# Patient Record
Sex: FEMALE | Race: WHITE | Employment: FULL TIME | URBAN - METROPOLITAN AREA
[De-identification: names, ages, dates, MRNs, and addresses within clinical notes are randomized per-mention and may not be internally consistent; named-entity substitution may affect disease eponyms.]

---

## 2017-01-05 ENCOUNTER — OFFICE VISIT (OUTPATIENT)
Dept: FAMILY MEDICINE CLINIC | Age: 47
End: 2017-01-05

## 2017-01-05 VITALS
DIASTOLIC BLOOD PRESSURE: 76 MMHG | RESPIRATION RATE: 18 BRPM | WEIGHT: 121.6 LBS | SYSTOLIC BLOOD PRESSURE: 112 MMHG | HEART RATE: 94 BPM | HEIGHT: 62 IN | BODY MASS INDEX: 22.38 KG/M2 | TEMPERATURE: 97.8 F

## 2017-01-05 DIAGNOSIS — R06.2 WHEEZING: ICD-10-CM

## 2017-01-05 DIAGNOSIS — R05.9 COUGH: ICD-10-CM

## 2017-01-05 DIAGNOSIS — J32.0 MAXILLARY SINUSITIS, UNSPECIFIED CHRONICITY: Primary | ICD-10-CM

## 2017-01-05 DIAGNOSIS — B37.0 ORAL THRUSH: ICD-10-CM

## 2017-01-05 DIAGNOSIS — R06.02 SOB (SHORTNESS OF BREATH): ICD-10-CM

## 2017-01-05 RX ORDER — BENZONATATE 100 MG/1
100 CAPSULE ORAL
Qty: 20 CAP | Refills: 0 | Status: SHIPPED | OUTPATIENT
Start: 2017-01-05 | End: 2017-01-12

## 2017-01-05 RX ORDER — ALBUTEROL SULFATE 90 UG/1
2 AEROSOL, METERED RESPIRATORY (INHALATION)
Qty: 1 INHALER | Refills: 4 | Status: SHIPPED | OUTPATIENT
Start: 2017-01-05 | End: 2020-09-05

## 2017-01-05 RX ORDER — NYSTATIN 100000 [USP'U]/ML
1 SUSPENSION ORAL 4 TIMES DAILY
Qty: 100 ML | Refills: 0 | Status: SHIPPED | OUTPATIENT
Start: 2017-01-05 | End: 2017-07-04

## 2017-01-05 RX ORDER — ALBUTEROL SULFATE 90 UG/1
AEROSOL, METERED RESPIRATORY (INHALATION)
Refills: 0 | COMMUNITY
Start: 2016-10-17 | End: 2017-01-05

## 2017-01-05 RX ORDER — LEVOFLOXACIN 500 MG/1
500 TABLET, FILM COATED ORAL DAILY
Qty: 7 TAB | Refills: 0 | Status: SHIPPED | OUTPATIENT
Start: 2017-01-05 | End: 2017-01-12

## 2017-01-05 NOTE — MR AVS SNAPSHOT
Visit Information Date & Time Provider Department Dept. Phone Encounter #  
 1/5/2017  9:50 AM Farzaneh Chacon, Carley Perez [place] [phone] [id number] Follow-up Instructions Return if symptoms worsen or fail to improve. Upcoming Health Maintenance Date Due  
 PAP AKA CERVICAL CYTOLOGY 4/8/2015 INFLUENZA AGE 9 TO ADULT 8/1/2016 DTaP/Tdap/Td series (2 - Td) 10/8/2024 Allergies as of 1/5/2017  Review Complete On: 1/5/2017 By: Farzaneh Chacon MD  
  
 Severity Noted Reaction Type Reactions Cephalosporins  04/19/2010    Hives Penicillins  04/19/2010    Hives Tetracyclines  04/19/2010    Hives Current Immunizations  Never Reviewed Name Date Tdap 10/8/2014 Not reviewed this visit You Were Diagnosed With   
  
 Codes Comments Maxillary sinusitis, unspecified chronicity    -  Primary ICD-10-CM: J32.0 ICD-9-CM: 473.0 Wheezing     ICD-10-CM: R06.2 ICD-9-CM: 786.07   
 SOB (shortness of breath)     ICD-10-CM: R06.02 
ICD-9-CM: 786.05 Oral thrush     ICD-10-CM: B37.0 ICD-9-CM: 112.0 Vitals BP Pulse Temp Resp Height(growth percentile) Weight(growth percentile) 112/76 94 97.8 °F (36.6 °C) (Oral) 18 5' 2\" (1.575 m) 121 lb 9.6 oz (55.2 kg) BMI OB Status Smoking Status 22.24 kg/m2 Ablation Former Smoker BMI and BSA Data Body Mass Index Body Surface Area  
 22.24 kg/m 2 1.55 m 2 Preferred Pharmacy Pharmacy Name Phone [facility] [address] AT 1 [facility] [phone] Your Updated Medication List  
  
   
This list is accurate as of: 1/5/17 11:01 AM.  Always use your most recent med list. ADVIL COLD AND SINUS  mg Cap Generic drug:  Pseudoephedrine-Ibuprofen Take  by mouth. albuterol 90 mcg/actuation inhaler Commonly known as:  PROVENTIL HFA, VENTOLIN HFA, PROAIR HFA  
 Take 2 Puffs by inhalation every four (4) hours as needed for Wheezing or Shortness of Breath. AMPHETAMINE SALT COMBO 20 mg tablet Generic drug:  dextroamphetamine-amphetamine Take 20 mg by mouth. ATIVAN 1 mg tablet Generic drug:  LORazepam  
Take 1 mg by mouth every eight (8) hours as needed. May take up to four times a day CENTRUM SILVER WOMEN PO Take 1 Tab by mouth daily. clonazePAM 1 mg tablet Commonly known as:  [place] Boop Take 1 mg by mouth two (2) times a day. levoFLOXacin 500 mg tablet Commonly known as:  Aldean Lefort Take 1 Tab by mouth daily for 7 days. nystatin 100,000 unit/mL suspension Commonly known as:  MYCOSTATIN Take 5 mL by mouth four (4) times daily. swish and swallow  
  
 traZODone 50 mg tablet Commonly known as:  Lemmie Radha Take 100 mg by mouth nightly. May have up to three tablets at bedtime Prescriptions Sent to Pharmacy Refills  
 albuterol (PROVENTIL HFA, VENTOLIN HFA, PROAIR HFA) 90 mcg/actuation inhaler 4 Sig: Take 2 Puffs by inhalation every four (4) hours as needed for Wheezing or Shortness of Breath. Class: Normal  
 Pharmacy: [facility] [address] [place] [address] [address] AT [facility] of [address] ([facility] Ph #: [phone] Route: Inhalation  
 levoFLOXacin (LEVAQUIN) 500 mg tablet 0 Sig: Take 1 Tab by mouth daily for 7 days. Class: Normal  
 Pharmacy: [facility] [place] [address] AT [facility] of [address] ([facility] Ph #: [phone] Route: Oral  
 nystatin (MYCOSTATIN) 100,000 unit/mL suspension 0 Sig: Take 5 mL by mouth four (4) times daily. swish and swallow Class: Normal  
 Pharmacy: [facility] [address] [place] [address] [address] AT [facility] of [address] ([facility] Ph #: [phone] Route: Oral  
  
Follow-up Instructions Return if symptoms worsen or fail to improve. Patient Instructions - Mucinex as needed for expectoration - Sudafed as needed for congestion - Robitussin as needed for cough - Zyrtec or Allegra or Claritin as needed for congestion 
- rest 
- plenty of fluids 
- albuterol inhaler every 4 hours 
- follow up if not feeling better in 3 days or if symptoms get worse Candidiasis: Care Instructions Your Care Instructions Candidiasis (say \"xcd-rks-HD-uh-shy\") is a yeast infection. Yeast normally lives in your body. But it can cause problems if your body's defenses don't work as they should. Some medicines can increase your chance of getting a yeast infection. These include antibiotics, steroids, and cancer drugs. And some diseases like AIDS and diabetes can make you more likely to get yeast infections. There are different types of yeast infections. Miya Swan is a yeast infection in the mouth. It usually occurs in people with weak immune systems. It causes white patches inside the mouth and throat. Yeast infections of the skin usually occur in skin folds where the skin stays moist. They cause red, oozing patches on your skin. Babies can get these infections under the diaper. People who often wear gloves can get them on their hands. Many women get vaginal yeast infections. They are most common when women take antibiotics. These infections can cause the vagina to itch and burn. They also cause white discharge that looks like cottage cheese. In rare cases, yeast infects the blood. This can cause serious disease. This kind of infection is treated with medicine given through a needle into a vein (IV). After you start treatment, a yeast infection usually goes away quickly. But if your immune system is weak, the infection may come back. Tell your doctor if you get yeast infections often. Follow-up care is a key part of your treatment and safety.  Be sure to make and go to all appointments, and call your doctor if you are having problems. It's also a good idea to know your test results and keep a list of the medicines you take. How can you care for yourself at home? · Take your medicines exactly as prescribed. Call your doctor if you think you are having a problem with your medicine. · Use antibiotics only as directed by your doctor. · Eat yogurt with live cultures. It has bacteria called lactobacillus. It may help prevent some types of yeast infections. · Keep your skin clean and dry. Put powder on moist places. · If you are using a cream or suppository to treat a vaginal yeast infection, don't use condoms or a diaphragm. Use a different type of birth control. · Eat a healthy diet and get regular exercise. This will help keep your immune system strong. When should you call for help? Call your doctor now or seek immediate medical care if: 
· You have a fever. · You are pregnant and have signs of a vaginal or urinary tract infection such as: ¨ Severe itching in your vagina. ¨ Pain during sex or when you urinate. ¨ Unusual discharge from your vagina. ¨ A frequent urge to urinate. ¨ Urine that is cloudy or smells bad. Watch closely for changes in your health, and be sure to contact your doctor if: 
· You do not get better as expected. Where can you learn more? Go to http://abdifatah-mariusz.info/. Enter L540 in the search box to learn more about \"Candidiasis: Care Instructions. \" Current as of: February 5, 2016 Content Version: 11.1 © 2869-3928 [employer]. Care instructions adapted under license by [employer] (which disclaims liability or warranty for this information). If you have questions about a medical condition or this instruction, always ask your healthcare professional. [address] any warranty or liability for your use of this information. Introducing \[facility]\"" & HEALTH SERVICES! Dear Rolene Prim: Thank you for requesting a Wamba account. Our records indicate that you already have an active Wamba account. You can access your account anytime at [email or web address] Did you know that you can access your hospital and ER discharge instructions at any time in Wamba? You can also review all of your test results from your hospital stay or ER visit. Additional Information If you have questions, please visit the Frequently Asked Questions section of the Wamba website at [email or web address] Remember, Wamba is NOT to be used for urgent needs. For medical emergencies, dial 911. Now available from your iPhone and Android! Please provide this summary of care documentation to your next provider. Your primary care clinician is listed as Joanie Soto. If you have any questions after today's visit, please call [phone].

## 2017-01-05 NOTE — PROGRESS NOTES
Hamida Vega is a 40 y.o. female with history of Crohn's disease, peripheral neuropathy, depression, Vit B 12 deficiency who presents with cold and congestion. History provided by: patient     HPI    Cold symptoms for 4 months, was seen by employee health and was given zpak in early December. CXR did not show PNA. Symptoms have acutely worsened. Looses her voice and it comes back. Cough with brown, green, yellow sputum, left ear fullness and runny nose  Temperature .3 F. Last night was 100.3F. No bodyaches     Needing proair 2 times a day for last 4 months, prior to that not needing it. Positive SOB and wheezing. No asthma or COPD. Former smoker quit 5 months ago. Painful mouth, felt on fire. White coating over the tongue. Did gargles and mouthwash. scrubbed with her toothbrush. Using netipot, taking advil cold and sinus without relief. Has tried mucinex too which did not work. Patient Active Problem List   Diagnosis Code    [facility] K50.90    Depression F32.9    Vertigo R42    Vitamin B12 deficiency E53.8    Peripheral neuropathy (HCC) G62.9    Nonspecific abnormal electrocardiogram (ECG) (EKG) R94.31          Current Outpatient Prescriptions:     Pseudoephedrine-Ibuprofen (ADVIL COLD AND SINUS)  mg cap, Take  by mouth., Disp: , Rfl:     albuterol (PROVENTIL HFA, VENTOLIN HFA, PROAIR HFA) 90 mcg/actuation inhaler, Take 2 Puffs by inhalation every four (4) hours as needed for Wheezing or Shortness of Breath., Disp: 1 Inhaler, Rfl: 4    levoFLOXacin (LEVAQUIN) 500 mg tablet, Take 1 Tab by mouth daily for 7 days. , Disp: 7 Tab, Rfl: 0    nystatin (MYCOSTATIN) 100,000 unit/mL suspension, Take 5 mL by mouth four (4) times daily. swish and swallow, Disp: 100 mL, Rfl: 0    benzonatate (TESSALON) 100 mg capsule, Take 1 Cap by mouth three (3) times daily as needed for Cough for up to 7 days. , Disp: 20 Cap, Rfl: 0    AMPHETAMINE SALT COMBO 20 mg tablet, Take 20 mg by mouth., Disp: , Rfl: 0    clonazePAM (KLONOPIN) 1 mg tablet, Take 1 mg by mouth two (2) times a day., Disp: , Rfl:     MULTIVITS-MIN/IRON/FA/LUTEIN (CENTRUM SILVER WOMEN PO), Take 1 Tab by mouth daily. , Disp: , Rfl:     trazodone (DESYREL) 50 mg tablet, Take 100 mg by mouth nightly. May have up to three tablets at bedtime, Disp: , Rfl:     lorazepam (ATIVAN) 1 mg tablet, Take 1 mg by mouth every eight (8) hours as needed. May take up to four times a day, Disp: , Rfl:      Allergies   Allergen Reactions    Cephalosporins Hives    Penicillins Hives    Tetracyclines Hives        Past Medical History   Diagnosis Date    [facility]      Crohn's Disease    Crohn disease ([facility] 4/19/2010    Crohn's     Depression 4/19/2010    Vertigo        ROS  As stated in HPI    Physical Exam   Constitutional: She is well-developed, well-nourished, and in no distress. /76  Pulse 94  Temp 97.8 °F (36.6 °C) (Oral)   Resp 18  Ht 5' 2\" (1.575 m)  Wt 121 lb 9.6 oz (55.2 kg)  BMI 22.24 kg/m2    HENT:   Head: Normocephalic and atraumatic. Mouth/Throat: No oropharyngeal exudate. Positive bilateral maxillary sinus tenderness  Nasal congestion  Left ear effusion, no erythema  Tongue is spotted area of white thick discharge   Eyes: Conjunctivae are normal. Pupils are equal, round, and reactive to light. Right eye exhibits no discharge. Left eye exhibits no discharge. No scleral icterus. Neck: Neck supple. Cardiovascular: Normal rate, regular rhythm, normal heart sounds and intact distal pulses. Exam reveals no gallop and no friction rub. No murmur heard. Pulmonary/Chest: Effort normal. She has no wheezes. She has no rales. Coarse breath sounds   Lymphadenopathy:     She has no cervical adenopathy. Vitals reviewed.     Assessment/Plan:   Petra Bruno is a 40 y.o. female with history of Crohn's disease, peripheral neuropathy, depression, Vit B 12 deficiency who presents with cold and congestion. ICD-10-CM ICD-9-CM    1. Maxillary sinusitis, unspecified chronicity J32.0 473.0 levoFLOXacin (LEVAQUIN) 500 mg tablet   2. Wheezing R06.2 786.07 albuterol (PROVENTIL HFA, VENTOLIN HFA, PROAIR HFA) 90 mcg/actuation inhaler   3. SOB (shortness of breath) R06.02 786.05 albuterol (PROVENTIL HFA, VENTOLIN HFA, PROAIR HFA) 90 mcg/actuation inhaler   4. Oral thrush B37.0 112.0 nystatin (MYCOSTATIN) 100,000 unit/mL suspension   5. Cough R05 786.2 benzonatate (TESSALON) 100 mg capsule     1. Maxillary sinusitis, unspecified chronicity  Acute worsening. Symptoms for 4 months. Has had zpak no relief. Allergic to PCN, tetracycline and cephalosporins.  - levoFLOXacin (LEVAQUIN) 500 mg tablet; Take 1 Tab by mouth daily for 7 days. Dispense: 7 Tab; Refill: 0  - Mucinex as needed for expectoration  - Sudafed as needed for congestion  - Robitussin as needed for cough  - Zyrtec or Allegra or Claritin as needed for congestion  - rest  - plenty of fluids  - albuterol inhaler every 4 hours  - follow up if not feeling better in 3 days or if symptoms get worse    2. Wheezing/SOB  Refills  - albuterol (PROVENTIL HFA, VENTOLIN HFA, PROAIR HFA) 90 mcg/actuation inhaler; Take 2 Puffs by inhalation every four (4) hours as needed for Wheezing or Shortness of Breath. Dispense: 1 Inhaler; Refill: 4    3. Oral thrush  - nystatin (MYCOSTATIN) 100,000 unit/mL suspension; Take 5 mL by mouth four (4) times daily. swish and swallow  Dispense: 100 mL; Refill: 0    4. Cough  - benzonatate (TESSALON) 100 mg capsule; Take 1 Cap by mouth three (3) times daily as needed for Cough for up to 7 days. Dispense: 20 Cap; Refill: 0    Follow-up Disposition:  Return if symptoms worsen or fail to improve. I have discussed the diagnosis with the patient and the intended plan as seen in the above orders. The patient has received an after-visit summary and questions were answered concerning future plans.   I have discussed medication side effects and warnings with the patient as well.     Chely Gaviria MD  Family Medicine Resident (PGY-3)  1/5/2017

## 2017-01-05 NOTE — PROGRESS NOTES
Malika Whitmore is a 40 y.o. female  Chief Complaint   Patient presents with    Cold Symptoms     cough, nasal congestion, chest congestion, fevers  x 4 months    Mouth Pain     reports severe pain in mouth x 5 days, states had white coating on tongue but scrubbed it off     1. Have you been to the ER, urgent care clinic since your last visit? Hospitalized since your last visit? No    2. Have you seen or consulted any other health care providers outside of the [address] [place] [address] since your last visit? Include any pap smears or colon screening. [address] for URI Early December 2016.

## 2017-01-05 NOTE — PATIENT INSTRUCTIONS
- Mucinex as needed for expectoration  - Sudafed as needed for congestion  - Robitussin as needed for cough  - Zyrtec or Allegra or Claritin as needed for congestion  - rest  - plenty of fluids  - albuterol inhaler every 4 hours  - follow up if not feeling better in 3 days or if symptoms get worse     Candidiasis: Care Instructions  Your Care Instructions  Candidiasis (say \"fri-mwu-DL-uh-shy\") is a yeast infection. Yeast normally lives in your body. But it can cause problems if your body's defenses don't work as they should. Some medicines can increase your chance of getting a yeast infection. These include antibiotics, steroids, and cancer drugs. And some diseases like AIDS and diabetes can make you more likely to get yeast infections. There are different types of yeast infections. Talon Bushy is a yeast infection in the mouth. It usually occurs in people with weak immune systems. It causes white patches inside the mouth and throat. Yeast infections of the skin usually occur in skin folds where the skin stays moist. They cause red, oozing patches on your skin. Babies can get these infections under the diaper. People who often wear gloves can get them on their hands. Many women get vaginal yeast infections. They are most common when women take antibiotics. These infections can cause the vagina to itch and burn. They also cause white discharge that looks like cottage cheese. In rare cases, yeast infects the blood. This can cause serious disease. This kind of infection is treated with medicine given through a needle into a vein (IV). After you start treatment, a yeast infection usually goes away quickly. But if your immune system is weak, the infection may come back. Tell your doctor if you get yeast infections often. Follow-up care is a key part of your treatment and safety. Be sure to make and go to all appointments, and call your doctor if you are having problems.  It's also a good idea to know your test results and keep a list of the medicines you take. How can you care for yourself at home? · Take your medicines exactly as prescribed. Call your doctor if you think you are having a problem with your medicine. · Use antibiotics only as directed by your doctor. · Eat yogurt with live cultures. It has bacteria called lactobacillus. It may help prevent some types of yeast infections. · Keep your skin clean and dry. Put powder on moist places. · If you are using a cream or suppository to treat a vaginal yeast infection, don't use condoms or a diaphragm. Use a different type of birth control. · Eat a healthy diet and get regular exercise. This will help keep your immune system strong. When should you call for help? Call your doctor now or seek immediate medical care if:  · You have a fever. · You are pregnant and have signs of a vaginal or urinary tract infection such as:  ¨ Severe itching in your vagina. ¨ Pain during sex or when you urinate. ¨ Unusual discharge from your vagina. ¨ A frequent urge to urinate. ¨ Urine that is cloudy or smells bad. Watch closely for changes in your health, and be sure to contact your doctor if:  · You do not get better as expected. Where can you learn more? Go to http://abdifatah-mariusz.info/. Enter C211 in the search box to learn more about \"Candidiasis: Care Instructions. \"  Current as of: February 5, 2016  Content Version: 11.1  © 9001-6825 [employer]. Care instructions adapted under license by [employer] (which disclaims liability or warranty for this information). If you have questions about a medical condition or this instruction, always ask your healthcare professional. [address] any warranty or liability for your use of this information.

## 2017-01-06 NOTE — PROGRESS NOTES
I reviewed the patient's medical history, the resident's findings on physical examination, the patient's diagnoses, and treatment plan as documented in the resident note. I concur with the treatment plan as documented. Additional suggestions noted.

## 2017-03-15 ENCOUNTER — OFFICE VISIT (OUTPATIENT)
Dept: FAMILY MEDICINE CLINIC | Age: 47
End: 2017-03-15

## 2017-03-15 VITALS
BODY MASS INDEX: 23 KG/M2 | WEIGHT: 125 LBS | DIASTOLIC BLOOD PRESSURE: 74 MMHG | RESPIRATION RATE: 18 BRPM | HEIGHT: 62 IN | HEART RATE: 89 BPM | TEMPERATURE: 98.6 F | SYSTOLIC BLOOD PRESSURE: 115 MMHG | OXYGEN SATURATION: 97 %

## 2017-03-15 DIAGNOSIS — M54.50 ACUTE MIDLINE LOW BACK PAIN WITHOUT SCIATICA: Primary | ICD-10-CM

## 2017-03-15 DIAGNOSIS — J32.9 SINUSITIS, UNSPECIFIED CHRONICITY, UNSPECIFIED LOCATION: ICD-10-CM

## 2017-03-15 DIAGNOSIS — E53.8 VITAMIN B12 DEFICIENCY: ICD-10-CM

## 2017-03-15 RX ORDER — CYANOCOBALAMIN 1000 UG/ML
1000 INJECTION, SOLUTION INTRAMUSCULAR; SUBCUTANEOUS ONCE
Qty: 1 ML | Refills: 0
Start: 2017-03-15 | End: 2017-03-15

## 2017-03-15 NOTE — PROGRESS NOTES
Chief Complaint   Patient presents with    Sinus Pain     pt states she feels drunk, and ear popping    Back Pain     1. Have you been to the ER, urgent care clinic since your last visit? Hospitalized since your last visit? No    2. Have you seen or consulted any other health care providers outside of the [address] [place] [address] since your last visit? Include any pap smears or colon screening.  No

## 2017-03-15 NOTE — PATIENT INSTRUCTIONS
Learning About How to Have a Healthy Back  What causes back pain? Back pain is often caused by overuse, strain, or injury. For example, people often hurt their backs playing sports or working in the yard, being jolted in a car accident, or lifting something too heavy. Aging plays a part too. Your bones and muscles tend to lose strength as you age, which makes injury more likely. The spongy discs between the bones of the spine (vertebrae) may suffer from wear and tear and no longer provide enough cushion between the bones. A disc that bulges or breaks open (herniated disc) can press on nerves, causing back pain. In some people, back pain is the result of arthritis, broken vertebrae caused by bone loss (osteoporosis), illness, or a spine problem. Although most people have back pain at one time or another, there are steps you can take to make it less likely. How can you have a healthy back? Reduce stress on your back through good posture  Slumping or slouching alone may not cause low back pain. But after the back has been strained or injured, bad posture can make pain worse. · Sleep in a position that maintains your back's normal curves and on a mattress that feels comfortable. Sleep on your side with a pillow between your knees, or sleep on your back with a pillow under your knees. These positions can reduce strain on your back. · Stand and sit up straight. \"Good posture\" generally means your ears, shoulders, and hips are in a straight line. · If you must stand for a long time, put one foot on a stool, ledge, or box. Switch feet every now and then. · Sit in a chair that is low enough to let you place both feet flat on the floor with both knees nearly level with your hips. If your chair or desk is too high, use a footrest to raise your knees. Place a small pillow, a rolled-up towel, or a lumbar roll in the curve of your back if you need extra support.   · Try a kneeling chair, which helps tilt your hips forward. This takes pressure off your lower back. · Try sitting on an exercise ball. It can rock from side to side, which helps keep your back loose. · When driving, keep your knees nearly level with your hips. Sit straight, and drive with both hands on the steering wheel. Your arms should be in a slightly bent position. Reduce stress on your back through careful lifting  · Squat down, bending at the hips and knees only. If you need to, put one knee to the floor and extend your other knee in front of you, bent at a right angle (half kneeling). · Press your chest straight forward. This helps keep your upper back straight while keeping a slight arch in your low back. · Hold the load as close to your body as possible, at the level of your belly button (navel). · Use your feet to change direction, taking small steps. · Lead with your hips as you change direction. Keep your shoulders in line with your hips as you move. · Set down your load carefully, squatting with your knees and hips only. Exercise and stretch your back  · Do some exercise on most days of the week, if your doctor says it is okay. You can walk, run, swim, or cycle. · Stretch your back muscles. Here are a few exercises to try:  Clevester Sanes on your back, and gently pull one bent knee to your chest. Put that foot back on the floor, and then pull the other knee to your chest.  ¨ Do pelvic tilts. Lie on your back with your knees bent. Tighten your stomach muscles. Pull your belly button (navel) in and up toward your ribs. You should feel like your back is pressing to the floor and your hips and pelvis are slightly lifting off the floor. Hold for 6 seconds while breathing smoothly. ¨ Sit with your back flat against a wall. · Keep your core muscles strong. The muscles of your back, belly (abdomen), and buttocks support your spine. ¨ Pull in your belly and imagine pulling your navel toward your spine. Hold this for 6 seconds, then relax.  Remember to keep breathing normally as you tense your muscles. ¨ Do curl-ups. Always do them with your knees bent. Keep your low back on the floor, and curl your shoulders toward your knees using a smooth, slow motion. Keep your arms folded across your chest. If this bothers your neck, try putting your hands behind your neck (not your head), with your elbows spread apart. ¨ Lie on your back with your knees bent and your feet flat on the floor. Tighten your belly muscles, and then push with your feet and raise your buttocks up a few inches. Hold this position 6 seconds as you continue to breathe normally, then lower yourself slowly to the floor. Repeat 8 to 12 times. ¨ If you like group exercise, try Pilates or yoga. These classes have poses that strengthen the core muscles. Lead a healthy lifestyle  · Stay at a healthy weight to avoid strain on your back. · Do not smoke. Smoking increases the risk of osteoporosis, which weakens the spine. If you need help quitting, talk to your doctor about stop-smoking programs and medicines. These can increase your chances of quitting for good. Where can you learn more? Go to http://abdifatah-mariusz.info/. Enter L315 in the search box to learn more about \"Learning About How to Have a Healthy Back. \"  Current as of: May 23, 2016  Content Version: 11.1  © 1106-3822 [employer]. Care instructions adapted under license by [employer] (which disclaims liability or warranty for this information). If you have questions about a medical condition or this instruction, always ask your healthcare professional. [place] [address] any warranty or liability for your use of this information.

## 2017-03-15 NOTE — MR AVS SNAPSHOT
Visit Information Date & Time Provider Department Dept. Phone Encounter #  
 3/15/2017 10:40 AM Marilyn Brown MD [address] [place] [address] [phone] [id number] Follow-up Instructions Return in about 1 week (around 3/22/2017), or if symptoms worsen or fail to improve. Upcoming Health Maintenance Date Due  
 PAP AKA CERVICAL CYTOLOGY 4/8/2015 INFLUENZA AGE 9 TO ADULT 8/1/2016 DTaP/Tdap/Td series (2 - Td) 10/8/2024 Allergies as of 3/15/2017  Review Complete On: 3/15/2017 By: Barbra Triana LPN Severity Noted Reaction Type Reactions Cephalosporins  04/19/2010    Hives Penicillins  04/19/2010    Hives Tetracyclines  04/19/2010    Hives Current Immunizations  Never Reviewed Name Date Tdap 10/8/2014 Not reviewed this visit You Were Diagnosed With   
  
 Codes Comments Sinusitis, unspecified chronicity, unspecified location    -  Primary ICD-10-CM: J32.9 ICD-9-CM: 473.9 Acute midline low back pain without sciatica     ICD-10-CM: M54.5 ICD-9-CM: 724.2 Vitals BP Pulse Temp Resp Height(growth percentile) Weight(growth percentile) 115/74 (BP 1 Location: Left arm, BP Patient Position: Sitting) 89 98.6 °F (37 °C) (Oral) 18 5' 2\" (1.575 m) 125 lb (56.7 kg) SpO2 BMI OB Status Smoking Status 97% 22.86 kg/m2 Ablation Former Smoker Vitals History BMI and BSA Data Body Mass Index Body Surface Area  
 22.86 kg/m 2 1.57 m 2 Preferred Pharmacy Pharmacy Name Phone [facility] [address]  [facility] [phone] Your Updated Medication List  
  
   
This list is accurate as of: 3/15/17 11:22 AM.  Always use your most recent med list. ADVIL COLD AND SINUS  mg Cap Generic drug:  Pseudoephedrine-Ibuprofen Take  by mouth. albuterol 90 mcg/actuation inhaler Commonly known as:  PROVENTIL HFA, VENTOLIN HFA, PROAIR HFA Take 2 Puffs by inhalation every four (4) hours as needed for Wheezing or Shortness of Breath. AMPHETAMINE SALT COMBO 20 mg tablet Generic drug:  dextroamphetamine-amphetamine Take 20 mg by mouth. ATIVAN 1 mg tablet Generic drug:  LORazepam  
Take 1 mg by mouth every eight (8) hours as needed. May take up to four times a day CENTRUM SILVER WOMEN PO Take 1 Tab by mouth daily. clonazePAM 1 mg tablet Commonly known as:  Viola Bourdon Take 1 mg by mouth two (2) times a day. nystatin 100,000 unit/mL suspension Commonly known as:  MYCOSTATIN Take 5 mL by mouth four (4) times daily. swish and swallow  
  
 traZODone 50 mg tablet Commonly known as:  Madolyn Saas Take 100 mg by mouth nightly. May have up to three tablets at bedtime Follow-up Instructions Return in about 1 week (around 3/22/2017), or if symptoms worsen or fail to improve. Patient Instructions Learning About How to Have a Healthy Back What causes back pain? Back pain is often caused by overuse, strain, or injury. For example, people often hurt their backs playing sports or working in the yard, being jolted in a car accident, or lifting something too heavy. Aging plays a part too. Your bones and muscles tend to lose strength as you age, which makes injury more likely. The spongy discs between the bones of the spine (vertebrae) may suffer from wear and tear and no longer provide enough cushion between the bones. A disc that bulges or breaks open (herniated disc) can press on nerves, causing back pain. In some people, back pain is the result of arthritis, broken vertebrae caused by bone loss (osteoporosis), illness, or a spine problem. Although most people have back pain at one time or another, there are steps you can take to make it less likely. How can you have a healthy back? Reduce stress on your back through good posture Slumping or slouching alone may not cause low back pain. But after the back has been strained or injured, bad posture can make pain worse. · Sleep in a position that maintains your back's normal curves and on a mattress that feels comfortable. Sleep on your side with a pillow between your knees, or sleep on your back with a pillow under your knees. These positions can reduce strain on your back. · Stand and sit up straight. \"Good posture\" generally means your ears, shoulders, and hips are in a straight line. · If you must stand for a long time, put one foot on a stool, ledge, or box. Switch feet every now and then. · Sit in a chair that is low enough to let you place both feet flat on the floor with both knees nearly level with your hips. If your chair or desk is too high, use a footrest to raise your knees. Place a small pillow, a rolled-up towel, or a lumbar roll in the curve of your back if you need extra support. · Try a kneeling chair, which helps tilt your hips forward. This takes pressure off your lower back. · Try sitting on an exercise ball. It can rock from side to side, which helps keep your back loose. · When driving, keep your knees nearly level with your hips. Sit straight, and drive with both hands on the steering wheel. Your arms should be in a slightly bent position. Reduce stress on your back through careful lifting · Squat down, bending at the hips and knees only. If you need to, put one knee to the floor and extend your other knee in front of you, bent at a right angle (half kneeling). · Press your chest straight forward. This helps keep your upper back straight while keeping a slight arch in your low back. · Hold the load as close to your body as possible, at the level of your belly button (navel). · Use your feet to change direction, taking small steps. · Lead with your hips as you change direction.  Keep your shoulders in line with your hips as you move. · Set down your load carefully, squatting with your knees and hips only. Exercise and stretch your back · Do some exercise on most days of the week, if your doctor says it is okay. You can walk, run, swim, or cycle. · Stretch your back muscles. Here are a few exercises to try: ¨ Lie on your back, and gently pull one bent knee to your chest. Put that foot back on the floor, and then pull the other knee to your chest. 
¨ Do pelvic tilts. Lie on your back with your knees bent. Tighten your stomach muscles. Pull your belly button (navel) in and up toward your ribs. You should feel like your back is pressing to the floor and your hips and pelvis are slightly lifting off the floor. Hold for 6 seconds while breathing smoothly. ¨ Sit with your back flat against a wall. · Keep your core muscles strong. The muscles of your back, belly (abdomen), and buttocks support your spine. ¨ Pull in your belly and imagine pulling your navel toward your spine. Hold this for 6 seconds, then relax. Remember to keep breathing normally as you tense your muscles. ¨ Do curl-ups. Always do them with your knees bent. Keep your low back on the floor, and curl your shoulders toward your knees using a smooth, slow motion. Keep your arms folded across your chest. If this bothers your neck, try putting your hands behind your neck (not your head), with your elbows spread apart. ¨ Lie on your back with your knees bent and your feet flat on the floor. Tighten your belly muscles, and then push with your feet and raise your buttocks up a few inches. Hold this position 6 seconds as you continue to breathe normally, then lower yourself slowly to the floor. Repeat 8 to 12 times. ¨ If you like group exercise, try Pilates or yoga. These classes have poses that strengthen the core muscles. Lead a healthy lifestyle · Stay at a healthy weight to avoid strain on your back. · Do not smoke. Smoking increases the risk of osteoporosis, which weakens the spine. If you need help quitting, talk to your doctor about stop-smoking programs and medicines. These can increase your chances of quitting for good. Where can you learn more? Go to http://abdifatah-mariusz.info/. Enter L315 in the search box to learn more about \"Learning About How to Have a Healthy Back. \" Current as of: May 23, 2016 Content Version: 11.1 © 5977-3181 [employer]. Care instructions adapted under license by [employer] (which disclaims liability or warranty for this information). If you have questions about a medical condition or this instruction, always ask your healthcare professional. [address] any warranty or liability for your use of this information. Introducing [facility]! Dear Dedrick Rosales: Thank you for requesting a [employer] account. Our records indicate that you already have an active [employer] account. You can access your account anytime at [email or web address] Did you know that you can access your hospital and ER discharge instructions at any time in [employer]? You can also review all of your test results from your hospital stay or ER visit. Additional Information If you have questions, please visit the Frequently Asked Questions section of the [employer] website at [email or web address] Remember, [employer] is NOT to be used for urgent needs. For medical emergencies, dial 911. Now available from your iPhone and Android! Please provide this summary of care documentation to your next provider. Your primary care clinician is listed as Maico Richards. If you have any questions after today's visit, please call [phone].

## 2017-07-04 ENCOUNTER — ANESTHESIA EVENT (OUTPATIENT)
Dept: SURGERY | Age: 47
DRG: 853 | End: 2017-07-04
Payer: COMMERCIAL

## 2017-07-04 ENCOUNTER — APPOINTMENT (OUTPATIENT)
Dept: CT IMAGING | Age: 47
DRG: 853 | End: 2017-07-04
Attending: EMERGENCY MEDICINE
Payer: COMMERCIAL

## 2017-07-04 ENCOUNTER — APPOINTMENT (OUTPATIENT)
Dept: GENERAL RADIOLOGY | Age: 47
DRG: 853 | End: 2017-07-04
Attending: EMERGENCY MEDICINE
Payer: COMMERCIAL

## 2017-07-04 ENCOUNTER — HOSPITAL ENCOUNTER (INPATIENT)
Age: 47
LOS: 17 days | Discharge: HOME HEALTH | DRG: 853 | End: 2017-07-21
Attending: EMERGENCY MEDICINE | Admitting: FAMILY MEDICINE
Payer: COMMERCIAL

## 2017-07-04 ENCOUNTER — ANESTHESIA (OUTPATIENT)
Dept: SURGERY | Age: 47
DRG: 853 | End: 2017-07-04
Payer: COMMERCIAL

## 2017-07-04 ENCOUNTER — APPOINTMENT (OUTPATIENT)
Dept: GENERAL RADIOLOGY | Age: 47
DRG: 853 | End: 2017-07-04
Attending: ANESTHESIOLOGY
Payer: COMMERCIAL

## 2017-07-04 DIAGNOSIS — D72.9 NEUTROPHILIA: ICD-10-CM

## 2017-07-04 DIAGNOSIS — L97.529 SKIN ULCERS OF FOOT, BILATERAL (HCC): ICD-10-CM

## 2017-07-04 DIAGNOSIS — D75.839 THROMBOCYTOSIS: ICD-10-CM

## 2017-07-04 DIAGNOSIS — K31.89 PERFORATED STOMACH, ACUTE: ICD-10-CM

## 2017-07-04 DIAGNOSIS — D72.829 LEUKOCYTOSIS, UNSPECIFIED TYPE: ICD-10-CM

## 2017-07-04 DIAGNOSIS — J18.9 PNEUMONIA OF LEFT LOWER LOBE DUE TO INFECTIOUS ORGANISM: Primary | ICD-10-CM

## 2017-07-04 DIAGNOSIS — D64.9 ANEMIA, UNSPECIFIED TYPE: ICD-10-CM

## 2017-07-04 DIAGNOSIS — L97.519 SKIN ULCERS OF FOOT, BILATERAL (HCC): ICD-10-CM

## 2017-07-04 PROBLEM — K63.1 PERFORATION BOWEL (HCC): Status: ACTIVE | Noted: 2017-07-04

## 2017-07-04 PROBLEM — S81.809A WOUNDS, MULTIPLE OPEN, LOWER EXTREMITY: Status: ACTIVE | Noted: 2017-07-04

## 2017-07-04 LAB
ALBUMIN SERPL BCP-MCNC: 2 G/DL (ref 3.5–5)
ALBUMIN/GLOB SERPL: 0.6 {RATIO} (ref 1.1–2.2)
ALP SERPL-CCNC: 72 U/L (ref 45–117)
ALT SERPL-CCNC: 23 U/L (ref 12–78)
ANION GAP BLD CALC-SCNC: 7 MMOL/L (ref 5–15)
APPEARANCE UR: ABNORMAL
APTT PPP: 32.1 SEC (ref 22.1–32.5)
AST SERPL W P-5'-P-CCNC: 15 U/L (ref 15–37)
BACTERIA URNS QL MICRO: NEGATIVE /HPF
BASOPHILS # BLD AUTO: 0 K/UL (ref 0–0.1)
BASOPHILS # BLD: 0 % (ref 0–1)
BILIRUB SERPL-MCNC: 0.2 MG/DL (ref 0.2–1)
BILIRUB UR QL: NEGATIVE
BUN SERPL-MCNC: 12 MG/DL (ref 6–20)
BUN/CREAT SERPL: 12 (ref 12–20)
CALCIUM SERPL-MCNC: 7.8 MG/DL (ref 8.5–10.1)
CHLORIDE SERPL-SCNC: 105 MMOL/L (ref 97–108)
CO2 SERPL-SCNC: 26 MMOL/L (ref 21–32)
COLOR UR: ABNORMAL
CREAT SERPL-MCNC: 1.03 MG/DL (ref 0.55–1.02)
DIFFERENTIAL METHOD BLD: ABNORMAL
EOSINOPHIL # BLD: 0.2 K/UL (ref 0–0.4)
EOSINOPHIL NFR BLD: 1 % (ref 0–7)
EPITH CASTS URNS QL MICRO: ABNORMAL /LPF
ERYTHROCYTE [DISTWIDTH] IN BLOOD BY AUTOMATED COUNT: 15 % (ref 11.5–14.5)
GLOBULIN SER CALC-MCNC: 3.2 G/DL (ref 2–4)
GLUCOSE SERPL-MCNC: 71 MG/DL (ref 65–100)
GLUCOSE UR STRIP.AUTO-MCNC: NEGATIVE MG/DL
HCT VFR BLD AUTO: 20.9 % (ref 35–47)
HEMOCCULT STL QL: NEGATIVE
HGB BLD-MCNC: 6.6 G/DL (ref 11.5–16)
HGB UR QL STRIP: NEGATIVE
HYALINE CASTS URNS QL MICRO: ABNORMAL /LPF (ref 0–5)
INR PPP: 1.2 (ref 0.9–1.1)
KETONES UR QL STRIP.AUTO: NEGATIVE MG/DL
LACTATE SERPL-SCNC: 1.3 MMOL/L (ref 0.4–2)
LDH SERPL L TO P-CCNC: 294 U/L (ref 81–246)
LEUKOCYTE ESTERASE UR QL STRIP.AUTO: ABNORMAL
LYMPHOCYTES # BLD AUTO: 10 % (ref 12–49)
LYMPHOCYTES # BLD: 1.8 K/UL (ref 0.8–3.5)
MCH RBC QN AUTO: 25.5 PG (ref 26–34)
MCHC RBC AUTO-ENTMCNC: 31.6 G/DL (ref 30–36.5)
MCV RBC AUTO: 80.7 FL (ref 80–99)
MONOCYTES # BLD: 0.9 K/UL (ref 0–1)
MONOCYTES NFR BLD AUTO: 5 % (ref 5–13)
NEUTS SEG # BLD: 15.2 K/UL (ref 1.8–8)
NEUTS SEG NFR BLD AUTO: 84 % (ref 32–75)
NITRITE UR QL STRIP.AUTO: NEGATIVE
PH UR STRIP: 6 [PH] (ref 5–8)
PLATELET # BLD AUTO: 547 K/UL (ref 150–400)
POTASSIUM SERPL-SCNC: 3.7 MMOL/L (ref 3.5–5.1)
PROT SERPL-MCNC: 5.2 G/DL (ref 6.4–8.2)
PROT UR STRIP-MCNC: NEGATIVE MG/DL
PROTHROMBIN TIME: 12.3 SEC (ref 9–11.1)
RBC # BLD AUTO: 2.59 M/UL (ref 3.8–5.2)
RBC #/AREA URNS HPF: ABNORMAL /HPF (ref 0–5)
RBC MORPH BLD: ABNORMAL
RETICS/RBC NFR AUTO: 2.3 % (ref 0.7–2.1)
SODIUM SERPL-SCNC: 138 MMOL/L (ref 136–145)
SP GR UR REFRACTOMETRY: 1.02 (ref 1–1.03)
THERAPEUTIC RANGE,PTTT: NORMAL SECS (ref 58–77)
UA: UC IF INDICATED,UAUC: ABNORMAL
UROBILINOGEN UR QL STRIP.AUTO: 0.2 EU/DL (ref 0.2–1)
WBC # BLD AUTO: 18.1 K/UL (ref 3.6–11)
WBC URNS QL MICRO: ABNORMAL /HPF (ref 0–4)

## 2017-07-04 PROCEDURE — 74011000250 HC RX REV CODE- 250

## 2017-07-04 PROCEDURE — 82728 ASSAY OF FERRITIN: CPT

## 2017-07-04 PROCEDURE — 74011250636 HC RX REV CODE- 250/636: Performed by: SURGERY

## 2017-07-04 PROCEDURE — 76210000016 HC OR PH I REC 1 TO 1.5 HR: Performed by: SURGERY

## 2017-07-04 PROCEDURE — 71020 XR CHEST PA LAT: CPT

## 2017-07-04 PROCEDURE — 74177 CT ABD & PELVIS W/CONTRAST: CPT

## 2017-07-04 PROCEDURE — 77030012407 HC DRN WND BARD -B: Performed by: SURGERY

## 2017-07-04 PROCEDURE — 77030008684 HC TU ET CUF COVD -B: Performed by: NURSE ANESTHETIST, CERTIFIED REGISTERED

## 2017-07-04 PROCEDURE — 36415 COLL VENOUS BLD VENIPUNCTURE: CPT | Performed by: EMERGENCY MEDICINE

## 2017-07-04 PROCEDURE — 77030032490 HC SLV COMPR SCD KNE COVD -B: Performed by: SURGERY

## 2017-07-04 PROCEDURE — 77030026438 HC STYL ET INTUB CARD -A: Performed by: NURSE ANESTHETIST, CERTIFIED REGISTERED

## 2017-07-04 PROCEDURE — 85045 AUTOMATED RETICULOCYTE COUNT: CPT | Performed by: EMERGENCY MEDICINE

## 2017-07-04 PROCEDURE — 0DQ60ZZ REPAIR STOMACH, OPEN APPROACH: ICD-10-PCS | Performed by: SURGERY

## 2017-07-04 PROCEDURE — 86900 BLOOD TYPING SEROLOGIC ABO: CPT

## 2017-07-04 PROCEDURE — 0DU607Z SUPPLEMENT STOMACH WITH AUTOLOGOUS TISSUE SUBSTITUTE, OPEN APPROACH: ICD-10-PCS | Performed by: SURGERY

## 2017-07-04 PROCEDURE — 87205 SMEAR GRAM STAIN: CPT | Performed by: FAMILY MEDICINE

## 2017-07-04 PROCEDURE — 77030011278 HC ELECTRD LIG IMPT COVD -F: Performed by: SURGERY

## 2017-07-04 PROCEDURE — 77030019908 HC STETH ESOPH SIMS -A: Performed by: NURSE ANESTHETIST, CERTIFIED REGISTERED

## 2017-07-04 PROCEDURE — 77030002916 HC SUT ETHLN J&J -A: Performed by: SURGERY

## 2017-07-04 PROCEDURE — 86923 COMPATIBILITY TEST ELECTRIC: CPT

## 2017-07-04 PROCEDURE — 77030002966 HC SUT PDS J&J -A: Performed by: SURGERY

## 2017-07-04 PROCEDURE — 76060000038 HC ANESTHESIA 3.5 TO 4 HR: Performed by: SURGERY

## 2017-07-04 PROCEDURE — 87086 URINE CULTURE/COLONY COUNT: CPT | Performed by: EMERGENCY MEDICINE

## 2017-07-04 PROCEDURE — 88307 TISSUE EXAM BY PATHOLOGIST: CPT | Performed by: SURGERY

## 2017-07-04 PROCEDURE — 74011250636 HC RX REV CODE- 250/636

## 2017-07-04 PROCEDURE — 74011250636 HC RX REV CODE- 250/636: Performed by: FAMILY MEDICINE

## 2017-07-04 PROCEDURE — 76010000134 HC OR TIME 3.5 TO 4 HR: Performed by: SURGERY

## 2017-07-04 PROCEDURE — 77030013567 HC DRN WND RESERV BARD -A: Performed by: SURGERY

## 2017-07-04 PROCEDURE — 87040 BLOOD CULTURE FOR BACTERIA: CPT | Performed by: EMERGENCY MEDICINE

## 2017-07-04 PROCEDURE — 77030018836 HC SOL IRR NACL ICUM -A: Performed by: SURGERY

## 2017-07-04 PROCEDURE — 77030018673: Performed by: SURGERY

## 2017-07-04 PROCEDURE — 82607 VITAMIN B-12: CPT

## 2017-07-04 PROCEDURE — 3E0M05Z INTRODUCTION OF ADHESION BARRIER INTO PERITONEAL CAVITY, OPEN APPROACH: ICD-10-PCS | Performed by: SURGERY

## 2017-07-04 PROCEDURE — 74011250636 HC RX REV CODE- 250/636: Performed by: EMERGENCY MEDICINE

## 2017-07-04 PROCEDURE — 85730 THROMBOPLASTIN TIME PARTIAL: CPT | Performed by: EMERGENCY MEDICINE

## 2017-07-04 PROCEDURE — 93005 ELECTROCARDIOGRAM TRACING: CPT

## 2017-07-04 PROCEDURE — 80053 COMPREHEN METABOLIC PANEL: CPT | Performed by: EMERGENCY MEDICINE

## 2017-07-04 PROCEDURE — 77030002933 HC SUT MCRYL J&J -A: Performed by: SURGERY

## 2017-07-04 PROCEDURE — 80053 COMPREHEN METABOLIC PANEL: CPT | Performed by: FAMILY MEDICINE

## 2017-07-04 PROCEDURE — 74011000250 HC RX REV CODE- 250: Performed by: EMERGENCY MEDICINE

## 2017-07-04 PROCEDURE — 88342 IMHCHEM/IMCYTCHM 1ST ANTB: CPT | Performed by: SURGERY

## 2017-07-04 PROCEDURE — 65610000006 HC RM INTENSIVE CARE

## 2017-07-04 PROCEDURE — 71010 XR CHEST PORT: CPT

## 2017-07-04 PROCEDURE — 77030011640 HC PAD GRND REM COVD -A: Performed by: SURGERY

## 2017-07-04 PROCEDURE — 74011636320 HC RX REV CODE- 636/320: Performed by: EMERGENCY MEDICINE

## 2017-07-04 PROCEDURE — 96361 HYDRATE IV INFUSION ADD-ON: CPT

## 2017-07-04 PROCEDURE — 0DBS0ZZ EXCISION OF GREATER OMENTUM, OPEN APPROACH: ICD-10-PCS | Performed by: SURGERY

## 2017-07-04 PROCEDURE — C9290 INJ, BUPIVACAINE LIPOSOME: HCPCS | Performed by: SURGERY

## 2017-07-04 PROCEDURE — 74011000258 HC RX REV CODE- 258: Performed by: SURGERY

## 2017-07-04 PROCEDURE — P9016 RBC LEUKOCYTES REDUCED: HCPCS

## 2017-07-04 PROCEDURE — 77030031139 HC SUT VCRL2 J&J -A: Performed by: SURGERY

## 2017-07-04 PROCEDURE — 77030002996 HC SUT SLK J&J -A: Performed by: SURGERY

## 2017-07-04 PROCEDURE — 83540 ASSAY OF IRON: CPT

## 2017-07-04 PROCEDURE — 83735 ASSAY OF MAGNESIUM: CPT | Performed by: FAMILY MEDICINE

## 2017-07-04 PROCEDURE — 83615 LACTATE (LD) (LDH) ENZYME: CPT | Performed by: EMERGENCY MEDICINE

## 2017-07-04 PROCEDURE — 82272 OCCULT BLD FECES 1-3 TESTS: CPT | Performed by: EMERGENCY MEDICINE

## 2017-07-04 PROCEDURE — 77030013079 HC BLNKT BAIR HGGR 3M -A: Performed by: NURSE ANESTHETIST, CERTIFIED REGISTERED

## 2017-07-04 PROCEDURE — 87075 CULTR BACTERIA EXCEPT BLOOD: CPT | Performed by: FAMILY MEDICINE

## 2017-07-04 PROCEDURE — 85610 PROTHROMBIN TIME: CPT | Performed by: EMERGENCY MEDICINE

## 2017-07-04 PROCEDURE — 81001 URINALYSIS AUTO W/SCOPE: CPT | Performed by: EMERGENCY MEDICINE

## 2017-07-04 PROCEDURE — 77030020061 HC IV BLD WRMR ADMIN SET 3M -B: Performed by: NURSE ANESTHETIST, CERTIFIED REGISTERED

## 2017-07-04 PROCEDURE — 77030008771 HC TU NG SALEM SUMP -A: Performed by: ANESTHESIOLOGY

## 2017-07-04 PROCEDURE — C1765 ADHESION BARRIER: HCPCS | Performed by: SURGERY

## 2017-07-04 PROCEDURE — 99285 EMERGENCY DEPT VISIT HI MDM: CPT

## 2017-07-04 PROCEDURE — 96374 THER/PROPH/DIAG INJ IV PUSH: CPT

## 2017-07-04 PROCEDURE — 83605 ASSAY OF LACTIC ACID: CPT | Performed by: EMERGENCY MEDICINE

## 2017-07-04 PROCEDURE — 30233N0 TRANSFUSION OF AUTOLOGOUS RED BLOOD CELLS INTO PERIPHERAL VEIN, PERCUTANEOUS APPROACH: ICD-10-PCS | Performed by: FAMILY MEDICINE

## 2017-07-04 PROCEDURE — 83010 ASSAY OF HAPTOGLOBIN QUANT: CPT

## 2017-07-04 PROCEDURE — 82746 ASSAY OF FOLIC ACID SERUM: CPT

## 2017-07-04 PROCEDURE — 85025 COMPLETE CBC W/AUTO DIFF WBC: CPT | Performed by: EMERGENCY MEDICINE

## 2017-07-04 RX ORDER — ROCURONIUM BROMIDE 10 MG/ML
INJECTION, SOLUTION INTRAVENOUS AS NEEDED
Status: DISCONTINUED | OUTPATIENT
Start: 2017-07-04 | End: 2017-07-04 | Stop reason: HOSPADM

## 2017-07-04 RX ORDER — DEXTROAMPHETAMINE SACCHARATE, AMPHETAMINE ASPARTATE, DEXTROAMPHETAMINE SULFATE AND AMPHETAMINE SULFATE 2.5; 2.5; 2.5; 2.5 MG/1; MG/1; MG/1; MG/1
10 TABLET ORAL
COMMUNITY
End: 2017-07-04

## 2017-07-04 RX ORDER — LORAZEPAM 1 MG/1
1 TABLET ORAL 4 TIMES DAILY
Status: ON HOLD | COMMUNITY
End: 2017-07-21

## 2017-07-04 RX ORDER — HYDROMORPHONE HCL IN 0.9% NACL 15 MG/30ML
PATIENT CONTROLLED ANALGESIA VIAL INTRAVENOUS
Status: DISCONTINUED | OUTPATIENT
Start: 2017-07-04 | End: 2017-07-13

## 2017-07-04 RX ORDER — SODIUM CHLORIDE 0.9 % (FLUSH) 0.9 %
5-10 SYRINGE (ML) INJECTION AS NEEDED
Status: DISCONTINUED | OUTPATIENT
Start: 2017-07-04 | End: 2017-07-04 | Stop reason: HOSPADM

## 2017-07-04 RX ORDER — FENTANYL CITRATE 50 UG/ML
INJECTION, SOLUTION INTRAMUSCULAR; INTRAVENOUS AS NEEDED
Status: DISCONTINUED | OUTPATIENT
Start: 2017-07-04 | End: 2017-07-04 | Stop reason: HOSPADM

## 2017-07-04 RX ORDER — ONDANSETRON 2 MG/ML
INJECTION INTRAMUSCULAR; INTRAVENOUS AS NEEDED
Status: DISCONTINUED | OUTPATIENT
Start: 2017-07-04 | End: 2017-07-04 | Stop reason: HOSPADM

## 2017-07-04 RX ORDER — SODIUM CHLORIDE, SODIUM LACTATE, POTASSIUM CHLORIDE, CALCIUM CHLORIDE 600; 310; 30; 20 MG/100ML; MG/100ML; MG/100ML; MG/100ML
1000 INJECTION, SOLUTION INTRAVENOUS CONTINUOUS
Status: DISCONTINUED | OUTPATIENT
Start: 2017-07-04 | End: 2017-07-04 | Stop reason: HOSPADM

## 2017-07-04 RX ORDER — LEVOFLOXACIN 5 MG/ML
750 INJECTION, SOLUTION INTRAVENOUS
Status: COMPLETED | OUTPATIENT
Start: 2017-07-04 | End: 2017-07-05

## 2017-07-04 RX ORDER — ESCITALOPRAM OXALATE 20 MG/1
20 TABLET ORAL DAILY
COMMUNITY
End: 2020-06-22

## 2017-07-04 RX ORDER — SODIUM CHLORIDE 0.9 % (FLUSH) 0.9 %
5-10 SYRINGE (ML) INJECTION AS NEEDED
Status: DISCONTINUED | OUTPATIENT
Start: 2017-07-04 | End: 2017-07-21 | Stop reason: HOSPADM

## 2017-07-04 RX ORDER — METRONIDAZOLE 500 MG/100ML
500 INJECTION, SOLUTION INTRAVENOUS EVERY 6 HOURS
Status: DISCONTINUED | OUTPATIENT
Start: 2017-07-05 | End: 2017-07-13

## 2017-07-04 RX ORDER — SODIUM CHLORIDE, SODIUM LACTATE, POTASSIUM CHLORIDE, CALCIUM CHLORIDE 600; 310; 30; 20 MG/100ML; MG/100ML; MG/100ML; MG/100ML
125 INJECTION, SOLUTION INTRAVENOUS CONTINUOUS
Status: DISCONTINUED | OUTPATIENT
Start: 2017-07-04 | End: 2017-07-05

## 2017-07-04 RX ORDER — SODIUM CHLORIDE 0.9 % (FLUSH) 0.9 %
5-10 SYRINGE (ML) INJECTION EVERY 8 HOURS
Status: DISCONTINUED | OUTPATIENT
Start: 2017-07-04 | End: 2017-07-04 | Stop reason: HOSPADM

## 2017-07-04 RX ORDER — SODIUM CHLORIDE 0.9 % (FLUSH) 0.9 %
5-10 SYRINGE (ML) INJECTION EVERY 8 HOURS
Status: DISCONTINUED | OUTPATIENT
Start: 2017-07-04 | End: 2017-07-21 | Stop reason: HOSPADM

## 2017-07-04 RX ORDER — GLYCOPYRROLATE 0.2 MG/ML
INJECTION INTRAMUSCULAR; INTRAVENOUS AS NEEDED
Status: DISCONTINUED | OUTPATIENT
Start: 2017-07-04 | End: 2017-07-04 | Stop reason: HOSPADM

## 2017-07-04 RX ORDER — FENTANYL CITRATE 50 UG/ML
25 INJECTION, SOLUTION INTRAMUSCULAR; INTRAVENOUS
Status: COMPLETED | OUTPATIENT
Start: 2017-07-04 | End: 2017-07-04

## 2017-07-04 RX ORDER — DEXTROAMPHETAMINE SACCHARATE, AMPHETAMINE ASPARTATE, DEXTROAMPHETAMINE SULFATE AND AMPHETAMINE SULFATE 5; 5; 5; 5 MG/1; MG/1; MG/1; MG/1
20 TABLET ORAL 3 TIMES DAILY
COMMUNITY
End: 2017-07-21

## 2017-07-04 RX ORDER — FLUCONAZOLE 2 MG/ML
200 INJECTION, SOLUTION INTRAVENOUS
Status: DISCONTINUED | OUTPATIENT
Start: 2017-07-04 | End: 2017-07-04

## 2017-07-04 RX ORDER — FLUCONAZOLE 2 MG/ML
400 INJECTION, SOLUTION INTRAVENOUS ONCE
Status: DISCONTINUED | OUTPATIENT
Start: 2017-07-04 | End: 2017-07-04

## 2017-07-04 RX ORDER — MINERAL OIL
180 ENEMA (ML) RECTAL DAILY
COMMUNITY
End: 2017-08-30 | Stop reason: ALTCHOICE

## 2017-07-04 RX ORDER — NEOSTIGMINE METHYLSULFATE 1 MG/ML
INJECTION INTRAVENOUS AS NEEDED
Status: DISCONTINUED | OUTPATIENT
Start: 2017-07-04 | End: 2017-07-04 | Stop reason: HOSPADM

## 2017-07-04 RX ORDER — SODIUM CHLORIDE 0.9 % (FLUSH) 0.9 %
5-10 SYRINGE (ML) INJECTION EVERY 8 HOURS
Status: DISCONTINUED | OUTPATIENT
Start: 2017-07-05 | End: 2017-07-04 | Stop reason: HOSPADM

## 2017-07-04 RX ORDER — MIDAZOLAM HYDROCHLORIDE 1 MG/ML
INJECTION, SOLUTION INTRAMUSCULAR; INTRAVENOUS AS NEEDED
Status: DISCONTINUED | OUTPATIENT
Start: 2017-07-04 | End: 2017-07-04 | Stop reason: HOSPADM

## 2017-07-04 RX ORDER — FLUCONAZOLE 2 MG/ML
400 INJECTION, SOLUTION INTRAVENOUS EVERY 24 HOURS
Status: DISCONTINUED | OUTPATIENT
Start: 2017-07-05 | End: 2017-07-04

## 2017-07-04 RX ORDER — LIDOCAINE HYDROCHLORIDE 20 MG/ML
INJECTION, SOLUTION EPIDURAL; INFILTRATION; INTRACAUDAL; PERINEURAL AS NEEDED
Status: DISCONTINUED | OUTPATIENT
Start: 2017-07-04 | End: 2017-07-04 | Stop reason: HOSPADM

## 2017-07-04 RX ORDER — PROPOFOL 10 MG/ML
INJECTION, EMULSION INTRAVENOUS AS NEEDED
Status: DISCONTINUED | OUTPATIENT
Start: 2017-07-04 | End: 2017-07-04 | Stop reason: HOSPADM

## 2017-07-04 RX ORDER — ALBUTEROL SULFATE 0.83 MG/ML
2.5 SOLUTION RESPIRATORY (INHALATION)
Status: DISCONTINUED | OUTPATIENT
Start: 2017-07-04 | End: 2017-07-21 | Stop reason: HOSPADM

## 2017-07-04 RX ORDER — HYDROMORPHONE HYDROCHLORIDE 1 MG/ML
.25-1 INJECTION, SOLUTION INTRAMUSCULAR; INTRAVENOUS; SUBCUTANEOUS
Status: DISCONTINUED | OUTPATIENT
Start: 2017-07-04 | End: 2017-07-04 | Stop reason: HOSPADM

## 2017-07-04 RX ORDER — NALOXONE HYDROCHLORIDE 0.4 MG/ML
0.4 INJECTION, SOLUTION INTRAMUSCULAR; INTRAVENOUS; SUBCUTANEOUS AS NEEDED
Status: DISCONTINUED | OUTPATIENT
Start: 2017-07-04 | End: 2017-07-21 | Stop reason: HOSPADM

## 2017-07-04 RX ORDER — LEVOFLOXACIN 5 MG/ML
750 INJECTION, SOLUTION INTRAVENOUS EVERY 24 HOURS
Status: DISCONTINUED | OUTPATIENT
Start: 2017-07-05 | End: 2017-07-04

## 2017-07-04 RX ORDER — SODIUM CHLORIDE, SODIUM LACTATE, POTASSIUM CHLORIDE, CALCIUM CHLORIDE 600; 310; 30; 20 MG/100ML; MG/100ML; MG/100ML; MG/100ML
INJECTION, SOLUTION INTRAVENOUS
Status: DISCONTINUED | OUTPATIENT
Start: 2017-07-04 | End: 2017-07-04 | Stop reason: HOSPADM

## 2017-07-04 RX ORDER — LEVOFLOXACIN 5 MG/ML
750 INJECTION, SOLUTION INTRAVENOUS EVERY 24 HOURS
Status: DISCONTINUED | OUTPATIENT
Start: 2017-07-05 | End: 2017-07-13

## 2017-07-04 RX ORDER — SODIUM CHLORIDE, SODIUM LACTATE, POTASSIUM CHLORIDE, CALCIUM CHLORIDE 600; 310; 30; 20 MG/100ML; MG/100ML; MG/100ML; MG/100ML
25 INJECTION, SOLUTION INTRAVENOUS CONTINUOUS
Status: DISCONTINUED | OUTPATIENT
Start: 2017-07-04 | End: 2017-07-04 | Stop reason: HOSPADM

## 2017-07-04 RX ORDER — HYDROMORPHONE HYDROCHLORIDE 2 MG/ML
INJECTION, SOLUTION INTRAMUSCULAR; INTRAVENOUS; SUBCUTANEOUS AS NEEDED
Status: DISCONTINUED | OUTPATIENT
Start: 2017-07-04 | End: 2017-07-04 | Stop reason: HOSPADM

## 2017-07-04 RX ORDER — METRONIDAZOLE 500 MG/100ML
500 INJECTION, SOLUTION INTRAVENOUS
Status: COMPLETED | OUTPATIENT
Start: 2017-07-04 | End: 2017-07-05

## 2017-07-04 RX ORDER — LIDOCAINE HYDROCHLORIDE 10 MG/ML
0.1 INJECTION, SOLUTION EPIDURAL; INFILTRATION; INTRACAUDAL; PERINEURAL AS NEEDED
Status: DISCONTINUED | OUTPATIENT
Start: 2017-07-04 | End: 2017-07-04 | Stop reason: HOSPADM

## 2017-07-04 RX ORDER — SUCCINYLCHOLINE CHLORIDE 20 MG/ML
INJECTION INTRAMUSCULAR; INTRAVENOUS AS NEEDED
Status: DISCONTINUED | OUTPATIENT
Start: 2017-07-04 | End: 2017-07-04 | Stop reason: HOSPADM

## 2017-07-04 RX ORDER — SODIUM CHLORIDE 9 MG/ML
250 INJECTION, SOLUTION INTRAVENOUS AS NEEDED
Status: DISCONTINUED | OUTPATIENT
Start: 2017-07-04 | End: 2017-07-21 | Stop reason: HOSPADM

## 2017-07-04 RX ORDER — TRAZODONE HYDROCHLORIDE 100 MG/1
50 TABLET ORAL
COMMUNITY
End: 2021-09-09

## 2017-07-04 RX ORDER — SODIUM CHLORIDE 9 MG/ML
100 INJECTION, SOLUTION INTRAVENOUS CONTINUOUS
Status: DISCONTINUED | OUTPATIENT
Start: 2017-07-04 | End: 2017-07-04

## 2017-07-04 RX ORDER — SODIUM CHLORIDE 9 MG/ML
INJECTION, SOLUTION INTRAVENOUS
Status: DISCONTINUED | OUTPATIENT
Start: 2017-07-04 | End: 2017-07-04 | Stop reason: HOSPADM

## 2017-07-04 RX ADMIN — SODIUM CHLORIDE 1000 ML: 900 INJECTION, SOLUTION INTRAVENOUS at 16:58

## 2017-07-04 RX ADMIN — NEOSTIGMINE METHYLSULFATE 3 MG: 1 INJECTION INTRAVENOUS at 22:21

## 2017-07-04 RX ADMIN — SODIUM CHLORIDE 200 MG: 900 INJECTION, SOLUTION INTRAVENOUS at 19:57

## 2017-07-04 RX ADMIN — FENTANYL CITRATE 100 MCG: 50 INJECTION, SOLUTION INTRAMUSCULAR; INTRAVENOUS at 19:37

## 2017-07-04 RX ADMIN — VANCOMYCIN HYDROCHLORIDE 1250 MG: 10 INJECTION, POWDER, LYOPHILIZED, FOR SOLUTION INTRAVENOUS at 20:21

## 2017-07-04 RX ADMIN — ONDANSETRON 4 MG: 2 INJECTION INTRAMUSCULAR; INTRAVENOUS at 22:10

## 2017-07-04 RX ADMIN — FENTANYL CITRATE 25 MCG: 50 INJECTION, SOLUTION INTRAMUSCULAR; INTRAVENOUS at 17:58

## 2017-07-04 RX ADMIN — SODIUM CHLORIDE, SODIUM LACTATE, POTASSIUM CHLORIDE, CALCIUM CHLORIDE: 600; 310; 30; 20 INJECTION, SOLUTION INTRAVENOUS at 20:04

## 2017-07-04 RX ADMIN — FENTANYL CITRATE 25 MCG: 50 INJECTION, SOLUTION INTRAMUSCULAR; INTRAVENOUS at 22:26

## 2017-07-04 RX ADMIN — ROCURONIUM BROMIDE 20 MG: 10 INJECTION, SOLUTION INTRAVENOUS at 19:35

## 2017-07-04 RX ADMIN — HYDROMORPHONE HYDROCHLORIDE 1 MG: 2 INJECTION, SOLUTION INTRAMUSCULAR; INTRAVENOUS; SUBCUTANEOUS at 22:43

## 2017-07-04 RX ADMIN — FENTANYL CITRATE 25 MCG: 50 INJECTION, SOLUTION INTRAMUSCULAR; INTRAVENOUS at 22:28

## 2017-07-04 RX ADMIN — IOPAMIDOL 100 ML: 755 INJECTION, SOLUTION INTRAVENOUS at 15:37

## 2017-07-04 RX ADMIN — FENTANYL CITRATE 100 MCG: 50 INJECTION, SOLUTION INTRAMUSCULAR; INTRAVENOUS at 21:03

## 2017-07-04 RX ADMIN — SODIUM CHLORIDE, SODIUM LACTATE, POTASSIUM CHLORIDE, CALCIUM CHLORIDE: 600; 310; 30; 20 INJECTION, SOLUTION INTRAVENOUS at 20:56

## 2017-07-04 RX ADMIN — ROCURONIUM BROMIDE 40 MG: 10 INJECTION, SOLUTION INTRAVENOUS at 19:08

## 2017-07-04 RX ADMIN — SODIUM CHLORIDE: 9 INJECTION, SOLUTION INTRAVENOUS at 21:00

## 2017-07-04 RX ADMIN — SODIUM CHLORIDE, SODIUM LACTATE, POTASSIUM CHLORIDE, CALCIUM CHLORIDE: 600; 310; 30; 20 INJECTION, SOLUTION INTRAVENOUS at 19:00

## 2017-07-04 RX ADMIN — LEVOFLOXACIN 750 MG: 5 INJECTION, SOLUTION INTRAVENOUS at 16:58

## 2017-07-04 RX ADMIN — SODIUM CHLORIDE 1000 ML: 900 INJECTION, SOLUTION INTRAVENOUS at 14:48

## 2017-07-04 RX ADMIN — FENTANYL CITRATE 100 MCG: 50 INJECTION, SOLUTION INTRAMUSCULAR; INTRAVENOUS at 19:01

## 2017-07-04 RX ADMIN — LIDOCAINE HYDROCHLORIDE 100 MG: 20 INJECTION, SOLUTION EPIDURAL; INFILTRATION; INTRACAUDAL; PERINEURAL at 19:01

## 2017-07-04 RX ADMIN — FENTANYL CITRATE 50 MCG: 50 INJECTION, SOLUTION INTRAMUSCULAR; INTRAVENOUS at 22:00

## 2017-07-04 RX ADMIN — HYDROMORPHONE HYDROCHLORIDE 1 MG: 2 INJECTION, SOLUTION INTRAMUSCULAR; INTRAVENOUS; SUBCUTANEOUS at 22:30

## 2017-07-04 RX ADMIN — ROCURONIUM BROMIDE 30 MG: 10 INJECTION, SOLUTION INTRAVENOUS at 20:08

## 2017-07-04 RX ADMIN — SODIUM CHLORIDE: 9 INJECTION, SOLUTION INTRAVENOUS at 18:50

## 2017-07-04 RX ADMIN — PROPOFOL 150 MG: 10 INJECTION, EMULSION INTRAVENOUS at 19:01

## 2017-07-04 RX ADMIN — FENTANYL CITRATE 25 MCG: 50 INJECTION, SOLUTION INTRAMUSCULAR; INTRAVENOUS at 15:45

## 2017-07-04 RX ADMIN — FENTANYL CITRATE 100 MCG: 50 INJECTION, SOLUTION INTRAMUSCULAR; INTRAVENOUS at 20:02

## 2017-07-04 RX ADMIN — NEOSTIGMINE METHYLSULFATE 1 MG: 1 INJECTION INTRAVENOUS at 22:30

## 2017-07-04 RX ADMIN — MIDAZOLAM HYDROCHLORIDE 2 MG: 1 INJECTION, SOLUTION INTRAMUSCULAR; INTRAVENOUS at 18:54

## 2017-07-04 RX ADMIN — SUCCINYLCHOLINE CHLORIDE 100 MG: 20 INJECTION INTRAMUSCULAR; INTRAVENOUS at 19:01

## 2017-07-04 RX ADMIN — ROCURONIUM BROMIDE 10 MG: 10 INJECTION, SOLUTION INTRAVENOUS at 19:01

## 2017-07-04 RX ADMIN — Medication: at 23:04

## 2017-07-04 RX ADMIN — METRONIDAZOLE 500 MG: 500 INJECTION, SOLUTION INTRAVENOUS at 20:12

## 2017-07-04 RX ADMIN — GLYCOPYRROLATE 0.5 MG: 0.2 INJECTION INTRAMUSCULAR; INTRAVENOUS at 22:21

## 2017-07-04 NOTE — PROGRESS NOTES
[facility] Dosing Services: 7/4/17    Consult for Vancomycin by Dr. Morena Vicente  Pharmacist reviewed antibiotic appropriateness for  56y.o. year old ,female for indication of perforated gastric ulcer sepsis. Trough goal: 15-20mcg/mL    Day of Therapy 1    Current Antimicrobial Therapy:  1250mg IVPB x1 then 1gm ivpb q12h    Previous Dosing Regimen none   Other Current Antibiotics Levaquin 750mg ivpb q24h, metronidazole 500mg ivpb q6h, antifungal    Serum Creatinine/ BUN Lab Results   Component Value Date/Time    Creatinine 1.03 07/04/2017 02:54 PM    Creatinine (POC) 1.0 05/06/2014 07:55 AM       Creatinine Clearance Estimated Creatinine Clearance: 54 mL/min (based on Cr of 1.03). Temp 98.1 °F (36.7 °C)    WBC Lab Results   Component Value Date/Time    WBC 18.1 07/04/2017 02:54 PM      H/H Lab Results   Component Value Date/Time    HGB 6.6 07/04/2017 02:54 PM      Platelets Lab Results   Component Value Date/Time    PLATELET 343 92/12/5510 02:54 PM        Significant Cultures: blood cx's pending  Recommendations regarding antibiotic therapy: recommending Eraxis in place of Fluconazole d/t QT prolongation and infusion duration.     Pharmacist HINA GoffD  Contact information: [phone]

## 2017-07-04 NOTE — ANESTHESIA PREPROCEDURE EVALUATION
Anesthetic History   No history of anesthetic complications            Review of Systems / Medical History  Patient summary reviewed and pertinent labs reviewed    Pulmonary          Smoker         Neuro/Psych              Cardiovascular  Within defined limits                Exercise tolerance: >4 METS     GI/Hepatic/Renal               Comments: Crohn's Disease Endo/Other        Anemia     Other Findings              Physical Exam    Airway  Mallampati: II  TM Distance: 4 - 6 cm  Neck ROM: normal range of motion   Mouth opening: Normal     Cardiovascular    Rhythm: regular  Rate: normal         Dental    Dentition: Poor dentition  Comments: Multiple missing and broken teeth   Pulmonary  Breath sounds clear to auscultation               Abdominal         Other Findings            Anesthetic Plan    ASA: 3, emergent  Anesthesia type: general            Anesthetic plan and risks discussed with: Father

## 2017-07-04 NOTE — ED NOTES
TRANSFER - OUT REPORT:    Verbal report given to Robert Starkey Rn(name) on Raphael Neumann  being transferred to OR(unit) for ordered procedure       Report consisted of patients Situation, Background, Assessment and   Recommendations(SBAR). Information from the following report(s) SBAR, ED Summary, [place] and Recent Results was reviewed with the receiving nurse. Lines:   Peripheral IV 07/04/17 Left Wrist (Active)   Site Assessment Clean, dry, & intact 7/4/2017  2:44 PM   Phlebitis Assessment 0 7/4/2017  2:44 PM   Infiltration Assessment 0 7/4/2017  2:44 PM   Dressing Status Clean, dry, & intact 7/4/2017  2:44 PM   Dressing Type Transparent 7/4/2017  2:44 PM   Hub Color/Line Status Pink;Capped;Flushed;Patent 7/4/2017  2:44 PM   Action Taken Blood drawn 7/4/2017  2:44 PM   Alcohol Cap Used Yes 7/4/2017  2:44 PM       Peripheral IV 07/04/17 Right Hand (Active)   Site Assessment Clean, dry, & intact 7/4/2017  2:53 PM   Phlebitis Assessment 0 7/4/2017  2:53 PM   Infiltration Assessment 0 7/4/2017  2:53 PM   Dressing Status Clean, dry, & intact 7/4/2017  2:53 PM   Dressing Type Transparent 7/4/2017  2:53 PM   Hub Color/Line Status Pink;Capped;Flushed;Patent 7/4/2017  2:53 PM   Action Taken Blood drawn 7/4/2017  2:53 PM   Alcohol Cap Used Yes 7/4/2017  2:53 PM        Opportunity for questions and clarification was provided.       Patient transported with:   OR Tech

## 2017-07-04 NOTE — PROGRESS NOTES
[facility]: MED RECONCILIATION    Comments: Talked to patient. - Patient states that she is on both Clonazepam and Alprazolam scheduled. RX Query does match that. - Prednisone : Recently finished (this month) Prednisone 10 mg pack (#48) + another 2 days of Prednisone 10 mg pack which was stopped yesterday by her physician due to no improvement. - Finished a week ago 3 weeks course of Doxycycline   - Took 150 mg Fluconazole one time on 7/2/17      Allergies: Cephalosporins; Penicillins; and Tetracyclines    Prior to Admission Medications:     Medication Documentation Review Audit       Reviewed by Kristin Vaughn, PHARMD (Pharmacist) on 07/04/17 at 1623         Medication Sig Documenting Provider Last Dose Status Taking? albuterol (PROVENTIL HFA, VENTOLIN HFA, PROAIR HFA) 90 mcg/actuation inhaler Take 2 Puffs by inhalation every four (4) hours as needed for Wheezing or Shortness of Breath. Marco Antonio Jalloh MD  Active Yes    clonazePAM (KLONOPIN) 1 mg tablet Take 1 mg by mouth two (2) times a day. Historical Provider 7/4/2017 AM Active Yes    dextroamphetamine-amphetamine (ADDERALL) 20 mg tablet Take 20 mg by mouth three (3) times daily. @@ 0800, 1200, 1400 Historical Provider 7/4/2017 Active Yes    escitalopram oxalate (LEXAPRO) 20 mg tablet Take 20 mg by mouth daily. Historical Provider 7/4/2017 Active Yes    fexofenadine (ALLEGRA) 180 mg tablet Take 180 mg by mouth daily. Historical Provider 7/4/2017 Active Yes    LORazepam (ATIVAN) 1 mg tablet Take 1 mg by mouth four (4) times daily. Historical Provider 7/4/2017 Active Yes    traZODone (DESYREL) 100 mg tablet Take 200 mg by mouth nightly.  Historical Provider 7/3/2017 Active Yes                  Kristin Vaughn, PHARMD   Contact: [phone]

## 2017-07-04 NOTE — ED PROVIDER NOTES
HPI Comments: 56 y.o. female with past medical history significant for Crohn's disease, depression, and vertigo who presents with chief complaint of wound. Patient was dx with Lyme disease ~1 month ago and placed on 21-day course of Doxycycline.  ~3 days after starting ABX, patient began developing bilateral feet swelling, along with rash and blistering to the feet/toes -- \"feels like my feet have been on fire. \"  Patient has also noticed \"random bruises\" on her legs. In addition to ABX, patient reports having been on \"2 different prednisone packs\" for these symptoms. Patient states that her blisters have \"popped,\" noting malodorous \"greenish clear\" drainage. Patient additionally complains of recent cough productive of yellow sputum, fever (102.2), and left-sided abdominal pain -- \"feels really hard, like I have a baby in there. \"  She was seen at [facility] earlier today, where her labs showed WBC 20 and HCT 24.8. Patient was referred to the ED due to concerns regarding anemia. Patient is not currently on any immunosuppressants for her Crohn's. Patient has h/o uterine ablation, LMP was \"years ago. \"  Patient reports not being able to pass her bowels regularly. Patient denies any hematochezia or melena. There are no other acute medical concerns at this time. Social hx: +tobacco smoker; denies EtOH; denies IV drug use  PCP: Tyler Davey MD  GI: Jamison Hollins. Hossein Yap MD    Note written by Bj Quinteros, as dictated by Dorene Bardales MD 2:35 PM    The history is provided by the patient.         Past Medical History:   Diagnosis Date    Autoimmune disease ([facility]     Crohn's Disease    Crohn disease ([facility] [address] [facility] 2010    Crohn's     Depression 2010    Vertigo        Past Surgical History:   Procedure Laterality Date    ABDOMEN SURGERY PROC UNLISTED      due to Crohn's-bowel resection x2    HX  SECTION      HX HEENT      HX TONSIL AND ADENOIDECTOMY      HX TUBAL LIGATION Family History:   Problem Relation Age of Onset    Heart Disease Father     Cancer Mother        Social History     Social History    Marital status:      Spouse name: N/A    Number of children: N/A    Years of education: N/A     Occupational History    Not on file. Social History Main Topics    Smoking status: Former Smoker     Packs/day: 0.50     Years: 8.00     Types: Cigarettes    Smokeless tobacco: Never Used    Alcohol use No    Drug use: No      Comment: 1 pack a day    Sexual activity: Yes     Partners: Male     Birth control/ protection: Pill     Other Topics Concern    Not on file     Social History Narrative         ALLERGIES: Cephalosporins; Penicillins; and Tetracyclines    Review of Systems   Constitutional: Positive for fever. Respiratory: Positive for cough. Cardiovascular: Positive for leg swelling. Gastrointestinal: Positive for abdominal pain and constipation. Negative for blood in stool. Skin: Positive for color change and wound. Hematological: Bruises/bleeds easily. All other systems reviewed and are negative. Vitals:    07/04/17 1420 07/04/17 1436   BP: 101/55    Pulse: (!) 101    Resp: 18    Temp: 98.1 °F (36.7 °C)    SpO2: 100% 100%   Weight: 54.9 kg (121 lb)    Height: 5' 2\" (1.575 m)             Physical Exam   Constitutional: She is oriented to person, place, and time. She appears well-developed and well-nourished. No distress. HENT:   Head: Normocephalic and atraumatic. Eyes: Conjunctivae and EOM are normal. Pupils are equal, round, and reactive to light. Neck: Normal range of motion. Neck supple. Cardiovascular: Normal rate, regular rhythm, normal heart sounds and intact distal pulses. Exam reveals no friction rub. No murmur heard. Pulmonary/Chest: Effort normal and breath sounds normal. No respiratory distress. She has no wheezes. She has no rales. She exhibits no tenderness. Abdominal: Soft.  Bowel sounds are normal. She exhibits no distension. There is tenderness. There is no rebound and no guarding. llq   Musculoskeletal: Normal range of motion. She exhibits edema. She exhibits no tenderness. 2+ pedal edema b/l;    Neurological: She is alert and oriented to person, place, and time. She exhibits normal muscle tone. Coordination normal.   Skin: Skin is warm and dry. She is not diaphoretic. No pallor. Ulcers to b/l feet    Scattered bruising   Psychiatric: She has a normal mood and affect. Her behavior is normal.   Nursing note and vitals reviewed. MDM  Number of Diagnoses or Management Options  Anemia, unspecified type:   Leukocytosis, unspecified type:   Pneumonia of left lower lobe due to infectious organism:   Skin ulcers of foot, bilateral ([facility] [address]): Thrombocytosis [facility]):   Diagnosis management comments: Repeat cbc, hb, plts. (hb 12 in June prior to abx) ? Aplastic anemia from doxycycline vs infectious process. eval for PNA, colitis/diverticulitis. Leg swelling creatinine nl at [facility] eval for low protein       Amount and/or Complexity of Data Reviewed  Clinical lab tests: ordered and reviewed  Tests in the radiology section of CPT®: ordered and reviewed  Obtain history from someone other than the patient: yes (parents)  Discuss the patient with other providers: yes (Hematology, family practice, surgery)  Independent visualization of images, tracings, or specimens: yes (ekg)    Critical Care  Total time providing critical care: 30-74 minutes (Total critical care time spend exclusive of procedures:  35)    Patient Progress  Patient progress: stable    ED Course       Procedures    EKG interpretation: (Preliminary)  Rhythm: normal sinus rhythm; and regular . Rate (approx.): 90; Axis: normal; P wave: normal; QRS interval: normal ; ST/T wave: non-specific changes;     CONSULT NOTE:  3:45 PM Loc Jhaveri MD spoke with Dr. Aileen Olmstead for hematology. Discussed available diagnostic tests and clinical findings. He is in agreement with care plans as outlined. Dr. Roberta Ag recommends sending LD, haptoglobin, peripheral smeal and retic count to check for aplastic anemia. He advises that if the pt does have aplastic anemia that we need to be careful of the type of blood the pt receives. 3:51 PM  Patient is being admitted to the hospital.  The results of their tests and reasons for their admission have been discussed with them and/or available family. They convey agreement and understanding for the need to be admitted and for their admission diagnosis. Consultation will be made now with the inpatient physician for hospitalization. CONSULT NOTE:  3:51 PM Phyllis Shields MD spoke with Dr. Alex Dobbins for [facility]. Discussed available diagnostic tests and clinical findings. They will admit. PROGRESS NOTE:  4:15 PM  Went in the room and the pt was eating although the pt was instructed not to eat or drink at the beginning of her time in the ED as she arrived with a sweet tea until we figured out what was going on. CONSULT NOTE:  4:21 PM Phyllis Shields MD spoke with Dr. Josiane Crowell MD, Consult for Surgery. Discussed available diagnostic tests and clinical findings. He is in agreement with care plans as outlined. Dr. Josiane Crowell MD is not on call for [facility], most likely Dr. José Miguel Lee. CONSULT NOTE:  4:34 PM Phyllis Shields MD spoke with Dr. Erendira Hammonds M.D., Consult for Surgery. Discussed available diagnostic tests and clinical findings. He is in agreement with care plans as outlined. Dr. Erendira Hammonds M.D. Will see the pt. Pt going to OR. [facility] ordered blood. Spoke with radiologist dr horn and fluid looks like simple fluid in abdomen not blood. Pt reports bp is always in low 100's.  Clinically she is well appearing and is not tachycardic or in distress

## 2017-07-04 NOTE — ED TRIAGE NOTES
Pt was Dx with lymes dieseae one month ago. Pres[facility] to [facility] today with blistering to toes and hands and edema to the bilateral lower legs and feet. Pt reports that they feel like they are on fire. WBC at [facility] 20.07 HCT 24.8  Has intermittent fevers, chills, general malaise.

## 2017-07-04 NOTE — H&P
[address] [facility] [address] [place] [address]   Office [phone]  Fax [phone]       Admission H&P     Name: Demetrius Jose MRN: [id number]  Sex: Female   YOB: 1970  Age: 56 y.o. PCP: Jarred Ta MD     Source of Information: patient, medical records    Chief complaint: foot wounds, abdominal pain. History of Present Illness  April RENATO Bella is a 56 y.o. female with known Chron's disease (s/p bowel resection, not currently on immunotherapy) vertigo and depression who presents to the ER complaining of foot wounds and abdominal pain. The patient very somnolent and cannot provide much history--history is mainly provided by stepmother and father in the room. She was diagnosed with lyme disease approximately 1 month which was treated with Doxycycline. Father says after the initiation of treatment pt had leg swelling along with rash and blistering of the feet and that she was told it was a reaction of the medication. As per father, pt fell on Friday night and as a result pt has a left upper back laceration of about 3 cm and after the fall her abdominal pain started. Pt says is 10/10 in intensity, localized to the left, no radiation and is better while she's holding her breath. Pt also complains about chills and a headache of 10/10 in intensity. Pt also says that her feet are swollen and sore. Father doesn't know what other symptoms pt is having at the moment. Past Medical History:   Diagnosis Date    [facility] disease ([facility]     Crohn's Disease    Crohn disease ([facility] 4/19/2010    Crohn's     Depression 4/19/2010    Vertigo       Patient Vitals for the past 12 hrs:   Temp Pulse Resp BP SpO2   07/04/17 1436 - - - - 100 %   07/04/17 1420 98.1 °F (36.7 °C) (!) 101 18 101/55 100 %       In the ER, vital signs were remarkable for Pulse of 101. Labs were remarkable for WBC 18.1, HGB 6.6, HCT 20.9, Platelets 736, Cr 1.76, Albumin 2.0.  Pt was treated with Levaquin 750 mg, Fentanyl injection 25 mcg and IV fluids. Home Medications   Prior to Admission medications    Medication Sig Start Date End Date Taking? Authorizing Provider   dextroamphetamine-amphetamine (ADDERALL) 20 mg tablet Take 20 mg by mouth three (3) times daily. @@ 0800, 1200, 1400   Yes Historical Provider   LORazepam (ATIVAN) 1 mg tablet Take 1 mg by mouth four (4) times daily. Yes Historical Provider   traZODone (DESYREL) 100 mg tablet Take 200 mg by mouth nightly. Yes Historical Provider   escitalopram oxalate (LEXAPRO) 20 mg tablet Take 20 mg by mouth daily. Yes Historical Provider   fexofenadine (ALLEGRA) 180 mg tablet Take 180 mg by mouth daily. Yes Historical Provider   albuterol (PROVENTIL HFA, VENTOLIN HFA, PROAIR HFA) 90 mcg/actuation inhaler Take 2 Puffs by inhalation every four (4) hours as needed for Wheezing or Shortness of Breath. 17  Yes Kassandra Bullock MD   clonazePAM (KLONOPIN) 1 mg tablet Take 1 mg by mouth two (2) times a day.    Yes Historical Provider       Allergies  Allergies   Allergen Reactions    Cephalosporins Hives    Penicillins Hives    Tetracyclines Hives       Past Medical History:   Diagnosis Date    Autoimmune disease ([facility] [address]     Crohn's Disease    Crohn disease ([facility] [address] 2010    Crohn's     Depression 2010    Vertigo        Previous Hospitalization(s)  -Vertigo in   -Acute renal failure, Abdominal pain and hematochezia, Anemia of GI loss, Chron disease in     Past Surgical History:   Procedure Laterality Date    ABDOMEN SURGERY PROC UNLISTED      due to Crohn's-bowel resection x2    HX  SECTION      HX HEENT      HX TONSIL AND ADENOIDECTOMY      HX TUBAL LIGATION         Family History   Problem Relation Age of Onset    Heart Disease Father     Cancer Mother        Social History  Social History     Social History    Marital status:      Spouse name: N/A    Number of children: N/A    Years of education: N/A     Occupational History    Not on file. Social History Main Topics    Smoking status: Former Smoker     Packs/day: 0.50     Years: 8.00     Types: Cigarettes    Smokeless tobacco: Never Used    Alcohol use No    Drug use: No      Comment: 1 pack a day    Sexual activity: Yes     Partners: Male     Birth control/ protection: Pill     Other Topics Concern    Not on file     Social History Narrative       Alcohol history: Not at all  Smoking history: Smokes 1 1/2 ppd x 32 years  Illicit drug history: Not at all    Living arrangement: patient lives with their son. Ambulates: Independently     Review of Systems  Review of Systems   Constitutional: Positive for chills and fever. HENT: Negative. Eyes: Negative. Respiratory: Positive for shortness of breath. Cardiovascular: Negative. Gastrointestinal: Positive for abdominal pain. Endocrine: Negative. Genitourinary: Negative. Neurological: Negative. Physical Exam  Objective:  General Appearance: In pain, uncomfortable and in distress. Vital signs: (most recent): Blood pressure 101/55, pulse (!) 101, temperature 98.1 °F (36.7 °C), resp. rate 18, height 5' 2\" (1.575 m), weight 121 lb (54.9 kg), SpO2 100 %. Fever. HEENT: Normal HEENT exam.    Lungs:  (Crackles were heard in right lower lobe)  Heart: Normal rate. Regular rhythm. Extremities: There is local extremity swelling. (Erythematous open wounds bilateral LE)  Neurological: Patient is alert and oriented to person, place and time. Skin:  (Color change and wounds in LE)  Abdomen: There is generalized tenderness. There is left lower quadrant tenderness. There is rebound tenderness. There is guarding. Pupils:  Pupils are equal, round, and reactive to light.     Back: Left laceration of about 3 cm    Foot wounds:               Left back laceration:          O2 Device: Room air         Laboratory Data  Recent Results (from the past 8 hour(s))   EKG, 12 LEAD, INITIAL    Collection Time: 07/04/17  2:45 PM   Result Value Ref Range    Ventricular Rate 91 BPM    Atrial Rate 91 BPM    P-R Interval 108 ms    QRS Duration 88 ms    Q-T Interval 352 ms    QTC Calculation (Bezet) 432 ms    Calculated P Axis 40 degrees    Calculated R Axis 59 degrees    Calculated T Axis 43 degrees    Diagnosis       Sinus rhythm with short NM  Otherwise normal ECG  When compared with ECG of 06-SEP-2015 13:25,  No significant change was found     LACTIC ACID    Collection Time: 07/04/17  2:54 PM   Result Value Ref Range    Lactic acid 1.3 0.4 - 2.0 MMOL/L   METABOLIC PANEL, COMPREHENSIVE    Collection Time: 07/04/17  2:54 PM   Result Value Ref Range    Sodium 138 136 - 145 mmol/L    Potassium 3.7 3.5 - 5.1 mmol/L    Chloride 105 97 - 108 mmol/L    CO2 26 21 - 32 mmol/L    Anion gap 7 5 - 15 mmol/L    Glucose 71 65 - 100 mg/dL    BUN 12 6 - 20 MG/DL    Creatinine 1.03 (H) 0.55 - 1.02 MG/DL    BUN/Creatinine ratio 12 12 - 20      GFR est AA >60 >60 ml/min/1.73m2    GFR est non-AA 58 (L) >60 ml/min/1.73m2    Calcium 7.8 (L) 8.5 - 10.1 MG/DL    Bilirubin, total 0.2 0.2 - 1.0 MG/DL    ALT (SGPT) 23 12 - 78 U/L    AST (SGOT) 15 15 - 37 U/L    Alk. phosphatase 72 45 - 117 U/L    Protein, total 5.2 (L) 6.4 - 8.2 g/dL    Albumin 2.0 (L) 3.5 - 5.0 g/dL    Globulin 3.2 2.0 - 4.0 g/dL    A-G Ratio 0.6 (L) 1.1 - 2.2     CBC WITH AUTOMATED DIFF    Collection Time: 07/04/17  2:54 PM   Result Value Ref Range    WBC 18.1 (H) 3.6 - 11.0 K/uL    RBC 2.59 (L) 3.80 - 5.20 M/uL    HGB 6.6 (L) 11.5 - 16.0 g/dL    HCT 20.9 (L) 35.0 - 47.0 %    MCV 80.7 80.0 - 99.0 FL    MCH 25.5 (L) 26.0 - 34.0 PG    MCHC 31.6 30.0 - 36.5 g/dL    RDW 15.0 (H) 11.5 - 14.5 %    PLATELET 011 (H) 236 - 400 K/uL    NEUTROPHILS 84 (H) 32 - 75 %    LYMPHOCYTES 10 (L) 12 - 49 %    MONOCYTES 5 5 - 13 %    EOSINOPHILS 1 0 - 7 %    BASOPHILS 0 0 - 1 %    ABS. NEUTROPHILS 15.2 (H) 1.8 - 8.0 K/UL    ABS. LYMPHOCYTES 1.8 0.8 - 3.5 K/UL    ABS.  MONOCYTES 0.9 0.0 - 1.0 K/UL ABS. EOSINOPHILS 0.2 0.0 - 0.4 K/UL    ABS. BASOPHILS 0.0 0.0 - 0.1 K/UL    DF SMEAR SCANNED      RBC COMMENTS FARIDEH CELLS  PRESENT        RBC COMMENTS HYPOCHROMIA  PRESENT        RBC COMMENTS ANISOCYTOSIS  1+       PROTHROMBIN TIME + INR    Collection Time: 07/04/17  2:54 PM   Result Value Ref Range    INR 1.2 (H) 0.9 - 1.1      Prothrombin time 12.3 (H) 9.0 - 11.1 sec   PTT    Collection Time: 07/04/17  2:54 PM   Result Value Ref Range    aPTT 32.1 22.1 - 32.5 sec    aPTT, therapeutic range     58.0 - 77.0 SECS   RETICULOCYTE COUNT    Collection Time: 07/04/17  2:56 PM   Result Value Ref Range    Reticulocyte count 2.3 (H) 0.7 - 2.1 %   LD    Collection Time: 07/04/17  2:56 PM   Result Value Ref Range     (H) 81 - 246 U/L   URINALYSIS W/ REFLEX CULTURE    Collection Time: 07/04/17  3:31 PM   Result Value Ref Range    Color YELLOW/STRAW      Appearance CLOUDY (A) CLEAR      Specific gravity 1.017 1.003 - 1.030      pH (UA) 6.0 5.0 - 8.0      Protein NEGATIVE  NEG mg/dL    Glucose NEGATIVE  NEG mg/dL    Ketone NEGATIVE  NEG mg/dL    Bilirubin NEGATIVE  NEG      Blood NEGATIVE  NEG      Urobilinogen 0.2 0.2 - 1.0 EU/dL    Nitrites NEGATIVE  NEG      Leukocyte Esterase TRACE (A) NEG      WBC 10-20 0 - 4 /hpf    RBC 0-5 0 - 5 /hpf    Epithelial cells FEW FEW /lpf    Bacteria NEGATIVE  NEG /hpf    UA:UC IF INDICATED URINE CULTURE ORDERED (A) CNI      Hyaline cast 0-2 0 - 5 /lpf   OCCULT BLOOD, STOOL    Collection Time: 07/04/17  4:42 PM   Result Value Ref Range    Occult blood, stool NEGATIVE  NEG     TYPE & CROSSMATCH    Collection Time: 07/04/17  4:42 PM   Result Value Ref Range    Crossmatch Expiration 07/07/2017     ABO/Rh(D) A NEGATIVE     Antibody screen NEG        Imaging  CXR Results  (Last 48 hours)               07/04/17 1524  XR CHEST PA LAT Final result    Impression:  IMPRESSION:     Small patch of left basilar airspace disease. Narrative:  INDICATION:  Cough and fever.        COMPARISON: 9/6/2015. FINDINGS:  PA and lateral views were obtained of the chest.     The heart is normal in size. A small patch of left basilar airspace disease is seen. No pulmonary edema or pleural effusion is evident. The regional osseous structures are unremarkable. CT Results  (Last 48 hours)               07/04/17 1545  CT ABD PELV W CONT Final result    Impression:  IMPRESSION:       1. Thickening of the gastric wall in the body and antrum with a perforation in   the posterior wall of the body. Gastric contents are seen extending down through   the defect. A moderate amount of fluid extends in the left upper quadrant from   adjacent to the adrenal gland along the greater curvature of the stomach to the   mid abdomen. No significant free air. A small amount of free fluid is also seen   in the right lower quadrant and in the deep pelvis. 2. Prior ileocolic anastomosis. 3. Areas of consolidation in the lingular tip and medial right middle lobe which   could related to atelectasis or airspace disease or possibly scarring. This is   new. 4. Bilateral nonobstructive nephrolithiasis. The findings were called to Dr. Victorino Javier on 7/4/2017 at 4:10 PM by myself. [id number]                   Narrative:  EXAM:  CT ABD PELV W CONT       INDICATION: llq pain. History of Crohn's disease and prior abdominal surgery. COMPARISON: 9/6/2015       CONTRAST:  100 mL of Isovue-370. TECHNIQUE:    Following the uneventful intravenous administration of 100 cc Isovue-370, thin   axial images were obtained through the abdomen and pelvis. Coronal and sagittal   reconstructions were generated. Oral contrast was not administered. CT dose   reduction was achieved through use of a standardized protocol tailored for this   examination and automatic exposure control for dose modulation. FINDINGS:    LUNG BASES: Areas of scarring are seen in the lingula and medial right middle   lobe.    INCIDENTALLY IMAGED HEART AND MEDIASTINUM: Unremarkable. LIVER: No mass or biliary dilatation. GALLBLADDER: Unremarkable. SPLEEN: No mass. PANCREAS: No mass or ductal dilatation. ADRENALS: Unremarkable. KIDNEYS: Small nonobstructive stones are seen in both kidneys. There is no   hydronephrosis. The kidneys are otherwise unremarkable. STOMACH: There is diffuse bowel wall thickening of the stomach. There is a gap   in the posterior stomach wall on series 2 image 27 measuring 3.1 cm in size. Stomach contents extend posteriorly in the gap. This is also well seen on   sagittal image 94 and coronal image 43. SMALL BOWEL: No dilatation or wall thickening. COLON: A surgical anastomosis is seen in the right colon. APPENDIX: Surgically absent. PERITONEUM: Free fluid is seen extending from adjacent to the left adrenal gland   down along the greater curvature of the stomach. This measures 2.4 x 2.8 cm on   series 2 image 17. This measures 4.5 x 2.7 cm on series 2 image 23. This   measures 2.5 x 4.9 cm on image 45. This measures 1.2 x 2.4 cm on image 54. A   small amount of free fluid is seen in the quadrant and in the deep pelvis. No   evidence of free air. RETROPERITONEUM: No lymphadenopathy or aortic aneurysm. REPRODUCTIVE ORGANS: The uterus appears small in size. URINARY BLADDER: No mass or calculus. BONES: No destructive bone lesion. ADDITIONAL COMMENTS: N/A                   EKG: normal EKG, normal sinus rhythm with short MN, unchanged from previous tracings. Assessment and Plan     Tiffany Petty is a 56 y.o. female with history of Chron disease and depression, who came to the ER for abdominal pain and foot wounds and was admitted for bowel perforation. Active Problems:    Perforation bowel ([address] (7/4/2017)      Pneumonia (7/4/2017)      Anemia (7/4/2017)      Wounds, multiple open, lower extremity (7/4/2017)    Bowel perforation: it was evidenced by Abd CT.  Pt will go into surgery today.  -NPO  -Flagyl 500mg, Levaquin 750mg, Anidulafungin 200 mg, Vancomycin 1000mg  -IV fluids  -Strict IN/Out    Pneumonia: Pt had some fever, SOB. WBC was elevated. CXR showed small patch of left basilar airspace disease.  -Levaquin 750 mg  -Albuterol nebulizer solution prn  -F/u VS  -F/u WBC    Anemia: HGB of 6.6, HCT of 20.9.  -Transfuse Packed RBC's  -CBC  -H&H    Lower extremity wounds: open erythematous wounds in both feets/  -IP consult for Wound care     Hypoalbuminemia: contributing to her leg swelling    Chron Disease: s/p bowel resection. Not currently on immunosuppressants.  -Consider GI consult later on     Lyme disease: as per pt she completed her Doxycycline treatment. Depression/Panic attacks: Adderall, Lorazepam, Trazodone, Escitalopram, Fefofenadine and Clonazepam were hold due to NPO. FEN/GI - NPO. NS at 100 mL/hr. Activity - Ambulate with assistance  DVT prophylaxis - SCDs  GI prophylaxis - Not indicated at this time  Fall prophylaxis - Not indicated at this time. Disposition - Admit to Stepdown. With low threshold of admission to ICU Plan to d/c to Home. Code Status - Full, discussed with patient / caregivers.       Patient Theresa Duke will be discussed Dr. Nabeel Sloan.    4:49 PM, 07/04/17  Violeta Blount MD  Family Medicine Resident       For Billing    Chief Complaint   Patient presents with    Rash    Fever    Sore Throat    Nasal Congestion       Hospital Problems  Date Reviewed: 1/5/2017          Codes Class Noted POA    Perforation bowel ([facility] ICD-10-CM: K63.1  ICD-9-CM: 569.83  7/4/2017 Unknown        Pneumonia ICD-10-CM: J18.9  ICD-9-CM: 486  7/4/2017 Unknown        Anemia ICD-10-CM: D64.9  ICD-9-CM: 285.9  7/4/2017 Unknown        Wounds, multiple open, lower extremity ICD-10-CM: F61.612I  ICD-9-CM: 894.0  7/4/2017 Unknown

## 2017-07-04 NOTE — IP AVS SNAPSHOT
Vviek Slade 
 
 
 [address] [place] [address] 
[phone] Patient: James Garrett MRN: [id number] :1970 Current Discharge Medication List  
  
START taking these medications Dose & Instructions Dispensing Information Comments Morning Noon Evening Bedtime  
 anidulafungin 100 mg Your last dose was: Your next dose is:    
   
   
 Dose:  100 mg  
100 mg by IntraVENous route every twenty-four (24) hours. Quantity:  1 Dose Refills:  0  
     
   
   
   
  
 meropenem 500 mg, ADDaptor 1 Device IVPB Your last dose was: Your next dose is:    
   
   
 Dose:  500 mg  
500 mg by IntraVENous route every six (6) hours. Quantity:  1 Dose Refills:  0  
     
   
   
   
  
 naloxone 2 mg/actuation Spry Your last dose was: Your next dose is:    
   
   
 Use 1 spray intranasally into 1 nostril. Use a new Narcan nasal spray for subsequent doses and administer into alternating nostrils. May repeat every 2 to 3 minutes as needed. Quantity:  1 Blister Refills:  0  
     
   
   
   
  
 oxyCODONE IR 10 mg Tab immediate release tablet Commonly known as:  Wyvobernabe Aguirre Your last dose was: Your next dose is:    
   
   
 Dose:  10 mg Take 1 Tab by mouth every eight (8) hours as needed. Max Daily Amount: 30 mg.  
 Quantity:  12 Tab Refills:  0  
     
   
   
   
  
 pantoprazole 40 mg tablet Commonly known as:  PROTONIX Your last dose was: Your next dose is:    
   
   
 Dose:  40 mg Take 1 Tab by mouth Before breakfast and dinner. Quantity:  30 Tab Refills:  2 CONTINUE these medications which have CHANGED Dose & Instructions Dispensing Information Comments Morning Noon Evening Bedtime LORazepam 1 mg tablet Commonly known as:  ATIVAN What changed:   
- when to take this 
- reasons to take this Your last dose was: Your next dose is:    
   
   
 Dose:  1 mg Take 1 Tab by mouth every eight (8) hours as needed for Anxiety. Max Daily Amount: 3 mg. Quantity:  12 Tab Refills:  0 CONTINUE these medications which have NOT CHANGED Dose & Instructions Dispensing Information Comments Morning Noon Evening Bedtime  
 albuterol 90 mcg/actuation inhaler Commonly known as:  PROVENTIL HFA, VENTOLIN HFA, PROAIR HFA Your last dose was: Your next dose is:    
   
   
 Dose:  2 Puff Take 2 Puffs by inhalation every four (4) hours as needed for Wheezing or Shortness of Breath. Quantity:  1 Inhaler Refills:  4  
     
   
   
   
  
 fexofenadine 180 mg tablet Commonly known as:  Linda Lapidus Your last dose was: Your next dose is:    
   
   
 Dose:  180 mg Take 180 mg by mouth daily. Refills:  0 LEXAPRO 20 mg tablet Generic drug:  escitalopram oxalate Your last dose was: Your next dose is:    
   
   
 Dose:  20 mg Take 20 mg by mouth daily. Refills:  0  
     
   
   
   
  
 traZODone 100 mg tablet Commonly known as:  Barbie Mtzman Your last dose was: Your next dose is:    
   
   
 Dose:  200 mg Take 200 mg by mouth nightly. Refills:  0 STOP taking these medications ADDERALL 20 mg tablet Generic drug:  dextroamphetamine-amphetamine  
   
  
 clonazePAM 1 mg tablet Commonly known as:  Dom Flores Where to Get Your Medications Information on where to get these meds will be given to you by the nurse or doctor. ! Ask your nurse or doctor about these medications  
  anidulafungin 100 mg LORazepam 1 mg tablet  
 meropenem 500 mg, ADDaptor 1 Device IVPB  
 naloxone 2 mg/actuation Spry  
 oxyCODONE IR 10 mg Tab immediate release tablet  
 pantoprazole 40 mg tablet

## 2017-07-04 NOTE — CONSULTS
Assessment:     Perforated hollow viscus, posterior wall of the stomach. Possibly secondary to steroid use. Plan:     Consent for  Emergent exploratory laparotomy, repair of gastric perforation and possible EGD  NPO  Levaquin, Flagyl, Vancomycin and Fluconazole  Cross for 4 units PRBC transfuse 2 now    Signed By: Beverly Neumann MD  [facility]  Office:  [phone]    July 4, 2017          General Surgery Consult    Subjective:      I was asked to see Nai Daly by Angus Dawn DO for management of a gastric perforation. The patient is a 56 y.o. female with prior history of Chron's disease s/p remote open ileocecectomy and small bowel resection in 2001, currently not on any medications for her Crohn's disease. She presents with a 4 day history of progressive, severe, sharp left upper and lower abdominal pain associated with abdominal distention, obstipation, SOB, and bilateral lower extremity edema. She was recently diagnosed with Lyme's disease and had a course of doxycycline prior to developing these symptoms. She completed a 1 month course of prednisone for her lower extremity edema. She denies nausea, emesis, fevers, chills, recent unintentional weight loss. She had a recent fall while cleaning a fan and landed on her left shoulder, she denies loss of consciousness or penetrating trauma. Work-up in the ER showed leukocytosis of 18.1, anemia with hemoglobin 6.6. CT abdomen/pelvis showed diffuse wall thickening of the stomach. There was a gap in the posterior stomach wall measuring 3.1 cm with extrusion of gastric contents and free fluid was seen extending from adjacent to the left adrenal gland down along the greater curvature of the stomach. She received 2L crystalloid and Levaquin. Vancomycin Flagyl, Fluconazole and 2 units of PRBC was ordered transfused but she has not received those therapies yet.     Past Medical History:   Diagnosis Date    Autoimmune disease ([facility] [address] Crohn's Disease    Crohn disease ([facility] [address] 2010    Crohn's     Depression 2010    Vertigo          Past Surgical History:   Procedure Laterality Date    ABDOMEN SURGERY PROC UNLISTED      due to Crohn's-bowel resection x2    HX  SECTION      HX HEENT      HX TONSIL AND ADENOIDECTOMY      HX TUBAL LIGATION        Family History   Problem Relation Age of Onset    Heart Disease Father     Cancer Mother      Social History     Social History    Marital status:      Spouse name: N/A    Number of children: N/A    Years of education: N/A     Social History Main Topics    Smoking status: Former Smoker     Packs/day: 0.50     Years: 8.00     Types: Cigarettes    Smokeless tobacco: Never Used    Alcohol use No    Drug use: No      Comment: 1 pack a day    Sexual activity: Yes     Partners: Male     Birth control/ protection: Pill     Other Topics Concern    None     Social History Narrative      Current Facility-Administered Medications   Medication Dose Route Frequency    sodium chloride (NS) flush 5-10 mL  5-10 mL IntraVENous PRN    sodium chloride 0.9 % bolus infusion 1,000 mL  1,000 mL IntraVENous NOW    levoFLOXacin (LEVAQUIN) 750 mg in D5W IVPB  750 mg IntraVENous NOW    metroNIDAZOLE (FLAGYL) IVPB premix 500 mg  500 mg IntraVENous NOW    0.9% sodium chloride infusion 250 mL  250 mL IntraVENous PRN    fentaNYL citrate (PF) injection 25 mcg  25 mcg IntraVENous NOW    sodium chloride (NS) flush 5-10 mL  5-10 mL IntraVENous Q8H    sodium chloride (NS) flush 5-10 mL  5-10 mL IntraVENous PRN    naloxone (NARCAN) injection 0.4 mg  0.4 mg IntraVENous PRN    metroNIDAZOLE (FLAGYL) IVPB premix 500 mg  500 mg IntraVENous Q6H    [START ON 2017] levoFLOXacin (LEVAQUIN) 750 mg in D5W IVPB  750 mg IntraVENous Q24H    0.9% sodium chloride infusion  100 mL/hr IntraVENous CONTINUOUS    albuterol (ACCUNEB) nebulizer solution 2.5 mg  2.5 mg Nebulization Q4H PRN    fluconazole in 0.9% NaCl (DIFLUCAN) 800 mg in 400 mL IV syringe  800 mg IntraVENous ONCE    [START ON 7/5/2017] fluconazole in 0.9% NaCl (DIFLUCAN) 400 mg in 200 mL IV syringe  400 mg IntraVENous Q24H    vancomycin (VANCOCIN) 1,250 mg in 0.9% sodium chloride 250 mL IVPB  1,250 mg IntraVENous ONCE    [START ON 7/5/2017] vancomycin (VANCOCIN) 1,000 mg in 0.9% sodium chloride (MBP/ADV) 250 mL  1 g IntraVENous Q12H     Current Outpatient Prescriptions   Medication Sig    dextroamphetamine-amphetamine (ADDERALL) 20 mg tablet Take 20 mg by mouth three (3) times daily. @@ 0800, 1200, 1400    LORazepam (ATIVAN) 1 mg tablet Take 1 mg by mouth four (4) times daily.  traZODone (DESYREL) 100 mg tablet Take 200 mg by mouth nightly.  escitalopram oxalate (LEXAPRO) 20 mg tablet Take 20 mg by mouth daily.  fexofenadine (ALLEGRA) 180 mg tablet Take 180 mg by mouth daily.  albuterol (PROVENTIL HFA, VENTOLIN HFA, PROAIR HFA) 90 mcg/actuation inhaler Take 2 Puffs by inhalation every four (4) hours as needed for Wheezing or Shortness of Breath.  clonazePAM (KLONOPIN) 1 mg tablet Take 1 mg by mouth two (2) times a day.       Allergies   Allergen Reactions    Cephalosporins Hives    Penicillins Hives    Tetracyclines Hives       Review of Systems:     []     Unable to obtain  ROS due to  []    mental status change  []    sedated   []    intubated   [x]    Total of 12 system negative, unless specified below or in HPI:  Constitutional: negative fever, negative chills, negative weight loss  Eyes:   negative visual changes  ENT:   negative sore throat, tongue or lip swelling  Respiratory:  negative cough, negative dyspnea  Cards:  negative for chest pain, palpitations, lower extremity edema  GI:   negative for nausea, vomiting, diarrhea, and abdominal pain  :  negative for frequency, dysuria  Integument:  negative for rash and pruritus  Heme:  negative for easy bruising and gum/nose bleeding  Musculoskel: negative for myalgias,  back pain and muscle weakness  Neuro:  negative for headaches, dizziness, vertigo  Psych:  negative for feelings of anxiety, depression     Objective:      Patient Vitals for the past 8 hrs:   BP Temp Pulse Resp SpO2 Height Weight   17 1436 - - - - 100 % - -   17 1420 101/55 98.1 °F (36.7 °C) (!) 101 18 100 % 5' 2\" (1.575 m) 54.9 kg (121 lb)       Temp (24hrs), Av.1 °F (36.7 °C), Min:98.1 °F (36.7 °C), Max:98.1 °F (36.7 °C)      Physical Exam:  General:  Alert, cooperative, uncomfortable, appears stated age. Eyes:  Conjunctivae/corneas clear. PERRL, EOMs intact. Nose: Nares normal. Septum midline. Mucosa normal. No drainage or sinus tenderness. Mouth/Throat: Lips, mucosa, and tongue normal. Teeth and gums normal.   Neck: Supple, symmetrical, trachea midline, no adenopathy, thyroid: no enlargment/tenderness/nodules, no carotid bruit and no JVD. Back:   Symmetric, no curvature. ROM normal. No CVA tenderness. Lungs:   Clear to auscultation bilaterally. Heart:  Regular rate and rhythm, S1, S2 normal, no murmur, click, rub or gallop. Abdomen:   Well healed RLQ incision without hernia, soft, moderately distended, Left sided tenderness with guarding. Extremities: 2+ bilateral lower extremity edema. Multiple healing partial thickness ulcers on the toes   Pulses: 2+ and symmetric all extremities.    Skin: Skin color, texture, turgor normal.    Lymph nodes: Cervical, supraclavicular, and axillary nodes normal.     Labs: Recent Labs      17   1454   WBC  18.1*   HGB  6.6*   HCT  20.9*   PLT  547*     Recent Labs      17   1454   NA  138   K  3.7   CL  105   CO2  26   GLU  71   BUN  12   CREA  1.03*   CA  7.8*   ALB  2.0*   TBILI  0.2   SGOT  15   ALT  23     Recent Labs      17   1454   INR  1.2*

## 2017-07-04 NOTE — IP AVS SNAPSHOT
[address] 
[phone] Patient: Levern Dakin MRN: [id number] :1970 You are allergic to the following Allergen Reactions Cephalosporins Hives Penicillins Hives Tetracyclines Nausea and Vomiting Recent Documentation Height Weight BMI OB Status Smoking Status 1.575 m 50.1 kg 20.21 kg/m2 Ablation Former Smoker Unresulted Labs Order Current Status CULTURE, BLOOD Preliminary result CULTURE, BLOOD Preliminary result CULTURE, BLOOD Preliminary result CULTURE, BLOOD, LINE Preliminary result Emergency Contacts Name Discharge Info Relation Home Work Mobile Valeria Alexandra  Other Relative [6] [phone] Anastasia Perez  Child [2] [phone] About your hospitalization You were admitted on:  2017 You last received care in the:  [facility] 4M POST SURG ORT 2 You were discharged on:  2017 Unit phone number:  [phone] Why you were hospitalized Your primary diagnosis was:  Perforation Bowel (Hcc) Your diagnoses also included:  Pneumonia, Anemia, Wounds, Multiple Open, Lower Extremity, Thrombocytosis (Hcc), Neutrophilia, Crohn Disease (Hcc) Providers Seen During Your Hospitalizations Provider Role Specialty Primary office phone Julien Wilson MD Attending Provider Emergency Medicine [phone] Gemma Gonzales DO Attending Provider [facility] [phone] Kellen Crockett MD Attending Provider [facility] [phone] Silvia Dennis MD Attending Provider [facility] [phone] Graham Mukherjee MD Attending Provider [facility] [phone] Your Primary Care Physician (PCP) Primary Care Physician Office Phone Office Fax [facility] [phone] Follow-up Information Follow up With Details Comments Contact Info [address] [place] [address]   [address] 
1st Floor [place] [address] 
[phone] [address] On 7/24/2017 9:55AM on Monday 7/24/17 with Dr. Codey Yan [address] [place] [address],[place] [address], [place] [address] 
[phone] Anum Sanchez MD Schedule an appointment as soon as possible for a visit in 2 weeks For General Surgery follow up and wound re-check [address] [place] [address] 
[phone] Ap Lopez MD   [address] [facility],[place] [address] [address] [address] [facility] 
[phone] [address] Frankey Dame 
[phone] Your Appointments Monday July 24, 2017  9:55 AM EDT TRANSITIONAL CARE MANAGEMENT with Codey Yan MD  
[address] [place] [address],[place] [address] [facility]  
[phone] Current Discharge Medication List  
  
START taking these medications Dose & Instructions Dispensing Information Comments Morning Noon Evening Bedtime  
 anidulafungin 100 mg Your last dose was: Your next dose is:    
   
   
 Dose:  100 mg  
100 mg by IntraVENous route every twenty-four (24) hours. Quantity:  1 Dose Refills:  0  
     
   
   
   
  
 meropenem 500 mg, ADDaptor 1 Device IVPB Your last dose was: Your next dose is:    
   
   
 Dose:  500 mg  
500 mg by IntraVENous route every six (6) hours. Quantity:  1 Dose Refills:  0  
     
   
   
   
  
 naloxone 2 mg/actuation Spry Your last dose was: Your next dose is:    
   
   
 Use 1 spray intranasally into 1 nostril. Use a new Narcan nasal spray for subsequent doses and administer into alternating nostrils. May repeat every 2 to 3 minutes as needed. Quantity:  1 Blister Refills:  0 oxyCODONE IR 10 mg Tab immediate release tablet Commonly known as:  Isatu Teran Your last dose was: Your next dose is:    
   
   
 Dose:  10 mg Take 1 Tab by mouth every eight (8) hours as needed. Max Daily Amount: 30 mg.  
 Quantity:  12 Tab Refills:  0  
     
   
   
   
  
 pantoprazole 40 mg tablet Commonly known as:  PROTONIX Your last dose was: Your next dose is:    
   
   
 Dose:  40 mg Take 1 Tab by mouth Before breakfast and dinner. Quantity:  30 Tab Refills:  2 CONTINUE these medications which have CHANGED Dose & Instructions Dispensing Information Comments Morning Noon Evening Bedtime LORazepam 1 mg tablet Commonly known as:  ATIVAN What changed:   
- when to take this 
- reasons to take this Your last dose was: Your next dose is:    
   
   
 Dose:  1 mg Take 1 Tab by mouth every eight (8) hours as needed for Anxiety. Max Daily Amount: 3 mg. Quantity:  12 Tab Refills:  0 CONTINUE these medications which have NOT CHANGED Dose & Instructions Dispensing Information Comments Morning Noon Evening Bedtime  
 albuterol 90 mcg/actuation inhaler Commonly known as:  PROVENTIL HFA, VENTOLIN HFA, PROAIR HFA Your last dose was: Your next dose is:    
   
   
 Dose:  2 Puff Take 2 Puffs by inhalation every four (4) hours as needed for Wheezing or Shortness of Breath. Quantity:  1 Inhaler Refills:  4  
     
   
   
   
  
 fexofenadine 180 mg tablet Commonly known as:  Dom Vesta Your last dose was: Your next dose is:    
   
   
 Dose:  180 mg Take 180 mg by mouth daily. Refills:  0 LEXAPRO 20 mg tablet Generic drug:  escitalopram oxalate Your last dose was: Your next dose is:    
   
   
 Dose:  20 mg Take 20 mg by mouth daily. Refills:  0  
     
   
   
   
  
 traZODone 100 mg tablet Commonly known as:  Marshall Ja Your last dose was: Your next dose is:    
   
   
 Dose:  200 mg Take 200 mg by mouth nightly. Refills:  0 STOP taking these medications ADDERALL 20 mg tablet Generic drug:  dextroamphetamine-amphetamine  
   
  
 clonazePAM 1 mg tablet Commonly known as:  Kvng Holm Where to Get Your Medications Information on where to get these meds will be given to you by the nurse or doctor. ! Ask your nurse or doctor about these medications  
  anidulafungin 100 mg LORazepam 1 mg tablet  
 meropenem 500 mg, ADDaptor 1 Device IVPB  
 naloxone 2 mg/actuation Spry  
 oxyCODONE IR 10 mg Tab immediate release tablet  
 pantoprazole 40 mg tablet Discharge Instructions HOME DISCHARGE INSTRUCTIONS Mara Segura / [id number] : 1970 Admission date: 2017 Discharge date: 2017 Please bring this form with you to show your care provider at your follow-up appointment. Primary care provider:  Teagan Molina MD 
 
Discharging provider: Melissa Cordero MD  - Family Medicine Resident Arianne Boyle MD - Attending, Family Medicine You have been admitted to the hospital with the following diagnoses: 
 
ACUTE DIAGNOSES: 
Perforation bowel Pneumonia Wounds, multiple open, lower extremity Anemia Gibran Bain . . . . . . . . . . . . . . . . . . . . . . . . . . . . . . . . . . . . . . . . . . . . . . . . . . . . . . . . . . . . . . . . . . . . . . Gibran Bain FOLLOW-UP CARE RECOMMENDATIONS: 
You will be going home with a six week course of Eraxis and Merrem for a total of six weeks. A home health nurse will be coming by daily to help with administration of your antibiotic and to check your PICC site for signs of infection. Keep PICC site clean and dry.  You will also have weekly labs to assess how your body is handling these long-term antibiotics. Never miss a dose of your antibiotic as this can cause bacteria to become resistant to the antibiotics--making the antibiotic ineffective in treatment. It is very important that you AVOID NSAIDs (aspirin, ibuprofen, naprosyn, BC or goody powders) as they can cause gastric ulcers which can lead to gastric perforations. Also, you will START taking Protonix 40mg daily. This will help protect the lining of your stomach and hopefully prevent ulcers from stomach acid. What to eat/Diet: Full liquid diet until 7/27/17 and then advance your diet to soft solid diet (mashed potatoes etc). Eat slowly, chew food extensively, and stop eating as soon as you reach satiety. Avoid taking in food and beverages at the same time. For pain control you are being given 10mg of Oxycodone. This can be used as needed up to three times a day. This prescription should last you until your appointment with your primary care provider on 8/24/17. You will also be given a prescription for Naloxone. This is given to all patients being sent home on narcotics as a reversal agent in case of an accidental overdose. Signs of opioid overdose include decreased breathing or respiratory rate, decreased bowel function (constipation), shallow breathing or pin point pupils. If you have decreased breaths or changes in mental status use the Naloxone as indicated. It can be physically and mentally exhausting when a person has a prolonged hospital stay such as you have. The body can become deconditioned. Due to this you will be receiving visits from physical therapy and occupational therapy. They are a part of your wellness team! They will work with you to improve your strength and functionality to do every day tasks that you were once able to do. You have gone more than 2 weeks without a cigarette! That is really awesome!  Talk with your primary care physician about ways to continue to be smoke-free. Not only is being smoke free good for you lungs, it is also good for your gut. People who smoke have an increased chance of forming a stomach ulcer. You were on the nicotine patch while in the hospital. If you like this method or want to explore other options (gum, lozenges) ask your primary care physician for all your options! Follow-up with your primary care physician about the listed medicines below: Adderall - you were not given this while inpatient. Have you doctor reassess whether or not you need to continue its use. Klonopin - you did not require this medication while inpatient to help with your anxiety. Discuss with your doctor whether or not you need to continue its use. Ativan- You were taking this medication four times daily but only required it three times daily while inpatient. Discuss with your doctor the frequency you need to take this medication. It was a pleasure taking care of you! Appointments Follow-up Information Follow up With Details Comments Contact [place] [address] 
1st Floor [place] [address] 
[phone] [address] On 7/24/2017 9:55AM on Monday 7/24/17 with Dr. Tiffany Florian [address] [facility], [facility] 
[phone] Erendira Hammonds MD Schedule an appointment as soon as possible for a visit in 2 weeks For General Surgery follow up and wound re-check [address] 
[phone] Sierra Ventura MD   [address] [facility] [address] [address] [address] 
[phone] Eliseo Jose 
[phone] FOLLOW-UP TESTS RECOMMENDED:  
· Weekly CBC and CMP while taking IV antibiotics. ONGOING TREATMENT PLAN: As per notes above PENDING TEST RESULTS: 
At the time of discharge the following test results are still pending: None.  
Please review these results as they become available. Specific symptoms to watch for: chest pain, shortness of breath, fever, chills, nausea, vomiting, diarrhea, change in mentation, falling, weakness, bleeding. DIET:  Full liquid diet until 7/27/17 and then advance your diet to soft solid diet (mashed potatoes etc). Eat slowly, chew food extensively, and stop eating as soon as you reach satiety. Avoid taking in food and beverages at the same time. ACTIVITY:  Activity as tolerated; home PT/OT 
 
WOUND CARE: Per home health wound care nurse EQUIPMENT needed:  Shower chair per OT 
 
INCIDENTAL FINDINGS:  None What to do if new or unexpected symptoms occur? If you experience any of the above symptoms (or should other concerns or questions arise after discharge) please call your primary care physician. Return to the emergency room if you cannot get hold of your doctor. · It is very important that you keep your follow-up appointment(s). · Please bring discharge papers, medication list (and/or medication bottles) to your follow-up appointments for review by your outpatient provider(s). · Please check the list of medications and be sure it includes every medication (even non-prescription medications) that your provider wants you to take. · It is important that you take the medication exactly as they are prescribed. · Keep your medication in the bottles provided by the pharmacist and keep a list of the medication names, dosages, and times to be taken in your wallet. · Do not take other medications without consulting your doctor. · If you have any questions about your medications or other instructions, please talk to your nurse or care provider before you leave the hospital.  
 
Information obtained by:  
 
I understand that if any problems occur once I am at home I am to contact my physician. These instructions were explained to me and I had the opportunity to ask questions. I understand and acknowledge receipt of the instructions indicated above. Physician's or R.N.'s Signature                                                                  Date/Time Patient or Representative Signature                                                          Date/Time 
 
 
 
Nutrition Recommendations for Discharge: 
 
Continue Oral Nutrition Supplements at discharge: Ensure Enlive or Ensure Plus twice daily between meals  
for 30 days unless otherwise directed by your Primary Care Physician. This product can be purchased at your local grocery store or drug store and online. Selma Encinas MD, RD 
 _ General Surgery Instructions Recommended diet: Full liquids. May start soft diet on 7/25 Recommended activity: No heavy lifting or strenuous activity for 6 weeks. You may shower. You may drive once pain has improved and you no longer require pain medication. DO NOT take any NSAID medications (ibuprofen, Motrin, Advil, Naproxen, Aleve, Mobic, Aspirin, etc) Follow up with Dr. Heidi De La Garza in 2 weeks. Call [facility] to make an appointment. Discharge Orders None [facility] Announcement We are excited to announce that we are making your provider's discharge notes available to you in Ziipa. You will see these notes when they are completed and signed by the physician that discharged you from your recent hospital stay. If you have any questions or concerns about any information you see in [employer], please call the Health Information Department where you were seen or reach out to your Primary Care Provider for more information about your plan of care. Introducing [facility] HE[facility]! Dear Miguel Angel Amado: Thank you for requesting a Branch[employer] account. Our records indicate that you already have an active Branch[employer] account. You can access your account anytime at [email or web address] Did you know that you can access your hospital and ER discharge instructions at any time in Branchly? You can also review all of your test results from your hospital stay or ER visit. Additional Information If you have questions, please visit the Frequently Asked Questions section of the Branch[employer] website at [email or web address] Remember, Branchly is NOT to be used for urgent needs. For medical emergencies, dial 911. Now available from your iPhone and Android! General Information Please provide this summary of care documentation to your next provider. Patient Signature:  ____________________________________________________________ Date:  ____________________________________________________________  
  
Eugenio Valadez Provider Signature:  ____________________________________________________________ Date:  ____________________________________________________________

## 2017-07-05 PROBLEM — D75.839 THROMBOCYTOSIS: Status: ACTIVE | Noted: 2017-07-05

## 2017-07-05 PROBLEM — D72.9 NEUTROPHILIA: Status: ACTIVE | Noted: 2017-07-05

## 2017-07-05 LAB
ALBUMIN SERPL BCP-MCNC: 1.3 G/DL (ref 3.5–5)
ALBUMIN SERPL BCP-MCNC: 1.3 G/DL (ref 3.5–5)
ALBUMIN/GLOB SERPL: 0.5 {RATIO} (ref 1.1–2.2)
ALBUMIN/GLOB SERPL: 0.5 {RATIO} (ref 1.1–2.2)
ALP SERPL-CCNC: 63 U/L (ref 45–117)
ALP SERPL-CCNC: 64 U/L (ref 45–117)
ALT SERPL-CCNC: 15 U/L (ref 12–78)
ALT SERPL-CCNC: 17 U/L (ref 12–78)
ANION GAP BLD CALC-SCNC: 6 MMOL/L (ref 5–15)
ANION GAP BLD CALC-SCNC: 6 MMOL/L (ref 5–15)
AST SERPL W P-5'-P-CCNC: 15 U/L (ref 15–37)
AST SERPL W P-5'-P-CCNC: 18 U/L (ref 15–37)
ATRIAL RATE: 91 BPM
BASOPHILS # BLD AUTO: 0 K/UL (ref 0–0.1)
BASOPHILS # BLD AUTO: 0 K/UL (ref 0–0.1)
BASOPHILS # BLD: 0 % (ref 0–1)
BASOPHILS # BLD: 0 % (ref 0–1)
BILIRUB SERPL-MCNC: 0.5 MG/DL (ref 0.2–1)
BILIRUB SERPL-MCNC: 0.7 MG/DL (ref 0.2–1)
BUN SERPL-MCNC: 7 MG/DL (ref 6–20)
BUN SERPL-MCNC: 8 MG/DL (ref 6–20)
BUN/CREAT SERPL: 10 (ref 12–20)
BUN/CREAT SERPL: 11 (ref 12–20)
CA-I BLD-SCNC: 1.07 MMOL/L (ref 1.13–1.32)
CALCIUM SERPL-MCNC: 6.8 MG/DL (ref 8.5–10.1)
CALCIUM SERPL-MCNC: 6.9 MG/DL (ref 8.5–10.1)
CALCULATED P AXIS, ECG09: 40 DEGREES
CALCULATED R AXIS, ECG10: 59 DEGREES
CALCULATED T AXIS, ECG11: 43 DEGREES
CHLORIDE SERPL-SCNC: 108 MMOL/L (ref 97–108)
CHLORIDE SERPL-SCNC: 108 MMOL/L (ref 97–108)
CO2 SERPL-SCNC: 25 MMOL/L (ref 21–32)
CO2 SERPL-SCNC: 25 MMOL/L (ref 21–32)
CREAT SERPL-MCNC: 0.7 MG/DL (ref 0.55–1.02)
CREAT SERPL-MCNC: 0.74 MG/DL (ref 0.55–1.02)
DIAGNOSIS, 93000: NORMAL
DIFFERENTIAL METHOD BLD: ABNORMAL
EOSINOPHIL # BLD: 0.2 K/UL (ref 0–0.4)
EOSINOPHIL # BLD: 0.2 K/UL (ref 0–0.4)
EOSINOPHIL NFR BLD: 1 % (ref 0–7)
EOSINOPHIL NFR BLD: 1 % (ref 0–7)
ERYTHROCYTE [DISTWIDTH] IN BLOOD BY AUTOMATED COUNT: 14.7 % (ref 11.5–14.5)
ERYTHROCYTE [DISTWIDTH] IN BLOOD BY AUTOMATED COUNT: 14.7 % (ref 11.5–14.5)
FERRITIN SERPL-MCNC: 46 NG/ML (ref 8–252)
FOLATE SERPL-MCNC: 7.5 NG/ML (ref 5–21)
GLOBULIN SER CALC-MCNC: 2.5 G/DL (ref 2–4)
GLOBULIN SER CALC-MCNC: 2.5 G/DL (ref 2–4)
GLUCOSE BLD STRIP.AUTO-MCNC: 111 MG/DL (ref 65–100)
GLUCOSE BLD STRIP.AUTO-MCNC: 166 MG/DL (ref 65–100)
GLUCOSE BLD STRIP.AUTO-MCNC: 53 MG/DL (ref 65–100)
GLUCOSE BLD STRIP.AUTO-MCNC: 61 MG/DL (ref 65–100)
GLUCOSE BLD STRIP.AUTO-MCNC: 83 MG/DL (ref 65–100)
GLUCOSE BLD STRIP.AUTO-MCNC: 89 MG/DL (ref 65–100)
GLUCOSE SERPL-MCNC: 66 MG/DL (ref 65–100)
GLUCOSE SERPL-MCNC: 74 MG/DL (ref 65–100)
HAPTOGLOB SERPL-MCNC: 250 MG/DL (ref 30–200)
HCT VFR BLD AUTO: 28.8 % (ref 35–47)
HCT VFR BLD AUTO: 30.2 % (ref 35–47)
HCT VFR BLD AUTO: 31.7 % (ref 35–47)
HGB BLD-MCNC: 10.1 G/DL (ref 11.5–16)
HGB BLD-MCNC: 10.9 G/DL (ref 11.5–16)
HGB BLD-MCNC: 9.4 G/DL (ref 11.5–16)
IRON SATN MFR SERPL: 3 % (ref 20–50)
IRON SERPL-MCNC: 6 UG/DL (ref 35–150)
LYMPHOCYTES # BLD AUTO: 4 % (ref 12–49)
LYMPHOCYTES # BLD AUTO: 7 % (ref 12–49)
LYMPHOCYTES # BLD: 1 K/UL (ref 0.8–3.5)
LYMPHOCYTES # BLD: 1.3 K/UL (ref 0.8–3.5)
MAGNESIUM SERPL-MCNC: 1 MG/DL (ref 1.6–2.4)
MAGNESIUM SERPL-MCNC: 1 MG/DL (ref 1.6–2.4)
MAGNESIUM SERPL-MCNC: 2.5 MG/DL (ref 1.6–2.4)
MCH RBC QN AUTO: 27 PG (ref 26–34)
MCH RBC QN AUTO: 27 PG (ref 26–34)
MCHC RBC AUTO-ENTMCNC: 32.6 G/DL (ref 30–36.5)
MCHC RBC AUTO-ENTMCNC: 33.4 G/DL (ref 30–36.5)
MCV RBC AUTO: 80.7 FL (ref 80–99)
MCV RBC AUTO: 82.8 FL (ref 80–99)
MONOCYTES # BLD: 0.5 K/UL (ref 0–1)
MONOCYTES # BLD: 0.7 K/UL (ref 0–1)
MONOCYTES NFR BLD AUTO: 3 % (ref 5–13)
MONOCYTES NFR BLD AUTO: 3 % (ref 5–13)
NEUTS SEG # BLD: 16.3 K/UL (ref 1.8–8)
NEUTS SEG # BLD: 22.1 K/UL (ref 1.8–8)
NEUTS SEG NFR BLD AUTO: 89 % (ref 32–75)
NEUTS SEG NFR BLD AUTO: 92 % (ref 32–75)
P-R INTERVAL, ECG05: 108 MS
PERIPHERAL SMEAR,PSM: NORMAL
PHOSPHATE SERPL-MCNC: 3.6 MG/DL (ref 2.6–4.7)
PLATELET # BLD AUTO: 432 K/UL (ref 150–400)
PLATELET # BLD AUTO: 505 K/UL (ref 150–400)
POTASSIUM SERPL-SCNC: 3.7 MMOL/L (ref 3.5–5.1)
POTASSIUM SERPL-SCNC: 3.9 MMOL/L (ref 3.5–5.1)
PROT SERPL-MCNC: 3.8 G/DL (ref 6.4–8.2)
PROT SERPL-MCNC: 3.8 G/DL (ref 6.4–8.2)
Q-T INTERVAL, ECG07: 352 MS
QRS DURATION, ECG06: 88 MS
QTC CALCULATION (BEZET), ECG08: 432 MS
RBC # BLD AUTO: 3.48 M/UL (ref 3.8–5.2)
RBC # BLD AUTO: 3.74 M/UL (ref 3.8–5.2)
RBC MORPH BLD: ABNORMAL
SERVICE CMNT-IMP: ABNORMAL
SERVICE CMNT-IMP: NORMAL
SERVICE CMNT-IMP: NORMAL
SODIUM SERPL-SCNC: 139 MMOL/L (ref 136–145)
SODIUM SERPL-SCNC: 139 MMOL/L (ref 136–145)
TIBC SERPL-MCNC: 192 UG/DL (ref 250–450)
VENTRICULAR RATE, ECG03: 91 BPM
VIT B12 SERPL-MCNC: 298 PG/ML (ref 211–911)
WBC # BLD AUTO: 18.4 K/UL (ref 3.6–11)
WBC # BLD AUTO: 24 K/UL (ref 3.6–11)

## 2017-07-05 PROCEDURE — C9113 INJ PANTOPRAZOLE SODIUM, VIA: HCPCS | Performed by: STUDENT IN AN ORGANIZED HEALTH CARE EDUCATION/TRAINING PROGRAM

## 2017-07-05 PROCEDURE — 74011250636 HC RX REV CODE- 250/636: Performed by: FAMILY MEDICINE

## 2017-07-05 PROCEDURE — 77030029211 HC GEL MEDIH TU INLC -B

## 2017-07-05 PROCEDURE — 36415 COLL VENOUS BLD VENIPUNCTURE: CPT

## 2017-07-05 PROCEDURE — 84100 ASSAY OF PHOSPHORUS: CPT

## 2017-07-05 PROCEDURE — 83735 ASSAY OF MAGNESIUM: CPT | Performed by: SURGERY

## 2017-07-05 PROCEDURE — 74011000250 HC RX REV CODE- 250: Performed by: PHYSICIAN ASSISTANT

## 2017-07-05 PROCEDURE — 74011000258 HC RX REV CODE- 258: Performed by: FAMILY MEDICINE

## 2017-07-05 PROCEDURE — 77030013797 HC KT TRNSDUC PRSSR EDWD -A

## 2017-07-05 PROCEDURE — 83735 ASSAY OF MAGNESIUM: CPT

## 2017-07-05 PROCEDURE — 74011250637 HC RX REV CODE- 250/637: Performed by: FAMILY MEDICINE

## 2017-07-05 PROCEDURE — 80053 COMPREHEN METABOLIC PANEL: CPT | Performed by: SURGERY

## 2017-07-05 PROCEDURE — 77030011256 HC DRSG MEPILEX <16IN NO BORD MOLN -A

## 2017-07-05 PROCEDURE — 85018 HEMOGLOBIN: CPT | Performed by: FAMILY MEDICINE

## 2017-07-05 PROCEDURE — 93970 EXTREMITY STUDY: CPT

## 2017-07-05 PROCEDURE — 74011000258 HC RX REV CODE- 258: Performed by: INTERNAL MEDICINE

## 2017-07-05 PROCEDURE — 74011000250 HC RX REV CODE- 250: Performed by: FAMILY MEDICINE

## 2017-07-05 PROCEDURE — 74011000250 HC RX REV CODE- 250: Performed by: STUDENT IN AN ORGANIZED HEALTH CARE EDUCATION/TRAINING PROGRAM

## 2017-07-05 PROCEDURE — 74011250636 HC RX REV CODE- 250/636: Performed by: STUDENT IN AN ORGANIZED HEALTH CARE EDUCATION/TRAINING PROGRAM

## 2017-07-05 PROCEDURE — 85025 COMPLETE CBC W/AUTO DIFF WBC: CPT | Performed by: SURGERY

## 2017-07-05 PROCEDURE — 74011250636 HC RX REV CODE- 250/636: Performed by: SURGERY

## 2017-07-05 PROCEDURE — 77030019563 HC DEV ATTCH FEED HOLL -A

## 2017-07-05 PROCEDURE — 74011250636 HC RX REV CODE- 250/636: Performed by: INTERNAL MEDICINE

## 2017-07-05 PROCEDURE — 74011000250 HC RX REV CODE- 250

## 2017-07-05 PROCEDURE — 77030012856

## 2017-07-05 PROCEDURE — 3E0336Z INTRODUCTION OF NUTRITIONAL SUBSTANCE INTO PERIPHERAL VEIN, PERCUTANEOUS APPROACH: ICD-10-PCS | Performed by: FAMILY MEDICINE

## 2017-07-05 PROCEDURE — 82962 GLUCOSE BLOOD TEST: CPT

## 2017-07-05 PROCEDURE — 74011000250 HC RX REV CODE- 250: Performed by: INTERNAL MEDICINE

## 2017-07-05 PROCEDURE — 65610000006 HC RM INTENSIVE CARE

## 2017-07-05 PROCEDURE — 74011000258 HC RX REV CODE- 258: Performed by: PHYSICIAN ASSISTANT

## 2017-07-05 PROCEDURE — 74011250636 HC RX REV CODE- 250/636: Performed by: PHYSICIAN ASSISTANT

## 2017-07-05 PROCEDURE — 82330 ASSAY OF CALCIUM: CPT | Performed by: FAMILY MEDICINE

## 2017-07-05 RX ORDER — DEXTROSE 50 % IN WATER (D50W) INTRAVENOUS SYRINGE
12.5-25 AS NEEDED
Status: DISCONTINUED | OUTPATIENT
Start: 2017-07-05 | End: 2017-07-07 | Stop reason: SDUPTHER

## 2017-07-05 RX ORDER — MAGNESIUM SULFATE 100 %
4 CRYSTALS MISCELLANEOUS AS NEEDED
Status: DISCONTINUED | OUTPATIENT
Start: 2017-07-05 | End: 2017-07-21 | Stop reason: HOSPADM

## 2017-07-05 RX ORDER — MAGNESIUM SULFATE HEPTAHYDRATE 40 MG/ML
2 INJECTION, SOLUTION INTRAVENOUS
Status: COMPLETED | OUTPATIENT
Start: 2017-07-05 | End: 2017-07-08

## 2017-07-05 RX ORDER — PROCHLORPERAZINE EDISYLATE 5 MG/ML
10 INJECTION INTRAMUSCULAR; INTRAVENOUS
Status: DISCONTINUED | OUTPATIENT
Start: 2017-07-05 | End: 2017-07-21 | Stop reason: HOSPADM

## 2017-07-05 RX ORDER — INSULIN LISPRO 100 [IU]/ML
INJECTION, SOLUTION INTRAVENOUS; SUBCUTANEOUS EVERY 6 HOURS
Status: DISCONTINUED | OUTPATIENT
Start: 2017-07-05 | End: 2017-07-13

## 2017-07-05 RX ORDER — DEXTROSE 50 % IN WATER (D50W) INTRAVENOUS SYRINGE
Status: COMPLETED
Start: 2017-07-05 | End: 2017-07-05

## 2017-07-05 RX ORDER — DEXTROSE MONOHYDRATE AND SODIUM CHLORIDE 5; .9 G/100ML; G/100ML
125 INJECTION, SOLUTION INTRAVENOUS CONTINUOUS
Status: DISPENSED | OUTPATIENT
Start: 2017-07-05 | End: 2017-07-05

## 2017-07-05 RX ORDER — DIAZEPAM 10 MG/2ML
2.5 INJECTION INTRAMUSCULAR
Status: DISCONTINUED | OUTPATIENT
Start: 2017-07-05 | End: 2017-07-06

## 2017-07-05 RX ORDER — DEXTROSE 50 % IN WATER (D50W) INTRAVENOUS SYRINGE
12.5-25 AS NEEDED
Status: DISCONTINUED | OUTPATIENT
Start: 2017-07-05 | End: 2017-07-21 | Stop reason: HOSPADM

## 2017-07-05 RX ORDER — IBUPROFEN 200 MG
1 TABLET ORAL DAILY
Status: DISCONTINUED | OUTPATIENT
Start: 2017-07-05 | End: 2017-07-21 | Stop reason: HOSPADM

## 2017-07-05 RX ORDER — SODIUM CHLORIDE, SODIUM LACTATE, POTASSIUM CHLORIDE, CALCIUM CHLORIDE 600; 310; 30; 20 MG/100ML; MG/100ML; MG/100ML; MG/100ML
83 INJECTION, SOLUTION INTRAVENOUS CONTINUOUS
Status: DISCONTINUED | OUTPATIENT
Start: 2017-07-05 | End: 2017-07-06

## 2017-07-05 RX ADMIN — DEXTROSE MONOHYDRATE 12.5 G: 25 INJECTION, SOLUTION INTRAVENOUS at 08:14

## 2017-07-05 RX ADMIN — DEXTROSE MONOHYDRATE 25 G: 25 INJECTION, SOLUTION INTRAVENOUS at 11:23

## 2017-07-05 RX ADMIN — SODIUM CHLORIDE 500 ML: 900 INJECTION, SOLUTION INTRAVENOUS at 05:59

## 2017-07-05 RX ADMIN — METRONIDAZOLE 500 MG: 500 INJECTION, SOLUTION INTRAVENOUS at 20:12

## 2017-07-05 RX ADMIN — DIAZEPAM 2.5 MG: 5 INJECTION, SOLUTION INTRAMUSCULAR; INTRAVENOUS at 23:43

## 2017-07-05 RX ADMIN — Medication 10 ML: at 13:46

## 2017-07-05 RX ADMIN — VANCOMYCIN HYDROCHLORIDE 1250 MG: 10 INJECTION, POWDER, LYOPHILIZED, FOR SOLUTION INTRAVENOUS at 00:57

## 2017-07-05 RX ADMIN — VANCOMYCIN HYDROCHLORIDE 1000 MG: 1 INJECTION, POWDER, LYOPHILIZED, FOR SOLUTION INTRAVENOUS at 11:28

## 2017-07-05 RX ADMIN — DEXTROSE MONOHYDRATE AND SODIUM CHLORIDE 125 ML/HR: 5; .9 INJECTION, SOLUTION INTRAVENOUS at 11:34

## 2017-07-05 RX ADMIN — CALCIUM GLUCONATE 1 G: 94 INJECTION, SOLUTION INTRAVENOUS at 17:51

## 2017-07-05 RX ADMIN — SODIUM CHLORIDE 40 MG: 9 INJECTION, SOLUTION INTRAMUSCULAR; INTRAVENOUS; SUBCUTANEOUS at 08:43

## 2017-07-05 RX ADMIN — METRONIDAZOLE 500 MG: 500 INJECTION, SOLUTION INTRAVENOUS at 08:43

## 2017-07-05 RX ADMIN — PROCHLORPERAZINE EDISYLATE 10 MG: 5 INJECTION INTRAMUSCULAR; INTRAVENOUS at 20:12

## 2017-07-05 RX ADMIN — SODIUM CHLORIDE 100 MG: 900 INJECTION, SOLUTION INTRAVENOUS at 20:32

## 2017-07-05 RX ADMIN — VANCOMYCIN HYDROCHLORIDE 1000 MG: 1 INJECTION, POWDER, LYOPHILIZED, FOR SOLUTION INTRAVENOUS at 23:43

## 2017-07-05 RX ADMIN — CALCIUM GLUCONATE 1 G: 94 INJECTION, SOLUTION INTRAVENOUS at 12:38

## 2017-07-05 RX ADMIN — NOREPINEPHRINE BITARTRATE 2 MCG/MIN: 1 INJECTION INTRAVENOUS at 06:20

## 2017-07-05 RX ADMIN — MAGNESIUM SULFATE HEPTAHYDRATE 2 G: 40 INJECTION, SOLUTION INTRAVENOUS at 08:41

## 2017-07-05 RX ADMIN — SODIUM CHLORIDE, SODIUM LACTATE, POTASSIUM CHLORIDE, AND CALCIUM CHLORIDE 125 ML/HR: 600; 310; 30; 20 INJECTION, SOLUTION INTRAVENOUS at 00:03

## 2017-07-05 RX ADMIN — SODIUM CHLORIDE, SODIUM LACTATE, POTASSIUM CHLORIDE, AND CALCIUM CHLORIDE 125 ML/HR: 600; 310; 30; 20 INJECTION, SOLUTION INTRAVENOUS at 07:56

## 2017-07-05 RX ADMIN — METRONIDAZOLE 500 MG: 500 INJECTION, SOLUTION INTRAVENOUS at 13:45

## 2017-07-05 RX ADMIN — RETINOL, ERGOCALCIFEROL, .ALPHA.-TOCOPHEROL ACETATE, DL-, PHYTONADIONE, ASCORBIC ACID, NIACINAMIDE, RIBOFLAVIN 5-PHOSPHATE SODIUM, THIAMINE HYDROCHLORIDE, PYRIDOXINE HYDROCHLORIDE, DEXPANTHENOL, BIOTIN, FOLIC ACID, AND CYANOCOBALAMIN: KIT at 17:52

## 2017-07-05 RX ADMIN — MAGNESIUM SULFATE HEPTAHYDRATE 2 G: 40 INJECTION, SOLUTION INTRAVENOUS at 06:42

## 2017-07-05 RX ADMIN — LEVOFLOXACIN 750 MG: 5 INJECTION, SOLUTION INTRAVENOUS at 17:52

## 2017-07-05 RX ADMIN — Medication 10 ML: at 21:21

## 2017-07-05 RX ADMIN — METRONIDAZOLE 500 MG: 500 INJECTION, SOLUTION INTRAVENOUS at 02:29

## 2017-07-05 RX ADMIN — SODIUM CHLORIDE 40 MG: 9 INJECTION, SOLUTION INTRAMUSCULAR; INTRAVENOUS; SUBCUTANEOUS at 20:33

## 2017-07-05 RX ADMIN — DIAZEPAM 2.5 MG: 5 INJECTION, SOLUTION INTRAMUSCULAR; INTRAVENOUS at 11:37

## 2017-07-05 RX ADMIN — Medication 10 ML: at 06:39

## 2017-07-05 RX ADMIN — SODIUM CHLORIDE, SODIUM LACTATE, POTASSIUM CHLORIDE, AND CALCIUM CHLORIDE 83 ML/HR: 600; 310; 30; 20 INJECTION, SOLUTION INTRAVENOUS at 18:02

## 2017-07-05 RX ADMIN — SODIUM CHLORIDE 40 MG: 9 INJECTION, SOLUTION INTRAMUSCULAR; INTRAVENOUS; SUBCUTANEOUS at 00:34

## 2017-07-05 RX ADMIN — DIAZEPAM 2.5 MG: 5 INJECTION, SOLUTION INTRAMUSCULAR; INTRAVENOUS at 17:51

## 2017-07-05 NOTE — WOUND CARE
Wound care consult:  Initial visit for skin and wound assessment, alert, no distress. In the ICU, staff at bedside. S/P abdominal surgery 7-4. Assessment  All skin folds and bony prominences assessed, turned with staff assistance. Left upper lateral back wound present on admission- full thickness 2.5x1.0.5 mixed wound bed, yellow and pink tissue, julia wound is white and moist, julia wound intact with bruising. Family states she fell off a stool into the cabinet in the kitchen last week. Mid abdominal wounds and ANDRE drains intact, not disturbed. Heels are pink, tender and blanching. Pain in the lower legs prior to admission, history of scattered blistering noted on the dorsal toes, now dry and resolving. Dry pink abraded area on the left medial upper arm present on admission. 2 dry scabs noted on the right buttock, resolving, unknown etiology. Treatment  Medihoney, foam to left upper back wound. Repositioned in bed   Heels floated    Recommendations/Plan  Wound care orders per family practice. Leave resolving feet blisters open to air. Turn, reposition every 2 hours as tolerated, float heels, place in prevalon boots if needed. Incontinent care with comfort shields. Apply moisture barrier as needed. Will follow, reconsult as needed.   Keerthi Aleman

## 2017-07-05 NOTE — PROGRESS NOTES
General Surgery Daily Progress Note    Patient: Gualberto Montaño MRN: [id number]  SSN: [id number]    YOB: 1970  Age: 56 y.o. Sex: female      Admit Date: 7/4/2017    Subjective:   Pain fairly well controlled with Dilaudid PCA. C/o left foot pain.  On Levophed 8mcg/min    Current Facility-Administered Medications   Medication Dose Route Frequency    NOREPINephrine (LEVOPHED) 8,000 mcg in dextrose 5% 250 mL infusion  2-16 mcg/min IntraVENous TITRATE    nicotine (NICODERM CQ) 21 mg/24 hr patch 1 Patch  1 Patch TransDERmal DAILY    dextrose (D50W) injection syrg 12.5-25 g  12.5-25 g IntraVENous PRN    diazePAM (VALIUM) injection 2.5 mg  2.5 mg IntraVENous Q6H PRN    TPN ADULT - CENTRAL AA 5% D15% W/ ELECTROLYTES AND CA   IntraVENous CONTINUOUS    glucose chewable tablet 16 g  4 Tab Oral PRN    dextrose (D50W) injection syrg 12.5-25 g  12.5-25 g IntraVENous PRN    glucagon (GLUCAGEN) injection 1 mg  1 mg IntraMUSCular PRN    insulin lispro (HUMALOG) injection   SubCUTAneous Q6H    lactated Ringers infusion  83 mL/hr IntraVENous CONTINUOUS    sodium chloride (NS) flush 5-10 mL  5-10 mL IntraVENous PRN    0.9% sodium chloride infusion 250 mL  250 mL IntraVENous PRN    sodium chloride (NS) flush 5-10 mL  5-10 mL IntraVENous Q8H    sodium chloride (NS) flush 5-10 mL  5-10 mL IntraVENous PRN    naloxone (NARCAN) injection 0.4 mg  0.4 mg IntraVENous PRN    metroNIDAZOLE (FLAGYL) IVPB premix 500 mg  500 mg IntraVENous Q6H    albuterol (PROVENTIL VENTOLIN) nebulizer solution 2.5 mg  2.5 mg Nebulization Q4H PRN    vancomycin (VANCOCIN) 1,000 mg in 0.9% sodium chloride (MBP/ADV) 250 mL  1 g IntraVENous Q12H    sodium chloride (NS) flush 5-10 mL  5-10 mL IntraVENous PRN    anidulafungin (ERAXIS) 100 mg in 0.9% sodium chloride 130 mL IVPB  100 mg IntraVENous Q24H    levoFLOXacin (LEVAQUIN) 750 mg in D5W IVPB  750 mg IntraVENous Q24H    HYDROmorphone (PF) 15 mg/30 ml (DILAUDID) PCA   IntraVENous TITRATE    lactated Ringers infusion  125 mL/hr IntraVENous CONTINUOUS    pantoprazole (PROTONIX) 40 mg in sodium chloride 0.9 % 10 mL injection  40 mg IntraVENous Q12H        Objective:   07/05 0701 - 07/05 1900  In: 872.1 [I.V.:862.1]  Out: 440 [Urine:440]  07/03 1901 - 07/05 0700  In: 5313.8 [I.V.:4693.8]  Out: 1930 [HJUSR:4330; Drains:460]  Patient Vitals for the past 8 hrs:   BP Temp Pulse Resp SpO2   07/05/17 1000 104/55 - 98 (!) 7 94 %   07/05/17 0945 111/65 - 90 9 95 %   07/05/17 0930 - - 99 15 95 %   07/05/17 0830 101/50 - 90 28 94 %   07/05/17 0815 122/41 - 82 19 96 %   07/05/17 0800 103/68 98.2 °F (36.8 °C) (!) 101 22 94 %   07/05/17 0745 - - (!) 102 13 94 %   07/05/17 0730 106/52 - 83 12 95 %   07/05/17 0715 (!) 86/33 - 100 17 91 %   07/05/17 0700 107/61 - 81 9 94 %   07/05/17 0646 105/57 - 73 16 95 %   07/05/17 0637 (!) 83/45 - 82 11 94 %   07/05/17 0630 (!) 88/49 - 82 12 93 %   07/05/17 0615 (!) 89/55 98 °F (36.7 °C) 91 11 95 %   07/05/17 0609 (!) 89/51 - (!) 101 17 94 %   07/05/17 0605 101/47 - 87 9 95 %   07/05/17 0600 (!) 89/51 - 84 (!) 5 93 %   07/05/17 0545 (!) 85/38 - 86 (!) 5 93 %   07/05/17 0537 (!) 83/49 - 87 10 94 %   07/05/17 0530 (!) 75/37 - 88 (!) 7 94 %   07/05/17 0518 (!) 80/58 - 94 12 94 %   07/05/17 0500 (!) 88/53 - 91 12 94 %   07/05/17 0445 90/59 - 97 16 92 %   07/05/17 0431 94/58 - 90 11 95 %   07/05/17 0415 94/60 - 79 13 96 %   07/05/17 0400 99/59 98.1 °F (36.7 °C) 82 10 94 %   07/05/17 0345 90/58 - 79 10 96 %   07/05/17 0330 93/59 - 81 12 96 %   07/05/17 0315 98/52 - 78 12 96 %       Physical Exam:  General: Alert, cooperative, NAD  Lungs: Clear to auscultation bilaterally. Heart:  Regular rate and rhythm  Abdomen: Soft, ATTP, distended, hypoactive bowel sounds. Incisions c/d/i.  Drains x 2 SS  Extremities: Warm, moves all, no edema  Skin:  Warm and dry, no rash    Labs: Recent Labs      07/05/17 0425   WBC  24.0*   HGB  10.1*   HCT  30.2*   PLT  505*     Recent Labs 07/05/17   0425   NA  139   K  3.7   CL  108   CO2  25   GLU  66   BUN  7   CREA  0.70   CA  6.9*   MG  1.0*   PHOS  3.6   ALB  1.3*   TBILI  0.5   SGOT  15   ALT  15       Assessment / Plan:   · POD#1 ex lap, primary repair perforated gastric ulcer x 2, omental intra-abdominal flap  · Will keep NG to POD#7 and will need an upper GI prior to initiating PO  · Start TPN today  · Continue PCA, add PRN Valium for abdominal muscle spasms.    · Continue broad-spectrum ABX and antifungal   · Maintain wilder until off pressors  · Start Lovenox for DVT prophylaxis  · Replete Mg  · H pylori serologies   · PT/OT consults, encourage IS

## 2017-07-05 NOTE — OP NOTES
Date: 07/04/17    Pre-operative Diagnosis: Acute gastric ulcer with perforation    Post-operative Diagnosis: Acute gastric ulcer with perforation    Procedure:   1.) Exploratory laparotomy  2.) Primary repair of perforated gastric ulcer x 2  3.) Creation of omental intra-abdominal flap  4.) Bilateral transversus abdominus plane block    Surgeon: Kaylin Bishop MD    Assistant:  Clara Mae    Anesthesia: General    Estimated Blood Loss: 100 cc    Fluids: 1600 cc NS; 2500 cc LR; 2 units PRBC    Urine: 550 cc    Findings: 2 cm round full thickness anterior gastric ulcer at the greater curvature. 6 cm x 4 cm full thickness posterior gastric ulcer at the greater curvature. There was gross contamination of the peritoneal cavity at the site of both of the ulcers with undigested food. Wound Class: 4     Specimen: Edges of the anterior gastric ulcer. Edges of the Posterior gastric ulcer. Peritoneal fluid for culture.      Indication: 56year-old female with Crohn's disease, recent diagnosis of Lyme disease s/p doxycycline therapy and recent 1 month use of Prednisone presented with findings of a perforated gastric ulcer and peritonitis.       Procedure: After written informed consent was obtained she was brought to the operating room, placed supine on the table and general endotracheal anesthesia was induced without complication. The left arm was tucked. All pressure points were padded. A wilder catheter was placed under sterile conditions. The anesthesia team placed a right internal jugular central line. The abdomen was prepped and draped in the usual sterile fashion with an [place]ob[place] drape. A time-out was performed verifying the correct patient, procedure, allergies, laterality and administration of antibiotics. She received Levaquin and Vancomycin pre-incision.      A midline incision was made from the xyphoid to the umbilicus. The skin and subcutaneous tissue was divided with electrocautery.  The fascia was incised in the midline and the peritoneum grasped and divided with scissors. There were no intra-abdominal adhesions. Preperitoneal fat was divided with the LigaSure device. A large wound protector was placed. The Bookwalter retractor was set up and the abdominal wall and left lobe of the liver were retracted. The anterior stomach was inspected and there was a 2 cm round full thickness ulcer at the greater curvature with surrounding gross contamination of undigested food. This was suctioned. A [place] clamp was used to temporarily close the defect and prevent additional spillage. The transverse colon was  from the stomach by  the gastrocolic ligament with electrocautery. There was a lot of inflammation and edema in this area. The lesser sac was entered and the posterior stomach was inspected. There was a 6 cm x 4 cm full thickness posterior gastric ulcer at the greater curvature with surrounding gross contamination of undigested food. This was suctioned. The colon and small bowel was inspected and no other injury was noted. There was no gross evidence of cancer or metastatic disease in the abdomen. The edges of the ulcers were necrotic. The ulcer edges were debrided sharply with scissor back to healthy bleeding tissue. A 48 Fr bougie esophageal dilator was placed by anesthesia and guided into the duodenum. The anterior ulcer was repaired in 2 layers over the bougie, the first with a running locking 2-0 Vicryl suture, a second layer of interrupted 3-0 silk sutures placed in a lembert fashion completed the repair. Attention was then turned to the posterior ulcer. The posterior ulcer was repaired in 2 layers over the bougie, the first with a running locking 2-0 Vicryl suture, a second layer of interrupted 3-0 silk sutures placed in a lembert fashion completed the repair. A omental flap was created with the LigaSure device and secured to the posterior gastric repair with interrupted 3-0 silk sutures. The abdomen was irrigated in all 4 quadrants with 5 liters sterile saline. Two 24 Mauritian channel drains were placed via a left and right stab incision. The left 24 Fr channel drain was positioned adjacent to posterior repair and secured to the skin with a 2-0 prolene. The right 24 Fr channel drain was positioned adjacent to anterior repair and secured to the skin with a 2-0 prolene. An NG tube was placed and its location was confirmed by the surgeon as being in the body of the stomach. It was secured at 63 cm at the nares by anesthesia. Exparel expanded with saline was injected as a transversus abdominus plane block bilaterally. The surgical team re-scrubbed and changed their outer garments and gloves. A new set of instruments was used to close the fascia. Sepra film was placed. The fascia was closed with a running 0 looped PDS. The subcutaneous fat was irrigated and the skin closed with staples. The drains were placed to bulb suction. All sponge and instrument counts were correct x 2. The patient tolerated the procedure well. She was extubated and transferred to the PACU for recovery. I went an discussed the findings with the patient's family in the waiting area.      Isamar Cruz MD

## 2017-07-05 NOTE — PROGRESS NOTES
[address] [place] [address]   Office [phone]  Fax [phone]          Assessment and Heracliovictor manuel Augustine is a 56 y.o. female with known Chron's Disease (s/p bowel resection, not currenlty on immunotherapy and depression admitted for sepsis 2/2 perforated gastric ulcers/peritonitis. She has spent 1 night(s) in the hospital.    24 Hour Events: Underwent surgical repair of perforated gastric ulcers last night. Continuing on abx following per general surgery. Infectious Disease    Septic Shock 2/2 Peritonitis - MAP noted to be <65 this morning. S/p exploratory laparotomy and operative repair of 2 perforated gastric ulcers. Resultant peritonitis. UOP:  550cc since admission. All cultures currently in process. -Consult placed to intensivist, appreciate their support.  -Starting levophed this morning. Instructing nursing to titrate to keep MAP > 65. -Abx:  Eraxis, levaquin, vancomycin, flagyl. -F/u blood, urine, body fluid (surgical) cultures.  -500cc NS bolus to be given now. -LR running at 125cc/hr. -Strict I&O. -Daily labs. Community Acquired Pneumonia - left basilar airspace disease noted on initial CXR. Could also be playing a role in SIRS/sepsis picture.  -Has appropriate coverage with levaquin.  -Albuterol nebulizer available. Patient has inhaler at home, but no documented history of obstructive lung disease. Lyme Disease - diagnosed ~1 month ago at [facility]. Was treated with a 21 day course of doxycycline. Completed this course fully, but developed some lower extremity skin breakdown following treatment. Tetracyclines are on patient's allergy list.  -Not repeating tic studies now. Gastrointestinal    Perforated Gastric Ulcers s/p Operative Repair on 7/4/17 - surgery following. Patient is NPO for now. Gastric ulcers thought to be related to chronic steroid use and Chron's disease. Drain OP 100cc.   -Appreciate surgery recommendations & support. -NPO x7 days. Starting TPN today--awaiting nutritional recs. -IV protonix. -Dilaudid PCA for pain control per general surgery.  -Encouraging incentive spirometry. Chron's Disease - no pharmacotherapy PTA. Poor follow up history with GI. Likely playing a role in patient's current clinical course.  -Will consider GI consult later in course when patient becomes more stable. Needs better outpatient follow up. Severe Protein Calorie Malnutrition - as evidenced by lower extremity swelling and albumin of 1.3. This is complicated by need for 7 days of NPO.  -Starting TPN today--awaiting nutrition recs. -Daily CMP. -Checking a Phos now (add on), checking daily as well. Hematology    Anemia - lab work up done on admission. Notable for reticulocyte count of 2.3, LD of 294. Not sure of the etiology of this process. Could be related to acute blood loss anemia 2/2 gastric perforation. S/p 2 units pRBC on admission--Hgb responded appropriately.  -Hematology consulted, appreciate their recs. -Have 2 units of pRBCs on hold. -Trending H&H Q12H (next draw today ~1600). Pulmonary    Tobacco Abuse - reports to smoke 1.5 PPD. -Encouraging cessation.  -Prescribed daily nicotine patch while patient admitted. Integumentary    Healing Wounds on Bilateral Feet - most likely acute reaction to doxycycline treatment that patient underwent prior to admission. Per patient, these wounds are healing. Remain painful.  -Wound care consulted, appreciate support.  -Tetracyclines on allergy list.    3cm Laceration of Left Upper Back - healing. Not amenable to sutures at this time.  -Wound care consulted, appreciate support. Electrolytes    Hypomagnesemia - Mag noted to be 1.0 this morning.  -Repleting with 2 runs of 2g IV magnesium now.  -Will check Mg level 2 hours after both infusions are complete. May need further infusion.  -Daily Mg level. Psych    Depression/Panic Attacks - currently stable.   Takes klonopin, adderall, Lexapro, ativan, and trazodone at home.  -Currently holding these medications as patient is NPO. Would avoid use of IV benzodiazepines in this patient as currently on dilaudid PCA. MSK    History of Falls - patient and family gave detailed history of multiple falls over the last few months/weeks. -PT/OT consulted. Their evaluation may be deferred until patient clinically improves. FEN/GI - NPO. Lactated Ringer's at 125 mL/hr. Activity - Bedrest.  DVT prophylaxis - SCDs  GI prophylaxis - Protonix  Fall prophylaxis - Fall precautions ordered. Unit - ICU  Admission Status - Admitted  IV Access - Right IJ CVC, 2 PIVs.  Disposition - Plan to d/c to D. Code Status - Full     Elsa Howard MD  Family Medicine Resident         Subjective / Objective     Subjective:  Symptoms:  Stable. She reports weakness and anxiety. No chest pain or chest pressure. Diet:  NPO. No nausea or vomiting. Activity level: Impaired due to pain. Pain:  She complains of pain that is severe. She reports pain is unchanged. Pain is requiring pain medication and partially controlled. Patient is complaining of pain this morning post operatively. Reports that PCA is helping. Is also complaining of some pruritis. Temp (24hrs), Av.9 °F (36.6 °C), Min:97.5 °F (36.4 °C), Max:98.1 °F (36.7 °C)     Objective:  General Appearance:  Ill-appearing, in pain and uncomfortable. Vital signs: (most recent): Blood pressure 107/61, pulse 81, temperature 98 °F (36.7 °C), resp. rate 9, height 5' 2\" (1.575 m), weight 121 lb (54.9 kg), SpO2 94 %. HEENT: Normal HEENT exam.  (NG tube in left nare. Brown/bloody output.)    Lungs:  Normal respiratory rate. She is not in respiratory distress. Breath sounds clear to auscultation. No stridor. No wheezes, rales, rhonchi or decreased breath sounds. (Taking shallow breaths. Unable to ausculate posteriorly 2/2 pain.)  Heart: Normal rate. Regular rhythm.   S1 normal and S2 normal.  No murmur, gallop or friction rub. Extremities: There is dependent edema. (Tenderness to palpation of bilateral lower extremities. SCD in place only on LLE.)  Neurological: Patient is alert and oriented to person, place and time. Skin:  Warm and dry. (3cm laceration on upper left back, unchanged from admission. Some skin breakdown noted/poorly healing wounds on bilateral feet.)  Abdomen: (Surgical bandages in place. ANDRE drain in place. Bandages appear clean and dry. )Bowel sounds are normal.   There is generalized tenderness. Pupils:  Pupils are equal, round, and reactive to light. Respiratory:  O2 Device: Room air     I/O:    Date 07/04/17 0700 - 07/05/17 0659 07/05/17 0700 - 07/06/17 0659   Shift 1290-5362 5850-5806 24 Hour Total 5869-6169 4694-9483 24 Hour Total   I  N  T  A  K  E   I.V.  (mL/kg/hr)  4693.8  (7.1) 4693.8  (3.6)         Volume (0.9% sodium chloride infusion)  1600 1600         Volume (lactated Ringers infusion)  2500 2500         Volume (lactated Ringers infusion)  493.8 493.8         Volume (metroNIDAZOLE (FLAGYL) IVPB premix 500 mg)  100 100       Blood  620 620         Volume (TRANSFUSE PACKED RBC'S)  310 310         Volume (TRANSFUSE PACKED RBC'S)  310 310       Shift Total  (mL/kg)  5313.8  (96.8) 5313.8  (96.8)      O  U  T  P  U  T   Urine  (mL/kg/hr)  1370  (2.1) 1370  (1)         Urine Output  550 550         Urine Output (mL) (Urinary Catheter 07/04/17 2- way; [place])  820 820       Drains  330 330         Output (ml) (Gurney Lasso #2 07/04/17 Anterior;Right Abdomen)  200 200         Output (ml) Ellis Lizz Drain #1 07/04/17 Left;Posterior Abdomen)  130 130       Blood  100 100         Estimated Blood Loss  100 100       Shift Total  (mL/kg)  1800  (32.8) 1800  (32.8)      NET  3513.8 3513.8      Weight (kg) 54.9 54.9 54.9 54.9 54.9 54.9       CBC:  Recent Labs      07/05/17   0425  07/04/17   2334  07/04/17   1454   WBC  24.0*  18.4*  18.1*   HGB 10.1*  9.4*  6.6*   HCT  30.2*  28.8*  20.9*   PLT  505*  432*  078*       Metabolic Panel:  Recent Labs      07/05/17   0425  07/04/17   2334  07/04/17   1454   NA  139  139  138   K  3.7  3.9  3.7   CL  108  108  105   CO2  25  25  26   BUN  7  8  12   CREA  0.70  0.74  1.03*   GLU  66  74  71   CA  6.9*  6.8*  7.8*   MG  1.0*  1.0*   --    ALB  1.3*  1.3*  2.0*   SGOT  15  18  15   ALT  15  17  23   INR   --    --   1.2*          For Billing    Chief Complaint   Patient presents with    Rash    Fever    Sore Throat    Nasal Congestion       Hospital Problems  Date Reviewed: 1/5/2017          Codes Class Noted POA    Perforation bowel ([facility] [address] ICD-10-CM: K63.1  ICD-9-CM: 569.83  7/4/2017 Unknown        Pneumonia ICD-10-CM: J18.9  ICD-9-CM: 580  7/4/2017 Unknown        Anemia ICD-10-CM: D64.9  ICD-9-CM: 285.9  7/4/2017 Unknown        Wounds, multiple open, lower extremity ICD-10-CM: D28.782S  ICD-9-CM: 894.0  7/4/2017 Unknown

## 2017-07-05 NOTE — PROGRESS NOTES
2330 Telephone report on this patient. Will go to bed 13 upon arrival.    2355 Pt arrive to unit. TRANSFER - IN REPORT:    Verbal report received from Kristal Ortiz (name) on [address]  being received from PACU(unit) for routine post - op      Report consisted of patients Situation, Background, Assessment and   Recommendations(SBAR). Information from the following report(s) SBAR, Kardex, OR Summary, Procedure Summary, Intake/Output, MAR, Recent Results and Cardiac Rhythm sinus rhythm was reviewed with the receiving nurse. Opportunity for questions and clarification was provided. Assessment completed upon patients arrival to unit and care assumed. 8527 Primary Nurse Daniela Gr and SVITLANA Denis performed a dual skin assessment on this patient Impairment noted- see wound doc flow sheet  Kehinde score is 17  Blisters to all toes both feet; blisters to left hand first and second digits; blisters to right hand first, second, and fourth digits; surgical site to abdomen. 3060 Kyle Urena on unit. Pharm tech given bag of vancocin 1250mg as the [facility] reflected this med had been given. Per Pharmacist, med to be given now, following doses will be adjusted accordingly. 0430 In pt room, pt requesting to be readjusted wanting to lay high on her right side. Pillows used to support patient in position of comfort. Pt had a subsequent drop in BP/ MAP.   0515 Dr Lisa Hanson called to check on pt. Will continue to monitor BP/ MAP. Dr Lisa Hanson states he will be by about 0600.  0530 Dr María Mullins arrive to check on pt. BP/ MAP remains low. 500mL NS bolus ordered. 4508 Dr Stephani Portillo arrive to check on pt. Dr Lisa Hanson also arrive. Will start pt on Levophed and continue to monitor. 0700 Bedside and Verbal shift change report given to Charmayne Angst (oncoming nurse) by Bakari Vincent (offgoing nurse).  Report included the following information SBAR, Kardex, OR Summary, Procedure Summary, Intake/Output, MAR, Recent Results and Cardiac Rhythm sinus rhythm.

## 2017-07-05 NOTE — BRIEF OP NOTE
BRIEF OPERATIVE NOTE    Date of Procedure: 7/4/2017   Preoperative Diagnosis: Acute gastric ulcer with perforation  Postoperative Diagnosis: Acute gastric ulcer with perforation    Procedure(s):  LAPAROTOMY EXPLORATORY, PRIMARY REPAIR OF GASTRIC PERFORATION AND CREATION OF OMENTAL FLAP INTRA-ABDOMINAL/ KODAK PATCH  Surgeon(s) and Role:     * Daya Lynn MD - Primary         Assistant Staff:       Surgical Staff:  Circ-1: Mitul Thompson RN  Scrub Tech-1: [place]  Surg Asst-1: Deborah Blas  Event Time In   Incision Start 1938   Incision Close 2229     Anesthesia: General   Estimated Blood Loss: 100  IV Fluid: 1600 cc NS; 2500 cc LR  Blood: 2 units PRBC  Urine: 550 cc  Specimens:   ID Type Source Tests Collected by Time Destination   1 : ANTERIOR GASTRIC ULCER Preservative Stomach  Daya Lnyn MD 7/4/2017 2043 Pathology   2 : POSTERIOR GASTRIC WALL Preservative Stomach  Daya Lynn MD 7/4/2017 2058 Pathology   1 : PERITONEAL FLUID Body Fluid Peritoneal Fluid CULTURE, BODY FLUID, CULTURE, ANAEROBIC Daya Lynn MD 7/4/2017 1952 Microbiology      Findings: 2 cm round full thickness anterior gastric ulcer at the greater curvature. 6 cm x 4 cm full thickness posterior gastric ulcer at the greater curvature. Left 24 Fr channel drain adjacent to posterior repair.  Right 24 Fr channel drain adjacent to anterior repair  Complications: None  Implants: * No implants in log *

## 2017-07-05 NOTE — PROGRESS NOTES
0200-Pt yelling in room. Upon Entrance Pt started cussing and yelling at staff. Attempts made to redirect pt unsuccessful. Instructed on Use of PCA and pt started yelling again about \"have you seen my hands? What am I supposed to do about this F#$%&*! Button? \" Explained again how to use PCA and provided oral care to pt. Explained the importance of PCA Utilization to stay on top of pain and that verbal abuse towards staff was unacceptable. Pt more calm now and pain seems to have decreased. Mary Anne Mullen RN

## 2017-07-05 NOTE — CONSULTS
IP CONSULT TO INTENSIVIST  Consult performed by: Apoorva Burnett ordered by: Ludmila Ness      See separate consult note dated 7/5 by Dr. Haile for details.

## 2017-07-05 NOTE — ROUTINE PROCESS
TRANSFER - OUT REPORT:    Verbal report given to [address] on [employer]  being transferred to ICU 13 for routine post - op       Report consisted of patients Situation, Background, Assessment and   Recommendations(SBAR). Information from the following report(s) SBAR and OR Summary was reviewed with the receiving nurse. Lines:   Triple Lumen Right IJ Central Line Right Internal jugular (Active)   Central Line Being Utilized No 7/4/2017 10:45 PM   Criteria for Appropriate Use Limited/no vessel suitable for conventional peripheral access 7/4/2017 10:45 PM   Site Assessment Clean, dry, & intact 7/4/2017 10:45 PM   Infiltration Assessment 0 7/4/2017 10:45 PM   Affected Extremity/Extremities Color distal to insertion site pink (or appropriate for race) 7/4/2017 10:45 PM   Date of Last Dressing Change 07/04/17 7/4/2017 10:45 PM   Dressing Status Clean, dry, & intact 7/4/2017 10:45 PM   Dressing Type Disk with Chlorhexadine gluconate (CHG); Transparent 7/4/2017 10:45 PM   Proximal Hub Color/Line Status White 7/4/2017 10:45 PM   Medial Hub Color/Line Status Blue 7/4/2017 10:45 PM   Distal Hub Color/Line Status Brown 7/4/2017 10:45 PM       Peripheral IV 07/04/17 Left Wrist (Active)   Site Assessment Clean, dry, & intact 7/4/2017 10:45 PM   Phlebitis Assessment 0 7/4/2017 10:45 PM   Infiltration Assessment 0 7/4/2017 10:45 PM   Dressing Status Clean, dry, & intact 7/4/2017 10:45 PM   Dressing Type Tape;Transparent 7/4/2017 10:45 PM   Hub Color/Line Status Blue 7/4/2017 10:45 PM   Action Taken Blood drawn 7/4/2017  2:44 PM   Alcohol Cap Used Yes 7/4/2017  2:44 PM       Peripheral IV 07/04/17 Right Hand (Active)   Site Assessment Clean, dry, & intact 7/4/2017 10:45 PM   Phlebitis Assessment 0 7/4/2017 10:45 PM   Infiltration Assessment 0 7/4/2017 10:45 PM   Dressing Status Clean, dry, & intact 7/4/2017 10:45 PM   Dressing Type Tape;Transparent 7/4/2017 10:45 PM   Hub Color/Line Status Pink 7/4/2017 10:45 PM Action Taken Blood drawn 7/4/2017  2:53 PM   Alcohol Cap Used Yes 7/4/2017  2:53 PM        Opportunity for questions and clarification was provided.       Patient transported with:   Monitor  O2 @ 3 liters  Registered Nurse

## 2017-07-05 NOTE — PROGRESS NOTES
Physical Therapy Note:    Orders acknowledged, chart reviewed, and spoke with OT following interdisciplinary rounds. Patient now POD 1 s/p gastric perforation repair. She is requiring pressor support at this time and not appropriate for PT evaluation. Will hold OT/PT evaluations until stable and appropriate.

## 2017-07-05 NOTE — PROGRESS NOTES
Pt is POD ex lap primary repair gastric ulcers x 2. .Parents visited earlier but left for lunch. As pt is currently sleeping and has been volatile ,cursing earlier @ staff I will assess pt when awake or talk with parents when they return.   Hima Pickard

## 2017-07-05 NOTE — PROCEDURES
[address]  *** FINAL REPORT ***    Name: Ruthy Ruiz  MRN: [id number]    Inpatient  : 1970  HIS Order #: [id number]  [id number] [facility] Visit #: [id number]  Date: 2017    TYPE OF TEST: Peripheral Venous Testing    REASON FOR TEST  Edema    Right Leg:-  Deep venous thrombosis:           No  Superficial venous thrombosis:    No  Deep venous insufficiency:        No  Superficial venous insufficiency: No    Left Leg:-  Deep venous thrombosis:           No  Superficial venous thrombosis:    No  Deep venous insufficiency:        No  Superficial venous insufficiency: No      INTERPRETATION/FINDINGS  PROCEDURE:  BILATERAL LE VENOUS DUPLEX. Evaluation of lower extremity veins with ultrasound (B-mode imaging,  pulsed Doppler, color Doppler). Includes the common femoral, deep  femoral, femoral, popliteal, posterior tibial, peroneal, and great  saphenous veins. FINDINGS:  Ardeth Елена scale and color flow duplex images of the veins in  both lower extremities demonstrate normal compressibility, spontaneous   and augmented flow profiles, and absence of filling defects  throughout the deep and superficial veins in both lower extremities. CONCLUSION:  Bilateral lower extremity venous duplex negative for deep   venous thrombosis or thrombophlebitis. ADDITIONAL COMMENTS    I have personally reviewed the data relevant to the interpretation of  this  study. TECHNOLOGIST: Isadora Aguilar  Signed: 2017 3:46:05 PM    PHYSICIAN: Felicita Waggoner MD  Signed: 2017 7:50:16 AM

## 2017-07-05 NOTE — PROGRESS NOTES
Nutrition Assessment:    RECOMMENDATIONS/INTERVENTION(S):   TPN recommendation:    Day 1:  D15/5%AA @ 42 mls/hr  Day 2:  D15/5%AA @ goal rate of 63 mls/hr+ add 20% lipids (500 mls) @ 42 mls/hr x 12 hours 3x per week  (TPN @ goal + lipids will provide pt with 1505 Kcals/d, 76 g/d protein, and 1512 mls/d fluid meeting 100% daily energy needs)    Adjust IVF accordingly (currently receiving LR @ 125 mls/hr)    Check TG weekly while receiving TPN/Lipids    Monitor electrolytes, BG closely    ASSESSMENT:   Noted consult for TPN recommendations. Chart reviewed. 55 yo female admitted with anemia, perforated gastric ulcer, s/p exp lap with repair. Hx Crohn's disease. Visited pt this morning. Lethargic but answered questions appropriately. Pt reports poor appetite/limited oral intake x 2 weeks along with a 6# weight loss which she attributes to new medication she was taking for Lyme Disease. IVF infusing. On Levo. -61. No recent TG level. Mg repleted. Phos and K+ WDL. NGT in place. Skin--abd incision, blisters/sores noted to bilateral feet, 3+ edema BLE.  NPO, TPN to begin today. Pt with a 4.7% weight decrease x 2 weeks, amount considered significant for timeframe. See above for TPN recommendations. Meets Criteria for Severe Acute Malnutrition as evidenced by:   [] Moderate muscle wasting, loss of subcutaneous fat   [x] Nutritional intake of <50% of recommended intake for >5 days   [x] Weight loss of >1-2% in 1 week, >5% in 1 month, >7.5% in 3 months, or >10% in 6 months   [x] Moderate-severe edema           SUBJECTIVE/OBJECTIVE:     Diet Order: NPO  % Eaten:  No data found.     Pertinent Medications: [x] Reviewed    Chemistries:  Lab Results   Component Value Date/Time    Sodium 139 07/05/2017 04:25 AM    Potassium 3.7 07/05/2017 04:25 AM    Chloride 108 07/05/2017 04:25 AM    CO2 25 07/05/2017 04:25 AM    Anion gap 6 07/05/2017 04:25 AM    Glucose 66 07/05/2017 04:25 AM    BUN 7 07/05/2017 04:25 AM Creatinine 0.70 07/05/2017 04:25 AM    BUN/Creatinine ratio 10 07/05/2017 04:25 AM    GFR est AA >60 07/05/2017 04:25 AM    GFR est non-AA >60 07/05/2017 04:25 AM    Calcium 6.9 07/05/2017 04:25 AM    AST (SGOT) 15 07/05/2017 04:25 AM    Alk. phosphatase 63 07/05/2017 04:25 AM    Protein, total 3.8 07/05/2017 04:25 AM    Albumin 1.3 07/05/2017 04:25 AM    Globulin 2.5 07/05/2017 04:25 AM    A-G Ratio 0.5 07/05/2017 04:25 AM    ALT (SGPT) 15 07/05/2017 04:25 AM      Anthropometrics: Height: 5' 2\" (157.5 cm) Weight: 54.9 kg (121 lb)    IBW (%IBW): 50 kg (110 lb 3.7 oz) ( ) UBW (%UBW):   (  %)    BMI: Body mass index is 22.13 kg/(m^2). This BMI is indicative of:   [] Underweight    [x] Normal    [] Overweight    []  Obesity    []  Extreme Obesity (BMI>40)  Estimated Nutrition Needs (Based on): 1485 Kcals/day (BMR (1142) x 1.3 AF ) , 72 g (1.3 g/Kg actual body wt) Protein  Carbohydrate:  At Least 130 g/day  Fluids: 1500 mL/day    Last BM: ?   []Active     []Hyperactive  [x]Hypoactive       [] Absent   BS  Skin:    [] Intact   [x] Incision  [] Breakdown   [] DTI   [] Tears/Excoriation/Abrasion  []Edema [x] Other: blisters noted to bilateral feet, abrasion to back   Wt Readings from Last 30 Encounters:   07/04/17 54.9 kg (121 lb)   03/15/17 56.7 kg (125 lb)   01/05/17 55.2 kg (121 lb 9.6 oz)   03/23/16 59 kg (130 lb)   09/06/15 59 kg (130 lb)   03/27/15 59 kg (130 lb)   03/19/15 57.2 kg (126 lb)   03/15/15 56.2 kg (124 lb)   03/06/15 57.6 kg (127 lb)   10/08/14 59 kg (130 lb)   05/06/14 57.6 kg (127 lb)   12/27/13 61.7 kg (136 lb)   05/22/13 57.2 kg (126 lb)   05/08/13 58.5 kg (129 lb)   08/31/12 47.6 kg (105 lb)   06/07/12 49.9 kg (110 lb)   05/31/12 49.6 kg (109 lb 6.4 oz)   02/16/12 48.4 kg (106 lb 9.6 oz)   02/09/12 48.8 kg (107 lb 9.6 oz)   02/02/12 48.5 kg (107 lb)   01/30/12 45.8 kg (101 lb)   01/21/12 46.7 kg (103 lb)   01/17/12 45.9 kg (101 lb 3.2 oz)   10/27/11 50.3 kg (111 lb)   08/16/11 46.7 kg (103 lb) 01/18/11 50.2 kg (110 lb 9.6 oz)   07/13/10 52.8 kg (116 lb 6.4 oz)   04/16/10 53.5 kg (118 lb)      NUTRITION DIAGNOSES:   Problem:  Altered GI function Unintended weight loss   Etiology: related to perforated gastric ulcer medication induced nausea, decreased appetite   Signs/Symptoms: as evidenced by s/p exp lap with repair, NPO, need for TPN 4.7% weight decrease x 2 weeks    NUTRITION INTERVENTIONS:    Enteral/Parenteral Nutrition: Initiate parenteral nutrition                GOAL:   Pt will tolerate TPN @ goal rate with stable electrolytes and BG in next 1-3 days    Cultural, Quaker, or Ethnic Dietary Needs: None     LEARNING NEEDS (Diet, Food/Nutrient-Drug Interaction):    [x] None Identified   [] Identified and Education Provided/Documented   [] Identified and Pt declined/was not appropriate      [x] Interdisciplinary Care Plan Reviewed/Documented    [x] Discharge Needs:  tbd   [] No Nutrition Related Discharge Needs    NUTRITION RISK:   Pt Is At Nutrition Risk  [x]     No Nutrition Risk Identified  []       PT SEEN FOR:    [x]  MD Consult: []Calorie Count      []Diabetic Diet Education        []Diet Education     []Electrolyte Management     []General Nutrition Management and Supplements     []Management of Tube Feeding     [x]TPN Recommendations    []  RN Referral:  []MST score >=2     []Enteral/Parenteral Nutrition PTA     []Pregnant: Gestational DM or Multigestation                 [] Pressure Ulcer      []  Low BMI      []  Length of Stay       [] Dysphagia Diet         [] Ventilator      []  Follow-Up      Previous Recommendations:   [] Implemented          [] Not Implemented          [x] Not Applicable    Previous Goal:   [] Met              [] Progressing Towards Goal              [] Not Progressing Towards Goal   [x] Not Applicable              Franky Mendez, [address] [place] [address]   Pager [phone]

## 2017-07-05 NOTE — DIABETES MGMT
DTC Progress Note    Recommendations/ Comments:  Chart reviewed on Onel Dutta for hypoglycemia (glucose less than 80 mg/dL x 1 in the past 24 hours). Juanjose Cerda is a 56 y.o. female with no past medical history significant for DM per Dr. Talbot Litten, MD's H&P dated 7/4/2017. If appropriate, please consider   1. Glucose POCT checks Q6 hours to assess for hypoglycemia   2. Adding dextrose to [facility] (inpatient) medications:  -none for DM    Prior to admission medications: per past medical records  1. None for DM       Lab Results   Component Value Date/Time    Hemoglobin A1c 4.9 01/21/2012 02:10 PM       Reference range*:  Increased risk for diabetes: 5.7 - 6.4%  Diabetes: >6.4%  Glycemic control for adults with diabetes: <7.0 %    *RADHA BLANK (2014). Diagnosis and classification of diabetes mellitus. Diabetes care, 37, S81. Recent Glucose Results:   Lab Results   Component Value Date/Time    GLU 66 07/05/2017 04:25 AM    GLU 74 07/04/2017 11:34 PM    GLU 71 07/04/2017 02:54 PM    GLUCPOC 111 (H) 07/05/2017 08:29 AM    GLUCPOC 61 (L) 07/05/2017 07:59 AM      Estimated Creatinine Clearance: 79.4 mL/min (based on Cr of 0.7). Active Orders   Diet    DIET NPO      PO intake: No data found. Thank you. Anna Conteh.  Yan Cruz RN, BSN, MPH  Diabetes [address] [place] [address]  [phone]

## 2017-07-05 NOTE — PROGRESS NOTES
0710: Bedside shift change report given to JIMMY Khalil RN (oncoming nurse) by David Pavon RN (offgoing nurse). Report included the following information SBAR, Kardex, Intake/Output, MAR, Accordion, Recent Results and Cardiac Rhythm NSR. Chart, orders, labs reviewed. ITRACE complete. 0800: FP rounding on patient. 0930: FP rounding on patient. 1011: PA Gen. Surgery rounding on patient. 1200: Wound care rn rounding on patient. 1412: Patient appears to be sleeping in bed, resting comfortably, call bell within reach. 1510: Vascular here to do venous duplex at bedside. 1724: Notified Dr. Maya Camilo of ionized calcium result. Orders received. 1910: Bedside shift change report given to MARILEE Maki RN (oncoming nurse) by Yolanda Giles RN/ Klaus Jenkins RN (offgoing nurse). Report included the following information SBAR, Kardex, Intake/Output, MAR, Accordion, Recent Results and Cardiac Rhythm NSR.

## 2017-07-05 NOTE — PROGRESS NOTES
Occupational Therapy Note:  Orders acknowledged, chart reviewed, and spoke with team at rounds. Patient now POD 1 s/p gastric perforation repair. She is requiring pressor support at this time. Will hold OT/PT evaluations until stable and weaned off pressors. Thank you.   Fito Miles, OTR/L

## 2017-07-05 NOTE — ANESTHESIA PROCEDURE NOTES
Central Line Placement    Start time: 7/4/2017 7:05 PM  End time: 7/4/2017 7:15 PM  Performed by: Sari Price  Authorized by: Sari Price     Indications: vascular access  Preanesthetic Checklist: patient identified, risks and benefits discussed, anesthesia consent, site marked, patient being monitored and timeout performed      Pre-procedure: All elements of maximal sterile barrier technique followed? Yes    Maximal barrier precautions followed, 2% Chlorhexidine for cutaneous antisepsis, Hand hygiene performed prior to catheter insertion and Ultrasound guidance    Sterile Ultrasound Technique followed?: Yes          Procedure:   Prep:  Chlorhexidine  Location:  Internal jugular  Orientation:  Right  Patient position:  Trendelenburg  Catheter type:  Triple lumen    Catheter length:  16 cm  Number of attempts:  1  Successful placement: Yes      Assessment:   Post-procedure:  Catheter secured, sterile dressing applied and sterile dressing with CHG applied  Assessment:  Blood return through all ports, free fluid flow and guidewire removal verified  Insertion:  Uncomplicated  Patient tolerance:  Patient tolerated the procedure well with no immediate complications  Procedure performed after induction of anesthesia. Seldinger technique, venous access verified by gravity.

## 2017-07-05 NOTE — PROGRESS NOTES
Subjective   Patient POD0 s/p exploratory laparotomy and primary repair of gastric perforation. Patient tolerated procedure well. No significant complaints. Wilder in, drain in, NG tube in. Visit Vitals    /64    Pulse 90    Temp 97.5 °F (36.4 °C)    Resp 16    Ht 5' 2\" (1.575 m)    Wt 54.9 kg (121 lb)    SpO2 95%    BMI 22.13 kg/m2         Date 07/04/17 0700 - 07/05/17 0659 07/05/17 0700 - 07/06/17 0659   Shift 5056-2159 8999-9115 24 Hour Total 7916-9591 2354-8099 24 Hour Total   I  N  T  A  K  E   I.V.  (mL/kg/hr)  4100 4100         Volume (0.9% sodium chloride infusion)  1600 1600         Volume (lactated Ringers infusion)  2500 2500       Blood  620 620         Volume (TRANSFUSE PACKED RBC'S)  310 310         Volume (TRANSFUSE PACKED RBC'S)  310 310       Shift Total  (mL/kg)  4720  (86) 4720  (86)      O  U  T  P  U  T   Urine  (mL/kg/hr)  550 550         Urine Output  550 550       Drains  100 100         Output (ml) (Osmar Drain #2 07/04/17 Anterior;Right Abdomen)  50 50         Output (ml) (Osmar Drain #1 07/04/17 Left;Posterior Abdomen)  50 50       Blood  100 100         Estimated Blood Loss  100 100       Shift Total  (mL/kg)  750  (13.7) 750  (13.7)      NET  3970 3970      Weight (kg) 54.9 54.9 54.9 54.9 54.9 54.9         Objective   General  no distress, resting comfortably with NG tube, and wilder in place. Respiratory  CTAB, no wheezing. Cardiovascular  - regular rate, normal rhythm, no murmur   Abdomen: s/p laparotomy. Incision c/d/i covered with clean dressing. ATTP,  Liberian channel drain in place with serosanguinous output      Assessment & Plan     S/p Laparotomy and gastric perforation repair, POD0: 2 perforated gastric ulcers repaired. - Continue  ml/hour  - Strict NPO for 7 days  - Continue broad spectrum antibiotics. On Levaquin, Flagyl, Vanc. Continue Eraxis   - plan for TPN. Consult placed to nutrition consult for TPN management.    - PCA for pain control, narcan prn   - PPI: Protonix 40 bid iv  - incentive spirometry  - monitor I/os, VS  - appreciate surgeon recs. Sepsis - 2/4 SIRS (HR, WBC). Pt with cellulitis. However, 2/4 SIRS possibly 2/2 stress from gastric perforation. - continue IVF at 125 cc/hr  - continue broad spectrum antibiotics. On Levaquin, Flagyl, Vanc. Continue Eraxis     Anemia: likely 2/2 to bleeding from Crohn colitis/ gastric ulcers. s/p 2 units of PBRCs  - HH 2 hours post transfusion. Dale Dangelo MD   12:49 AM

## 2017-07-05 NOTE — PROGRESS NOTES
[facility]     Resident ICU Progress Note      Name: Mara Segura   : 1970   MRN: [id number]   Date: 2017 10:27 AM     I have reviewed the flowsheet and previous days notes. IMPRESSION:   · Septic Shock 2/2 Peritonitis: POD#1 s/p Gastric Perforation Repair. Improving and           tolerating well the procedure. Important amount of gastric content in the peritoneal       cavity. Patient on Pressors overnight. Surgery following. · PNA: Stable  · Hypomagnesemia: IV replaced  · Hypocalcemia: Might be related to nutritional state and protein levels  · Anemia: Stable s/p 2 units of PRBCs on admission  · Skin Lesions: Suspected due to Doxycycline SE (Risk for EM, SJS, TEN) or Crohn's          skin manifestations (erythema nodosum and pyoderma gangrenosum), possible                  multifactorial.   · Hypoglycemia: Probably due to NPO and malnourished state as per protein and                  albumin levels. Required D50W administration. · Bilateral Leg Pain: As reported by patient there is a new onset of pain and tenderness. PLAN:   · Continue NPO and NGT in place until POD#7 as per surgery. Surgery suggests starting TPN today. Continue PCA and start Valium for muscle spasms. Wean off pressors as tolerated, keeping [place] at least until off pressors. Continue current broad spectrum ABX (Flagyl, Levaquin, Vancomycin, and Eraxis). · Continue Levaquin to cover PNA   · F/U Mg levels post replacement and daily  · Will replace IV, F/U ionized Ca 2 hours after replacement   · F/U Hgb levels daily  · Continue Wound care   · Continue holding psych meds until PO  · POC Glucose checks every 6 hours, D50W PRN, plans for TPN  · F/U Duplex Scan of Bilateral LE     Overnight events reviewed: Patient received surgical repair of gastric perforation. Required pressors overnight to maintain adequate BP. Patient complained of pain on skin lesions. Vital Signs:    Visit Vitals    /55    Pulse 98    Temp 98.2 °F (36.8 °C)    Resp (!) 7    Ht 5' 2\" (1.575 m)    Wt 121 lb (54.9 kg)    SpO2 94%    BMI 22.13 kg/m2       O2 Device: Room air   O2 Flow Rate (L/min): 3 l/min   Temp (24hrs), Av °F (36.7 °C), Min:97.5 °F (36.4 °C), Max:98.2 °F (36.8 °C)       Intake/Output:   Last shift:      701 - 1900  In: 872.1 [I.V.:862.1]  Out: 440 [Urine:440]  Last 3 shifts: 1901 - 700  In: 5313.8 [I.V.:4693.8]  Out: 1930 [NXZLE:1860; Drains:460]    Intake/Output Summary (Last 24 hours) at 17 1027  Last data filed at 17 1022   Gross per 24 hour   Intake          6185.82 ml   Output             2370 ml   Net          3815.82 ml     Hemodynamics:   PAP:     Wedge:     CVP:  CVP (mmHg): 12 mmHg (17 0400)   CO:     CI:     SVR:     PVR:        Physical Exam:  General:  Alert, cooperative, no distress, appears stated age. Head:  Normocephalic, without obvious abnormality, atraumatic. Eyes:  Conjunctivae/corneas clear. PERRL, EOMs intact. Nose: Nares normal. Septum midline. Mucosa normal. No drainage or sinus tenderness. Throat: Lips, mucosa, and tongue normal. Several teeth missing or chipped. No ulcerations. Lungs:   Clear to auscultation bilaterally. Chest wall:  No tenderness or deformity. Heart:  Regular rate and rhythm, S1, S2 normal, no murmur, click, rub or gallop. Abdomen:   Soft, non-tender. Bowel sounds normal. No masses,  No organomegaly. ANDRE drains in place of bilateral flanks   Extremities: Extremities normal, atraumatic, no cyanosis or edema. Pulses: 2+ and symmetric all extremities. Skin: Skin color, texture, turgor normal. Ulcerative lesions on Lt posterior neck, Lt medial arm, Rt lower back, Bilateral extensor surface to fingers and toes. Incision of about 2 inches on the Lt posterior axilla, Drain on bilateral flanks and 3 surgical wounds covers by dry, clean dresses.     Lymph nodes: Cervical, supraclavicular, and axillary nodes normal.   Neurologic: Grossly nonfocal       DATA:   Current Facility-Administered Medications   Medication Dose Route Frequency    NOREPINephrine (LEVOPHED) 8,000 mcg in dextrose 5% 250 mL infusion  2-16 mcg/min IntraVENous TITRATE    nicotine (NICODERM CQ) 21 mg/24 hr patch 1 Patch  1 Patch TransDERmal DAILY    dextrose (D50W) injection syrg 12.5-25 g  12.5-25 g IntraVENous PRN    sodium chloride (NS) flush 5-10 mL  5-10 mL IntraVENous PRN    0.9% sodium chloride infusion 250 mL  250 mL IntraVENous PRN    sodium chloride (NS) flush 5-10 mL  5-10 mL IntraVENous Q8H    sodium chloride (NS) flush 5-10 mL  5-10 mL IntraVENous PRN    naloxone (NARCAN) injection 0.4 mg  0.4 mg IntraVENous PRN    metroNIDAZOLE (FLAGYL) IVPB premix 500 mg  500 mg IntraVENous Q6H    albuterol (PROVENTIL VENTOLIN) nebulizer solution 2.5 mg  2.5 mg Nebulization Q4H PRN    vancomycin (VANCOCIN) 1,000 mg in 0.9% sodium chloride (MBP/ADV) 250 mL  1 g IntraVENous Q12H    sodium chloride (NS) flush 5-10 mL  5-10 mL IntraVENous PRN    anidulafungin (ERAXIS) 100 mg in 0.9% sodium chloride 130 mL IVPB  100 mg IntraVENous Q24H    levoFLOXacin (LEVAQUIN) 750 mg in D5W IVPB  750 mg IntraVENous Q24H    HYDROmorphone (PF) 15 mg/30 ml (DILAUDID) PCA   IntraVENous TITRATE    lactated Ringers infusion  125 mL/hr IntraVENous CONTINUOUS    pantoprazole (PROTONIX) 40 mg in sodium chloride 0.9 % 10 mL injection  40 mg IntraVENous Q12H       Telemetry: NSR          Labs:  Recent Labs      07/05/17 0425 07/04/17   2334  07/04/17   1454   WBC  24.0*  18.4*  18.1*   HGB  10.1*  9.4*  6.6*   HCT  30.2*  28.8*  20.9*   PLT  505*  432*  547*     Recent Labs      07/05/17 0425 07/04/17   2334  07/04/17   1454   NA  139  139  138   K  3.7  3.9  3.7   CL  108  108  105   CO2  25  25  26   GLU  66  74  71   BUN  7  8  12   CREA  0.70  0.74  1.03*   CA  6.9*  6.8*  7.8*   MG  1.0*  1.0*   -- PHOS  3.6   --    --    ALB  1.3*  1.3*  2.0*   SGOT  15  18  15   ALT  15  17  23   INR   --    --   1.2*     No results for input(s): PH, PCO2, PO2, HCO3, FIO2 in the last 72 hours. Imaging:  I have personally reviewed the patients radiographs and reports. The pt is critically ill.     My assessment/plan to be discussed with: Dr. Juma Ann MD  PGY-2 Family Medicine Resident

## 2017-07-05 NOTE — ANESTHESIA POSTPROCEDURE EVALUATION
Post-Anesthesia Evaluation and Assessment    Patient: Levern Dakin MRN: [id number]  SSN: [id number]    YOB: 1970  Age: 56 y.o. Sex: female       Cardiovascular Function/Vital Signs  Visit Vitals    /81    Pulse (!) 106    Temp 36.6 °C (97.9 °F)    Resp 18    Ht 5' 2\" (1.575 m)    Wt 54.9 kg (121 lb)    SpO2 95%    BMI 22.13 kg/m2       Patient is status post general anesthesia for Procedure(s):  LAPAROTOMY EXPLORATORY, REPAIR OF GASTRIC PERFORATION. Nausea/Vomiting: None    Postoperative hydration reviewed and adequate. Pain:  Pain Scale 1: Numeric (0 - 10) (07/04/17 1434)  Pain Intensity 1: 10 (07/04/17 1434)   Managed    Neurological Status: At baseline    Mental Status and Level of Consciousness: Arousable    Pulmonary Status:   O2 Device: Nasal cannula (07/04/17 9295)   Adequate oxygenation and airway patent    Complications related to anesthesia: None    Post-anesthesia assessment completed.  No concerns    Signed By: Peter Tineo MD     July 4, 2017

## 2017-07-05 NOTE — CONSULTS
[id number] [facility] at [facility]  [phone]    Hematology / Oncology Consult    Reason for Visit:   Remigio Blair is a 56 y.o. female who is seen in consultation at the request of Dr. Lisa Garcia for evaluation of anemia. History of Present Illness:   Remigio Blair is a pleasant 56 y.o. female who was admitted for a perforated gastric ulcer. She presented to the ED yesterday complaining of fevers, rash, blisters, and abdominal pain. She had been started on doxycline about a month prior for Lyme Disease. In the ED she had labwork that demonstrated significant anemia, apparently new from a month prior. Given the timing, the ED physician was concerned about the possibility of a drug reaction leading to her anemia (ie aplastic anemia) and called me for guidance. I advised some additional labs, including a reticulocyte count. Shortly after that conversation the patient had a CT scan of her abdomen which revealed a perforated gastric ulcer, the more likely cause of her anemia. She was taken urgently to the operating room for repair. Currently, she is in the ICU, on a PCA for pain control. No apparent bleeding at this time. She reports considerable pain in her abdomen, only partially relieved by PCA. Complains of feeling very warm.       Past Medical History:   Diagnosis Date    Autoimmune disease ([address]     Crohn's Disease    Crohn disease [address] [address] 2010    Crohn's     Depression 2010    Vertigo       Past Surgical History:   Procedure Laterality Date    ABDOMEN SURGERY PROC UNLISTED      due to Crohn's-bowel resection x2    HX  SECTION      HX HEENT      HX TONSIL AND ADENOIDECTOMY      HX TUBAL LIGATION        Social History   Substance Use Topics    Smoking status: Former Smoker     Packs/day: 0.50     Years: 8.00     Types: Cigarettes    Smokeless tobacco: Never Used    Alcohol use No      Family History   Problem Relation Age of Onset  Heart Disease Father     Cancer Mother      Current Facility-Administered Medications   Medication Dose Route Frequency    NOREPINephrine (LEVOPHED) 8,000 mcg in dextrose 5% 250 mL infusion  2-16 mcg/min IntraVENous TITRATE    nicotine (NICODERM CQ) 21 mg/24 hr patch 1 Patch  1 Patch TransDERmal DAILY    sodium chloride (NS) flush 5-10 mL  5-10 mL IntraVENous PRN    0.9% sodium chloride infusion 250 mL  250 mL IntraVENous PRN    sodium chloride (NS) flush 5-10 mL  5-10 mL IntraVENous Q8H    sodium chloride (NS) flush 5-10 mL  5-10 mL IntraVENous PRN    naloxone (NARCAN) injection 0.4 mg  0.4 mg IntraVENous PRN    metroNIDAZOLE (FLAGYL) IVPB premix 500 mg  500 mg IntraVENous Q6H    albuterol (PROVENTIL VENTOLIN) nebulizer solution 2.5 mg  2.5 mg Nebulization Q4H PRN    vancomycin (VANCOCIN) 1,000 mg in 0.9% sodium chloride (MBP/ADV) 250 mL  1 g IntraVENous Q12H    sodium chloride (NS) flush 5-10 mL  5-10 mL IntraVENous PRN    anidulafungin (ERAXIS) 100 mg in 0.9% sodium chloride 130 mL IVPB  100 mg IntraVENous Q24H    levoFLOXacin (LEVAQUIN) 750 mg in D5W IVPB  750 mg IntraVENous Q24H    HYDROmorphone (PF) 15 mg/30 ml (DILAUDID) PCA   IntraVENous TITRATE    lactated Ringers infusion  125 mL/hr IntraVENous CONTINUOUS    pantoprazole (PROTONIX) 40 mg in sodium chloride 0.9 % 10 mL injection  40 mg IntraVENous Q12H      Allergies   Allergen Reactions    Cephalosporins Hives    Penicillins Hives    Tetracyclines Nausea and Vomiting        Review of Systems: A complete review of systems was obtained, negative except as described above.     Physical Exam:     Visit Vitals    BP (!) 89/51    Pulse 84    Temp 98.1 °F (36.7 °C)    Resp (!) 5    Ht 5' 2\" (1.575 m)    Wt 121 lb (54.9 kg)    SpO2 93%    BMI 22.13 kg/m2     General: No distress, ill appearing  Eyes: PERRLA, anicteric sclerae  HENT: Atraumatic, OP clear  Neck: Supple  Lymphatic: No cervical, supraclavicular, or inguinal adenopathy  Respiratory: CTAB, normal respiratory effort  CV: Normal rate, regular rhythm, no murmurs, no peripheral edema  GI: Tender, bandages in place. MS: Normal gait and station. Digits without clubbing or cyanosis. Skin: Blisters on feet. No ecchymoses or petechiae. Normal temperature, turgor, and texture. Psych: Alert, oriented, appropriate affect, normal judgment/insight    Results:     Lab Results   Component Value Date/Time    WBC 24.0 07/05/2017 04:25 AM    HGB 10.1 07/05/2017 04:25 AM    HCT 30.2 07/05/2017 04:25 AM    PLATELET 103 66/87/9293 04:25 AM    MCV 80.7 07/05/2017 04:25 AM    ABS. NEUTROPHILS 22.1 07/05/2017 04:25 AM    Hemoglobin (POC) 13.3 05/06/2014 07:55 AM    Hematocrit (POC) 39 05/06/2014 07:55 AM     Lab Results   Component Value Date/Time    Sodium 139 07/05/2017 04:25 AM    Potassium 3.7 07/05/2017 04:25 AM    Chloride 108 07/05/2017 04:25 AM    CO2 25 07/05/2017 04:25 AM    Glucose 66 07/05/2017 04:25 AM    BUN 7 07/05/2017 04:25 AM    Creatinine 0.70 07/05/2017 04:25 AM    GFR est AA >60 07/05/2017 04:25 AM    GFR est non-AA >60 07/05/2017 04:25 AM    Calcium 6.9 07/05/2017 04:25 AM    Sodium (POC) 141 05/06/2014 07:55 AM    Potassium (POC) 3.8 05/06/2014 07:55 AM    Chloride (POC) 110 05/06/2014 07:55 AM    Glucose (POC) 83 09/06/2015 09:34 AM    BUN (POC) 15 05/06/2014 07:55 AM    Creatinine (POC) 1.0 05/06/2014 07:55 AM    Calcium, ionized (POC) 1.23 05/06/2014 07:55 AM     Lab Results   Component Value Date/Time    Bilirubin, total 0.5 07/05/2017 04:25 AM    ALT (SGPT) 15 07/05/2017 04:25 AM    AST (SGOT) 15 07/05/2017 04:25 AM    Alk.  phosphatase 63 07/05/2017 04:25 AM    Protein, total 3.8 07/05/2017 04:25 AM    Albumin 1.3 07/05/2017 04:25 AM    Globulin 2.5 07/05/2017 04:25 AM     Lab Results   Component Value Date/Time    Reticulocyte count 2.3 07/04/2017 02:56 PM    Iron % saturation 3 07/04/2017 04:45 PM    TIBC 192 07/04/2017 04:45 PM    Ferritin 46 07/04/2017 04:45 PM    Vitamin B12 298 07/04/2017 04:45 PM    Folate 7.5 07/04/2017 04:45 PM    Homocysteine, plasma 6.4 01/22/2012 04:42 AM    Methylmalonic acid 0.45 01/22/2012 02:30 AM    Haptoglobin 250 07/04/2017 04:45 PM     07/04/2017 02:56 PM    Sed rate (ESR) 3 01/22/2012 02:30 AM    TSH 2.140 08/16/2011 11:37 AM    CORI, Direct None Detected 01/21/2012 02:10 PM    Lipase 150 09/06/2015 08:00 AM    HEP C VIRUS AB <0.1 03/19/2015 02:41 PM    HIV 1/O/2 Abs <1.00 03/19/2015 02:41 PM     Lab Results   Component Value Date/Time    INR 1.2 07/04/2017 02:54 PM    aPTT 32.1 07/04/2017 02:54 PM       Records reviewed and summarized above. Assessment/Recommendations:   1) Anemia, normocytic  Likely secondary to bleeding related to her gastric ulcer and subsequent perforation. Labwork indicates iron deficiency as well. HGB responded appropriately to transfusion. My recommendation is to start her on oral iron supplementation when she is able to take PO. Monitor CBC and transfuse if HGB <7.    2) Neutrophilia  Secondary to perforated gastric ulcer. Monitor. 3) Thrombocytosis  Likely reactive to her perforated gastric ulcer, and perhaps to iron deficiency. Monitor. 4) Perforated gastric ulcer  S/p repair by general surgery. Presumably not malignant, but pathology is pending. Continue supportive care. I will sign off. Please do not hesitate to call with any questions.     Signed By: Alexa Randolph MD     July 5, 2017

## 2017-07-06 LAB
ALBUMIN SERPL BCP-MCNC: 1.1 G/DL (ref 3.5–5)
ALBUMIN/GLOB SERPL: 0.4 {RATIO} (ref 1.1–2.2)
ALP SERPL-CCNC: 70 U/L (ref 45–117)
ALT SERPL-CCNC: 16 U/L (ref 12–78)
ANION GAP BLD CALC-SCNC: 5 MMOL/L (ref 5–15)
AST SERPL W P-5'-P-CCNC: 16 U/L (ref 15–37)
BACTERIA SPEC CULT: NORMAL
BASOPHILS # BLD AUTO: 0 K/UL (ref 0–0.1)
BASOPHILS # BLD: 0 % (ref 0–1)
BILIRUB SERPL-MCNC: 0.2 MG/DL (ref 0.2–1)
BUN SERPL-MCNC: 6 MG/DL (ref 6–20)
BUN/CREAT SERPL: 8 (ref 12–20)
CALCIUM SERPL-MCNC: 7.1 MG/DL (ref 8.5–10.1)
CC UR VC: NORMAL
CHLORIDE SERPL-SCNC: 109 MMOL/L (ref 97–108)
CO2 SERPL-SCNC: 24 MMOL/L (ref 21–32)
CREAT SERPL-MCNC: 0.71 MG/DL (ref 0.55–1.02)
DATE LAST DOSE: ABNORMAL
EOSINOPHIL # BLD: 0.3 K/UL (ref 0–0.4)
EOSINOPHIL NFR BLD: 1 % (ref 0–7)
ERYTHROCYTE [DISTWIDTH] IN BLOOD BY AUTOMATED COUNT: 15.5 % (ref 11.5–14.5)
GLOBULIN SER CALC-MCNC: 2.7 G/DL (ref 2–4)
GLUCOSE BLD STRIP.AUTO-MCNC: 102 MG/DL (ref 65–100)
GLUCOSE BLD STRIP.AUTO-MCNC: 103 MG/DL (ref 65–100)
GLUCOSE BLD STRIP.AUTO-MCNC: 106 MG/DL (ref 65–100)
GLUCOSE SERPL-MCNC: 110 MG/DL (ref 65–100)
HCT VFR BLD AUTO: 31.6 % (ref 35–47)
HGB BLD-MCNC: 10.4 G/DL (ref 11.5–16)
LYMPHOCYTES # BLD AUTO: 4 % (ref 12–49)
LYMPHOCYTES # BLD: 1.1 K/UL (ref 0.8–3.5)
MAGNESIUM SERPL-MCNC: 1.8 MG/DL (ref 1.6–2.4)
MCH RBC QN AUTO: 26.8 PG (ref 26–34)
MCHC RBC AUTO-ENTMCNC: 32.9 G/DL (ref 30–36.5)
MCV RBC AUTO: 81.4 FL (ref 80–99)
MONOCYTES # BLD: 1.1 K/UL (ref 0–1)
MONOCYTES NFR BLD AUTO: 4 % (ref 5–13)
NEUTS SEG # BLD: 25 K/UL (ref 1.8–8)
NEUTS SEG NFR BLD AUTO: 91 % (ref 32–75)
PHOSPHATE SERPL-MCNC: 2.7 MG/DL (ref 2.6–4.7)
PLATELET # BLD AUTO: 537 K/UL (ref 150–400)
POTASSIUM SERPL-SCNC: 3.6 MMOL/L (ref 3.5–5.1)
PROT SERPL-MCNC: 3.8 G/DL (ref 6.4–8.2)
RBC # BLD AUTO: 3.88 M/UL (ref 3.8–5.2)
RBC MORPH BLD: ABNORMAL
RBC MORPH BLD: ABNORMAL
REPORTED DOSE,DOSE: ABNORMAL UNITS
REPORTED DOSE/TIME,TMG: ABNORMAL
SERVICE CMNT-IMP: ABNORMAL
SERVICE CMNT-IMP: NORMAL
SERVICE CMNT-IMP: NORMAL
SODIUM SERPL-SCNC: 138 MMOL/L (ref 136–145)
TRIGL SERPL-MCNC: 41 MG/DL (ref ?–150)
VANCOMYCIN TROUGH SERPL-MCNC: 12.1 UG/ML (ref 5–10)
WBC # BLD AUTO: 27.5 K/UL (ref 3.6–11)

## 2017-07-06 PROCEDURE — 97165 OT EVAL LOW COMPLEX 30 MIN: CPT

## 2017-07-06 PROCEDURE — 65610000006 HC RM INTENSIVE CARE

## 2017-07-06 PROCEDURE — 80053 COMPREHEN METABOLIC PANEL: CPT | Performed by: SURGERY

## 2017-07-06 PROCEDURE — 74011250637 HC RX REV CODE- 250/637: Performed by: FAMILY MEDICINE

## 2017-07-06 PROCEDURE — 74011250636 HC RX REV CODE- 250/636: Performed by: FAMILY MEDICINE

## 2017-07-06 PROCEDURE — 74011000250 HC RX REV CODE- 250: Performed by: FAMILY MEDICINE

## 2017-07-06 PROCEDURE — 82962 GLUCOSE BLOOD TEST: CPT

## 2017-07-06 PROCEDURE — 74011000258 HC RX REV CODE- 258: Performed by: FAMILY MEDICINE

## 2017-07-06 PROCEDURE — 97530 THERAPEUTIC ACTIVITIES: CPT

## 2017-07-06 PROCEDURE — 74011000250 HC RX REV CODE- 250: Performed by: STUDENT IN AN ORGANIZED HEALTH CARE EDUCATION/TRAINING PROGRAM

## 2017-07-06 PROCEDURE — 77030027138 HC INCENT SPIROMETER -A

## 2017-07-06 PROCEDURE — 84100 ASSAY OF PHOSPHORUS: CPT | Performed by: SURGERY

## 2017-07-06 PROCEDURE — 74011250636 HC RX REV CODE- 250/636: Performed by: PHYSICIAN ASSISTANT

## 2017-07-06 PROCEDURE — 84478 ASSAY OF TRIGLYCERIDES: CPT | Performed by: SURGERY

## 2017-07-06 PROCEDURE — 83735 ASSAY OF MAGNESIUM: CPT | Performed by: SURGERY

## 2017-07-06 PROCEDURE — 74011250636 HC RX REV CODE- 250/636: Performed by: SURGERY

## 2017-07-06 PROCEDURE — 85025 COMPLETE CBC W/AUTO DIFF WBC: CPT | Performed by: SURGERY

## 2017-07-06 PROCEDURE — 74011000258 HC RX REV CODE- 258: Performed by: PHYSICIAN ASSISTANT

## 2017-07-06 PROCEDURE — 74011250636 HC RX REV CODE- 250/636: Performed by: STUDENT IN AN ORGANIZED HEALTH CARE EDUCATION/TRAINING PROGRAM

## 2017-07-06 PROCEDURE — 97162 PT EVAL MOD COMPLEX 30 MIN: CPT

## 2017-07-06 PROCEDURE — 74011000250 HC RX REV CODE- 250: Performed by: PHYSICIAN ASSISTANT

## 2017-07-06 PROCEDURE — 80202 ASSAY OF VANCOMYCIN: CPT | Performed by: FAMILY MEDICINE

## 2017-07-06 PROCEDURE — 36415 COLL VENOUS BLD VENIPUNCTURE: CPT | Performed by: SURGERY

## 2017-07-06 PROCEDURE — 97535 SELF CARE MNGMENT TRAINING: CPT

## 2017-07-06 PROCEDURE — C9113 INJ PANTOPRAZOLE SODIUM, VIA: HCPCS | Performed by: STUDENT IN AN ORGANIZED HEALTH CARE EDUCATION/TRAINING PROGRAM

## 2017-07-06 PROCEDURE — 86677 HELICOBACTER PYLORI ANTIBODY: CPT | Performed by: PHYSICIAN ASSISTANT

## 2017-07-06 RX ORDER — DIAZEPAM 10 MG/2ML
2.5 INJECTION INTRAMUSCULAR
Status: DISCONTINUED | OUTPATIENT
Start: 2017-07-06 | End: 2017-07-09

## 2017-07-06 RX ORDER — SODIUM CHLORIDE, SODIUM LACTATE, POTASSIUM CHLORIDE, CALCIUM CHLORIDE 600; 310; 30; 20 MG/100ML; MG/100ML; MG/100ML; MG/100ML
62 INJECTION, SOLUTION INTRAVENOUS CONTINUOUS
Status: DISCONTINUED | OUTPATIENT
Start: 2017-07-06 | End: 2017-07-08

## 2017-07-06 RX ORDER — HEPARIN SODIUM 5000 [USP'U]/ML
5000 INJECTION, SOLUTION INTRAVENOUS; SUBCUTANEOUS EVERY 8 HOURS
Status: DISCONTINUED | OUTPATIENT
Start: 2017-07-06 | End: 2017-07-21 | Stop reason: HOSPADM

## 2017-07-06 RX ORDER — VANCOMYCIN/0.9 % SOD CHLORIDE 1.5G/250ML
1500 PLASTIC BAG, INJECTION (ML) INTRAVENOUS EVERY 12 HOURS
Status: DISCONTINUED | OUTPATIENT
Start: 2017-07-06 | End: 2017-07-08 | Stop reason: DRUGHIGH

## 2017-07-06 RX ADMIN — SODIUM CHLORIDE 40 MG: 9 INJECTION, SOLUTION INTRAMUSCULAR; INTRAVENOUS; SUBCUTANEOUS at 09:20

## 2017-07-06 RX ADMIN — Medication 10 ML: at 05:05

## 2017-07-06 RX ADMIN — HEPARIN SODIUM 5000 UNITS: 5000 INJECTION, SOLUTION INTRAVENOUS; SUBCUTANEOUS at 21:40

## 2017-07-06 RX ADMIN — DIAZEPAM 2.5 MG: 5 INJECTION, SOLUTION INTRAMUSCULAR; INTRAVENOUS at 22:36

## 2017-07-06 RX ADMIN — HEPARIN SODIUM 5000 UNITS: 5000 INJECTION, SOLUTION INTRAVENOUS; SUBCUTANEOUS at 14:42

## 2017-07-06 RX ADMIN — SODIUM CHLORIDE 40 MG: 9 INJECTION, SOLUTION INTRAMUSCULAR; INTRAVENOUS; SUBCUTANEOUS at 20:52

## 2017-07-06 RX ADMIN — DIAZEPAM 2.5 MG: 5 INJECTION, SOLUTION INTRAMUSCULAR; INTRAVENOUS at 05:06

## 2017-07-06 RX ADMIN — METRONIDAZOLE 500 MG: 500 INJECTION, SOLUTION INTRAVENOUS at 14:42

## 2017-07-06 RX ADMIN — Medication: at 02:18

## 2017-07-06 RX ADMIN — LEVOFLOXACIN 750 MG: 5 INJECTION, SOLUTION INTRAVENOUS at 16:54

## 2017-07-06 RX ADMIN — SODIUM CHLORIDE, SODIUM LACTATE, POTASSIUM CHLORIDE, AND CALCIUM CHLORIDE 62 ML/HR: 600; 310; 30; 20 INJECTION, SOLUTION INTRAVENOUS at 20:52

## 2017-07-06 RX ADMIN — PROCHLORPERAZINE EDISYLATE 10 MG: 5 INJECTION INTRAMUSCULAR; INTRAVENOUS at 21:40

## 2017-07-06 RX ADMIN — VANCOMYCIN HYDROCHLORIDE 1500 MG: 10 INJECTION, POWDER, LYOPHILIZED, FOR SOLUTION INTRAVENOUS at 22:40

## 2017-07-06 RX ADMIN — METRONIDAZOLE 500 MG: 500 INJECTION, SOLUTION INTRAVENOUS at 19:52

## 2017-07-06 RX ADMIN — HEPARIN SODIUM 5000 UNITS: 5000 INJECTION, SOLUTION INTRAVENOUS; SUBCUTANEOUS at 07:47

## 2017-07-06 RX ADMIN — METRONIDAZOLE 500 MG: 500 INJECTION, SOLUTION INTRAVENOUS at 07:41

## 2017-07-06 RX ADMIN — Medication 10 ML: at 22:00

## 2017-07-06 RX ADMIN — Medication 10 ML: at 13:23

## 2017-07-06 RX ADMIN — VANCOMYCIN HYDROCHLORIDE 1000 MG: 1 INJECTION, POWDER, LYOPHILIZED, FOR SOLUTION INTRAVENOUS at 12:37

## 2017-07-06 RX ADMIN — DIAZEPAM 2.5 MG: 5 INJECTION, SOLUTION INTRAMUSCULAR; INTRAVENOUS at 11:42

## 2017-07-06 RX ADMIN — DIAZEPAM 2.5 MG: 5 INJECTION, SOLUTION INTRAMUSCULAR; INTRAVENOUS at 18:31

## 2017-07-06 RX ADMIN — METRONIDAZOLE 500 MG: 500 INJECTION, SOLUTION INTRAVENOUS at 01:13

## 2017-07-06 RX ADMIN — RETINOL, ERGOCALCIFEROL, .ALPHA.-TOCOPHEROL ACETATE, DL-, PHYTONADIONE, ASCORBIC ACID, NIACINAMIDE, RIBOFLAVIN 5-PHOSPHATE SODIUM, THIAMINE HYDROCHLORIDE, PYRIDOXINE HYDROCHLORIDE, DEXPANTHENOL, BIOTIN, FOLIC ACID, AND CYANOCOBALAMIN: KIT at 18:17

## 2017-07-06 RX ADMIN — SODIUM CHLORIDE 100 MG: 900 INJECTION, SOLUTION INTRAVENOUS at 19:51

## 2017-07-06 RX ADMIN — SODIUM CHLORIDE, SODIUM LACTATE, POTASSIUM CHLORIDE, AND CALCIUM CHLORIDE 83 ML/HR: 600; 310; 30; 20 INJECTION, SOLUTION INTRAVENOUS at 07:41

## 2017-07-06 NOTE — PROGRESS NOTES
Problem: Mobility Impaired (Adult and Pediatric)  Goal: *Acute Goals and Plan of Care (Insert Text)  Physical Therapy Goals  Initiated 7/6/2017  1. Patient will move from supine to sit and sit to supine in bed with minimal assistance/contact guard assist within 7 day(s). 2. Patient will transfer from bed to chair and chair to bed with minimal assistance/contact guard assist using the least restrictive device within 7 day(s). 3. Patient will perform sit to stand with moderate assistance within 7 day(s). 4. Patient will ambulate with moderate assistance for 15 feet with the least restrictive device within 7 day(s). 5. Patient will demonstrate independence with home exercise program for LE strengthening within 7 days. PHYSICAL THERAPY EVALUATION  Patient: Jeffery Albarado (89 y.o. female)  Date: 7/6/2017  Primary Diagnosis: Perforation bowel (HCC)  Pneumonia  Wounds, multiple open, lower extremity  Anemia  Perforated Bowel  Procedure(s) (LRB):  LAPAROTOMY EXPLORATORY, REPAIR OF GASTRIC PERFORATION (N/A) 2 Days Post-Op   Precautions: Falls         ASSESSMENT :  Based on the objective data described below, the patient presents with decreased strength, impaired balance, decreased endurance, and severe abdominal pain on POD 2 s/p repair of gastric perforation. Pt states independent baseline mobility with several medical issues in recent few months as documented elsewhere in medical record. Despite reported severe pain pt willing to attempt passive repositioning with bed in chair position up to 65 degrees, then sitting edge of bed briefly with moderate assistance. VSS in all positions. Patient will benefit from skilled intervention to address the above impairments.   Patients rehabilitation potential is considered to be Good  Factors which may influence rehabilitation potential include:   [X]         None noted  [ ]         Mental ability/status  [ ]         Medical condition  [ ]         Home/family situation and support systems  [ ]         Safety awareness  [ ]         Pain tolerance/management  [ ]         Other:        PLAN :  Recommendations and Planned Interventions:  [X]           Bed Mobility Training             [ ]    Neuromuscular Re-Education  [X]           Transfer Training                   [ ]    Orthotic/Prosthetic Training  [X]           Gait Training                         [ ]    Modalities  [X]           Therapeutic Exercises           [ ]    Edema Management/Control  [X]           Therapeutic Activities            [X]    Patient and Family Training/Education  [ ]           Other (comment):     Frequency/Duration: Patient will be followed by physical therapy  5 times a week to address goals. Discharge Recommendations: Rehab  Further Equipment Recommendations for Discharge: defer to rehab       SUBJECTIVE:   Patient stated Ysabel Mai can try it, re: sitting edge of bed after bed in chair position. OBJECTIVE DATA SUMMARY:   HISTORY:    Past Medical History:   Diagnosis Date    Auto[facility] disease ([facility] [address]       Crohn's Disease    Crohn disease ([facility] [address] 2010    Crohn's      Depression 2010    Vertigo       Past Surgical History:   Procedure Laterality Date    ABDOMEN SURGERY PROC UNLISTED        due to Crohn's-bowel resection x2    HX  SECTION       HX HEENT        HX TONSIL AND ADENOIDECTOMY        HX TUBAL LIGATION         Prior Level of Function/Home Situation: independent baseline mobility, drives a school bus. Father reports pt mowed her lawn for 2 hours immediately prior to admission. Pt stating lower extremity swelling for 3 weeks prior to admission limiting ability to ambulate. Personal factors and/or comorbidities impacting plan of care: Depression, requiring pressor support at time of evaluation     Home Situation  Home Environment: Private residence  5 steps with bilateral rails to enter at front. She state 35 stairs to enter at deck.   One/Two Story Residence: One story  Living Alone: No  Support Systems: Family member(s)  Patient Expects to be Discharged to[de-identified] Private residence  Current DME Used/Available at Home: None     EXAMINATION/PRESENTATION/DECISION MAKING:   Critical Behavior:  Neurologic State: Drowsy  Orientation Level: Oriented X4  Cognition: Poor safety awareness, Decreased attention/concentration, Follows commands     Hearing: Auditory  Auditory Impairment: None  Skin:  Healing wounds to dorsal aspect of toes bilaterally, ecchymosis to L medial ankle, dressing to L shoulder wound. Pt with 2 ANDRE drains, L drain with red-yellow drainaige, R drain with clear yellow drainage, wilder catheter, R neck central line, NG tube to wall suction 57 at nares before and after encounter. Edema: swelling to bilateral feet. Range Of Motion:   AROM grossly decreased, non functional in lower extremities. PROM generally decreased, functional, limited by pain in lower extremities                       Strength:        grossly decreased in bilateral lower extremities. Tone & Sensation:          normal lower extremity tone. Functional Mobility:  Bed Mobility:  Rolling: Additional time; Moderate assistance; to R  Supine to Sit: Additional time; Moderate assistance (x2; +rails and elevated head of bed)  Sit to Supine: Additional time;Maximum assistance         Pt sits with bed in chair position x 12 mins with gradual elevation as high as 65 degrees before performing supine to sit at edge of bed  Transfers:      not tested                       Balance:   Sitting: Impaired  Sitting - Static: Fair (occasional)  Sitting - Dynamic: Poor (constant support)  Ambulation/Gait Training:      not tested                                                                 Therapeutic Exercises:    Ankle pumps x 10     Functional Measure:  Barthel Index:      Bathin  Bladder: 0  Bowels: 0  Groomin  Dressin  Feedin  Mobility: 0  Stairs: 0  Toilet Use: 0  Transfer (Bed to Chair and Back): 5  Total: 5         Barthel and G-code impairment scale:  Percentage of impairment CH  0% CI  1-19% CJ  20-39% CK  40-59% CL  60-79% CM  80-99% CN  100%   Barthel Score 0-100 100 99-80 79-60 59-40 20-39 1-19    0   Barthel Score 0-20 20 17-19 13-16 9-12 5-8 1-4 0      The Barthel ADL Index: Guidelines  1. The index should be used as a record of what a patient does, not as a record of what a patient could do. 2. The main aim is to establish degree of independence from any help, physical or verbal, however minor and for whatever reason. 3. The need for supervision renders the patient not independent. 4. A patient's performance should be established using the best available evidence. Asking the patient, friends/relatives and nurses are the usual sources, but direct observation and common sense are also important. However direct testing is not needed. 5. Usually the patient's performance over the preceding 24-48 hours is important, but occasionally longer periods will be relevant. 6. Middle categories imply that the patient supplies over 50 per cent of the effort. 7. Use of aids to be independent is allowed. Florecita Fowler., Barthel, D.W. (8692). Functional evaluation: the Barthel Index. [address] [facility] (14)2. LISA Lemos, Roney Mackay., Chadwick Edgar., [place], [address] [place] [address] (1999). Measuring the change indisability after inpatient rehabilitation; comparison of the responsiveness of the Barthel Index and Functional [place] Measure. Journal of Neurology, Neurosurgery, and Psychiatry, 66(4), 313-465. Germain Luna, N.ADELA.A, Farnaz Cheema,  W.J.M, & Vipin Pritchett MSWETHA. (2004.) Assessment of post-stroke quality of life in cost-effectiveness studies: The usefulness of the Barthel Index and the EuroQoL-5D. Quality of Life Research, 13, 573-55            G codes:   In compliance with CMSs Claims Based Outcome Reporting, the following G-code set was chosen for this patient based on their primary functional limitation being treated: The outcome measure chosen to determine the severity of the functional limitation was the Barthel with a score of 5/100 which was correlated with the impairment scale. · Mobility - Walking and Moving Around:               - CURRENT STATUS:    CM - 80%-99% impaired, limited or restricted               - GOAL STATUS:           CK - 40%-59% impaired, limited or restricted               - D/C STATUS:                       ---------------To be determined---------------      Physical Therapy Evaluation Charge Determination   History Examination Presentation Decision-Making   MEDIUM  Complexity : 1-2 comorbidities / personal factors will impact the outcome/ POC  HIGH Complexity : 4+ Standardized tests and measures addressing body structure, function, activity limitation and / or participation in recreation  MEDIUM Complexity : Evolving with changing characteristics  Other outcome measures Barthel  HIGH       Based on the above components, the patient evaluation is determined to be of the following complexity level: MEDIUM     Pain:  Pain Scale 1: Numeric (0 - 10)  Pain Intensity 1: 10  Pain Location 1: Abdomen  Pain Orientation 1: Right  Pain Description 1: Aching  Pain Intervention(s) 1: Encouraged PCA; repositioned, rest  Activity Tolerance:   Limited by pain, fatigue. Please refer to the flowsheet for vital signs taken during this treatment.   After treatment:   [ ]         Patient left in no apparent distress sitting up in chair  [X]         Patient left in no apparent distress in bed  [X]         Call bell left within reach  [X]         Nursing notified  [X]         Caregiver present (RN)  [ ]         Bed alarm activated      COMMUNICATION/EDUCATION:   The patients plan of care was discussed with: Occupational Therapist and Registered Nurse.  [X]         Fall prevention education was provided and the patient/caregiver indicated understanding. [X]         Patient/family have participated as able in goal setting and plan of care. [X]         Patient/family agree to work toward stated goals and plan of care. [ ]         Patient understands intent and goals of therapy, but is neutral about his/her participation. [ ]         Patient is unable to participate in goal setting and plan of care.      Thank you for this referral.  Scott Corcoran, PT, DPT   Time Calculation: 37 mins

## 2017-07-06 NOTE — PROGRESS NOTES
0710: Bedside shift change report given to JIMMY Lane RN/MARILEE Pearl, RN (oncoming nurse) by Gita Echevarria. Isabella Calero RN (offgoing nurse). Report included the following information SBAR, Kardex, Intake/Output, MAR, Accordion, Recent Results and Cardiac Rhythm NSR. Orders, labs, chart reviewed. ITRACE complete. 6910: [facility] surgery rounding on patient. 0845: Dr. Kodak Roland rounding on patient with PA    1008: Dr. Torri Pacheco rounding on patient. 1038: [facility]. 1209: PT/OT working with patient    [id number]: Patient resting quietly in bed, no acute distress noted. Patient c/o pain with coughing. Encouraged to splint incision and press pca button as needed. VSS. Call bell left within reach. Will continue to monitor. 1622: Patient resting quietly in bed. VSS. Call bell left within reach. 1820: Patient requesting valium. Given see e-mar. Family at bedside. VSS. Call bell left within reach. 1915: Bedside shift change report given to ANNITA Calero RN (oncoming nurse) by Fransisca Bill RN/ MARILEE Pearl RN (offgoing nurse). Report included the following information SBAR, Kardex, MAR, Accordion, Recent Results and Cardiac Rhythm NSR.

## 2017-07-06 NOTE — PROGRESS NOTES
[facility] Dosing Services: Antimicrobial Stewardship Daily Doc     Consult for antibiotic dosing of vancomycin by Dr. Regla Fung  Indication: perf'd gastric ulcer, sepsis   Day of Therapy: 2 (first dose not hung until 7/5 early AM)     Vancomycin therapy:  1250 mg IV x 1 then 1000 mg IV q 12 hours   Dose calculated to approximate a therapeutic trough of 15-20mcg/mL. Plan: patient's vancomycin level was 12.1, prior to the 4th dose, scr 0.71. Will increase vancomycin to 1500 mg IV q 12 hours      (Dose administration notes: 1250 mg load hung at 0100 7/5)     Other Antimicrobial   (not dosed by pharmacist) Flagyl, levaquin, eraxis   Cultures 7/4 2/2 bcx: NGTD (pending)  7/4 ucx: NGTD (pending)  7/4 body fluid: NGTD (pending)   Serum Creatinine       Lab Results   Component Value Date/Time     Creatinine 0.71 07/06/2017 03:47 AM     Creatinine (POC) 1.0 05/06/2014 07:55 AM       Creatinine Clearance Estimated Creatinine Clearance: 86.7 mL/min (based on Cr of 0.71).    Temp Temp: 98.2 °F (36.8 °C)     WBC        Lab Results    Component Value Date/Time     WBC 27.5 07/06/2017 03:47 AM       H/H        Lab Results    Component Value Date/Time     HGB 10.4 07/06/2017 03:47 AM       Platelets           Lab Results   Component Value Date/Time     PLATELET 682 56/60/3802 03:47 AM             Pharmacist HINA SarkarD                      Contact information: [phone]

## 2017-07-06 NOTE — PROGRESS NOTES
General Surgery Daily Progress Note    Patient: Theresa Hernandez MRN: [id number]  SSN: [id number]    YOB: 1970  Age: 56 y.o. Sex: female      Admit Date: 7/4/2017    Subjective:   C/o abdominal pain. No nausea. On Levophed 2mcg/min. UOP good. Per RN patient has been somewhat sedated and speech slurred.      Current Facility-Administered Medications   Medication Dose Route Frequency    heparin (porcine) injection 5,000 Units  5,000 Units SubCUTAneous Q8H    NOREPINephrine (LEVOPHED) 8,000 mcg in dextrose 5% 250 mL infusion  2-16 mcg/min IntraVENous TITRATE    nicotine (NICODERM CQ) 21 mg/24 hr patch 1 Patch  1 Patch TransDERmal DAILY    dextrose (D50W) injection syrg 12.5-25 g  12.5-25 g IntraVENous PRN    diazePAM (VALIUM) injection 2.5 mg  2.5 mg IntraVENous Q6H PRN    TPN ADULT - CENTRAL AA 5% D15% W/ ELECTROLYTES AND CA   IntraVENous CONTINUOUS    glucose chewable tablet 16 g  4 Tab Oral PRN    dextrose (D50W) injection syrg 12.5-25 g  12.5-25 g IntraVENous PRN    glucagon (GLUCAGEN) injection 1 mg  1 mg IntraMUSCular PRN    insulin lispro (HUMALOG) injection   SubCUTAneous Q6H    lactated Ringers infusion  83 mL/hr IntraVENous CONTINUOUS    vancomycin: trough 7/6 1130   Other ONCE    prochlorperazine (COMPAZINE) injection 10 mg  10 mg IntraVENous Q6H PRN    sodium chloride (NS) flush 5-10 mL  5-10 mL IntraVENous PRN    0.9% sodium chloride infusion 250 mL  250 mL IntraVENous PRN    sodium chloride (NS) flush 5-10 mL  5-10 mL IntraVENous Q8H    sodium chloride (NS) flush 5-10 mL  5-10 mL IntraVENous PRN    naloxone (NARCAN) injection 0.4 mg  0.4 mg IntraVENous PRN    metroNIDAZOLE (FLAGYL) IVPB premix 500 mg  500 mg IntraVENous Q6H    albuterol (PROVENTIL VENTOLIN) nebulizer solution 2.5 mg  2.5 mg Nebulization Q4H PRN    vancomycin (VANCOCIN) 1,000 mg in 0.9% sodium chloride (MBP/ADV) 250 mL  1 g IntraVENous Q12H    sodium chloride (NS) flush 5-10 mL  5-10 mL IntraVENous PRN  anidulafungin (ERAXIS) 100 mg in 0.9% sodium chloride 130 mL IVPB  100 mg IntraVENous Q24H    levoFLOXacin (LEVAQUIN) 750 mg in D5W IVPB  750 mg IntraVENous Q24H    HYDROmorphone (PF) 15 mg/30 ml (DILAUDID) PCA   IntraVENous TITRATE    pantoprazole (PROTONIX) 40 mg in sodium chloride 0.9 % 10 mL injection  40 mg IntraVENous Q12H        Objective:   07/06 0701 - 07/06 1900  In: -   Out: 205 [Urine:135; Drains:70]  07/04 1901 - 07/06 0700  In: 38050.5 [I.V.:9660.5]  Out: 5605 [Urine:4080; Drains:1325]  Patient Vitals for the past 8 hrs:   BP Temp Pulse Resp SpO2   07/06/17 0700 102/65 - 88 14 93 %   07/06/17 0645 110/68 - 86 14 95 %   07/06/17 0630 103/68 - 83 14 95 %   07/06/17 0615 98/67 - 86 15 93 %   07/06/17 0600 103/87 - 92 17 94 %   07/06/17 0545 108/66 - 82 13 95 %   07/06/17 0530 107/74 - 90 19 94 %   07/06/17 0515 102/64 - 88 16 94 %   07/06/17 0500 106/59 - 94 17 95 %   07/06/17 0445 103/55 - 81 14 96 %   07/06/17 0430 98/58 - - - -   07/06/17 0415 101/61 - 87 14 95 %   07/06/17 0400 102/70 98.2 °F (36.8 °C) 94 18 93 %   07/06/17 0345 102/59 - 90 15 94 %   07/06/17 0330 100/57 - 84 12 96 %   07/06/17 0315 99/60 - 85 14 95 %   07/06/17 0300 92/60 - 83 10 96 %   07/06/17 0245 96/60 - 86 14 95 %   07/06/17 0230 96/57 - 87 14 95 %   07/06/17 0215 109/55 - 97 29 93 %   07/06/17 0200 101/59 - 88 16 94 %   07/06/17 0145 91/56 - 86 15 95 %   07/06/17 0130 96/56 - 81 15 96 %   07/06/17 0115 93/60 - 81 11 96 %   07/06/17 0100 (!) 85/54 - 81 10 95 %   07/06/17 0030 92/52 - 87 13 96 %       Physical Exam:  General: Awake, cooperative, speech slurred  Lungs: Clear to auscultation bilaterally. Heart:  Regular rate and rhythm  Abdomen: Soft, ATTP, distended, hypoactive bowel sounds. Incisions c/d/i.  Left anterior drain SS, right posterior drain serous output   Extremities: Warm, moves all, no edema  Skin:  Warm and dry, no rash    Labs:   Recent Labs      07/06/17   0347   WBC  27.5*   HGB  10.4*   HCT  31.6* PLT  537*     Recent Labs      07/06/17   0347   NA  138   K  3.6   CL  109*   CO2  24   GLU  110*   BUN  6   CREA  0.71   CA  7.1*   MG  1.8   PHOS  2.7   ALB  1.1*   TBILI  0.2   SGOT  16   ALT  16       Assessment / Plan:   · POD#2 ex lap, primary repair perforated gastric ulcer x 2, omental intra-abdominal flap  · NG decompression to POD#7 and will need an upper GI prior to initiating PO  · Increase TPN rate, add lipids. · Continue PCA, add PRN Valium for abdominal muscle spasms. Avoid increase in sedating medications. · WBC increased, no fever. Cultures pending.  Monitor and continue broad-spectrum ABX and antifungal  · D/c wilder once off pressors  · Heparin for DVT prophylaxis  · H pylori serologies pending, continue BID PPI  · PT/OT consults, encourage IS

## 2017-07-06 NOTE — PROGRESS NOTES
1900 - Bedside shift change report given to KAILYN, [address], RN (oncoming nurse) by Andreina Marino RN  offgoing nurse). Report included the following information SBAR, OR Summary, Procedure Summary, Intake/Output, Recent Results, Cardiac Rhythm SR and Alarm Parameters . 2000 - Assessment done. See flow sheet. Pt awake in bed, family at bedside. Dilaudid PCA running. Enc pt to push button when in pain. Verbalized understanding  0000 - Reassessment done. See flowsheet. Pt resting comfortably. Using PCA as needed for pain management. Pt turned repositioned. 0400 - Reassessment done, labs drawn. No changes in condition. Pt resting comfortably. Pt turned/repositioned  0500 - Pt awoke, crying out, very anxious. Valium 2.5 mg given IV  0530 - Pt resting comfortably. Resp even, unlabored  0605 - Dr Chavo Escobar in to see pt. Updated on overnight events. 0700 - \"Bedside\"} shift change report given to Andreina Marino RN (oncoming nurse) by SVITLANA AVINA (offgoing nurse). Report included the following information SBAR, Intake/Output, Recent Results, Cardiac Rhythm NSR and Alarm Parameters .

## 2017-07-06 NOTE — PROGRESS NOTES
[address] [facility] [place] [address]   Office [phone]  Fax [phone]          Assessment and Wendy Lloyd is a 56 y.o. female with known Chron's Disease (s/p bowel resection, not currenlty on immunotherapy and depression admitted for sepsis 2/2 perforated gastric ulcers/peritonitis. She has spent 2 night(s) in the hospital.    24 Hour Events: No acute events. Started on levophed yesterday. Started on TPN yesterday as well. Infectious Disease    Septic Shock 2/2 Peritonitis - Remains on levophed this morning. Blood cultures with no growth x17 hours. Body fluid culture with no growth x10 hours. Urine culture in process.  -Intensivist following, appreciate their support.  -Continue levophed. Instructing nursing to titrate to keep MAP > 65. -Abx:  Eraxis, levaquin, vancomycin, flagyl. -F/u blood, urine, body fluid (surgical) cultures. -LR running at 125cc/hr. -Strict I&O. -Daily labs. Community Acquired Pneumonia - left basilar airspace disease noted on initial CXR. Could also be playing a role in SIRS/sepsis picture.  -Has appropriate coverage with levaquin.  -Albuterol nebulizer available. Patient has inhaler at home, but no documented history of obstructive lung disease. Lyme Disease - diagnosed ~1 month ago at [facility]. Was treated with a 21 day course of doxycycline. Completed this course fully, but developed some lower extremity skin breakdown following treatment. Tetracyclines are on patient's allergy list.  -Not repeating tic studies. Gastrointestinal    Perforated Gastric Ulcers s/p Operative Repair on 7/4/17 - surgery following. Patient is NPO until POD7. Gastric ulcers thought to be related to chronic steroid use and Chron's disease. Drain OP 865cc over the last 24 hours. -Appreciate surgery recommendations & support. -NPO x7 days. Continue TPN. Added lipids 3x/week. Checking TG weekly. -IV protonix.   -Dilaudid PCA for pain control per general surgery.  -Encouraging incentive spirometry. Chron's Disease - no pharmacotherapy PTA. Poor follow up history with GI. Likely playing a role in patient's current clinical course.  -Will consider GI consult later in course when patient becomes more stable. Needs better outpatient follow up. Severe Protein Calorie Malnutrition - as evidenced by lower extremity swelling and sever hypoalbuminemia  This is complicated by need for 7 days of NPO. -Continuing TPN. -Daily CMP. -Daily Phos. Hematology    Anemia - lab work up done on admission. Notable for reticulocyte count of 2.3, LD of 294. Per hematology, most likely 2/2 acute blood loss from gastric ulcer. Also a component of iron deficiency. S/p 2 units pRBC on admission--Hgb responded appropriately. Has been stable since.  -Hematology signed off.  -Have 2 units of pRBCs on hold. Pulmonary    Tobacco Abuse - reports to smoke 1.5 PPD. -Encouraging cessation.  -Prescribed daily nicotine patch while patient admitted. Integumentary    Healing Wounds on Bilateral Feet - most likely acute reaction to doxycycline treatment that patient underwent prior to admission. Per patient, these wounds are healing. Remain painful.  -Wound care consulted, appreciate support.  -Tetracyclines on allergy list.    3cm Laceration of Left Upper Back - healing. Not amenable to sutures at this time.  -Wound care consulted, appreciate support. Bilateral Lower Extremity Swelling/Pain - noted on admisison. Thought to be 2/2 hypoalbuminemia. Intensivist ordered duplex venous studies of lower extremities to evaluate for DVT. -Follow up on result of duplex. Electrolytes    Hypomagnesemia - Resolved s/p repletion.  -Daily Mg level. Psych    Depression/Panic Attacks - currently stable.   Takes klonopin, adderall, Lexapro, ativan, and trazodone at home.  -Currently holding these medications as patient is NPO.  -OK with IV vallium prn.    MSK    History of Falls - patient and family gave detailed history of multiple falls over the last few months/weeks. -PT/OT consulted. Their evaluation may be deferred until patient clinically improves. FEN/GI - NPO. Lactated Ringer's at 125 mL/hr. Activity - Bedrest.  DVT prophylaxis - SCDs  GI prophylaxis - Protonix  Fall prophylaxis - Fall precautions ordered. Unit - ICU  Admission Status - Admitted  IV Access - Right IJ CVC, 2 PIVs.  Disposition - Plan to d/c to TBD. Code Status - Full     Blaze Lawrence MD  Family Medicine Resident         Subjective / Objective     Subjective:  Symptoms:  Stable. She reports weakness and anxiety. No chest pain or chest pressure. Diet:  NPO. No nausea or vomiting. Activity level: Impaired due to pain. Pain:  She complains of pain that is severe. She reports pain is unchanged. Pain is requiring pain medication and partially controlled. Did have some increased anxiety overnight--treated with vallium which did help. Patient reports feeling the same this morning. Temp (24hrs), Av.1 °F (36.7 °C), Min:97.4 °F (36.3 °C), Max:98.7 °F (37.1 °C)     Objective:  General Appearance:  Ill-appearing, in pain and uncomfortable. Vital signs: (most recent): Blood pressure 103/87, pulse 92, temperature 98.2 °F (36.8 °C), resp. rate 17, height 5' 2\" (1.575 m), weight 139 lb 15.9 oz (63.5 kg), SpO2 94 %. HEENT: Normal HEENT exam.  (NG tube in left nare. Brown/bloody output.)    Lungs:  Normal respiratory rate. She is not in respiratory distress. Breath sounds clear to auscultation. No stridor. No wheezes, rales, rhonchi or decreased breath sounds. (Taking shallow breaths. Unable to ausculate posteriorly 2/2 pain.)  Heart: Normal rate. Regular rhythm. S1 normal and S2 normal.  No murmur, gallop or friction rub. Extremities: There is dependent edema. (Tenderness to palpation of bilateral lower extremities.   No SCDs this morning.)  Neurological: Patient is alert and oriented to person, place and time. Skin:  Warm and dry. (3cm laceration on upper left back, unchanged from admission. Some skin breakdown noted/poorly healing wounds on bilateral feet.)  Abdomen: (Surgical bandages in place. ANDRE drain in place. Bandages appear clean and dry. )Bowel sounds are normal.   There is generalized tenderness. Pupils:  Pupils are equal, round, and reactive to light. Respiratory:  O2 Device: Room air     I/O:    Date 07/05/17 0700 - 07/06/17 0659 07/06/17 0700 - 07/07/17 0659   Shift 0700-1859 1900-0659 24 Hour Total 0700-1859 1900-0659 24 Hour Total   I  N  T  A  K  E   I.V.  (mL/kg/hr) 2691.3  (4.1) 2150.5 4841.7         Levophed Volume 133.3 67.6 201         Volume (lactated Ringers infusion) 941.7  941. 7         Volume (lactated Ringers infusion)  993.2 993.2         Volume (dextrose 5% and 0.9% NaCl infusion) 806.3  806.3         Volume (metroNIDAZOLE (FLAGYL) IVPB premix 500 mg) 200 200 400         Volume (vancomycin (VANCOCIN) 1,000 mg in 0.9% sodium chloride (MBP/ADV) 250 mL) 250 250 500         Volume (anidulafungin (ERAXIS) 100 mg in 0.9% sodium chloride 130 mL IVPB)  130 130         Volume (levoFLOXacin (LEVAQUIN) 750 mg in D5W IVPB) 150  150         Volume (magnesium sulfate 2 g/50 ml IVPB (premix or compounded)) 100  100         Volume (calcium gluconate 1 g in 0.9% sodium chloride 100 mL IVPB) 110  110         Volume (TPN ADULT - CENTRAL AA 5% D15% W/ ELECTROLYTES AND CA)  509.6 509.6       NG/GT 30 30 60         Intake (ml) (Nasogastric Tube) 30 30 60       Shift Total  (mL/kg) 2721.3  (49.6) 2180.5  (34.3) 4901.7  (77.2)      O  U  T  P  U  T   Urine  (mL/kg/hr) 1240  (1.9) 1470 2710         Urine Output (mL) (Urinary Catheter 07/04/17 2- way; [place]) 1240 1470 2710       Emesis/NG output 70 30 100         Output (ml) (Nasogastric Tube) 70 30 100       Drains 675 320 995         Output (ml) ([place] Fan #2 07/04/17 Anterior;Right Abdomen) 560 290 850         Output (ml) ([place] Drain #1 07/04/17 Left;Posterior Abdomen) 115 30 145       Shift Total  (mL/kg) 1985  (36.2) 1820  (28.7) 3805  (59.9)      .3 360.5 1096.7      Weight (kg) 54.9 63.5 63.5 63.5 63.5 63.5       CBC:  Recent Labs      07/06/17 0347 07/05/17   1551  07/05/17   0425  07/04/17   2334   WBC  27.5*   --   24.0*  18.4*   HGB  10.4*  10.9*  10.1*  9.4*   HCT  31.6*  31.7*  30.2*  28.8*   PLT  537*   --   505*  240*       Metabolic Panel:  Recent Labs      07/06/17 0347 07/05/17   1119  07/05/17   0425  07/04/17   2334   07/04/17   1454   NA  138   --   139  139   --   138   K  3.6   --   3.7  3.9   --   3.7   CL  109*   --   108  108   --   105   CO2  24   --   25  25   --   26   BUN  6   --   7  8   --   12   CREA  0.71   --   0.70  0.74   --   1.03*   GLU  110*   --   66  74   --   71   CA  7.1*   --   6.9*  6.8*   --   7.8*   MG  1.8  2.5*  1.0*  1.0*   < >   --    PHOS  2.7   --   3.6   --    --    --    ALB  1.1*   --   1.3*  1.3*   --   2.0*   SGOT  16   --   15  18   --   15   ALT  16   --   15  17   --   23   INR   --    --    --    --    --   1.2*    < > = values in this interval not displayed.           For Billing    Chief Complaint   Patient presents with    Rash    Fever    Sore Throat    Nasal Congestion       Hospital Problems  Date Reviewed: 1/5/2017          Codes Class Noted POA    Thrombocytosis ([facility] [address] ICD-10-CM: D47.3  ICD-9-CM: 238.71  7/5/2017 Yes        Neutrophilia ICD-10-CM: D72.9  ICD-9-CM: 288.8  7/5/2017 Yes        * (Principal)Perforation bowel ([facility] ICD-10-CM: K63.1  ICD-9-CM: 569.83  7/4/2017 Yes        Pneumonia ICD-10-CM: J18.9  ICD-9-CM: 102  7/4/2017 Yes        Anemia ICD-10-CM: D64.9  ICD-9-CM: 285.9  7/4/2017 Yes        Wounds, multiple open, lower extremity ICD-10-CM: M35.881I  ICD-9-CM: 894.0  7/4/2017 Yes        Crohn disease ([facility] ICD-10-CM: K50.90  ICD-9-CM: 555.9  4/19/2010 Yes    Overview Addendum 2/2/2012  4:33 PM by Zara Nugent MD     She had 4 colon resections in the past.  Dr. Libby Pena, Dr. Orozco Given abd/pevis 2009: Thickened segment of neoileum proximal and adjacent to   anastomotic chain sutures and likely representing inflammatory bowel disease   recurrence. Partial small bowel obstruction at this level. No evidence of   perforation. No evidence of fistula or intra-abdominal abscess.

## 2017-07-06 NOTE — DIABETES MGMT
DTC Progress Note    Recommendations/ Comments:  Chart reviewed on Aprill L Dutta for hypoglycemia (glucose less than 80 mg/dL x 1 in the past 24 hours). Christal Roberson is a 56 y.o. female with no past medical history significant for DM per Dr. Chantale Chin MD's H&P dated 7/4/2017. Hypoglycemia treated x 2 - with 12.5 g and then with 25 g D50W. Receiving TPN. Will continue to follow. Hospital (inpatient) medications:  -Humalog Normal Sensitivity scale to cover for glucose > 139 mg/dL Every 6 hours       Prior to admission medications: per past medical records  1. None for DM       Lab Results   Component Value Date/Time    Hemoglobin A1c 4.9 01/21/2012 02:10 PM       Reference range*:  Increased risk for diabetes: 5.7 - 6.4%  Diabetes: >6.4%  Glycemic control for adults with diabetes: <7.0 %    *ARDHA BLANK (2014). Diagnosis and classification of diabetes mellitus. Diabetes care, 37, S81. Recent Glucose Results:   Lab Results   Component Value Date/Time     (H) 07/06/2017 03:47 AM    GLUCPOC 89 07/05/2017 11:17 PM    GLUCPOC 83 07/05/2017 04:56 PM    GLUCPOC 166 (H) 07/05/2017 11:37 AM      Estimated Creatinine Clearance: 86.7 mL/min (based on Cr of 0.71). Active Orders   Diet    DIET NPO      PO intake: No data found. Thank you. Cristal Haque.  Ike Montejo RN, BSN, MPH  Diabetes [address] [place] [address]  [phone]

## 2017-07-06 NOTE — PROGRESS NOTES
Problem: Pain  Goal: *KNOWLEDGE OF MEDICATION MANAGEMENT  Outcome: Progressing Towards Goal  Pt using PCA without encouragement.  Resting comfortably when hourly rounding is done

## 2017-07-06 NOTE — PROGRESS NOTES
Occupational Therapy EVALUATION    Occupational Therapy Goals  Initiated 7/6/2017  1. Patient will perform light grooming in unsupported sitting x5 mins with minimal assistance within 7 day(s). 2. Patient will perform upper body dressing in unsupported sitting with min A within 7 day(s). 3. Patient will perform toilet transfers with minimal assistance with appropriate AD within 7 day(s). 4. Patient will participate in upper extremity therapeutic exercise/activities through comfortable ROM in supported sitting to prepare for ADL tasks with supervision/set-up for 6 minutes within 7 day(s). 5. Patient will utilize energy conservation techniques during functional activities with verbal cues within 7 day(s). Patient: Jose Garcia (81 y.o. female)  Date: 7/6/2017  Primary Diagnosis: Perforation bowel (HCC)  Pneumonia  Wounds, multiple open, lower extremity  Anemia  Perforated Bowel  Procedure(s) (LRB):  LAPAROTOMY EXPLORATORY, REPAIR OF GASTRIC PERFORATION (N/A) 2 Days Post-Op   Precautions: Fall       ASSESSMENT :  Based on the objective data described below, the patient presents with impaired activity tolerance, sitting balance, functional mobility and abdominal pain s/p surgical repair of gastric perforation POD 2 with recent dx of Lyme's disease and blistering on distal extremities. Nursing cleared for therapy and MD encouraging mobility. Prior to admission she reports independence with all ADL tasks and works as a . Fall at home on 6/30 which she reports increased pain in L shoulder with AROM. Reports of severe pain, but agreeable to continue. Patient with use of PCA button at beginning of session. Tolerated bed in chair position with head up to 65 degrees for over 10 mins(between 45-65 degrees, progressing to erect posture) and completed washing her face and putting on lip moisturizer with RUE.   She was willing to attempt sitting EOB and completed unsupported sitting briefly with mod A.     BP, HR and SpO2 on RA stable supine, bed in chair position and after sitting EOB. Recommend next session for additional assessment of L shoulder ROM and continued sitting EOB as tolerated. Patient will benefit from skilled intervention to address the above impairments. Patients rehabilitation potential is considered to be Good  Factors which may influence rehabilitation potential include:   []             None noted  []             Mental ability/status  [x]             Medical condition  []             Home/family situation and support systems  []             Safety awareness  []             Pain tolerance/management  []             Other:      PLAN :  Recommendations and Planned Interventions:  [x]               Self Care Training                  [x]        Therapeutic Activities  [x]               Functional Mobility Training    []        Cognitive Retraining  [x]               Therapeutic Exercises           [x]        Endurance Activities  [x]               Balance Training                   []        Neuromuscular Re-Education  []               Visual/Perceptual Training     [x]   Home Safety Training  [x]               Patient Education                 [x]        Family Training/Education  []               Other (comment):    Frequency/Duration: Patient will be followed by occupational therapy 5 times a week to address goals. Discharge Recommendations: Rehab vs TBD base on progress with therapy in hospital  Further Equipment Recommendations for Discharge: TBD with progression     SUBJECTIVE:   Patient stated I will try.   In regards to attempting to sit EOB, patient's choice    OBJECTIVE DATA SUMMARY:   HISTORY:   Past Medical History:   Diagnosis Date    Autoimmune disease ([facility] [address]     Crohn's Disease    Crohn disease ([facility] [address] 4/19/2010    Crohn's     Depression 4/19/2010    Vertigo      Past Surgical History:   Procedure Laterality Date    ABDOMEN SURGERY PROC UNLISTED  2009    due to Crohn's-bowel resection x2    HX  SECTION      HX HEENT      HX TONSIL AND ADENOIDECTOMY      HX TUBAL LIGATION         Prior Level of Function/Home Situation: Home with family. 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 Patient's father reporting she was mowing the lawn two hours prior to admission to hospital.  Expanded or extensive additional review of patient history: extensive medical hx: Lyme's disease, Chron disease, blistering on hands/feet    Home Situation  Home Environment: Private residence  One/Two Story Residence: One story  Living Alone: No  Support Systems: Family member(s)  Patient Expects to be Discharged to[de-identified] Private residence  Current DME Used/Available at Home: None  [x]  Right hand dominant   []  Left hand dominant    EXAMINATION OF PERFORMANCE DEFICITS:  Cognitive/Behavioral Status:  Neurologic State: Drowsy  Orientation Level: Oriented X4  Cognition: Poor safety awareness;Decreased attention/concentration; Follows commands       Skin: impaired, see nursing notes for details    Edema: uppers none    Hearing: Auditory  Auditory Impairment: None    Vision/Perceptual:    Acuity: Within Defined Limits         Range of Motion:  RUE: WDL  LUE: mild impairment, functional        Strength:  RUE: mild impairment, functional- overall  LUE: moderate impairment at shoulder for overhead extension, reporting soreness following fall at home ; elbow, wrist, fingers WDL       Coordination:     Fine Motor Skills-Upper: Left Intact; Right Intact (weak  strength)    Gross Motor Skills-Upper: Left Impaired;Right Intact (R shoulder ROM decrease s/p fall at home)    Tone & Sensation:   BUE Tone normal  Sensation: hypersensitivity with feet secondary to blisters- preferring not to put on socks on this day    Balance:  Sitting: Impaired  Sitting - Static: Fair (occasional)  Sitting - Dynamic: Poor (constant support)    Functional Mobility and Transfers for ADLs:  Bed Mobility:  Rolling: Additional time; Moderate assistance  Supine to Sit: Additional time; Moderate assistance (x2; +rails and elevated head of bed)  Sit to Supine: Additional time;Maximum assistance    Transfers:       ADL Assessment:  Feeding:  (NPO)    Oral Facial Hygiene/Grooming: Minimum assistance (in supported sitting)    Bathing: Maximum assistance    Upper Body Dressing: Moderate assistance    Lower Body Dressing: Total assistance    Toileting: Total assistance      ADL Intervention and task modifications:    Grooming- while bed in chair position  Washing Face: Supervision/set-up (supported sitting)  Applying Makeup: Supervision/set-up (lip moistureizer )    Provided education on importance of therapy and time in sitting/OOB. Patient with reports of severe pain, 10/10. Use of PCA prior to putting bed in chair position. Asked patient through out session if she wanted to end session and patient willing to continue. Provided verbal cues for bracing abdomin while coughing or repositioning. Tolerated bed in chair position up to 65 degrees to complete ADL tasks. Education on body mechanics and sequencing for supine > sit, completed with mod A x 2 and sitting briefly with mod A. Functional Measure:  Barthel Index:    Bathin  Bladder: 0  Bowels: 0  Groomin  Dressin  Feedin  Mobility: 0  Stairs: 0  Toilet Use: 0  Transfer (Bed to Chair and Back): 5  Total: 5       Barthel and G-code impairment scale:  Percentage of impairment CH  0% CI  1-19% CJ  20-39% CK  40-59% CL  60-79% CM  80-99% CN  100%   Barthel Score 0-100 100 99-80 79-60 59-40 20-39 1-19   0   Barthel Score 0-20 20 17-19 13-16 9-12 5-8 1-4 0      The Barthel ADL Index: Guidelines  1. The index should be used as a record of what a patient does, not as a record of what a patient could do. 2. The main aim is to establish degree of independence from any help, physical or verbal, however minor and for whatever reason.   3. The need for supervision renders the patient not independent. 4. A patient's performance should be established using the best available evidence. Asking the patient, friends/relatives and nurses are the usual sources, but direct observation and common sense are also important. However direct testing is not needed. 5. Usually the patient's performance over the preceding 24-48 hours is important, but occasionally longer periods will be relevant. 6. Middle categories imply that the patient supplies over 50 per cent of the effort. 7. Use of aids to be independent is allowed. Jany Yeager., Barthel, D.W. (8404). Functional evaluation: the Barthel Index. [address] [facility] (14)2. Raza Dang gentry LISA Acosta, Socorro Gómez., Peg Flores., Mika, [address] [place] [address] Ave (1999). Measuring the change indisability after inpatient rehabilitation; comparison of the responsiveness of the Barthel Index and Functional Lea Measure. Journal of Neurology, Neurosurgery, and Psychiatry, 66(4), 633-059. Kwadwo De La Cruz, N.J.A, SUZY Weeks, & Adam Elder MKirstinA. (2004.) Assessment of post-stroke quality of life in cost-effectiveness studies: The usefulness of the Barthel Index and the EuroQoL-5D. Quality of Life Research, 13, 277-11       G codes: In compliance with CMSs Claims Based Outcome Reporting, the following G-code set was chosen for this patient based on their primary functional limitation being treated: The outcome measure chosen to determine the severity of the functional limitation was the barthel index with a score of 5/100 which was correlated with the impairment scale. ?  Self Care:     - CURRENT STATUS: CM - 80%-99% impaired, limited or restricted    - GOAL STATUS: CL - 60%-79% impaired, limited or restricted    - D/C STATUS:  ---------------To be determined---------------       Occupational Therapy Evaluation Charge Determination   History Examination Decision-Making   LOW Complexity : Brief history review  MEDIUM Complexity : 3-5 performance deficits relating to physical, cognitive , or psychosocial skils that result in activity limitations and / or participation restrictions MEDIUM Complexity : Patient may present with comorbidities that affect occupational performnce. Miniml to moderate modification of tasks or assistance (eg, physical or verbal ) with assesment(s) is necessary to enable patient to complete evaluation       Based on the above components, the patient evaluation is determined to be of the following complexity level: LOW   Pain:  Pain Scale 1: Numeric (0 - 10)  Pain Intensity 1: 0  Pain Location 1: Abdomen  Pain Orientation 1: Right  Pain Description 1: Aching  Pain Intervention(s) 1: Encouraged PCA    Activity Tolerance:   VSS with supine, bed in chair position and following sitting EOB  Supine/HOB mild elevation: 100/66, HR 95  Bed in chair position: 100/67, HR 91  After session: 115/81    After treatment:   [] Patient left in no apparent distress sitting up in chair  [x] Patient left in no apparent distress in bed  [x] Call bell left within reach  [x] Nursing notified  [] Caregiver present  [] Bed alarm activated    COMMUNICATION/EDUCATION:   The patients plan of care was discussed with: Physical Therapist, Registered Nurse and Patient and two family members. [x] Home safety education was provided and the patient/caregiver indicated understanding. [x] Patient/family have participated as able in goal setting and plan of care. [x] Patient/family agree to work toward stated goals and plan of care. [] Patient understands intent and goals of therapy, but is neutral about his/her participation. [] Patient is unable to participate in goal setting and plan of care. This patients plan of care is appropriate for delegation to [facility].     Thank you for this referral.  Morgan Dixon, OTR/L  Time Calculation: 36 mins

## 2017-07-06 NOTE — PROGRESS NOTES
[facility]     Resident ICU Progress Note      Name: George Mojica   : 1970   MRN: [id number]   Date: 2017 10:27 AM     I have reviewed the flowsheet and previous days notes. IMPRESSION:   · Septic Shock 2/2 Peritonitis: POD#2 s/p Gastric Perforation Repair. Improving and           tolerating well the procedure. Weaning down Pressors, planning on wean off Today. TPN started Yesterday, will continue as per Surgery. · Leukocytosis: WBC count increasing even with broad spectrum Abx and antifungi treatment. Will continue watching. · PNA: Stable  · Hypomagnesemia: Stable  · Hypocalcemia: Improved when adjusted by albumin levels, ionized marginally low after        replacement. Will continue watching with TPN. · Anemia: Stable, s/p 2 units of PRBCs on admission  · Skin Lesions: Improving. Suspected due to Doxycycline SE (Risk for EM, SJS, TEN) or Crohn's skin manifestations (erythema nodosum and pyoderma gangrenosum), possible multifactorial.   · Hypoglycemia: Stable. Patient started on TPN Yesterday. · Bilateral Leg Pain: Bilateral Duplex scan was negative for DVT and Thrombophlebitis. PLAN:   · Continue NPO, TPN and keep NGT in place until POD#7 as per surgery. Continue Dilaudid PCA for pain control and Valium for muscle spasms. Wean off pressors as tolerated, keeping [place] at least until off pressors. Continue current broad spectrum ABX (Flagyl, Levaquin, and Eraxis). · Continue Levaquin to cover PNA   · F/U Mg and Ca levels daily  · F/U Hgb levels daily  · Continue Wound care   · Continue holding psych meds until PO  · POC Glucose checks every 6 hours, D50W PRN, and TPN as per Surgery     Overnight events reviewed: Patient received surgical repair of gastric perforation. Required pressors overnight to maintain adequate BP. Patient complained of pain on skin lesions.          Vital Signs:    Visit Vitals    /61  Pulse 92    Temp 98.2 °F (36.8 °C)    Resp 17    Ht 5' 2\" (1.575 m)    Wt 139 lb 15.9 oz (63.5 kg)    SpO2 93%    BMI 25.6 kg/m2       O2 Device: Room air   O2 Flow Rate (L/min): 3 l/min   Temp (24hrs), Av.1 °F (36.7 °C), Min:97.4 °F (36.3 °C), Max:98.7 °F (37.1 °C)       Intake/Output:   Last shift:      701 - 1900  In: 426 [I.V.:426]  Out: 440 [Urine:370; Drains:70]  Last 3 shifts: 1901 - 700  In: 11390.5 [I.V.:9660.5]  Out: 5605 [Urine:4080; Drains:1325]    Intake/Output Summary (Last 24 hours) at 17 1026  Last data filed at 17 1018   Gross per 24 hour   Intake          4580.67 ml   Output             3675 ml   Net           905.67 ml     Hemodynamics:   PAP:     Wedge:     CVP:  CVP (mmHg): 7 mmHg (17 0800)   CO:     CI:     SVR:     PVR:        Physical Exam:  General:  Alert, cooperative, no distress, appears stated age. Head:  Normocephalic, without obvious abnormality, atraumatic. Eyes:  Conjunctivae/corneas clear. PERRL, EOMs intact. Nose: Nares normal. Septum midline. Mucosa normal. No drainage or sinus tenderness. Throat: Lips, mucosa, and tongue normal. Several teeth missing or chipped. No ulcerations. Lungs:   Clear to auscultation bilaterally. Chest wall:  No tenderness or deformity. Heart:  Regular rate and rhythm, S1, S2 normal, no murmur, click, rub or gallop. Abdomen:   Soft, non-tender. Bowel sounds normal. No masses,  No organomegaly. ANDRE drains in place of bilateral flanks   Extremities: Extremities normal, atraumatic, no cyanosis or edema. Pulses: 2+ and symmetric all extremities. Skin: Skin color, texture, turgor normal. Improving ulcerative lesions on Lt posterior neck, Lt medial arm, Rt lower back, Bilateral extensor surface to fingers and toes. Incision of about 2 inches on the Lt posterior axilla, Drain on bilateral flanks and 3 surgical wounds covered by dry, clean dresses.     Lymph nodes: Cervical, supraclavicular, and axillary nodes normal.   Neurologic: Grossly nonfocal       DATA:   Current Facility-Administered Medications   Medication Dose Route Frequency    heparin (porcine) injection 5,000 Units  5,000 Units SubCUTAneous Q8H    TPN ADULT - CENTRAL AA 5% D15% W/ ELECTROLYTES AND CA   IntraVENous CONTINUOUS    [START ON 7/7/2017] fat emulsion 20% (LIPOSYN, INTRALIPID) infusion 500 mL  500 mL IntraVENous Q MON, WED & FRI    NOREPINephrine (LEVOPHED) 8,000 mcg in dextrose 5% 250 mL infusion  2-16 mcg/min IntraVENous TITRATE    nicotine (NICODERM CQ) 21 mg/24 hr patch 1 Patch  1 Patch TransDERmal DAILY    dextrose (D50W) injection syrg 12.5-25 g  12.5-25 g IntraVENous PRN    diazePAM (VALIUM) injection 2.5 mg  2.5 mg IntraVENous Q6H PRN    TPN ADULT - CENTRAL AA 5% D15% W/ ELECTROLYTES AND CA   IntraVENous CONTINUOUS    glucose chewable tablet 16 g  4 Tab Oral PRN    dextrose (D50W) injection syrg 12.5-25 g  12.5-25 g IntraVENous PRN    glucagon (GLUCAGEN) injection 1 mg  1 mg IntraMUSCular PRN    insulin lispro (HUMALOG) injection   SubCUTAneous Q6H    lactated Ringers infusion  83 mL/hr IntraVENous CONTINUOUS    vancomycin: trough 7/6 1130   Other ONCE    prochlorperazine (COMPAZINE) injection 10 mg  10 mg IntraVENous Q6H PRN    sodium chloride (NS) flush 5-10 mL  5-10 mL IntraVENous PRN    0.9% sodium chloride infusion 250 mL  250 mL IntraVENous PRN    sodium chloride (NS) flush 5-10 mL  5-10 mL IntraVENous Q8H    sodium chloride (NS) flush 5-10 mL  5-10 mL IntraVENous PRN    naloxone (NARCAN) injection 0.4 mg  0.4 mg IntraVENous PRN    metroNIDAZOLE (FLAGYL) IVPB premix 500 mg  500 mg IntraVENous Q6H    albuterol (PROVENTIL VENTOLIN) nebulizer solution 2.5 mg  2.5 mg Nebulization Q4H PRN    vancomycin (VANCOCIN) 1,000 mg in 0.9% sodium chloride (MBP/ADV) 250 mL  1 g IntraVENous Q12H    sodium chloride (NS) flush 5-10 mL  5-10 mL IntraVENous PRN    anidulafungin (ERAXIS) 100 mg in 0.9% sodium chloride 130 mL IVPB  100 mg IntraVENous Q24H    levoFLOXacin (LEVAQUIN) 750 mg in D5W IVPB  750 mg IntraVENous Q24H    HYDROmorphone (PF) 15 mg/30 ml (DILAUDID) PCA   IntraVENous TITRATE    pantoprazole (PROTONIX) 40 mg in sodium chloride 0.9 % 10 mL injection  40 mg IntraVENous Q12H       Telemetry: NSR          Labs:  Recent Labs      07/06/17 0347 07/05/17   1551  07/05/17   0425  07/04/17   2334   WBC  27.5*   --   24.0*  18.4*   HGB  10.4*  10.9*  10.1*  9.4*   HCT  31.6*  31.7*  30.2*  28.8*   PLT  537*   --   505*  432*     Recent Labs      07/06/17 0347 07/05/17   1119  07/05/17   0425  07/04/17   2334   07/04/17   1454   NA  138   --   139  139   --   138   K  3.6   --   3.7  3.9   --   3.7   CL  109*   --   108  108   --   105   CO2  24   --   25  25   --   26   GLU  110*   --   66  74   --   71   BUN  6   --   7  8   --   12   CREA  0.71   --   0.70  0.74   --   1.03*   CA  7.1*   --   6.9*  6.8*   --   7.8*   MG  1.8  2.5*  1.0*  1.0*   < >   --    PHOS  2.7   --   3.6   --    --    --    ALB  1.1*   --   1.3*  1.3*   --   2.0*   SGOT  16   --   15  18   --   15   ALT  16   --   15  17   --   23   INR   --    --    --    --    --   1.2*    < > = values in this interval not displayed. No results for input(s): PH, PCO2, PO2, HCO3, FIO2 in the last 72 hours. Imaging:  I have personally reviewed the patients radiographs and reports. The pt is critically ill.     My assessment/plan to be discussed with: Dr. Macey Ha MD  PGY-2 Family Medicine Resident

## 2017-07-07 LAB
ABO + RH BLD: NORMAL
ALBUMIN SERPL BCP-MCNC: 1 G/DL (ref 3.5–5)
ALBUMIN/GLOB SERPL: 0.5 {RATIO} (ref 1.1–2.2)
ALP SERPL-CCNC: 74 U/L (ref 45–117)
ALT SERPL-CCNC: 16 U/L (ref 12–78)
ANION GAP BLD CALC-SCNC: 4 MMOL/L (ref 5–15)
AST SERPL W P-5'-P-CCNC: 14 U/L (ref 15–37)
BASOPHILS # BLD AUTO: 0 K/UL (ref 0–0.1)
BASOPHILS # BLD: 0 % (ref 0–1)
BILIRUB DIRECT SERPL-MCNC: 0.1 MG/DL (ref 0–0.2)
BILIRUB SERPL-MCNC: 0.2 MG/DL (ref 0.2–1)
BLD PROD TYP BPU: NORMAL
BLOOD GROUP ANTIBODIES SERPL: NORMAL
BPU ID: NORMAL
BUN SERPL-MCNC: 4 MG/DL (ref 6–20)
BUN/CREAT SERPL: 6 (ref 12–20)
CALCIUM SERPL-MCNC: 7.3 MG/DL (ref 8.5–10.1)
CHLORIDE SERPL-SCNC: 108 MMOL/L (ref 97–108)
CO2 SERPL-SCNC: 27 MMOL/L (ref 21–32)
CREAT SERPL-MCNC: 0.63 MG/DL (ref 0.55–1.02)
CROSSMATCH RESULT,%XM: NORMAL
EOSINOPHIL # BLD: 0.1 K/UL (ref 0–0.4)
EOSINOPHIL NFR BLD: 1 % (ref 0–7)
ERYTHROCYTE [DISTWIDTH] IN BLOOD BY AUTOMATED COUNT: 15.8 % (ref 11.5–14.5)
GLOBULIN SER CALC-MCNC: 2 G/DL (ref 2–4)
GLUCOSE BLD STRIP.AUTO-MCNC: 105 MG/DL (ref 65–100)
GLUCOSE BLD STRIP.AUTO-MCNC: 107 MG/DL (ref 65–100)
GLUCOSE SERPL-MCNC: 114 MG/DL (ref 65–100)
H PYLORI IGA SER-ACNC: <9 UNITS (ref 0–8.9)
H PYLORI IGG SER IA-ACNC: <0.9 U/ML (ref 0–0.8)
HCT VFR BLD AUTO: 28.7 % (ref 35–47)
HGB BLD-MCNC: 9.4 G/DL (ref 11.5–16)
LYMPHOCYTES # BLD AUTO: 7 % (ref 12–49)
LYMPHOCYTES # BLD: 1.1 K/UL (ref 0.8–3.5)
MAGNESIUM SERPL-MCNC: 1.6 MG/DL (ref 1.6–2.4)
MCH RBC QN AUTO: 26.6 PG (ref 26–34)
MCHC RBC AUTO-ENTMCNC: 32.8 G/DL (ref 30–36.5)
MCV RBC AUTO: 81.3 FL (ref 80–99)
MONOCYTES # BLD: 1.1 K/UL (ref 0–1)
MONOCYTES NFR BLD AUTO: 7 % (ref 5–13)
NEUTS SEG # BLD: 13.4 K/UL (ref 1.8–8)
NEUTS SEG NFR BLD AUTO: 85 % (ref 32–75)
PHOSPHATE SERPL-MCNC: 2.3 MG/DL (ref 2.6–4.7)
PLATELET # BLD AUTO: 475 K/UL (ref 150–400)
POTASSIUM SERPL-SCNC: 3.4 MMOL/L (ref 3.5–5.1)
PROT SERPL-MCNC: 3 G/DL (ref 6.4–8.2)
RBC # BLD AUTO: 3.53 M/UL (ref 3.8–5.2)
SERVICE CMNT-IMP: ABNORMAL
SERVICE CMNT-IMP: ABNORMAL
SODIUM SERPL-SCNC: 139 MMOL/L (ref 136–145)
SPECIMEN EXP DATE BLD: NORMAL
STATUS OF UNIT,%ST: NORMAL
UNIT DIVISION, %UDIV: 0
WBC # BLD AUTO: 15.8 K/UL (ref 3.6–11)

## 2017-07-07 PROCEDURE — 94640 AIRWAY INHALATION TREATMENT: CPT

## 2017-07-07 PROCEDURE — 84100 ASSAY OF PHOSPHORUS: CPT | Performed by: INTERNAL MEDICINE

## 2017-07-07 PROCEDURE — 74011000250 HC RX REV CODE- 250: Performed by: FAMILY MEDICINE

## 2017-07-07 PROCEDURE — 74011250636 HC RX REV CODE- 250/636: Performed by: STUDENT IN AN ORGANIZED HEALTH CARE EDUCATION/TRAINING PROGRAM

## 2017-07-07 PROCEDURE — 74011250636 HC RX REV CODE- 250/636: Performed by: FAMILY MEDICINE

## 2017-07-07 PROCEDURE — 65660000000 HC RM CCU STEPDOWN

## 2017-07-07 PROCEDURE — C9113 INJ PANTOPRAZOLE SODIUM, VIA: HCPCS | Performed by: STUDENT IN AN ORGANIZED HEALTH CARE EDUCATION/TRAINING PROGRAM

## 2017-07-07 PROCEDURE — 74011000250 HC RX REV CODE- 250: Performed by: INTERNAL MEDICINE

## 2017-07-07 PROCEDURE — 77030013140 HC MSK NEB VYRM -A

## 2017-07-07 PROCEDURE — 80048 BASIC METABOLIC PNL TOTAL CA: CPT | Performed by: INTERNAL MEDICINE

## 2017-07-07 PROCEDURE — 74011000250 HC RX REV CODE- 250: Performed by: STUDENT IN AN ORGANIZED HEALTH CARE EDUCATION/TRAINING PROGRAM

## 2017-07-07 PROCEDURE — 83735 ASSAY OF MAGNESIUM: CPT | Performed by: INTERNAL MEDICINE

## 2017-07-07 PROCEDURE — 74011000250 HC RX REV CODE- 250: Performed by: PHYSICIAN ASSISTANT

## 2017-07-07 PROCEDURE — 74011250637 HC RX REV CODE- 250/637: Performed by: PHYSICIAN ASSISTANT

## 2017-07-07 PROCEDURE — 80076 HEPATIC FUNCTION PANEL: CPT | Performed by: INTERNAL MEDICINE

## 2017-07-07 PROCEDURE — 97535 SELF CARE MNGMENT TRAINING: CPT

## 2017-07-07 PROCEDURE — 97530 THERAPEUTIC ACTIVITIES: CPT

## 2017-07-07 PROCEDURE — 74011250637 HC RX REV CODE- 250/637: Performed by: FAMILY MEDICINE

## 2017-07-07 PROCEDURE — 82962 GLUCOSE BLOOD TEST: CPT

## 2017-07-07 PROCEDURE — 74011000258 HC RX REV CODE- 258: Performed by: PHYSICIAN ASSISTANT

## 2017-07-07 PROCEDURE — 77030011256 HC DRSG MEPILEX <16IN NO BORD MOLN -A

## 2017-07-07 PROCEDURE — 74011250636 HC RX REV CODE- 250/636: Performed by: PHYSICIAN ASSISTANT

## 2017-07-07 PROCEDURE — 85025 COMPLETE CBC W/AUTO DIFF WBC: CPT | Performed by: INTERNAL MEDICINE

## 2017-07-07 PROCEDURE — 36415 COLL VENOUS BLD VENIPUNCTURE: CPT | Performed by: INTERNAL MEDICINE

## 2017-07-07 RX ORDER — IPRATROPIUM BROMIDE AND ALBUTEROL SULFATE 2.5; .5 MG/3ML; MG/3ML
3 SOLUTION RESPIRATORY (INHALATION)
Status: DISCONTINUED | OUTPATIENT
Start: 2017-07-07 | End: 2017-07-08

## 2017-07-07 RX ORDER — LIDOCAINE 50 MG/G
2 PATCH TOPICAL EVERY 24 HOURS
Status: DISCONTINUED | OUTPATIENT
Start: 2017-07-07 | End: 2017-07-21 | Stop reason: HOSPADM

## 2017-07-07 RX ORDER — POTASSIUM CHLORIDE 7.45 MG/ML
10 INJECTION INTRAVENOUS
Status: COMPLETED | OUTPATIENT
Start: 2017-07-07 | End: 2017-07-08

## 2017-07-07 RX ORDER — MAGNESIUM SULFATE HEPTAHYDRATE 40 MG/ML
2 INJECTION, SOLUTION INTRAVENOUS ONCE
Status: COMPLETED | OUTPATIENT
Start: 2017-07-07 | End: 2017-07-08

## 2017-07-07 RX ADMIN — Medication 10 ML: at 21:26

## 2017-07-07 RX ADMIN — METRONIDAZOLE 500 MG: 500 INJECTION, SOLUTION INTRAVENOUS at 21:19

## 2017-07-07 RX ADMIN — DIAZEPAM 2.5 MG: 5 INJECTION, SOLUTION INTRAMUSCULAR; INTRAVENOUS at 21:19

## 2017-07-07 RX ADMIN — VANCOMYCIN HYDROCHLORIDE 1500 MG: 10 INJECTION, POWDER, LYOPHILIZED, FOR SOLUTION INTRAVENOUS at 09:51

## 2017-07-07 RX ADMIN — LEVOFLOXACIN 750 MG: 5 INJECTION, SOLUTION INTRAVENOUS at 17:13

## 2017-07-07 RX ADMIN — METRONIDAZOLE 500 MG: 500 INJECTION, SOLUTION INTRAVENOUS at 14:20

## 2017-07-07 RX ADMIN — HEPARIN SODIUM 5000 UNITS: 5000 INJECTION, SOLUTION INTRAVENOUS; SUBCUTANEOUS at 14:21

## 2017-07-07 RX ADMIN — IPRATROPIUM BROMIDE AND ALBUTEROL SULFATE 3 ML: .5; 3 SOLUTION RESPIRATORY (INHALATION) at 19:58

## 2017-07-07 RX ADMIN — POTASSIUM CHLORIDE 10 MEQ: 10 INJECTION, SOLUTION INTRAVENOUS at 05:42

## 2017-07-07 RX ADMIN — METRONIDAZOLE 500 MG: 500 INJECTION, SOLUTION INTRAVENOUS at 01:27

## 2017-07-07 RX ADMIN — DIAZEPAM 2.5 MG: 5 INJECTION, SOLUTION INTRAMUSCULAR; INTRAVENOUS at 15:43

## 2017-07-07 RX ADMIN — DIAZEPAM 2.5 MG: 5 INJECTION, SOLUTION INTRAMUSCULAR; INTRAVENOUS at 05:41

## 2017-07-07 RX ADMIN — SODIUM CHLORIDE 40 MG: 9 INJECTION, SOLUTION INTRAMUSCULAR; INTRAVENOUS; SUBCUTANEOUS at 21:19

## 2017-07-07 RX ADMIN — I.V. FAT EMULSION 500 ML: 20 EMULSION INTRAVENOUS at 19:19

## 2017-07-07 RX ADMIN — Medication 10 ML: at 05:43

## 2017-07-07 RX ADMIN — Medication 10 ML: at 14:23

## 2017-07-07 RX ADMIN — IPRATROPIUM BROMIDE AND ALBUTEROL SULFATE 3 ML: .5; 3 SOLUTION RESPIRATORY (INHALATION) at 09:07

## 2017-07-07 RX ADMIN — METRONIDAZOLE 500 MG: 500 INJECTION, SOLUTION INTRAVENOUS at 07:26

## 2017-07-07 RX ADMIN — MAGNESIUM SULFATE IN WATER 2 G: 40 INJECTION, SOLUTION INTRAVENOUS at 05:42

## 2017-07-07 RX ADMIN — RETINOL, ERGOCALCIFEROL, .ALPHA.-TOCOPHEROL ACETATE, DL-, PHYTONADIONE, ASCORBIC ACID, NIACINAMIDE, RIBOFLAVIN 5-PHOSPHATE SODIUM, THIAMINE HYDROCHLORIDE, PYRIDOXINE HYDROCHLORIDE, DEXPANTHENOL, BIOTIN, FOLIC ACID, AND CYANOCOBALAMIN: KIT at 18:47

## 2017-07-07 RX ADMIN — HEPARIN SODIUM 5000 UNITS: 5000 INJECTION, SOLUTION INTRAVENOUS; SUBCUTANEOUS at 05:41

## 2017-07-07 RX ADMIN — SODIUM CHLORIDE, SODIUM LACTATE, POTASSIUM CHLORIDE, AND CALCIUM CHLORIDE 62 ML/HR: 600; 310; 30; 20 INJECTION, SOLUTION INTRAVENOUS at 21:19

## 2017-07-07 RX ADMIN — POTASSIUM CHLORIDE 10 MEQ: 10 INJECTION, SOLUTION INTRAVENOUS at 07:25

## 2017-07-07 RX ADMIN — IPRATROPIUM BROMIDE AND ALBUTEROL SULFATE 3 ML: .5; 3 SOLUTION RESPIRATORY (INHALATION) at 14:17

## 2017-07-07 RX ADMIN — POTASSIUM CHLORIDE 10 MEQ: 10 INJECTION, SOLUTION INTRAVENOUS at 08:40

## 2017-07-07 RX ADMIN — SODIUM CHLORIDE: 900 INJECTION, SOLUTION INTRAVENOUS at 05:42

## 2017-07-07 RX ADMIN — POTASSIUM CHLORIDE 10 MEQ: 10 INJECTION, SOLUTION INTRAVENOUS at 09:48

## 2017-07-07 RX ADMIN — SODIUM CHLORIDE 40 MG: 9 INJECTION, SOLUTION INTRAMUSCULAR; INTRAVENOUS; SUBCUTANEOUS at 08:40

## 2017-07-07 NOTE — PROGRESS NOTES
[address] [place] [address] [facility] [place]   Office [phone]  Fax [phone]          Vinita Frost is a 56 y.o. female with known Chron's Disease (s/p bowel resection, not currenlty on immunotherapy and depression admitted for sepsis 2/2 perforated gastric ulcers/peritonitis. She has spent 3 night(s) in the hospital.    24 Hour Events: No acute events. Was taken off of levophed yesterday. Did have some increased anxiety overnight, treated with IV vallium. Infectious Disease    Septic Shock 2/2 Peritonitis - off pressors now. Blood cultures with no growth x3 days. Body fluid culture with no growth x1 day. Urine culture with no significant growth. -Intensivist following, appreciate their support. -Maintaining MAP now. Will keep levophed available. Need to keep MAP >65. -Abx:  Eraxis, levaquin, vancomycin, flagyl. Can consider weaning abx today: possibly off vanco, eraxis. -F/u blood, urine, body fluid (surgical) cultures. -LR running at 125cc/hr. -Strict I&O. -Daily labs. -OK for patient to be transferred out of ICU today per primary team.  Will defer to intensivist for final decision on this. Community Acquired Pneumonia - left basilar airspace disease noted on initial CXR. Could also be playing a role in SIRS/sepsis picture.  -Has appropriate coverage with levaquin.  -Albuterol nebulizer available. Aggressive pulmonary toilet per pulm. -Checking a CXR to f/u PNA resolution. Lyme Disease - diagnosed ~1 month ago at [facility]. Was treated with a 21 day course of doxycycline. Completed this course fully, but developed some lower extremity skin breakdown following treatment. Tetracyclines are on patient's allergy list.  -Not repeating tic studies. Gastrointestinal    Perforated Gastric Ulcers s/p Operative Repair on 7/4/17 - surgery following. Patient is NPO until POD7.   Gastric ulcers thought to be related to chronic steroid use and Chron's disease. Drain OP 865cc over the last 24 hours. -Appreciate surgery recommendations & support. -NPO x7 days. Continue TPN. Added lipids 3x/week. Checking TG weekly. -IV protonix. -Dilaudid PCA for pain control per general surgery.  -Encouraging incentive spirometry. Chron's Disease - no pharmacotherapy PTA. Poor follow up history with GI. Likely playing a role in patient's current clinical course.  -Will consider GI consult later in course when patient becomes more stable. Needs better outpatient follow up. Severe Protein Calorie Malnutrition - as evidenced by lower extremity swelling and severe hypoalbuminemia  This is complicated by need for 7 days of NPO. -Continuing TPN. -Daily CMP. -Daily Phos. Hematology    Anemia - lab work up done on admission. Notable for reticulocyte count of 2.3, LD of 294. Per hematology, most likely 2/2 acute blood loss from gastric ulcer. Also a component of iron deficiency. S/p 2 units pRBC on admission--Hgb responded appropriately. Has been stable since.  -Hematology signed off.  -Have 2 units of pRBCs on hold. Pulmonary    Tobacco Abuse - reports to smoke 1.5 PPD. -Encouraging cessation.  -Prescribed daily nicotine patch while patient admitted. Integumentary    Healing Wounds on Bilateral Feet - most likely acute reaction to doxycycline treatment that patient underwent prior to admission. Per patient, these wounds are healing. Remain painful.  -Wound care consulted, appreciate support.  -Tetracyclines on allergy list.    3cm Laceration of Left Upper Back - healing. Not amenable to sutures at this time.  -Wound care consulted, appreciate support. Bilateral Lower Extremity Swelling/Pain - noted on admisison. Thought to be 2/2 hypoalbuminemia. Duplex studies of lower extremities negative for DVT. Electrolytes - multiple abnormalities, likely 2/2 nutritional management with TPN.     Hypomagnesemia - low this morning.  -Repleting with IV mag.  -Daily Mg level. Hypophosphatemia - low this morning.  -Repleting with IV phos.  -Daily P. Hypokalemia - low this morning.  -Repleting with IV potassium. 4 runs of 10meq.  -Daily BMP. Psych    Depression/Panic Attacks - patient is showing some symptoms of this--mostly at night. Takes klonopin, adderall, Lexapro, ativan, and trazodone at home.  -Currently holding these medications as patient is NPO.  -OK with IV vallium prn. Will need to carefully monitor respirations. MSK    History of Falls - patient and family gave detailed history of multiple falls over the last few months/weeks. -PT/OT consulted. Their evaluation may be deferred until patient clinically improves. FEN/GI - NPO. Lactated Ringer's at 62 mL/hr, TPN running at 63mL/hr. Activity - Bedrest.  DVT prophylaxis - SCDs  GI prophylaxis - Protonix  Fall prophylaxis - Fall precautions ordered. Unit - ICU  Admission Status - Admitted  IV Access - Right IJ CVC, 2 PIVs.  Disposition - Plan to d/c to TBD. Code Status - Full     Blaze Lawrence MD  Family Medicine Resident         Subjective / Objective     Subjective:  Symptoms:  Stable. She reports weakness and anxiety. No chest pain or chest pressure. Diet:  NPO. No nausea or vomiting. Activity level: Impaired due to pain. Pain:  She complains of pain that is severe. She reports pain is unchanged. Pain is requiring pain medication and partially controlled. Did have some increased anxiety overnight--frequency of vallium increased. Not feeling much improvement this morning. Still complaining of abdominal soreness. Temp (24hrs), Av.2 °F (36.8 °C), Min:97.2 °F (36.2 °C), Max:98.5 °F (36.9 °C)     Objective:  General Appearance:  Ill-appearing, in pain and uncomfortable. Vital signs: (most recent): Blood pressure 100/64, pulse 100, temperature 98.5 °F (36.9 °C), resp. rate 24, height 5' 2\" (1.575 m), weight 141 lb 1.5 oz (64 kg), SpO2 99 %.     HEENT: Normal HEENT exam.  (NG tube in left nare. Output is no longer brown/bloody.)    Lungs:  Normal respiratory rate. She is not in respiratory distress. Breath sounds clear to auscultation. No stridor. No wheezes, rales, rhonchi or decreased breath sounds. (Taking shallow breaths. Unable to ausculate posteriorly 2/2 pain.)  Heart: Normal rate. Regular rhythm. S1 normal and S2 normal.  No murmur, gallop or friction rub. Extremities: There is dependent edema. (Tenderness to palpation of bilateral lower extremities. No SCDs this morning.)  Neurological: Patient is alert and oriented to person, place and time. Skin:  Warm and dry. (3cm laceration on upper left back, unchanged from admission. Some skin breakdown noted/poorly healing wounds on bilateral feet.)  Abdomen: (Surgical bandages in place. ANDRE drain in place. Bandages appear clean and dry. )Bowel sounds are normal.   There is generalized tenderness. Pupils:  Pupils are equal, round, and reactive to light. Respiratory:  O2 Device: Room air     I/O:    Date 07/06/17 0700 - 07/07/17 0659 07/07/17 0700 - 07/08/17 0659   Shift 5068-5970 5634-6426 24 Hour Total 6172-8802 1261-4439 24 Hour Total   I  N  T  A  K  E   I.V.  (mL/kg/hr) 2072.3  (2.7) 2260.5  (2.9) 4332.8  (2.8)         Levophed Volume 31.9  31.9         Volume (lactated Ringers infusion) 943.4  943. 4         Volume (lactated Ringers infusion)  692.3 692.3         Volume (metroNIDAZOLE (FLAGYL) IVPB premix 500 mg) 200 200 400         Volume (vancomycin (VANCOCIN) 1,000 mg in 0.9% sodium chloride (MBP/ADV) 250 mL) 250  250         Volume (anidulafungin (ERAXIS) 100 mg in 0.9% sodium chloride 130 mL IVPB)  130 130         Volume (levoFLOXacin (LEVAQUIN) 750 mg in D5W IVPB) 150  150         Volume (vancomycin (VANCOCIN) 1500 mg in  ml infusion)  500 500         Volume (TPN ADULT - CENTRAL AA 5% D15% W/ ELECTROLYTES AND CA) 497  119         Volume (TPN ADULT - CENTRAL AA 5% D15% W/ ELECTROLYTES AND CA)  738.2 738.2       NG/GT 25 30 55         Water Flush Volume (mL) (Nasogastric Tube) 25 30 55       Shift Total  (mL/kg) 2097.3  (33) 2290.5  (35.8) 4387.8  (68.6)      O  U  T  P  U  T   Urine  (mL/kg/hr) 910  (1.2) 1730  (2.3) 2640  (1.7) 350  350      Urine Output (mL) (Urinary Catheter 07/04/17 2- way; [place]) 910 1730 2640 350  350    Emesis/NG output 25 150 175 0  0      Output (ml) (Nasogastric Tube) 25 150 175 0  0    Drains          Output (ml) (Osmar Drain #2 07/04/17 Anterior;Right Abdomen)          Output (ml) (Osmar Drain #1 07/04/17 Left;Posterior Abdomen) 30 0 30       Shift Total  (mL/kg) 1610  (25.4) 2310  (36.1) 3920  (61.3) 350  (5.5)  350  (5.5)   .3 -19.5 467.8 -350  -350   Weight (kg) 63.5 64 64 64 64 64       CBC:  Recent Labs      07/07/17   0332  07/06/17   0347  07/05/17   1551  07/05/17   0425   WBC  15.8*  27.5*   --   24.0*   HGB  9.4*  10.4*  10.9*  10.1*   HCT  28.7*  31.6*  31.7*  30.2*   PLT  475*  537*   --   397*       Metabolic Panel:  Recent Labs      07/07/17   0332  07/06/17   0347  07/05/17   1119  07/05/17   0425   07/04/17   1454   NA  139  138   --   139   < >  138   K  3.4*  3.6   --   3.7   < >  3.7   CL  108  109*   --   108   < >  105   CO2  27  24   --   25   < >  26   BUN  4*  6   --   7   < >  12   CREA  0.63  0.71   --   0.70   < >  1.03*   GLU  114*  110*   --   66   < >  71   CA  7.3*  7.1*   --   6.9*   < >  7.8*   MG  1.6  1.8  2.5*  1.0*   < >   --    PHOS  2.3*  2.7   --   3.6   --    --    ALB  1.0*  1.1*   --   1.3*   < >  2.0*   SGOT  14*  16   --   15   < >  15   ALT  16  16   --   15   < >  23   INR   --    --    --    --    --   1.2*    < > = values in this interval not displayed.           For Billing    Chief Complaint   Patient presents with    Rash    Fever    Sore Throat    Nasal Congestion       Hospital Problems  Date Reviewed: 1/5/2017          Codes Class Noted POA    Thrombocytosis ([address] ICD-10-CM: D47.3  ICD-9-CM: 238.71  7/5/2017 Yes        Neutrophilia ICD-10-CM: D72.9  ICD-9-CM: 288.8  7/5/2017 Yes        * (Principal)Perforation bowel ([facility] ICD-10-CM: K63.1  ICD-9-CM: 569.83  7/4/2017 Yes        Pneumonia ICD-10-CM: J18.9  ICD-9-CM: 758  7/4/2017 Yes        Anemia ICD-10-CM: D64.9  ICD-9-CM: 285.9  7/4/2017 Yes        Wounds, multiple open, lower extremity ICD-10-CM: S81.809A  ICD-9-CM: 894.0  7/4/2017 Yes        Crohn disease ([facility] ICD-10-CM: K50.90  ICD-9-CM: 555.9  4/19/2010 Yes    Overview Addendum 2/2/2012  4:33 PM by Yoshi Unger MD     She had 4 colon resections in the past.  Dr. Rena Lazo, Dr. Yahaira Varma abd/pevis 2009: Thickened segment of neoileum proximal and adjacent to   anastomotic chain sutures and likely representing inflammatory bowel disease   recurrence. Partial small bowel obstruction at this level. No evidence of   perforation. No evidence of fistula or intra-abdominal abscess.

## 2017-07-07 NOTE — PROGRESS NOTES
Problem: Pain  Goal: *Pain is controlled to three or less  Outcome: Progressing Towards Goal  Pt using PCA.

## 2017-07-07 NOTE — PROGRESS NOTES
1910 - Bedside shift change report given to Marcel Stacy RN (oncoming nurse) by Ubaldo Schirmer, RN (offgoing nurse). Report included the following information SBAR, Intake/Output, Recent Results, Cardiac Rhythm SR, ST and Alarm Parameters . 2000 - Assessment done. See flow sheet. Pt awake, alert, coughing and deep breathing. IS provided by RN, instruction given. Verbalized understanding. Enc to use PCA when needed. States she has been. Dr Haile in to see pt. Informed pt has been anxious, restless today and need to have valium increased. States to call family practice if it is indeed needed. 2050 - Dr Chuck Zeng informed of pt's increased anxiety, restlessness. Orders rec'd to increase valium to q 4 hrs PRN  0000 - Reassessment done. No changes. Pt resting comfortably in bed w/ eyes closed. VSS  0400 - Reassessment done. No changes. Pt resting comfortably in bed. PCA in use.   0700 - \"Bedside\" shift change report given to Leidy Elmore RN (oncoming nurse) by [address] [place], RN (offgoing nurse). Report included the following information SBAR, Procedure Summary, Recent Results, Med Rec Status and Cardiac Rhythm NSR.

## 2017-07-07 NOTE — PROGRESS NOTES
Problem: Self Care Deficits Care Plan (Adult)  Goal: *Acute Goals and Plan of Care (Insert Text)  Occupational Therapy Goals  Initiated 7/6/2017  1. Patient will perform light grooming in unsupported sitting x5 mins with minimal assistance within 7 day(s). 2. Patient will perform upper body dressing in unsupported sitting with min A within 7 day(s). 3. Patient will perform toilet transfers with minimal assistance with appropriate AD within 7 day(s). 4. Patient will participate in upper extremity therapeutic exercise/activities through comfortable ROM in supported sitting to prepare for ADL tasks with supervision/set-up for 6 minutes within 7 day(s). 5. Patient will utilize energy conservation techniques during functional activities with verbal cues within 7 day(s). OCCUPATIONAL THERAPY TREATMENT  Patient: Talia Dhaliwal (77 y.o. female)  Date: 7/7/2017  Diagnosis: Perforation bowel ([address]  Pneumonia  Wounds, multiple open, lower extremity  Anemia  Perforated Bowel Perforation bowel ([address]  Procedure(s) (LRB):  LAPAROTOMY EXPLORATORY, REPAIR OF GASTRIC PERFORATION (N/A) 3 Days Post-Op  Precautions:  fall      ASSESSMENT:  Ms. Santhosh Hammonds was received in bed agreeable to activity. She continues to be primarily limited by abdominal pain; encouraged PCA and education provided for bracing techniques + cues for carryover when coughing or during movement. She is motivated however does require constant encouragement and cuing to remain engaged in activity. Education provided for log roll technique and she required mod A overall to come to sitting EOB. Education provided for adaptive LB dressing technique; Due to increased pain/hypersensativity to B feet/toes (blisters) and impaired balance with dynamic sitting tasks, she continues to need total A for don socks however overall efforts/contribution to task significantly improved.   Mod A needed for stand-pivot transfer to chair + max verbal and tactile cuing for upright posture during transfers (head and trunk initially flexed). Transfer completed to wheelchair for future transfer to 4th floor. Encouraged patient to be OOB as often as tolerated and to request staff assist her with OOB to chair at least 3x/day over the weekend for continued progression towards independence; She was agreeable. Patient is making good progress and would benefit from continued skilled OT. Progression toward goals:  [X]       Improving appropriately and progressing toward goals  [ ]       Improving slowly and progressing toward goals  [ ]       Not making progress toward goals and plan of care will be adjusted       PLAN:  Patient continues to benefit from skilled intervention to address the above impairments. Continue treatment per established plan of care. Discharge Recommendations:  Rehab  Further Equipment Recommendations for Discharge:  None at this time       SUBJECTIVE:   Patient stated Do I have to stay in the chair?   Patient educated on the benefits of being OOB frequently and encouraged to remain up for 1 hour as a goal multiple times a day (at least 3x). OBJECTIVE DATA SUMMARY:   Cognitive/Behavioral Status:  Neurologic State: Alert  Orientation Level: Oriented X4  Cognition: Follows commands  Perception: Appears intact  Perseveration: No perseveration noted  Safety/Judgement: Awareness of environment     Functional Mobility and Transfers for ADLs:  Bed Mobility:  Rolling: Moderate assistance; Additional time  Supine to Sit: Moderate assistance; Additional time  Scooting: Moderate assistance; Additional time     Transfers:  Sit to Stand: Moderate assistance; Additional time     Balance:  Sitting: Impaired  Sitting - Static: Good (unsupported)  Sitting - Dynamic: Fair (occasional)  Standing: Impaired  Standing - Static: Poor  Standing - Dynamic : Poor     ADL Intervention:  Lower Body Dressing Assistance  Dressing Assistance: Total assistance(dependent)  Socks:  Total assistance (dependent)  Leg Crossed Method Used: Yes  Position Performed: Seated in chair  Cues: Verbal cues provided;Don;Visual cues provided     Cognitive Retraining  Safety/Judgement: Awareness of environment     Pain:  Pain Scale 1: Numeric (0 - 10)  Pain Intensity 1: 7  Pain Location 1: Abdomen  Pain Orientation 1: Anterior  Pain Description 1: Aching  Pain Intervention(s) 1: Encouraged PCA; educated on bracing with pillow; educated and assisted with positioning/repositioning     Activity Tolerance:   Patient tolerated tx well. Please refer to the flowsheet for vital signs taken during this treatment. After treatment:   [X] Patient left in no apparent distress sitting up in wheelchair preparing for transfer to 4th floor  [ ] Patient left in no apparent distress in bed  [X] Call bell left within reach  [X] Nursing notified  [ ] Caregiver present  [ ] Bed alarm activated      COMMUNICATION/COLLABORATION:   The patients plan of care was discussed with: Physical Therapist, Registered Nurse and patient.      Corinne Burris OTR/L  Time Calculation: 24 mins

## 2017-07-07 NOTE — PROGRESS NOTES
General Surgery Daily Progress Note    Patient: Negar Luu MRN: [id number]  SSN: [id number]    YOB: 1970  Age: 56 y.o. Sex: female      Admit Date: 7/4/2017    Subjective:   Pain controlled, no nausea, no BM or flatus. Off pressor support.      Current Facility-Administered Medications   Medication Dose Route Frequency    albuterol-ipratropium (DUO-NEB) 2.5 MG-0.5 MG/3 ML  3 mL Nebulization Q6H RT    lidocaine (LIDODERM) 5 % patch 2 Patch  2 Patch TransDERmal Q24H    heparin (porcine) injection 5,000 Units  5,000 Units SubCUTAneous Q8H    TPN ADULT - CENTRAL AA 5% D15% W/ ELECTROLYTES AND CA   IntraVENous CONTINUOUS    fat emulsion 20% (LIPOSYN, INTRALIPID) infusion 500 mL  500 mL IntraVENous Q MON, WED & FRI    vancomycin (VANCOCIN) 1500 mg in  ml infusion  1,500 mg IntraVENous Q12H    lactated Ringers infusion  62 mL/hr IntraVENous CONTINUOUS    diazePAM (VALIUM) injection 2.5 mg  2.5 mg IntraVENous Q4H PRN    nicotine (NICODERM CQ) 21 mg/24 hr patch 1 Patch  1 Patch TransDERmal DAILY    glucose chewable tablet 16 g  4 Tab Oral PRN    dextrose (D50W) injection syrg 12.5-25 g  12.5-25 g IntraVENous PRN    glucagon (GLUCAGEN) injection 1 mg  1 mg IntraMUSCular PRN    insulin lispro (HUMALOG) injection   SubCUTAneous Q6H    prochlorperazine (COMPAZINE) injection 10 mg  10 mg IntraVENous Q6H PRN    sodium chloride (NS) flush 5-10 mL  5-10 mL IntraVENous PRN    0.9% sodium chloride infusion 250 mL  250 mL IntraVENous PRN    sodium chloride (NS) flush 5-10 mL  5-10 mL IntraVENous Q8H    sodium chloride (NS) flush 5-10 mL  5-10 mL IntraVENous PRN    naloxone (NARCAN) injection 0.4 mg  0.4 mg IntraVENous PRN    metroNIDAZOLE (FLAGYL) IVPB premix 500 mg  500 mg IntraVENous Q6H    albuterol (PROVENTIL VENTOLIN) nebulizer solution 2.5 mg  2.5 mg Nebulization Q4H PRN    sodium chloride (NS) flush 5-10 mL  5-10 mL IntraVENous PRN    anidulafungin (ERAXIS) 100 mg in 0.9% sodium chloride 130 mL IVPB  100 mg IntraVENous Q24H    levoFLOXacin (LEVAQUIN) 750 mg in D5W IVPB  750 mg IntraVENous Q24H    HYDROmorphone (PF) 15 mg/30 ml (DILAUDID) PCA   IntraVENous TITRATE    pantoprazole (PROTONIX) 40 mg in sodium chloride 0.9 % 10 mL injection  40 mg IntraVENous Q12H        Objective:   07/07 0701 - 07/07 1900  In: 6948 [I.V.:1750]  Out: 1200 [Urine:1130; Drains:70]  07/05 1901 - 07/07 0700  In: 6427.3 [I.V.:6342.3]  Out: 5299 [Urine:4110; Drains:1425]  Patient Vitals for the past 8 hrs:   BP Temp Pulse Resp SpO2   07/07/17 1000 99/68 - 94 26 98 %   07/07/17 0900 100/70 - 96 25 96 %   07/07/17 0718 - 98.5 °F (36.9 °C) - - -   07/07/17 0600 100/64 - 100 24 99 %   07/07/17 0500 99/60 - 98 22 95 %   07/07/17 0400 107/65 97.2 °F (36.2 °C) 98 24 97 %       Physical Exam:  General: Awake, cooperative, speech slurred  Lungs: Clear to auscultation bilaterally. Heart:  Regular rate and rhythm  Abdomen: Soft, ATTP, mildly distended, incisions c/d/i.  Left anterior drain SS, right posterior drain serous output   Extremities: Warm, moves all, no edema  Skin:  Warm and dry, no rash    Labs:   Recent Labs      07/07/17   0332   WBC  15.8*   HGB  9.4*   HCT  28.7*   PLT  475*     Recent Labs      07/07/17   0332   NA  139   K  3.4*   CL  108   CO2  27   GLU  114*   BUN  4*   CREA  0.63   CA  7.3*   MG  1.6   PHOS  2.3*   ALB  1.0*   TBILI  0.2   SGOT  14*   ALT  16       Assessment / Plan:   · POD#3 ex lap, primary repair perforated gastric ulcer x 2, omental intra-abdominal flap  · NG decompression to POD#7 and will need an upper GI prior to initiating PO  · Continue TPN  · Continue PCA, PRN Valium for abdominal muscle spasms, add Lidoderm patches   · Leukocytosis improved, continue broad-spectrum ABX and antifungal  · D/c wilder   · Heparin for DVT prophylaxis  · H pylori serologies pending, continue BID PPI  · Pathology benign  · PT/OT consults, encourage IS  · OK to come out of ICU today from our perspective

## 2017-07-07 NOTE — PROGRESS NOTES
Nutrition Assessment:    RECOMMENDATIONS/INTERVENTION(S):   Continue TPN D15/5%AA @ goal rate of 63 mls/hr+ add 20% lipids (500 mls) @ 42 mls/hr x 12 hours 3x per week  (TPN @ goal + lipids will provide pt with 1505 Kcals/d, 76 g/d protein, and 1512 mls/d fluid meeting 100% daily energy needs)    Check TG weekly while receiving TPN/Lipids    Monitor electrolytes, BG closely    Diet advancement per Surgery    ASSESSMENT:   7/7:  Remains NPO. Will need UGI prior to initiated PO per surgery. Lytes requiring repletion. BG stable 102-103-106. TG 41. Off pressors. Current TPN and Lipids meeting daily energy needs at this time. 7/5:  Noted consult for TPN recommendations. Chart reviewed. 57 yo female admitted with anemia, perforated gastric ulcer, s/p exp lap with repair. Hx Crohn's disease. Visited pt this morning. Lethargic but answered questions appropriately. Pt reports poor appetite/limited oral intake x 2 weeks along with a 6# weight loss which she attributes to new medication she was taking for Lyme Disease. IVF infusing. On Levo. -61. No recent TG level. Mg repleted. Phos and K+ WDL. NGT in place. Skin--abd incision, blisters/sores noted to bilateral feet, 3+ edema BLE.  NPO, TPN to begin today. Pt with a 4.7% weight decrease x 2 weeks, amount considered significant for timeframe. See above for TPN recommendations. Meets Criteria for Severe Acute Malnutrition as evidenced by:   [] Moderate muscle wasting, loss of subcutaneous fat   [x] Nutritional intake of <50% of recommended intake for >5 days   [x] Weight loss of >1-2% in 1 week, >5% in 1 month, >7.5% in 3 months, or >10% in 6 months   [x] Moderate-severe edema           SUBJECTIVE/OBJECTIVE:     Diet Order: NPO;  TPN (D20/5%AA @ 63 mls/hr + 20% lipids (500 mls) @ 42 mls/hr x 12 hours 3x weekly  % Eaten:  No data found.     Pertinent Medications: [x] Reviewed    Chemistries:  Lab Results   Component Value Date/Time    Sodium 139 07/07/2017 03:32 AM    Potassium 3.4 07/07/2017 03:32 AM    Chloride 108 07/07/2017 03:32 AM    CO2 27 07/07/2017 03:32 AM    Anion gap 4 07/07/2017 03:32 AM    Glucose 114 07/07/2017 03:32 AM    BUN 4 07/07/2017 03:32 AM    Creatinine 0.63 07/07/2017 03:32 AM    BUN/Creatinine ratio 6 07/07/2017 03:32 AM    GFR est AA >60 07/07/2017 03:32 AM    GFR est non-AA >60 07/07/2017 03:32 AM    Calcium 7.3 07/07/2017 03:32 AM    AST (SGOT) 14 07/07/2017 03:32 AM    Alk. phosphatase 74 07/07/2017 03:32 AM    Protein, total 3.0 07/07/2017 03:32 AM    Albumin 1.0 07/07/2017 03:32 AM    Globulin 2.0 07/07/2017 03:32 AM    A-G Ratio 0.5 07/07/2017 03:32 AM    ALT (SGPT) 16 07/07/2017 03:32 AM      Anthropometrics: Height: 5' 2\" (157.5 cm) Weight: 64 kg (141 lb 1.5 oz)    IBW (%IBW): 50 kg (110 lb 3.7 oz) ( ) UBW (%UBW):   (  %)    BMI: Body mass index is 25.81 kg/(m^2). This BMI is indicative of:   [] Underweight    [x] Normal    [] Overweight    []  Obesity    []  Extreme Obesity (BMI>40)  Estimated Nutrition Needs (Based on): 1485 Kcals/day (BMR (1142) x 1.3 AF ) , 72 g (1.3 g/Kg actual body wt) Protein  Carbohydrate:  At Least 130 g/day  Fluids: 1500 mL/day    Last BM: ?   []Active     []Hyperactive  [x]Hypoactive       [] Absent   BS  Skin:    [] Intact   [x] Incision  [] Breakdown   [] DTI   [] Tears/Excoriation/Abrasion  []Edema [x] Other: blisters noted to bilateral feet, abrasion to back   Wt Readings from Last 30 Encounters:   07/06/17 64 kg (141 lb 1.5 oz)   03/15/17 56.7 kg (125 lb)   01/05/17 55.2 kg (121 lb 9.6 oz)   03/23/16 59 kg (130 lb)   09/06/15 59 kg (130 lb)   03/27/15 59 kg (130 lb)   03/19/15 57.2 kg (126 lb)   03/15/15 56.2 kg (124 lb)   03/06/15 57.6 kg (127 lb)   10/08/14 59 kg (130 lb)   05/06/14 57.6 kg (127 lb)   12/27/13 61.7 kg (136 lb)   05/22/13 57.2 kg (126 lb)   05/08/13 58.5 kg (129 lb)   08/31/12 47.6 kg (105 lb)   06/07/12 49.9 kg (110 lb)   05/31/12 49.6 kg (109 lb 6.4 oz)   02/16/12 48.4 kg (106 lb 9.6 oz)   02/09/12 48.8 kg (107 lb 9.6 oz)   02/02/12 48.5 kg (107 lb)   01/30/12 45.8 kg (101 lb)   01/21/12 46.7 kg (103 lb)   01/17/12 45.9 kg (101 lb 3.2 oz)   10/27/11 50.3 kg (111 lb)   08/16/11 46.7 kg (103 lb)   01/18/11 50.2 kg (110 lb 9.6 oz)   07/13/10 52.8 kg (116 lb 6.4 oz)   04/16/10 53.5 kg (118 lb)      NUTRITION DIAGNOSES:   Problem:  Altered GI function Unintended weight loss   Etiology: related to perforated gastric ulcer medication induced nausea, decreased appetite   Signs/Symptoms: as evidenced by s/p exp lap with repair, NPO, need for TPN 4.7% weight decrease x 2 weeks    NUTRITION INTERVENTIONS:    Enteral/Parenteral Nutrition: Initiate parenteral nutrition                GOAL:   Pt will tolerate TPN @ goal rate with stable electrolytes and BG in next 1-3 days    Cultural, Restorationist, or Ethnic Dietary Needs: None     LEARNING NEEDS (Diet, Food/Nutrient-Drug Interaction):    [x] None Identified   [] Identified and Education Provided/Documented   [] Identified and Pt declined/was not appropriate      [x] Interdisciplinary Care Plan Reviewed/Documented    [x] Discharge Needs:  tbd   [] No Nutrition Related Discharge Needs    NUTRITION RISK:   Pt Is At Nutrition Risk  [x]     No Nutrition Risk Identified  []       PT SEEN FOR:    []  MD Consult: []Calorie Count      []Diabetic Diet Education        []Diet Education     []Electrolyte Management     []General Nutrition Management and Supplements     []Management of Tube Feeding     []TPN Recommendations    []  RN Referral:  []MST score >=2     []Enteral/Parenteral Nutrition PTA     []Pregnant: Gestational DM or Multigestation                 [] Pressure Ulcer      []  Low BMI      []  Length of Stay       [] Dysphagia Diet         [] Ventilator      [x]  Follow-Up-TPN     Previous Recommendations:   [x] Implemented          [] Not Implemented          [] Not Applicable    Previous Goal:   [] Met              [x] Progressing Towards Goal              [] Not Progressing Towards Goal   [] Not Applicable              Pallavi Lau, Neva N [address] [place] [address]   Pager [phone]

## 2017-07-07 NOTE — PROGRESS NOTES
0710- Bedside shift change report given to [facility] (oncoming nurse) by Jennifer Hernandez RN (offgoing nurse). Report included the following information SBAR, Kardex, Intake/Output, MAR and Recent Results. Delaney.Kaplan- Dr. Tyrese Portillo, family practice resident, in to see patient. No new orders at this time. 9213- Dr. Edilson Thibodeaux in to see patient. Ok for transfer out of ICU. Will confirm with surgery they are ok with transfer out. [facility],  Surgery PA, in to see patient. Ok for transfer out of ICU today. 1100- [place] catheter removed. Pt tolerated well. 1200- Pt able to void post catheter removal.    1225- TRANSFER - OUT REPORT:    Verbal report given to April RN(name) on Trena Rodriguez  being transferred to 4th floor(unit) for routine progression of care       Report consisted of patients Situation, Background, Assessment and   Recommendations(SBAR). Information from the following report(s) SBAR, Kardex, OR Summary, Intake/Output, MAR and Recent Results was reviewed with the receiving nurse. Lines:   Triple Lumen Right IJ Central Line Right Internal jugular (Active)   Central Line Being Utilized Yes 7/7/2017  7:30 AM   Criteria for Appropriate Use Total parenteral nutrition 7/7/2017  7:30 AM   Site Assessment Clean, dry, & intact 7/7/2017  7:30 AM   Infiltration Assessment 0 7/7/2017  7:30 AM   Affected Extremity/Extremities Color distal to insertion site pink (or appropriate for race) 7/7/2017  7:30 AM   Date of Last Dressing Change 07/04/17 7/7/2017  7:30 AM   Dressing Status Clean, dry, & intact 7/7/2017  7:30 AM   Dressing Type Disk with Chlorhexadine gluconate (CHG); Transparent 7/7/2017  7:30 AM   Action Taken Open ports on tubing capped 7/7/2017  4:00 AM   Proximal Hub Color/Line Status White 7/7/2017  7:30 AM   Positive Blood Return (Medial Site) Yes 7/7/2017  7:30 AM   Medial Hub Color/Line Status Blue 7/7/2017  7:30 AM   Positive Blood Return (Lateral Site) Yes 7/7/2017  7:30 AM Distal Hub Color/Line Status Brown 7/7/2017  7:30 AM   Positive Blood Return (Site #3) Yes 7/7/2017  7:30 AM   Alcohol Cap Used Yes 7/7/2017  7:30 AM       Peripheral IV 07/04/17 Left Wrist (Active)   Site Assessment Clean, dry, & intact 7/7/2017  7:30 AM   Phlebitis Assessment 0 7/7/2017  7:30 AM   Infiltration Assessment 0 7/7/2017  7:30 AM   Dressing Status Clean, dry, & intact 7/7/2017  7:30 AM   Dressing Type Transparent 7/7/2017  7:30 AM   Hub Color/Line Status Blue 7/7/2017  7:30 AM   Action Taken Open ports on tubing capped 7/7/2017 12:00 AM   Alcohol Cap Used Yes 7/7/2017  7:30 AM       Peripheral IV 07/04/17 Right Hand (Active)   Site Assessment Clean, dry, & intact 7/7/2017  7:30 AM   Phlebitis Assessment 0 7/7/2017  7:30 AM   Infiltration Assessment 0 7/7/2017  7:30 AM   Dressing Status Clean, dry, & intact 7/7/2017  7:30 AM   Dressing Type Transparent 7/7/2017  7:30 AM   Hub Color/Line Status Pink 7/7/2017  7:30 AM   Action Taken Open ports on tubing capped 7/7/2017 12:00 AM   Alcohol Cap Used Yes 7/7/2017  7:30 AM        Opportunity for questions and clarification was provided. Patient transported with:   Monitor  Tech      9031- Pt's father, Barbra Baptist, notified of transfer to 4th floor.

## 2017-07-07 NOTE — PROGRESS NOTES
I met with pt as a follow-up visit to discuss future discharge planning. Pt states her two adult sons live with her. They are ages 21 and 22. I discussed the recommendations with pt (rehab versus other ) Pt states she prefers going home with home health if possible when stable for discharge. Pt is planning to stay with her father when discharged. Case Management will continue to follow pt throughout hospitalization for discharge disposition needs.     Serjio Kim

## 2017-07-07 NOTE — PROGRESS NOTES
[facility]  Pulmonary, Critical Care, and Sleep Medicine  Name: Tiffany Petty MRN: [id number]   : 1970 [facility]   Date: 2017          IMPRESSION:   1. POD # 3 lap perfed gastric ulcers  2. Prob CAP with ? COPD  3. Decreased nutrition-->for prolonged bowel rest-->LOLA  4. Crohn's disease  5. Resolved shock/SIRS--> off LV since mid-day   6. H/o smoking  7. Anemia      RECOMMENDATIONS:   · ICU transfer if sgy concurs  · Continue anti-infectives- f/u final cx data-  · Pulm toilet-> add nebs/flutter valve  · LOLA support- >correct lytes  · DVT prophylaxis- on sub q heparin  · Will need f/u CXR for ? pna resolution       Radiology  ( personally reviewed) CXR  reviewed   ABG No results for input(s): PHI, PO2I, PCO2I in the last 72 hours. Subjective/Interval History:   I have reviewed the flowsheet and previous days notes. D/w ICU nursing- no new issues. Rx reviewed with pharmacy  ROS- cough- no SOB  Not hungry, no flatus  Pain controlled on PCA      Objective:     Mode Rate Tidal Volume Pressure FiO2 PEEP                    Vital Signs:     TMAX(24)      Intake/Output:   Last shift:         Last 3 shifts:  0701 -  1900  In: -   Out: 350 [Urine:350][id number]  Intake/Output Summary (Last 24 hours) at 17 0816  Last data filed at 17 0730   Gross per 24 hour   Intake           4049.8 ml   Output             4065 ml   Net            -15.2 ml     EXAM:   Patient awake alert- off pressors  NG  Mouth dry  Heart regular RRR S1 S2  Lungs clear- decreased bases, loose rhonchi with cough  Abdomen soft mildly tender, no BS. Large dressings intact. Drain with serosanguinous output  Multiple areas of ulceration over feet and hands. Extremities warm trace pedal edema  Neuro intact           Data    I have personally reviewed data, flowsheets for the last 24 hours.         Labs:  Recent Labs      17   0332  17   7222 07/05/17   1551  07/05/17   0425   WBC  15.8*  27.5*   --   24.0*   HGB  9.4*  10.4*  10.9*  10.1*   HCT  28.7*  31.6*  31.7*  30.2*   PLT  475*  537*   --   505*     Recent Labs      07/07/17   0332  07/06/17   0347  07/05/17   1119  07/05/17   0425   07/04/17   1454   NA  139  138   --   139   < >  138   K  3.4*  3.6   --   3.7   < >  3.7   CL  108  109*   --   108   < >  105   CO2  27  24   --   25   < >  26   GLU  114*  110*   --   66   < >  71   BUN  4*  6   --   7   < >  12   CREA  0.63  0.71   --   0.70   < >  1.03*   CA  7.3*  7.1*   --   6.9*   < >  7.8*   MG  1.6  1.8  2.5*  1.0*   < >   --    PHOS  2.3*  2.7   --   3.6   --    --    ALB  1.0*  1.1*   --   1.3*   < >  2.0*   SGOT  14*  16   --   15   < >  15   ALT  16  16   --   15   < >  23   INR   --    --    --    --    --   1.2*    < > = values in this interval not displayed.        Jenni Antonio MD  [facility]

## 2017-07-07 NOTE — PROGRESS NOTES
Problem: Mobility Impaired (Adult and Pediatric)  Goal: *Acute Goals and Plan of Care (Insert Text)  Physical Therapy Goals  Initiated 7/6/2017  1. Patient will move from supine to sit and sit to supine in bed with minimal assistance/contact guard assist within 7 day(s). 2. Patient will transfer from bed to chair and chair to bed with minimal assistance/contact guard assist using the least restrictive device within 7 day(s). 3. Patient will perform sit to stand with moderate assistance within 7 day(s). 4. Patient will ambulate with moderate assistance for 15 feet with the least restrictive device within 7 day(s). 5. Patient will demonstrate independence with home exercise program for LE strengthening within 7 days. PHYSICAL THERAPY TREATMENT  Patient: Trena Rdoriguez (56 y.o. female)  Date: 7/7/2017  Diagnosis: Perforation bowel ([address]  Pneumonia  Wounds, multiple open, lower extremity  Anemia  Perforated Bowel Perforation bowel ([address]  Procedure(s) (LRB):  LAPAROTOMY EXPLORATORY, REPAIR OF GASTRIC PERFORATION (N/A) 3 Days Post-Op  Precautions:        ASSESSMENT:  Patient with continued abdominal pain with transfers, but able to participate. She requires increased cuing for log roll technique for supine-sit transfers. Overall Mod A for performance. Instructed and education on bracing technique for abdominal region with pillow for pain relief with coughing. She requires MIN A verbal cuing for carry over. She was able to tolerate sitting at edge of bed for 5 min duration. Patient with increased blisters on dorsum of foot, especially toes. She  Is hypersensitive. She required MOD A for stand pivot transfers to sit up in wheelchair. She has multiple lines still present including: +2 ANDRE drains (right and left abdominal region), NG tube, central line, PCA pump. Patient continues to benefit from skilled PT due to low activity tolerance and overall functional mobility.    Progression toward goals:  [ ] Improving appropriately and progressing toward goals  [X]    Improving slowly and progressing toward goals  [ ]    Not making progress toward goals and plan of care will be adjusted       PLAN:  Patient continues to benefit from skilled intervention to address the above impairments. Continue treatment per established plan of care. Discharge Recommendations:  Rehab  Further Equipment Recommendations for Discharge:  TBD at rehab       SUBJECTIVE:   Patient stated .      OBJECTIVE DATA SUMMARY:   Critical Behavior:  Neurologic State: Alert, Drowsy  Orientation Level: Oriented X4  Cognition: Appropriate safety awareness, Follows commands, Decreased attention/concentration     Functional Mobility Training:  Bed Mobility:  Rolling: Moderate assistance  Supine to Sit: Moderate assistance              Transfers:  Sit to Stand: Moderate assistance  Stand to Sit: Minimum assistance        Bed to Chair: Moderate assistance                    Balance:     Ambulation/Gait Training:   only able to perform stand pivot to chair              Stairs:                       Neuro Re-Education:     Therapeutic Exercises:      Pain:  Pain Scale 1: Numeric (0 - 10)  Pain Intensity 1: 0              Activity Tolerance:   Fair- increased pain  Please refer to the flowsheet for vital signs taken during this treatment.   After treatment:   [X]    Patient left in no apparent distress sitting up in chair  [ ]    Patient left in no apparent distress in bed  [X]    Call bell left within reach  [X]    Nursing notified  [X]    Caregiver present  [ ]    Bed alarm activated      COMMUNICATION/COLLABORATION:   The patients plan of care was discussed with: Registered Nurse     Felipe Clemens PT, DPT   Time Calculation: 20 mins

## 2017-07-08 LAB
ALBUMIN SERPL BCP-MCNC: 1.1 G/DL (ref 3.5–5)
ALBUMIN/GLOB SERPL: 0.4 {RATIO} (ref 1.1–2.2)
ALP SERPL-CCNC: 86 U/L (ref 45–117)
ALT SERPL-CCNC: 14 U/L (ref 12–78)
ANION GAP BLD CALC-SCNC: 7 MMOL/L (ref 5–15)
AST SERPL W P-5'-P-CCNC: 15 U/L (ref 15–37)
BASOPHILS # BLD AUTO: 0 K/UL (ref 0–0.1)
BASOPHILS # BLD: 0 % (ref 0–1)
BILIRUB SERPL-MCNC: 0.2 MG/DL (ref 0.2–1)
BUN SERPL-MCNC: 4 MG/DL (ref 6–20)
BUN/CREAT SERPL: 6 (ref 12–20)
CALCIUM SERPL-MCNC: 6.9 MG/DL (ref 8.5–10.1)
CHLORIDE SERPL-SCNC: 105 MMOL/L (ref 97–108)
CO2 SERPL-SCNC: 25 MMOL/L (ref 21–32)
CREAT SERPL-MCNC: 0.63 MG/DL (ref 0.55–1.02)
DATE LAST DOSE: ABNORMAL
EOSINOPHIL # BLD: 0 K/UL (ref 0–0.4)
EOSINOPHIL NFR BLD: 0 % (ref 0–7)
ERYTHROCYTE [DISTWIDTH] IN BLOOD BY AUTOMATED COUNT: 16.3 % (ref 11.5–14.5)
GLOBULIN SER CALC-MCNC: 3 G/DL (ref 2–4)
GLUCOSE BLD STRIP.AUTO-MCNC: 107 MG/DL (ref 65–100)
GLUCOSE BLD STRIP.AUTO-MCNC: 114 MG/DL (ref 65–100)
GLUCOSE BLD STRIP.AUTO-MCNC: 129 MG/DL (ref 65–100)
GLUCOSE BLD STRIP.AUTO-MCNC: 134 MG/DL (ref 65–100)
GLUCOSE BLD STRIP.AUTO-MCNC: 138 MG/DL (ref 65–100)
GLUCOSE SERPL-MCNC: 104 MG/DL (ref 65–100)
HCT VFR BLD AUTO: 27.1 % (ref 35–47)
HGB BLD-MCNC: 9.2 G/DL (ref 11.5–16)
LYMPHOCYTES # BLD AUTO: 6 % (ref 12–49)
LYMPHOCYTES # BLD: 1.4 K/UL (ref 0.8–3.5)
MAGNESIUM SERPL-MCNC: 1.6 MG/DL (ref 1.6–2.4)
MCH RBC QN AUTO: 26.7 PG (ref 26–34)
MCHC RBC AUTO-ENTMCNC: 33.9 G/DL (ref 30–36.5)
MCV RBC AUTO: 78.6 FL (ref 80–99)
MONOCYTES # BLD: 0.7 K/UL (ref 0–1)
MONOCYTES NFR BLD AUTO: 3 % (ref 5–13)
NEUTS SEG # BLD: 21.3 K/UL (ref 1.8–8)
NEUTS SEG NFR BLD AUTO: 91 % (ref 32–75)
PHOSPHATE SERPL-MCNC: 2.3 MG/DL (ref 2.6–4.7)
PLATELET # BLD AUTO: 436 K/UL (ref 150–400)
POTASSIUM SERPL-SCNC: 3.2 MMOL/L (ref 3.5–5.1)
PROT SERPL-MCNC: 4.1 G/DL (ref 6.4–8.2)
RBC # BLD AUTO: 3.45 M/UL (ref 3.8–5.2)
RBC MORPH BLD: ABNORMAL
REPORTED DOSE,DOSE: ABNORMAL UNITS
REPORTED DOSE/TIME,TMG: 2200
SERVICE CMNT-IMP: ABNORMAL
SODIUM SERPL-SCNC: 137 MMOL/L (ref 136–145)
VANCOMYCIN TROUGH SERPL-MCNC: 17.5 UG/ML (ref 5–10)
WBC # BLD AUTO: 23.4 K/UL (ref 3.6–11)

## 2017-07-08 PROCEDURE — 80053 COMPREHEN METABOLIC PANEL: CPT | Performed by: SURGERY

## 2017-07-08 PROCEDURE — 74011250636 HC RX REV CODE- 250/636: Performed by: FAMILY MEDICINE

## 2017-07-08 PROCEDURE — 74011250637 HC RX REV CODE- 250/637: Performed by: PHYSICIAN ASSISTANT

## 2017-07-08 PROCEDURE — 74011000258 HC RX REV CODE- 258: Performed by: FAMILY MEDICINE

## 2017-07-08 PROCEDURE — C9113 INJ PANTOPRAZOLE SODIUM, VIA: HCPCS | Performed by: STUDENT IN AN ORGANIZED HEALTH CARE EDUCATION/TRAINING PROGRAM

## 2017-07-08 PROCEDURE — 74011000250 HC RX REV CODE- 250: Performed by: FAMILY MEDICINE

## 2017-07-08 PROCEDURE — 82962 GLUCOSE BLOOD TEST: CPT

## 2017-07-08 PROCEDURE — 65660000000 HC RM CCU STEPDOWN

## 2017-07-08 PROCEDURE — 85025 COMPLETE CBC W/AUTO DIFF WBC: CPT | Performed by: SURGERY

## 2017-07-08 PROCEDURE — 74011000250 HC RX REV CODE- 250: Performed by: STUDENT IN AN ORGANIZED HEALTH CARE EDUCATION/TRAINING PROGRAM

## 2017-07-08 PROCEDURE — 94640 AIRWAY INHALATION TREATMENT: CPT

## 2017-07-08 PROCEDURE — 74011250636 HC RX REV CODE- 250/636: Performed by: PHYSICIAN ASSISTANT

## 2017-07-08 PROCEDURE — 84100 ASSAY OF PHOSPHORUS: CPT | Performed by: SURGERY

## 2017-07-08 PROCEDURE — 36415 COLL VENOUS BLD VENIPUNCTURE: CPT | Performed by: SURGERY

## 2017-07-08 PROCEDURE — 80202 ASSAY OF VANCOMYCIN: CPT | Performed by: FAMILY MEDICINE

## 2017-07-08 PROCEDURE — 74011250636 HC RX REV CODE- 250/636: Performed by: SURGERY

## 2017-07-08 PROCEDURE — 83735 ASSAY OF MAGNESIUM: CPT | Performed by: SURGERY

## 2017-07-08 PROCEDURE — 74011250636 HC RX REV CODE- 250/636: Performed by: STUDENT IN AN ORGANIZED HEALTH CARE EDUCATION/TRAINING PROGRAM

## 2017-07-08 PROCEDURE — 74011250637 HC RX REV CODE- 250/637: Performed by: FAMILY MEDICINE

## 2017-07-08 RX ORDER — LEVALBUTEROL INHALATION SOLUTION 1.25 MG/3ML
1.25 SOLUTION RESPIRATORY (INHALATION)
Status: DISCONTINUED | OUTPATIENT
Start: 2017-07-08 | End: 2017-07-11

## 2017-07-08 RX ADMIN — METRONIDAZOLE 500 MG: 500 INJECTION, SOLUTION INTRAVENOUS at 02:14

## 2017-07-08 RX ADMIN — LEVALBUTEROL INHALATION 1.25MG/3ML 1.25 MG: 1.25 SOLUTION RESPIRATORY (INHALATION) at 13:45

## 2017-07-08 RX ADMIN — VANCOMYCIN HYDROCHLORIDE 1000 MG: 1 INJECTION, POWDER, LYOPHILIZED, FOR SOLUTION INTRAVENOUS at 23:12

## 2017-07-08 RX ADMIN — SODIUM CHLORIDE 100 MG: 900 INJECTION, SOLUTION INTRAVENOUS at 00:08

## 2017-07-08 RX ADMIN — DIAZEPAM 2.5 MG: 5 INJECTION, SOLUTION INTRAMUSCULAR; INTRAVENOUS at 23:27

## 2017-07-08 RX ADMIN — VANCOMYCIN HYDROCHLORIDE 1500 MG: 10 INJECTION, POWDER, LYOPHILIZED, FOR SOLUTION INTRAVENOUS at 00:26

## 2017-07-08 RX ADMIN — SODIUM CHLORIDE 40 MG: 9 INJECTION, SOLUTION INTRAMUSCULAR; INTRAVENOUS; SUBCUTANEOUS at 08:21

## 2017-07-08 RX ADMIN — LEVALBUTEROL INHALATION 1.25MG/3ML 1.25 MG: 1.25 SOLUTION RESPIRATORY (INHALATION) at 08:00

## 2017-07-08 RX ADMIN — METRONIDAZOLE 500 MG: 500 INJECTION, SOLUTION INTRAVENOUS at 20:45

## 2017-07-08 RX ADMIN — VANCOMYCIN HYDROCHLORIDE 1500 MG: 10 INJECTION, POWDER, LYOPHILIZED, FOR SOLUTION INTRAVENOUS at 10:31

## 2017-07-08 RX ADMIN — HEPARIN SODIUM 5000 UNITS: 5000 INJECTION, SOLUTION INTRAVENOUS; SUBCUTANEOUS at 00:12

## 2017-07-08 RX ADMIN — DIAZEPAM 2.5 MG: 5 INJECTION, SOLUTION INTRAMUSCULAR; INTRAVENOUS at 04:46

## 2017-07-08 RX ADMIN — HEPARIN SODIUM 5000 UNITS: 5000 INJECTION, SOLUTION INTRAVENOUS; SUBCUTANEOUS at 08:21

## 2017-07-08 RX ADMIN — Medication: at 14:11

## 2017-07-08 RX ADMIN — HEPARIN SODIUM 5000 UNITS: 5000 INJECTION, SOLUTION INTRAVENOUS; SUBCUTANEOUS at 21:02

## 2017-07-08 RX ADMIN — LEVALBUTEROL INHALATION 1.25MG/3ML 1.25 MG: 1.25 SOLUTION RESPIRATORY (INHALATION) at 19:50

## 2017-07-08 RX ADMIN — SODIUM CHLORIDE: 900 INJECTION, SOLUTION INTRAVENOUS at 09:42

## 2017-07-08 RX ADMIN — LEVOFLOXACIN 750 MG: 5 INJECTION, SOLUTION INTRAVENOUS at 17:48

## 2017-07-08 RX ADMIN — DIAZEPAM 2.5 MG: 5 INJECTION, SOLUTION INTRAMUSCULAR; INTRAVENOUS at 19:07

## 2017-07-08 RX ADMIN — Medication 10 ML: at 21:01

## 2017-07-08 RX ADMIN — HEPARIN SODIUM 5000 UNITS: 5000 INJECTION, SOLUTION INTRAVENOUS; SUBCUTANEOUS at 13:54

## 2017-07-08 RX ADMIN — METRONIDAZOLE 500 MG: 500 INJECTION, SOLUTION INTRAVENOUS at 13:53

## 2017-07-08 RX ADMIN — SODIUM CHLORIDE 40 MG: 9 INJECTION, SOLUTION INTRAMUSCULAR; INTRAVENOUS; SUBCUTANEOUS at 20:49

## 2017-07-08 RX ADMIN — RETINOL, ERGOCALCIFEROL, .ALPHA.-TOCOPHEROL ACETATE, DL-, PHYTONADIONE, ASCORBIC ACID, NIACINAMIDE, RIBOFLAVIN 5-PHOSPHATE SODIUM, THIAMINE HYDROCHLORIDE, PYRIDOXINE HYDROCHLORIDE, DEXPANTHENOL, BIOTIN, FOLIC ACID, AND CYANOCOBALAMIN: KIT at 18:38

## 2017-07-08 RX ADMIN — DIAZEPAM 0.25 MG: 5 INJECTION, SOLUTION INTRAMUSCULAR; INTRAVENOUS at 14:05

## 2017-07-08 RX ADMIN — METRONIDAZOLE 500 MG: 500 INJECTION, SOLUTION INTRAVENOUS at 08:26

## 2017-07-08 RX ADMIN — SODIUM CHLORIDE 100 MG: 900 INJECTION, SOLUTION INTRAVENOUS at 20:33

## 2017-07-08 NOTE — PROGRESS NOTES
Bedside shift change report given to Huseyin Casey RN (oncoming nurse) by Haroldo Matthews RN (offgoing nurse). Report included the following information SBAR, Kardex, Procedure Summary, Intake/Output, MAR and Accordion.

## 2017-07-08 NOTE — PROGRESS NOTES
Doing well and stable  Had a good BM  Afebrile and vitals stable  abd is soft and non distended - minimal drainage from right side ANDRE and none from left  White count is still high  Maintain NPO and continue ABX

## 2017-07-08 NOTE — PROGRESS NOTES
Physical Therapy    915 Chart reviewed, Spoke with RN . Pt received in bed, tearful, reporting increased pain. Reports she transferred to [facility] with nursing, her feet did not touch floor increasing her pain. Pt declining OOB activity. Brought step stool to room to allow for more stable sitting balance with C transfers. Shirley Ordonez

## 2017-07-08 NOTE — PROGRESS NOTES
Bedside and Verbal shift change report given to [address] [place] [address] (oncoming nurse) by [facility] (offgoing nurse). Report included the following information SBAR, Kardex, Procedure Summary, Intake/Output, MAR and Recent Results.

## 2017-07-08 NOTE — PROGRESS NOTES
[facility] Dosing Services: Antimicrobial Stewardship Daily Doc  Consult for antibiotic dosing of Vancomycin by Dr. Jaden Carter  Indication: Intra-abdominal infection/Perforated gastric ulcerx2/Peritonitis/Sepsis   Day of Therapy: 4    Ht Readings from Last 1 Encounters:   07/04/17 157.5 cm (62\")        Wt Readings from Last 1 Encounters:   07/06/17 64 kg (141 lb 1.5 oz)      Vancomycin therapy:  Current maintenance dose: 1500(mg) every 12 hours (frequency). Dose calculated to approximate a therapeutic trough of 15-20 mcg/mL. Last trough level: 17.5 mcg/ml drawn 9.43 hrs post dose, extrapolates to a trough of ~12.91 mcg/ml  Plan for level / Adjustment in Therapy: Will change Vancomycin to 1000 mg IV q8hr and recheck level  Dose administration notes:   Doses given appropriately as scheduled    Other Antimicrobial   (not dosed by pharmacist) Levaquin 750 mg IV daily  Flagyl 500 mg IV q6hr  Eraxis 100 mg IV daily   Cultures 7/5/2017: Nares: neg final  7/4/2017: Peritoneal fluid: NG x3 days pending  Anaerobes: NG x3 days pending  7/4/2017: Urine: NG final  7/4/2017: Blood: NG x4 days pending  7/4/2017: Blood: NG x4 days pending   Serum Creatinine Lab Results   Component Value Date/Time    Creatinine 0.63 07/08/2017 05:40 AM    Creatinine (POC) 1.0 05/06/2014 07:55 AM         Creatinine Clearance Estimated Creatinine Clearance: 98.1 mL/min (based on Cr of 0.63).      Temp Temp: 98.8 °F (37.1 °C)       WBC Lab Results   Component Value Date/Time    WBC 23.4 07/08/2017 05:40 AM        H/H Lab Results   Component Value Date/Time    HGB 9.2 07/08/2017 05:40 AM        Platelets    Lab Results   Component Value Date/Time    PLATELET 965 84/28/6175 05:40 AM          Pharmacist Santiago Singh Contact information: [phone]

## 2017-07-09 LAB
ALBUMIN SERPL BCP-MCNC: 1.2 G/DL (ref 3.5–5)
ALBUMIN/GLOB SERPL: 0.5 {RATIO} (ref 1.1–2.2)
ALP SERPL-CCNC: 88 U/L (ref 45–117)
ALT SERPL-CCNC: 13 U/L (ref 12–78)
ANION GAP BLD CALC-SCNC: 7 MMOL/L (ref 5–15)
AST SERPL W P-5'-P-CCNC: 17 U/L (ref 15–37)
BACTERIA SPEC CULT: NORMAL
BACTERIA SPEC CULT: NORMAL
BASOPHILS # BLD AUTO: 0 K/UL (ref 0–0.1)
BASOPHILS # BLD: 0 % (ref 0–1)
BILIRUB SERPL-MCNC: 0.1 MG/DL (ref 0.2–1)
BUN SERPL-MCNC: 6 MG/DL (ref 6–20)
BUN/CREAT SERPL: 10 (ref 12–20)
CALCIUM SERPL-MCNC: 6.7 MG/DL (ref 8.5–10.1)
CHLORIDE SERPL-SCNC: 107 MMOL/L (ref 97–108)
CO2 SERPL-SCNC: 26 MMOL/L (ref 21–32)
CREAT SERPL-MCNC: 0.62 MG/DL (ref 0.55–1.02)
EOSINOPHIL # BLD: 0.7 K/UL (ref 0–0.4)
EOSINOPHIL NFR BLD: 3 % (ref 0–7)
ERYTHROCYTE [DISTWIDTH] IN BLOOD BY AUTOMATED COUNT: 16.6 % (ref 11.5–14.5)
GLOBULIN SER CALC-MCNC: 2.4 G/DL (ref 2–4)
GLUCOSE BLD STRIP.AUTO-MCNC: 105 MG/DL (ref 65–100)
GLUCOSE BLD STRIP.AUTO-MCNC: 124 MG/DL (ref 65–100)
GLUCOSE BLD STRIP.AUTO-MCNC: 128 MG/DL (ref 65–100)
GLUCOSE BLD STRIP.AUTO-MCNC: 137 MG/DL (ref 65–100)
GLUCOSE SERPL-MCNC: 99 MG/DL (ref 65–100)
GRAM STN SPEC: NORMAL
GRAM STN SPEC: NORMAL
HCT VFR BLD AUTO: 26.2 % (ref 35–47)
HGB BLD-MCNC: 8.8 G/DL (ref 11.5–16)
LYMPHOCYTES # BLD AUTO: 11 % (ref 12–49)
LYMPHOCYTES # BLD: 2.6 K/UL (ref 0.8–3.5)
MAGNESIUM SERPL-MCNC: 1.4 MG/DL (ref 1.6–2.4)
MCH RBC QN AUTO: 26.9 PG (ref 26–34)
MCHC RBC AUTO-ENTMCNC: 33.6 G/DL (ref 30–36.5)
MCV RBC AUTO: 80.1 FL (ref 80–99)
MONOCYTES # BLD: 1.2 K/UL (ref 0–1)
MONOCYTES NFR BLD AUTO: 5 % (ref 5–13)
MYELOCYTES NFR BLD MANUAL: 4 %
NEUTS BAND NFR BLD MANUAL: 1 % (ref 0–6)
NEUTS SEG # BLD: 18.4 K/UL (ref 1.8–8)
NEUTS SEG NFR BLD AUTO: 76 % (ref 32–75)
PHOSPHATE SERPL-MCNC: 3.2 MG/DL (ref 2.6–4.7)
PLATELET # BLD AUTO: 406 K/UL (ref 150–400)
POTASSIUM SERPL-SCNC: 3.2 MMOL/L (ref 3.5–5.1)
PROT SERPL-MCNC: 3.6 G/DL (ref 6.4–8.2)
RBC # BLD AUTO: 3.27 M/UL (ref 3.8–5.2)
RBC MORPH BLD: ABNORMAL
SERVICE CMNT-IMP: ABNORMAL
SERVICE CMNT-IMP: NORMAL
SERVICE CMNT-IMP: NORMAL
SODIUM SERPL-SCNC: 140 MMOL/L (ref 136–145)
WBC # BLD AUTO: 23.9 K/UL (ref 3.6–11)

## 2017-07-09 PROCEDURE — 74011000250 HC RX REV CODE- 250: Performed by: FAMILY MEDICINE

## 2017-07-09 PROCEDURE — 94640 AIRWAY INHALATION TREATMENT: CPT

## 2017-07-09 PROCEDURE — 74011250637 HC RX REV CODE- 250/637: Performed by: FAMILY MEDICINE

## 2017-07-09 PROCEDURE — 74011000258 HC RX REV CODE- 258: Performed by: FAMILY MEDICINE

## 2017-07-09 PROCEDURE — 36415 COLL VENOUS BLD VENIPUNCTURE: CPT | Performed by: SURGERY

## 2017-07-09 PROCEDURE — 65660000000 HC RM CCU STEPDOWN

## 2017-07-09 PROCEDURE — 74011250636 HC RX REV CODE- 250/636: Performed by: FAMILY MEDICINE

## 2017-07-09 PROCEDURE — 80053 COMPREHEN METABOLIC PANEL: CPT | Performed by: SURGERY

## 2017-07-09 PROCEDURE — 80202 ASSAY OF VANCOMYCIN: CPT | Performed by: FAMILY MEDICINE

## 2017-07-09 PROCEDURE — C9113 INJ PANTOPRAZOLE SODIUM, VIA: HCPCS | Performed by: STUDENT IN AN ORGANIZED HEALTH CARE EDUCATION/TRAINING PROGRAM

## 2017-07-09 PROCEDURE — 84100 ASSAY OF PHOSPHORUS: CPT | Performed by: SURGERY

## 2017-07-09 PROCEDURE — 83735 ASSAY OF MAGNESIUM: CPT | Performed by: SURGERY

## 2017-07-09 PROCEDURE — 74011250636 HC RX REV CODE- 250/636: Performed by: STUDENT IN AN ORGANIZED HEALTH CARE EDUCATION/TRAINING PROGRAM

## 2017-07-09 PROCEDURE — 74011250636 HC RX REV CODE- 250/636: Performed by: PHYSICIAN ASSISTANT

## 2017-07-09 PROCEDURE — 74011000250 HC RX REV CODE- 250: Performed by: STUDENT IN AN ORGANIZED HEALTH CARE EDUCATION/TRAINING PROGRAM

## 2017-07-09 PROCEDURE — 82962 GLUCOSE BLOOD TEST: CPT

## 2017-07-09 PROCEDURE — 85025 COMPLETE CBC W/AUTO DIFF WBC: CPT | Performed by: SURGERY

## 2017-07-09 PROCEDURE — 74011250637 HC RX REV CODE- 250/637: Performed by: PHYSICIAN ASSISTANT

## 2017-07-09 RX ORDER — POTASSIUM CHLORIDE 7.45 MG/ML
10 INJECTION INTRAVENOUS
Status: COMPLETED | OUTPATIENT
Start: 2017-07-09 | End: 2017-07-11

## 2017-07-09 RX ORDER — DIAZEPAM 10 MG/2ML
1 INJECTION INTRAMUSCULAR
Status: DISCONTINUED | OUTPATIENT
Start: 2017-07-09 | End: 2017-07-13

## 2017-07-09 RX ORDER — MAGNESIUM SULFATE HEPTAHYDRATE 40 MG/ML
2 INJECTION, SOLUTION INTRAVENOUS
Status: COMPLETED | OUTPATIENT
Start: 2017-07-09 | End: 2017-07-11

## 2017-07-09 RX ADMIN — MAGNESIUM SULFATE IN WATER 2 G: 40 INJECTION, SOLUTION INTRAVENOUS at 09:46

## 2017-07-09 RX ADMIN — MAGNESIUM SULFATE IN WATER 2 G: 40 INJECTION, SOLUTION INTRAVENOUS at 07:32

## 2017-07-09 RX ADMIN — LEVALBUTEROL INHALATION 1.25MG/3ML 1.25 MG: 1.25 SOLUTION RESPIRATORY (INHALATION) at 08:00

## 2017-07-09 RX ADMIN — HEPARIN SODIUM 5000 UNITS: 5000 INJECTION, SOLUTION INTRAVENOUS; SUBCUTANEOUS at 23:01

## 2017-07-09 RX ADMIN — Medication 10 ML: at 23:05

## 2017-07-09 RX ADMIN — VANCOMYCIN HYDROCHLORIDE 1000 MG: 1 INJECTION, POWDER, LYOPHILIZED, FOR SOLUTION INTRAVENOUS at 14:20

## 2017-07-09 RX ADMIN — HEPARIN SODIUM 5000 UNITS: 5000 INJECTION, SOLUTION INTRAVENOUS; SUBCUTANEOUS at 13:16

## 2017-07-09 RX ADMIN — POTASSIUM CHLORIDE 10 MEQ: 10 INJECTION, SOLUTION INTRAVENOUS at 13:17

## 2017-07-09 RX ADMIN — HEPARIN SODIUM 5000 UNITS: 5000 INJECTION, SOLUTION INTRAVENOUS; SUBCUTANEOUS at 07:10

## 2017-07-09 RX ADMIN — POTASSIUM CHLORIDE 10 MEQ: 10 INJECTION, SOLUTION INTRAVENOUS at 12:09

## 2017-07-09 RX ADMIN — LEVALBUTEROL INHALATION 1.25MG/3ML 1.25 MG: 1.25 SOLUTION RESPIRATORY (INHALATION) at 01:22

## 2017-07-09 RX ADMIN — DIAZEPAM 1 MG: 5 INJECTION, SOLUTION INTRAMUSCULAR; INTRAVENOUS at 14:26

## 2017-07-09 RX ADMIN — POTASSIUM CHLORIDE 10 MEQ: 10 INJECTION, SOLUTION INTRAVENOUS at 11:00

## 2017-07-09 RX ADMIN — SODIUM CHLORIDE 40 MG: 9 INJECTION, SOLUTION INTRAMUSCULAR; INTRAVENOUS; SUBCUTANEOUS at 21:01

## 2017-07-09 RX ADMIN — METRONIDAZOLE 500 MG: 500 INJECTION, SOLUTION INTRAVENOUS at 13:15

## 2017-07-09 RX ADMIN — VANCOMYCIN HYDROCHLORIDE 1000 MG: 1 INJECTION, POWDER, LYOPHILIZED, FOR SOLUTION INTRAVENOUS at 07:04

## 2017-07-09 RX ADMIN — SODIUM CHLORIDE 100 MG: 900 INJECTION, SOLUTION INTRAVENOUS at 21:01

## 2017-07-09 RX ADMIN — RETINOL, ERGOCALCIFEROL, .ALPHA.-TOCOPHEROL ACETATE, DL-, PHYTONADIONE, ASCORBIC ACID, NIACINAMIDE, RIBOFLAVIN 5-PHOSPHATE SODIUM, THIAMINE HYDROCHLORIDE, PYRIDOXINE HYDROCHLORIDE, DEXPANTHENOL, BIOTIN, FOLIC ACID, AND CYANOCOBALAMIN: KIT at 18:25

## 2017-07-09 RX ADMIN — METRONIDAZOLE 500 MG: 500 INJECTION, SOLUTION INTRAVENOUS at 19:47

## 2017-07-09 RX ADMIN — VANCOMYCIN HYDROCHLORIDE 1000 MG: 1 INJECTION, POWDER, LYOPHILIZED, FOR SOLUTION INTRAVENOUS at 23:01

## 2017-07-09 RX ADMIN — SODIUM CHLORIDE 40 MG: 9 INJECTION, SOLUTION INTRAMUSCULAR; INTRAVENOUS; SUBCUTANEOUS at 08:46

## 2017-07-09 RX ADMIN — Medication 10 ML: at 13:16

## 2017-07-09 RX ADMIN — METRONIDAZOLE 500 MG: 500 INJECTION, SOLUTION INTRAVENOUS at 08:41

## 2017-07-09 RX ADMIN — DIAZEPAM 2.5 MG: 5 INJECTION, SOLUTION INTRAMUSCULAR; INTRAVENOUS at 09:41

## 2017-07-09 RX ADMIN — Medication 10 ML: at 07:34

## 2017-07-09 RX ADMIN — POTASSIUM CHLORIDE 10 MEQ: 10 INJECTION, SOLUTION INTRAVENOUS at 08:45

## 2017-07-09 RX ADMIN — DIAZEPAM 1 MG: 5 INJECTION, SOLUTION INTRAMUSCULAR; INTRAVENOUS at 19:44

## 2017-07-09 RX ADMIN — LEVALBUTEROL INHALATION 1.25MG/3ML 1.25 MG: 1.25 SOLUTION RESPIRATORY (INHALATION) at 23:35

## 2017-07-09 RX ADMIN — LEVALBUTEROL INHALATION 1.25MG/3ML 1.25 MG: 1.25 SOLUTION RESPIRATORY (INHALATION) at 19:49

## 2017-07-09 RX ADMIN — LEVOFLOXACIN 750 MG: 5 INJECTION, SOLUTION INTRAVENOUS at 16:40

## 2017-07-09 RX ADMIN — METRONIDAZOLE 500 MG: 500 INJECTION, SOLUTION INTRAVENOUS at 03:29

## 2017-07-09 RX ADMIN — LEVALBUTEROL INHALATION 1.25MG/3ML 1.25 MG: 1.25 SOLUTION RESPIRATORY (INHALATION) at 14:12

## 2017-07-09 NOTE — PROGRESS NOTES
Physical Exam   Skin:              Nevaeh Bridges RN and primary nurse Jc Lemon RN performed a dual skin assessment on this patient Impairment noted- see wound doc flow sheet.

## 2017-07-09 NOTE — PROGRESS NOTES
[address] [facility] [address] [place] [address] [facility]    Office [phone]  Fax [phone]          Assessment and Bekah Rodrigo is a 56 y.o. female with known Chron's Disease (s/p bowel resection, not currenlty on immunotherapy and depression admitted for sepsis 2/2 perforated gastric ulcers/peritonitis. She has spent 5 night(s) in the hospital.    24 Hour Events: No acute events. Infectious Disease    Septic Shock 2/2 Peritonitis - off pressors. Blood cultures with no growth x5 days. Body fluid culture with no growth x4 day. Urine culture with no significant growth. Leukocytosis stable. -Maintaining MAP on her own now--pressors not required. Need to keep MAP >65. -Abx:  Eraxis, levaquin, vancomycin, flagyl. Will keep broad spectrum abx per general surgery. -F/u blood, body fluid (surgical) cultures to completion.  -Stopping IVF--TPN only as patient is complaining of some increasing LE edema.  -Strict I&O. -Daily labs. Community Acquired Pneumonia - left basilar airspace disease noted on initial CXR. Could also be playing a role in SIRS/sepsis picture.  -Has appropriate coverage with levaquin.  -Albuterol nebulizer available. Aggressive pulmonary toilet per pulm. Lyme Disease - diagnosed ~1 month ago at [facility]. Was treated with a 21 day course of doxycycline. Completed this course fully, but developed some lower extremity skin breakdown following treatment. Tetracyclines are on patient's allergy list.  -Not repeating tic studies. Gastrointestinal    Perforated Gastric Ulcers s/p Operative Repair on 7/4/17 - surgery following. Patient is NPO until POD7. Gastric ulcers thought to be related to chronic steroid use and Chron's disease. Drain OP 175cc over the last 24 hours. -Appreciate surgery recommendations & support. -NPO x7 days. Continue TPN. Added lipids 3x/week. Checking TG weekly. -IV protonix.   -Dilaudid PCA for pain control per general surgery.  -Encouraging incentive spirometry. Chron's Disease - no pharmacotherapy PTA. Poor follow up history with GI. Likely playing a role in patient's current clinical course.  -Will consider GI consult later in course when patient becomes more stable. Needs better outpatient follow up. Severe Protein Calorie Malnutrition - as evidenced by lower extremity swelling and severe hypoalbuminemia  This is complicated by need for 7 days of NPO. -Continuing TPN. -Daily CMP. -Daily Phos. Hematology    Anemia - lab work up done on admission. Notable for reticulocyte count of 2.3, LD of 294. Per hematology, most likely 2/2 acute blood loss from gastric ulcer. Also a component of iron deficiency. S/p 2 units pRBC on admission--Hgb responded appropriately. Has been stable since. Hgb down to 8.8 this morning.  -Hematology signed off.  -Have 2 units of pRBCs on hold. -Daily CBC. Pulmonary    Tobacco Abuse - reports to smoke 1.5 PPD. -Encouraging cessation.  -Prescribed daily nicotine patch while patient admitted. Integumentary    Healing Wounds on Bilateral Feet - most likely acute reaction to doxycycline treatment that patient underwent prior to admission. Per patient, these wounds are healing. Remain painful.  -Wound care consulted, appreciate support.  -Tetracyclines on allergy list.    3cm Laceration of Left Upper Back - healing. Patient notes that this was from a fall. Not amenable to sutures at this time.  -Wound care consulted, appreciate support. Bilateral Lower Extremity Swelling/Pain - noted on admisison. Thought to be 2/2 hypoalbuminemia. Duplex studies of lower extremities negative for DVT. Electrolytes - multiple abnormalities throughout course, likely 2/2 nutritional management with TPN. Hypomagnesemia - low this morning.  -Repleting IV.  -Daily Mg level. Hypophosphatemia - normal this morning.  -Daily P.     Hypokalemia - low this morning.  -Repleting with IV potassium (4 runs of 10meq). -Daily BMP. Psych    Depression/Panic Attacks - patient is showing some symptoms of this--mostly at night. Takes klonopin, adderall, Lexapro, ativan, and trazodone at home. Concern for withdrawal from SSRI. -Currently holding these medications as patient is NPO.  -OK with IV vallium prn--will reduce dose today to 1mg as patient concerned that 2.5mg is too sedating. Will need to carefully monitor respirations with this in addition to dilaudid. MSK    History of Falls - patient and family gave detailed history of multiple falls over the last few months/weeks. -PT/OT consulted. Their evaluation may be deferred until patient clinically improves. FEN/GI - NPO. TPN running at 63mL/hr. Activity - Out of bed with assistance  DVT prophylaxis - SCDs  GI prophylaxis - Protonix  Fall prophylaxis - Fall precautions ordered. Unit - Remote Telemetry. Admission Status - Admitted  IV Access - Right IJ CVC, 2 PIVs.  Disposition - Plan to d/c to [facility]. Code Status - Full     Zahraa Dunlap MD  Family Medicine Resident         Subjective / Objective     Subjective:  Symptoms:  Stable. She reports weakness and anxiety. No chest pain or chest pressure. Diet:  NPO. No nausea or vomiting. Activity level: Impaired due to pain. Pain:  She complains of pain that is severe. She reports pain is unchanged. Pain is requiring pain medication and partially controlled. Continues to improve. Asking for her IV vallium dose to be decreased as she finds it too sedating. Temp (24hrs), Av °F (36.7 °C), Min:95.1 °F (35.1 °C), Max:98.8 °F (37.1 °C)     Objective:  General Appearance:  Ill-appearing, in pain and uncomfortable. Vital signs: (most recent): Blood pressure 108/66, pulse 96, temperature 95.1 °F (35.1 °C), resp. rate 18, height 5' 2\" (1.575 m), weight 141 lb 1.5 oz (64 kg), SpO2 95 %. HEENT: Normal HEENT exam.  (NG tube in left nare.   Output clear.)    Lungs:  Normal respiratory rate and normal effort. She is not in respiratory distress. Breath sounds clear to auscultation. No stridor. No wheezes, rales, rhonchi or decreased breath sounds. Heart: Normal rate. Regular rhythm. S1 normal and S2 normal.  No murmur, gallop or friction rub. Extremities: There is dependent edema. (Tenderness to palpation of bilateral lower extremities. No SCDs this morning.)  Neurological: Patient is alert and oriented to person, place and time. Skin:  Warm and dry. (3cm laceration on upper left back, unchanged from admission. Some skin breakdown noted/poorly healing wounds on bilateral feet.)  Abdomen: (Surgical bandages in place. ANDRE drain in place. Bandages appear clean and dry. )Bowel sounds are normal.   There is generalized tenderness. Pupils:  Pupils are equal, round, and reactive to light.       Respiratory:  O2 Device: Room air     I/O:    Date 07/08/17 0700 - 07/09/17 0659 07/09/17 0700 - 07/10/17 0659   Shift [id number] 1900-0659 24 Hour Total 0700-1859 1900-0659 24 Hour Total   I  N  T  A  K  E   NG/GT  0 0         Intake (ml) (Nasogastric Tube)  0 0       Shift Total  (mL/kg)  0  (0) 0  (0)      O  U  T  P  U  T   Urine  (mL/kg/hr)  1250  (1.6) 1250  (0.8)         Urine Voided  1250 1250         Urine Occurrence(s)  4 x 4 x 2 x  2 x    Emesis/NG output 125 100 225         Output (ml) (Nasogastric Tube) 125 100 225       Drains 95 80 175 45  45      Output (ml) (Osmar Drain #2 07/04/17 Anterior;Right Abdomen) 90 80 170 30  30      Output (ml) (Osmar Drain #1 07/04/17 Left;Posterior Abdomen) 5  5 15  15    Shift Total  (mL/kg) 220  (3.4) 1430  (22.3) 1650  (25.8) 45  (0.7)  45  (0.7)   NET -220 -1430 -1650 -45  -45   Weight (kg) 64 64 64 64 64 64       CBC:  Recent Labs      07/09/17   0341  07/08/17   0540  07/07/17   0332   WBC  23.9*  23.4*  15.8*   HGB  8.8*  9.2*  9.4*   HCT  26.2*  27.1*  28.7*   PLT  406*  436*  625*       Metabolic Panel:  Recent Labs 07/09/17   0341  07/08/17   0540  07/07/17   0332   NA  140  137  139   K  3.2*  3.2*  3.4*   CL  107  105  108   CO2  26  25  27   BUN  6  4*  4*   CREA  0.62  0.63  0.63   GLU  99  104*  114*   CA  6.7*  6.9*  7.3*   MG  1.4*  1.6  1.6   PHOS  3.2  2.3*  2.3*   ALB  1.2*  1.1*  1.0*   SGOT  17  15  14*   ALT  13  14  16          For Billing    Chief Complaint   Patient presents with    Rash    Fever    Sore Throat    Nasal Congestion       Hospital Problems  Date Reviewed: 1/5/2017          Codes Class Noted POA    Thrombocytosis ([facility] ICD-10-CM: D47.3  ICD-9-CM: 238.71  7/5/2017 Yes        Neutrophilia ICD-10-CM: D72.9  ICD-9-CM: 288.8  7/5/2017 Yes        * (Principal)Perforation bowel ([facility] ICD-10-CM: K63.1  ICD-9-CM: 569.83  7/4/2017 Yes        Pneumonia ICD-10-CM: J18.9  ICD-9-CM: 349  7/4/2017 Yes        Anemia ICD-10-CM: D64.9  ICD-9-CM: 285.9  7/4/2017 Yes        Wounds, multiple open, lower extremity ICD-10-CM: S81.809A  ICD-9-CM: 894.0  7/4/2017 Yes        Crohn disease ([facility] ICD-10-CM: K50.90  ICD-9-CM: 555.9  4/19/2010 Yes    Overview Addendum 2/2/2012  4:33 PM by Daniel Ozuna MD     She had 4 colon resections in the past.  Dr. Ze Ragland, Dr. Jaycee Martinez abd/kaye 2009: Thickened segment of neoileum proximal and adjacent to   anastomotic chain sutures and likely representing inflammatory bowel disease   recurrence. Partial small bowel obstruction at this level. No evidence of   perforation. No evidence of fistula or intra-abdominal abscess.

## 2017-07-09 NOTE — ROUTINE PROCESS
Bedside and Verbal shift change report given to Lynn Krause RN (oncoming nurse) by SVITLANA Murray (offgoing nurse). Report included the following information SBAR, Kardex, OR Summary, Intake/Output, MAR and Cardiac Rhythm NSR.

## 2017-07-09 NOTE — PROGRESS NOTES
Bedside and Verbal shift change report given to [facility] (oncoming nurse) by [facility] (offgoing nurse). Report included the following information SBAR, Kardex, Procedure Summary, Intake/Output, MAR and Recent Results.

## 2017-07-09 NOTE — PROGRESS NOTES
Pain controlled   Passing flatus and feels hungry  Vitals stable  Afebrile   abd is soft and non tender  Awaiting contrast study prior to beginning PO  White count remains high and Hb trending down- will check that daily

## 2017-07-10 ENCOUNTER — APPOINTMENT (OUTPATIENT)
Dept: GENERAL RADIOLOGY | Age: 47
DRG: 853 | End: 2017-07-10
Attending: FAMILY MEDICINE
Payer: COMMERCIAL

## 2017-07-10 ENCOUNTER — APPOINTMENT (OUTPATIENT)
Dept: GENERAL RADIOLOGY | Age: 47
DRG: 853 | End: 2017-07-10
Attending: RADIOLOGY
Payer: COMMERCIAL

## 2017-07-10 ENCOUNTER — APPOINTMENT (OUTPATIENT)
Dept: GENERAL RADIOLOGY | Age: 47
DRG: 853 | End: 2017-07-10
Attending: PHYSICIAN ASSISTANT
Payer: COMMERCIAL

## 2017-07-10 ENCOUNTER — APPOINTMENT (OUTPATIENT)
Dept: CT IMAGING | Age: 47
DRG: 853 | End: 2017-07-10
Attending: PHYSICIAN ASSISTANT
Payer: COMMERCIAL

## 2017-07-10 ENCOUNTER — APPOINTMENT (OUTPATIENT)
Dept: ULTRASOUND IMAGING | Age: 47
DRG: 853 | End: 2017-07-10
Attending: PHYSICIAN ASSISTANT
Payer: COMMERCIAL

## 2017-07-10 LAB
ALBUMIN SERPL BCP-MCNC: 1.1 G/DL (ref 3.5–5)
ALBUMIN/GLOB SERPL: 0.3 {RATIO} (ref 1.1–2.2)
ALP SERPL-CCNC: 83 U/L (ref 45–117)
ALT SERPL-CCNC: 10 U/L (ref 12–78)
ANION GAP BLD CALC-SCNC: 5 MMOL/L (ref 5–15)
APPEARANCE FLD: ABNORMAL
AST SERPL W P-5'-P-CCNC: 18 U/L (ref 15–37)
BACTERIA SPEC CULT: NORMAL
BACTERIA SPEC CULT: NORMAL
BASOPHILS # BLD AUTO: 0 K/UL (ref 0–0.1)
BASOPHILS # BLD: 0 % (ref 0–1)
BILIRUB SERPL-MCNC: 0.2 MG/DL (ref 0.2–1)
BODY FLD TYPE: NORMAL
BUN SERPL-MCNC: 6 MG/DL (ref 6–20)
BUN/CREAT SERPL: 10 (ref 12–20)
CALCIUM SERPL-MCNC: 7.2 MG/DL (ref 8.5–10.1)
CHLORIDE SERPL-SCNC: 104 MMOL/L (ref 97–108)
CO2 SERPL-SCNC: 27 MMOL/L (ref 21–32)
COLOR FLD: ABNORMAL
CREAT SERPL-MCNC: 0.61 MG/DL (ref 0.55–1.02)
DATE LAST DOSE: ABNORMAL
DIFFERENTIAL METHOD BLD: ABNORMAL
EOSINOPHIL # BLD: 0.2 K/UL (ref 0–0.4)
EOSINOPHIL NFR BLD: 1 % (ref 0–7)
ERYTHROCYTE [DISTWIDTH] IN BLOOD BY AUTOMATED COUNT: 16.6 % (ref 11.5–14.5)
GLOBULIN SER CALC-MCNC: 3.4 G/DL (ref 2–4)
GLUCOSE BLD STRIP.AUTO-MCNC: 117 MG/DL (ref 65–100)
GLUCOSE BLD STRIP.AUTO-MCNC: 118 MG/DL (ref 65–100)
GLUCOSE BLD STRIP.AUTO-MCNC: 118 MG/DL (ref 65–100)
GLUCOSE SERPL-MCNC: 125 MG/DL (ref 65–100)
HCT VFR BLD AUTO: 25.2 % (ref 35–47)
HGB BLD-MCNC: 8.6 G/DL (ref 11.5–16)
LDH FLD L TO P-CCNC: 129 U/L
LYMPHOCYTES # BLD AUTO: 10 % (ref 12–49)
LYMPHOCYTES # BLD: 2.3 K/UL (ref 0.8–3.5)
LYMPHOCYTES NFR FLD: 23 %
MAGNESIUM SERPL-MCNC: 1.8 MG/DL (ref 1.6–2.4)
MCH RBC QN AUTO: 26.9 PG (ref 26–34)
MCHC RBC AUTO-ENTMCNC: 34.1 G/DL (ref 30–36.5)
MCV RBC AUTO: 78.8 FL (ref 80–99)
MESOTHL CELL NFR FLD: 7 %
METAMYELOCYTES NFR BLD MANUAL: 3 %
MONOCYTES # BLD: 1.6 K/UL (ref 0–1)
MONOCYTES NFR BLD AUTO: 7 % (ref 5–13)
MONOS+MACROS NFR FLD: 9 %
NEUTS BAND NFR BLD MANUAL: 1 % (ref 0–6)
NEUTS SEG # BLD: 18.4 K/UL (ref 1.8–8)
NEUTS SEG NFR BLD AUTO: 78 % (ref 32–75)
NEUTS SEG NFR FLD: 61 %
NUC CELL # FLD: 919 /CU MM (ref 0–5)
PH FLD: 8 [PH]
PHOSPHATE SERPL-MCNC: 2.9 MG/DL (ref 2.6–4.7)
PLATELET # BLD AUTO: 380 K/UL (ref 150–400)
POTASSIUM SERPL-SCNC: 3.5 MMOL/L (ref 3.5–5.1)
PROT SERPL-MCNC: 4.5 G/DL (ref 6.4–8.2)
RBC # BLD AUTO: 3.2 M/UL (ref 3.8–5.2)
RBC # FLD: >100 /CU MM
RBC MORPH BLD: ABNORMAL
RBC MORPH BLD: ABNORMAL
REPORTED DOSE,DOSE: ABNORMAL UNITS
REPORTED DOSE/TIME,TMG: 1420
SERVICE CMNT-IMP: ABNORMAL
SERVICE CMNT-IMP: NORMAL
SERVICE CMNT-IMP: NORMAL
SODIUM SERPL-SCNC: 136 MMOL/L (ref 136–145)
SPECIMEN SOURCE FLD: ABNORMAL
SPECIMEN SOURCE FLD: NORMAL
VANCOMYCIN TROUGH SERPL-MCNC: 16.4 UG/ML (ref 5–10)
WBC # BLD AUTO: 23.3 K/UL (ref 3.6–11)

## 2017-07-10 PROCEDURE — 74011636320 HC RX REV CODE- 636/320: Performed by: RADIOLOGY

## 2017-07-10 PROCEDURE — 74011000250 HC RX REV CODE- 250: Performed by: FAMILY MEDICINE

## 2017-07-10 PROCEDURE — 74011250636 HC RX REV CODE- 250/636: Performed by: FAMILY MEDICINE

## 2017-07-10 PROCEDURE — 74177 CT ABD & PELVIS W/CONTRAST: CPT

## 2017-07-10 PROCEDURE — 65660000000 HC RM CCU STEPDOWN

## 2017-07-10 PROCEDURE — 83986 ASSAY PH BODY FLUID NOS: CPT | Performed by: FAMILY MEDICINE

## 2017-07-10 PROCEDURE — 71010 XR CHEST SNGL V: CPT

## 2017-07-10 PROCEDURE — 74011250637 HC RX REV CODE- 250/637: Performed by: PHYSICIAN ASSISTANT

## 2017-07-10 PROCEDURE — 71010 XR CHEST PORT: CPT

## 2017-07-10 PROCEDURE — 87205 SMEAR GRAM STAIN: CPT | Performed by: FAMILY MEDICINE

## 2017-07-10 PROCEDURE — 97535 SELF CARE MNGMENT TRAINING: CPT

## 2017-07-10 PROCEDURE — 74011000258 HC RX REV CODE- 258: Performed by: FAMILY MEDICINE

## 2017-07-10 PROCEDURE — 80053 COMPREHEN METABOLIC PANEL: CPT | Performed by: SURGERY

## 2017-07-10 PROCEDURE — 77030029211 HC GEL MEDIH TU INLC -B

## 2017-07-10 PROCEDURE — C9113 INJ PANTOPRAZOLE SODIUM, VIA: HCPCS | Performed by: STUDENT IN AN ORGANIZED HEALTH CARE EDUCATION/TRAINING PROGRAM

## 2017-07-10 PROCEDURE — 74011250636 HC RX REV CODE- 250/636: Performed by: STUDENT IN AN ORGANIZED HEALTH CARE EDUCATION/TRAINING PROGRAM

## 2017-07-10 PROCEDURE — 84157 ASSAY OF PROTEIN OTHER: CPT | Performed by: DENTIST

## 2017-07-10 PROCEDURE — 77030032490 HC SLV COMPR SCD KNE COVD -B

## 2017-07-10 PROCEDURE — 74011250636 HC RX REV CODE- 250/636: Performed by: SURGERY

## 2017-07-10 PROCEDURE — 36415 COLL VENOUS BLD VENIPUNCTURE: CPT | Performed by: SURGERY

## 2017-07-10 PROCEDURE — 85025 COMPLETE CBC W/AUTO DIFF WBC: CPT | Performed by: SURGERY

## 2017-07-10 PROCEDURE — 0W9B3ZZ DRAINAGE OF LEFT PLEURAL CAVITY, PERCUTANEOUS APPROACH: ICD-10-PCS | Performed by: RADIOLOGY

## 2017-07-10 PROCEDURE — 83735 ASSAY OF MAGNESIUM: CPT | Performed by: SURGERY

## 2017-07-10 PROCEDURE — 77030019563 HC DEV ATTCH FEED HOLL -A

## 2017-07-10 PROCEDURE — 74011250636 HC RX REV CODE- 250/636: Performed by: RADIOLOGY

## 2017-07-10 PROCEDURE — 77030011256 HC DRSG MEPILEX <16IN NO BORD MOLN -A

## 2017-07-10 PROCEDURE — 74011000250 HC RX REV CODE- 250: Performed by: PHYSICIAN ASSISTANT

## 2017-07-10 PROCEDURE — 74011250636 HC RX REV CODE- 250/636: Performed by: PHYSICIAN ASSISTANT

## 2017-07-10 PROCEDURE — 89050 BODY FLUID CELL COUNT: CPT | Performed by: FAMILY MEDICINE

## 2017-07-10 PROCEDURE — 83615 LACTATE (LD) (LDH) ENZYME: CPT | Performed by: FAMILY MEDICINE

## 2017-07-10 PROCEDURE — 97530 THERAPEUTIC ACTIVITIES: CPT

## 2017-07-10 PROCEDURE — 74011000250 HC RX REV CODE- 250: Performed by: STUDENT IN AN ORGANIZED HEALTH CARE EDUCATION/TRAINING PROGRAM

## 2017-07-10 PROCEDURE — 71275 CT ANGIOGRAPHY CHEST: CPT

## 2017-07-10 PROCEDURE — 97116 GAIT TRAINING THERAPY: CPT

## 2017-07-10 PROCEDURE — 82962 GLUCOSE BLOOD TEST: CPT

## 2017-07-10 PROCEDURE — 32555 ASPIRATE PLEURA W/ IMAGING: CPT

## 2017-07-10 PROCEDURE — 74011250637 HC RX REV CODE- 250/637: Performed by: FAMILY MEDICINE

## 2017-07-10 PROCEDURE — 94640 AIRWAY INHALATION TREATMENT: CPT

## 2017-07-10 PROCEDURE — 77030012856

## 2017-07-10 PROCEDURE — 84100 ASSAY OF PHOSPHORUS: CPT | Performed by: SURGERY

## 2017-07-10 RX ORDER — FENTANYL CITRATE 50 UG/ML
100 INJECTION, SOLUTION INTRAMUSCULAR; INTRAVENOUS
Status: DISCONTINUED | OUTPATIENT
Start: 2017-07-10 | End: 2017-07-21 | Stop reason: HOSPADM

## 2017-07-10 RX ORDER — LORAZEPAM 2 MG/ML
1 INJECTION INTRAMUSCULAR ONCE
Status: COMPLETED | OUTPATIENT
Start: 2017-07-10 | End: 2017-07-10

## 2017-07-10 RX ADMIN — FENTANYL CITRATE 25 MCG: 50 INJECTION, SOLUTION INTRAMUSCULAR; INTRAVENOUS at 15:32

## 2017-07-10 RX ADMIN — VANCOMYCIN HYDROCHLORIDE 1000 MG: 1 INJECTION, POWDER, LYOPHILIZED, FOR SOLUTION INTRAVENOUS at 16:47

## 2017-07-10 RX ADMIN — HEPARIN SODIUM 5000 UNITS: 5000 INJECTION, SOLUTION INTRAVENOUS; SUBCUTANEOUS at 22:35

## 2017-07-10 RX ADMIN — DIAZEPAM 1 MG: 5 INJECTION, SOLUTION INTRAMUSCULAR; INTRAVENOUS at 02:44

## 2017-07-10 RX ADMIN — METRONIDAZOLE 500 MG: 500 INJECTION, SOLUTION INTRAVENOUS at 19:55

## 2017-07-10 RX ADMIN — VANCOMYCIN HYDROCHLORIDE 1000 MG: 1 INJECTION, POWDER, LYOPHILIZED, FOR SOLUTION INTRAVENOUS at 05:27

## 2017-07-10 RX ADMIN — Medication 10 ML: at 05:27

## 2017-07-10 RX ADMIN — SODIUM CHLORIDE 40 MG: 9 INJECTION, SOLUTION INTRAMUSCULAR; INTRAVENOUS; SUBCUTANEOUS at 20:31

## 2017-07-10 RX ADMIN — LEVOFLOXACIN 750 MG: 5 INJECTION, SOLUTION INTRAVENOUS at 17:58

## 2017-07-10 RX ADMIN — LEVALBUTEROL INHALATION 1.25MG/3ML 1.25 MG: 1.25 SOLUTION RESPIRATORY (INHALATION) at 20:28

## 2017-07-10 RX ADMIN — Medication: at 06:27

## 2017-07-10 RX ADMIN — I.V. FAT EMULSION 500 ML: 20 EMULSION INTRAVENOUS at 17:46

## 2017-07-10 RX ADMIN — LORAZEPAM 1 MG: 2 INJECTION INTRAMUSCULAR; INTRAVENOUS at 20:32

## 2017-07-10 RX ADMIN — SODIUM CHLORIDE 40 MG: 9 INJECTION, SOLUTION INTRAMUSCULAR; INTRAVENOUS; SUBCUTANEOUS at 08:18

## 2017-07-10 RX ADMIN — HEPARIN SODIUM 5000 UNITS: 5000 INJECTION, SOLUTION INTRAVENOUS; SUBCUTANEOUS at 05:27

## 2017-07-10 RX ADMIN — METRONIDAZOLE 500 MG: 500 INJECTION, SOLUTION INTRAVENOUS at 16:51

## 2017-07-10 RX ADMIN — FENTANYL CITRATE 25 MCG: 50 INJECTION, SOLUTION INTRAMUSCULAR; INTRAVENOUS at 15:40

## 2017-07-10 RX ADMIN — DIAZEPAM 1 MG: 5 INJECTION, SOLUTION INTRAMUSCULAR; INTRAVENOUS at 08:06

## 2017-07-10 RX ADMIN — SODIUM CHLORIDE 100 MG: 900 INJECTION, SOLUTION INTRAVENOUS at 20:31

## 2017-07-10 RX ADMIN — METRONIDAZOLE 500 MG: 500 INJECTION, SOLUTION INTRAVENOUS at 02:26

## 2017-07-10 RX ADMIN — LEVALBUTEROL INHALATION 1.25MG/3ML 1.25 MG: 1.25 SOLUTION RESPIRATORY (INHALATION) at 07:33

## 2017-07-10 RX ADMIN — RETINOL, ERGOCALCIFEROL, .ALPHA.-TOCOPHEROL ACETATE, DL-, PHYTONADIONE, ASCORBIC ACID, NIACINAMIDE, RIBOFLAVIN 5-PHOSPHATE SODIUM, THIAMINE HYDROCHLORIDE, PYRIDOXINE HYDROCHLORIDE, DEXPANTHENOL, BIOTIN, FOLIC ACID, AND CYANOCOBALAMIN: KIT at 17:49

## 2017-07-10 RX ADMIN — VANCOMYCIN HYDROCHLORIDE 1000 MG: 1 INJECTION, POWDER, LYOPHILIZED, FOR SOLUTION INTRAVENOUS at 22:34

## 2017-07-10 RX ADMIN — FENTANYL CITRATE 25 MCG: 50 INJECTION, SOLUTION INTRAMUSCULAR; INTRAVENOUS at 15:25

## 2017-07-10 RX ADMIN — IOPAMIDOL 95 ML: 755 INJECTION, SOLUTION INTRAVENOUS at 10:00

## 2017-07-10 RX ADMIN — DIAZEPAM 1 MG: 5 INJECTION, SOLUTION INTRAMUSCULAR; INTRAVENOUS at 17:41

## 2017-07-10 RX ADMIN — LEVALBUTEROL INHALATION 1.25MG/3ML 1.25 MG: 1.25 SOLUTION RESPIRATORY (INHALATION) at 13:55

## 2017-07-10 RX ADMIN — METRONIDAZOLE 500 MG: 500 INJECTION, SOLUTION INTRAVENOUS at 08:03

## 2017-07-10 NOTE — PROGRESS NOTES
General Surgery Daily Progress Note    Patient: Rogers Coley MRN: [id number]  SSN: [id number]    YOB: 1970  Age: 56 y.o. Sex: female      Admit Date: 7/4/2017    Subjective:   Abdominal pain controlled, + BM/flatus. States she is still having foot pain.      Current Facility-Administered Medications   Medication Dose Route Frequency    TPN ADULT - CENTRAL AA 5% D15% W/ ELECTROLYTES AND CA   IntraVENous CONTINUOUS    diazePAM (VALIUM) injection 1 mg  1 mg IntraVENous Q4H PRN    levalbuterol (XOPENEX) nebulizer soln 1.25 mg/3 mL  1.25 mg Nebulization Q6H RT    vancomycin (VANCOCIN) 1,000 mg in 0.9% sodium chloride (MBP/ADV) 250 mL  1,000 mg IntraVENous Q8H    lidocaine (LIDODERM) 5 % patch 2 Patch  2 Patch TransDERmal Q24H    heparin (porcine) injection 5,000 Units  5,000 Units SubCUTAneous Q8H    fat emulsion 20% (LIPOSYN, INTRALIPID) infusion 500 mL  500 mL IntraVENous Q MON, WED & FRI    nicotine (NICODERM CQ) 21 mg/24 hr patch 1 Patch  1 Patch TransDERmal DAILY    glucose chewable tablet 16 g  4 Tab Oral PRN    dextrose (D50W) injection syrg 12.5-25 g  12.5-25 g IntraVENous PRN    glucagon (GLUCAGEN) injection 1 mg  1 mg IntraMUSCular PRN    insulin lispro (HUMALOG) injection   SubCUTAneous Q6H    prochlorperazine (COMPAZINE) injection 10 mg  10 mg IntraVENous Q6H PRN    sodium chloride (NS) flush 5-10 mL  5-10 mL IntraVENous PRN    0.9% sodium chloride infusion 250 mL  250 mL IntraVENous PRN    sodium chloride (NS) flush 5-10 mL  5-10 mL IntraVENous Q8H    sodium chloride (NS) flush 5-10 mL  5-10 mL IntraVENous PRN    naloxone (NARCAN) injection 0.4 mg  0.4 mg IntraVENous PRN    metroNIDAZOLE (FLAGYL) IVPB premix 500 mg  500 mg IntraVENous Q6H    albuterol (PROVENTIL VENTOLIN) nebulizer solution 2.5 mg  2.5 mg Nebulization Q4H PRN    sodium chloride (NS) flush 5-10 mL  5-10 mL IntraVENous PRN    anidulafungin (ERAXIS) 100 mg in 0.9% sodium chloride 130 mL IVPB  100 mg IntraVENous Q24H    levoFLOXacin (LEVAQUIN) 750 mg in D5W IVPB  750 mg IntraVENous Q24H    HYDROmorphone (PF) 15 mg/30 ml (DILAUDID) PCA   IntraVENous TITRATE    pantoprazole (PROTONIX) 40 mg in sodium chloride 0.9 % 10 mL injection  40 mg IntraVENous Q12H        Objective:   07/10 0701 - 07/10 1900  In: -   Out: 135 [Drains:35]  07/08 1901 - 07/10 0700  In: 1948.4 [I.V.:1948.4]  Out: 8099 [Urine:1250; Drains:175]  Patient Vitals for the past 8 hrs:   BP Temp Pulse Resp SpO2   07/10/17 0759 111/69 98.2 °F (36.8 °C) (!) 113 18 95 %   07/10/17 0223 113/67 97.9 °F (36.6 °C) (!) 103 17 97 %       Physical Exam:  General: Awake, cooperative, speech slurred  Lungs: Clear to auscultation bilaterally. Heart:  Tachycardic  Abdomen: Soft, ATTP, mildly distended, incisions c/d/i. Osmar drains x 2 serous. Extremities: Warm, moves all, no edema, Ulcers on toes   Skin:  Warm and dry, no rash    Labs:   Recent Labs      07/10/17   0234   WBC  23.3*   HGB  8.6*   HCT  25.2*   PLT  380     Recent Labs      07/10/17   0234   NA  136   K  3.5   CL  104   CO2  27   GLU  125*   BUN  6   CREA  0.61   CA  7.2*   MG  1.8   PHOS  2.9   ALB  1.1*   TBILI  0.2   SGOT  18   ALT  10*       Assessment / Plan:   · POD#6 ex lap, primary repair perforated gastric ulcer x 2, omental intra-abdominal flap  · Persistent leukocytosis, tachycardic over last 24 hours. Abdominal exam appropriate. Will get CTA chest and CT abd/pelvis today to eval for leak, abscess, PE.   · Continue TPN  · Continue PCA, PRN Valium for abdominal muscle spasms, Lidoderm patches   · Continue current ABX broad-spectrum ABX and antifungal  · Heparin for DVT prophylaxis, holding today until CT results reviewed  · H pylori serologies negative. Continue BID PPI  · Pathology benign  · PT/OT consults, encourage IS.  It is important she participate with PT and get OOB to chair

## 2017-07-10 NOTE — PROGRESS NOTES
Chart reviewed for evaluation prior to US guided Thoracentesis. Areas reviewed include, but are not limited to, patient's current and past H&P, Lab and Procedure Results, all notes, media records and medications.

## 2017-07-10 NOTE — PROGRESS NOTES
Received call from Dr. Vonn Osgood regarding patient's post-thoracentesis CXR. There is a small left apical pneumothorax. Will check CXR in 2 hours to evaluation for increased size. Maintain patient on supplemental oxygen via NC. If no enlargement of pneumothorax we will repeat CXR in am. If it enlarges, she will need a chest tube. Discussed plan with patient's nurse. Dr. Arthur Love aware.

## 2017-07-10 NOTE — PROGRESS NOTES
Pt rec'd in [address] [place] [address] holding 1445 via stretcher. Pt is anxious and tearful. PCA pump dilaudid has been taken off for radiology procedure. Called and requested RN to bring her PCA pump to Radiology. Instructed family to return to pt room until procedure complete. Pt taken to ultrasound and Dr Duncan Knock in to discuss procedure, risks and benefits and consent obtained. Pain pump is now reconnected at 1510. Pt alert, oriented x 3, describes abdominal pain 7/10.    1515 Procedure begins    1600 US guided thoracentesis completed. Start time [id number], stop time 1555. Fentanyl 75 mcg IV used for procedure. Pt tolerated procedure well. Total of 700 ml serosanguinous fluid removed L pleural space. Sterile gauze dressing with tegaderm over to Left upper back site. CXR obtained. Pt transported to room via stretcher. Assisted to void  per bedpan  Approximately 200 ml urine. 20 GA PIV removed R forearm as pt has TLC at present.

## 2017-07-10 NOTE — PROGRESS NOTES
[facility]   Daily Progress Note    Date: 7/10/2017    Assessment/Plan:   Lorena Bay is a 56 y.o. female who is hospitalized for sepsis 2/2 perforated gastric ulcers/peritonitis. 24 Hour Events: No events overnight    Sepsis 2/2 Peritonitis - off pressors. Blood cultures with no growth x5 days. Body fluid culture with no growth x4 day. Urine culture with no significant growth. Leukocytosis stable. -Maintaining MAP on her own now--pressors not required. Need to keep MAP >65. -Abx:  Eraxis, levaquin, vancomycin, flagyl. Will keep broad spectrum abx per general surgery. -F/u blood, body fluid (surgical) cultures to completion.  -Stopping IVF--TPN only as patient is complaining of some increasing LE edema.  -Strict I&O. -Daily labs. Perforated Gastric Ulcers s/p Operative Repair on 7/4/17 - surgery following. Patient is NPO until POD7. Gastric ulcers thought to be related to chronic steroid use and Chron's disease. Drain OP 175cc over the last 24 hours. -Appreciate surgery recommendations & support. -NPO x7 days. Continue TPN. Added lipids 3x/week. Checking TG weekly. -IV protonix. -Dilaudid PCA for pain control per general surgery.  -Encouraging incentive spirometry.  -Encouraging movement and participation in PT  -Per surgery SBF for tomorrow    Community Acquired Pneumonia - left basilar airspace disease noted on initial CXR. Could also be playing a role in SIRS/sepsis picture.  -Has appropriate coverage with levaquin.  -Albuterol nebulizer available. Aggressive pulmonary toilet per pulm. Anemia - lab work up done on admission. Notable for reticulocyte count of 2.3, LD of 294. Per hematology, most likely 2/2 acute blood loss from gastric ulcer. Also a component of iron deficiency. S/p 2 units pRBC on admission--Hgb responded appropriately. Has been stable since.   Hgb down to 8.8 this morning.  -Hematology signed off.  -Have 2 units of pRBCs on hold. -Daily CBC. Chron's Disease - no pharmacotherapy PTA. Poor follow up history with GI. Likely playing a role in patient's current clinical course.  -Will consider GI consult later in course when patient becomes more stable. Needs better outpatient follow up. Severe Protein Calorie Malnutrition - as evidenced by lower extremity swelling and severe hypoalbuminemia  This is complicated by need for 7 days of NPO. -Continuing TPN. -Daily CMP. -Daily Phos. Lyme Disease - diagnosed ~1 month ago at [facility]. Was treated with a 21 day course of doxycycline. Completed this course fully, but developed some lower extremity skin breakdown following treatment. Tetracyclines are on patient's allergy list.  -Not repeating tic studies    Tobacco Abuse - reports to smoke 1.5 PPD. -Encouraging cessation.  -Prescribed daily nicotine patch while patient admitted. Healing Wounds on Bilateral Feet - most likely acute reaction to doxycycline treatment that patient underwent prior to admission. Per patient, these wounds are healing. Remain painful.  -Wound care consulted, appreciate support.  -Tetracyclines on allergy list.     3cm Laceration of Left Upper Back - healing. Patient notes that this was from a fall. Not amenable to sutures at this time.  -Wound care consulted, appreciate support.     Bilateral Lower Extremity Swelling/Pain - noted on admisison. Thought to be 2/2 hypoalbuminemia. Duplex studies of lower extremities negative for DVT. Improved.     Hypomagnesemia -  likely 2/2 nutritional management with TPN. Resolved with repletion     Hypophosphatemia -  likely 2/2 nutritional management with TPN. Resolved  -Daily Phosphorous     Hypokalemia -  likely 2/2 nutritional management with TPN. Resolved with repletionlow this morning.  -Daily CMP     Depression/Panic Attacks - patient is showing some symptoms of this--mostly at night.   Takes klonopin, adderall, Lexapro, ativan, and trazodone at home. Concern for withdrawal from SSRI. -Currently holding these medications as patient is NPO.  -OK with IV vallium prn--will reduce dose today to 1mg as patient concerned that 2.5mg is too sedating. Will need to carefully monitor respirations with this in addition to dilaudid.     History of Falls - patient and family gave detailed history of multiple falls over the last few months/weeks. -PT/OT consulted.     FEN/GI - NPO. TPN running at 63mL/hr. Activity - Out of bed with assistance  DVT prophylaxis - Heparin  GI prophylaxis - Protonix  Fall prophylaxis - Fall precautions ordered. Disposition- D/c placement Rehab  Code Status - Full     Patient seen and discussed with Dr. Howie Chávez (Attending physician)      Veronique Najera MD  Family Medicine Resident  7/10/2017 9:02 AM         CC: \"This tick bite is bothering me\"    Subjective  No acute events overnight. Patient has not had a BM but has been able to pass flatus. Denies chills, headaches, chest pain, shortness of breath, palpitations, abdominal pain, nausea and vomiting. Currently NPO. Has not been very active. Reports the site of a previous tick bite is very itchy. Also notes that pain at incision site is more today.       Inpatient Medications  Current Facility-Administered Medications   Medication Dose Route Frequency    TPN ADULT - CENTRAL AA 5% D15% W/ ELECTROLYTES AND CA   IntraVENous CONTINUOUS    diazePAM (VALIUM) injection 1 mg  1 mg IntraVENous Q4H PRN    levalbuterol (XOPENEX) nebulizer soln 1.25 mg/3 mL  1.25 mg Nebulization Q6H RT    vancomycin (VANCOCIN) 1,000 mg in 0.9% sodium chloride (MBP/ADV) 250 mL  1,000 mg IntraVENous Q8H    lidocaine (LIDODERM) 5 % patch 2 Patch  2 Patch TransDERmal Q24H    heparin (porcine) injection 5,000 Units  5,000 Units SubCUTAneous Q8H    fat emulsion 20% (LIPOSYN, INTRALIPID) infusion 500 mL  500 mL IntraVENous Q MON, WED & FRI    nicotine (NICODERM CQ) 21 mg/24 hr patch 1 Patch  1 Patch TransDERmal DAILY    glucose chewable tablet 16 g  4 Tab Oral PRN    dextrose (D50W) injection syrg 12.5-25 g  12.5-25 g IntraVENous PRN    glucagon (GLUCAGEN) injection 1 mg  1 mg IntraMUSCular PRN    insulin lispro (HUMALOG) injection   SubCUTAneous Q6H    prochlorperazine (COMPAZINE) injection 10 mg  10 mg IntraVENous Q6H PRN    sodium chloride (NS) flush 5-10 mL  5-10 mL IntraVENous PRN    0.9% sodium chloride infusion 250 mL  250 mL IntraVENous PRN    sodium chloride (NS) flush 5-10 mL  5-10 mL IntraVENous Q8H    sodium chloride (NS) flush 5-10 mL  5-10 mL IntraVENous PRN    naloxone (NARCAN) injection 0.4 mg  0.4 mg IntraVENous PRN    metroNIDAZOLE (FLAGYL) IVPB premix 500 mg  500 mg IntraVENous Q6H    albuterol (PROVENTIL VENTOLIN) nebulizer solution 2.5 mg  2.5 mg Nebulization Q4H PRN    sodium chloride (NS) flush 5-10 mL  5-10 mL IntraVENous PRN    anidulafungin (ERAXIS) 100 mg in 0.9% sodium chloride 130 mL IVPB  100 mg IntraVENous Q24H    levoFLOXacin (LEVAQUIN) 750 mg in D5W IVPB  750 mg IntraVENous Q24H    HYDROmorphone (PF) 15 mg/30 ml (DILAUDID) PCA   IntraVENous TITRATE    pantoprazole (PROTONIX) 40 mg in sodium chloride 0.9 % 10 mL injection  40 mg IntraVENous Q12H         Allergies  Allergies   Allergen Reactions    Cephalosporins Hives    Penicillins Hives    Tetracyclines Nausea and Vomiting         Objective  Vitals:  Patient Vitals for the past 8 hrs:   Temp Pulse Resp BP SpO2   07/10/17 0759 98.2 °F (36.8 °C) (!) 113 18 111/69 95 %   07/10/17 0223 97.9 °F (36.6 °C) (!) 103 17 113/67 97 %         I/O:    Intake/Output Summary (Last 24 hours) at 07/10/17 0902  Last data filed at 07/10/17 0820   Gross per 24 hour   Intake          1948.35 ml   Output              280 ml   Net          1668.35 ml     Last shift:    07/10 0701 - 07/10 1900  In: -   Out: 135 [Drains:35]  Last 3 shifts:    07/08 1901 - 07/10 0700  In: 1948.4 [I.V.:1948.4]  Out: 3640 [Urine:1250; Drains:175]    Physical Exam:  General: No acute distress. Alert. Cooperative. HEENT: Normocephalic. Atraumatic. No tonsillar exudates or erythema. Conjunctiva pink. Sclera white. PERRL. MMM. Flat eschar with surrounding erythema on left posterior neck. Non fluctuant without discharge. Respiratory: CTAB. No w/r/r/c.   Cardiovascular: RRR. Normal S1,S2. No m/r/g. 2+ pulses in DP bilaterally   GI: + bowel sounds. Nontender. No rebound tenderness or guarding. Nondistended   Extremities: Pedal edema bilaterally. No tenderness. Laboratory Data  Recent Results (from the past 12 hour(s))   VANCOMYCIN, TROUGH    Collection Time: 07/09/17 11:04 PM   Result Value Ref Range    Vancomycin,trough 16.4 (H) 5.0 - 10.0 ug/mL    Reported dose date: 20170709      Reported dose time: 1420      Reported dose: 1000 MG UNITS   GLUCOSE, POC    Collection Time: 07/09/17 11:36 PM   Result Value Ref Range    Glucose (POC) 105 (H) 65 - 100 mg/dL    Performed by Soren Coleman    METABOLIC PANEL, COMPREHENSIVE    Collection Time: 07/10/17  2:34 AM   Result Value Ref Range    Sodium 136 136 - 145 mmol/L    Potassium 3.5 3.5 - 5.1 mmol/L    Chloride 104 97 - 108 mmol/L    CO2 27 21 - 32 mmol/L    Anion gap 5 5 - 15 mmol/L    Glucose 125 (H) 65 - 100 mg/dL    BUN 6 6 - 20 MG/DL    Creatinine 0.61 0.55 - 1.02 MG/DL    BUN/Creatinine ratio 10 (L) 12 - 20      GFR est AA >60 >60 ml/min/1.73m2    GFR est non-AA >60 >60 ml/min/1.73m2    Calcium 7.2 (L) 8.5 - 10.1 MG/DL    Bilirubin, total 0.2 0.2 - 1.0 MG/DL    ALT (SGPT) 10 (L) 12 - 78 U/L    AST (SGOT) 18 15 - 37 U/L    Alk.  phosphatase 83 45 - 117 U/L    Protein, total 4.5 (L) 6.4 - 8.2 g/dL    Albumin 1.1 (L) 3.5 - 5.0 g/dL    Globulin 3.4 2.0 - 4.0 g/dL    A-G Ratio 0.3 (L) 1.1 - 2.2     CBC WITH AUTOMATED DIFF    Collection Time: 07/10/17  2:34 AM   Result Value Ref Range    WBC 23.3 (H) 3.6 - 11.0 K/uL    RBC 3.20 (L) 3.80 - 5.20 M/uL    HGB 8.6 (L) 11.5 - 16.0 g/dL    HCT 25.2 (L) 35.0 - 47.0 %    MCV 78.8 (L) 80.0 - 99.0 FL    MCH 26.9 26.0 - 34.0 PG    MCHC 34.1 30.0 - 36.5 g/dL    RDW 16.6 (H) 11.5 - 14.5 %    PLATELET 540 321 - 503 K/uL    NEUTROPHILS 78 (H) 32 - 75 %    BAND NEUTROPHILS 1 0 - 6 %    LYMPHOCYTES 10 (L) 12 - 49 %    MONOCYTES 7 5 - 13 %    EOSINOPHILS 1 0 - 7 %    BASOPHILS 0 0 - 1 %    METAMYELOCYTES 3 (H) 0 %    ABS. NEUTROPHILS 18.4 (H) 1.8 - 8.0 K/UL    ABS. LYMPHOCYTES 2.3 0.8 - 3.5 K/UL    ABS. MONOCYTES 1.6 (H) 0.0 - 1.0 K/UL    ABS. EOSINOPHILS 0.2 0.0 - 0.4 K/UL    ABS. BASOPHILS 0.0 0.0 - 0.1 K/UL    DF MANUAL      RBC COMMENTS ANISOCYTOSIS  1+        RBC COMMENTS MICROCYTOSIS  1+       PHOSPHORUS    Collection Time: 07/10/17  2:34 AM   Result Value Ref Range    Phosphorus 2.9 2.6 - 4.7 MG/DL   MAGNESIUM    Collection Time: 07/10/17  2:34 AM   Result Value Ref Range    Magnesium 1.8 1.6 - 2.4 mg/dL   GLUCOSE, POC    Collection Time: 07/10/17  6:12 AM   Result Value Ref Range    Glucose (POC) 118 (H) 65 - 100 mg/dL    Performed by Tanner Moulton (PCT)      Labs pertinent for Van trough of 16.4      Imaging  No new imaging      Hospital Problems:  Hospital Problems  Date Reviewed: 1/5/2017          Codes Class Noted POA    Thrombocytosis ([facility] ICD-10-CM: D47.3  ICD-9-CM: 238.71  7/5/2017 Yes        Neutrophilia ICD-10-CM: D72.9  ICD-9-CM: 288.8  7/5/2017 Yes        * (Principal)Perforation bowel ([facility] ICD-10-CM: K63.1  ICD-9-CM: 569.83  7/4/2017 Yes        Pneumonia ICD-10-CM: J18.9  ICD-9-CM: 454  7/4/2017 Yes        Anemia ICD-10-CM: D64.9  ICD-9-CM: 285.9  7/4/2017 Yes        Wounds, multiple open, lower extremity ICD-10-CM: N19.725R  ICD-9-CM: 894.0  7/4/2017 Yes        Crohn disease ([facility] ICD-10-CM: K50.90  ICD-9-CM: 555.9  4/19/2010 Yes    Overview Addendum 2/2/2012  4:33 PM by Roe Alejo MD     She had 4 colon resections in the past.  Dr. Guerline Harman, Dr. Janis Mayorga abd/pevis 2009:   Thickened segment of neoileum proximal and adjacent to anastomotic chain sutures and likely representing inflammatory bowel disease   recurrence. Partial small bowel obstruction at this level. No evidence of   perforation. No evidence of fistula or intra-abdominal abscess.

## 2017-07-10 NOTE — PROGRESS NOTES
Vancomycin trough resulted at  16.4 mcg/ml. This extrapolates to 17.8 mcg/ml and an AUC:MEGAN of 565. This is a therapeutic result, so will continue same regimen.

## 2017-07-10 NOTE — WOUND CARE
Wound care reconsult per nursing for left shoulder wound present on admission. Patient assessed on admission by [facility], dressing orders in place. Staff nurse advised, patient off the floor for tests, orders followed by nursing. Will follow.   Rony Lopez

## 2017-07-10 NOTE — PROGRESS NOTES
Problem: Mobility Impaired (Adult and Pediatric)  Goal: *Acute Goals and Plan of Care (Insert Text)  Physical Therapy Goals  Initiated 7/6/2017  1. Patient will move from supine to sit and sit to supine in bed with minimal assistance/contact guard assist within 7 day(s). 2. Patient will transfer from bed to chair and chair to bed with minimal assistance/contact guard assist using the least restrictive device within 7 day(s). 3. Patient will perform sit to stand with moderate assistance within 7 day(s). 4. Patient will ambulate with moderate assistance for 15 feet with the least restrictive device within 7 day(s). 5. Patient will demonstrate independence with home exercise program for LE strengthening within 7 days. PHYSICAL THERAPY TREATMENT  Patient: Kirill Yousif (7 y.o. female)  Date: 7/10/2017  Diagnosis: Perforation bowel ([address]  Pneumonia  Wounds, multiple open, lower extremity  Anemia  Perforated Bowel Perforation bowel ([address]  Procedure(s) (LRB):  LAPAROTOMY EXPLORATORY, REPAIR OF GASTRIC PERFORATION (N/A) 6 Days Post-Op  Precautions:        ASSESSMENT:  Pt received in bed, agreeable to participate with PT. Pt describes \"pins and needles\" in BLE, blisters dried dorsum of BLE. Total A to don socks. Min A for supine > sitting EOB with increased time to complete task. Sat EOB x 5min to brush teeth. Sit<>stand using RW with min A x2. Gait training using RW x 6' with min A x2 demonstrated fwd flexed posture, with decreased step clearance and verbal cues for sequencing. [facility] commode brought to patient, +void. Pt returned to supine with min Ax2. Bed placed in chair position, pt became anxious, unsure how she would \"get out of this\" Pt shown how to control and adjust bed, pt returned bed to semi Baarn position. Heels floated and encouraged patient to perform supine thera ex of BLE and attempt HOB in more upright position throughout the day. Pt agreed she \"would try\".  Pt required encouragement to use PCA for pain control during session. Progression toward goals:  [ ]    Improving appropriately and progressing toward goals  [X]    Improving slowly and progressing toward goals  [ ]    Not making progress toward goals and plan of care will be adjusted       PLAN:  Patient continues to benefit from skilled intervention to address the above impairments. Continue treatment per established plan of care. Discharge Recommendations:  Rehab  Further Equipment Recommendations for Discharge:  none       SUBJECTIVE:   Patient stated I will try.       OBJECTIVE DATA SUMMARY:   Critical Behavior:  Neurologic State: Alert  Orientation Level: Oriented X4  Cognition: Follows commands, Appropriate safety awareness, Appropriate for age attention/concentration, Appropriate decision making  Safety/Judgement: Awareness of environment  Functional Mobility Training:  Bed Mobility:        Sit to Supine: Minimum assistance;Assist x2  Scooting: Contact guard assistance;Assist x2        Transfers:  Sit to Stand: Minimum assistance;Assist x2  Stand to Sit: Minimum assistance;Assist x2                             Balance:  Sitting - Static: Fair (occasional)  Sitting - Dynamic: Fair (occasional)  Standing - Static: Fair;Constant support  Standing - Dynamic : Fair  Ambulation/Gait Training:  Distance (ft): 8 Feet (ft)  Assistive Device: Walker, rolling  Ambulation - Level of Assistance: Minimal assistance;Assist x2        Gait Abnormalities: Decreased step clearance; Step to gait        Base of Support: Narrowed                                        Stairs:                       Neuro Re-Education:     Therapeutic Exercises:      Pain:  Pain Scale 1: Numeric (0 - 10)  Pain Intensity 1: 7  Pain Location 1: Abdomen  Pain Orientation 1: Anterior  Pain Description 1: Aching  Pain Intervention(s) 1: Encouraged PCA; Medication (see MAR)  Activity Tolerance:   Fair  Please refer to the flowsheet for vital signs taken during this treatment.   After treatment:   [ ]    Patient left in no apparent distress sitting up in chair  [X]    Patient left in no apparent distress in bed  [X]    Call bell left within reach  [X]    Nursing notified  [ ]    Caregiver present  [ ]    Bed alarm activated      COMMUNICATION/COLLABORATION:   The patients plan of care was discussed with: Certified Occupational Therapy Assistant and Registered Nurse     Bindu Dukes   Time Calculation: 42 mins

## 2017-07-10 NOTE — PROGRESS NOTES
Problem: Self Care Deficits Care Plan (Adult)  Goal: *Acute Goals and Plan of Care (Insert Text)  Occupational Therapy Goals  Initiated 7/6/2017  1. Patient will perform light grooming in unsupported sitting x5 mins with minimal assistance within 7 day(s). 2. Patient will perform upper body dressing in unsupported sitting with min A within 7 day(s). 3. Patient will perform toilet transfers with minimal assistance with appropriate AD within 7 day(s). 4. Patient will participate in upper extremity therapeutic exercise/activities through comfortable ROM in supported sitting to prepare for ADL tasks with supervision/set-up for 6 minutes within 7 day(s). 5. Patient will utilize energy conservation techniques during functional activities with verbal cues within 7 day(s). OCCUPATIONAL THERAPY TREATMENT  Patient: Christal Roberson (49 y.o. female)  Date: 7/10/2017  Diagnosis: Perforation bowel ([address]  Pneumonia  Wounds, multiple open, lower extremity  Anemia  Perforated Bowel Perforation bowel ([address]  Procedure(s) (LRB):  LAPAROTOMY EXPLORATORY, REPAIR OF GASTRIC PERFORATION (N/A) 6 Days Post-Op  Precautions:    Chart, occupational therapy assessment, plan of care, and goals were reviewed. ASSESSMENT:  Pt sat edge of bed with min assist to brush her teeth with contact guard verbalizing 6/10 abdominal pain. After standing pt needed bedside commode. She transferred with min assist x 2 and dep for bladder hygiene. Attempted to place bed in chair position but pt uncomfortable and wanted head of bed down more. Encouraged pt to sit up when she has visitors etc.  Recommend rehab. Progression toward goals:  [ ]          Improving appropriately and progressing toward goals  [X]          Improving slowly and progressing toward goals  [ ]          Not making progress toward goals and plan of care will be adjusted       PLAN:  Patient continues to benefit from skilled intervention to address the above impairments.   Continue treatment per established plan of care. Discharge Recommendations: Rehab  Further Equipment Recommendations for Discharge:  None       SUBJECTIVE:   Patient stated Can I have the head of the bed down some? Moses Suggs      OBJECTIVE DATA SUMMARY:   Cognitive/Behavioral Status:  Neurologic State: Alert  Orientation Level: Oriented X4  Cognition: Follows commands           Functional Mobility and Transfers for ADLs:              Bed Mobility:        Sit to Supine: Minimum assistance;Assist x2  Scooting: Contact guard assistance;Assist x2                Transfers:  Sit to Stand: Minimum assistance;Assist x2  Functional Transfers  Toilet Transfer : Minimum assistance;Assist x2  Adaptive Equipment: Bedside commode;Walker (comment)     Balance:  Sitting - Static: Fair (occasional)  Sitting - Dynamic: Fair (occasional)  Standing - Static: Fair;Constant support  Standing - Dynamic : Fair  ADL Intervention:        Grooming  Grooming Assistance: Supervision/set up  Brushing Teeth: Supervision/set-up seated at edge of bed. Lower Body Dressing Assistance  Dressing Assistance: Total assistance(dependent)  Socks: Total assistance (dependent)     Toileting  Bladder Hygiene: Total assistance (dependent)  Clothing Management: Total assistance (dependent)      Min assist x 2 to transfer to bedside commode. Pain:  Pain Scale 1: Numeric (0 - 10)  Pain Intensity 1: 7  Pain Location 1: Abdomen  Pain Orientation 1: Anterior  Pain Description 1: Aching  Pain Intervention(s) 1: Encouraged PCA; Medication (see MAR)     Activity Tolerance:    Fair  Please refer to the flowsheet for vital signs taken during this treatment.   After treatment:   [ ]  Patient left in no apparent distress sitting up in chair  [X]  Patient left in no apparent distress in bed  [X]  Call bell left within reach  [X]  Nursing notified  [ ]  Caregiver present  [ ]  Bed alarm activated      COMMUNICATION/COLLABORATION:   The patients plan of care was discussed with: Physical Therapy Assistant, Occupational Therapist and Registered Nurse     GRICEL Ulloa  Time Calculation: 42 mins

## 2017-07-10 NOTE — PROGRESS NOTES
PHYSICAL THERAPY    1040 Chart reviewed, pt currently off floor for CT. PT will attempt later this afternoon. Daniela Lyons

## 2017-07-10 NOTE — PROGRESS NOTES
[facility]. [facility] Res[facility] Program       Resident Post op Note     S: Patient seen and examined at bedside after thoracocentesis. Patient still c/o mild SOB. No CP. Pain is minimal      O:  Visit Vitals    /77 (BP 1 Location: Left arm, BP Patient Position: Sitting)    Pulse 99    Temp 98.3 °F (36.8 °C)    Resp 20    Ht 5' 2\" (1.575 m)    Wt 64 kg (141 lb 1.5 oz)    SpO2 97%    BMI 25.81 kg/m2     Physical Examination:   General appearance - alert, well appearing, and in mild pain. Chest - NC in place, but on RA and normal O2 sats. clear to auscultation, no wheezes, rales or rhonchi, Dressing over the area of the thoracocentesis. Symmetric air entry  Heart - normal rate, regular rhythm, normal S1, S2, no murmurs, rubs, clicks or gallops,   Abdomen - soft, nontender, nondistended, no masses or organomegaly, drain in place  Neurological - alert, oriented, normal speech, no focal findings  Extremities - peripheral pulses normal, no pedal edema, no clubbing or cyanosis    A/P: s/p thoracocentesis complicated with small pneumothorax. Repeat CXR:stable  Left apical pneumothorax to minimally smaller. - continue monitoring respiratory status      Dale Dangelo MD  Family Medicine Resident  10:01 PM

## 2017-07-10 NOTE — PROGRESS NOTES
Bedside and Verbal shift change report given to [address] [place] [address] (oncoming nurse) by Jens Bonner RN (offgoing nurse). Report included the following information SBAR, Kardex, Procedure Summary, Intake/Output, MAR, Accordion and Recent Results.

## 2017-07-10 NOTE — PROGRESS NOTES
Nutrition Assessment:    RECOMMENDATIONS/INTERVENTION(S):   Continue TPN D15/5%AA @ goal rate of 63 mls/hr+ add 20% lipids (500 mls) @ 42 mls/hr x 12 hours 3x per week  (TPN @ goal + lipids will provide pt with 1505 Kcals/d, 76 g/d protein, and 1512 mls/d fluid meeting 100% daily energy needs)    Check TG weekly while receiving TPN/Lipids    Monitor electrolytes, BG closely    Diet advancement per Surgery    ASSESSMENT:   7/10:  NPO, NGT in place. Noted WBC remain elevated, for CT abd/pelvis, chest today. Labs/meds reviewed. BG stable. K+ and Mg low end of normal--repleted. BM recorded 7/10. Skin--abd incisions, 1+ pitting edema BLE. Current TPN and lipids meeting daily energy needs at this time. 7/7:  Remains NPO. Will need UGI prior to initiated PO per surgery. Lytes requiring repletion. BG stable 102-103-106. TG 41. Off pressors. Current TPN and Lipids meeting daily energy needs at this time. 7/5:  Noted consult for TPN recommendations. Chart reviewed. 57 yo female admitted with anemia, perforated gastric ulcer, s/p exp lap with repair. Hx Crohn's disease. Visited pt this morning. Lethargic but answered questions appropriately. Pt reports poor appetite/limited oral intake x 2 weeks along with a 6# weight loss which she attributes to new medication she was taking for Lyme Disease. IVF infusing. On Levo. -61. No recent TG level. Mg repleted. Phos and K+ WDL. NGT in place. Skin--abd incision, blisters/sores noted to bilateral feet, 3+ edema BLE.  NPO, TPN to begin today. Pt with a 4.7% weight decrease x 2 weeks, amount considered significant for timeframe. See above for TPN recommendations.       Meets Criteria for Severe Acute Malnutrition as evidenced by:   [] Moderate muscle wasting, loss of subcutaneous fat   [x] Nutritional intake of <50% of recommended intake for >5 days   [x] Weight loss of >1-2% in 1 week, >5% in 1 month, >7.5% in 3 months, or >10% in 6 months   [x] Moderate-severe edema           SUBJECTIVE/OBJECTIVE:     Diet Order: NPO;  TPN (D20/5%AA @ 63 mls/hr + 20% lipids (500 mls) @ 42 mls/hr x 12 hours 3x weekly  % Eaten:  No data found. Pertinent Medications: [x] Reviewed    Chemistries:  Lab Results   Component Value Date/Time    Sodium 136 07/10/2017 02:34 AM    Potassium 3.5 07/10/2017 02:34 AM    Chloride 104 07/10/2017 02:34 AM    CO2 27 07/10/2017 02:34 AM    Anion gap 5 07/10/2017 02:34 AM    Glucose 125 07/10/2017 02:34 AM    BUN 6 07/10/2017 02:34 AM    Creatinine 0.61 07/10/2017 02:34 AM    BUN/Creatinine ratio 10 07/10/2017 02:34 AM    GFR est AA >60 07/10/2017 02:34 AM    GFR est non-AA >60 07/10/2017 02:34 AM    Calcium 7.2 07/10/2017 02:34 AM    AST (SGOT) 18 07/10/2017 02:34 AM    Alk. phosphatase 83 07/10/2017 02:34 AM    Protein, total 4.5 07/10/2017 02:34 AM    Albumin 1.1 07/10/2017 02:34 AM    Globulin 3.4 07/10/2017 02:34 AM    A-G Ratio 0.3 07/10/2017 02:34 AM    ALT (SGPT) 10 07/10/2017 02:34 AM      Anthropometrics: Height: 5' 2\" (157.5 cm) Weight: 64 kg (141 lb 1.5 oz)    IBW (%IBW): 50 kg (110 lb 3.7 oz) ( ) UBW (%UBW):   (  %)    BMI: Body mass index is 25.81 kg/(m^2). This BMI is indicative of:   [] Underweight    [x] Normal    [] Overweight    []  Obesity    []  Extreme Obesity (BMI>40)  Estimated Nutrition Needs (Based on): 1485 Kcals/day (BMR (1142) x 1.3 AF ) , 72 g (1.3 g/Kg actual body wt) Protein  Carbohydrate:  At Least 130 g/day  Fluids: 1500 mL/day    Last BM: 7/10   [x]Active     []Hyperactive  [x]Hypoactive       [] Absent   BS  Skin:    [] Intact   [x] Incision  [] Breakdown   [] DTI   [] Tears/Excoriation/Abrasion  [x]Edema--1+ pitting BLE [x] Other: blisters noted to bilateral feet, abrasion to back   Wt Readings from Last 30 Encounters:   07/06/17 64 kg (141 lb 1.5 oz)   03/15/17 56.7 kg (125 lb)   01/05/17 55.2 kg (121 lb 9.6 oz)   03/23/16 59 kg (130 lb)   09/06/15 59 kg (130 lb)   03/27/15 59 kg (130 lb)   03/19/15 57.2 kg (126 lb)   03/15/15 56.2 kg (124 lb)   03/06/15 57.6 kg (127 lb)   10/08/14 59 kg (130 lb)   05/06/14 57.6 kg (127 lb)   12/27/13 61.7 kg (136 lb)   05/22/13 57.2 kg (126 lb)   05/08/13 58.5 kg (129 lb)   08/31/12 47.6 kg (105 lb)   06/07/12 49.9 kg (110 lb)   05/31/12 49.6 kg (109 lb 6.4 oz)   02/16/12 48.4 kg (106 lb 9.6 oz)   02/09/12 48.8 kg (107 lb 9.6 oz)   02/02/12 48.5 kg (107 lb)   01/30/12 45.8 kg (101 lb)   01/21/12 46.7 kg (103 lb)   01/17/12 45.9 kg (101 lb 3.2 oz)   10/27/11 50.3 kg (111 lb)   08/16/11 46.7 kg (103 lb)   01/18/11 50.2 kg (110 lb 9.6 oz)   07/13/10 52.8 kg (116 lb 6.4 oz)   04/16/10 53.5 kg (118 lb)      NUTRITION DIAGNOSES:   Problem:  Altered GI function Unintended weight loss   Etiology: related to perforated gastric ulcer medication induced nausea, decreased appetite   Signs/Symptoms: as evidenced by s/p exp lap with repair, NPO, need for TPN 4.7% weight decrease x 2 weeks    NUTRITION INTERVENTIONS:    Enteral/Parenteral Nutrition: Initiate parenteral nutrition                GOAL:   Pt will tolerate TPN @ goal rate with stable electrolytes and BG in next 1-3 days    Cultural, Religion, or Ethnic Dietary Needs: None     LEARNING NEEDS (Diet, Food/Nutrient-Drug Interaction):    [x] None Identified   [] Identified and Education Provided/Documented   [] Identified and Pt declined/was not appropriate      [x] Interdisciplinary Care Plan Reviewed/Documented    [x] Discharge Needs:  tbd   [] No Nutrition Related Discharge Needs    NUTRITION RISK:   Pt Is At Nutrition Risk  [x]     No Nutrition Risk Identified  []       PT SEEN FOR:    []  MD Consult: []Calorie Count      []Diabetic Diet Education        []Diet Education     []Electrolyte Management     []General Nutrition Management and Supplements     []Management of Tube Feeding     []TPN Recommendations    []  RN Referral:  []MST score >=2     []Enteral/Parenteral Nutrition PTA     []Pregnant: Gestational DM or Multigestation [] Pressure Ulcer      []  Low BMI      []  Length of Stay       [] Dysphagia Diet         [] Ventilator      [x]  Follow-Up-TPN     Previous Recommendations:   [x] Implemented          [] Not Implemented          [] Not Applicable    Previous Goal:   [] Met              [x] Progressing Towards Goal              [] Not Progressing Towards Goal   [] Not Applicable              Neva Lunsford N [address] [place] [address]   Pager [phone]

## 2017-07-10 NOTE — PROGRESS NOTES
Met with pt, her son, and parents. Discussed dc options and amenable to starting process of rehab and interested in [facility]. Referral sent to [facility]. CM to follow. Thanks.   Sharon Castillo LCSW

## 2017-07-11 ENCOUNTER — APPOINTMENT (OUTPATIENT)
Dept: GENERAL RADIOLOGY | Age: 47
DRG: 853 | End: 2017-07-11
Attending: PHYSICIAN ASSISTANT
Payer: COMMERCIAL

## 2017-07-11 LAB
ALBUMIN SERPL BCP-MCNC: 1.3 G/DL (ref 3.5–5)
ALBUMIN/GLOB SERPL: 0.3 {RATIO} (ref 1.1–2.2)
ALP SERPL-CCNC: 83 U/L (ref 45–117)
ALT SERPL-CCNC: 11 U/L (ref 12–78)
ANION GAP BLD CALC-SCNC: 5 MMOL/L (ref 5–15)
AST SERPL W P-5'-P-CCNC: 19 U/L (ref 15–37)
BASOPHILS # BLD AUTO: 0 K/UL (ref 0–0.1)
BASOPHILS # BLD: 0 % (ref 0–1)
BILIRUB SERPL-MCNC: 0.1 MG/DL (ref 0.2–1)
BUN SERPL-MCNC: 9 MG/DL (ref 6–20)
BUN/CREAT SERPL: 15 (ref 12–20)
CALCIUM SERPL-MCNC: 7.4 MG/DL (ref 8.5–10.1)
CHLORIDE SERPL-SCNC: 105 MMOL/L (ref 97–108)
CO2 SERPL-SCNC: 26 MMOL/L (ref 21–32)
CREAT SERPL-MCNC: 0.6 MG/DL (ref 0.55–1.02)
EOSINOPHIL # BLD: 0.2 K/UL (ref 0–0.4)
EOSINOPHIL NFR BLD: 1 % (ref 0–7)
ERYTHROCYTE [DISTWIDTH] IN BLOOD BY AUTOMATED COUNT: 17 % (ref 11.5–14.5)
GLOBULIN SER CALC-MCNC: 3.8 G/DL (ref 2–4)
GLUCOSE BLD STRIP.AUTO-MCNC: 134 MG/DL (ref 65–100)
GLUCOSE BLD STRIP.AUTO-MCNC: 139 MG/DL (ref 65–100)
GLUCOSE BLD STRIP.AUTO-MCNC: 143 MG/DL (ref 65–100)
GLUCOSE BLD STRIP.AUTO-MCNC: 145 MG/DL (ref 65–100)
GLUCOSE SERPL-MCNC: 154 MG/DL (ref 65–100)
HCT VFR BLD AUTO: 25.8 % (ref 35–47)
HGB BLD-MCNC: 8.6 G/DL (ref 11.5–16)
LDH SERPL L TO P-CCNC: 326 U/L (ref 81–246)
LYMPHOCYTES # BLD AUTO: 6 % (ref 12–49)
LYMPHOCYTES # BLD: 1.4 K/UL (ref 0.8–3.5)
MAGNESIUM SERPL-MCNC: 1.5 MG/DL (ref 1.6–2.4)
MCH RBC QN AUTO: 26.5 PG (ref 26–34)
MCHC RBC AUTO-ENTMCNC: 33.3 G/DL (ref 30–36.5)
MCV RBC AUTO: 79.6 FL (ref 80–99)
METAMYELOCYTES NFR BLD MANUAL: 1 %
MONOCYTES # BLD: 1.2 K/UL (ref 0–1)
MONOCYTES NFR BLD AUTO: 5 % (ref 5–13)
MYELOCYTES NFR BLD MANUAL: 2 %
NEUTS BAND NFR BLD MANUAL: 1 % (ref 0–6)
NEUTS SEG # BLD: 20.5 K/UL (ref 1.8–8)
NEUTS SEG NFR BLD AUTO: 84 % (ref 32–75)
PHOSPHATE SERPL-MCNC: 3 MG/DL (ref 2.6–4.7)
PLATELET # BLD AUTO: 422 K/UL (ref 150–400)
POTASSIUM SERPL-SCNC: 3.3 MMOL/L (ref 3.5–5.1)
PROT FLD-MCNC: 1.4 G/DL
PROT SERPL-MCNC: 5.1 G/DL (ref 6.4–8.2)
RBC # BLD AUTO: 3.24 M/UL (ref 3.8–5.2)
RBC MORPH BLD: ABNORMAL
SERVICE CMNT-IMP: ABNORMAL
SODIUM SERPL-SCNC: 136 MMOL/L (ref 136–145)
SPECIMEN SOURCE FLD: NORMAL
WBC # BLD AUTO: 24.1 K/UL (ref 3.6–11)

## 2017-07-11 PROCEDURE — C9113 INJ PANTOPRAZOLE SODIUM, VIA: HCPCS | Performed by: STUDENT IN AN ORGANIZED HEALTH CARE EDUCATION/TRAINING PROGRAM

## 2017-07-11 PROCEDURE — 74011250636 HC RX REV CODE- 250/636: Performed by: PHYSICIAN ASSISTANT

## 2017-07-11 PROCEDURE — 74011636637 HC RX REV CODE- 636/637: Performed by: PHYSICIAN ASSISTANT

## 2017-07-11 PROCEDURE — 74011000250 HC RX REV CODE- 250: Performed by: FAMILY MEDICINE

## 2017-07-11 PROCEDURE — 65660000000 HC RM CCU STEPDOWN

## 2017-07-11 PROCEDURE — 74011250636 HC RX REV CODE- 250/636: Performed by: STUDENT IN AN ORGANIZED HEALTH CARE EDUCATION/TRAINING PROGRAM

## 2017-07-11 PROCEDURE — 84100 ASSAY OF PHOSPHORUS: CPT | Performed by: SURGERY

## 2017-07-11 PROCEDURE — 88305 TISSUE EXAM BY PATHOLOGIST: CPT | Performed by: FAMILY MEDICINE

## 2017-07-11 PROCEDURE — 74011000258 HC RX REV CODE- 258: Performed by: STUDENT IN AN ORGANIZED HEALTH CARE EDUCATION/TRAINING PROGRAM

## 2017-07-11 PROCEDURE — 83615 LACTATE (LD) (LDH) ENZYME: CPT

## 2017-07-11 PROCEDURE — 74011250637 HC RX REV CODE- 250/637: Performed by: PHYSICIAN ASSISTANT

## 2017-07-11 PROCEDURE — 74011250637 HC RX REV CODE- 250/637: Performed by: FAMILY MEDICINE

## 2017-07-11 PROCEDURE — 36415 COLL VENOUS BLD VENIPUNCTURE: CPT | Performed by: SURGERY

## 2017-07-11 PROCEDURE — 74011000250 HC RX REV CODE- 250: Performed by: STUDENT IN AN ORGANIZED HEALTH CARE EDUCATION/TRAINING PROGRAM

## 2017-07-11 PROCEDURE — 74011250636 HC RX REV CODE- 250/636: Performed by: FAMILY MEDICINE

## 2017-07-11 PROCEDURE — 80053 COMPREHEN METABOLIC PANEL: CPT | Performed by: SURGERY

## 2017-07-11 PROCEDURE — 85025 COMPLETE CBC W/AUTO DIFF WBC: CPT | Performed by: SURGERY

## 2017-07-11 PROCEDURE — 71010 XR CHEST SNGL V: CPT

## 2017-07-11 PROCEDURE — 88112 CYTOPATH CELL ENHANCE TECH: CPT | Performed by: FAMILY MEDICINE

## 2017-07-11 PROCEDURE — 83735 ASSAY OF MAGNESIUM: CPT | Performed by: SURGERY

## 2017-07-11 PROCEDURE — 74011000258 HC RX REV CODE- 258: Performed by: FAMILY MEDICINE

## 2017-07-11 PROCEDURE — 77030032490 HC SLV COMPR SCD KNE COVD -B

## 2017-07-11 PROCEDURE — 94640 AIRWAY INHALATION TREATMENT: CPT

## 2017-07-11 RX ORDER — POTASSIUM CHLORIDE 7.45 MG/ML
10 INJECTION INTRAVENOUS
Status: COMPLETED | OUTPATIENT
Start: 2017-07-11 | End: 2017-07-11

## 2017-07-11 RX ORDER — LEVALBUTEROL INHALATION SOLUTION 1.25 MG/3ML
1.25 SOLUTION RESPIRATORY (INHALATION)
Status: DISCONTINUED | OUTPATIENT
Start: 2017-07-11 | End: 2017-07-21 | Stop reason: HOSPADM

## 2017-07-11 RX ORDER — LEVALBUTEROL INHALATION SOLUTION 1.25 MG/3ML
SOLUTION RESPIRATORY (INHALATION)
Status: DISPENSED
Start: 2017-07-11 | End: 2017-07-12

## 2017-07-11 RX ORDER — MAGNESIUM SULFATE HEPTAHYDRATE 40 MG/ML
2 INJECTION, SOLUTION INTRAVENOUS
Status: COMPLETED | OUTPATIENT
Start: 2017-07-11 | End: 2017-07-11

## 2017-07-11 RX ADMIN — SODIUM CHLORIDE 100 MG: 900 INJECTION, SOLUTION INTRAVENOUS at 21:01

## 2017-07-11 RX ADMIN — VANCOMYCIN HYDROCHLORIDE 1000 MG: 1 INJECTION, POWDER, LYOPHILIZED, FOR SOLUTION INTRAVENOUS at 14:46

## 2017-07-11 RX ADMIN — LEVOFLOXACIN 750 MG: 5 INJECTION, SOLUTION INTRAVENOUS at 16:51

## 2017-07-11 RX ADMIN — DIAZEPAM 1 MG: 5 INJECTION, SOLUTION INTRAMUSCULAR; INTRAVENOUS at 08:13

## 2017-07-11 RX ADMIN — HEPARIN SODIUM 5000 UNITS: 5000 INJECTION, SOLUTION INTRAVENOUS; SUBCUTANEOUS at 14:47

## 2017-07-11 RX ADMIN — POTASSIUM CHLORIDE 10 MEQ: 10 INJECTION, SOLUTION INTRAVENOUS at 10:33

## 2017-07-11 RX ADMIN — INSULIN LISPRO 2 UNITS: 100 INJECTION, SOLUTION INTRAVENOUS; SUBCUTANEOUS at 12:03

## 2017-07-11 RX ADMIN — DIAZEPAM 1 MG: 5 INJECTION, SOLUTION INTRAMUSCULAR; INTRAVENOUS at 17:46

## 2017-07-11 RX ADMIN — Medication 10 ML: at 06:00

## 2017-07-11 RX ADMIN — LEVALBUTEROL 1.25 MG: 1.25 SOLUTION RESPIRATORY (INHALATION) at 19:53

## 2017-07-11 RX ADMIN — VANCOMYCIN HYDROCHLORIDE 1000 MG: 1 INJECTION, POWDER, LYOPHILIZED, FOR SOLUTION INTRAVENOUS at 06:37

## 2017-07-11 RX ADMIN — VANCOMYCIN HYDROCHLORIDE 1000 MG: 1 INJECTION, POWDER, LYOPHILIZED, FOR SOLUTION INTRAVENOUS at 22:10

## 2017-07-11 RX ADMIN — METRONIDAZOLE 500 MG: 500 INJECTION, SOLUTION INTRAVENOUS at 13:44

## 2017-07-11 RX ADMIN — METRONIDAZOLE 500 MG: 500 INJECTION, SOLUTION INTRAVENOUS at 08:21

## 2017-07-11 RX ADMIN — POTASSIUM CHLORIDE 10 MEQ: 10 INJECTION, SOLUTION INTRAVENOUS at 08:18

## 2017-07-11 RX ADMIN — LEVALBUTEROL INHALATION 1.25MG/3ML 1.25 MG: 1.25 SOLUTION RESPIRATORY (INHALATION) at 01:21

## 2017-07-11 RX ADMIN — HEPARIN SODIUM 5000 UNITS: 5000 INJECTION, SOLUTION INTRAVENOUS; SUBCUTANEOUS at 06:38

## 2017-07-11 RX ADMIN — DIAZEPAM 1 MG: 5 INJECTION, SOLUTION INTRAMUSCULAR; INTRAVENOUS at 22:11

## 2017-07-11 RX ADMIN — POTASSIUM CHLORIDE 10 MEQ: 10 INJECTION, SOLUTION INTRAVENOUS at 11:23

## 2017-07-11 RX ADMIN — SODIUM CHLORIDE 40 MG: 9 INJECTION, SOLUTION INTRAMUSCULAR; INTRAVENOUS; SUBCUTANEOUS at 08:26

## 2017-07-11 RX ADMIN — DIAZEPAM 1 MG: 5 INJECTION, SOLUTION INTRAMUSCULAR; INTRAVENOUS at 13:44

## 2017-07-11 RX ADMIN — POTASSIUM CHLORIDE 10 MEQ: 10 INJECTION, SOLUTION INTRAVENOUS at 13:22

## 2017-07-11 RX ADMIN — MAGNESIUM SULFATE HEPTAHYDRATE 2 G: 40 INJECTION, SOLUTION INTRAVENOUS at 11:24

## 2017-07-11 RX ADMIN — DIAZEPAM 1 MG: 5 INJECTION, SOLUTION INTRAMUSCULAR; INTRAVENOUS at 04:03

## 2017-07-11 RX ADMIN — ALBUTEROL SULFATE 2.5 MG: 2.5 SOLUTION RESPIRATORY (INHALATION) at 13:26

## 2017-07-11 RX ADMIN — LEVALBUTEROL INHALATION 1.25MG/3ML 1.25 MG: 1.25 SOLUTION RESPIRATORY (INHALATION) at 07:35

## 2017-07-11 RX ADMIN — INSULIN LISPRO 2 UNITS: 100 INJECTION, SOLUTION INTRAVENOUS; SUBCUTANEOUS at 06:38

## 2017-07-11 RX ADMIN — HEPARIN SODIUM 5000 UNITS: 5000 INJECTION, SOLUTION INTRAVENOUS; SUBCUTANEOUS at 22:11

## 2017-07-11 RX ADMIN — METRONIDAZOLE 500 MG: 500 INJECTION, SOLUTION INTRAVENOUS at 21:02

## 2017-07-11 RX ADMIN — MAGNESIUM SULFATE HEPTAHYDRATE 2 G: 40 INJECTION, SOLUTION INTRAVENOUS at 09:47

## 2017-07-11 RX ADMIN — METRONIDAZOLE 500 MG: 500 INJECTION, SOLUTION INTRAVENOUS at 02:17

## 2017-07-11 RX ADMIN — SODIUM CHLORIDE 40 MG: 9 INJECTION, SOLUTION INTRAMUSCULAR; INTRAVENOUS; SUBCUTANEOUS at 21:00

## 2017-07-11 RX ADMIN — DIAZEPAM 1 MG: 5 INJECTION, SOLUTION INTRAMUSCULAR; INTRAVENOUS at 00:04

## 2017-07-11 RX ADMIN — RETINOL, ERGOCALCIFEROL, .ALPHA.-TOCOPHEROL ACETATE, DL-, PHYTONADIONE, ASCORBIC ACID, NIACINAMIDE, RIBOFLAVIN 5-PHOSPHATE SODIUM, THIAMINE HYDROCHLORIDE, PYRIDOXINE HYDROCHLORIDE, DEXPANTHENOL, BIOTIN, FOLIC ACID, AND CYANOCOBALAMIN: KIT at 18:23

## 2017-07-11 RX ADMIN — Medication 10 ML: at 21:02

## 2017-07-11 NOTE — PROGRESS NOTES
Occupational Therapy Note: Despite much encouragement pt declined OT due to increased pain from yesterday's thoracentesis. RN aware. Will attempt OT tomorrow.

## 2017-07-11 NOTE — PROGRESS NOTES
Spiritual Care Assessment/Progress Notes    Mara Segura [id number]  [id number]    1970  56 y.o.  female    Patient Telephone Number: [phone] (home)   Cheondoism Affiliation: No Restorationist   Language: English   Extended Emergency Contact Information  Primary Emergency Contact: Essie Lott Phone: [phone]  Relation: Other Relative  Secondary Emergency Contact: [address] Phone: [phone]  Relation: Child   Patient Active Problem List    Diagnosis Date Noted    Thrombocytosis ([facility] 07/05/2017    Neutrophilia 07/05/2017    Perforation bowel ([facility] 07/04/2017    Pneumonia 07/04/2017    Anemia 07/04/2017    Wounds, multiple open, lower extremity 07/04/2017    Peripheral neuropathy ([facility] 02/02/2012    Nonspecific abnormal electrocardiogram (ECG) (EKG) 02/02/2012    Vitamin B12 deficiency 01/21/2012    Vertigo 01/20/2012    Crohn disease ([facility] 04/19/2010    Depression 04/19/2010        Date: 7/11/2017       Level of Cheondoism/Spiritual Activity:  []         Involved in colt tradition/spiritual practice    []         Not involved in colt tradition/spiritual practice  [x]         Spiritually oriented    []         Claims no spiritual orientation    []         seeking spiritual identity  []         Feels alienated from Church practice/tradition  []         Feels angry about Church practice/tradition  []         Spirituality/Church tradition is a resource for coping at this time.   []         Not able to assess due to medical condition    Services Provided Today:  []         crisis intervention    []         reading Scriptures  []         spiritual assessment    []         prayer  [x]         empathic listening/emotional support  []         rites and rituals (cite in comments)  []         life review     []         Church support  []         theological development   []         advocacy  []         ethical dialog     []         blessing  []         bereavement support    []         support to family  []         anticipatory grief support   []         help with AMD  []         spiritual guidance    []         meditation      Spiritual Care Needs  []         Emotional Support  []         Spiritual/Advent Care  []         Loss/Adjustment  []         Advocacy/Referral                /Ethics  [x]         No needs expressed at               this time  []         Other: (note in               comments)  [address]  []         Follow up visits with               pt/family  []         Provide materials  []         Schedule sacraments  []         Contact Community               Clergy  [x]         Follow up as needed  []         Other: (note in               comments)     Comments:  has attempted visit 2x today. Pt has been busy and was sleeping during second visit. Pt's family were by bedside and updated  about pt's status. Spiritual care will continue to follow with pt and family.      Naif Rodriguez M.Div, M.S, [place] [address] available at [phone]

## 2017-07-11 NOTE — PROGRESS NOTES
Bedside and Verbal shift change report given to EVERT Sanders (oncoming nurse) by Mohesn Mcnamara RN (offgoing nurse). Report included the following information SBAR, Kardex, Procedure Summary, Intake/Output, MAR, Accordion and Recent Results.

## 2017-07-11 NOTE — PROGRESS NOTES
General Surgery Daily Progress Note    Patient: Starr Beck MRN: [id number]  SSN: [id number]    YOB: 1970  Age: 56 y.o. Sex: female      Admit Date: 7/4/2017    Subjective:   Abdominal pain controlled, + BM/flatus. C/o left pleuritic chest pain.     Current Facility-Administered Medications   Medication Dose Route Frequency    TPN ADULT - CENTRAL AA 5% D15% W/ ELECTROLYTES AND CA   IntraVENous CONTINUOUS    magnesium sulfate 2 g/50 ml IVPB (premix or compounded)  2 g IntraVENous Q1H    potassium chloride 10 mEq in 100 ml IVPB  10 mEq IntraVENous Q1H    TPN ADULT - CENTRAL AA 5% D15% W/ ELECTROLYTES AND CA   IntraVENous CONTINUOUS    fentaNYL citrate (PF) injection 100 mcg  100 mcg IntraVENous RAD PRN    diazePAM (VALIUM) injection 1 mg  1 mg IntraVENous Q4H PRN    levalbuterol (XOPENEX) nebulizer soln 1.25 mg/3 mL  1.25 mg Nebulization Q6H RT    vancomycin (VANCOCIN) 1,000 mg in 0.9% sodium chloride (MBP/ADV) 250 mL  1,000 mg IntraVENous Q8H    lidocaine (LIDODERM) 5 % patch 2 Patch  2 Patch TransDERmal Q24H    heparin (porcine) injection 5,000 Units  5,000 Units SubCUTAneous Q8H    fat emulsion 20% (LIPOSYN, INTRALIPID) infusion 500 mL  500 mL IntraVENous Q MON, WED & FRI    nicotine (NICODERM CQ) 21 mg/24 hr patch 1 Patch  1 Patch TransDERmal DAILY    glucose chewable tablet 16 g  4 Tab Oral PRN    dextrose (D50W) injection syrg 12.5-25 g  12.5-25 g IntraVENous PRN    glucagon (GLUCAGEN) injection 1 mg  1 mg IntraMUSCular PRN    insulin lispro (HUMALOG) injection   SubCUTAneous Q6H    prochlorperazine (COMPAZINE) injection 10 mg  10 mg IntraVENous Q6H PRN    sodium chloride (NS) flush 5-10 mL  5-10 mL IntraVENous PRN    0.9% sodium chloride infusion 250 mL  250 mL IntraVENous PRN    sodium chloride (NS) flush 5-10 mL  5-10 mL IntraVENous Q8H    sodium chloride (NS) flush 5-10 mL  5-10 mL IntraVENous PRN    naloxone (NARCAN) injection 0.4 mg  0.4 mg IntraVENous PRN    metroNIDAZOLE (FLAGYL) IVPB premix 500 mg  500 mg IntraVENous Q6H    albuterol (PROVENTIL VENTOLIN) nebulizer solution 2.5 mg  2.5 mg Nebulization Q4H PRN    sodium chloride (NS) flush 5-10 mL  5-10 mL IntraVENous PRN    anidulafungin (ERAXIS) 100 mg in 0.9% sodium chloride 130 mL IVPB  100 mg IntraVENous Q24H    levoFLOXacin (LEVAQUIN) 750 mg in D5W IVPB  750 mg IntraVENous Q24H    HYDROmorphone (PF) 15 mg/30 ml (DILAUDID) PCA   IntraVENous TITRATE    pantoprazole (PROTONIX) 40 mg in sodium chloride 0.9 % 10 mL injection  40 mg IntraVENous Q12H        Objective:   07/11 0701 - 07/11 1900  In: 0   Out: 45 [Drains:45]  07/09 1901 - 07/11 0700  In: 0   Out: 516 [Urine:200; Drains:115]  Patient Vitals for the past 8 hrs:   BP Temp Pulse Resp SpO2   07/11/17 0810 109/70 98.5 °F (36.9 °C) 97 18 99 %   07/11/17 0346 106/65 98 °F (36.7 °C) (!) 106 17 98 %       Physical Exam:  General: Awake, cooperative, speech slurred  Lungs: Clear to auscultation bilaterally, unlabored  Heart:  RRR  Abdomen: Soft, ATTP, non-distended, incisions c/d/i. Osmar drains x 2 serous. Extremities: Warm, moves all, no edema, Ulcers on toes   Skin:  Warm and dry, no rash    Labs:   Recent Labs      07/11/17   0356   WBC  24.1*   HGB  8.6*   HCT  25.8*   PLT  422*     Recent Labs      07/11/17   0356   NA  136   K  3.3*   CL  105   CO2  26   GLU  154*   BUN  9   CREA  0.60   CA  7.4*   MG  1.5*   PHOS  3.0   ALB  1.3*   TBILI  0.1*   SGOT  19   ALT  11*       Assessment / Plan:   · POD#7 ex lap, primary repair perforated gastric ulcer x 2, omental intra-abdominal flap  · No leak demonstrated on CT. Multiple small intra-abdominal collections will be treated with ABX  · S/p left thoracentesis yesterday. Small left apical pneumothorax is improving. Continue supplemental oxygen. Repeat CXR in am  · NG removed, NPO w/ sips today. Continue TPN at full rate. · Continue PCA, PRN Valium for abdominal muscle spasms, Lidoderm patches.    · Continue current ABX broad-spectrum ABX and antifungal  · Heparin for DVT prophylaxis  · H pylori serologies negative. Continue BID PPI  · Pathology benign  · PT/OT consults, encourage IS.  It is important she participate with PT and get OOB to chair

## 2017-07-11 NOTE — PROGRESS NOTES
[facility] [place] RESIDENCY PROGRAM   Daily Progress Note    Date: 7/11/2017    Assessment/Plan:   Paul Niño is a 56 y.o. female who is hospitalized for sepsis 2/2 perforated gastric ulcers/peritonitis    24 Hour Events: Thorocentesis overnight for left pleural effusion leaving small apical pntx which showed minimal improvement on repeat CXR but has remained stable. Sepsis 2/2 Peritonitis - Blood cultures with no growth x5 days.  Body fluid culture with no growth x4 day.  Urine culture with no significant growth. Leukocytosis stable. Per surgery, drain showed low serous output with WBCs remained elevated at 23. Chest CT was obtained and found pleural effusions bilaterally. U/S guided thoracentesis was preformed and  700ml of serosanguinous fluid was removed from left pleural space. Repeat serial CXRs showed improving small apical pneumothorax. Pain in upper left lateral chest likely referred pleuritic pain. -Abx:  Eraxis, levaquin, vancomycin, flagyl.  Will keep broad spectrum abx per general surgery. -F/u blood, body fluid (surgical) cultures to completion.  -Stopping IVF--TPN only as patient is complaining of some increasing LE edema.  -Strict I&O. -Daily labs. -Continue to monitor respiratory status  -Repeat CXR this AM     Perforated Gastric Ulcers s/p Operative Repair on 7/4/17 - surgery following.  Patient is NPO until POD7.  Gastric ulcers thought to be related to chronic steroid use and Chron's disease.  Drain OP 175cc over the last 24 hours. -Appreciate surgery recommendations & support. -NPO x7 days.  Continue TPN.  Added lipids 3x/week. Checking TG weekly. -IV protonix.   -Dilaudid PCA for pain control per general surgery.  -Encouraging incentive spirometry.  -Encouraging movement and participation in PT  -Per surgery SBF for tomorrow     Community Acquired Pneumonia - left basilar airspace disease noted on initial CXR.  Could also be playing a role in SIRS/sepsis picture.  -Has appropriate coverage with levaquin.  -Albuterol nebulizer available.  Aggressive pulmonary toilet per pulm. Severe Acute Malnutrition - criteria evidenced by nutritional intake <50% of recommended intake for >5days, weight loss of 4.7% in 2 weeks, and moderate-severe edema. - Per nutrition, continue TPN with 20% lipids while NPO     Anemia - lab work up done on admission.  Notable for reticulocyte count of 2.3, LD of 294.  Per hematology, most likely 2/2 acute blood loss from gastric ulcer.  Also a component of iron deficiency.  S/p 2 units pRBC on admission--Hgb responded appropriately.  Has been stable since.  Hgb down to 8.8 this morning.  -Hematology signed off.  -Have 2 units of pRBCs on hold. -Daily CBC.     Chron's Disease - no pharmacotherapy PTA.  Poor follow up history with GI.  Likely playing a role in patient's current clinical course.  -Will consider GI consult later in course when patient becomes more stable.  Needs better outpatient follow up.     Severe Protein Calorie Malnutrition - as evidenced by lower extremity swelling and severe hypoalbuminemia  This is complicated by need for 7 days of NPO. -Continuing TPN. -Daily CMP. -Daily Phos.     Lyme Disease - diagnosed ~1 month ago at [facility].  Was treated with a 21 day course of doxycycline.  Completed this course fully, but developed some lower extremity skin breakdown following treatment.  Tetracyclines are on patient's allergy list.  -Not repeating tic studies     Tobacco Abuse - reports to smoke 1.5 PPD.   -Encouraging cessation.  -Prescribed daily nicotine patch while patient admitted.     Healing Wounds on Bilateral Feet - most likely acute reaction to doxycycline treatment that patient underwent prior to admission.  Per patient, these wounds are healing.  Remain painful.  -Wound care consulted, appreciate support.  -Tetracyclines on allergy list.      3cm Laceration of Left Upper Back - healing.  Patient notes that this was from a fall.  Not amenable to sutures at this time.  -Wound care consulted, appreciate support.      Bilateral Lower Extremity Swelling/Pain - noted on admisison.  Thought to be 2/2 hypoalbuminemia.  Duplex studies of lower extremities negative for DVT. Improved.      Hypomagnesemia -  likely 2/2 nutritional management with TPN. Resolved with repletion      Hypophosphatemia -  likely 2/2 nutritional management with TPN. Resolved  -Daily Phosphorous      Hypokalemia -  likely 2/2 nutritional management with TPN. Resolved with repletionlow this morning.  -Daily CMP      Depression/Panic Attacks - patient is showing some symptoms of this--mostly at night.  Takes klonopin, adderall, Lexapro, ativan, and trazodone at home. [facility] for withdrawal from SSRI. -Currently holding these medications as patient is NPO.  -OK with IV vallium prn--will reduce dose today to 1mg as patient concerned that 2.5mg is too sedating.  Will need to carefully monitor respirations with this in addition to dilaudid.      History of Falls - patient and family gave detailed history of multiple falls over the last few months/weeks. -PT/OT consulted.      FEN/GI - NPO.  TPN running at 63mL/hr. Activity - Out of bed with assistance  DVT prophylaxis - Heparin  GI prophylaxis - Protonix  Fall prophylaxis - Fall precautions ordered. Disposition- D/c placement Rehab  Code Status [facility]     Patient seen and discussed with Dr. To Hartley (Attending physician)      Rafaela Mclean MD  Family Medicine Resident  7/11/2017 5:51 AM         CC: \"I have pain under my left breast\"    Subjective  Poor drainage from surgical drain. Thorocentesis to remove left-sided effusion. Patient denies chills, headaches, chest pain, shortness of breath, palpitations, nausea and vomiting, and LE edema. She endorses sharp intermittent pain in her left lateral chest that is self-limiting. Currently on TPN.     Inpatient Medications  Current Facility-Administered Medications   Medication Dose Route Frequency    TPN ADULT - CENTRAL AA 5% D15% W/ ELECTROLYTES AND CA   IntraVENous CONTINUOUS    fentaNYL citrate (PF) injection 100 mcg  100 mcg IntraVENous RAD PRN    diazePAM (VALIUM) injection 1 mg  1 mg IntraVENous Q4H PRN    levalbuterol (XOPENEX) nebulizer soln 1.25 mg/3 mL  1.25 mg Nebulization Q6H RT    vancomycin (VANCOCIN) 1,000 mg in 0.9% sodium chloride (MBP/ADV) 250 mL  1,000 mg IntraVENous Q8H    lidocaine (LIDODERM) 5 % patch 2 Patch  2 Patch TransDERmal Q24H    heparin (porcine) injection 5,000 Units  5,000 Units SubCUTAneous Q8H    fat emulsion 20% (LIPOSYN, INTRALIPID) infusion 500 mL  500 mL IntraVENous Q MON, WED & FRI    nicotine (NICODERM CQ) 21 mg/24 hr patch 1 Patch  1 Patch TransDERmal DAILY    glucose chewable tablet 16 g  4 Tab Oral PRN    dextrose (D50W) injection syrg 12.5-25 g  12.5-25 g IntraVENous PRN    glucagon (GLUCAGEN) injection 1 mg  1 mg IntraMUSCular PRN    insulin lispro (HUMALOG) injection   SubCUTAneous Q6H    prochlorperazine (COMPAZINE) injection 10 mg  10 mg IntraVENous Q6H PRN    sodium chloride (NS) flush 5-10 mL  5-10 mL IntraVENous PRN    0.9% sodium chloride infusion 250 mL  250 mL IntraVENous PRN    sodium chloride (NS) flush 5-10 mL  5-10 mL IntraVENous Q8H    sodium chloride (NS) flush 5-10 mL  5-10 mL IntraVENous PRN    naloxone (NARCAN) injection 0.4 mg  0.4 mg IntraVENous PRN    metroNIDAZOLE (FLAGYL) IVPB premix 500 mg  500 mg IntraVENous Q6H    albuterol (PROVENTIL VENTOLIN) nebulizer solution 2.5 mg  2.5 mg Nebulization Q4H PRN    sodium chloride (NS) flush 5-10 mL  5-10 mL IntraVENous PRN    anidulafungin (ERAXIS) 100 mg in 0.9% sodium chloride 130 mL IVPB  100 mg IntraVENous Q24H    levoFLOXacin (LEVAQUIN) 750 mg in D5W IVPB  750 mg IntraVENous Q24H    HYDROmorphone (PF) 15 mg/30 ml (DILAUDID) PCA   IntraVENous TITRATE    pantoprazole (PROTONIX) 40 mg in sodium chloride 0.9 % 10 mL injection  40 mg IntraVENous Q12H         Allergies  Allergies   Allergen Reactions    Cephalosporins Hives    Penicillins Hives    Tetracyclines Nausea and Vomiting         Objective  Vitals:  Patient Vitals for the past 8 hrs:   Temp Pulse Resp BP SpO2   07/11/17 0346 98 °F (36.7 °C) (!) 106 17 106/65 98 %   07/10/17 2344 98.3 °F (36.8 °C) (!) 104 18 114/77 100 %   07/10/17 2245 - (!) 104 - - -         I/O:    Intake/Output Summary (Last 24 hours) at 07/11/17 0551  Last data filed at 07/10/17 2234   Gross per 24 hour   Intake                0 ml   Output              436 ml   Net             -436 ml     Last shift:    07/10 1901 - 07/11 0700  In: -   Out: 100 [Drains:50]  Last 3 shifts:    07/09 0701 - 07/10 1900  In: 1948.4 [I.V.:1948.4]  Out: 481 [Urine:200; Drains:130]    Physical Exam:  General: No acute distress. Alert. Cooperative. HEENT: Normocephalic. Atraumatic. NG tube in place. Conjunctiva pink. Sclera white. PERRL. MMM   Respiratory: CTAB. No w/r/r/c. No TTP of left lateral chest wall. Cardiovascular: RRR. Normal S1,S2. No m/r/g. 2+ pulses in DP bilaterally   GI: + bowel sounds. Nondistended   Extremities: Improved pedal edema. No tenderness.      Laboratory Data  Recent Results (from the past 12 hour(s))   GLUCOSE, POC    Collection Time: 07/10/17  6:02 PM   Result Value Ref Range    Glucose (POC) 117 (H) 65 - 100 mg/dL    Performed by Park Dang (PCT)    GLUCOSE, POC    Collection Time: 07/10/17 11:52 PM   Result Value Ref Range    Glucose (POC) 134 (H) 65 - 100 mg/dL    Performed by Alberto Moulton (PCT)    METABOLIC PANEL, COMPREHENSIVE    Collection Time: 07/11/17  3:56 AM   Result Value Ref Range    Sodium 136 136 - 145 mmol/L    Potassium 3.3 (L) 3.5 - 5.1 mmol/L    Chloride 105 97 - 108 mmol/L    CO2 26 21 - 32 mmol/L    Anion gap 5 5 - 15 mmol/L    Glucose 154 (H) 65 - 100 mg/dL    BUN 9 6 - 20 MG/DL    Creatinine 0.60 0.55 - 1.02 MG/DL    BUN/Creatinine ratio 15 12 - 20 GFR est AA >60 >60 ml/min/1.73m2    GFR est non-AA >60 >60 ml/min/1.73m2    Calcium 7.4 (L) 8.5 - 10.1 MG/DL    Bilirubin, total 0.1 (L) 0.2 - 1.0 MG/DL    ALT (SGPT) 11 (L) 12 - 78 U/L    AST (SGOT) 19 15 - 37 U/L    Alk. phosphatase 83 45 - 117 U/L    Protein, total 5.1 (L) 6.4 - 8.2 g/dL    Albumin 1.3 (L) 3.5 - 5.0 g/dL    Globulin 3.8 2.0 - 4.0 g/dL    A-G Ratio 0.3 (L) 1.1 - 2.2     CBC WITH AUTOMATED DIFF    Collection Time: 07/11/17  3:56 AM   Result Value Ref Range    WBC 24.1 (H) 3.6 - 11.0 K/uL    RBC 3.24 (L) 3.80 - 5.20 M/uL    HGB 8.6 (L) 11.5 - 16.0 g/dL    HCT 25.8 (L) 35.0 - 47.0 %    MCV 79.6 (L) 80.0 - 99.0 FL    MCH 26.5 26.0 - 34.0 PG    MCHC 33.3 30.0 - 36.5 g/dL    RDW 17.0 (H) 11.5 - 14.5 %    PLATELET 277 (H) 743 - 400 K/uL    NEUTROPHILS PENDING %    LYMPHOCYTES PENDING %    MONOCYTES PENDING %    EOSINOPHILS PENDING %    BASOPHILS PENDING %    ABS. NEUTROPHILS PENDING K/UL    ABS. LYMPHOCYTES PENDING K/UL    ABS. MONOCYTES PENDING K/UL    ABS. EOSINOPHILS PENDING K/UL    ABS. BASOPHILS PENDING K/UL    DF PENDING    PHOSPHORUS    Collection Time: 07/11/17  3:56 AM   Result Value Ref Range    Phosphorus 3.0 2.6 - 4.7 MG/DL   MAGNESIUM    Collection Time: 07/11/17  3:56 AM   Result Value Ref Range    Magnesium 1.5 (L) 1.6 - 2.4 mg/dL     Pertinent labs Mg 1.5, K 3.3, WBC 29.1. Imaging  CT abdomen- Moderate to large bilateral pleural effusions. Scattered groundglass opacities in the lungs consistent with pulmonary edema. Numerous loculated collections in the peritoneum the largest along the posterior margin of the spleen and 48 x 33 mm in size. Too small for drainage.  Hydropic gallbladder with nonobstructive right renal calculus      Hospital Problems:  Hospital Problems  Date Reviewed: 1/5/2017          Codes Class Noted POA    Thrombocytosis ([facility] ICD-10-CM: D47.3  ICD-9-CM: 238.71  7/5/2017 Yes        Neutrophilia ICD-10-CM: D72.9  ICD-9-CM: 288.8  7/5/2017 Yes        * (Principal)Perforation bowel ([facility] ICD-10-CM: K63.1  ICD-9-CM: 569.83  7/4/2017 Yes        Pneumonia ICD-10-CM: J18.9  ICD-9-CM: 437  7/4/2017 Yes        Anemia ICD-10-CM: D64.9  ICD-9-CM: 285.9  7/4/2017 Yes        Wounds, multiple open, lower extremity ICD-10-CM: S81.809A  ICD-9-CM: 894.0  7/4/2017 Yes        Crohn disease ([facility] ICD-10-CM: K50.90  ICD-9-CM: 555.9  4/19/2010 Yes    Overview Addendum 2/2/2012  4:33 PM by Ap Lopez MD     She had 4 colon resections in the past.  Dr. Davis Smith, Dr. Sophia Wharton abd/pevis 2009: Thickened segment of neoileum proximal and adjacent to   anastomotic chain sutures and likely representing inflammatory bowel disease   recurrence. Partial small bowel obstruction at this level. No evidence of   perforation. No evidence of fistula or intra-abdominal abscess.

## 2017-07-11 NOTE — PROGRESS NOTES
Bedside and Verbal shift change report given to Safia Le RN (oncoming nurse) by Marylene Mathieu, RN (offgoing nurse). Report included the following information SBAR, Kardex, Procedure Summary, Intake/Output, MAR, Recent Results and Cardiac Rhythm NSR.

## 2017-07-11 NOTE — PROGRESS NOTES
PHYSICAL THERAPY    1430 Chart reviewed, spoke with RN. Pt received in bed, tearful, declining to participate with physical therapy. Explained benefits of physical therapy, pt stated being up to [facility] a couple times today and not feeling up to additional activity. PT will continue to follow    Maria Luz Sarmiento

## 2017-07-11 NOTE — ROUTINE PROCESS
Bedside and Verbal shift change report given to Tania prescott RN (oncoming nurse) by Brandon Hoff RN (offgoing nurse). Report included the following information SBAR, Kardex, Procedure Summary, Intake/Output, MAR and Cardiac Rhythm NSR.

## 2017-07-12 ENCOUNTER — APPOINTMENT (OUTPATIENT)
Dept: GENERAL RADIOLOGY | Age: 47
DRG: 853 | End: 2017-07-12
Attending: PHYSICIAN ASSISTANT
Payer: COMMERCIAL

## 2017-07-12 LAB
ALBUMIN SERPL BCP-MCNC: 1.3 G/DL (ref 3.5–5)
ALBUMIN/GLOB SERPL: 0.3 {RATIO} (ref 1.1–2.2)
ALP SERPL-CCNC: 85 U/L (ref 45–117)
ALT SERPL-CCNC: 16 U/L (ref 12–78)
ANION GAP BLD CALC-SCNC: 4 MMOL/L (ref 5–15)
AST SERPL W P-5'-P-CCNC: 13 U/L (ref 15–37)
BASOPHILS # BLD AUTO: 0 K/UL (ref 0–0.1)
BASOPHILS # BLD: 0 % (ref 0–1)
BILIRUB SERPL-MCNC: 0.2 MG/DL (ref 0.2–1)
BUN SERPL-MCNC: 9 MG/DL (ref 6–20)
BUN/CREAT SERPL: 15 (ref 12–20)
CALCIUM SERPL-MCNC: 7.4 MG/DL (ref 8.5–10.1)
CHLORIDE SERPL-SCNC: 107 MMOL/L (ref 97–108)
CO2 SERPL-SCNC: 28 MMOL/L (ref 21–32)
CREAT SERPL-MCNC: 0.6 MG/DL (ref 0.55–1.02)
DIFFERENTIAL METHOD BLD: ABNORMAL
EOSINOPHIL # BLD: 0 K/UL (ref 0–0.4)
EOSINOPHIL NFR BLD: 0 % (ref 0–7)
ERYTHROCYTE [DISTWIDTH] IN BLOOD BY AUTOMATED COUNT: 17 % (ref 11.5–14.5)
GLOBULIN SER CALC-MCNC: 3.9 G/DL (ref 2–4)
GLUCOSE BLD STRIP.AUTO-MCNC: 109 MG/DL (ref 65–100)
GLUCOSE BLD STRIP.AUTO-MCNC: 130 MG/DL (ref 65–100)
GLUCOSE BLD STRIP.AUTO-MCNC: 139 MG/DL (ref 65–100)
GLUCOSE BLD STRIP.AUTO-MCNC: 93 MG/DL (ref 65–100)
GLUCOSE SERPL-MCNC: 113 MG/DL (ref 65–100)
HCT VFR BLD AUTO: 25.6 % (ref 35–47)
HGB BLD-MCNC: 8.4 G/DL (ref 11.5–16)
LYMPHOCYTES # BLD AUTO: 15 % (ref 12–49)
LYMPHOCYTES # BLD: 3.3 K/UL (ref 0.8–3.5)
MAGNESIUM SERPL-MCNC: 1.8 MG/DL (ref 1.6–2.4)
MCH RBC QN AUTO: 26.6 PG (ref 26–34)
MCHC RBC AUTO-ENTMCNC: 32.8 G/DL (ref 30–36.5)
MCV RBC AUTO: 81 FL (ref 80–99)
MONOCYTES # BLD: 2.2 K/UL (ref 0–1)
MONOCYTES NFR BLD AUTO: 10 % (ref 5–13)
MYELOCYTES NFR BLD MANUAL: 1 %
NEUTS BAND NFR BLD MANUAL: 6 % (ref 0–6)
NEUTS SEG # BLD: 16.2 K/UL (ref 1.8–8)
NEUTS SEG NFR BLD AUTO: 68 % (ref 32–75)
PHOSPHATE SERPL-MCNC: 3.1 MG/DL (ref 2.6–4.7)
PLATELET # BLD AUTO: 466 K/UL (ref 150–400)
POTASSIUM SERPL-SCNC: 3.5 MMOL/L (ref 3.5–5.1)
PROT SERPL-MCNC: 5.2 G/DL (ref 6.4–8.2)
RBC # BLD AUTO: 3.16 M/UL (ref 3.8–5.2)
RBC MORPH BLD: ABNORMAL
RBC MORPH BLD: ABNORMAL
SERVICE CMNT-IMP: ABNORMAL
SERVICE CMNT-IMP: NORMAL
SODIUM SERPL-SCNC: 139 MMOL/L (ref 136–145)
WBC # BLD AUTO: 21.9 K/UL (ref 3.6–11)

## 2017-07-12 PROCEDURE — 97535 SELF CARE MNGMENT TRAINING: CPT

## 2017-07-12 PROCEDURE — 82962 GLUCOSE BLOOD TEST: CPT

## 2017-07-12 PROCEDURE — 74011250637 HC RX REV CODE- 250/637: Performed by: PHYSICIAN ASSISTANT

## 2017-07-12 PROCEDURE — 97530 THERAPEUTIC ACTIVITIES: CPT

## 2017-07-12 PROCEDURE — 74011000250 HC RX REV CODE- 250: Performed by: FAMILY MEDICINE

## 2017-07-12 PROCEDURE — 65660000000 HC RM CCU STEPDOWN

## 2017-07-12 PROCEDURE — 97116 GAIT TRAINING THERAPY: CPT

## 2017-07-12 PROCEDURE — 83735 ASSAY OF MAGNESIUM: CPT | Performed by: SURGERY

## 2017-07-12 PROCEDURE — 77030011256 HC DRSG MEPILEX <16IN NO BORD MOLN -A

## 2017-07-12 PROCEDURE — 74011000250 HC RX REV CODE- 250: Performed by: PHYSICIAN ASSISTANT

## 2017-07-12 PROCEDURE — 71010 XR CHEST PORT: CPT

## 2017-07-12 PROCEDURE — 84100 ASSAY OF PHOSPHORUS: CPT | Performed by: SURGERY

## 2017-07-12 PROCEDURE — C9113 INJ PANTOPRAZOLE SODIUM, VIA: HCPCS | Performed by: STUDENT IN AN ORGANIZED HEALTH CARE EDUCATION/TRAINING PROGRAM

## 2017-07-12 PROCEDURE — 74011250636 HC RX REV CODE- 250/636: Performed by: PHYSICIAN ASSISTANT

## 2017-07-12 PROCEDURE — 94640 AIRWAY INHALATION TREATMENT: CPT

## 2017-07-12 PROCEDURE — 74011250636 HC RX REV CODE- 250/636: Performed by: FAMILY MEDICINE

## 2017-07-12 PROCEDURE — 74011000250 HC RX REV CODE- 250: Performed by: STUDENT IN AN ORGANIZED HEALTH CARE EDUCATION/TRAINING PROGRAM

## 2017-07-12 PROCEDURE — 80053 COMPREHEN METABOLIC PANEL: CPT | Performed by: SURGERY

## 2017-07-12 PROCEDURE — 36415 COLL VENOUS BLD VENIPUNCTURE: CPT | Performed by: SURGERY

## 2017-07-12 PROCEDURE — 74011250636 HC RX REV CODE- 250/636: Performed by: STUDENT IN AN ORGANIZED HEALTH CARE EDUCATION/TRAINING PROGRAM

## 2017-07-12 PROCEDURE — 74011000258 HC RX REV CODE- 258: Performed by: FAMILY MEDICINE

## 2017-07-12 PROCEDURE — 74011250637 HC RX REV CODE- 250/637: Performed by: FAMILY MEDICINE

## 2017-07-12 PROCEDURE — 85025 COMPLETE CBC W/AUTO DIFF WBC: CPT | Performed by: SURGERY

## 2017-07-12 PROCEDURE — 74011250636 HC RX REV CODE- 250/636: Performed by: SURGERY

## 2017-07-12 PROCEDURE — 74011000258 HC RX REV CODE- 258: Performed by: STUDENT IN AN ORGANIZED HEALTH CARE EDUCATION/TRAINING PROGRAM

## 2017-07-12 RX ADMIN — LEVOFLOXACIN 750 MG: 5 INJECTION, SOLUTION INTRAVENOUS at 17:47

## 2017-07-12 RX ADMIN — HEPARIN SODIUM 5000 UNITS: 5000 INJECTION, SOLUTION INTRAVENOUS; SUBCUTANEOUS at 22:13

## 2017-07-12 RX ADMIN — Medication 20 ML: at 14:25

## 2017-07-12 RX ADMIN — Medication: at 07:45

## 2017-07-12 RX ADMIN — DIAZEPAM 1 MG: 5 INJECTION, SOLUTION INTRAMUSCULAR; INTRAVENOUS at 06:28

## 2017-07-12 RX ADMIN — DIAZEPAM 1 MG: 5 INJECTION, SOLUTION INTRAMUSCULAR; INTRAVENOUS at 01:38

## 2017-07-12 RX ADMIN — LEVALBUTEROL 1.25 MG: 1.25 SOLUTION RESPIRATORY (INHALATION) at 08:04

## 2017-07-12 RX ADMIN — METRONIDAZOLE 500 MG: 500 INJECTION, SOLUTION INTRAVENOUS at 20:21

## 2017-07-12 RX ADMIN — Medication 10 ML: at 20:21

## 2017-07-12 RX ADMIN — VANCOMYCIN HYDROCHLORIDE 1000 MG: 1 INJECTION, POWDER, LYOPHILIZED, FOR SOLUTION INTRAVENOUS at 06:21

## 2017-07-12 RX ADMIN — DIAZEPAM 1 MG: 5 INJECTION, SOLUTION INTRAMUSCULAR; INTRAVENOUS at 11:46

## 2017-07-12 RX ADMIN — METRONIDAZOLE 500 MG: 500 INJECTION, SOLUTION INTRAVENOUS at 09:00

## 2017-07-12 RX ADMIN — DIAZEPAM 1 MG: 5 INJECTION, SOLUTION INTRAMUSCULAR; INTRAVENOUS at 22:13

## 2017-07-12 RX ADMIN — SODIUM CHLORIDE 40 MG: 9 INJECTION, SOLUTION INTRAMUSCULAR; INTRAVENOUS; SUBCUTANEOUS at 08:54

## 2017-07-12 RX ADMIN — SODIUM CHLORIDE 100 MG: 900 INJECTION, SOLUTION INTRAVENOUS at 22:12

## 2017-07-12 RX ADMIN — HEPARIN SODIUM 5000 UNITS: 5000 INJECTION, SOLUTION INTRAVENOUS; SUBCUTANEOUS at 06:21

## 2017-07-12 RX ADMIN — METRONIDAZOLE 500 MG: 500 INJECTION, SOLUTION INTRAVENOUS at 14:30

## 2017-07-12 RX ADMIN — METRONIDAZOLE 500 MG: 500 INJECTION, SOLUTION INTRAVENOUS at 01:38

## 2017-07-12 RX ADMIN — VANCOMYCIN HYDROCHLORIDE 1000 MG: 1 INJECTION, POWDER, LYOPHILIZED, FOR SOLUTION INTRAVENOUS at 22:13

## 2017-07-12 RX ADMIN — I.V. FAT EMULSION 500 ML: 20 EMULSION INTRAVENOUS at 17:54

## 2017-07-12 RX ADMIN — VANCOMYCIN HYDROCHLORIDE 1000 MG: 1 INJECTION, POWDER, LYOPHILIZED, FOR SOLUTION INTRAVENOUS at 14:31

## 2017-07-12 RX ADMIN — SODIUM CHLORIDE 40 MG: 9 INJECTION, SOLUTION INTRAMUSCULAR; INTRAVENOUS; SUBCUTANEOUS at 20:21

## 2017-07-12 RX ADMIN — HEPARIN SODIUM 5000 UNITS: 5000 INJECTION, SOLUTION INTRAVENOUS; SUBCUTANEOUS at 14:32

## 2017-07-12 RX ADMIN — LEVALBUTEROL 1.25 MG: 1.25 SOLUTION RESPIRATORY (INHALATION) at 19:20

## 2017-07-12 RX ADMIN — Medication 10 ML: at 17:53

## 2017-07-12 RX ADMIN — DIAZEPAM 1 MG: 5 INJECTION, SOLUTION INTRAMUSCULAR; INTRAVENOUS at 17:47

## 2017-07-12 RX ADMIN — RETINOL, ERGOCALCIFEROL, .ALPHA.-TOCOPHEROL ACETATE, DL-, PHYTONADIONE, ASCORBIC ACID, NIACINAMIDE, RIBOFLAVIN 5-PHOSPHATE SODIUM, THIAMINE HYDROCHLORIDE, PYRIDOXINE HYDROCHLORIDE, DEXPANTHENOL, BIOTIN, FOLIC ACID, AND CYANOCOBALAMIN: KIT at 17:55

## 2017-07-12 NOTE — PROGRESS NOTES
Problem: Self Care Deficits Care Plan (Adult)  Goal: *Acute Goals and Plan of Care (Insert Text)  Occupational Therapy Goals  Initiated 7/6/2017  1. Patient will perform light grooming in unsupported sitting x5 mins with minimal assistance within 7 day(s). 2. Patient will perform upper body dressing in unsupported sitting with min A within 7 day(s). 3. Patient will perform toilet transfers with minimal assistance with appropriate AD within 7 day(s). 4. Patient will participate in upper extremity therapeutic exercise/activities through comfortable ROM in supported sitting to prepare for ADL tasks with supervision/set-up for 6 minutes within 7 day(s). 5. Patient will utilize energy conservation techniques during functional activities with verbal cues within 7 day(s). OCCUPATIONAL THERAPY TREATMENT  Patient: Jeffery Albarado (52 y.o. female)  Date: 7/12/2017  Diagnosis: Perforation bowel ([address]  Pneumonia  Wounds, multiple open, lower extremity  Anemia  Perforated Bowel Perforation bowel ([address]  Procedure(s) (LRB):  LAPAROTOMY EXPLORATORY, REPAIR OF GASTRIC PERFORATION (N/A) 8 Days Post-Op  Precautions:    Chart, occupational therapy assessment, plan of care, and goals were reviewed. ASSESSMENT:  Pt seated in chair willing to work on grooming. She washed her face and upper body needing assist for her back. Pt cued to take deep breaths during task, heart rate approx 118 beats per minute during activity. She also brushed her teeth with set-up. Min assist to doff/don hospital gown. Pt returned to bed following treatment with contact guard for transfer and assist of another for lines/leads. Pt tolerated sitting in chair for approx 20 minutes. Progress slow, limited by abdominal pain, decreased activity tolerance. Recommend rehab.   Progression toward goals:  [ ]          Improving appropriately and progressing toward goals  [X]          Improving slowly and progressing toward goals  [ ]          Not making progress toward goals and plan of care will be adjusted       PLAN:  Patient continues to benefit from skilled intervention to address the above impairments. Continue treatment per established plan of care. Discharge Recommendations:  Rehab  Further Equipment Recommendations for Discharge:  None       SUBJECTIVE:   Patient stated I would like to wash up.       OBJECTIVE DATA SUMMARY:   Cognitive/Behavioral Status:  Neurologic State: Alert  Orientation Level: Oriented X4  Cognition: Follows commands           Functional Mobility and Transfers for ADLs:              Bed Mobility:      Min assist x 2 for sit to supine. Transfers:      Contact guard plus assist of another for lines/leads. Balance: Impaired standing balance. ADL Intervention:        Grooming  Grooming Assistance: Supervision/set up (seated in chair)  Washing Face: Supervision/set-up  Brushing Teeth: Supervision/set-up     Upper Body Bathing  Bathing Assistance: Minimum assistance  Position Performed: Seated in chair  Cues: Physical assistance seated in chair. Upper Body Dressing Assistance  Dressing Assistance: Minimum assistance  Hospital Gown: Minimum  assistance  Cues: Verbal cues provided seated in chair. Pain:  Pain Scale 1: Numeric (0 - 10)  Pain Intensity 1: 7                 Activity Tolerance:    Fair  Please refer to the flowsheet for vital signs taken during this treatment.   After treatment:   [ ]  Patient left in no apparent distress sitting up in chair  [X]  Patient left in no apparent distress in bed  [X]  Call bell left within reach  [ ]  Nursing notified  [ ]  Caregiver present  [ ]  Bed alarm activated      COMMUNICATION/COLLABORATION:   The patients plan of care was discussed with: Physical Therapy Assistant and Occupational Therapist     GRICEL Marcum  Time Calculation: 36 mins

## 2017-07-12 NOTE — PROGRESS NOTES
Problem: Mobility Impaired (Adult and Pediatric)  Goal: *Acute Goals and Plan of Care (Insert Text)  Physical Therapy Goals  Initiated 7/6/2017  1. Patient will move from supine to sit and sit to supine in bed with minimal assistance/contact guard assist within 7 day(s). 2. Patient will transfer from bed to chair and chair to bed with minimal assistance/contact guard assist using the least restrictive device within 7 day(s). 3. Patient will perform sit to stand with moderate assistance within 7 day(s). 4. Patient will ambulate with moderate assistance for 15 feet with the least restrictive device within 7 day(s). 5. Patient will demonstrate independence with home exercise program for LE strengthening within 7 days. PHYSICAL THERAPY TREATMENT  Patient: Criss Yi (88 y.o. female)  Date: 7/12/2017  Diagnosis: Perforation bowel ([address]  Pneumonia  Wounds, multiple open, lower extremity  Anemia  Perforated Bowel Perforation bowel ([address]  Procedure(s) (LRB):  LAPAROTOMY EXPLORATORY, REPAIR OF GASTRIC PERFORATION (N/A) 8 Days Post-Op  Precautions:        ASSESSMENT:  Pt received in bed, agreeable to participate with physical therapy. Encouraged use of PCA prior to session. Pt prefer to perform mobility tasks independently. Supine to sitting EOB with CGA with increased time to complete task. Stand pivot transfer using RW to [facility]. +void. Gait training using RW x 10' with CGAx2 with second person to manage lines/leads. Pt demonstrates fwd flexed posture secondary to abdominal incisional pain. Pt returned to chair and agreeable to sit 30min. Pt anxious regarding sitting in chair, legs shaking. Pt left in room with Occupational Therapy. Will benefit from Rehab to improved activity tolerance.   Progression toward goals:  [ ]    Improving appropriately and progressing toward goals  [X]    Improving slowly and progressing toward goals  [ ]    Not making progress toward goals and plan of care will be adjusted PLAN:  Patient continues to benefit from skilled intervention to address the above impairments. Continue treatment per established plan of care. Discharge Recommendations:  Rehab  Further Equipment Recommendations for Discharge:  TBD       SUBJECTIVE:   Patient stated I didn't want to wait for the afternoon.       OBJECTIVE DATA SUMMARY:   Critical Behavior:  Neurologic State: Alert  Orientation Level: Oriented X4  Cognition: Follows commands  Safety/Judgement: Awareness of environment  Functional Mobility Training:  Bed Mobility:     Supine to Sit: Contact guard assistance; Additional time     Scooting: Contact guard assistance; Additional time        Transfers:  Sit to Stand: Contact guard assistance; Additional time  Stand to Sit: Contact guard assistance                             Balance:  Sitting - Static: Good (unsupported)  Sitting - Dynamic: Fair (occasional)  Standing - Static: Fair  Standing - Dynamic : Fair  Ambulation/Gait Training:  Distance (ft): 10 Feet (ft)  Assistive Device: Walker, rolling  Ambulation - Level of Assistance: Contact guard assistance;Assist x2 (second person to manage IV)        Gait Abnormalities: Decreased step clearance        Base of Support: Narrowed     Speed/Marjorie: Slow                                  Stairs:                       Neuro Re-Education:     Therapeutic Exercises:      Pain:  Pain Scale 1: Numeric (0 - 10)  Pain Intensity 1: 7              Activity Tolerance:   Fair  Please refer to the flowsheet for vital signs taken during this treatment.   After treatment:   [X]    Patient left in no apparent distress sitting up in chair  [ ]    Patient left in no apparent distress in bed  [X]    Call bell left within reach  [X]    Nursing notified  [ ]    Caregiver present  [X]    Chair alarm activated      COMMUNICATION/COLLABORATION:   The patients plan of care was discussed with: Certified Occupational Therapy Assistant and Registered Nurse     Gloria Still SONA Wolf   Time Calculation: 32 mins

## 2017-07-12 NOTE — PROGRESS NOTES
Bedside and Verbal shift change report given to Marie Branch (oncoming nurse) by Bonnie Chang (offgoing nurse). Report included the following information SBAR, MAR and Recent Results.

## 2017-07-12 NOTE — PROGRESS NOTES
[facility] FAMILY MEDICINE RESIDENCY PROGRAM   Daily Progress Note    Date: 7/12/2017    Assessment/Plan:   Theresa Duke is a 56 y.o. female who is hospitalized for sepsis 2/2 perforated gastric ulcers/peritonitis    24 Hour Events: No events overnight. Sepsis 2/2 Peritonitis - Blood cultures with no growth x5 days.  Body fluid culture with no growth x4 day.  Urine culture with no significant growth. Leukocytosis stable. Per surgery, drain showed low serous output with WBCs remained elevated at 23. Chest CT was obtained and found pleural effusions bilaterally. U/S guided thoracentesis was preformed and 700ml of serosanguinous fluid was removed from left pleural space. Repeat serial CXRs showed improving small apical pneumothorax. Pain in upper left lateral chest likely referred pleuritic pain. Surgery advanced diet to clear liquids. -Abx:  Eraxis, levaquin, vancomycin, flagyl.  Will keep broad spectrum abx per general surgery. -F/u blood, body fluid (surgical) cultures to completion.  -Stopping IVF--TPN only as patient is complaining of some increasing LE edema.  -Strict I&O. -Daily labs. -Repeat CXR this AM     Perforated Gastric Ulcers s/p Operative Repair on 7/4/17 - surgery following.  Patient is NPO until POD7.  Gastric ulcers thought to be related to chronic steroid use and Chron's disease.  Drain OP 175cc over the last 24 hours. -Appreciate surgery recommendations & support.  -Continue TPN.  Added lipids 3x/week. Checking TG weekly. Advance diet per Gen Surg  -IV protonix. -Dilaudid PCA for pain control per general surgery.  -Encouraging incentive spirometry.  -Encouraging movement and participation in PT     Community Acquired Pneumonia - Improvement of left basilar airspace disease noted on initial CXR.  Could have been playing a role in SIRS/sepsis picture.  -Has appropriate coverage with levaquin.  -Albuterol nebulizer available.  Aggressive pulmonary toilet per pulm.     Severe Acute Malnutrition - criteria evidenced by nutritional intake <50% of recommended intake for >5days, weight loss of 4.7% in 2 weeks, and moderate-severe edema. - Per nutrition, continue TPN with 20% lipids  - Now on clear liquid diet.     Anemia - lab work up done on admission.  Notable for reticulocyte count of 2.3, LD of 294.  Per hematology, most likely 2/2 acute blood loss from gastric ulcer.  Also a component of iron deficiency.  S/p 2 units pRBC on admission--Hgb responded appropriately.  Has been stable since.  Hgb down to 8.8 this morning.  -Hematology signed off.  -Have 2 units of pRBCs on hold. -Daily CBC.     Chron's Disease - no pharmacotherapy PTA.  Poor follow up history with GI.  Likely playing a role in patient's current clinical course.  -Will consider GI consult later in course when patient becomes more stable.  Needs better outpatient follow up.     Severe Protein Calorie Malnutrition - as evidenced by lower extremity swelling and severe hypoalbuminemia  This is complicated by need for 7 days of NPO. -Continuing TPN. -Daily CMP. -Daily Phos.     Lyme Disease - diagnosed ~1 month ago at [facility].  Was treated with a 21 day course of doxycycline.  Completed this course fully, but developed some lower extremity skin breakdown following treatment.  Tetracyclines are on patient's allergy list.  -Not repeating tic studies     Tobacco Abuse - reports to smoke 1.5 PPD.   -Encouraging cessation.  -Prescribed daily nicotine patch while patient admitted.     Healing Wounds on Bilateral Feet - most likely acute reaction to doxycycline treatment that patient underwent prior to admission.  Per patient, these wounds are healing.  Remain painful.  -Wound care consulted, appreciate support.  -Tetracyclines on allergy list.      3cm Laceration of Left Upper Back - healing.  Patient notes that this was from a fall.  Not amenable to sutures at this time.  -Wound care consulted, appreciate support.      Bilateral Lower Extremity Swelling/Pain - noted on admisison.  Thought to be 2/2 hypoalbuminemia.  Duplex studies of lower extremities negative for DVT. Improved.      Hypomagnesemia -  likely 2/2 nutritional management with TPN. Resolved with repletion      Hypophosphatemia -  likely 2/2 nutritional management with TPN. Resolved  -Daily Phosphorous      Hypokalemia -  likely 2/2 nutritional management with TPN. Resolved with repletionlow this morning.  -Daily CMP      Depression/Panic Attacks - patient is showing some symptoms of this--mostly at night.  Takes klonopin, adderall, Lexapro, ativan, and trazodone at home. [facility] for withdrawal from SSRI. -Currently holding these medications as patient is NPO.  -OK with IV vallium prn--will reduce dose today to 1mg as patient concerned that 2.5mg is too sedating.  Will need to carefully monitor respirations with this in addition to dilaudid.      History of Falls - patient and family gave detailed history of multiple falls over the last few months/weeks. -PT/OT consulted.      FEN/GI - Clear liquids. TPN running at 63mL/hr. Activity - Out of bed with assistance  DVT prophylaxis - Heparin  GI prophylaxis - Protonix  Fall prophylaxis - Fall precautions ordered. Disposition- D/c placement Rehab  Code Status [facility]     Patient seen and discussed with Dr. Lesa Landa (Attending physician)      Annmarie Felton MD  Family Medicine Resident  7/12/2017 5:51 AM         CC: \"I was too tired to participate in PT\"    Subjective  No events overnight. Per OT/PT note she did not participate in their exercises. When asked about this her response is that she is so wiped out at the end of the day when they come. She says she would participate if they would come earlier. She has been tolerating ice chips well. Patient denies chills, headaches, chest pain, shortness of breath, palpitations, nausea and vomiting.  She notes improvement in the feel and look of her pedal edema.     Inpatient Medications  Current Facility-Administered Medications   Medication Dose Route Frequency    TPN ADULT - CENTRAL AA 5% D15% W/ ELECTROLYTES AND CA   IntraVENous CONTINUOUS    TPN ADULT - CENTRAL AA 5% D15% W/ ELECTROLYTES AND CA   IntraVENous CONTINUOUS    levalbuterol (XOPENEX) nebulizer soln 1.25 mg/3 mL  1.25 mg Nebulization BID RT    fentaNYL citrate (PF) injection 100 mcg  100 mcg IntraVENous RAD PRN    diazePAM (VALIUM) injection 1 mg  1 mg IntraVENous Q4H PRN    vancomycin (VANCOCIN) 1,000 mg in 0.9% sodium chloride (MBP/ADV) 250 mL  1,000 mg IntraVENous Q8H    lidocaine (LIDODERM) 5 % patch 2 Patch  2 Patch TransDERmal Q24H    heparin (porcine) injection 5,000 Units  5,000 Units SubCUTAneous Q8H    fat emulsion 20% (LIPOSYN, INTRALIPID) infusion 500 mL  500 mL IntraVENous Q MON, WED & FRI    nicotine (NICODERM CQ) 21 mg/24 hr patch 1 Patch  1 Patch TransDERmal DAILY    glucose chewable tablet 16 g  4 Tab Oral PRN    dextrose (D50W) injection syrg 12.5-25 g  12.5-25 g IntraVENous PRN    glucagon (GLUCAGEN) injection 1 mg  1 mg IntraMUSCular PRN    insulin lispro (HUMALOG) injection   SubCUTAneous Q6H    prochlorperazine (COMPAZINE) injection 10 mg  10 mg IntraVENous Q6H PRN    sodium chloride (NS) flush 5-10 mL  5-10 mL IntraVENous PRN    0.9% sodium chloride infusion 250 mL  250 mL IntraVENous PRN    sodium chloride (NS) flush 5-10 mL  5-10 mL IntraVENous Q8H    sodium chloride (NS) flush 5-10 mL  5-10 mL IntraVENous PRN    naloxone (NARCAN) injection 0.4 mg  0.4 mg IntraVENous PRN    metroNIDAZOLE (FLAGYL) IVPB premix 500 mg  500 mg IntraVENous Q6H    albuterol (PROVENTIL VENTOLIN) nebulizer solution 2.5 mg  2.5 mg Nebulization Q4H PRN    sodium chloride (NS) flush 5-10 mL  5-10 mL IntraVENous PRN    anidulafungin (ERAXIS) 100 mg in 0.9% sodium chloride 130 mL IVPB  100 mg IntraVENous Q24H    levoFLOXacin (LEVAQUIN) 750 mg in D5W IVPB  750 mg IntraVENous Q24H    HYDROmorphone (PF) 15 mg/30 ml (DILAUDID) PCA   IntraVENous TITRATE    pantoprazole (PROTONIX) 40 mg in sodium chloride 0.9 % 10 mL injection  40 mg IntraVENous Q12H         Allergies  Allergies   Allergen Reactions    Cephalosporins Hives    Penicillins Hives    Tetracyclines Nausea and Vomiting         Objective  Vitals:  Patient Vitals for the past 8 hrs:   Temp Pulse Resp BP SpO2   07/12/17 1535 98.5 °F (36.9 °C) 94 18 117/73 99 %   07/12/17 1500 - 90 - - -   07/12/17 1119 98.3 °F (36.8 °C) 97 18 113/72 100 %         I/O:    Intake/Output Summary (Last 24 hours) at 07/12/17 1756  Last data filed at 07/12/17 0849   Gross per 24 hour   Intake                0 ml   Output              160 ml   Net             -160 ml     Last shift:    07/12 0701 - 07/12 1900  In: -   Out: 40 [Drains:40]  Last 3 shifts:    07/10 1901 - 07/12 0700  In: 2796.7 [I.V.:2796.7]  Out: 305 [Drains:255]    Physical Exam:  General: No acute distress. Alert. Cooperative. HEENT: Normocephalic. Atraumatic. NG tube in place. Conjunctiva pink. Sclera white. PERRL. MMM   Respiratory: CTAB. No w/r/r/c. No TTP of left lateral chest wall. Cardiovascular: RRR. Normal S1,S2. No m/r/g. 2+ pulses in DP bilaterally   GI: + bowel sounds. Non-distended. Soft abdomen. Incision c/d/i. Extremities: Improved pedal edema. No tenderness.      Laboratory Data  Recent Results (from the past 24 hour(s))   GLUCOSE, POC    Collection Time: 07/12/17 12:25 AM   Result Value Ref Range    Glucose (POC) 109 (H) 65 - 100 mg/dL    Performed by ARIEL VIVAR    METABOLIC PANEL, COMPREHENSIVE    Collection Time: 07/12/17 12:30 AM   Result Value Ref Range    Sodium 139 136 - 145 mmol/L    Potassium 3.5 3.5 - 5.1 mmol/L    Chloride 107 97 - 108 mmol/L    CO2 28 21 - 32 mmol/L    Anion gap 4 (L) 5 - 15 mmol/L    Glucose 113 (H) 65 - 100 mg/dL    BUN 9 6 - 20 MG/DL    Creatinine 0.60 0.55 - 1.02 MG/DL    BUN/Creatinine ratio 15 12 - 20      GFR est AA >60 >60 ml/min/1.73m2    GFR est non-AA >60 >60 ml/min/1.73m2    Calcium 7.4 (L) 8.5 - 10.1 MG/DL    Bilirubin, total 0.2 0.2 - 1.0 MG/DL    ALT (SGPT) 16 12 - 78 U/L    AST (SGOT) 13 (L) 15 - 37 U/L    Alk. phosphatase 85 45 - 117 U/L    Protein, total 5.2 (L) 6.4 - 8.2 g/dL    Albumin 1.3 (L) 3.5 - 5.0 g/dL    Globulin 3.9 2.0 - 4.0 g/dL    A-G Ratio 0.3 (L) 1.1 - 2.2     CBC WITH AUTOMATED DIFF    Collection Time: 07/12/17 12:30 AM   Result Value Ref Range    WBC 21.9 (H) 3.6 - 11.0 K/uL    RBC 3.16 (L) 3.80 - 5.20 M/uL    HGB 8.4 (L) 11.5 - 16.0 g/dL    HCT 25.6 (L) 35.0 - 47.0 %    MCV 81.0 80.0 - 99.0 FL    MCH 26.6 26.0 - 34.0 PG    MCHC 32.8 30.0 - 36.5 g/dL    RDW 17.0 (H) 11.5 - 14.5 %    PLATELET 823 (H) 024 - 400 K/uL    NEUTROPHILS 68 32 - 75 %    BAND NEUTROPHILS 6 0 - 6 %    LYMPHOCYTES 15 12 - 49 %    MONOCYTES 10 5 - 13 %    EOSINOPHILS 0 0 - 7 %    BASOPHILS 0 0 - 1 %    MYELOCYTES 1 (H) 0 %    ABS. NEUTROPHILS 16.2 (H) 1.8 - 8.0 K/UL    ABS. LYMPHOCYTES 3.3 0.8 - 3.5 K/UL    ABS. MONOCYTES 2.2 (H) 0.0 - 1.0 K/UL    ABS. EOSINOPHILS 0.0 0.0 - 0.4 K/UL    ABS.  BASOPHILS 0.0 0.0 - 0.1 K/UL    DF MANUAL      RBC COMMENTS ANISOCYTOSIS  1+        RBC COMMENTS HYPOCHROMIA  1+       PHOSPHORUS    Collection Time: 07/12/17 12:30 AM   Result Value Ref Range    Phosphorus 3.1 2.6 - 4.7 MG/DL   MAGNESIUM    Collection Time: 07/12/17 12:30 AM   Result Value Ref Range    Magnesium 1.8 1.6 - 2.4 mg/dL   GLUCOSE, POC    Collection Time: 07/12/17  6:03 AM   Result Value Ref Range    Glucose (POC) 139 (H) 65 - 100 mg/dL    Performed by Hyacinth Moulton ([facility])    GLUCOSE, POC    Collection Time: 07/12/17 11:10 AM   Result Value Ref Range    Glucose (POC) 130 (H) 65 - 100 mg/dL    Performed by Kristina Buchanan ([facility])    GLUCOSE, POC    Collection Time: 07/12/17  5:50 PM   Result Value Ref Range    Glucose (POC) 93 65 - 100 mg/dL    Performed by Kristina Buchanan ([facility])      Pertinent labs Hbg 8.4, WBC 21.9, K 3.5, TP 5.2, Mg 1.8      Imaging  CXR-No change in the left lung base. Interval removal of NG tube. Hospital Problems:  Hospital Problems  Date Reviewed: 1/5/2017          Codes Class Noted POA    Thrombocytosis ([facility] ICD-10-CM: D47.3  ICD-9-CM: 238.71  7/5/2017 Yes        Neutrophilia ICD-10-CM: D72.9  ICD-9-CM: 288.8  7/5/2017 Yes        * (Principal)Perforation bowel ([facility] ICD-10-CM: K63.1  ICD-9-CM: 569.83  7/4/2017 Yes        Pneumonia ICD-10-CM: J18.9  ICD-9-CM: 379  7/4/2017 Yes        Anemia ICD-10-CM: D64.9  ICD-9-CM: 285.9  7/4/2017 Yes        Wounds, multiple open, lower extremity ICD-10-CM: S81.809A  ICD-9-CM: 894.0  7/4/2017 Yes        Crohn disease ([facility] ICD-10-CM: K50.90  ICD-9-CM: 555.9  4/19/2010 Yes    Overview Addendum 2/2/2012  4:33 PM by Sean Knight MD     She had 4 colon resections in the past.  Dr. Shanna Contreras, Dr. Radha Humphrey abd/kaye 2009: Thickened segment of neoileum proximal and adjacent to   anastomotic chain sutures and likely representing inflammatory bowel disease   recurrence. Partial small bowel obstruction at this level. No evidence of   perforation. No evidence of fistula or intra-abdominal abscess.

## 2017-07-12 NOTE — PROGRESS NOTES
General Surgery Daily Progress Note    Patient: Starr Beck MRN: [id number]  SSN: [id number]    YOB: 1970  Age: 56 y.o. Sex: female      Admit Date: 7/4/2017    Subjective:   Abdominal pain controlled, + BM/flatus. Tolerating sips. Bowel function continues.      Current Facility-Administered Medications   Medication Dose Route Frequency    TPN ADULT - CENTRAL AA 5% D15% W/ ELECTROLYTES AND CA   IntraVENous CONTINUOUS    TPN ADULT - CENTRAL AA 5% D15% W/ ELECTROLYTES AND CA   IntraVENous CONTINUOUS    levalbuterol (XOPENEX) nebulizer soln 1.25 mg/3 mL  1.25 mg Nebulization BID RT    fentaNYL citrate (PF) injection 100 mcg  100 mcg IntraVENous RAD PRN    diazePAM (VALIUM) injection 1 mg  1 mg IntraVENous Q4H PRN    vancomycin (VANCOCIN) 1,000 mg in 0.9% sodium chloride (MBP/ADV) 250 mL  1,000 mg IntraVENous Q8H    lidocaine (LIDODERM) 5 % patch 2 Patch  2 Patch TransDERmal Q24H    heparin (porcine) injection 5,000 Units  5,000 Units SubCUTAneous Q8H    fat emulsion 20% (LIPOSYN, INTRALIPID) infusion 500 mL  500 mL IntraVENous Q MON, WED & FRI    nicotine (NICODERM CQ) 21 mg/24 hr patch 1 Patch  1 Patch TransDERmal DAILY    glucose chewable tablet 16 g  4 Tab Oral PRN    dextrose (D50W) injection syrg 12.5-25 g  12.5-25 g IntraVENous PRN    glucagon (GLUCAGEN) injection 1 mg  1 mg IntraMUSCular PRN    insulin lispro (HUMALOG) injection   SubCUTAneous Q6H    prochlorperazine (COMPAZINE) injection 10 mg  10 mg IntraVENous Q6H PRN    sodium chloride (NS) flush 5-10 mL  5-10 mL IntraVENous PRN    0.9% sodium chloride infusion 250 mL  250 mL IntraVENous PRN    sodium chloride (NS) flush 5-10 mL  5-10 mL IntraVENous Q8H    sodium chloride (NS) flush 5-10 mL  5-10 mL IntraVENous PRN    naloxone (NARCAN) injection 0.4 mg  0.4 mg IntraVENous PRN    metroNIDAZOLE (FLAGYL) IVPB premix 500 mg  500 mg IntraVENous Q6H    albuterol (PROVENTIL VENTOLIN) nebulizer solution 2.5 mg  2.5 mg Nebulization Q4H PRN    sodium chloride (NS) flush 5-10 mL  5-10 mL IntraVENous PRN    anidulafungin (ERAXIS) 100 mg in 0.9% sodium chloride 130 mL IVPB  100 mg IntraVENous Q24H    levoFLOXacin (LEVAQUIN) 750 mg in D5W IVPB  750 mg IntraVENous Q24H    HYDROmorphone (PF) 15 mg/30 ml (DILAUDID) PCA   IntraVENous TITRATE    pantoprazole (PROTONIX) 40 mg in sodium chloride 0.9 % 10 mL injection  40 mg IntraVENous Q12H        Objective:   07/12 0701 - 07/12 1900  In: -   Out: 40 [Drains:40]  07/10 1901 - 07/12 0700  In: 2796.7 [I.V.:2796.7]  Out: 305 [Drains:255]  Patient Vitals for the past 8 hrs:   BP Temp Pulse Resp SpO2   07/12/17 1119 113/72 98.3 °F (36.8 °C) 97 18 100 %   07/12/17 0804 - - - - 98 %   07/12/17 0748 110/72 98.6 °F (37 °C) 93 18 98 %   07/12/17 0420 127/84 98 °F (36.7 °C) 95 17 98 %       Physical Exam:  General: Awake, cooperative, speech slurred  Lungs: Clear to auscultation bilaterally, unlabored  Heart:  RRR  Abdomen: Soft, ATTP, non-distended, incisions c/d/i. Osmar drains x 2 serous. Extremities: Warm, moves all, no edema, Ulcers on toes   Skin:  Warm and dry, no rash. Left posterior shoulder wound from injury prior to admission is healing well and shows no sign of infection. Labs:   Recent Labs      07/12/17   0030   WBC  21.9*   HGB  8.4*   HCT  25.6*   PLT  466*     Recent Labs      07/12/17   0030   NA  139   K  3.5   CL  107   CO2  28   GLU  113*   BUN  9   CREA  0.60   CA  7.4*   MG  1.8   PHOS  3.1   ALB  1.3*   TBILI  0.2   SGOT  13*   ALT  16       Assessment / Plan:   · POD#8 ex lap, primary repair perforated gastric ulcer x 2, omental intra-abdominal flap  · No leak demonstrated on CT. Multiple small intra-abdominal collections will be treated with ABX  · S/p left thoracentesis. Small left apical pneumothorax is stable. Continue supplemental oxygen. Repeat CXR in am  · CLD, would not wean TPN at this point   · PCA, PRN Valium for abdominal muscle spasms, Lidoderm patches. · Continue current ABX broad-spectrum ABX and antifungal  · Heparin for DVT prophylaxis  · H pylori serologies negative.  Continue BID PPI  · Pathology benign  · PT/OT consults, encourage IS

## 2017-07-12 NOTE — PROGRESS NOTES
Bedside and Verbal shift change report given to Ashlee Carpio RN (oncoming nurse) by Stevan Martinez RN (offgoing nurse). Report included the following information SBAR, Kardex, Procedure Summary, Intake/Output, MAR, Accordion and Recent Results.

## 2017-07-12 NOTE — PROGRESS NOTES
is providing follow up support with pt in room 428. Pt shared about her grief related to losing her mother. She shared about some of her complicated medical and family concerns.  provided a listening presence and prayer with pt by bedside. Spiritual care will continue to follow.     Reji Milner M.Div, M.S, [place] [address] available at [address] [place] [phone])

## 2017-07-12 NOTE — PROGRESS NOTES
[facility] FAMILY MEDICINE RESIDENCY PROGRAM   Daily Progress Note    Date: 7/13/2017    Assessment/Plan:   Rogers Coley is a 56 y.o. female who is hospitalized for sepsis 2/2 perforated gastric ulcers/peritonitis    24 Hour Events: No events overnight. Sepsis 2/2 Peritonitis - Blood cultures with no growth x5 days.  Body fluid culture with no growth x4 day.  Urine culture with no significant growth. Leukocytosis stable. Per surgery, drain showed low serous output with WBCs remained elevated at 23. Chest CT was obtained and found pleural effusions bilaterally. U/S guided thoracentesis was preformed and 700ml of serosanguinous fluid was removed from left pleural space. Repeat serial CXRs showed stable improvement of small apical pneumothorax. Pain in upper left lateral chest likely referred pleuritic pain. Surgery advanced diet to clear liquids.   -Per surgery, Abx-Eraxis, vancomycin, meropenum. -F/u blood, body fluid (surgical) cultures to completion.  -Wean TPN, advance diet to full liquids  -Strict I&O. -Daily labs.     Perforated Gastric Ulcers s/p Operative Repair on 7/4/17 - surgery following.  Patient is NPO until POD7.  Gastric ulcers thought to be related to chronic steroid use and Chron's disease.  Drain OP 175cc over the last 24 hours. -Appreciate surgery recommendations & support.  -Per surgery, wean TPN and start full liquid diet. -IV protonix. -Dilaudid PO for pain control per general surgery.  -Encouraging incentive spirometry.  -Encouraging movement and participation in PT    Elevated Leukocytosis - despite being on broad spectrum antibiotics, continues to have leukocytosis. Peripheral smear shows metamyelocytes of 1 and metamyelocytes of 1.  - Heme/onc made aware and will assess     Community Acquired Pneumonia - Improvement of left basilar airspace disease noted on initial CXR. Could have been playing a role in SIRS/sepsis picture. Improved on serial CXR.     Severe Acute Malnutrition - criteria evidenced by nutritional intake <50% of recommended intake for >5days, weight loss of 4.7% in 2 weeks, and moderate-severe edema. Improvement during hospitalization. Weight today 136lb (up from from 121 on 7/4). - Encourage full liquid diet     Anemia - lab work up done on admission.  Notable for reticulocyte count of 2.3, LD of 294.  Per hematology, most likely 2/2 acute blood loss from gastric ulcer.  Also a component of iron deficiency.  S/p 2 units pRBC on admission--Hgb responded appropriately.  Has been stable since.  Hgb down to 8.8 this morning.  -Hematology signed off.  -Have 2 units of pRBCs on hold. -Daily CBC.     Chron's Disease - no pharmacotherapy PTA.  Poor follow up history with GI.  Likely playing a role in patient's current clinical course.  -Will consider GI consult later in course when patient becomes more stable.  Needs better outpatient follow up.     Severe Protein Calorie Malnutrition - as evidenced by lower extremity swelling and severe hypoalbuminemia  This is complicated by need for 7 days of NPO. -Continuing TPN. -Daily CMP. -Daily Phos.     Lyme Disease - diagnosed ~1 month ago at [facility].  Was treated with a 21 day course of doxycycline.  Completed this course fully, but developed some lower extremity skin breakdown following treatment.  Tetracyclines are on patient's allergy list.  -Not repeating tic studies     Tobacco Abuse - reports to smoke 1.5 PPD.   -Encouraging cessation.  -Prescribed daily nicotine patch while patient admitted.     Healing Wounds on Bilateral Feet - most likely acute reaction to doxycycline treatment that patient underwent prior to admission.  Per patient, these wounds are healing.  Remain painful.  -Wound care consulted, appreciate support.  -Tetracyclines on allergy list.      3cm Laceration of Left Upper Back - healing.  Patient notes that this was from a fall.  Not amenable to sutures at this time.  -Wound care consulted, appreciate support.      Bilateral Lower Extremity Swelling/Pain - noted on admisison.  Thought to be 2/2 hypoalbuminemia.  Duplex studies of lower extremities negative for DVT. Improved.      Hypomagnesemia -  likely 2/2 nutritional management with TPN. Resolved with repletion      Hypophosphatemia -  likely 2/2 nutritional management with TPN. Resolved  -Daily Phosphorous      Hypokalemia -  likely 2/2 nutritional management with TPN. Resolved with repletionlow this morning.  -Daily CMP      Depression/Panic Attacks - patient is showing some symptoms of this--mostly at night.  Takes klonopin, adderall, Lexapro, ativan, and trazodone at home. [facility] for withdrawal from SSRI. -Currently holding these medications as patient is NPO.  -OK with IV vallium prn--will reduce dose today to 1mg as patient concerned that 2.5mg is too sedating.  Will need to carefully monitor respirations with this in addition to dilaudid.      History of Falls - patient and family gave detailed history of multiple falls over the last few months/weeks. -PT/OT consulted.      FEN/GI - Clear liquids. TPN running at 63mL/hr. Activity - Out of bed with assistance  DVT prophylaxis - Heparin  GI prophylaxis - Protonix  Fall prophylaxis - Fall precautions ordered. Disposition- D/c placement Rehab  Code Status [facility]     Patient seen and discussed with Dr. Jackeline Jang (Attending physician)      Gildardo Sanchez MD  Family Medicine Resident  7/13/2017 5:51 AM         CC: \"I'm doing pretty good\"    Subjective  No events overnight. Participated in PT yesterday. She has been tolerating clear fluids well. Passing stool and flatus. Patient denies chills, headaches, chest pain, shortness of breath, palpitations, nausea and vomiting.      Inpatient Medications  Current Facility-Administered Medications   Medication Dose Route Frequency    TPN ADULT - CENTRAL AA 5% D15% W/ ELECTROLYTES AND CA   IntraVENous CONTINUOUS    levalbuterol (XOPENEX) nebulizer soln 1.25 mg/3 mL  1.25 mg Nebulization BID RT    fentaNYL citrate (PF) injection 100 mcg  100 mcg IntraVENous RAD PRN    diazePAM (VALIUM) injection 1 mg  1 mg IntraVENous Q4H PRN    vancomycin (VANCOCIN) 1,000 mg in 0.9% sodium chloride (MBP/ADV) 250 mL  1,000 mg IntraVENous Q8H    lidocaine (LIDODERM) 5 % patch 2 Patch  2 Patch TransDERmal Q24H    heparin (porcine) injection 5,000 Units  5,000 Units SubCUTAneous Q8H    fat emulsion 20% (LIPOSYN, INTRALIPID) infusion 500 mL  500 mL IntraVENous Q MON, WED & FRI    nicotine (NICODERM CQ) 21 mg/24 hr patch 1 Patch  1 Patch TransDERmal DAILY    glucose chewable tablet 16 g  4 Tab Oral PRN    dextrose (D50W) injection syrg 12.5-25 g  12.5-25 g IntraVENous PRN    glucagon (GLUCAGEN) injection 1 mg  1 mg IntraMUSCular PRN    insulin lispro (HUMALOG) injection   SubCUTAneous Q6H    prochlorperazine (COMPAZINE) injection 10 mg  10 mg IntraVENous Q6H PRN    sodium chloride (NS) flush 5-10 mL  5-10 mL IntraVENous PRN    0.9% sodium chloride infusion 250 mL  250 mL IntraVENous PRN    sodium chloride (NS) flush 5-10 mL  5-10 mL IntraVENous Q8H    sodium chloride (NS) flush 5-10 mL  5-10 mL IntraVENous PRN    naloxone (NARCAN) injection 0.4 mg  0.4 mg IntraVENous PRN    metroNIDAZOLE (FLAGYL) IVPB premix 500 mg  500 mg IntraVENous Q6H    albuterol (PROVENTIL VENTOLIN) nebulizer solution 2.5 mg  2.5 mg Nebulization Q4H PRN    sodium chloride (NS) flush 5-10 mL  5-10 mL IntraVENous PRN    anidulafungin (ERAXIS) 100 mg in 0.9% sodium chloride 130 mL IVPB  100 mg IntraVENous Q24H    levoFLOXacin (LEVAQUIN) 750 mg in D5W IVPB  750 mg IntraVENous Q24H    HYDROmorphone (PF) 15 mg/30 ml (DILAUDID) PCA   IntraVENous TITRATE    pantoprazole (PROTONIX) 40 mg in sodium chloride 0.9 % 10 mL injection  40 mg IntraVENous Q12H         Allergies  Allergies   Allergen Reactions    Cephalosporins Hives    Penicillins Hives    Tetracyclines Nausea and Vomiting         Objective  Vitals:  Patient Vitals for the past 8 hrs:   Temp Pulse Resp BP SpO2   07/13/17 0343 98 °F (36.7 °C) (!) 109 19 117/73 97 %   07/12/17 2313 98.4 °F (36.9 °C) 84 16 120/73 97 %         I/O:    Intake/Output Summary (Last 24 hours) at 07/13/17 0607  Last data filed at 07/12/17 2021   Gross per 24 hour   Intake             2984 ml   Output               65 ml   Net             2919 ml     Last shift:    07/12 1901 - 07/13 0700  In: -   Out: 25 [Drains:25]  Last 3 shifts:    07/11 0701 - 07/12 1900  In: 5780.7 [I.V.:5780.7]  Out: 245 [Drains:245]    Physical Exam:  General: No acute distress. Alert. Cooperative. HEENT: Normocephalic. Atraumatic. NG tube in place. Conjunctiva pink. Sclera white. PERRL. MMM   Respiratory: CTAB. No w/r/r/c.   Cardiovascular: RRR. Normal S1,S2. No m/r/g. 2+ pulses in DP bilaterally   GI: + bowel sounds. Non-distended. Soft abdomen. Incision c/d/i. Extremities:  Skin: Improved pedal edema. No tenderness.   Healing tick bite on left neck, improving surrounding erythema     Laboratory Data  Recent Results (from the past 24 hour(s))   GLUCOSE, POC    Collection Time: 07/12/17 11:10 AM   Result Value Ref Range    Glucose (POC) 130 (H) 65 - 100 mg/dL    Performed by Silver Ninfa (PCT)    GLUCOSE, POC    Collection Time: 07/12/17  5:50 PM   Result Value Ref Range    Glucose (POC) 93 65 - 100 mg/dL    Performed by Silver Ninfa (PCT)    GLUCOSE, POC    Collection Time: 07/13/17 12:19 AM   Result Value Ref Range    Glucose (POC) 136 (H) 65 - 100 mg/dL    Performed by Joseph Moulton (PCT)    METABOLIC PANEL, COMPREHENSIVE    Collection Time: 07/13/17 12:41 AM   Result Value Ref Range    Sodium 135 (L) 136 - 145 mmol/L    Potassium 3.7 3.5 - 5.1 mmol/L    Chloride 104 97 - 108 mmol/L    CO2 24 21 - 32 mmol/L    Anion gap 7 5 - 15 mmol/L    Glucose 120 (H) 65 - 100 mg/dL    BUN 12 6 - 20 MG/DL    Creatinine 0.66 0.55 - 1.02 MG/DL    BUN/Creatinine ratio 18 12 - 20      GFR est AA >60 >60 ml/min/1.73m2    GFR est non-AA >60 >60 ml/min/1.73m2    Calcium 7.7 (L) 8.5 - 10.1 MG/DL    Bilirubin, total 0.2 0.2 - 1.0 MG/DL    ALT (SGPT) 12 12 - 78 U/L    AST (SGOT) 32 15 - 37 U/L    Alk. phosphatase 88 45 - 117 U/L    Protein, total 5.5 (L) 6.4 - 8.2 g/dL    Albumin 1.4 (L) 3.5 - 5.0 g/dL    Globulin 4.1 (H) 2.0 - 4.0 g/dL    A-G Ratio 0.3 (L) 1.1 - 2.2     CBC WITH AUTOMATED DIFF    Collection Time: 07/13/17 12:41 AM   Result Value Ref Range    WBC 23.4 (H) 3.6 - 11.0 K/uL    RBC 3.33 (L) 3.80 - 5.20 M/uL    HGB 8.9 (L) 11.5 - 16.0 g/dL    HCT 26.6 (L) 35.0 - 47.0 %    MCV 79.9 (L) 80.0 - 99.0 FL    MCH 26.7 26.0 - 34.0 PG    MCHC 33.5 30.0 - 36.5 g/dL    RDW 17.3 (H) 11.5 - 14.5 %    PLATELET 452 (H) 704 - 400 K/uL    NEUTROPHILS 64 32 - 75 %    BAND NEUTROPHILS 1 0 - 6 %    LYMPHOCYTES 20 12 - 49 %    MONOCYTES 9 5 - 13 %    EOSINOPHILS 2 0 - 7 %    BASOPHILS 0 0 - 1 %    METAMYELOCYTES 1 (H) 0 %    MYELOCYTES 3 (H) 0 %    ABS. NEUTROPHILS 15.2 (H) 1.8 - 8.0 K/UL    ABS. LYMPHOCYTES 4.7 (H) 0.8 - 3.5 K/UL    ABS. MONOCYTES 2.1 (H) 0.0 - 1.0 K/UL    ABS. EOSINOPHILS 0.5 (H) 0.0 - 0.4 K/UL    ABS. BASOPHILS 0.0 0.0 - 0.1 K/UL    DF MANUAL      RBC COMMENTS ANISOCYTOSIS  1+        WBC COMMENTS REACTIVE LYMPHS     PHOSPHORUS    Collection Time: 07/13/17 12:41 AM   Result Value Ref Range    Phosphorus 3.6 2.6 - 4.7 MG/DL   MAGNESIUM    Collection Time: 07/13/17 12:41 AM   Result Value Ref Range    Magnesium 1.4 (L) 1.6 - 2.4 mg/dL   TRIGLYCERIDE    Collection Time: 07/13/17 12:41 AM   Result Value Ref Range    Triglyceride 222 (H) <150 MG/DL     Pertinent labs WBC 23.4, K 3.7, glu , peripheral smear- Metamyelocytes 1, myelocytes 3. Imaging  CXR-Left-sided hydropneumothorax, similar to comparison.        Hospital Problems:  Hospital Problems  Date Reviewed: 1/5/2017          Codes Class Noted POA    Thromb[facility] ([facility] ICD-10-CM: D47.3  ICD-9-CM: 238.71  7/5/2017 Yes Neutrophilia ICD-10-CM: D72.9  ICD-9-CM: 288.8  7/5/2017 Yes        * (Principal)Perforation bowel ([facility] ICD-10-CM: K63.1  ICD-9-CM: 569.83  7/4/2017 Yes        Pneumonia ICD-10-CM: J18.9  ICD-9-CM: 180  7/4/2017 Yes        Anemia ICD-10-CM: D64.9  ICD-9-CM: 285.9  7/4/2017 Yes        Wounds, multiple open, lower extremity ICD-10-CM: S81.809A  ICD-9-CM: 894.0  7/4/2017 Yes        Crohn disease ([facility] ICD-10-CM: K50.90  ICD-9-CM: 555.9  4/19/2010 Yes    Overview Addendum 2/2/2012  4:33 PM by Zara Nugent MD     She had 4 colon resections in the past.  Dr. Libby Pena, Dr. Orozco Given abd/pevis 2009: Thickened segment of neoileum proximal and adjacent to   anastomotic chain sutures and likely representing inflammatory bowel disease   recurrence. Partial small bowel obstruction at this level. No evidence of   perforation. No evidence of fistula or intra-abdominal abscess.

## 2017-07-12 NOTE — PROGRESS NOTES
is attempting follow up with pt for spiritual support. Pt is resting well and the time and not waking to  calling her name.      [id number] Aravind Dubon M.Div, M.S, [place] [address] available at [address] [place] [phone]

## 2017-07-13 ENCOUNTER — APPOINTMENT (OUTPATIENT)
Dept: GENERAL RADIOLOGY | Age: 47
DRG: 853 | End: 2017-07-13
Attending: PHYSICIAN ASSISTANT
Payer: COMMERCIAL

## 2017-07-13 LAB
ALBUMIN SERPL BCP-MCNC: 1.4 G/DL (ref 3.5–5)
ALBUMIN/GLOB SERPL: 0.3 {RATIO} (ref 1.1–2.2)
ALP SERPL-CCNC: 88 U/L (ref 45–117)
ALT SERPL-CCNC: 12 U/L (ref 12–78)
ANION GAP BLD CALC-SCNC: 7 MMOL/L (ref 5–15)
AST SERPL W P-5'-P-CCNC: 32 U/L (ref 15–37)
BASOPHILS # BLD AUTO: 0 K/UL (ref 0–0.1)
BASOPHILS # BLD: 0 % (ref 0–1)
BILIRUB SERPL-MCNC: 0.2 MG/DL (ref 0.2–1)
BUN SERPL-MCNC: 12 MG/DL (ref 6–20)
BUN/CREAT SERPL: 18 (ref 12–20)
CALCIUM SERPL-MCNC: 7.7 MG/DL (ref 8.5–10.1)
CHLORIDE SERPL-SCNC: 104 MMOL/L (ref 97–108)
CO2 SERPL-SCNC: 24 MMOL/L (ref 21–32)
CREAT SERPL-MCNC: 0.66 MG/DL (ref 0.55–1.02)
DIFFERENTIAL METHOD BLD: ABNORMAL
EOSINOPHIL # BLD: 0.5 K/UL (ref 0–0.4)
EOSINOPHIL NFR BLD: 2 % (ref 0–7)
ERYTHROCYTE [DISTWIDTH] IN BLOOD BY AUTOMATED COUNT: 17.3 % (ref 11.5–14.5)
ERYTHROCYTE [SEDIMENTATION RATE] IN BLOOD: 124 MM/HR (ref 0–20)
GLOBULIN SER CALC-MCNC: 4.1 G/DL (ref 2–4)
GLUCOSE BLD STRIP.AUTO-MCNC: 125 MG/DL (ref 65–100)
GLUCOSE BLD STRIP.AUTO-MCNC: 136 MG/DL (ref 65–100)
GLUCOSE BLD STRIP.AUTO-MCNC: 147 MG/DL (ref 65–100)
GLUCOSE BLD STRIP.AUTO-MCNC: 82 MG/DL (ref 65–100)
GLUCOSE BLD STRIP.AUTO-MCNC: 83 MG/DL (ref 65–100)
GLUCOSE SERPL-MCNC: 120 MG/DL (ref 65–100)
HCT VFR BLD AUTO: 26.6 % (ref 35–47)
HGB BLD-MCNC: 8.9 G/DL (ref 11.5–16)
LDH SERPL L TO P-CCNC: 214 U/L (ref 81–246)
LYMPHOCYTES # BLD AUTO: 20 % (ref 12–49)
LYMPHOCYTES # BLD: 4.7 K/UL (ref 0.8–3.5)
MAGNESIUM SERPL-MCNC: 1.4 MG/DL (ref 1.6–2.4)
MCH RBC QN AUTO: 26.7 PG (ref 26–34)
MCHC RBC AUTO-ENTMCNC: 33.5 G/DL (ref 30–36.5)
MCV RBC AUTO: 79.9 FL (ref 80–99)
METAMYELOCYTES NFR BLD MANUAL: 1 %
MONOCYTES # BLD: 2.1 K/UL (ref 0–1)
MONOCYTES NFR BLD AUTO: 9 % (ref 5–13)
MYELOCYTES NFR BLD MANUAL: 3 %
NEUTS BAND NFR BLD MANUAL: 1 % (ref 0–6)
NEUTS SEG # BLD: 15.2 K/UL (ref 1.8–8)
NEUTS SEG NFR BLD AUTO: 64 % (ref 32–75)
PHOSPHATE SERPL-MCNC: 3.6 MG/DL (ref 2.6–4.7)
PLATELET # BLD AUTO: 631 K/UL (ref 150–400)
POTASSIUM SERPL-SCNC: 3.7 MMOL/L (ref 3.5–5.1)
PROT SERPL-MCNC: 5.5 G/DL (ref 6.4–8.2)
RBC # BLD AUTO: 3.33 M/UL (ref 3.8–5.2)
RBC MORPH BLD: ABNORMAL
SERVICE CMNT-IMP: ABNORMAL
SERVICE CMNT-IMP: NORMAL
SERVICE CMNT-IMP: NORMAL
SODIUM SERPL-SCNC: 135 MMOL/L (ref 136–145)
TRIGL SERPL-MCNC: 222 MG/DL (ref ?–150)
WBC # BLD AUTO: 23.4 K/UL (ref 3.6–11)
WBC MORPH BLD: ABNORMAL

## 2017-07-13 PROCEDURE — 74011636637 HC RX REV CODE- 636/637: Performed by: FAMILY MEDICINE

## 2017-07-13 PROCEDURE — 74011250636 HC RX REV CODE- 250/636: Performed by: FAMILY MEDICINE

## 2017-07-13 PROCEDURE — 80053 COMPREHEN METABOLIC PANEL: CPT | Performed by: SURGERY

## 2017-07-13 PROCEDURE — 74011000250 HC RX REV CODE- 250: Performed by: FAMILY MEDICINE

## 2017-07-13 PROCEDURE — 74011000250 HC RX REV CODE- 250: Performed by: STUDENT IN AN ORGANIZED HEALTH CARE EDUCATION/TRAINING PROGRAM

## 2017-07-13 PROCEDURE — 74011250637 HC RX REV CODE- 250/637: Performed by: FAMILY MEDICINE

## 2017-07-13 PROCEDURE — 74011250636 HC RX REV CODE- 250/636: Performed by: STUDENT IN AN ORGANIZED HEALTH CARE EDUCATION/TRAINING PROGRAM

## 2017-07-13 PROCEDURE — 65660000000 HC RM CCU STEPDOWN

## 2017-07-13 PROCEDURE — 74011000258 HC RX REV CODE- 258: Performed by: FAMILY MEDICINE

## 2017-07-13 PROCEDURE — 97530 THERAPEUTIC ACTIVITIES: CPT

## 2017-07-13 PROCEDURE — 83615 LACTATE (LD) (LDH) ENZYME: CPT | Performed by: INTERNAL MEDICINE

## 2017-07-13 PROCEDURE — 97535 SELF CARE MNGMENT TRAINING: CPT

## 2017-07-13 PROCEDURE — 82962 GLUCOSE BLOOD TEST: CPT

## 2017-07-13 PROCEDURE — 97116 GAIT TRAINING THERAPY: CPT

## 2017-07-13 PROCEDURE — 84478 ASSAY OF TRIGLYCERIDES: CPT | Performed by: SURGERY

## 2017-07-13 PROCEDURE — 85652 RBC SED RATE AUTOMATED: CPT | Performed by: INTERNAL MEDICINE

## 2017-07-13 PROCEDURE — 83735 ASSAY OF MAGNESIUM: CPT | Performed by: SURGERY

## 2017-07-13 PROCEDURE — 74011000258 HC RX REV CODE- 258: Performed by: SURGERY

## 2017-07-13 PROCEDURE — 36415 COLL VENOUS BLD VENIPUNCTURE: CPT | Performed by: SURGERY

## 2017-07-13 PROCEDURE — 74011250637 HC RX REV CODE- 250/637: Performed by: PHYSICIAN ASSISTANT

## 2017-07-13 PROCEDURE — C9113 INJ PANTOPRAZOLE SODIUM, VIA: HCPCS | Performed by: STUDENT IN AN ORGANIZED HEALTH CARE EDUCATION/TRAINING PROGRAM

## 2017-07-13 PROCEDURE — 85025 COMPLETE CBC W/AUTO DIFF WBC: CPT | Performed by: SURGERY

## 2017-07-13 PROCEDURE — 74011250636 HC RX REV CODE- 250/636: Performed by: PHYSICIAN ASSISTANT

## 2017-07-13 PROCEDURE — 71010 XR CHEST PORT: CPT

## 2017-07-13 PROCEDURE — 74011250636 HC RX REV CODE- 250/636: Performed by: SURGERY

## 2017-07-13 PROCEDURE — 94640 AIRWAY INHALATION TREATMENT: CPT

## 2017-07-13 PROCEDURE — 84100 ASSAY OF PHOSPHORUS: CPT | Performed by: SURGERY

## 2017-07-13 RX ORDER — LORAZEPAM 1 MG/1
1 TABLET ORAL
Status: DISCONTINUED | OUTPATIENT
Start: 2017-07-13 | End: 2017-07-14

## 2017-07-13 RX ORDER — OXYCODONE HYDROCHLORIDE 5 MG/1
10 TABLET ORAL
Status: DISCONTINUED | OUTPATIENT
Start: 2017-07-13 | End: 2017-07-17

## 2017-07-13 RX ORDER — ESCITALOPRAM OXALATE 10 MG/1
20 TABLET ORAL DAILY
Status: DISCONTINUED | OUTPATIENT
Start: 2017-07-14 | End: 2017-07-21 | Stop reason: HOSPADM

## 2017-07-13 RX ORDER — HYDROMORPHONE HYDROCHLORIDE 1 MG/ML
0.5 INJECTION, SOLUTION INTRAMUSCULAR; INTRAVENOUS; SUBCUTANEOUS
Status: DISCONTINUED | OUTPATIENT
Start: 2017-07-13 | End: 2017-07-17

## 2017-07-13 RX ORDER — INSULIN LISPRO 100 [IU]/ML
INJECTION, SOLUTION INTRAVENOUS; SUBCUTANEOUS
Status: DISCONTINUED | OUTPATIENT
Start: 2017-07-13 | End: 2017-07-15

## 2017-07-13 RX ORDER — OXYCODONE HYDROCHLORIDE 5 MG/1
5 TABLET ORAL
Status: DISCONTINUED | OUTPATIENT
Start: 2017-07-13 | End: 2017-07-17

## 2017-07-13 RX ADMIN — SODIUM CHLORIDE 40 MG: 9 INJECTION, SOLUTION INTRAMUSCULAR; INTRAVENOUS; SUBCUTANEOUS at 21:12

## 2017-07-13 RX ADMIN — LORAZEPAM 1 MG: 1 TABLET ORAL at 23:20

## 2017-07-13 RX ADMIN — VANCOMYCIN HYDROCHLORIDE 1000 MG: 1 INJECTION, POWDER, LYOPHILIZED, FOR SOLUTION INTRAVENOUS at 23:21

## 2017-07-13 RX ADMIN — OXYCODONE HYDROCHLORIDE 10 MG: 5 TABLET ORAL at 23:20

## 2017-07-13 RX ADMIN — VANCOMYCIN HYDROCHLORIDE 1000 MG: 1 INJECTION, POWDER, LYOPHILIZED, FOR SOLUTION INTRAVENOUS at 14:26

## 2017-07-13 RX ADMIN — DIAZEPAM 1 MG: 5 INJECTION, SOLUTION INTRAMUSCULAR; INTRAVENOUS at 06:11

## 2017-07-13 RX ADMIN — SODIUM CHLORIDE 40 MG: 9 INJECTION, SOLUTION INTRAMUSCULAR; INTRAVENOUS; SUBCUTANEOUS at 08:50

## 2017-07-13 RX ADMIN — DIAZEPAM 1 MG: 5 INJECTION, SOLUTION INTRAMUSCULAR; INTRAVENOUS at 02:13

## 2017-07-13 RX ADMIN — VANCOMYCIN HYDROCHLORIDE 1000 MG: 1 INJECTION, POWDER, LYOPHILIZED, FOR SOLUTION INTRAVENOUS at 06:10

## 2017-07-13 RX ADMIN — OXYCODONE HYDROCHLORIDE 5 MG: 5 TABLET ORAL at 11:57

## 2017-07-13 RX ADMIN — Medication 10 ML: at 23:35

## 2017-07-13 RX ADMIN — Medication 10 ML: at 18:45

## 2017-07-13 RX ADMIN — INSULIN LISPRO 2 UNITS: 100 INJECTION, SOLUTION INTRAVENOUS; SUBCUTANEOUS at 11:52

## 2017-07-13 RX ADMIN — HYDROMORPHONE HYDROCHLORIDE 0.5 MG: 1 INJECTION, SOLUTION INTRAMUSCULAR; INTRAVENOUS; SUBCUTANEOUS at 14:40

## 2017-07-13 RX ADMIN — HEPARIN SODIUM 5000 UNITS: 5000 INJECTION, SOLUTION INTRAVENOUS; SUBCUTANEOUS at 06:11

## 2017-07-13 RX ADMIN — MEROPENEM 500 MG: 500 INJECTION, POWDER, FOR SOLUTION INTRAVENOUS at 18:10

## 2017-07-13 RX ADMIN — Medication 10 ML: at 06:11

## 2017-07-13 RX ADMIN — METRONIDAZOLE 500 MG: 500 INJECTION, SOLUTION INTRAVENOUS at 02:13

## 2017-07-13 RX ADMIN — LEVALBUTEROL 1.25 MG: 1.25 SOLUTION RESPIRATORY (INHALATION) at 07:20

## 2017-07-13 RX ADMIN — HEPARIN SODIUM 5000 UNITS: 5000 INJECTION, SOLUTION INTRAVENOUS; SUBCUTANEOUS at 14:26

## 2017-07-13 RX ADMIN — SODIUM CHLORIDE 100 MG: 900 INJECTION, SOLUTION INTRAVENOUS at 21:12

## 2017-07-13 RX ADMIN — DIAZEPAM 1 MG: 5 INJECTION, SOLUTION INTRAMUSCULAR; INTRAVENOUS at 10:32

## 2017-07-13 RX ADMIN — HYDROMORPHONE HYDROCHLORIDE 0.5 MG: 1 INJECTION, SOLUTION INTRAMUSCULAR; INTRAVENOUS; SUBCUTANEOUS at 18:05

## 2017-07-13 RX ADMIN — MEROPENEM 500 MG: 500 INJECTION, POWDER, FOR SOLUTION INTRAVENOUS at 11:53

## 2017-07-13 RX ADMIN — Medication 10 ML: at 14:27

## 2017-07-13 RX ADMIN — HYDROMORPHONE HYDROCHLORIDE 0.5 MG: 1 INJECTION, SOLUTION INTRAMUSCULAR; INTRAVENOUS; SUBCUTANEOUS at 23:42

## 2017-07-13 RX ADMIN — LEVALBUTEROL 1.25 MG: 1.25 SOLUTION RESPIRATORY (INHALATION) at 20:57

## 2017-07-13 RX ADMIN — HEPARIN SODIUM 5000 UNITS: 5000 INJECTION, SOLUTION INTRAVENOUS; SUBCUTANEOUS at 23:20

## 2017-07-13 NOTE — PROGRESS NOTES
[id number] [facility] at [facility]  [phone]    Hematology / Oncology Follow up    Reason for Visit:   Tiffany Petty is a 56 y.o. female who is seen in consultation at the request of Dr. Brandy Loja for evaluation of anemia. History of Present Illness:   Tiffany Petty is a pleasant 56 y.o. female who was admitted for a perforated gastric ulcer. She presented to the ED yesterday complaining of fevers, rash, blisters, and abdominal pain. She had been started on doxycline about a month prior for Lyme Disease. In the ED she had labwork that demonstrated significant anemia, apparently new from a month prior. Given the timing, the ED physician was concerned about the possibility of a drug reaction leading to her anemia (ie aplastic anemia) and called me for guidance. I advised some additional labs, including a reticulocyte count. Shortly after that conversation the patient had a CT scan of her abdomen which revealed a perforated gastric ulcer, the more likely cause of her anemia. She was taken urgently to the operating room for repair. Currently, she is in the ICU, on a PCA for pain control. No apparent bleeding at this time. She reports considerable pain in her abdomen, only partially relieved by PCA. Complains of feeling very warm. Interval History:   7/13/2017 We have been re-consulted for continuation of elevated WBC. Since original consultation, Ms Gracy Frey developed sepsis secondary to peritonitis; remains on abx . Developed bilateral pleural effusions; (7/10) thoracentesis performed and 700 ml removed from Left pleural effusion. Developed PNA  Ms Gracy Frey is currently on TPN due to malnutrition    Teary during conversation stating she gets down when she is told that she needs more time in the hospital and possibly rehab. She is feeling better, has been getting up to the chair and up to the bedside commode. Tolerated diet for today. Denies N/V.  Does have abd pain; currently controlled with current medication. Denies SOB. Blisters on toes are improving. No further complaints at present. No family at bedside.        Past Medical History:   Diagnosis Date    Autoimmune disease ([facility]     Crohn's Disease    [facility] 2010    Crohn's     Depression 2010    Vertigo       Past Surgical History:   Procedure Laterality Date    ABDOMEN SURGERY PROC UNLISTED      due to Crohn's-bowel resection x2    HX  SECTION      HX HEENT      HX TONSIL AND ADENOIDECTOMY      HX TUBAL LIGATION        Social History   Substance Use Topics    Smoking status: Former Smoker     Packs/day: 0.50     Years: 8.00     Types: Cigarettes    Smokeless tobacco: Never Used    Alcohol use No      Family History   Problem Relation Age of Onset    Heart Disease Father     Cancer Mother      Current Facility-Administered Medications   Medication Dose Route Frequency    meropenem (MERREM) 500 mg in 0.9% sodium chloride (MBP/ADV) 50 mL  500 mg IntraVENous Q6H    oxyCODONE IR (ROXICODONE) tablet 5 mg  5 mg Oral Q4H PRN    HYDROmorphone (PF) (DILAUDID) injection 0.5 mg  0.5 mg IntraVENous Q3H PRN    oxyCODONE IR (ROXICODONE) tablet 10 mg  10 mg Oral Q4H PRN    insulin lispro (HUMALOG) injection   SubCUTAneous AC&HS    TPN ADULT - CENTRAL AA 5% D15% W/ ELECTROLYTES AND CA   IntraVENous CONTINUOUS    levalbuterol (XOPENEX) nebulizer soln 1.25 mg/3 mL  1.25 mg Nebulization BID RT    fentaNYL citrate (PF) injection 100 mcg  100 mcg IntraVENous RAD PRN    diazePAM (VALIUM) injection 1 mg  1 mg IntraVENous Q4H PRN    vancomycin (VANCOCIN) 1,000 mg in 0.9% sodium chloride (MBP/ADV) 250 mL  1,000 mg IntraVENous Q8H    lidocaine (LIDODERM) 5 % patch 2 Patch  2 Patch TransDERmal Q24H    heparin (porcine) injection 5,000 Units  5,000 Units SubCUTAneous Q8H    fat emulsion 20% (LIPOSYN, INTRALIPID) infusion 500 mL  500 mL IntraVENous Q MON, WED & FRI    nicotine (NICODERM CQ) 21 mg/24 hr patch 1 Patch  1 Patch TransDERmal DAILY    glucose chewable tablet 16 g  4 Tab Oral PRN    dextrose (D50W) injection syrg 12.5-25 g  12.5-25 g IntraVENous PRN    glucagon (GLUCAGEN) injection 1 mg  1 mg IntraMUSCular PRN    prochlorperazine (COMPAZINE) injection 10 mg  10 mg IntraVENous Q6H PRN    sodium chloride (NS) flush 5-10 mL  5-10 mL IntraVENous PRN    0.9% sodium chloride infusion 250 mL  250 mL IntraVENous PRN    sodium chloride (NS) flush 5-10 mL  5-10 mL IntraVENous Q8H    sodium chloride (NS) flush 5-10 mL  5-10 mL IntraVENous PRN    naloxone (NARCAN) injection 0.4 mg  0.4 mg IntraVENous PRN    albuterol (PROVENTIL VENTOLIN) nebulizer solution 2.5 mg  2.5 mg Nebulization Q4H PRN    sodium chloride (NS) flush 5-10 mL  5-10 mL IntraVENous PRN    anidulafungin (ERAXIS) 100 mg in 0.9% sodium chloride 130 mL IVPB  100 mg IntraVENous Q24H    pantoprazole (PROTONIX) 40 mg in sodium chloride 0.9 % 10 mL injection  40 mg IntraVENous Q12H      Allergies   Allergen Reactions    Cephalosporins Hives    Penicillins Hives    Tetracyclines Nausea and Vomiting        Review of Systems: A complete review of systems was obtained, negative except as described above. Physical Exam:     Visit Vitals    /71 (BP 1 Location: Right arm, BP Patient Position: At rest)    Pulse 90    Temp 98.8 °F (37.1 °C)    Resp 18    Ht 5' 2\" (1.575 m)    Wt 61.8 kg (136 lb 3.2 oz)    SpO2 93%    BMI 24.91 kg/m2     General: No distress  Eyes:anicteric sclerae  HENT: Atraumatic, OP clear  Neck: Supple  Respiratory: normal respiratory effort  CV: Normal rate, regular rhythm, no murmurs, no peripheral edema; rt IJ noted  Skin: Blisters on feet. No ecchymoses or petechiae. Normal temperature, turgor, and texture.   Psych: Alert, oriented, appropriate affect, normal judgment/insight    Results:     Lab Results   Component Value Date/Time    WBC 23.4 07/13/2017 12:41 AM    HGB 8.9 07/13/2017 12:41 AM    HCT 26.6 07/13/2017 12:41 AM    PLATELET 161 62/51/1956 12:41 AM    MCV 79.9 07/13/2017 12:41 AM    ABS. NEUTROPHILS 15.2 07/13/2017 12:41 AM    Hemoglobin (POC) 13.3 05/06/2014 07:55 AM    Hematocrit (POC) 39 05/06/2014 07:55 AM     Lab Results   Component Value Date/Time    Sodium 135 07/13/2017 12:41 AM    Potassium 3.7 07/13/2017 12:41 AM    Chloride 104 07/13/2017 12:41 AM    CO2 24 07/13/2017 12:41 AM    Glucose 120 07/13/2017 12:41 AM    BUN 12 07/13/2017 12:41 AM    Creatinine 0.66 07/13/2017 12:41 AM    GFR est AA >60 07/13/2017 12:41 AM    GFR est non-AA >60 07/13/2017 12:41 AM    Calcium 7.7 07/13/2017 12:41 AM    Sodium (POC) 141 05/06/2014 07:55 AM    Potassium (POC) 3.8 05/06/2014 07:55 AM    Chloride (POC) 110 05/06/2014 07:55 AM    Glucose (POC) 147 07/13/2017 11:09 AM    BUN (POC) 15 05/06/2014 07:55 AM    Creatinine (POC) 1.0 05/06/2014 07:55 AM    Calcium, ionized (POC) 1.23 05/06/2014 07:55 AM     Lab Results   Component Value Date/Time    Bilirubin, total 0.2 07/13/2017 12:41 AM    ALT (SGPT) 12 07/13/2017 12:41 AM    AST (SGOT) 32 07/13/2017 12:41 AM    Alk.  phosphatase 88 07/13/2017 12:41 AM    Protein, total 5.5 07/13/2017 12:41 AM    Albumin 1.4 07/13/2017 12:41 AM    Globulin 4.1 07/13/2017 12:41 AM     Lab Results   Component Value Date/Time    Reticulocyte count 2.3 07/04/2017 02:56 PM    Iron % saturation 3 07/04/2017 04:45 PM    TIBC 192 07/04/2017 04:45 PM    Ferritin 46 07/04/2017 04:45 PM    Vitamin B12 298 07/04/2017 04:45 PM    Folate 7.5 07/04/2017 04:45 PM    Homocysteine, plasma 6.4 01/22/2012 04:42 AM    Methylmalonic acid 0.45 01/22/2012 02:30 AM    Haptoglobin 250 07/04/2017 04:45 PM     07/11/2017 03:56 AM    Sed rate (ESR) 3 01/22/2012 02:30 AM    TSH 2.140 08/16/2011 11:37 AM    CORI, Direct None Detected 01/21/2012 02:10 PM    Lipase 150 09/06/2015 08:00 AM    HEP C VIRUS AB <0.1 03/19/2015 02:41 PM    HIV 1/O/2 Abs <1.00 03/19/2015 02:41 PM     Lab Results   Component Value Date/Time    INR 1.2 07/04/2017 02:54 PM    aPTT 32.1 07/04/2017 02:54 PM     7/6/2017 Path report    FINAL PATHOLOGIC DIAGNOSIS   1. Stomach, anterior partial resection:   Ulcer with no evidence of malignancy   Acute serositis   Changes compatible with perforation   2. Stomach, posterior, partial resection:   Ulcer with inflammatory sinus tract   Acute serositis   Immunohistochemistry for H. pylori will be reported in an addendum   No evidence of malignancy     7/11/2017 Pleural Fluid  CYTOLOGIC INTERPRETATION:   Reactive mesothelial cells, acute and chronic inflammation and fibrin   General Categorization   No cells diagnostic for malignancy       Assessment/Recommendations:   1) Neutrophilia  Appears reactive likely secondary to infection,  surgery and hx of Crohn's disease  Additional labs added and peripheral smear to further eval    2) Anemia, normocytic (s/p 2 units PRBCs 7/4/2017)  Likely secondary to bleeding related to her gastric ulcer and subsequent perforation and  iron deficiency as well. Recommend starting her on oral iron supplementation when she is able to take PO. Monitor CBC and transfuse if HGB <7.    3) Thrombocytosis  Likely reactive to her perforated gastric ulcer, and perhaps to iron deficiency. Monitor. 4) Perforated gastric ulcer  S/p repair by general surgery;not malignant     Plan reviewed with Dr Carl Stover  .     Signed By: Alphonsus Prader, NP     July 13, 2017

## 2017-07-13 NOTE — PROGRESS NOTES
Pt continues to be amenable to [facility]. CM continues to follow. Thanks.   Beto Torres LCSW

## 2017-07-13 NOTE — PROGRESS NOTES
Bedside and Verbal shift change report given to Jada Menchaca RN (oncoming nurse) by Modesto Ramírez RN (offgoing nurse). Report included the following information SBAR, Kardex, Procedure Summary, Intake/Output, MAR, Recent Results and Cardiac Rhythm NSR.

## 2017-07-13 NOTE — PROGRESS NOTES
Problem: Self Care Deficits Care Plan (Adult)  Goal: *Acute Goals and Plan of Care (Insert Text)  Occupational Therapy Goals  Initiated 7/6/2017 reviewed and upgraded 7-13  1. Patient will perform light grooming in unsupported sitting x5 mins with minimal assistance within 7 day(s). Met 7-13; upgrade to grooming in standing with S in 7 days  2. Patient will perform upper body dressing in unsupported sitting with min A within 7 day(s). Met; upgrade to mod I in 7 days  3. Patient will perform toilet transfers with minimal assistance with appropriate AD within 7 day(s). Met; upgrade to S in 7 days  4. Patient will participate in upper extremity therapeutic exercise/activities through comfortable ROM in supported sitting to prepare for ADL tasks with supervision/set-up for 6 minutes within 7 day(s). Cont 7 days  5. Patient will utilize energy conservation techniques during functional activities with verbal cues within 7 day(s). Cont 7 days  6. Patient will complete LE dressing with S in 7 days   OCCUPATIONAL THERAPY TREATMENT: WEEKLY REASSESSMENT  Patient: Theresa Duke (89 y.o. female)  Date: 7/13/2017  Diagnosis: Perforation bowel ([address]  Pneumonia  Wounds, multiple open, lower extremity  Anemia  Perforated Bowel Perforation bowel ([address]  Procedure(s) (LRB):  LAPAROTOMY EXPLORATORY, REPAIR OF GASTRIC PERFORATION (N/A) 9 Days Post-Op  Precautions:  fall, several drains  Chart, occupational therapy assessment, plan of care, and goals were reviewed. ASSESSMENT: Patient received asleep; easily awoken and willing to participate despite 6/10 abdominal pain. Note PCA pump being removed prior to session. Patient has met 4/5 STGs with OT and Barthel Index has improved from 5/100 to 45/100, indicating nice progress with self care and functional mobility over this week. Remains min A overall, limited by abdominal pain.  Patient may benefit from short rehab stay vs  to maximize safety and functional endurance with self care and functional mobility, depending on acute care progress. Progression toward goals:  [X]          Improving appropriately and progressing toward goals  [ ]          Improving slowly and progressing toward goals  [ ]          Not making progress toward goals and plan of care will be adjusted       PLAN:  Goals have been updated based on progression since last assessment. Patient continues to benefit from skilled intervention to address the above impairments. Continue to follow patient 5 times a week to address goals. Planned Interventions:  [ ]                  Self Care Training                  [ ]           Therapeutic Activities  [ ]                  Functional Mobility Training    [ ]           Cognitive Retraining  [ ]                  Therapeutic Exercises           [ ]           Endurance Activities  [ ]                  Balance Training                   [ ]           Neuromuscular Re-Education  [ ]                  Visual/Perceptual Training     [ ]      Home Safety Training  [ ]                  Patient Education                 [ ]           Family Training/Education  [ ]                  Other (comment):  Discharge Recommendations: Home Health, Inpatient Rehab and To Be Determined  Further Equipment Recommendations for Discharge: BSC, shower chair       SUBJECTIVE:   Patient stated I can use this potty chair now.  (adjusted BSC to lowest level; patient now able to touch floor with feet, increasing comfort level/tolerance with toileting)      OBJECTIVE DATA SUMMARY:   Cognitive/Behavioral Status:  Neurologic State: Alert; Appropriate for age  Orientation Level: Oriented X4  Cognition: Appropriate decision making; Appropriate for age attention/concentration; Appropriate safety awareness; Follows commands  Perception: Appears intact  Perseveration: No perseveration noted  Safety/Judgement: Fall prevention;Home safety; Awareness of environment  Functional Mobility and Transfers for ADLs: Bed Mobility:  Rolling: Supervision  Supine to Sit: Contact guard assistance  Sit to Supine: Contact guard assistance  Scooting: Contact guard assistance     Transfers:  Sit to Stand: Minimum assistance              Functional Transfers  Bathroom Mobility: Minimum assistance     Balance:  Sitting: Intact; Impaired  Sitting - Static: Good (unsupported)  Sitting - Dynamic: Fair (occasional) (depending on pain level)  Standing: Impaired  Standing - Static: Constant support;Good  Standing - Dynamic : Fair  ADL Intervention:  Feeding  Feeding Assistance: Supervision/set-up (still with clear liquid diet)     Grooming  Grooming Assistance: Supervision/set up     Upper Body Bathing  Bathing Assistance: Minimum assistance     Lower Body Bathing  Bathing Assistance: Minimum assistance     Upper Body Dressing Assistance  Dressing Assistance: Supervision/set-up     Lower Body Dressing Assistance  Dressing Assistance: Minimum assistance     Toileting  Toileting Assistance: Minimum assistance     Cognitive Retraining  Maintains Attention For (Time): Greater than 10 minutes  Safety/Judgement: Fall prevention;Home safety; Awareness of environment        Therapeutic Exercises:   Trained patient in health benefits of consistent participation B UE AROM HEP 30 cumulative minutes daily; able to demonstrate full AROM B UE, although slow with all movements due to abdominal pain; agreeable to increasing activity level     Pain:  Pain Scale 1: Numeric (0 - 10)  Pain Intensity 1: 6  Pain Location 1: Abdomen  Pain Orientation 1: Anterior  Pain Description 1: Aching; Sore  Pain Intervention(s) 1: Medication (see MAR)     Activity Tolerance:    Improving but remains a limiting factor; ~ 20 minutes at a time; encourage her to take all meals in the chair  Please refer to the flowsheet for vital signs taken during this treatment.   After treatment:   [ ] Patient left in no apparent distress sitting up in chair  [X] Patient left in no apparent distress in bed  [X] Call bell left within reach  [X] Nursing notified  [ ] Caregiver present  [ ] Bed alarm activated      COMMUNICATION/COLLABORATION:   The patients plan of care was discussed with: Physical Therapist and Registered Nurse     Macey Elizondo OTR/L  Time Calculation: 56 mins

## 2017-07-13 NOTE — PROGRESS NOTES
Problem: Mobility Impaired (Adult and Pediatric)  Goal: *Acute Goals and Plan of Care (Insert Text)  Physical Therapy Goals  Re-eval 7/13 Updated goals  Initiated 7/13/2017  1. Patient will move from supine to sit and sit to supine in bed with independence within 7 day(s). 2. Patient will transfer from bed to chair and chair to bed with modified independence using the least restrictive device within 7 day(s). 3. Patient will perform sit to stand with modified independence within 7 day(s). 4. Patient will ambulate with modified independence for 100 feet with the least restrictive device within 7 day(s). Initiated 7/6/2017  1. Patient will move from supine to sit and sit to supine in bed with minimal assistance/contact guard assist within 7 day(s). - MET  2. Patient will transfer from bed to chair and chair to bed with minimal assistance/contact guard assist using the least restrictive device within 7 day(s). -MET  3. Patient will perform sit to stand with moderate assistance within 7 day(s). -MET  4. Patient will ambulate with moderate assistance for 15 feet with the least restrictive device within 7 day(s). -MET  5. Patient will demonstrate independence with home exercise program for LE strengthening within 7 days. -Progressing   PHYSICAL THERAPY TREATMENT: WEEKLY REASSESSMENT  Patient: Mara Segura (36 y.o. female)  Date: 7/13/2017  Diagnosis: Perforation bowel ([facility])  Pneumonia  Wounds, multiple open, lower extremity  Anemia  Perforated Bowel Perforation bowel ([address]  Procedure(s) (LRB):  LAPAROTOMY EXPLORATORY, REPAIR OF GASTRIC PERFORATION (N/A) 9 Days Post-Op  Precautions:        ASSESSMENT:  Pt sleeping but wakes to voice and agreeable to therapy. Pt stated she needed to use the bathroom and set the goal to make it into the bathroom and use the toilet vs BSC. Pt reports the [facility] is very difficult to use d/t to the height.  Pt demonstrates good independence bed mobility to reach sitting EOB with CGA for management of lines. CGA for standing and ambulating in room with RW. No LOB or hands on assist to reach the bathroom. Deferred ambulation in hallway but agreed to sit up in the chair for 30 minutes. Pt remains fearful and anxious about sitting up. Reassured pt and given call bell. Notified RN and OT about best transfer. Pt agreed on goal to make it into the hallway tomorrow with PT and secondary assist for managing lines and drains. Patient's progression toward goals since last assessment: CGA for all bed mobility and transfers with A x 1. Pt was Mod A x 2 for transfers. Better pain control. Increased activity tolerance. Sitting up in chair during the day. PLAN:  Goals have been updated based on progression since last assessment. Patient continues to benefit from skilled intervention to address the above impairments. Continue to follow the patient 5 times a week to address goals. Planned Interventions:  [X]              Bed Mobility Training             [X]       Neuromuscular Re-Education  [X]              Transfer Training                   [ ]       Orthotic/Prosthetic Training  [X]              Gait Training                         [ ]       Modalities  [X]              Therapeutic Exercises           [ ]       Edema Management/Control  [X]              Therapeutic Activities            [X]       Patient and Family Training/Education  [ ]              Other (comment):  Discharge Recommendations: Rehab  Further Equipment Recommendations for Discharge: TBD       SUBJECTIVE:   Patient stated I like the energetic therapists better because I don't want to sit at the side of the bed and breath 20 times. I want to get up.       OBJECTIVE DATA SUMMARY:   Critical Behavior:  Neurologic State: Alert  Orientation Level: Oriented X4  Cognition: Appropriate decision making, Appropriate for age attention/concentration, Appropriate safety awareness, Follows commands  Safety/Judgement: Awareness of environment     Strength:   Strength: Generally decreased, functional                       Functional Mobility Training:  Bed Mobility:     Supine to Sit: Contact guard assistance     Scooting: Contact guard assistance        Transfers:  Sit to Stand: Contact guard assistance  Stand to Sit: Contact guard assistance  Stand Pivot Transfers: Contact guard assistance                          Balance:  Sitting: Intact  Standing: Intact; With support  Standing - Static: Good  Standing - Dynamic : Fair (standing without RW)  Ambulation/Gait Training:  Distance (ft): 30 Feet (ft)  Assistive Device: Walker, rolling  Ambulation - Level of Assistance: Contact guard assistance        Gait Abnormalities: Antalgic;Decreased step clearance        Base of Support: Narrowed     Speed/Marjorie: Slow  Step Length: Left shortened;Right shortened                             Activity Tolerance:   Good  Please refer to the flowsheet for vital signs taken during this treatment.   After treatment:   [X]  Patient left in no apparent distress sitting up in chair  [ ]  Patient left in no apparent distress in bed  [X]  Call bell left within reach  [X]  Nursing notified  [ ]  Caregiver present  [ ]  Bed alarm activated      COMMUNICATION/COLLABORATION:   The patients plan of care was discussed with: Occupational Therapist and Registered Nurse     Carla Barros PT   Time Calculation: 24 mins

## 2017-07-13 NOTE — DIABETES MGMT
[facility]        Recommendations/ Comments: If appropriate, please consider adding a new A1C test to next lab draw to aid in  assessment of home management. Last value was obtained in 2012. A1c:   Lab Results   Component Value Date/Time    Hemoglobin A1c 4.9 01/21/2012 02:10 PM       Will continue to follow as needed. Thank you. ROBBY Fuller, [address] [place] [address]   [phone] (office)  [phone] (pager)

## 2017-07-13 NOTE — PROGRESS NOTES
Bedside and Verbal shift change report given to Vandana Coffey RN (oncoming nurse) by Giselle London RN (offgoing nurse). Report included the following information SBAR, Kardex, Procedure Summary, Intake/Output, MAR, Accordion and Recent Results.

## 2017-07-13 NOTE — PROGRESS NOTES
is providing follow up support with pt as requested. Listening presence, encouragement and prayer provided.      [id number] Aravind Dubon M.Div, M.S, [address] [address] available at [phone]([phone])

## 2017-07-13 NOTE — PROGRESS NOTES
Pt requesting valium. IV valium discontinued and home lexapro resumed. Pt inquiring about her home ativan, trazadone and klonopin. Call placed to Dr. Mirna Fowler with family practice. MD to consider restarting some of home meds. MD to place appropriate orders.

## 2017-07-13 NOTE — PROGRESS NOTES
General Surgery Daily Progress Note    Patient: Theresa Duke MRN: [id number]  SSN: [id number]    YOB: 1970  Age: 56 y.o. Sex: female      Admit Date: 7/4/2017    Subjective:   Abdominal pain controlled, + BM/flatus. Tolerating clears. Bowel function continues.      Current Facility-Administered Medications   Medication Dose Route Frequency    meropenem (MERREM) 500 mg in 0.9% sodium chloride (MBP/ADV) 50 mL  500 mg IntraVENous Q6H    TPN ADULT - CENTRAL AA 5% D15% W/ ELECTROLYTES AND CA   IntraVENous CONTINUOUS    levalbuterol (XOPENEX) nebulizer soln 1.25 mg/3 mL  1.25 mg Nebulization BID RT    fentaNYL citrate (PF) injection 100 mcg  100 mcg IntraVENous RAD PRN    diazePAM (VALIUM) injection 1 mg  1 mg IntraVENous Q4H PRN    vancomycin (VANCOCIN) 1,000 mg in 0.9% sodium chloride (MBP/ADV) 250 mL  1,000 mg IntraVENous Q8H    lidocaine (LIDODERM) 5 % patch 2 Patch  2 Patch TransDERmal Q24H    heparin (porcine) injection 5,000 Units  5,000 Units SubCUTAneous Q8H    fat emulsion 20% (LIPOSYN, INTRALIPID) infusion 500 mL  500 mL IntraVENous Q MON, WED & FRI    nicotine (NICODERM CQ) 21 mg/24 hr patch 1 Patch  1 Patch TransDERmal DAILY    glucose chewable tablet 16 g  4 Tab Oral PRN    dextrose (D50W) injection syrg 12.5-25 g  12.5-25 g IntraVENous PRN    glucagon (GLUCAGEN) injection 1 mg  1 mg IntraMUSCular PRN    insulin lispro (HUMALOG) injection   SubCUTAneous Q6H    prochlorperazine (COMPAZINE) injection 10 mg  10 mg IntraVENous Q6H PRN    sodium chloride (NS) flush 5-10 mL  5-10 mL IntraVENous PRN    0.9% sodium chloride infusion 250 mL  250 mL IntraVENous PRN    sodium chloride (NS) flush 5-10 mL  5-10 mL IntraVENous Q8H    sodium chloride (NS) flush 5-10 mL  5-10 mL IntraVENous PRN    naloxone (NARCAN) injection 0.4 mg  0.4 mg IntraVENous PRN    albuterol (PROVENTIL VENTOLIN) nebulizer solution 2.5 mg  2.5 mg Nebulization Q4H PRN    sodium chloride (NS) flush 5-10 mL 5-10 mL IntraVENous PRN    anidulafungin (ERAXIS) 100 mg in 0.9% sodium chloride 130 mL IVPB  100 mg IntraVENous Q24H    HYDROmorphone (PF) 15 mg/30 ml (DILAUDID) PCA   IntraVENous TITRATE    pantoprazole (PROTONIX) 40 mg in sodium chloride 0.9 % 10 mL injection  40 mg IntraVENous Q12H        Objective:   07/13 0701 - 07/13 1900  In: 0   Out: 40 [Drains:40]  07/11 1901 - 07/13 0700  In: 5329.4 [I.V.:5329.4]  Out: 185 [Drains:185]  Patient Vitals for the past 8 hrs:   BP Temp Pulse Resp SpO2   07/13/17 0752 103/66 98.6 °F (37 °C) 82 18 96 %   07/13/17 0720 - - - - 96 %   07/13/17 0343 117/73 98 °F (36.7 °C) (!) 109 19 97 %       Physical Exam:  General: Awake, cooperative, speech slurred  Lungs: Clear to auscultation bilaterally, unlabored  Heart:  RRR  Abdomen: Soft, ATTP, non-distended, incisions c/d/i. Osmar drains x 2 serous. Extremities: Warm, moves all, no edema, Ulcers on toes   Skin:  Warm and dry, no rash. Left posterior shoulder wound from injury prior to admission is healing well and shows no sign of infection. Labs:   Recent Labs      07/13/17   0041   WBC  23.4*   HGB  8.9*   HCT  26.6*   PLT  631*     Recent Labs      07/13/17   0041   NA  135*   K  3.7   CL  104   CO2  24   GLU  120*   BUN  12   CREA  0.66   CA  7.7*   MG  1.4*   PHOS  3.6   ALB  1.4*   TBILI  0.2   SGOT  32   ALT  12       Assessment / Plan:   · POD#9 ex lap, primary repair perforated gastric ulcer x 2, omental intra-abdominal flap  · No leak demonstrated on CT. Multiple small intra-abdominal collections will be treated with ABX  · S/p left thoracentesis. Small left apical pneumothorax is stable  · Advance to full liquids which will be her diet at discharge. OK to start weaning TPN  · Leukocytosis- Change Levo/Flagyl to Meropenem. Continue vanc and Eraxis.    · Can d/c CVL once off TPN if there is sufficient peripheral access, otherwise will have IR change CVL  · Transition to PO pain meds and d/c PCA, Valium for abdominal muscle spasms, Lidoderm patches. · Heparin for DVT prophylaxis  · H pylori serologies negative. Continue BID PPI  · Pathology benign  · Anticipate continued inpatient IV ABX through the weekend and will repeat CT next week.   · PT/OT, encourage IS

## 2017-07-14 LAB
ALBUMIN SERPL BCP-MCNC: 1.6 G/DL (ref 3.5–5)
ALBUMIN/GLOB SERPL: 0.5 {RATIO} (ref 1.1–2.2)
ALP SERPL-CCNC: 111 U/L (ref 45–117)
ALT SERPL-CCNC: 11 U/L (ref 12–78)
ANION GAP BLD CALC-SCNC: 9 MMOL/L (ref 5–15)
AST SERPL W P-5'-P-CCNC: 18 U/L (ref 15–37)
BACTERIA SPEC CULT: NORMAL
BASOPHILS # BLD AUTO: 0 K/UL (ref 0–0.1)
BASOPHILS # BLD: 0 % (ref 0–1)
BILIRUB SERPL-MCNC: 0.2 MG/DL (ref 0.2–1)
BUN SERPL-MCNC: 13 MG/DL (ref 6–20)
BUN/CREAT SERPL: 20 (ref 12–20)
CALCIUM SERPL-MCNC: 7.9 MG/DL (ref 8.5–10.1)
CHLORIDE SERPL-SCNC: 107 MMOL/L (ref 97–108)
CO2 SERPL-SCNC: 24 MMOL/L (ref 21–32)
CREAT SERPL-MCNC: 0.66 MG/DL (ref 0.55–1.02)
DATE LAST DOSE: ABNORMAL
DIFFERENTIAL METHOD BLD: ABNORMAL
EOSINOPHIL # BLD: 1 K/UL (ref 0–0.4)
EOSINOPHIL NFR BLD: 5 % (ref 0–7)
ERYTHROCYTE [DISTWIDTH] IN BLOOD BY AUTOMATED COUNT: 17.6 % (ref 11.5–14.5)
GLOBULIN SER CALC-MCNC: 3.2 G/DL (ref 2–4)
GLUCOSE BLD STRIP.AUTO-MCNC: 100 MG/DL (ref 65–100)
GLUCOSE BLD STRIP.AUTO-MCNC: 103 MG/DL (ref 65–100)
GLUCOSE BLD STRIP.AUTO-MCNC: 114 MG/DL (ref 65–100)
GLUCOSE BLD STRIP.AUTO-MCNC: 85 MG/DL (ref 65–100)
GLUCOSE SERPL-MCNC: 77 MG/DL (ref 65–100)
GRAM STN SPEC: NORMAL
GRAM STN SPEC: NORMAL
HCT VFR BLD AUTO: 26.8 % (ref 35–47)
HGB BLD-MCNC: 8.7 G/DL (ref 11.5–16)
LYMPHOCYTES # BLD AUTO: 19 % (ref 12–49)
LYMPHOCYTES # BLD: 3.7 K/UL (ref 0.8–3.5)
MAGNESIUM SERPL-MCNC: 1.5 MG/DL (ref 1.6–2.4)
MCH RBC QN AUTO: 26.4 PG (ref 26–34)
MCHC RBC AUTO-ENTMCNC: 32.5 G/DL (ref 30–36.5)
MCV RBC AUTO: 81.5 FL (ref 80–99)
METAMYELOCYTES NFR BLD MANUAL: 2 %
MONOCYTES # BLD: 1.4 K/UL (ref 0–1)
MONOCYTES NFR BLD AUTO: 7 % (ref 5–13)
MYELOCYTES NFR BLD MANUAL: 3 %
NEUTS BAND NFR BLD MANUAL: 4 % (ref 0–6)
NEUTS SEG # BLD: 12.5 K/UL (ref 1.8–8)
NEUTS SEG NFR BLD AUTO: 60 % (ref 32–75)
PERIPHERAL SMEAR,PSM: NORMAL
PHOSPHATE SERPL-MCNC: 4.5 MG/DL (ref 2.6–4.7)
PLATELET # BLD AUTO: 707 K/UL (ref 150–400)
PLATELET COMMENTS,PCOM: ABNORMAL
POTASSIUM SERPL-SCNC: 4.2 MMOL/L (ref 3.5–5.1)
PROT SERPL-MCNC: 4.8 G/DL (ref 6.4–8.2)
RBC # BLD AUTO: 3.29 M/UL (ref 3.8–5.2)
RBC MORPH BLD: ABNORMAL
REPORTED DOSE,DOSE: ABNORMAL UNITS
REPORTED DOSE/TIME,TMG: 2200
SERVICE CMNT-IMP: ABNORMAL
SERVICE CMNT-IMP: ABNORMAL
SERVICE CMNT-IMP: NORMAL
SODIUM SERPL-SCNC: 140 MMOL/L (ref 136–145)
VANCOMYCIN TROUGH SERPL-MCNC: 23.5 UG/ML (ref 5–10)
WBC # BLD AUTO: 19.6 K/UL (ref 3.6–11)
WBC MORPH BLD: ABNORMAL

## 2017-07-14 PROCEDURE — 36415 COLL VENOUS BLD VENIPUNCTURE: CPT | Performed by: SURGERY

## 2017-07-14 PROCEDURE — 83735 ASSAY OF MAGNESIUM: CPT | Performed by: SURGERY

## 2017-07-14 PROCEDURE — 65660000000 HC RM CCU STEPDOWN

## 2017-07-14 PROCEDURE — 74011000258 HC RX REV CODE- 258: Performed by: FAMILY MEDICINE

## 2017-07-14 PROCEDURE — 74011000250 HC RX REV CODE- 250: Performed by: FAMILY MEDICINE

## 2017-07-14 PROCEDURE — 74011250637 HC RX REV CODE- 250/637: Performed by: STUDENT IN AN ORGANIZED HEALTH CARE EDUCATION/TRAINING PROGRAM

## 2017-07-14 PROCEDURE — 97116 GAIT TRAINING THERAPY: CPT

## 2017-07-14 PROCEDURE — 74011000250 HC RX REV CODE- 250: Performed by: STUDENT IN AN ORGANIZED HEALTH CARE EDUCATION/TRAINING PROGRAM

## 2017-07-14 PROCEDURE — 82962 GLUCOSE BLOOD TEST: CPT

## 2017-07-14 PROCEDURE — 74011250636 HC RX REV CODE- 250/636: Performed by: FAMILY MEDICINE

## 2017-07-14 PROCEDURE — 97535 SELF CARE MNGMENT TRAINING: CPT

## 2017-07-14 PROCEDURE — 74011250636 HC RX REV CODE- 250/636: Performed by: SURGERY

## 2017-07-14 PROCEDURE — 74011250636 HC RX REV CODE- 250/636: Performed by: PHYSICIAN ASSISTANT

## 2017-07-14 PROCEDURE — 74011000258 HC RX REV CODE- 258: Performed by: SURGERY

## 2017-07-14 PROCEDURE — 84100 ASSAY OF PHOSPHORUS: CPT | Performed by: SURGERY

## 2017-07-14 PROCEDURE — 74011250637 HC RX REV CODE- 250/637: Performed by: PHYSICIAN ASSISTANT

## 2017-07-14 PROCEDURE — 94640 AIRWAY INHALATION TREATMENT: CPT

## 2017-07-14 PROCEDURE — 74011250637 HC RX REV CODE- 250/637: Performed by: FAMILY MEDICINE

## 2017-07-14 PROCEDURE — 80053 COMPREHEN METABOLIC PANEL: CPT | Performed by: SURGERY

## 2017-07-14 PROCEDURE — 74011250636 HC RX REV CODE- 250/636: Performed by: STUDENT IN AN ORGANIZED HEALTH CARE EDUCATION/TRAINING PROGRAM

## 2017-07-14 PROCEDURE — 80202 ASSAY OF VANCOMYCIN: CPT | Performed by: FAMILY MEDICINE

## 2017-07-14 PROCEDURE — 85025 COMPLETE CBC W/AUTO DIFF WBC: CPT | Performed by: SURGERY

## 2017-07-14 PROCEDURE — C9113 INJ PANTOPRAZOLE SODIUM, VIA: HCPCS | Performed by: STUDENT IN AN ORGANIZED HEALTH CARE EDUCATION/TRAINING PROGRAM

## 2017-07-14 RX ORDER — LORAZEPAM 1 MG/1
1 TABLET ORAL
Status: DISCONTINUED | OUTPATIENT
Start: 2017-07-14 | End: 2017-07-21 | Stop reason: HOSPADM

## 2017-07-14 RX ORDER — MAGNESIUM SULFATE HEPTAHYDRATE 40 MG/ML
2 INJECTION, SOLUTION INTRAVENOUS ONCE
Status: COMPLETED | OUTPATIENT
Start: 2017-07-14 | End: 2017-07-14

## 2017-07-14 RX ADMIN — LEVALBUTEROL 1.25 MG: 1.25 SOLUTION RESPIRATORY (INHALATION) at 07:25

## 2017-07-14 RX ADMIN — MEROPENEM 500 MG: 500 INJECTION, POWDER, FOR SOLUTION INTRAVENOUS at 17:48

## 2017-07-14 RX ADMIN — LORAZEPAM 1 MG: 1 TABLET ORAL at 18:50

## 2017-07-14 RX ADMIN — Medication 10 ML: at 13:15

## 2017-07-14 RX ADMIN — VANCOMYCIN HYDROCHLORIDE 1000 MG: 1 INJECTION, POWDER, LYOPHILIZED, FOR SOLUTION INTRAVENOUS at 06:53

## 2017-07-14 RX ADMIN — HEPARIN SODIUM 5000 UNITS: 5000 INJECTION, SOLUTION INTRAVENOUS; SUBCUTANEOUS at 13:12

## 2017-07-14 RX ADMIN — MEROPENEM 500 MG: 500 INJECTION, POWDER, FOR SOLUTION INTRAVENOUS at 11:47

## 2017-07-14 RX ADMIN — MEROPENEM 500 MG: 500 INJECTION, POWDER, FOR SOLUTION INTRAVENOUS at 01:10

## 2017-07-14 RX ADMIN — VANCOMYCIN HYDROCHLORIDE 1000 MG: 1 INJECTION, POWDER, LYOPHILIZED, FOR SOLUTION INTRAVENOUS at 13:15

## 2017-07-14 RX ADMIN — Medication 10 ML: at 22:39

## 2017-07-14 RX ADMIN — VANCOMYCIN HYDROCHLORIDE 1000 MG: 1 INJECTION, POWDER, LYOPHILIZED, FOR SOLUTION INTRAVENOUS at 22:38

## 2017-07-14 RX ADMIN — HEPARIN SODIUM 5000 UNITS: 5000 INJECTION, SOLUTION INTRAVENOUS; SUBCUTANEOUS at 05:22

## 2017-07-14 RX ADMIN — Medication 10 ML: at 06:54

## 2017-07-14 RX ADMIN — MAGNESIUM SULFATE HEPTAHYDRATE 2 G: 40 INJECTION, SOLUTION INTRAVENOUS at 11:47

## 2017-07-14 RX ADMIN — SODIUM CHLORIDE 40 MG: 9 INJECTION, SOLUTION INTRAMUSCULAR; INTRAVENOUS; SUBCUTANEOUS at 09:05

## 2017-07-14 RX ADMIN — LORAZEPAM 1 MG: 1 TABLET ORAL at 09:17

## 2017-07-14 RX ADMIN — SODIUM CHLORIDE 100 MG: 900 INJECTION, SOLUTION INTRAVENOUS at 19:14

## 2017-07-14 RX ADMIN — LORAZEPAM 1 MG: 1 TABLET ORAL at 22:40

## 2017-07-14 RX ADMIN — OXYCODONE HYDROCHLORIDE 10 MG: 5 TABLET ORAL at 18:50

## 2017-07-14 RX ADMIN — HYDROMORPHONE HYDROCHLORIDE 0.5 MG: 1 INJECTION, SOLUTION INTRAMUSCULAR; INTRAVENOUS; SUBCUTANEOUS at 22:40

## 2017-07-14 RX ADMIN — HEPARIN SODIUM 5000 UNITS: 5000 INJECTION, SOLUTION INTRAVENOUS; SUBCUTANEOUS at 22:39

## 2017-07-14 RX ADMIN — HYDROMORPHONE HYDROCHLORIDE 0.5 MG: 1 INJECTION, SOLUTION INTRAMUSCULAR; INTRAVENOUS; SUBCUTANEOUS at 05:23

## 2017-07-14 RX ADMIN — MEROPENEM 500 MG: 500 INJECTION, POWDER, FOR SOLUTION INTRAVENOUS at 05:23

## 2017-07-14 RX ADMIN — ESCITALOPRAM 20 MG: 10 TABLET, FILM COATED ORAL at 09:05

## 2017-07-14 RX ADMIN — OXYCODONE HYDROCHLORIDE 10 MG: 5 TABLET ORAL at 14:38

## 2017-07-14 RX ADMIN — OXYCODONE HYDROCHLORIDE 10 MG: 5 TABLET ORAL at 09:17

## 2017-07-14 RX ADMIN — PROCHLORPERAZINE EDISYLATE 10 MG: 5 INJECTION INTRAMUSCULAR; INTRAVENOUS at 20:31

## 2017-07-14 RX ADMIN — HYDROMORPHONE HYDROCHLORIDE 0.5 MG: 1 INJECTION, SOLUTION INTRAMUSCULAR; INTRAVENOUS; SUBCUTANEOUS at 11:44

## 2017-07-14 RX ADMIN — SODIUM CHLORIDE 40 MG: 9 INJECTION, SOLUTION INTRAMUSCULAR; INTRAVENOUS; SUBCUTANEOUS at 20:31

## 2017-07-14 NOTE — PROGRESS NOTES
[facility] FAMILY MEDICINE RESIDENCY PROGRAM   Daily Progress Note    Date: 7/14/2017    Assessment/Plan:   Criss Yi is a 56 y.o. female who is hospitalized for sepsis 2/2 perforated gastric ulcers/peritonitis    24 Hour Events: No events overnight. Sepsis 2/2 Peritonitis - Blood cultures with no growth x5 days.  Body fluid culture with no growth x4 day.  Urine culture with no significant growth. Leukocytosis stable. Per surgery, drain showed low serous output with WBCs remained elevated at 23. Chest CT was obtained and found pleural effusions bilaterally. U/S guided thoracentesis was preformed and 700ml of serosanguinous fluid was removed from left pleural space. Repeat serial CXRs showed stable improvement of small apical pneumothorax. Pain in upper left lateral chest likely referred pleuritic pain. Surgery advanced diet to clear liquids.   -Per surgery, Abx-Eraxis, vancomycin, meropenum. -F/u blood, body fluid (surgical) cultures to completion. -D/c TPN, diet to full liquids  -Strict I&O. -Daily labs. -Replete Mg  -Continue IV Abx over the weekend with CT scan on Monday     Perforated Gastric Ulcers s/p Operative Repair on 7/4/17 - surgery following.  Patient is NPO until POD7.  Gastric ulcers thought to be related to chronic steroid use and Chron's disease.  Drain OP 175cc over the last 24 hours. -Appreciate surgery recommendations & support.  -Per surgery, wean TPN and start full liquid diet. -IV protonix. -Dilaudid PO for pain control per general surgery.  -Encouraging incentive spirometry.  -Encouraging movement and participation in PT    Elevated Leukocytosis - Per heme, likely reactive. There was concern that WBC reamined elevated despite being on broad spectrum antibiotics. -Daily CBC     Community Acquired Pneumonia - Improvement of left basilar airspace disease noted on initial CXR. Could have been playing a role in SIRS/sepsis picture. Improved on serial CXR.     Severe Acute Malnutrition - criteria evidenced by nutritional intake <50% of recommended intake for >5days, weight loss of 4.7% in 2 weeks, and moderate-severe edema. Improvement during hospitalization. Weight today 136lb (up from from 121 on 7/4). - Encourage full liquid diet with Ensure and Might Shakes     Anemia - lab work up done on admission.  Notable for reticulocyte count of 2.3, LD of 294.  Per hematology, most likely 2/2 acute blood loss from gastric ulcer.  Also a component of iron deficiency.  S/p 2 units pRBC on admission--Hgb responded appropriately.  Has been stable since.  Hgb down to 8.8 this morning.  -Hematology signed off.  -Have 2 units of pRBCs on hold. -Daily CBC.     Chron's Disease - no pharmacotherapy PTA.  Poor follow up history with GI.  Likely playing a role in patient's current clinical course.  -Will consider GI consult later in course when patient becomes more stable.  Needs better outpatient follow up.     Severe Protein Calorie Malnutrition - as evidenced by lower extremity swelling and severe hypoalbuminemia  This is complicated by need for 7 days of NPO. -Continuing TPN. -Daily CMP. -Daily Phos.     Lyme Disease - diagnosed ~1 month ago at [facility].  Was treated with a 21 day course of doxycycline.  Completed this course fully, but developed some lower extremity skin breakdown following treatment.  Tetracyclines are on patient's allergy list.  -Not repeating tic studies     Tobacco Abuse - reports to smoke 1.5 PPD.   -Encouraging cessation.  -Prescribed daily nicotine patch while patient admitted.     Healing Wounds on Bilateral Feet - most likely acute reaction to doxycycline treatment that patient underwent prior to admission.  Per patient, these wounds are healing.  Remain painful.  -Wound care consulted, appreciate support.  -Tetracyclines on allergy list.      3cm Laceration of Left Upper Back - healing.  Patient notes that this was from a fall.  Not amenable to sutures at this time.  -Wound care consulted, appreciate support.      Bilateral Lower Extremity Swelling/Pain - noted on admisison.  Thought to be 2/2 hypoalbuminemia.  Duplex studies of lower extremities negative for DVT. Improved.      Hypomagnesemia -  likely 2/2 nutritional management with TPN. Resolved with repletion      Hypophosphatemia -  likely 2/2 nutritional management with TPN. Resolved  -Daily Phosphorous      Hypokalemia -  likely 2/2 nutritional management with TPN. Resolved with repletionlow this morning.  -Daily CMP      Depression/Panic Attacks - patient is showing some symptoms of this--mostly at night.  Takes klonopin, adderall, Lexapro, ativan, and trazodone at home. [facility] for withdrawal from SSRI. -Currently holding these medications as patient is NPO.  -Resuming lorazepam TID. Carlynn Stairs need to carefully monitor respirations with this in addition to dilaudid.      History of Falls - patient and family gave detailed history of multiple falls over the last few months/weeks. -PT/OT consulted.      FEN/GI - Full liquid diet  Activity - Out of bed with assistance  DVT prophylaxis - Heparin  GI prophylaxis - Protonix  Fall prophylaxis - Fall precautions ordered. Disposition- D/c placement Rehab  Code Status [facility]     Patient seen and discussed with Dr. Philip Barraza (Attending physician)      Luís Baez MD  Family Medicine Resident  7/14/2017 5:51 AM         CC: \"I had a panic attack yesterday\"    Subjective  Notes that she takes lorazepam four times daily to control her anxiety. She states that she has been on this regimen for quite some time and it works for her. She has been tolerating full fluids well. Passing stool and flatus. Patient denies chills, headaches, chest pain, shortness of breath, palpitations, nausea and vomiting.      Inpatient Medications  Current Facility-Administered Medications   Medication Dose Route Frequency    meropenem (MERREM) 500 mg in 0.9% sodium chloride (MBP/ADV) 50 mL  500 mg IntraVENous Q6H    oxyCODONE IR (ROXICODONE) tablet 5 mg  5 mg Oral Q4H PRN    HYDROmorphone (PF) (DILAUDID) injection 0.5 mg  0.5 mg IntraVENous Q3H PRN    oxyCODONE IR (ROXICODONE) tablet 10 mg  10 mg Oral Q4H PRN    insulin lispro (HUMALOG) injection   SubCUTAneous AC&HS    escitalopram oxalate (LEXAPRO) tablet 20 mg  20 mg Oral DAILY    Vancomycin Trough 05:30 7/14  1 Each Other ONCE    LORazepam (ATIVAN) tablet 1 mg  1 mg Oral BID PRN    levalbuterol (XOPENEX) nebulizer soln 1.25 mg/3 mL  1.25 mg Nebulization BID RT    fentaNYL citrate (PF) injection 100 mcg  100 mcg IntraVENous RAD PRN    vancomycin (VANCOCIN) 1,000 mg in 0.9% sodium chloride (MBP/ADV) 250 mL  1,000 mg IntraVENous Q8H    lidocaine (LIDODERM) 5 % patch 2 Patch  2 Patch TransDERmal Q24H    heparin (porcine) injection 5,000 Units  5,000 Units SubCUTAneous Q8H    fat emulsion 20% (LIPOSYN, INTRALIPID) infusion 500 mL  500 mL IntraVENous Q MON, WED & FRI    nicotine (NICODERM CQ) 21 mg/24 hr patch 1 Patch  1 Patch TransDERmal DAILY    glucose chewable tablet 16 g  4 Tab Oral PRN    dextrose (D50W) injection syrg 12.5-25 g  12.5-25 g IntraVENous PRN    glucagon (GLUCAGEN) injection 1 mg  1 mg IntraMUSCular PRN    prochlorperazine (COMPAZINE) injection 10 mg  10 mg IntraVENous Q6H PRN    sodium chloride (NS) flush 5-10 mL  5-10 mL IntraVENous PRN    0.9% sodium chloride infusion 250 mL  250 mL IntraVENous PRN    sodium chloride (NS) flush 5-10 mL  5-10 mL IntraVENous Q8H    sodium chloride (NS) flush 5-10 mL  5-10 mL IntraVENous PRN    naloxone (NARCAN) injection 0.4 mg  0.4 mg IntraVENous PRN    albuterol (PROVENTIL VENTOLIN) nebulizer solution 2.5 mg  2.5 mg Nebulization Q4H PRN    sodium chloride (NS) flush 5-10 mL  5-10 mL IntraVENous PRN    anidulafungin (ERAXIS) 100 mg in 0.9% sodium chloride 130 mL IVPB  100 mg IntraVENous Q24H    pantoprazole (PROTONIX) 40 mg in sodium chloride 0.9 % 10 mL injection  40 mg IntraVENous Q12H         Allergies  Allergies   Allergen Reactions    Cephalosporins Hives    Penicillins Hives    Tetracyclines Nausea and Vomiting         Objective  Vitals:  Patient Vitals for the past 8 hrs:   Temp Pulse Resp BP SpO2   07/14/17 0424 98.4 °F (36.9 °C) (!) 101 18 113/73 99 %   07/13/17 2306 - 99 16 105/72 99 %   07/13/17 2300 - (!) 108 - - -         I/O:    Intake/Output Summary (Last 24 hours) at 07/14/17 0518  Last data filed at 07/14/17 0208   Gross per 24 hour   Intake          3560.57 ml   Output               40 ml   Net          3520.57 ml     Last shift:    07/13 1901 - 07/14 0700  In: 430 [I.V.:430]  Out: -   Last 3 shifts:    07/12 0701 - 07/13 1900  In: 4492.9 [I.V.:4492.9]  Out: 105 [Drains:105]    Physical Exam:  General: No acute distress. Alert. Cooperative. HEENT: Normocephalic. Atraumatic. NG tube in place. Conjunctiva pink. Sclera white. PERRL. MMM   Respiratory: CTAB. No w/r/c. Expiratory wheezing on ULL   Cardiovascular: RRR. Normal S1,S2. No m/r/g. 2+ pulses in DP bilaterally   GI: + bowel sounds. Non-distended. Soft abdomen. Incision c/d/i. Extremities:  Skin: Improved pedal edema. No tenderness.   Healing tick bite on left neck, improving surrounding erythema     Laboratory Data  Recent Results (from the past 24 hour(s))   GLUCOSE, POC    Collection Time: 07/13/17  6:32 AM   Result Value Ref Range    Glucose (POC) 125 (H) 65 - 100 mg/dL    Performed by Ruth Moulton (PCT)    GLUCOSE, POC    Collection Time: 07/13/17 11:09 AM   Result Value Ref Range    Glucose (POC) 147 (H) 65 - 100 mg/dL    Performed by Antonella Hartley (PCT)    SED RATE (ESR)    Collection Time: 07/13/17  4:06 PM   Result Value Ref Range    Sed rate, automated 124 (H) 0 - 20 mm/hr   LD    Collection Time: 07/13/17  4:06 PM   Result Value Ref Range     81 - 246 U/L   GLUCOSE, POC    Collection Time: 07/13/17  4:23 PM   Result Value Ref Range    Glucose (POC) 82 65 - 100 mg/dL    Performed by Corina Butler (PCT)    GLUCOSE, POC    Collection Time: 07/13/17  9:15 PM   Result Value Ref Range    Glucose (POC) 83 65 - 100 mg/dL    Performed by Wendee Krabbe (US)      Pertinent labs WBC 19.6, K 4.2, Mg 1.5, glu       Imaging  CXR-Left-sided hydropneumothorax, similar to comparison. Hospital Problems:  Hospital Problems  Date Reviewed: 1/5/2017          Codes Class Noted POA    Thrombocytosis ([facility] ICD-10-CM: D47.3  ICD-9-CM: 238.71  7/5/2017 Yes        Neutrophilia ICD-10-CM: D72.9  ICD-9-CM: 288.8  7/5/2017 Yes        * (Principal)Perforation bowel ([facility] ICD-10-CM: K63.1  ICD-9-CM: 569.83  7/4/2017 Yes        Pneumonia ICD-10-CM: J18.9  ICD-9-CM: 211  7/4/2017 Yes        Anemia ICD-10-CM: D64.9  ICD-9-CM: 285.9  7/4/2017 Yes        Wounds, multiple open, lower extremity ICD-10-CM: S81.809A  ICD-9-CM: 894.0  7/4/2017 Yes        Crohn disease ([facility] ICD-10-CM: K50.90  ICD-9-CM: 555.9  4/19/2010 Yes    Overview Addendum 2/2/2012  4:33 PM by Yoshi Unger MD     She had 4 colon resections in the past.  Dr. Rena Lazo, Dr. Yahaira Varma abd/pevis 2009: Thickened segment of neoileum proximal and adjacent to   anastomotic chain sutures and likely representing inflammatory bowel disease   recurrence. Partial small bowel obstruction at this level. No evidence of   perforation. No evidence of fistula or intra-abdominal abscess.

## 2017-07-14 NOTE — PROGRESS NOTES
General Surgery Progress Note      S: Pain controlled on current regimen. No nausea. Has return of bowel function.        Patient Vitals for the past 24 hrs:   Temp Pulse Resp BP SpO2   07/14/17 0850 98.3 °F (36.8 °C) 98 16 101/71 100 %   07/14/17 0424 98.4 °F (36.9 °C) (!) 101 18 113/73 99 %   07/13/17 2306 - 99 16 105/72 99 %   07/13/17 2300 - (!) 108 - - -   07/13/17 2057 - - - - 97 %   07/13/17 2022 98.2 °F (36.8 °C) (!) 102 17 114/74 98 %   07/13/17 1532 97.7 °F (36.5 °C) 97 16 120/74 99 %   07/13/17 1500 - (!) 105 - - -   07/13/17 1222 - 90 - - 93 %   07/13/17 1157 - (!) 110 - - 90 %   07/13/17 1108 98.8 °F (37.1 °C) 93 18 103/71 100 %           Date 07/13/17 0700 - 07/14/17 0659 07/14/17 0700 - 07/15/17 0659   Shift 7352-4244 7040-0999 24 Hour Total 3783-6105 6648-2562 24 Hour Total   I  N  T  A  K  E   I.V.  (mL/kg/hr) 785.2  (1.1) 430  (0.6) 1215.2  (0.8)         Volume (vancomycin (VANCOCIN) 1,000 mg in 0.9% sodium chloride (MBP/ADV) 250 mL) 250 250 500         Volume (meropenem (MERREM) 1 g in 0.9% sodium chloride (MBP/ADV) 50 mL) 0  0         Volume (meropenem (MERREM) 500 mg in 0.9% sodium chloride (MBP/ADV) 50 mL) 100 50 150         Volume (metroNIDAZOLE (FLAGYL) IVPB premix 500 mg) 0  0         Volume (anidulafungin (ERAXIS) 100 mg in 0.9% sodium chloride 130 mL IVPB) 0 130 130         Volume (levoFLOXacin (LEVAQUIN) 750 mg in D5W IVPB) 0  0         Volume (TPN ADULT - CENTRAL AA 5% D15% W/ ELECTROLYTES AND CA) 435.2  435.2         Volume (fat emulsion 20% (LIPOSYN, INTRALIPID) infusion 500 mL) 0  0       Shift Total  (mL/kg) 785.2  (12.7) 430  (7) 1215.2  (19.7)      O  U  T  P  U  T   Urine  (mL/kg/hr)            Urine Occurrence(s) 3 x 2 x 5 x       Drains 40  40         Output (ml) (Osmar Drain #2 07/04/17 Anterior;Right Abdomen) 40  40       Stool            Stool Occurrence(s) 2 x  2 x       Shift Total  (mL/kg) 40  (0.6)  40  (0.6)      .2 430 1175.2      Weight (kg) 61.8 61.8 61.8 61.8 61.8 61.8           Physical Exam:      General: NAD, A&Ox3, cooperative  Resp: non-labored   CV: RRR  Abdomen: soft, appropriately tender, non-distended. Incision without signs of infection, close d with staples. Drains with scant serous output  Extremity: Warm    Lab Results   Component Value Date/Time    WBC 19.6 07/14/2017 05:08 AM    Hemoglobin (POC) 13.3 05/06/2014 07:55 AM    HGB 8.7 07/14/2017 05:08 AM    Hematocrit (POC) 39 05/06/2014 07:55 AM    HCT 26.8 07/14/2017 05:08 AM    PLATELET 395 64/52/8699 05:08 AM    MCV 81.5 07/14/2017 05:08 AM       Lab Results   Component Value Date/Time    Sodium 140 07/14/2017 05:08 AM    Potassium 4.2 07/14/2017 05:08 AM    Chloride 107 07/14/2017 05:08 AM    CO2 24 07/14/2017 05:08 AM    Anion gap 9 07/14/2017 05:08 AM    Glucose 77 07/14/2017 05:08 AM    BUN 13 07/14/2017 05:08 AM    Creatinine 0.66 07/14/2017 05:08 AM    BUN/Creatinine ratio 20 07/14/2017 05:08 AM    GFR est AA >60 07/14/2017 05:08 AM    GFR est non-AA >60 07/14/2017 05:08 AM    Calcium 7.9 07/14/2017 05:08 AM        Lab Results   Component Value Date/Time    INR 1.2 07/04/2017 02:54 PM    Prothrombin time 12.3 07/04/2017 02:54 PM           A/P:  47F POD 10 s/p open repair of two perforated gastric ulcers. Doing well.   Check chest x-ray 7/15/17  Continue Full liquid diet with nutritional supplements over the weekend  Stop TPN  Obtain peripheral IV access and DC right IJ triple lumen catheter  WBC trending down on Meropenem and Vancomycin will continue  PT recommends rehab      Citlali Del Rosario MD

## 2017-07-14 NOTE — PROGRESS NOTES
Problem: Self Care Deficits Care Plan (Adult)  Goal: *Acute Goals and Plan of Care (Insert Text)  Occupational Therapy Goals  Initiated 7/6/2017 reviewed and upgraded 7-13  1. Patient will perform light grooming in unsupported sitting x5 mins with minimal assistance within 7 day(s). Met 7-13; upgrade to grooming in standing with S in 7 days  2. Patient will perform upper body dressing in unsupported sitting with min A within 7 day(s). Met; upgrade to mod I in 7 days  3. Patient will perform toilet transfers with minimal assistance with appropriate AD within 7 day(s). Met; upgrade to S in 7 days  4. Patient will participate in upper extremity therapeutic exercise/activities through comfortable ROM in supported sitting to prepare for ADL tasks with supervision/set-up for 6 minutes within 7 day(s). Cont 7 days  5. Patient will utilize energy conservation techniques during functional activities with verbal cues within 7 day(s). Cont 7 days  6. Patient will complete LE dressing with S in 7 days   OCCUPATIONAL THERAPY TREATMENT  Patient: Remigio Blair (47 y.o. female)  Date: 7/14/2017  Diagnosis: Perforation bowel ([address]  Pneumonia  Wounds, multiple open, lower extremity  Anemia  Perforated Bowel Perforation bowel ([address]  Procedure(s) (LRB):  LAPAROTOMY EXPLORATORY, REPAIR OF GASTRIC PERFORATION (N/A) 10 Days Post-Op  Precautions:    Chart, occupational therapy assessment, plan of care, and goals were reviewed. ASSESSMENT:  Pt agreeable to OT. She sat edge of bed and donned her slippers using cross legged method. After ambulation to the bathroom she stood at the sink to brush her teeth and take out her pony tail. Pt stated she wanted to wash her hair \"since nothing has been done since July 4th. \" Pt with good balance to task and chair was brought behind her if she needed to sit for a rest. Pt stood for grooming task for 10 minutes and than bleeding on left forearm as IV came loose.  Called RN and placed pt back to bed. Good progress standing for task today. Progression toward goals:  [X]          Improving appropriately and progressing toward goals  [ ]          Improving slowly and progressing toward goals  [ ]          Not making progress toward goals and plan of care will be adjusted       PLAN:  Patient continues to benefit from skilled intervention to address the above impairments. Continue treatment per established plan of care. Discharge Recommendations: Rehab  Further Equipment Recommendations for Discharge: None       SUBJECTIVE:   Patient stated Can I wash my hair? Jennifer Folds      OBJECTIVE DATA SUMMARY:   Cognitive/Behavioral Status:  Neurologic State: Alert  Orientation Level: Oriented X4  Cognition: Follows commands           Functional Mobility and Transfers for ADLs:              Bed Mobility:      Contact guard supine to sit. Transfers:      Contact guard for functional transfers. Balance: Intact sitting balance. ADL Intervention:        Grooming  Grooming Assistance: Supervision/set up (standing at sink for 10 minutes)  Brushing Teeth: Supervision/set-up  Brushing/Combing Hair: Supervision/set-up      Pt stood at sink to wash her hair and brush her teeth. Supervision overall and no loss of balance. Pt verbalized she was not fatigued during standing portion of task. Lower Body Dressing Assistance  Slip on Shoes with Back: Contact guard assistance  Position Performed: Seated edge of bed  Slippers with open back. Pain:  Pain Scale 1: Numeric (0 - 10)  Pain Intensity 1: 4  Pain Location 1: Abdomen  Pain Orientation 1: Mid  Pain Description 1: Aching; Sore  Pain Intervention(s) 1: Medication (see MAR)     Activity Tolerance:    No signs/symptoms of distress or discomfort during OT. Please refer to the flowsheet for vital signs taken during this treatment.   After treatment:   [ ]  Patient left in no apparent distress sitting up in chair  [X]  Patient left in no apparent distress in bed  [X]  Call bell left within reach  [X]  Nursing notified  [ ]  Caregiver present  [ ]  Bed alarm activated      COMMUNICATION/COLLABORATION:   The patients plan of care was discussed with: Physical Therapist, Occupational Therapist, Registered Nurse and Certified Nursing Assistant/Patient Care Technician     ARGENIS Hopkins/L  Time Calculation: 28 mins

## 2017-07-14 NOTE — PROGRESS NOTES
[facility] Dosing Services: Antimicrobial Stewardship Daily Doc    Consult for antibiotic dosing of Vancomycin by Dr. Shaun Koroma  Indication: Intra-abdominal infection/Perforated gastric ulcerx2/Peritonitis/Sepsis   Day of Therapy: 9    Vancomycin therapy:  Current maintenance dose: 1000 (mg) every 8 hours (frequency). Trough goal 15-20 mcg/mL. Last trough level 23.5 mcg/ml drawn 2.5 hrs early. Corrected trough 17.7 mcg/ml. Plan :continue current dose    Pharmacy to follow daily.   Pharmacist Lynsey Castrejon                                 Contact: [phone]

## 2017-07-14 NOTE — PROGRESS NOTES
Nutrition Assessment:    RECOMMENDATIONS/INTERVENTION(S):   Continue Full Liquid diet  Add Ensure Enlive BID, Mighty Shakes once daily to assist with meeting daily energy needs  Monitor electrolytes, BG closely  Further diet advancement per Surgery    ASSESSMENT:   7/14:  Diet advanced to Full Liquids and TPN discontinued. +BS, LBM recorded 7/12. Labs/Meds reviewed. Stable BG, Mg low. Diet advanced per Surgery. 7/10:  NPO, NGT in place. Noted WBC remain elevated, for CT abd/pelvis, chest today. Labs/meds reviewed. BG stable. K+ and Mg low end of normal--repleted. BM recorded 7/10. Skin--abd incisions, 1+ pitting edema BLE. Current TPN and lipids meeting daily energy needs at this time. 7/7:  Remains NPO. Will need UGI prior to initiated PO per surgery. Lytes requiring repletion. BG stable 102-103-106. TG 41. Off pressors. Current TPN and Lipids meeting daily energy needs at this time. 7/5:  Noted consult for TPN recommendations. Chart reviewed. 57 yo female admitted with anemia, perforated gastric ulcer, s/p exp lap with repair. Hx Crohn's disease. Visited pt this morning. Lethargic but answered questions appropriately. Pt reports poor appetite/limited oral intake x 2 weeks along with a 6# weight loss which she attributes to new medication she was taking for Lyme Disease. IVF infusing. On Levo. -61. No recent TG level. Mg repleted. Phos and K+ WDL. NGT in place. Skin--abd incision, blisters/sores noted to bilateral feet, 3+ edema BLE.  NPO, TPN to begin today. Pt with a 4.7% weight decrease x 2 weeks, amount considered significant for timeframe. See above for TPN recommendations.       Meets Criteria for Severe Acute Malnutrition as evidenced by:   [] Moderate muscle wasting, loss of subcutaneous fat   [x] Nutritional intake of <50% of recommended intake for >5 days   [x] Weight loss of >1-2% in 1 week, >5% in 1 month, >7.5% in 3 months, or >10% in 6 months   [x] Moderate-severe edema           SUBJECTIVE/OBJECTIVE:     Diet Order: Full liquids  % Eaten:  No data found. Pertinent Medications: [x] Reviewed    Chemistries:  Lab Results   Component Value Date/Time    Sodium 140 07/14/2017 05:08 AM    Potassium 4.2 07/14/2017 05:08 AM    Chloride 107 07/14/2017 05:08 AM    CO2 24 07/14/2017 05:08 AM    Anion gap 9 07/14/2017 05:08 AM    Glucose 77 07/14/2017 05:08 AM    BUN 13 07/14/2017 05:08 AM    Creatinine 0.66 07/14/2017 05:08 AM    BUN/Creatinine ratio 20 07/14/2017 05:08 AM    GFR est AA >60 07/14/2017 05:08 AM    GFR est non-AA >60 07/14/2017 05:08 AM    Calcium 7.9 07/14/2017 05:08 AM    AST (SGOT) 18 07/14/2017 05:08 AM    Alk. phosphatase 111 07/14/2017 05:08 AM    Protein, total 4.8 07/14/2017 05:08 AM    Albumin 1.6 07/14/2017 05:08 AM    Globulin 3.2 07/14/2017 05:08 AM    A-G Ratio 0.5 07/14/2017 05:08 AM    ALT (SGPT) 11 07/14/2017 05:08 AM      Anthropometrics: Height: 5' 2\" (157.5 cm) Weight: 61.8 kg (136 lb 3.2 oz)    IBW (%IBW): 50 kg (110 lb 3.7 oz) ( ) UBW (%UBW):   (  %)    BMI: Body mass index is 24.91 kg/(m^2). This BMI is indicative of:   [] Underweight    [x] Normal    [] Overweight    []  Obesity    []  Extreme Obesity (BMI>40)  Estimated Nutrition Needs (Based on): 1485 Kcals/day (BMR (1142) x 1.3 AF ) , 72 g (1.3 g/Kg actual body wt) Protein  Carbohydrate:  At Least 130 g/day  Fluids: 1500 mL/day    Last BM: 7/12   [x]Active     []Hyperactive  [x]Hypoactive       [] Absent   BS  Skin:    [] Intact   [x] Incision  [] Breakdown   [] DTI   [] Tears/Excoriation/Abrasion  [x]Edema--1+ pitting BLE [x] Other: blisters noted to bilateral feet, abrasion to back   Wt Readings from Last 30 Encounters:   07/13/17 61.8 kg (136 lb 3.2 oz)   03/15/17 56.7 kg (125 lb)   01/05/17 55.2 kg (121 lb 9.6 oz)   03/23/16 59 kg (130 lb)   09/06/15 59 kg (130 lb)   03/27/15 59 kg (130 lb)   03/19/15 57.2 kg (126 lb)   03/15/15 56.2 kg (124 lb)   03/06/15 57.6 kg (127 lb)   10/08/14 59 kg (130 lb)   05/06/14 57.6 kg (127 lb)   12/27/13 61.7 kg (136 lb)   05/22/13 57.2 kg (126 lb)   05/08/13 58.5 kg (129 lb)   08/31/12 47.6 kg (105 lb)   06/07/12 49.9 kg (110 lb)   05/31/12 49.6 kg (109 lb 6.4 oz)   02/16/12 48.4 kg (106 lb 9.6 oz)   02/09/12 48.8 kg (107 lb 9.6 oz)   02/02/12 48.5 kg (107 lb)   01/30/12 45.8 kg (101 lb)   01/21/12 46.7 kg (103 lb)   01/17/12 45.9 kg (101 lb 3.2 oz)   10/27/11 50.3 kg (111 lb)   08/16/11 46.7 kg (103 lb)   01/18/11 50.2 kg (110 lb 9.6 oz)   07/13/10 52.8 kg (116 lb 6.4 oz)   04/16/10 53.5 kg (118 lb)      NUTRITION DIAGNOSES:   Problem:  Altered GI function Unintended weight loss   Etiology: related to perforated gastric ulcer medication induced nausea, decreased appetite   Signs/Symptoms: as evidenced by s/p exp lap with repair, NPO, need for TPN 4.7% weight decrease x 2 weeks    NUTRITION INTERVENTIONS:  Meals/Snacks: General/healthful diet Enteral/Parenteral Nutrition: Initiate parenteral nutrition Supplements: Commercial supplement              GOAL:   Pt will tolerate and consume >50% meals/fluids and ONS in next 3-5 days    Cultural, Catholic, or Ethnic Dietary Needs: None     LEARNING NEEDS (Diet, Food/Nutrient-Drug Interaction):    [x] None Identified   [] Identified and Education Provided/Documented   [] Identified and Pt declined/was not appropriate      [x] Interdisciplinary Care Plan Reviewed/Documented    [x] Discharge Needs:  See dc order   [] No Nutrition Related Discharge Needs    NUTRITION RISK:   Pt Is At Nutrition Risk  [x]     No Nutrition Risk Identified  []       PT SEEN FOR:    []  MD Consult: []Calorie Count      []Diabetic Diet Education        []Diet Education     []Electrolyte Management     []General Nutrition Management and Supplements     []Management of Tube Feeding     []TPN Recommendations    []  RN Referral:  []MST score >=2     []Enteral/Parenteral Nutrition PTA     []Pregnant: Gestational DM or Multigestation [] Pressure Ulcer      []  Low BMI      []  Length of Stay       [] Dysphagia Diet         [] Ventilator      [x]  Follow-Up-TPN     Previous Recommendations:   [x] Implemented          [] Not Implemented          [] Not Applicable    Previous Goal:   [] Met              [x] Progressing Towards Goal              [] Not Progressing Towards Goal   [] Not Applicable              Anil Parks, [address] [place] [address]   Pager [phone]

## 2017-07-14 NOTE — ROUTINE PROCESS
Bedside and Verbal shift change report given to Avril Teixeira RN (oncoming nurse) by Roman Sahu RN (offgoing nurse). Report included the following information SBAR, Kardex, Intake/Output, MAR and Recent Results.

## 2017-07-14 NOTE — PROGRESS NOTES
Problem: Mobility Impaired (Adult and Pediatric)  Goal: *Acute Goals and Plan of Care (Insert Text)  Physical Therapy Goals  Re-eval 7/13 Updated goals  Initiated 7/13/2017  1. Patient will move from supine to sit and sit to supine in bed with independence within 7 day(s). 2. Patient will transfer from bed to chair and chair to bed with modified independence using the least restrictive device within 7 day(s). 3. Patient will perform sit to stand with modified independence within 7 day(s). 4. Patient will ambulate with modified independence for 100 feet with the least restrictive device within 7 day(s). Initiated 7/6/2017  1. Patient will move from supine to sit and sit to supine in bed with minimal assistance/contact guard assist within 7 day(s). - MET  2. Patient will transfer from bed to chair and chair to bed with minimal assistance/contact guard assist using the least restrictive device within 7 day(s). -MET  3. Patient will perform sit to stand with moderate assistance within 7 day(s). -MET  4. Patient will ambulate with moderate assistance for 15 feet with the least restrictive device within 7 day(s). -MET  5. Patient will demonstrate independence with home exercise program for LE strengthening within 7 days. -Progressing   PHYSICAL THERAPY TREATMENT  Patient: Gualberto Montaño (88 y.o. female)  Date: 7/14/2017  Diagnosis: Perforation bowel ([address]  Pneumonia  Wounds, multiple open, lower extremity  Anemia  Perforated Bowel Perforation bowel ([address]  Procedure(s) (LRB):  LAPAROTOMY EXPLORATORY, REPAIR OF GASTRIC PERFORATION (N/A) 10 Days Post-Op  Precautions:        ASSESSMENT:  Pt eager to walk in hallway today. Supervision for bed mobility to manage IV lines and drains. SBA to ambulate with RW. 1000+ft completed today with steady gait and minimal sway. Good dynamic balance with RW. Pt with minimal reliance on RW. Safety cues given to keep hands on RW.  Will attempt walking without AD next session and possibly attempt stairs. Pt is much improved from last week. D/t increased activity today and safety with ambulation pt may be able to return home with HHPT and assistance from family members if stair training goes well. Progression toward goals:  [X]    Improving appropriately and progressing toward goals  [ ]    Improving slowly and progressing toward goals  [ ]    Not making progress toward goals and plan of care will be adjusted       PLAN:  Patient continues to benefit from skilled intervention to address the above impairments. Continue treatment per established plan of care. Discharge Recommendations:  Home Health  Further Equipment Recommendations for Discharge:  RW if discharged home       SUBJECTIVE:   Patient stated Manicolecorey Colon we make a second lap to see my dog. Memo Nguyen      OBJECTIVE DATA SUMMARY:   Critical Behavior:  Neurologic State: Alert  Orientation Level: Oriented X4  Cognition: Follows commands  Safety/Judgement: Fall prevention, Home safety, Awareness of environment  Functional Mobility Training:  Bed Mobility:     Supine to Sit: Supervision  Sit to Supine: Supervision           Transfers:  Sit to Stand: Supervision  Stand to Sit: Modified independent                             Balance:  Sitting: Intact  Standing: Intact; With support  Ambulation/Gait Training:  Distance (ft): 1000 Feet (ft)  Assistive Device: Walker, rolling  Ambulation - Level of Assistance: Stand-by asssistance                 Base of Support: Narrowed     Speed/Marjorie: Pace decreased (<100 feet/min)                 Activity Tolerance:   Good  Please refer to the flowsheet for vital signs taken during this treatment.   After treatment:   [ ]    Patient left in no apparent distress sitting up in chair  [X]    Patient left in no apparent distress in bed  [X]    Call bell left within reach  [X]    Nursing notified  [ ]    Caregiver present  [ ]    Bed alarm activated      COMMUNICATION/COLLABORATION:   The patients plan of care was discussed with: Registered Nurse     Irene Bernstein, PT   Time Calculation: 33 mins

## 2017-07-14 NOTE — ROUTINE PROCESS
1000: 2 peripheral IV's placed with 3 attempts made. Right IJ still in place, as patient is currently working with physical therapy. IJ will be removed after patient returns to bed.    1100: Patient called stating IVs had come out. Both peripheral IVs had been removed, unwitnessed. She stated she had done it on accident while washing her hair. Patient is slightly tearful. Reassurance is provided. 1130: No additional IV's were able to be placed, despite 3 additional attempts. Dr. Nick Patel made aware. MD stated he wanted to continue with peripheral IV attempts, as central line needed to come out and patient needed IV access.

## 2017-07-14 NOTE — PROGRESS NOTES
Follow up visit with pt who requests daily visits from [facility]. Listened to her concerns as she spoke about her difficult home life and formidable medical issues. Chaplains will follow as needed/able.   Chaplain Tito, MDiv, MS, [facility]  [phone]

## 2017-07-14 NOTE — PROGRESS NOTES
[address] [facility] at [address] [place] [address]  [phone]    Hematology / Oncology Follow up    Reason for Visit:   Remigio Blair is a 56 y.o. female who is seen in consultation at the request of Dr. Lisa Garcia for evaluation of anemia. History of Present Illness:   Remigio Blair is a pleasant 56 y.o. female who was admitted for a perforated gastric ulcer. She presented to the ED yesterday complaining of fevers, rash, blisters, and abdominal pain. She had been started on doxycline about a month prior for Lyme Disease. In the ED she had labwork that demonstrated significant anemia, apparently new from a month prior. Given the timing, the ED physician was concerned about the possibility of a drug reaction leading to her anemia (ie aplastic anemia) and called me for guidance. I advised some additional labs, including a reticulocyte count. Shortly after that conversation the patient had a CT scan of her abdomen which revealed a perforated gastric ulcer, the more likely cause of her anemia. She was taken urgently to the operating room for repair. Currently, she is in the ICU, on a PCA for pain control. No apparent bleeding at this time. She reports considerable pain in her abdomen, only partially relieved by PCA. Complains of feeling very warm. Interval History:   Reports feeling better; up to the bathroom to brush teeth. Feeling stronger. Denies any pain or N/V at present. No family at bedside.       Current Facility-Administered Medications   Medication Dose Route Frequency    LORazepam (ATIVAN) tablet 1 mg  1 mg Oral TID PRN    magnesium sulfate 2 g/50 ml IVPB (premix or compounded)  2 g IntraVENous ONCE    meropenem (MERREM) 500 mg in 0.9% sodium chloride (MBP/ADV) 50 mL  500 mg IntraVENous Q6H    oxyCODONE IR (ROXICODONE) tablet 5 mg  5 mg Oral Q4H PRN    HYDROmorphone (PF) (DILAUDID) injection 0.5 mg  0.5 mg IntraVENous Q3H PRN    oxyCODONE IR (ROXICODONE) tablet 10 mg  10 mg Oral Q4H PRN    insulin lispro (HUMALOG) injection   SubCUTAneous AC&HS    escitalopram oxalate (LEXAPRO) tablet 20 mg  20 mg Oral DAILY    levalbuterol (XOPENEX) nebulizer soln 1.25 mg/3 mL  1.25 mg Nebulization BID RT    fentaNYL citrate (PF) injection 100 mcg  100 mcg IntraVENous RAD PRN    vancomycin (VANCOCIN) 1,000 mg in 0.9% sodium chloride (MBP/ADV) 250 mL  1,000 mg IntraVENous Q8H    lidocaine (LIDODERM) 5 % patch 2 Patch  2 Patch TransDERmal Q24H    heparin (porcine) injection 5,000 Units  5,000 Units SubCUTAneous Q8H    nicotine (NICODERM CQ) 21 mg/24 hr patch 1 Patch  1 Patch TransDERmal DAILY    glucose chewable tablet 16 g  4 Tab Oral PRN    dextrose (D50W) injection syrg 12.5-25 g  12.5-25 g IntraVENous PRN    glucagon (GLUCAGEN) injection 1 mg  1 mg IntraMUSCular PRN    prochlorperazine (COMPAZINE) injection 10 mg  10 mg IntraVENous Q6H PRN    sodium chloride (NS) flush 5-10 mL  5-10 mL IntraVENous PRN    0.9% sodium chloride infusion 250 mL  250 mL IntraVENous PRN    sodium chloride (NS) flush 5-10 mL  5-10 mL IntraVENous Q8H    sodium chloride (NS) flush 5-10 mL  5-10 mL IntraVENous PRN    naloxone (NARCAN) injection 0.4 mg  0.4 mg IntraVENous PRN    albuterol (PROVENTIL VENTOLIN) nebulizer solution 2.5 mg  2.5 mg Nebulization Q4H PRN    sodium chloride (NS) flush 5-10 mL  5-10 mL IntraVENous PRN    anidulafungin (ERAXIS) 100 mg in 0.9% sodium chloride 130 mL IVPB  100 mg IntraVENous Q24H    pantoprazole (PROTONIX) 40 mg in sodium chloride 0.9 % 10 mL injection  40 mg IntraVENous Q12H      Allergies   Allergen Reactions    Cephalosporins Hives    Penicillins Hives    Tetracyclines Nausea and Vomiting        Review of Systems: A complete review of systems was obtained, negative except as described above.     Physical Exam:     Visit Vitals    /71 (BP 1 Location: Left arm, BP Patient Position: At rest)    Pulse 98    Temp 98.3 °F (36.8 °C)    Resp 16    Ht 5' 2\" (1.575 m)    Wt 61.8 kg (136 lb 3.2 oz)    SpO2 100%    BMI 24.91 kg/m2     General: No distress  Eyes:anicteric sclerae  HENT: Atraumatic, OP clear  Neck: Supple  Respiratory: normal respiratory effort  CV: Normal rate, regular rhythm, no murmurs, no peripheral edema; rt IJ noted  Skin: Blisters on feet. No ecchymoses or petechiae. Normal temperature, turgor, and texture. Psych: Alert, oriented, appropriate affect, normal judgment/insight    Results:     Lab Results   Component Value Date/Time    WBC 19.6 07/14/2017 05:08 AM    HGB 8.7 07/14/2017 05:08 AM    HCT 26.8 07/14/2017 05:08 AM    PLATELET 257 54/14/5440 05:08 AM    MCV 81.5 07/14/2017 05:08 AM    ABS. NEUTROPHILS 12.5 07/14/2017 05:08 AM    Hemoglobin (POC) 13.3 05/06/2014 07:55 AM    Hematocrit (POC) 39 05/06/2014 07:55 AM     Lab Results   Component Value Date/Time    Sodium 140 07/14/2017 05:08 AM    Potassium 4.2 07/14/2017 05:08 AM    Chloride 107 07/14/2017 05:08 AM    CO2 24 07/14/2017 05:08 AM    Glucose 77 07/14/2017 05:08 AM    BUN 13 07/14/2017 05:08 AM    Creatinine 0.66 07/14/2017 05:08 AM    GFR est AA >60 07/14/2017 05:08 AM    GFR est non-AA >60 07/14/2017 05:08 AM    Calcium 7.9 07/14/2017 05:08 AM    Sodium (POC) 141 05/06/2014 07:55 AM    Potassium (POC) 3.8 05/06/2014 07:55 AM    Chloride (POC) 110 05/06/2014 07:55 AM    Glucose (POC) 103 07/14/2017 07:37 AM    BUN (POC) 15 05/06/2014 07:55 AM    Creatinine (POC) 1.0 05/06/2014 07:55 AM    Calcium, ionized (POC) 1.23 05/06/2014 07:55 AM     Lab Results   Component Value Date/Time    Bilirubin, total 0.2 07/14/2017 05:08 AM    ALT (SGPT) 11 07/14/2017 05:08 AM    AST (SGOT) 18 07/14/2017 05:08 AM    Alk.  phosphatase 111 07/14/2017 05:08 AM    Protein, total 4.8 07/14/2017 05:08 AM    Albumin 1.6 07/14/2017 05:08 AM    Globulin 3.2 07/14/2017 05:08 AM     Lab Results   Component Value Date/Time    Reticulocyte count 2.3 07/04/2017 02:56 PM    Iron % saturation 3 07/04/2017 04:45 PM    TIBC 192 07/04/2017 04:45 PM    Ferritin 46 07/04/2017 04:45 PM    Vitamin B12 298 07/04/2017 04:45 PM    Folate 7.5 07/04/2017 04:45 PM    Homocysteine, plasma 6.4 01/22/2012 04:42 AM    Methylmalonic acid 0.45 01/22/2012 02:30 AM    Haptoglobin 250 07/04/2017 04:45 PM     07/13/2017 04:06 PM    Sed rate (ESR) 3 01/22/2012 02:30 AM    Sed rate, automated 124 07/13/2017 04:06 PM    TSH 2.140 08/16/2011 11:37 AM    CORI, Direct None Detected 01/21/2012 02:10 PM    Lipase 150 09/06/2015 08:00 AM    HEP C VIRUS AB <0.1 03/19/2015 02:41 PM    HIV 1/O/2 Abs <1.00 03/19/2015 02:41 PM     Lab Results   Component Value Date/Time    INR 1.2 07/04/2017 02:54 PM    aPTT 32.1 07/04/2017 02:54 PM     7/13/2017 Peripheral smear    Mild microcytic hypochromic anemia with mild anisocytosis. Leukocytosis with neutrophilia with mild myeloid left shift. Thrombocytosis with platelet anisocytosis and large platelets noted. 7/6/2017 Path report    FINAL PATHOLOGIC DIAGNOSIS   1. Stomach, anterior partial resection:   Ulcer with no evidence of malignancy   Acute serositis   Changes compatible with perforation   2. Stomach, posterior, partial resection:   Ulcer with inflammatory sinus tract   Acute serositis   Immunohistochemistry for H. pylori will be reported in an addendum   No evidence of malignancy     7/11/2017 Pleural Fluid  CYTOLOGIC INTERPRETATION:   Reactive mesothelial cells, acute and chronic inflammation and fibrin   General Categorization   No cells diagnostic for malignancy       Assessment/Recommendations:   1) Neutrophilia  Appears reactive likely secondary to infection,  surgery and hx of Crohn's disease. Primary hematologic disorder seems unlikely. Elevated sed rate supports inflammation      2) Anemia, normocytic (s/p 2 units PRBCs 7/4/2017)  Likely secondary to bleeding related to her gastric ulcer and subsequent perforation and  iron deficiency as well.     Recommend starting her on oral iron supplementation when she is able to take PO. Monitor CBC and transfuse if HGB <7.    3) Thrombocytosis  Likely reactive to her perforated gastric ulcer, and perhaps to iron deficiency. Monitor. 4) Perforated gastric ulcer  S/p repair by general surgery;not malignant     Plan reviewed with Dr Marquis Davila  .     Signed By: Karyle Guy, NP     July 14, 2017

## 2017-07-15 ENCOUNTER — APPOINTMENT (OUTPATIENT)
Dept: GENERAL RADIOLOGY | Age: 47
DRG: 853 | End: 2017-07-15
Attending: SURGERY
Payer: COMMERCIAL

## 2017-07-15 LAB
ALBUMIN SERPL BCP-MCNC: 1.6 G/DL (ref 3.5–5)
ALBUMIN/GLOB SERPL: 0.5 {RATIO} (ref 1.1–2.2)
ALP SERPL-CCNC: 127 U/L (ref 45–117)
ALT SERPL-CCNC: 9 U/L (ref 12–78)
ANION GAP BLD CALC-SCNC: 7 MMOL/L (ref 5–15)
AST SERPL W P-5'-P-CCNC: 18 U/L (ref 15–37)
BASOPHILS # BLD AUTO: 0 K/UL (ref 0–0.1)
BASOPHILS # BLD: 0 % (ref 0–1)
BILIRUB SERPL-MCNC: 0.3 MG/DL (ref 0.2–1)
BUN SERPL-MCNC: 12 MG/DL (ref 6–20)
BUN/CREAT SERPL: 17 (ref 12–20)
CALCIUM SERPL-MCNC: 8.6 MG/DL (ref 8.5–10.1)
CHLORIDE SERPL-SCNC: 109 MMOL/L (ref 97–108)
CO2 SERPL-SCNC: 25 MMOL/L (ref 21–32)
CREAT SERPL-MCNC: 0.71 MG/DL (ref 0.55–1.02)
DIFFERENTIAL METHOD BLD: ABNORMAL
EOSINOPHIL # BLD: 0 K/UL (ref 0–0.4)
EOSINOPHIL NFR BLD: 0 % (ref 0–7)
ERYTHROCYTE [DISTWIDTH] IN BLOOD BY AUTOMATED COUNT: 17.6 % (ref 11.5–14.5)
GLOBULIN SER CALC-MCNC: 3.3 G/DL (ref 2–4)
GLUCOSE BLD STRIP.AUTO-MCNC: 88 MG/DL (ref 65–100)
GLUCOSE SERPL-MCNC: 80 MG/DL (ref 65–100)
HCT VFR BLD AUTO: 26.2 % (ref 35–47)
HGB BLD-MCNC: 8.4 G/DL (ref 11.5–16)
LYMPHOCYTES # BLD AUTO: 6 % (ref 12–49)
LYMPHOCYTES # BLD: 1.6 K/UL (ref 0.8–3.5)
MAGNESIUM SERPL-MCNC: 2 MG/DL (ref 1.6–2.4)
MCH RBC QN AUTO: 26.2 PG (ref 26–34)
MCHC RBC AUTO-ENTMCNC: 32.1 G/DL (ref 30–36.5)
MCV RBC AUTO: 81.6 FL (ref 80–99)
MONOCYTES # BLD: 1 K/UL (ref 0–1)
MONOCYTES NFR BLD AUTO: 4 % (ref 5–13)
MYELOCYTES NFR BLD MANUAL: 2 %
NEUTS SEG # BLD: 23 K/UL (ref 1.8–8)
NEUTS SEG NFR BLD AUTO: 88 % (ref 32–75)
PHOSPHATE SERPL-MCNC: 4.3 MG/DL (ref 2.6–4.7)
PLATELET # BLD AUTO: 723 K/UL (ref 150–400)
PLATELET COMMENTS,PCOM: ABNORMAL
POTASSIUM SERPL-SCNC: 4.4 MMOL/L (ref 3.5–5.1)
PROT SERPL-MCNC: 4.9 G/DL (ref 6.4–8.2)
RBC # BLD AUTO: 3.21 M/UL (ref 3.8–5.2)
RBC MORPH BLD: ABNORMAL
RBC MORPH BLD: ABNORMAL
SERVICE CMNT-IMP: NORMAL
SODIUM SERPL-SCNC: 141 MMOL/L (ref 136–145)
WBC # BLD AUTO: 26.1 K/UL (ref 3.6–11)

## 2017-07-15 PROCEDURE — 74011000250 HC RX REV CODE- 250: Performed by: FAMILY MEDICINE

## 2017-07-15 PROCEDURE — 74011250637 HC RX REV CODE- 250/637: Performed by: FAMILY MEDICINE

## 2017-07-15 PROCEDURE — 71010 XR CHEST PORT: CPT

## 2017-07-15 PROCEDURE — 74011250636 HC RX REV CODE- 250/636: Performed by: PHYSICIAN ASSISTANT

## 2017-07-15 PROCEDURE — 36415 COLL VENOUS BLD VENIPUNCTURE: CPT | Performed by: SURGERY

## 2017-07-15 PROCEDURE — 74011250636 HC RX REV CODE- 250/636: Performed by: SURGERY

## 2017-07-15 PROCEDURE — 83735 ASSAY OF MAGNESIUM: CPT | Performed by: SURGERY

## 2017-07-15 PROCEDURE — 84100 ASSAY OF PHOSPHORUS: CPT | Performed by: SURGERY

## 2017-07-15 PROCEDURE — 94640 AIRWAY INHALATION TREATMENT: CPT

## 2017-07-15 PROCEDURE — 74011250637 HC RX REV CODE- 250/637: Performed by: STUDENT IN AN ORGANIZED HEALTH CARE EDUCATION/TRAINING PROGRAM

## 2017-07-15 PROCEDURE — 74011250636 HC RX REV CODE- 250/636: Performed by: FAMILY MEDICINE

## 2017-07-15 PROCEDURE — 65660000000 HC RM CCU STEPDOWN

## 2017-07-15 PROCEDURE — 74011250636 HC RX REV CODE- 250/636: Performed by: STUDENT IN AN ORGANIZED HEALTH CARE EDUCATION/TRAINING PROGRAM

## 2017-07-15 PROCEDURE — C9113 INJ PANTOPRAZOLE SODIUM, VIA: HCPCS | Performed by: STUDENT IN AN ORGANIZED HEALTH CARE EDUCATION/TRAINING PROGRAM

## 2017-07-15 PROCEDURE — 74011000250 HC RX REV CODE- 250: Performed by: STUDENT IN AN ORGANIZED HEALTH CARE EDUCATION/TRAINING PROGRAM

## 2017-07-15 PROCEDURE — 74011000258 HC RX REV CODE- 258: Performed by: SURGERY

## 2017-07-15 PROCEDURE — 80053 COMPREHEN METABOLIC PANEL: CPT | Performed by: SURGERY

## 2017-07-15 PROCEDURE — 85025 COMPLETE CBC W/AUTO DIFF WBC: CPT | Performed by: SURGERY

## 2017-07-15 PROCEDURE — 74011000258 HC RX REV CODE- 258: Performed by: FAMILY MEDICINE

## 2017-07-15 PROCEDURE — 74011250637 HC RX REV CODE- 250/637: Performed by: PHYSICIAN ASSISTANT

## 2017-07-15 PROCEDURE — 82962 GLUCOSE BLOOD TEST: CPT

## 2017-07-15 RX ADMIN — MEROPENEM 500 MG: 500 INJECTION, POWDER, FOR SOLUTION INTRAVENOUS at 00:39

## 2017-07-15 RX ADMIN — HYDROMORPHONE HYDROCHLORIDE 0.5 MG: 1 INJECTION, SOLUTION INTRAMUSCULAR; INTRAVENOUS; SUBCUTANEOUS at 18:42

## 2017-07-15 RX ADMIN — HEPARIN SODIUM 5000 UNITS: 5000 INJECTION, SOLUTION INTRAVENOUS; SUBCUTANEOUS at 15:14

## 2017-07-15 RX ADMIN — ESCITALOPRAM 20 MG: 10 TABLET, FILM COATED ORAL at 10:22

## 2017-07-15 RX ADMIN — OXYCODONE HYDROCHLORIDE 5 MG: 5 TABLET ORAL at 15:18

## 2017-07-15 RX ADMIN — HYDROMORPHONE HYDROCHLORIDE 0.5 MG: 1 INJECTION, SOLUTION INTRAMUSCULAR; INTRAVENOUS; SUBCUTANEOUS at 05:34

## 2017-07-15 RX ADMIN — LEVALBUTEROL 1.25 MG: 1.25 SOLUTION RESPIRATORY (INHALATION) at 07:22

## 2017-07-15 RX ADMIN — MEROPENEM 500 MG: 500 INJECTION, POWDER, FOR SOLUTION INTRAVENOUS at 18:00

## 2017-07-15 RX ADMIN — LEVALBUTEROL 1.25 MG: 1.25 SOLUTION RESPIRATORY (INHALATION) at 19:43

## 2017-07-15 RX ADMIN — VANCOMYCIN HYDROCHLORIDE 1000 MG: 1 INJECTION, POWDER, LYOPHILIZED, FOR SOLUTION INTRAVENOUS at 15:18

## 2017-07-15 RX ADMIN — HEPARIN SODIUM 5000 UNITS: 5000 INJECTION, SOLUTION INTRAVENOUS; SUBCUTANEOUS at 05:28

## 2017-07-15 RX ADMIN — Medication 10 ML: at 05:35

## 2017-07-15 RX ADMIN — SODIUM CHLORIDE 40 MG: 9 INJECTION, SOLUTION INTRAMUSCULAR; INTRAVENOUS; SUBCUTANEOUS at 10:28

## 2017-07-15 RX ADMIN — VANCOMYCIN HYDROCHLORIDE 1000 MG: 1 INJECTION, POWDER, LYOPHILIZED, FOR SOLUTION INTRAVENOUS at 06:10

## 2017-07-15 RX ADMIN — Medication 10 ML: at 22:38

## 2017-07-15 RX ADMIN — OXYCODONE HYDROCHLORIDE 5 MG: 5 TABLET ORAL at 10:22

## 2017-07-15 RX ADMIN — SODIUM CHLORIDE 100 MG: 900 INJECTION, SOLUTION INTRAVENOUS at 20:20

## 2017-07-15 RX ADMIN — MEROPENEM 500 MG: 500 INJECTION, POWDER, FOR SOLUTION INTRAVENOUS at 05:28

## 2017-07-15 RX ADMIN — Medication 5 ML: at 15:19

## 2017-07-15 RX ADMIN — HEPARIN SODIUM 5000 UNITS: 5000 INJECTION, SOLUTION INTRAVENOUS; SUBCUTANEOUS at 22:37

## 2017-07-15 RX ADMIN — OXYCODONE HYDROCHLORIDE 10 MG: 5 TABLET ORAL at 22:52

## 2017-07-15 RX ADMIN — LORAZEPAM 1 MG: 1 TABLET ORAL at 11:58

## 2017-07-15 RX ADMIN — LORAZEPAM 1 MG: 1 TABLET ORAL at 20:20

## 2017-07-15 RX ADMIN — SODIUM CHLORIDE 40 MG: 9 INJECTION, SOLUTION INTRAMUSCULAR; INTRAVENOUS; SUBCUTANEOUS at 20:20

## 2017-07-15 RX ADMIN — MEROPENEM 500 MG: 500 INJECTION, POWDER, FOR SOLUTION INTRAVENOUS at 11:58

## 2017-07-15 RX ADMIN — VANCOMYCIN HYDROCHLORIDE 1000 MG: 1 INJECTION, POWDER, LYOPHILIZED, FOR SOLUTION INTRAVENOUS at 22:38

## 2017-07-15 NOTE — PROGRESS NOTES
[facility]   Daily Progress Note    Date: 7/15/2017    Assessment/Plan:   Maureen Guy is a 56 y.o. female who is hospitalized for sepsis 2/2 perforated gastric ulcers/peritonitis    24 Hour Events: No events overnight. Sepsis 2/2 Perforated Gastric Ulcers s/p Operative Repair on 7/4/17 - POD 11. Was NPO with TPN until POD 7. 2 drains currently in place. Cultures negative to date. Pain well controlled on po meds. Gastric ulcers thought to be related to chronic steroid use and Crohn's disease.    -Appreciate surgery recommendations & support.  -Per surgery, current abx are eraxis, vancomycin, meropenem. -IV protonix.  -Continue pain control, currently dilaudid po, goal to begin to wean further as approaching 2 weeks post-op  -FLD  -Strict I&O. -Daily labs. -Replete lytes prn  -Continue IV Abx over the weekend with CT scan on Monday  -Dilaudid PO for pain control per general surgery  -Encouraging incentive spirometry  -Encouraging movement and participation in PT    Left pleural effusion: Acute. Seen on CT chest this admission. S/p IR drainage 7/10. Left ptx appeared to improve on serial CXRs. O2 sats stable on RA.  -Continue to monitor for sxs of worsening respiratory status. Elevated Leukocytosis - Per heme, likely reactive. There was concern that WBC reamined elevated despite being on broad spectrum antibiotics. Given increase in wbc today, Gen Sgy is recommending repeat CT chest/abdomen/pelvis on Sunday if wbc continues to rise.  -Daily CBC     Community Acquired Pneumonia - Improvement of left basilar airspace disease noted on initial CXR. Could have been playing a role in SIRS/sepsis picture. Improved on serial CXR. Severe Acute Malnutrition - criteria evidenced by nutritional intake <50% of recommended intake for >5days, weight loss of 4.7% in 2 weeks, and moderate-severe edema. Improvement during hospitalization.  Weight today 136lb (up from from 121 on 7/4).  - Encourage full liquid diet with Ensure and Might Shakes     Anemia - lab work up done on admission.  Notable for reticulocyte count of 2.3, LD of 294.  Per hematology, most likely 2/2 acute blood loss from gastric ulcer.  Also a component of iron deficiency.  S/p 2 units pRBC on admission--Hgb responded appropriately.  Has been stable since.  Hgb down to 8.8 this morning.  -Hematology signed off.  -Have 2 units of pRBCs on hold. -Daily CBC.     Chron's Disease - no pharmacotherapy PTA.  Poor follow up history with GI.  Likely playing a role in patient's current clinical course.  -Will consider GI consult later in course when patient becomes more stable.  Needs better outpatient follow up.     Severe Protein Calorie Malnutrition - as evidenced by lower extremity swelling and severe hypoalbuminemia  This is complicated by need for 7 days of NPO. -Continuing TPN. -Daily CMP. -Daily Phos.     Lyme Disease - diagnosed ~1 month ago at [facility].  Was treated with a 21 day course of doxycycline.  Completed this course fully, but developed some lower extremity skin breakdown following treatment.  Tetracyclines are on patient's allergy list.  -Not repeating tic studies     Tobacco Abuse - reports to smoke 1.5 PPD. -Encouraging cessation.  -Prescribed daily nicotine patch while patient admitted.     Healing Wounds on Bilateral Feet - most likely acute reaction to doxycycline treatment that patient underwent prior to admission.  Per patient, these wounds are healing.  Remain painful.  -Wound care consulted, appreciate support.  -Tetracyclines on allergy list.      3cm Laceration of Left Upper Back - healing.  Patient notes that this was from a fall.  Not amenable to sutures at this time.  -Wound care consulted, appreciate support.      Bilateral Lower Extremity Swelling/Pain - noted on admisison.  Thought to be 2/2 hypoalbuminemia.  Duplex studies of lower extremities negative for DVT. Improved.      Electrolyte deficiencies: Resolved s/p repletion and resuming diet. Will continue to trend and replete prn.      Depression/Panic Attacks - patient is showing some symptoms of this--mostly at night.  Takes klonopin, adderall, Lexapro, ativan, and trazodone at home. [facility] for withdrawal from SSRI. -Beginning to resume home meds now that pt is taking FLD      History of Falls - patient and family gave detailed history of multiple falls over the last few months/weeks. -PT/OT consulted.      FEN/GI - Full liquid diet  Activity - Out of bed with assistance  DVT prophylaxis - Heparin  GI prophylaxis - Protonix  Fall prophylaxis - Fall precautions ordered. Disposition- Anticipate rehab. C/s to CM placed. Code Status [facility], [place], operated by [facility]     Patient seen and discussed with Dr. Cornel Talley (Attending physician)    Kaylynn Owen MD  Family Medicine Resident  7/15/2017       Subjective  Pleased that she had a good day yesterday. Walked in the halls. Could see her dog from the window (a friend/family member brought it). Her pain is well controlled.      Inpatient Medications  Current Facility-Administered Medications   Medication Dose Route Frequency    LORazepam (ATIVAN) tablet 1 mg  1 mg Oral TID PRN    meropenem (MERREM) 500 mg in 0.9% sodium chloride (MBP/ADV) 50 mL  500 mg IntraVENous Q6H    oxyCODONE IR (ROXICODONE) tablet 5 mg  5 mg Oral Q4H PRN    HYDROmorphone (PF) (DILAUDID) injection 0.5 mg  0.5 mg IntraVENous Q3H PRN    oxyCODONE IR (ROXICODONE) tablet 10 mg  10 mg Oral Q4H PRN    escitalopram oxalate (LEXAPRO) tablet 20 mg  20 mg Oral DAILY    levalbuterol (XOPENEX) nebulizer soln 1.25 mg/3 mL  1.25 mg Nebulization BID RT    fentaNYL citrate (PF) injection 100 mcg  100 mcg IntraVENous RAD PRN    vancomycin (VANCOCIN) 1,000 mg in 0.9% sodium chloride (MBP/ADV) 250 mL  1,000 mg IntraVENous Q8H    lidocaine (LIDODERM) 5 % patch 2 Patch  2 Patch TransDERmal Q24H    heparin (porcine) injection 5,000 Units  5,000 Units SubCUTAneous Q8H    nicotine (NICODERM CQ) 21 mg/24 hr patch 1 Patch  1 Patch TransDERmal DAILY    glucose chewable tablet 16 g  4 Tab Oral PRN    dextrose (D50W) injection syrg 12.5-25 g  12.5-25 g IntraVENous PRN    glucagon (GLUCAGEN) injection 1 mg  1 mg IntraMUSCular PRN    prochlorperazine (COMPAZINE) injection 10 mg  10 mg IntraVENous Q6H PRN    sodium chloride (NS) flush 5-10 mL  5-10 mL IntraVENous PRN    0.9% sodium chloride infusion 250 mL  250 mL IntraVENous PRN    sodium chloride (NS) flush 5-10 mL  5-10 mL IntraVENous Q8H    sodium chloride (NS) flush 5-10 mL  5-10 mL IntraVENous PRN    naloxone (NARCAN) injection 0.4 mg  0.4 mg IntraVENous PRN    albuterol (PROVENTIL VENTOLIN) nebulizer solution 2.5 mg  2.5 mg Nebulization Q4H PRN    sodium chloride (NS) flush 5-10 mL  5-10 mL IntraVENous PRN    anidulafungin (ERAXIS) 100 mg in 0.9% sodium chloride 130 mL IVPB  100 mg IntraVENous Q24H    pantoprazole (PROTONIX) 40 mg in sodium chloride 0.9 % 10 mL injection  40 mg IntraVENous Q12H         Allergies  Allergies   Allergen Reactions    Cephalosporins Hives    Penicillins Hives    Tetracyclines Nausea and Vomiting         Objective  Vitals:  Patient Vitals for the past 8 hrs:   Temp Pulse Resp BP SpO2   07/15/17 1233 97.4 °F (36.3 °C) 89 20 95/60 98 %   07/15/17 0744 97.9 °F (36.6 °C) (!) 109 20 97/64 96 %   07/15/17 0723 - - - - 93 %   07/15/17 0636 - 88 - - -         I/O:    Intake/Output Summary (Last 24 hours) at 07/15/17 1243  Last data filed at 07/15/17 1158   Gross per 24 hour   Intake             1430 ml   Output             1230 ml   Net              200 ml     Last shift:    07/15 0701 - 07/15 1900  In: 50 [I.V.:50]  Out: 200 [Urine:200]  Last 3 shifts:    07/13 1901 - 07/15 0700  In: 7185 [I.V.:1810]  Out: 1540 [Urine:1400; Drains:30]    Physical Exam:  General: No acute distress. Alert. Cooperative. HEENT: Normocephalic. Atraumatic. Conjunctiva pink. Sclera white. PERRL. MMM   Respiratory: CTAB. No w/r/c. Expiratory wheezing on ULL   Cardiovascular: RRR. Normal S1,S2. No m/r/g. 2+ pulses in DP bilaterally   GI: + bowel sounds. Non-distended. Soft abdomen. Incision c/d/i. Extremities:  Skin: Improved pedal edema. No tenderness. Healing tick bite on left neck, improving surrounding erythema     Laboratory Data  Recent Results (from the past 24 hour(s))   GLUCOSE, POC    Collection Time: 07/14/17  4:41 PM   Result Value Ref Range    Glucose (POC) 85 65 - 100 mg/dL    Performed by Du Flores (PCT)    GLUCOSE, POC    Collection Time: 07/14/17 11:01 PM   Result Value Ref Range    Glucose (POC) 114 (H) 65 - 100 mg/dL    Performed by Iona Juarez ([facility])    METABOLIC PANEL, COMPREHENSIVE    Collection Time: 07/15/17  5:26 AM   Result Value Ref Range    Sodium 141 136 - 145 mmol/L    Potassium 4.4 3.5 - 5.1 mmol/L    Chloride 109 (H) 97 - 108 mmol/L    CO2 25 21 - 32 mmol/L    Anion gap 7 5 - 15 mmol/L    Glucose 80 65 - 100 mg/dL    BUN 12 6 - 20 MG/DL    Creatinine 0.71 0.55 - 1.02 MG/DL    BUN/Creatinine ratio 17 12 - 20      GFR est AA >60 >60 ml/min/1.73m2    GFR est non-AA >60 >60 ml/min/1.73m2    Calcium 8.6 8.5 - 10.1 MG/DL    Bilirubin, total 0.3 0.2 - 1.0 MG/DL    ALT (SGPT) 9 (L) 12 - 78 U/L    AST (SGOT) 18 15 - 37 U/L    Alk.  phosphatase 127 (H) 45 - 117 U/L    Protein, total 4.9 (L) 6.4 - 8.2 g/dL    Albumin 1.6 (L) 3.5 - 5.0 g/dL    Globulin 3.3 2.0 - 4.0 g/dL    A-G Ratio 0.5 (L) 1.1 - 2.2     CBC WITH AUTOMATED DIFF    Collection Time: 07/15/17  5:26 AM   Result Value Ref Range    WBC 26.1 (H) 3.6 - 11.0 K/uL    RBC 3.21 (L) 3.80 - 5.20 M/uL    HGB 8.4 (L) 11.5 - 16.0 g/dL    HCT 26.2 (L) 35.0 - 47.0 %    MCV 81.6 80.0 - 99.0 FL    MCH 26.2 26.0 - 34.0 PG    MCHC 32.1 30.0 - 36.5 g/dL    RDW 17.6 (H) 11.5 - 14.5 %    PLATELET 435 (H) 737 - 400 K/uL    NEUTROPHILS 88 (H) 32 - 75 %    LYMPHOCYTES 6 (L) 12 - 49 %    MONOCYTES 4 (L) 5 - 13 %    EOSINOPHILS 0 0 - 7 %    BASOPHILS 0 0 - 1 %    MYELOCYTES 2 (H) 0 %    ABS. NEUTROPHILS 23.0 (H) 1.8 - 8.0 K/UL    ABS. LYMPHOCYTES 1.6 0.8 - 3.5 K/UL    ABS. MONOCYTES 1.0 0.0 - 1.0 K/UL    ABS. EOSINOPHILS 0.0 0.0 - 0.4 K/UL    ABS. BASOPHILS 0.0 0.0 - 0.1 K/UL    DF MANUAL      PLATELET COMMENTS LARGE PLATELETS      RBC COMMENTS ANISOCYTOSIS  1+        RBC COMMENTS HYPOCHROMIA  1+       PHOSPHORUS    Collection Time: 07/15/17  5:26 AM   Result Value Ref Range    Phosphorus 4.3 2.6 - 4.7 MG/DL   MAGNESIUM    Collection Time: 07/15/17  5:26 AM   Result Value Ref Range    Magnesium 2.0 1.6 - 2.4 mg/dL   GLUCOSE, POC    Collection Time: 07/15/17  7:48 AM   Result Value Ref Range    Glucose (POC) 88 65 - 100 mg/dL    Performed by [facility] ([facility])        Imaging  None new      Hospital Problems:  Hospital Problems  Date Reviewed: 1/5/2017          Codes Class Noted POA    Thrombocytosis ([facility] ICD-10-CM: D47.3  ICD-9-CM: 238.71  7/5/2017 Yes        Neutrophilia ICD-10-CM: D72.9  ICD-9-CM: 288.8  7/5/2017 Yes        * (Principal)Perforation bowel ([email or web address] ICD-10-CM: K63.1  ICD-9-CM: 569.83  7/4/2017 Yes        Pneumonia ICD-10-CM: J18.9  ICD-9-CM: 550  7/4/2017 Yes        Anemia ICD-10-CM: D64.9  ICD-9-CM: 285.9  7/4/2017 Yes        Wounds, multiple open, lower extremity ICD-10-CM: S81.809A  ICD-9-CM: 894.0  7/4/2017 Yes        Crohn disease ([email or web address] ICD-10-CM: K50.90  ICD-9-CM: 555.9  4/19/2010 Yes    Overview Addendum 2/2/2012  4:33 PM by Henry Miranda MD     She had 4 colon resections in the past.  Dr. Ana Narayan, Dr. Dunn Plane abd/pevis 2009: Thickened segment of neoileum proximal and adjacent to   anastomotic chain sutures and likely representing inflammatory bowel disease   recurrence. Partial small bowel obstruction at this level. No evidence of   perforation. No evidence of fistula or intra-abdominal abscess.

## 2017-07-15 NOTE — PROGRESS NOTES
General Surgery Progress Note      S: Pain controlled on current regimen. No nausea. Has return of bowel function. WBC up today. Ambulated the aguayo with PT. Patient Vitals for the past 24 hrs:   Temp Pulse Resp BP SpO2   07/15/17 0744 97.9 °F (36.6 °C) (!) 109 20 97/64 96 %   07/15/17 0723 - - - - 93 %   07/15/17 0636 - 88 - - -   07/15/17 0407 98.4 °F (36.9 °C) 93 20 98/62 96 %   07/14/17 2337 98.1 °F (36.7 °C) 98 18 91/61 100 %   07/14/17 2221 - (!) 124 - - -   07/14/17 1939 98 °F (36.7 °C) (!) 118 20 119/80 90 %   07/14/17 1313 98.2 °F (36.8 °C) 98 16 111/73 98 %           Date 07/14/17 0700 - 07/15/17 0659 07/15/17 0700 - 07/16/17 0659   Shift 1782-1453 0175-8704 24 Hour Total 2624-7455 1989-6193 24 Hour Total   I  N  T  A  K  E   I.V.  (mL/kg/hr)  1080  (1.5) 1080  (0.7)         Volume (vancomycin (VANCOCIN) 1,000 mg in 0.9% sodium chloride (MBP/ADV) 250 mL)  750 750         Volume (meropenem (MERREM) 500 mg in 0.9% sodium chloride (MBP/ADV) 50 mL)  200 200         Volume (anidulafungin (ERAXIS) 100 mg in 0.9% sodium chloride 130 mL IVPB)  130 130       Shift Total  (mL/kg)  1080  (17.6) 1080  (17.6)      O  U  T  P  U  T   Urine  (mL/kg/hr) 1000  (1.4) 400  (0.5) 1400  (1)         Urine Voided 9331 042 1602         Urine Occurrence(s)  1 x 1 x       Emesis/NG output  110 110         Emesis  110 110       Drains 10 20 30         Output (ml) (Osmar Drain #2 07/04/17 Anterior;Right Abdomen) 10 20 30       Shift Total  (mL/kg) 1010  (16.5) 530  (8.7) 1540  (25.1)      NET -1010 550 -460      Weight (kg) 61.2 61.2 61.2 61.2 61.2 61.2           Physical Exam:      General: NAD, A&Ox3, cooperative  Resp: non-labored   CV: RRR  Abdomen: soft, appropriately tender, non-distended. Incision without signs of infection, closed with staples.  Drains with scant serous output  Extremity: Warm    Lab Results   Component Value Date/Time    WBC 26.1 07/15/2017 05:26 AM    Hemoglobin (POC) 13.3 05/06/2014 07:55 AM    HGB 8.4 07/15/2017 05:26 AM    Hematocrit (POC) 39 05/06/2014 07:55 AM    HCT 26.2 07/15/2017 05:26 AM    PLATELET 694 23/02/0733 05:26 AM    MCV 81.6 07/15/2017 05:26 AM       Lab Results   Component Value Date/Time    Sodium 141 07/15/2017 05:26 AM    Potassium 4.4 07/15/2017 05:26 AM    Chloride 109 07/15/2017 05:26 AM    CO2 25 07/15/2017 05:26 AM    Anion gap 7 07/15/2017 05:26 AM    Glucose 80 07/15/2017 05:26 AM    BUN 12 07/15/2017 05:26 AM    Creatinine 0.71 07/15/2017 05:26 AM    BUN/Creatinine ratio 17 07/15/2017 05:26 AM    GFR est AA >60 07/15/2017 05:26 AM    GFR est non-AA >60 07/15/2017 05:26 AM    Calcium 8.6 07/15/2017 05:26 AM        Lab Results   Component Value Date/Time    INR 1.2 07/04/2017 02:54 PM    Prothrombin time 12.3 07/04/2017 02:54 PM           A/P:  47F POD 11 s/p open repair of two perforated gastric ulcers. Persistent leukocytosis. Looks good on exam.     Check chest x-ray 7/15/17  Continue Full liquid diet with nutritional supplements over the weekend  Stop TPN  Obtain peripheral IV access and DC right IJ triple lumen catheter  Monitor WBC. Continue Meropenem and Vancomycin. Would obtain CT chest/abdomen/pelvis with IV contrast 7/16/17 if WBC continues to rise.    PT recommends home health        Isamar Cruz MD

## 2017-07-16 LAB
ALBUMIN SERPL BCP-MCNC: 1.7 G/DL (ref 3.5–5)
ALBUMIN/GLOB SERPL: 0.4 {RATIO} (ref 1.1–2.2)
ALP SERPL-CCNC: 128 U/L (ref 45–117)
ALT SERPL-CCNC: 11 U/L (ref 12–78)
ANION GAP BLD CALC-SCNC: 8 MMOL/L (ref 5–15)
AST SERPL W P-5'-P-CCNC: 17 U/L (ref 15–37)
BASOPHILS # BLD AUTO: 0 K/UL (ref 0–0.1)
BASOPHILS # BLD: 0 % (ref 0–1)
BILIRUB SERPL-MCNC: 0.2 MG/DL (ref 0.2–1)
BUN SERPL-MCNC: 11 MG/DL (ref 6–20)
BUN/CREAT SERPL: 14 (ref 12–20)
CALCIUM SERPL-MCNC: 8.8 MG/DL (ref 8.5–10.1)
CHLORIDE SERPL-SCNC: 106 MMOL/L (ref 97–108)
CO2 SERPL-SCNC: 24 MMOL/L (ref 21–32)
CREAT SERPL-MCNC: 0.76 MG/DL (ref 0.55–1.02)
DIFFERENTIAL METHOD BLD: ABNORMAL
EOSINOPHIL # BLD: 0.5 K/UL (ref 0–0.4)
EOSINOPHIL NFR BLD: 2 % (ref 0–7)
ERYTHROCYTE [DISTWIDTH] IN BLOOD BY AUTOMATED COUNT: 17.3 % (ref 11.5–14.5)
GLOBULIN SER CALC-MCNC: 4.1 G/DL (ref 2–4)
GLUCOSE SERPL-MCNC: 90 MG/DL (ref 65–100)
HCT VFR BLD AUTO: 24.4 % (ref 35–47)
HGB BLD-MCNC: 7.9 G/DL (ref 11.5–16)
LYMPHOCYTES # BLD AUTO: 7 % (ref 12–49)
LYMPHOCYTES # BLD: 1.6 K/UL (ref 0.8–3.5)
MAGNESIUM SERPL-MCNC: 1.5 MG/DL (ref 1.6–2.4)
MCH RBC QN AUTO: 26.2 PG (ref 26–34)
MCHC RBC AUTO-ENTMCNC: 32.4 G/DL (ref 30–36.5)
MCV RBC AUTO: 80.8 FL (ref 80–99)
MONOCYTES # BLD: 1.8 K/UL (ref 0–1)
MONOCYTES NFR BLD AUTO: 8 % (ref 5–13)
NEUTS SEG # BLD: 19 K/UL (ref 1.8–8)
NEUTS SEG NFR BLD AUTO: 83 % (ref 32–75)
PHOSPHATE SERPL-MCNC: 3.9 MG/DL (ref 2.6–4.7)
PLATELET # BLD AUTO: 783 K/UL (ref 150–400)
POTASSIUM SERPL-SCNC: 3.8 MMOL/L (ref 3.5–5.1)
PROT SERPL-MCNC: 5.8 G/DL (ref 6.4–8.2)
RBC # BLD AUTO: 3.02 M/UL (ref 3.8–5.2)
RBC MORPH BLD: ABNORMAL
RBC MORPH BLD: ABNORMAL
SODIUM SERPL-SCNC: 138 MMOL/L (ref 136–145)
WBC # BLD AUTO: 22.9 K/UL (ref 3.6–11)
WBC MORPH BLD: ABNORMAL

## 2017-07-16 PROCEDURE — 74011000258 HC RX REV CODE- 258: Performed by: FAMILY MEDICINE

## 2017-07-16 PROCEDURE — 80053 COMPREHEN METABOLIC PANEL: CPT | Performed by: SURGERY

## 2017-07-16 PROCEDURE — 74011250636 HC RX REV CODE- 250/636: Performed by: STUDENT IN AN ORGANIZED HEALTH CARE EDUCATION/TRAINING PROGRAM

## 2017-07-16 PROCEDURE — 74011250637 HC RX REV CODE- 250/637: Performed by: PHYSICIAN ASSISTANT

## 2017-07-16 PROCEDURE — 36415 COLL VENOUS BLD VENIPUNCTURE: CPT | Performed by: SURGERY

## 2017-07-16 PROCEDURE — 74011250636 HC RX REV CODE- 250/636: Performed by: PHYSICIAN ASSISTANT

## 2017-07-16 PROCEDURE — 83735 ASSAY OF MAGNESIUM: CPT | Performed by: SURGERY

## 2017-07-16 PROCEDURE — 74011250636 HC RX REV CODE- 250/636: Performed by: FAMILY MEDICINE

## 2017-07-16 PROCEDURE — 74011000250 HC RX REV CODE- 250: Performed by: FAMILY MEDICINE

## 2017-07-16 PROCEDURE — 74011250637 HC RX REV CODE- 250/637: Performed by: FAMILY MEDICINE

## 2017-07-16 PROCEDURE — 74011000258 HC RX REV CODE- 258: Performed by: SURGERY

## 2017-07-16 PROCEDURE — 74011000250 HC RX REV CODE- 250: Performed by: STUDENT IN AN ORGANIZED HEALTH CARE EDUCATION/TRAINING PROGRAM

## 2017-07-16 PROCEDURE — 87040 BLOOD CULTURE FOR BACTERIA: CPT | Performed by: FAMILY MEDICINE

## 2017-07-16 PROCEDURE — 74011250637 HC RX REV CODE- 250/637: Performed by: STUDENT IN AN ORGANIZED HEALTH CARE EDUCATION/TRAINING PROGRAM

## 2017-07-16 PROCEDURE — 74011250636 HC RX REV CODE- 250/636: Performed by: SURGERY

## 2017-07-16 PROCEDURE — 84100 ASSAY OF PHOSPHORUS: CPT | Performed by: SURGERY

## 2017-07-16 PROCEDURE — 65660000000 HC RM CCU STEPDOWN

## 2017-07-16 PROCEDURE — 85025 COMPLETE CBC W/AUTO DIFF WBC: CPT | Performed by: SURGERY

## 2017-07-16 PROCEDURE — C9113 INJ PANTOPRAZOLE SODIUM, VIA: HCPCS | Performed by: STUDENT IN AN ORGANIZED HEALTH CARE EDUCATION/TRAINING PROGRAM

## 2017-07-16 PROCEDURE — 94640 AIRWAY INHALATION TREATMENT: CPT

## 2017-07-16 RX ORDER — ACETAMINOPHEN 325 MG/1
650 TABLET ORAL
Status: DISCONTINUED | OUTPATIENT
Start: 2017-07-16 | End: 2017-07-21 | Stop reason: HOSPADM

## 2017-07-16 RX ORDER — MAGNESIUM SULFATE HEPTAHYDRATE 40 MG/ML
2 INJECTION, SOLUTION INTRAVENOUS
Status: COMPLETED | OUTPATIENT
Start: 2017-07-16 | End: 2017-07-16

## 2017-07-16 RX ADMIN — HYDROMORPHONE HYDROCHLORIDE 0.5 MG: 1 INJECTION, SOLUTION INTRAMUSCULAR; INTRAVENOUS; SUBCUTANEOUS at 18:09

## 2017-07-16 RX ADMIN — HEPARIN SODIUM 5000 UNITS: 5000 INJECTION, SOLUTION INTRAVENOUS; SUBCUTANEOUS at 22:22

## 2017-07-16 RX ADMIN — HYDROMORPHONE HYDROCHLORIDE 0.5 MG: 1 INJECTION, SOLUTION INTRAMUSCULAR; INTRAVENOUS; SUBCUTANEOUS at 01:39

## 2017-07-16 RX ADMIN — VANCOMYCIN HYDROCHLORIDE 1000 MG: 1 INJECTION, POWDER, LYOPHILIZED, FOR SOLUTION INTRAVENOUS at 14:01

## 2017-07-16 RX ADMIN — SODIUM CHLORIDE 40 MG: 9 INJECTION, SOLUTION INTRAMUSCULAR; INTRAVENOUS; SUBCUTANEOUS at 09:08

## 2017-07-16 RX ADMIN — MAGNESIUM SULFATE HEPTAHYDRATE 2 G: 40 INJECTION, SOLUTION INTRAVENOUS at 09:08

## 2017-07-16 RX ADMIN — SODIUM CHLORIDE 100 MG: 900 INJECTION, SOLUTION INTRAVENOUS at 20:12

## 2017-07-16 RX ADMIN — ACETAMINOPHEN 650 MG: 325 TABLET ORAL at 23:47

## 2017-07-16 RX ADMIN — Medication 10 ML: at 06:30

## 2017-07-16 RX ADMIN — HYDROMORPHONE HYDROCHLORIDE 0.5 MG: 1 INJECTION, SOLUTION INTRAMUSCULAR; INTRAVENOUS; SUBCUTANEOUS at 09:22

## 2017-07-16 RX ADMIN — HEPARIN SODIUM 5000 UNITS: 5000 INJECTION, SOLUTION INTRAVENOUS; SUBCUTANEOUS at 14:03

## 2017-07-16 RX ADMIN — OXYCODONE HYDROCHLORIDE 10 MG: 5 TABLET ORAL at 10:54

## 2017-07-16 RX ADMIN — PROCHLORPERAZINE EDISYLATE 10 MG: 5 INJECTION INTRAMUSCULAR; INTRAVENOUS at 15:39

## 2017-07-16 RX ADMIN — SODIUM CHLORIDE 40 MG: 9 INJECTION, SOLUTION INTRAMUSCULAR; INTRAVENOUS; SUBCUTANEOUS at 20:12

## 2017-07-16 RX ADMIN — MEROPENEM 500 MG: 500 INJECTION, POWDER, FOR SOLUTION INTRAVENOUS at 06:30

## 2017-07-16 RX ADMIN — LORAZEPAM 1 MG: 1 TABLET ORAL at 09:08

## 2017-07-16 RX ADMIN — HEPARIN SODIUM 5000 UNITS: 5000 INJECTION, SOLUTION INTRAVENOUS; SUBCUTANEOUS at 06:31

## 2017-07-16 RX ADMIN — VANCOMYCIN HYDROCHLORIDE 1000 MG: 1 INJECTION, POWDER, LYOPHILIZED, FOR SOLUTION INTRAVENOUS at 07:08

## 2017-07-16 RX ADMIN — VANCOMYCIN HYDROCHLORIDE 1000 MG: 1 INJECTION, POWDER, LYOPHILIZED, FOR SOLUTION INTRAVENOUS at 22:26

## 2017-07-16 RX ADMIN — MEROPENEM 500 MG: 500 INJECTION, POWDER, FOR SOLUTION INTRAVENOUS at 17:59

## 2017-07-16 RX ADMIN — LORAZEPAM 1 MG: 1 TABLET ORAL at 20:12

## 2017-07-16 RX ADMIN — MEROPENEM 500 MG: 500 INJECTION, POWDER, FOR SOLUTION INTRAVENOUS at 23:47

## 2017-07-16 RX ADMIN — LEVALBUTEROL 1.25 MG: 1.25 SOLUTION RESPIRATORY (INHALATION) at 19:34

## 2017-07-16 RX ADMIN — MEROPENEM 500 MG: 500 INJECTION, POWDER, FOR SOLUTION INTRAVENOUS at 12:37

## 2017-07-16 RX ADMIN — ESCITALOPRAM 20 MG: 10 TABLET, FILM COATED ORAL at 09:08

## 2017-07-16 RX ADMIN — HYDROMORPHONE HYDROCHLORIDE 0.5 MG: 1 INJECTION, SOLUTION INTRAMUSCULAR; INTRAVENOUS; SUBCUTANEOUS at 12:47

## 2017-07-16 RX ADMIN — OXYCODONE HYDROCHLORIDE 10 MG: 5 TABLET ORAL at 06:30

## 2017-07-16 RX ADMIN — LEVALBUTEROL 1.25 MG: 1.25 SOLUTION RESPIRATORY (INHALATION) at 07:47

## 2017-07-16 RX ADMIN — Medication 10 ML: at 20:12

## 2017-07-16 RX ADMIN — OXYCODONE HYDROCHLORIDE 10 MG: 5 TABLET ORAL at 22:22

## 2017-07-16 RX ADMIN — Medication 10 ML: at 14:02

## 2017-07-16 RX ADMIN — MEROPENEM 500 MG: 500 INJECTION, POWDER, FOR SOLUTION INTRAVENOUS at 01:40

## 2017-07-16 NOTE — PROGRESS NOTES
Patient's heart rate periodically increases to the 130s via tele. Low grade fever of 99. Patient with no complaints. Notified family practice. No new orders given at this time. Will continue to monitor.

## 2017-07-16 NOTE — PROGRESS NOTES
is providing follow up support with pt, as requested.  met with pt, son and father. Active listening, encouragement and prayer provided by bedside.      [id number] Aravind Dubon M.Div, M.S, [place] [address] available at [phone]

## 2017-07-16 NOTE — ROUTINE PROCESS
Bedside and Verbal shift change report given to Valery  (oncoming nurse) by Sondra Zamora RN (offgoing nurse). Report included the following information SBAR, Kardex and Recent Results.

## 2017-07-16 NOTE — PROGRESS NOTES
General Surgery Progress Note      S: Pain controlled on current regimen. No nausea. Has return of bowel function. WBC down today. Ambulated the aguayo with PT. Patient Vitals for the past 24 hrs:   Temp Pulse Resp BP SpO2   07/16/17 0846 - (!) 106 18 109/70 96 %   07/16/17 0748 - - - - 96 %   07/16/17 0651 - 95 - - -   07/16/17 0359 99 °F (37.2 °C) 98 18 104/65 98 %   07/15/17 2233 99 °F (37.2 °C) (!) 104 18 104/66 98 %   07/15/17 2226 - 96 - - -   07/15/17 1930 98.7 °F (37.1 °C) (!) 102 20 113/73 97 %   07/15/17 1822 - (!) 141 - - -   07/15/17 1534 98.1 °F (36.7 °C) 99 20 100/65 99 %   07/15/17 1452 - 96 - - -   07/15/17 1233 97.4 °F (36.3 °C) 89 20 95/60 98 %           Date 07/15/17 0700 - 07/16/17 0659 07/16/17 0700 - 07/17/17 0659   Shift 0399-2627 9941-8095 24 Hour Total 6984-4944 5029-6965 24 Hour Total   I  N  T  A  K  E   I.V.  (mL/kg/hr) 300  (0.4)  300  (0.2)         Volume (vancomycin (VANCOCIN) 1,000 mg in 0.9% sodium chloride (MBP/ADV) 250 mL) 250  250         Volume (meropenem (MERREM) 500 mg in 0.9% sodium chloride (MBP/ADV) 50 mL) 50  50       Shift Total  (mL/kg) 300  (4.9)  300  (4.9)      O  U  T  P  U  T   Urine  (mL/kg/hr) 200  (0.3)  200  (0.1)         Urine Voided 200  200         Urine Occurrence(s) 1 x 1 x 2 x       Drains  10 10         Output (ml) (Osmar Drain #2 07/04/17 Anterior;Right Abdomen)  10 10       Stool            Stool Occurrence(s) 1 x  1 x       Shift Total  (mL/kg) 200  (3.3) 10  (0.2) 210  (3.4)       -10 90      Weight (kg) 61.2 61.2 61.2 61.2 61.2 61.2           Physical Exam:      General: NAD, A&Ox3, cooperative  Resp: non-labored   CV: RRR  Abdomen: soft, appropriately tender, non-distended. Incision without signs of infection, closed with staples.  Drains with scant serous output  Extremity: Warm    Lab Results   Component Value Date/Time    WBC 22.9 07/16/2017 01:51 AM    Hemoglobin (POC) 13.3 05/06/2014 07:55 AM    HGB 7.9 07/16/2017 01:51 AM    Hematocrit (POC) 39 05/06/2014 07:55 AM    HCT 24.4 07/16/2017 01:51 AM    PLATELET 815 58/53/2866 01:51 AM    MCV 80.8 07/16/2017 01:51 AM       Lab Results   Component Value Date/Time    Sodium 138 07/16/2017 01:51 AM    Potassium 3.8 07/16/2017 01:51 AM    Chloride 106 07/16/2017 01:51 AM    CO2 24 07/16/2017 01:51 AM    Anion gap 8 07/16/2017 01:51 AM    Glucose 90 07/16/2017 01:51 AM    BUN 11 07/16/2017 01:51 AM    Creatinine 0.76 07/16/2017 01:51 AM    BUN/Creatinine ratio 14 07/16/2017 01:51 AM    GFR est AA >60 07/16/2017 01:51 AM    GFR est non-AA >60 07/16/2017 01:51 AM    Calcium 8.8 07/16/2017 01:51 AM        Lab Results   Component Value Date/Time    INR 1.2 07/04/2017 02:54 PM    Prothrombin time 12.3 07/04/2017 02:54 PM           A/P:  47F POD 12 s/p open repair of two perforated gastric ulcers. Persistent leukocytosis. Looks good on exam.       Continue Full liquid diet with nutritional supplements over the weekend  Right abdominal drain removed at the bedside without complication  Monitor WBC. Continue Meropenem, Vancomycin, Eraxis. Obtain CT chest/abdomen/pelvis with IV contrast 7/17/17.    PT recommends home health        Tony Guevara MD

## 2017-07-16 NOTE — ROUTINE PROCESS
Bedside, Verbal and Written shift change report given to Debra Pittman  (oncoming nurse) by Chetna Travis RN  (offgoing nurse). Report included the following information SBAR, Kardex, MAR and Recent Results.

## 2017-07-16 NOTE — PROGRESS NOTES
Bedside and Verbal shift change report given to Lee Larsen RN (oncoming nurse) by Wilton Benítez RN (offgoing nurse). Report included the following information SBAR, Kardex, Procedure Summary, Intake/Output, MAR, Accordion and Recent Results.

## 2017-07-16 NOTE — PROGRESS NOTES
[facility]   Daily Progress Note    Date: 7/16/2017    Assessment/Plan:   Chucho Rea is a 56 y.o. female who is hospitalized for sepsis 2/2 perforated gastric ulcers/peritonitis. 24 Hour Events: Tachy into the 140s. Sepsis 2/2 Perforated Gastric Ulcers s/p Operative Repair on 7/4/17 - POD 11. Was NPO with TPN until POD 7. 1 drains currently in place. Right abdominal drain removed without complication. Cultures negative to date. Pain well controlled on po meds. Gastric ulcers thought to be related to chronic steroid use and Crohn's disease.    -Appreciate surgery recommendations & support.  -Per surgery, current abx are eraxis, vancomycin, meropenem. -IV protonix.  -Continue pain control, currently dilaudid po, goal to begin to wean further as approaching 2 weeks post-op  -FLD  -Strict I&O. -Daily labs. -Replete lytes prn  -Continue IV Abx over the weekend with CT scan on Monday for continued leukocytosis   -Encouraging incentive spirometry  -Encouraging movement and participation in PT    Left pleural effusion: Acute. Seen on CT chest this admission. S/p IR drainage 7/10. Left ptx appeared to improve on serial CXRs. O2 sats stable on RA.  -Continue to monitor for sxs of worsening respiratory status. Elevated Leukocytosis - Per heme, likely reactive. There was concern that WBC reamined elevated despite being on broad spectrum antibiotics. Given increase in wbc today, Gen Sgy is recommending repeat CT chest/abdomen/pelvis on Sunday if wbc continues to rise.  -Daily CBC  -CT chest/abdomen/pelvis with IV contract tomorrow    Thrombophilia - Unclear etiology. Platelets have continued to increase. On 7/10 platelets were 092, today 783. Likely secondary to reactive cause.     Community Acquired Pneumonia - Improvement of left basilar airspace disease noted on initial CXR. Could have been playing a role in SIRS/sepsis picture. Improved on serial CXR.     Severe Acute Malnutrition - criteria evidenced by nutritional intake <50% of recommended intake for >5days, weight loss of 4.7% in 2 weeks, and moderate-severe edema. Improvement during hospitalization. Weight today 136lb (up from from 121 on 7/4). - Encourage full liquid diet with Ensure and Might Shakes     Anemia - lab work up done on admission.  Notable for reticulocyte count of 2.3, LD of 294.  Per hematology, most likely 2/2 acute blood loss from gastric ulcer.  Also a component of iron deficiency.  S/p 2 units pRBC on admission--Hgb responded appropriately.  Has been stable since.  Hgb down to 8.8 this morning.  -Hematology signed off.  -Have 2 units of pRBCs on hold. -Daily CBC.     Chron's Disease - no pharmacotherapy PTA.  Poor follow up history with GI.  Likely playing a role in patient's current clinical course.  -Will consider GI consult later in course when patient becomes more stable.  Needs better outpatient follow up. Lyme Disease - diagnosed ~1 month ago at [facility].  Was treated with a 21 day course of doxycycline.  Completed this course fully, but developed some lower extremity skin breakdown following treatment.  Tetracyclines are on patient's allergy list.  -Not repeating tic studies     Tobacco Abuse - reports to smoke 1.5 PPD.   -Encouraging cessation.  -Prescribed daily nicotine patch while patient admitted.     Healing Wounds on Bilateral Feet - most likely acute reaction to doxycycline treatment that patient underwent prior to admission.  Per patient, these wounds are healing.  Remain painful.  -Wound care consulted, appreciate support.  -Tetracyclines on allergy list.  -Bacitracin PRN       3cm Laceration of Left Upper Back - healing.  Patient notes that this was from a fall.  Not amenable to sutures at this time.  -Wound care consulted, appreciate support.      Bilateral Lower Extremity Swelling/Pain - noted on admisison.  Thought to be 2/2 hypoalbuminemia.  Duplex studies of lower extremities negative for DVT. Improved.      Electrolyte deficiencies: Resolved s/p repletion and resuming diet. Will continue to trend and replete prn.      Depression/Panic Attacks - patient is showing some symptoms of this--mostly at night.  Takes klonopin, adderall, Lexapro, ativan, and trazodone at home. [facility] for withdrawal from SSRI. -Beginning to resume home meds now that pt is taking FLD      History of Falls - patient and family gave detailed history of multiple falls over the last few months/weeks. -PT/OT consulted.      FEN/GI - Full liquid diet  Activity - Out of bed with assistance  DVT prophylaxis - Heparin  GI prophylaxis - Protonix  Fall prophylaxis - Fall precautions ordered. Disposition- Anticipate rehab. C/s to CM placed. Code Status [facility]     Patient seen and discussed with Dr. Leida Chávez (Attending physician)    Yuriy Mazariegos MD  Family Medicine Resident  7/16/2017       Subjective  Her pain is well controlled. Expressed concern about the persistent elevated leukocytosis. [place] [employer] and has been having 2 BM a day.     Inpatient Medications  Current Facility-Administered Medications   Medication Dose Route Frequency    LORazepam (ATIVAN) tablet 1 mg  1 mg Oral TID PRN    meropenem (MERREM) 500 mg in 0.9% sodium chloride (MBP/ADV) 50 mL  500 mg IntraVENous Q6H    oxyCODONE IR (ROXICODONE) tablet 5 mg  5 mg Oral Q4H PRN    HYDROmorphone (PF) (DILAUDID) injection 0.5 mg  0.5 mg IntraVENous Q3H PRN    oxyCODONE IR (ROXICODONE) tablet 10 mg  10 mg Oral Q4H PRN    escitalopram oxalate (LEXAPRO) tablet 20 mg  20 mg Oral DAILY    levalbuterol (XOPENEX) nebulizer soln 1.25 mg/3 mL  1.25 mg Nebulization BID RT    fentaNYL citrate (PF) injection 100 mcg  100 mcg IntraVENous RAD PRN    vancomycin (VANCOCIN) 1,000 mg in 0.9% sodium chloride (MBP/ADV) 250 mL  1,000 mg IntraVENous Q8H    lidocaine (LIDODERM) 5 % patch 2 Patch  2 Patch TransDERmal Q24H    heparin (porcine) injection 5,000 Units  5,000 Units SubCUTAneous Q8H    nicotine (NICODERM CQ) 21 mg/24 hr patch 1 Patch  1 Patch TransDERmal DAILY    glucose chewable tablet 16 g  4 Tab Oral PRN    dextrose (D50W) injection syrg 12.5-25 g  12.5-25 g IntraVENous PRN    glucagon (GLUCAGEN) injection 1 mg  1 mg IntraMUSCular PRN    prochlorperazine (COMPAZINE) injection 10 mg  10 mg IntraVENous Q6H PRN    sodium chloride (NS) flush 5-10 mL  5-10 mL IntraVENous PRN    0.9% sodium chloride infusion 250 mL  250 mL IntraVENous PRN    sodium chloride (NS) flush 5-10 mL  5-10 mL IntraVENous Q8H    sodium chloride (NS) flush 5-10 mL  5-10 mL IntraVENous PRN    naloxone (NARCAN) injection 0.4 mg  0.4 mg IntraVENous PRN    albuterol (PROVENTIL VENTOLIN) nebulizer solution 2.5 mg  2.5 mg Nebulization Q4H PRN    sodium chloride (NS) flush 5-10 mL  5-10 mL IntraVENous PRN    anidulafungin (ERAXIS) 100 mg in 0.9% sodium chloride 130 mL IVPB  100 mg IntraVENous Q24H    pantoprazole (PROTONIX) 40 mg in sodium chloride 0.9 % 10 mL injection  40 mg IntraVENous Q12H         Allergies  Allergies   Allergen Reactions    Cephalosporins Hives    Penicillins Hives    Tetracyclines Nausea and Vomiting         Objective  Vitals:  Patient Vitals for the past 8 hrs:   Temp Pulse Resp BP SpO2   07/16/17 0748 - - - - 96 %   07/16/17 0651 - 95 - - -   07/16/17 0359 99 °F (37.2 °C) 98 18 104/65 98 %         I/O:    Intake/Output Summary (Last 24 hours) at 07/16/17 0812  Last data filed at 07/16/17 0440   Gross per 24 hour   Intake              300 ml   Output              210 ml   Net               90 ml     Last shift:       Last 3 shifts:    07/14 1901 - 07/16 0700  In: 1680 [I.V.:1680]  Out: 740 [Urine:600; Drains:30]    Physical Exam:  General: No acute distress. Alert. Cooperative. HEENT: Normocephalic. Atraumatic. Conjunctiva pink. Sclera white. PERRL. MMM   Respiratory: CTAB. No w/r/c. Expiratory wheezing on ULL   Cardiovascular: RRR.  Normal S1,S2. No m/r/g. 2+ pulses in DP bilaterally   GI: + bowel sounds. Non-distended. Soft abdomen. Incision c/d/i. Extremities:  Skin: No edema. No tenderness. Healing lesions on toes. Surrounding erythema on wounds     Laboratory Data  Recent Results (from the past 24 hour(s))   METABOLIC PANEL, COMPREHENSIVE    Collection Time: 07/16/17  1:51 AM   Result Value Ref Range    Sodium 138 136 - 145 mmol/L    Potassium 3.8 3.5 - 5.1 mmol/L    Chloride 106 97 - 108 mmol/L    CO2 24 21 - 32 mmol/L    Anion gap 8 5 - 15 mmol/L    Glucose 90 65 - 100 mg/dL    BUN 11 6 - 20 MG/DL    Creatinine 0.76 0.55 - 1.02 MG/DL    BUN/Creatinine ratio 14 12 - 20      GFR est AA >60 >60 ml/min/1.73m2    GFR est non-AA >60 >60 ml/min/1.73m2    Calcium 8.8 8.5 - 10.1 MG/DL    Bilirubin, total 0.2 0.2 - 1.0 MG/DL    ALT (SGPT) 11 (L) 12 - 78 U/L    AST (SGOT) 17 15 - 37 U/L    Alk. phosphatase 128 (H) 45 - 117 U/L    Protein, total 5.8 (L) 6.4 - 8.2 g/dL    Albumin 1.7 (L) 3.5 - 5.0 g/dL    Globulin 4.1 (H) 2.0 - 4.0 g/dL    A-G Ratio 0.4 (L) 1.1 - 2.2     CBC WITH AUTOMATED DIFF    Collection Time: 07/16/17  1:51 AM   Result Value Ref Range    WBC 22.9 (H) 3.6 - 11.0 K/uL    RBC 3.02 (L) 3.80 - 5.20 M/uL    HGB 7.9 (L) 11.5 - 16.0 g/dL    HCT 24.4 (L) 35.0 - 47.0 %    MCV 80.8 80.0 - 99.0 FL    MCH 26.2 26.0 - 34.0 PG    MCHC 32.4 30.0 - 36.5 g/dL    RDW 17.3 (H) 11.5 - 14.5 %    PLATELET 166 (H) 818 - 400 K/uL    NEUTROPHILS 83 (H) 32 - 75 %    LYMPHOCYTES 7 (L) 12 - 49 %    MONOCYTES 8 5 - 13 %    EOSINOPHILS 2 0 - 7 %    BASOPHILS 0 0 - 1 %    ABS. NEUTROPHILS 19.0 (H) 1.8 - 8.0 K/UL    ABS. LYMPHOCYTES 1.6 0.8 - 3.5 K/UL    ABS. MONOCYTES 1.8 (H) 0.0 - 1.0 K/UL    ABS. EOSINOPHILS 0.5 (H) 0.0 - 0.4 K/UL    ABS.  BASOPHILS 0.0 0.0 - 0.1 K/UL    DF MANUAL      RBC COMMENTS ANISOCYTOSIS  1+        RBC COMMENTS HYPOCHROMIA  1+        WBC COMMENTS REACTIVE LYMPHS     PHOSPHORUS    Collection Time: 07/16/17  1:51 AM   Result Value Ref Range Phosphorus 3.9 2.6 - 4.7 MG/DL   MAGNESIUM    Collection Time: 07/16/17  1:51 AM   Result Value Ref Range    Magnesium 1.5 (L) 1.6 - 2.4 mg/dL       Imaging  None new imaging      Hospital Problems:  Hospital Problems  Date Reviewed: 1/5/2017          Codes Class Noted POA    Thrombocytosis ([facility] ICD-10-CM: D47.3  ICD-9-CM: 238.71  7/5/2017 Yes        Neutrophilia ICD-10-CM: D72.9  ICD-9-CM: 288.8  7/5/2017 Yes        * (Principal)Perforation bowel ([facility] ICD-10-CM: K63.1  ICD-9-CM: 569.83  7/4/2017 Yes        Pneumonia ICD-10-CM: J18.9  ICD-9-CM: 332  7/4/2017 Yes        Anemia ICD-10-CM: D64.9  ICD-9-CM: 285.9  7/4/2017 Yes        Wounds, multiple open, lower extremity ICD-10-CM: S81.809A  ICD-9-CM: 894.0  7/4/2017 Yes        Crohn disease ([facility] ICD-10-CM: K50.90  ICD-9-CM: 555.9  4/19/2010 Yes    Overview Addendum 2/2/2012  4:33 PM by Tanner Hurst MD     She had 4 colon resections in the past.  Dr. Katia Shelley, Dr. Jeremias Patel abd/pekatelyn 2009: Thickened segment of neoileum proximal and adjacent to   anastomotic chain sutures and likely representing inflammatory bowel disease   recurrence. Partial small bowel obstruction at this level. No evidence of   perforation. No evidence of fistula or intra-abdominal abscess.

## 2017-07-17 ENCOUNTER — APPOINTMENT (OUTPATIENT)
Dept: CT IMAGING | Age: 47
DRG: 853 | End: 2017-07-17
Attending: FAMILY MEDICINE
Payer: COMMERCIAL

## 2017-07-17 LAB
ALBUMIN SERPL BCP-MCNC: 1.8 G/DL (ref 3.5–5)
ALBUMIN/GLOB SERPL: 0.5 {RATIO} (ref 1.1–2.2)
ALP SERPL-CCNC: 154 U/L (ref 45–117)
ALT SERPL-CCNC: 11 U/L (ref 12–78)
ANION GAP BLD CALC-SCNC: 9 MMOL/L (ref 5–15)
AST SERPL W P-5'-P-CCNC: 14 U/L (ref 15–37)
BASOPHILS # BLD AUTO: 0.2 K/UL (ref 0–0.1)
BASOPHILS # BLD: 1 % (ref 0–1)
BILIRUB SERPL-MCNC: 0.2 MG/DL (ref 0.2–1)
BUN SERPL-MCNC: 9 MG/DL (ref 6–20)
BUN/CREAT SERPL: 12 (ref 12–20)
CALCIUM SERPL-MCNC: 8.5 MG/DL (ref 8.5–10.1)
CHLORIDE SERPL-SCNC: 107 MMOL/L (ref 97–108)
CO2 SERPL-SCNC: 25 MMOL/L (ref 21–32)
CREAT SERPL-MCNC: 0.78 MG/DL (ref 0.55–1.02)
DATE LAST DOSE: ABNORMAL
DIFFERENTIAL METHOD BLD: ABNORMAL
EOSINOPHIL # BLD: 0.7 K/UL (ref 0–0.4)
EOSINOPHIL NFR BLD: 4 % (ref 0–7)
ERYTHROCYTE [DISTWIDTH] IN BLOOD BY AUTOMATED COUNT: 17.5 % (ref 11.5–14.5)
GLOBULIN SER CALC-MCNC: 4 G/DL (ref 2–4)
GLUCOSE SERPL-MCNC: 78 MG/DL (ref 65–100)
HCT VFR BLD AUTO: 25.1 % (ref 35–47)
HGB BLD-MCNC: 8 G/DL (ref 11.5–16)
LYMPHOCYTES # BLD AUTO: 17 % (ref 12–49)
LYMPHOCYTES # BLD: 3 K/UL (ref 0.8–3.5)
MAGNESIUM SERPL-MCNC: 1.8 MG/DL (ref 1.6–2.4)
MCH RBC QN AUTO: 26.1 PG (ref 26–34)
MCHC RBC AUTO-ENTMCNC: 31.9 G/DL (ref 30–36.5)
MCV RBC AUTO: 82 FL (ref 80–99)
MONOCYTES # BLD: 1.4 K/UL (ref 0–1)
MONOCYTES NFR BLD AUTO: 8 % (ref 5–13)
NEUTS SEG # BLD: 12.4 K/UL (ref 1.8–8)
NEUTS SEG NFR BLD AUTO: 70 % (ref 32–75)
PHOSPHATE SERPL-MCNC: 4.1 MG/DL (ref 2.6–4.7)
PLATELET # BLD AUTO: 858 K/UL (ref 150–400)
POTASSIUM SERPL-SCNC: 3.5 MMOL/L (ref 3.5–5.1)
PROT SERPL-MCNC: 5.8 G/DL (ref 6.4–8.2)
RBC # BLD AUTO: 3.06 M/UL (ref 3.8–5.2)
RBC MORPH BLD: ABNORMAL
RBC MORPH BLD: ABNORMAL
REPORTED DOSE,DOSE: ABNORMAL UNITS
REPORTED DOSE/TIME,TMG: 2200
SODIUM SERPL-SCNC: 141 MMOL/L (ref 136–145)
VANCOMYCIN TROUGH SERPL-MCNC: 31.7 UG/ML (ref 5–10)
WBC # BLD AUTO: 17.7 K/UL (ref 3.6–11)

## 2017-07-17 PROCEDURE — 74011250637 HC RX REV CODE- 250/637: Performed by: FAMILY MEDICINE

## 2017-07-17 PROCEDURE — 74011250636 HC RX REV CODE- 250/636: Performed by: PHYSICIAN ASSISTANT

## 2017-07-17 PROCEDURE — 65660000000 HC RM CCU STEPDOWN

## 2017-07-17 PROCEDURE — 74011250636 HC RX REV CODE- 250/636: Performed by: STUDENT IN AN ORGANIZED HEALTH CARE EDUCATION/TRAINING PROGRAM

## 2017-07-17 PROCEDURE — 74011250637 HC RX REV CODE- 250/637: Performed by: PHYSICIAN ASSISTANT

## 2017-07-17 PROCEDURE — 74011250636 HC RX REV CODE- 250/636: Performed by: FAMILY MEDICINE

## 2017-07-17 PROCEDURE — 74011250636 HC RX REV CODE- 250/636: Performed by: SURGERY

## 2017-07-17 PROCEDURE — 74011000250 HC RX REV CODE- 250: Performed by: STUDENT IN AN ORGANIZED HEALTH CARE EDUCATION/TRAINING PROGRAM

## 2017-07-17 PROCEDURE — 74011000258 HC RX REV CODE- 258: Performed by: SURGERY

## 2017-07-17 PROCEDURE — 97116 GAIT TRAINING THERAPY: CPT

## 2017-07-17 PROCEDURE — 83735 ASSAY OF MAGNESIUM: CPT | Performed by: SURGERY

## 2017-07-17 PROCEDURE — 74011636320 HC RX REV CODE- 636/320: Performed by: RADIOLOGY

## 2017-07-17 PROCEDURE — 71260 CT THORAX DX C+: CPT

## 2017-07-17 PROCEDURE — 85025 COMPLETE CBC W/AUTO DIFF WBC: CPT | Performed by: SURGERY

## 2017-07-17 PROCEDURE — 74011000250 HC RX REV CODE- 250: Performed by: FAMILY MEDICINE

## 2017-07-17 PROCEDURE — 94640 AIRWAY INHALATION TREATMENT: CPT

## 2017-07-17 PROCEDURE — 97535 SELF CARE MNGMENT TRAINING: CPT

## 2017-07-17 PROCEDURE — 80202 ASSAY OF VANCOMYCIN: CPT | Performed by: FAMILY MEDICINE

## 2017-07-17 PROCEDURE — 84100 ASSAY OF PHOSPHORUS: CPT | Performed by: SURGERY

## 2017-07-17 PROCEDURE — 74011000258 HC RX REV CODE- 258: Performed by: FAMILY MEDICINE

## 2017-07-17 PROCEDURE — 36415 COLL VENOUS BLD VENIPUNCTURE: CPT | Performed by: SURGERY

## 2017-07-17 PROCEDURE — 74011250637 HC RX REV CODE- 250/637: Performed by: STUDENT IN AN ORGANIZED HEALTH CARE EDUCATION/TRAINING PROGRAM

## 2017-07-17 PROCEDURE — 77030029211 HC GEL MEDIH TU INLC -B

## 2017-07-17 PROCEDURE — 80053 COMPREHEN METABOLIC PANEL: CPT | Performed by: SURGERY

## 2017-07-17 PROCEDURE — C9113 INJ PANTOPRAZOLE SODIUM, VIA: HCPCS | Performed by: STUDENT IN AN ORGANIZED HEALTH CARE EDUCATION/TRAINING PROGRAM

## 2017-07-17 RX ORDER — OXYCODONE HYDROCHLORIDE 5 MG/1
10 TABLET ORAL
Status: DISCONTINUED | OUTPATIENT
Start: 2017-07-17 | End: 2017-07-21 | Stop reason: HOSPADM

## 2017-07-17 RX ORDER — HYDROMORPHONE HYDROCHLORIDE 1 MG/ML
0.5 INJECTION, SOLUTION INTRAMUSCULAR; INTRAVENOUS; SUBCUTANEOUS
Status: DISCONTINUED | OUTPATIENT
Start: 2017-07-17 | End: 2017-07-19

## 2017-07-17 RX ORDER — OXYCODONE HYDROCHLORIDE 5 MG/1
5 TABLET ORAL
Status: DISCONTINUED | OUTPATIENT
Start: 2017-07-17 | End: 2017-07-21 | Stop reason: HOSPADM

## 2017-07-17 RX ORDER — POTASSIUM CHLORIDE 1.5 G/1.77G
20 POWDER, FOR SOLUTION ORAL EVERY 4 HOURS
Status: COMPLETED | OUTPATIENT
Start: 2017-07-17 | End: 2017-07-17

## 2017-07-17 RX ADMIN — MEROPENEM 500 MG: 500 INJECTION, POWDER, FOR SOLUTION INTRAVENOUS at 12:27

## 2017-07-17 RX ADMIN — MEROPENEM 500 MG: 500 INJECTION, POWDER, FOR SOLUTION INTRAVENOUS at 20:30

## 2017-07-17 RX ADMIN — LEVALBUTEROL 1.25 MG: 1.25 SOLUTION RESPIRATORY (INHALATION) at 07:34

## 2017-07-17 RX ADMIN — POTASSIUM CHLORIDE 20 MEQ: 1.5 POWDER, FOR SOLUTION ORAL at 10:22

## 2017-07-17 RX ADMIN — SODIUM CHLORIDE 100 MG: 900 INJECTION, SOLUTION INTRAVENOUS at 21:52

## 2017-07-17 RX ADMIN — HYDROMORPHONE HYDROCHLORIDE 0.5 MG: 1 INJECTION, SOLUTION INTRAMUSCULAR; INTRAVENOUS; SUBCUTANEOUS at 22:11

## 2017-07-17 RX ADMIN — HEPARIN SODIUM 5000 UNITS: 5000 INJECTION, SOLUTION INTRAVENOUS; SUBCUTANEOUS at 05:23

## 2017-07-17 RX ADMIN — LEVALBUTEROL 1.25 MG: 1.25 SOLUTION RESPIRATORY (INHALATION) at 20:03

## 2017-07-17 RX ADMIN — OXYCODONE HYDROCHLORIDE 5 MG: 5 TABLET ORAL at 05:23

## 2017-07-17 RX ADMIN — VANCOMYCIN HYDROCHLORIDE 1000 MG: 1 INJECTION, POWDER, LYOPHILIZED, FOR SOLUTION INTRAVENOUS at 07:26

## 2017-07-17 RX ADMIN — HYDROMORPHONE HYDROCHLORIDE 0.5 MG: 1 INJECTION, SOLUTION INTRAMUSCULAR; INTRAVENOUS; SUBCUTANEOUS at 02:06

## 2017-07-17 RX ADMIN — MEROPENEM 500 MG: 500 INJECTION, POWDER, FOR SOLUTION INTRAVENOUS at 05:24

## 2017-07-17 RX ADMIN — HEPARIN SODIUM 5000 UNITS: 5000 INJECTION, SOLUTION INTRAVENOUS; SUBCUTANEOUS at 14:50

## 2017-07-17 RX ADMIN — LORAZEPAM 1 MG: 1 TABLET ORAL at 18:06

## 2017-07-17 RX ADMIN — IOPAMIDOL 95 ML: 755 INJECTION, SOLUTION INTRAVENOUS at 21:17

## 2017-07-17 RX ADMIN — SODIUM CHLORIDE 40 MG: 9 INJECTION, SOLUTION INTRAMUSCULAR; INTRAVENOUS; SUBCUTANEOUS at 08:29

## 2017-07-17 RX ADMIN — OXYCODONE HYDROCHLORIDE 10 MG: 5 TABLET ORAL at 12:27

## 2017-07-17 RX ADMIN — Medication 10 ML: at 21:55

## 2017-07-17 RX ADMIN — Medication 10 ML: at 05:24

## 2017-07-17 RX ADMIN — HEPARIN SODIUM 5000 UNITS: 5000 INJECTION, SOLUTION INTRAVENOUS; SUBCUTANEOUS at 22:11

## 2017-07-17 RX ADMIN — ESCITALOPRAM 20 MG: 10 TABLET, FILM COATED ORAL at 08:30

## 2017-07-17 RX ADMIN — POTASSIUM CHLORIDE 20 MEQ: 1.5 POWDER, FOR SOLUTION ORAL at 14:51

## 2017-07-17 RX ADMIN — LORAZEPAM 1 MG: 1 TABLET ORAL at 12:28

## 2017-07-17 RX ADMIN — OXYCODONE HYDROCHLORIDE 10 MG: 5 TABLET ORAL at 18:56

## 2017-07-17 RX ADMIN — SODIUM CHLORIDE 40 MG: 9 INJECTION, SOLUTION INTRAMUSCULAR; INTRAVENOUS; SUBCUTANEOUS at 20:30

## 2017-07-17 RX ADMIN — HYDROMORPHONE HYDROCHLORIDE 0.5 MG: 1 INJECTION, SOLUTION INTRAMUSCULAR; INTRAVENOUS; SUBCUTANEOUS at 10:23

## 2017-07-17 RX ADMIN — Medication 10 ML: at 12:31

## 2017-07-17 NOTE — PROGRESS NOTES
Bedside and Verbal shift change report given to Zabrina Murphy RN (oncoming nurse) by Roshan Rangel RN (offgoing nurse). Report included the following information SBAR, Kardex, Procedure Summary, Intake/Output, MAR, Accordion and Recent Results.

## 2017-07-17 NOTE — PROGRESS NOTES
Problem: Mobility Impaired (Adult and Pediatric)  Goal: *Acute Goals and Plan of Care (Insert Text)  Physical Therapy Goals  Re-eval 7/13 Updated goals  Initiated 7/13/2017  1. Patient will move from supine to sit and sit to supine in bed with independence within 7 day(s). 2. Patient will transfer from bed to chair and chair to bed with modified independence using the least restrictive device within 7 day(s). 3. Patient will perform sit to stand with modified independence within 7 day(s). 4. Patient will ambulate with modified independence for 100 feet with the least restrictive device within 7 day(s). Initiated 7/6/2017  1. Patient will move from supine to sit and sit to supine in bed with minimal assistance/contact guard assist within 7 day(s). - MET  2. Patient will transfer from bed to chair and chair to bed with minimal assistance/contact guard assist using the least restrictive device within 7 day(s). -MET  3. Patient will perform sit to stand with moderate assistance within 7 day(s). -MET  4. Patient will ambulate with moderate assistance for 15 feet with the least restrictive device within 7 day(s). -MET  5. Patient will demonstrate independence with home exercise program for LE strengthening within 7 days. -Progressing   PHYSICAL THERAPY TREATMENT  Patient: Theresa Duke (20 y.o. female)  Date: 7/17/2017  Diagnosis: Perforation bowel ([address]  Pneumonia  Wounds, multiple open, lower extremity  Anemia  Perforated Bowel Perforation bowel ([address]  Procedure(s) (LRB):  LAPAROTOMY EXPLORATORY, REPAIR OF GASTRIC PERFORATION (N/A) 13 Days Post-Op  Precautions:        ASSESSMENT:  SBA for functional mobility tasks. Ascend/descend 10 steps using single handrail on R for steadying with SBA. Gait training without AD x 300' with SBA, decreased arm swing during gait with  occasional use of jim bar for steadying, no overt LOB. Pt has cane at home may use next session. HR  during session.   Progression toward goals:  [ ]    Improving appropriately and progressing toward goals  [X]    Improving slowly and progressing toward goals  [ ]    Not making progress toward goals and plan of care will be adjusted       PLAN:  Patient continues to benefit from skilled intervention to address the above impairments. Continue treatment per established plan of care. Discharge Recommendations:  Home Health v none  Further Equipment Recommendations for Discharge:  none       SUBJECTIVE:   Patient stated I am feeling much better.       OBJECTIVE DATA SUMMARY:   Critical Behavior:  Neurologic State: Alert, Appropriate for age  Orientation Level: Appropriate for age, Oriented X4  Cognition: Appropriate decision making  Safety/Judgement: Fall prevention, Home safety, Awareness of environment  Functional Mobility Training:  Bed Mobility:  Rolling: Stand-by asssistance  Supine to Sit: Stand-by asssistance  Sit to Supine: Stand-by asssistance           Transfers:  Sit to Stand: Stand-by asssistance  Stand to Sit: Stand-by asssistance                             Balance:  Sitting: Intact  Standing - Static: Good  Standing - Dynamic : Good  Ambulation/Gait Training:  Distance (ft): 300 Feet (ft)  Assistive Device:  (none)  Ambulation - Level of Assistance: Stand-by asssistance        Gait Abnormalities: Trunk sway increased; Altered arm swing        Base of Support: Narrowed     Speed/Marjorie: Pace decreased (<100 feet/min)                                  Stairs:  Number of Stairs Trained: 10  Stairs - Level of Assistance: Stand-by asssistance              Rail Use: Left   Neuro Re-Education:     Therapeutic Exercises:      Pain:  Pain Scale 1: Numeric (0 - 10)  Pain Intensity 1: 6  Pain Location 1: Abdomen  Pain Orientation 1: Mid  Pain Description 1: Aching  Pain Intervention(s) 1: Medication (see MAR)  Activity Tolerance:   Good  Please refer to the flowsheet for vital signs taken during this treatment.   After treatment:   [ ] Patient left in no apparent distress sitting up in chair  [X]    Patient left in no apparent distress in bed  [X]    Call bell left within reach  [X]    Nursing notified  [ ]    Caregiver present  [ ]    Bed alarm activated      COMMUNICATION/COLLABORATION:   The patients plan of care was discussed with: Registered Nurse     Keenan Donohue   Time Calculation: 20 mins

## 2017-07-17 NOTE — PROGRESS NOTES
is providing follow up support with pt as requested. Active listening, encouragement and prayer provided by bedside.      [id number] Aravind Dubon M.Div, M.S, [place] [address] [facility] at [id number]

## 2017-07-17 NOTE — PROGRESS NOTES
[facility] Dosing Services: Antimicrobial Stewardship Daily Doc    Consult for antibiotic dosing of Vancomycin by Dr. Josh Mclain  Indication: Intra-abdominal infection/Perforated gastric ulcerx2/Peritonitis/Sepsis   Day of Therapy: 12    Vancomycin therapy:  Current maintenance dose: 1000 (mg) every 8 hours (frequency). Trough goal 15-20 mcg/mL. Last trough level 31.7 mcg/ml drawn 1 hr early. Corrected trough 29 mcg/ml    Adjustment in Therapy:1000 mg every 12 hours  Dose calculated to approximate a therapeutic trough of 16 mcg/mL. Pharmacy to follow daily.   Pharmacist Hair Nugent                                 [id number]

## 2017-07-17 NOTE — WOUND CARE
Re consulted to reassess wounds to patients feet. Per patient she thinks the cause of these is from the antibiotics she was taking to treat her lyme disease. Bilateral feet tops of toes with dry, brown scabbed wounds, POA. Per patient and MD appear to be improving overall but with more pain especially to right lateral 5th toe. No odor or drainage noted, will cont with keeping open to air.      Rogers Car RN

## 2017-07-17 NOTE — PROGRESS NOTES
Pt has an order for a CT, however pt does not have an iv access. Transport on unit to take pt to CT. Called, informed that pt has no iv access. CT tech requested an order for po contrast. Per [facility] [employer] pt needs IV contrast po will not be effective and needs an iv access.

## 2017-07-17 NOTE — PROGRESS NOTES
[facility]   Daily Progress Note    Date: 7/17/2017    Assessment/Plan:   Nai Daly is a 56 y.o. female who is hospitalized for sepsis 2/2 perforated gastric ulcers/peritonitis. 24 Hour Events: Tachy into the 140s. Sepsis 2/2 Perforated Gastric Ulcers s/p Operative Repair on 7/4/17 - POD 11. Was NPO with TPN until POD 7. 1 drains currently in place. Right abdominal drain removed without complication. Cultures negative to date. Pain well controlled on po meds. Gastric ulcers thought to be related to chronic steroid use and Crohn's disease.    -Appreciate surgery recommendations & support.  -Per surgery, current abx are eraxis, vancomycin, meropenem. -IV protonix.  -Spacing out meds, Oxy 10mg and 5mg q6 as she is 2-weeks post-op  -Full liquid Diet  -Strict I&O. -Daily labs. -Replete lytes prn  -Per continue IV Abx CT of ab/pelvis/chest today   -Encouraging incentive spirometry  -Encouraging movement and participation in PT    Left pleural effusion: Acute. Seen on CT chest this admission. S/p IR drainage 7/10. Left ptx appeared to improve on serial CXRs. O2 sats stable on RA.  -Continue to monitor for sxs of worsening respiratory status. Elevated Leukocytosis - Per heme, likely reactive. Down-trending today. There was concern that WBC reamined elevated despite being on broad spectrum antibiotics. Given increase in wbc today, Gen Surg is recommending repeat CT chest/abdomen/pelvis on Sunday if wbc continues to rise. Spiked a low-grade temp on 7/16 so repeat blood cultures were drawn. 1 bottle found to have bacteria. Sent for culture.  -Daily CBC  -CT chest/abdomen/pelvis with IV contract today    Thrombophilia - Unclear etiology. Platelets have continued to increase. On 7/10 platelets were 783, today 858.  Likely secondary to reactive cause.     Community Acquired Pneumonia - Improvement of left basilar airspace disease noted on initial CXR. Could have been playing a role in SIRS/sepsis picture. Improved on serial CXR. Severe Acute Malnutrition - criteria evidenced by nutritional intake <50% of recommended intake for >5days, weight loss of 4.7% in 2 weeks, and moderate-severe edema. Improvement during hospitalization. Weight today 136lb (up from from 121 on 7/4). - Encourage full liquid diet with Ensure and Might Shakes     Anemia - lab work up done on admission.  Notable for reticulocyte count of 2.3, LD of 294.  Per hematology, most likely 2/2 acute blood loss from gastric ulcer.  Also a component of iron deficiency.  S/p 2 units pRBC on admission--Hgb responded appropriately.  Has been stable since.  Hgb down to 8.8 this morning.  -Hematology signed off.  -Have 2 units of pRBCs on hold. -Daily CBC.     Chron's Disease - no pharmacotherapy PTA.  Poor follow up history with GI.  Likely playing a role in patient's current clinical course.  -Will consider GI consult later in course when patient becomes more stable.  Needs better outpatient follow up. Lyme Disease - diagnosed ~1 month ago at [facility].  Was treated with a 21 day course of doxycycline.  Completed this course fully, but developed some lower extremity skin breakdown following treatment.  Tetracyclines are on patient's allergy list.  -Not repeating tic studies     Tobacco Abuse - reports to smoke 1.5 PPD.   -Encouraging cessation.  -Prescribed daily nicotine patch while patient admitted.     Healing Wounds on Bilateral Feet - most likely acute reaction to doxycycline treatment that patient underwent prior to admission.  Per patient, these wounds are healing.  Remain painful.  -Touch base with wound care, appreciate support.  -Tetracyclines on allergy list.  -Bacitracin PRN       3cm Laceration of Left Upper Back - healing.  Patient notes that this was from a fall.  Not amenable to sutures at this time.  -Wound care consulted, appreciate support.      Bilateral Lower Extremity Swelling/Pain - noted on admisison.  Thought to be 2/2 hypoalbuminemia.  Duplex studies of lower extremities negative for DVT. Improved.      Electrolyte deficiencies: Resolved s/p repletion and resuming diet. Will continue to trend and replete prn.      Depression/Panic Attacks - patient is showing some symptoms of this--mostly at night.  Takes klonopin, adderall, Lexapro, ativan, and trazodone at home. [facility] for withdrawal from SSRI. -Beginning to resume home meds now that pt is taking FLD      History of Falls - patient and family gave detailed history of multiple falls over the last few months/weeks. -PT/OT consulted.      FEN/GI - Full liquid diet  Activity - Out of bed with assistance  DVT prophylaxis - Heparin  GI prophylaxis - Protonix  Fall prophylaxis - Fall precautions ordered. Disposition- Anticipate rehab. C/s to  placed. Code Status [facility]     Patient seen and discussed with Dr. Maico Kent (Attending physician)    Angelica Myrick MD  Family Medicine Resident  7/17/2017       Subjective  Her pain is well controlled. No complaints. One bowel movement yesterday.     Inpatient Medications  Current Facility-Administered Medications   Medication Dose Route Frequency    HYDROmorphone (PF) (DILAUDID) injection 0.5 mg  0.5 mg IntraVENous Q8H PRN    oxyCODONE IR (ROXICODONE) tablet 10 mg  10 mg Oral Q6H PRN    oxyCODONE IR (ROXICODONE) tablet 5 mg  5 mg Oral Q6H PRN    acetaminophen (TYLENOL) tablet 650 mg  650 mg Oral Q6H PRN    LORazepam (ATIVAN) tablet 1 mg  1 mg Oral TID PRN    meropenem (MERREM) 500 mg in 0.9% sodium chloride (MBP/ADV) 50 mL  500 mg IntraVENous Q6H    escitalopram oxalate (LEXAPRO) tablet 20 mg  20 mg Oral DAILY    levalbuterol (XOPENEX) nebulizer soln 1.25 mg/3 mL  1.25 mg Nebulization BID RT    fentaNYL citrate (PF) injection 100 mcg  100 mcg IntraVENous RAD PRN    vancomycin (VANCOCIN) 1,000 mg in 0.9% sodium chloride (MBP/ADV) 250 mL  1,000 mg IntraVENous Q8H  lidocaine (LIDODERM) 5 % patch 2 Patch  2 Patch TransDERmal Q24H    heparin (porcine) injection 5,000 Units  5,000 Units SubCUTAneous Q8H    nicotine (NICODERM CQ) 21 mg/24 hr patch 1 Patch  1 Patch TransDERmal DAILY    glucose chewable tablet 16 g  4 Tab Oral PRN    dextrose (D50W) injection syrg 12.5-25 g  12.5-25 g IntraVENous PRN    glucagon (GLUCAGEN) injection 1 mg  1 mg IntraMUSCular PRN    prochlorperazine (COMPAZINE) injection 10 mg  10 mg IntraVENous Q6H PRN    sodium chloride (NS) flush 5-10 mL  5-10 mL IntraVENous PRN    0.9% sodium chloride infusion 250 mL  250 mL IntraVENous PRN    sodium chloride (NS) flush 5-10 mL  5-10 mL IntraVENous Q8H    sodium chloride (NS) flush 5-10 mL  5-10 mL IntraVENous PRN    naloxone (NARCAN) injection 0.4 mg  0.4 mg IntraVENous PRN    albuterol (PROVENTIL VENTOLIN) nebulizer solution 2.5 mg  2.5 mg Nebulization Q4H PRN    sodium chloride (NS) flush 5-10 mL  5-10 mL IntraVENous PRN    anidulafungin (ERAXIS) 100 mg in 0.9% sodium chloride 130 mL IVPB  100 mg IntraVENous Q24H    pantoprazole (PROTONIX) 40 mg in sodium chloride 0.9 % 10 mL injection  40 mg IntraVENous Q12H         Allergies  Allergies   Allergen Reactions    Cephalosporins Hives    Penicillins Hives    Tetracyclines Nausea and Vomiting         Objective  Vitals:  Patient Vitals for the past 8 hrs:   Temp Pulse Resp BP SpO2   07/17/17 0742 98.1 °F (36.7 °C) 92 18 99/53 96 %   07/17/17 0735 - - - - 98 %   07/17/17 0254 98.4 °F (36.9 °C) 94 18 90/56 97 %         I/O:    Intake/Output Summary (Last 24 hours) at 07/17/17 0922  Last data filed at 07/16/17 1549   Gross per 24 hour   Intake               50 ml   Output                0 ml   Net               50 ml     Last shift:       Last 3 shifts:    07/15 1901 - 07/17 0700  In: 50 [I.V.:50]  Out: 10 [Drains:10]    Physical Exam:  General: No acute distress. Alert. Cooperative. HEENT: Normocephalic. Atraumatic. Conjunctiva pink.  Sclera white. PERRL. MMM   Respiratory: CTAB. No w/r/c. Expiratory wheezing on ULL   Cardiovascular: RRR. Normal S1,S2. No m/r/g. 2+ pulses in DP bilaterally   GI: + bowel sounds. Non-distended. Soft abdomen. Incision c/d/i. Left drain in place. Extremities:  Skin: No edema. No tenderness. Healing lesions on toes. Surrounding erythema on wounds     Laboratory Data  Recent Results (from the past 24 hour(s))   CULTURE, BLOOD    Collection Time: 07/16/17 11:44 PM   Result Value Ref Range    Special Requests: NO SPECIAL REQUESTS      Culture result:        ONE OF TWO BOTTLES HAS BEEN FLAGGED POSITIVE BY INSTRUMENT. BOTTLE HAS BEEN SENT TO [facility] TO ASSESS FOR POSSIBLE GROWTH. Culture result: REMAINING BOTTLE(S) HAS/HAVE NO GROWTH SO FAR     CULTURE, BLOOD    Collection Time: 07/16/17 11:44 PM   Result Value Ref Range    Special Requests: NO SPECIAL REQUESTS      Culture result: NO GROWTH AFTER 6 HOURS     METABOLIC PANEL, COMPREHENSIVE    Collection Time: 07/17/17  5:35 AM   Result Value Ref Range    Sodium 141 136 - 145 mmol/L    Potassium 3.5 3.5 - 5.1 mmol/L    Chloride 107 97 - 108 mmol/L    CO2 25 21 - 32 mmol/L    Anion gap 9 5 - 15 mmol/L    Glucose 78 65 - 100 mg/dL    BUN 9 6 - 20 MG/DL    Creatinine 0.78 0.55 - 1.02 MG/DL    BUN/Creatinine ratio 12 12 - 20      GFR est AA >60 >60 ml/min/1.73m2    GFR est non-AA >60 >60 ml/min/1.73m2    Calcium 8.5 8.5 - 10.1 MG/DL    Bilirubin, total 0.2 0.2 - 1.0 MG/DL    ALT (SGPT) 11 (L) 12 - 78 U/L    AST (SGOT) 14 (L) 15 - 37 U/L    Alk.  phosphatase 154 (H) 45 - 117 U/L    Protein, total 5.8 (L) 6.4 - 8.2 g/dL    Albumin 1.8 (L) 3.5 - 5.0 g/dL    Globulin 4.0 2.0 - 4.0 g/dL    A-G Ratio 0.5 (L) 1.1 - 2.2     CBC WITH AUTOMATED DIFF    Collection Time: 07/17/17  5:35 AM   Result Value Ref Range    WBC 17.7 (H) 3.6 - 11.0 K/uL    RBC 3.06 (L) 3.80 - 5.20 M/uL    HGB 8.0 (L) 11.5 - 16.0 g/dL    HCT 25.1 (L) 35.0 - 47.0 %    MCV 82.0 80.0 - 99.0 FL    MCH 26.1 26.0 - 34.0 PG    MCHC 31.9 30.0 - 36.5 g/dL    RDW 17.5 (H) 11.5 - 14.5 %    PLATELET 721 (H) 826 - 400 K/uL    NEUTROPHILS 70 32 - 75 %    LYMPHOCYTES 17 12 - 49 %    MONOCYTES 8 5 - 13 %    EOSINOPHILS 4 0 - 7 %    BASOPHILS 1 0 - 1 %    ABS. NEUTROPHILS 12.4 (H) 1.8 - 8.0 K/UL    ABS. LYMPHOCYTES 3.0 0.8 - 3.5 K/UL    ABS. MONOCYTES 1.4 (H) 0.0 - 1.0 K/UL    ABS. EOSINOPHILS 0.7 (H) 0.0 - 0.4 K/UL    ABS. BASOPHILS 0.2 (H) 0.0 - 0.1 K/UL    DF SMEAR SCANNED      RBC COMMENTS ANISOCYTOSIS  1+        RBC COMMENTS HYPOCHROMIA  1+       PHOSPHORUS    Collection Time: 07/17/17  5:35 AM   Result Value Ref Range    Phosphorus 4.1 2.6 - 4.7 MG/DL   MAGNESIUM    Collection Time: 07/17/17  5:35 AM   Result Value Ref Range    Magnesium 1.8 1.6 - 2.4 mg/dL   [facility]    Collection Time: 07/17/17  5:35 AM   Result Value Ref Range    Vancomycin,trough 31.7 () 5.0 - 10.0 ug/mL    Reported dose date: 20170716      Reported dose time: 2200      Reported dose: 1000 MG UNITS       Imaging  None new imaging      Hospital Problems:  Hospital Problems  Date Reviewed: 1/5/2017          Codes Class Noted POA    Thrombocytosis ([facility] ICD-10-CM: D47.3  ICD-9-CM: 238.71  7/5/2017 Yes        Neutrophilia ICD-10-CM: D72.9  ICD-9-CM: 288.8  7/5/2017 Yes        * (Principal)Perforation bowel ([facility] ICD-10-CM: K63.1  ICD-9-CM: 569.83  7/4/2017 Yes        Pneumonia ICD-10-CM: J18.9  ICD-9-CM: 736  7/4/2017 Yes        Anemia ICD-10-CM: D64.9  ICD-9-CM: 285.9  7/4/2017 Yes        Wounds, multiple open, lower extremity ICD-10-CM: S81.809A  ICD-9-CM: 894.0  7/4/2017 Yes        Crohn disease ([facility] ICD-10-CM: K50.90  ICD-9-CM: 555.9  4/19/2010 Yes    Overview Addendum 2/2/2012  4:33 PM by Sanaz Amaya MD     She had 4 colon resections in the past.  Dr. Sriram Plaza, Dr. Andrea Quezada abd/pevis 2009:   Thickened segment of neoileum proximal and adjacent to   anastomotic chain sutures and likely representing inflammatory bowel disease recurrence. Partial small bowel obstruction at this level. No evidence of   perforation. No evidence of fistula or intra-abdominal abscess.

## 2017-07-17 NOTE — PROGRESS NOTES
General Surgery Daily Progress Note    Patient: Jannette De Souza MRN: [id number]  SSN: [id number]    YOB: 1970  Age: 56 y.o. Sex: female      Admit Date: 7/4/2017    Subjective:   Pain controlled, tolerating diet, bowel function continues.      Current Facility-Administered Medications   Medication Dose Route Frequency    HYDROmorphone (PF) (DILAUDID) injection 0.5 mg  0.5 mg IntraVENous Q8H PRN    oxyCODONE IR (ROXICODONE) tablet 10 mg  10 mg Oral Q6H PRN    oxyCODONE IR (ROXICODONE) tablet 5 mg  5 mg Oral Q6H PRN    potassium chloride (KLOR-CON) packet 20 mEq  20 mEq Oral Q4H    acetaminophen (TYLENOL) tablet 650 mg  650 mg Oral Q6H PRN    LORazepam (ATIVAN) tablet 1 mg  1 mg Oral TID PRN    meropenem (MERREM) 500 mg in 0.9% sodium chloride (MBP/ADV) 50 mL  500 mg IntraVENous Q6H    escitalopram oxalate (LEXAPRO) tablet 20 mg  20 mg Oral DAILY    levalbuterol (XOPENEX) nebulizer soln 1.25 mg/3 mL  1.25 mg Nebulization BID RT    fentaNYL citrate (PF) injection 100 mcg  100 mcg IntraVENous RAD PRN    vancomycin (VANCOCIN) 1,000 mg in 0.9% sodium chloride (MBP/ADV) 250 mL  1,000 mg IntraVENous Q8H    lidocaine (LIDODERM) 5 % patch 2 Patch  2 Patch TransDERmal Q24H    heparin (porcine) injection 5,000 Units  5,000 Units SubCUTAneous Q8H    nicotine (NICODERM CQ) 21 mg/24 hr patch 1 Patch  1 Patch TransDERmal DAILY    glucose chewable tablet 16 g  4 Tab Oral PRN    dextrose (D50W) injection syrg 12.5-25 g  12.5-25 g IntraVENous PRN    glucagon (GLUCAGEN) injection 1 mg  1 mg IntraMUSCular PRN    prochlorperazine (COMPAZINE) injection 10 mg  10 mg IntraVENous Q6H PRN    sodium chloride (NS) flush 5-10 mL  5-10 mL IntraVENous PRN    0.9% sodium chloride infusion 250 mL  250 mL IntraVENous PRN    sodium chloride (NS) flush 5-10 mL  5-10 mL IntraVENous Q8H    sodium chloride (NS) flush 5-10 mL  5-10 mL IntraVENous PRN    naloxone (NARCAN) injection 0.4 mg  0.4 mg IntraVENous PRN    albuterol (PROVENTIL VENTOLIN) nebulizer solution 2.5 mg  2.5 mg Nebulization Q4H PRN    sodium chloride (NS) flush 5-10 mL  5-10 mL IntraVENous PRN    anidulafungin (ERAXIS) 100 mg in 0.9% sodium chloride 130 mL IVPB  100 mg IntraVENous Q24H    pantoprazole (PROTONIX) 40 mg in sodium chloride 0.9 % 10 mL injection  40 mg IntraVENous Q12H        Objective:      07/15 1901 - 07/17 0700  In: 50 [I.V.:50]  Out: 10 [Drains:10]  Patient Vitals for the past 8 hrs:   BP Temp Pulse Resp SpO2   07/17/17 0742 99/53 98.1 °F (36.7 °C) 92 18 96 %   07/17/17 0735 - - - - 98 %   07/17/17 0254 90/56 98.4 °F (36.9 °C) 94 18 97 %       Physical Exam:  General: Awake, cooperative, speech slurred  Lungs: Clear to auscultation bilaterally, unlabored  Heart:  RRR  Abdomen: Soft, ATTP, non-distended, incisions c/d/i. Osmar drain serous. Extremities: Warm, moves all, no edema  Skin:  Warm and dry, no rash    Labs:   Recent Labs      07/17/17   0535   WBC  17.7*   HGB  8.0*   HCT  25.1*   PLT  858*     Recent Labs      07/17/17   0535   NA  141   K  3.5   CL  107   CO2  25   GLU  78   BUN  9   CREA  0.78   CA  8.5   MG  1.8   PHOS  4.1   ALB  1.8*   TBILI  0.2   SGOT  14*   ALT  11*       Assessment / Plan:   · POD#13 ex lap, primary repair perforated gastric ulcer x 2, omental intra-abdominal flap  · Repeat CT chest/abd/pelvis today  · Continue full liquids   · Leukocytosis- trending down again. Single episode of fever last night. Abdominal exam appropriate. Continue ABX  · Pain controlled with PO meds  · Heparin for DVT prophylaxis  · H pylori serologies negative.  Continue BID PPI  · Pathology benign  · Remove drain  · PT/OT, encourage IS

## 2017-07-17 NOTE — PROGRESS NOTES
Problem: Self Care Deficits Care Plan (Adult)  Goal: *Acute Goals and Plan of Care (Insert Text)  Occupational Therapy Goals  Initiated 7/6/2017 reviewed and upgraded 7-13  1. Patient will perform light grooming in unsupported sitting x5 mins with minimal assistance within 7 day(s). Met 7-13; upgrade to grooming in standing with S in 7 days  2. Patient will perform upper body dressing in unsupported sitting with min A within 7 day(s). Met; upgrade to mod I in 7 days  3. Patient will perform toilet transfers with minimal assistance with appropriate AD within 7 day(s). Met; upgrade to S in 7 days  4. Patient will participate in upper extremity therapeutic exercise/activities through comfortable ROM in supported sitting to prepare for ADL tasks with supervision/set-up for 6 minutes within 7 day(s). Cont 7 days  5. Patient will utilize energy conservation techniques during functional activities with verbal cues within 7 day(s). Cont 7 days  6. Patient will complete LE dressing with S in 7 days   OCCUPATIONAL THERAPY TREATMENT  Patient: Negar Luu (59 y.o. female)  Date: 7/17/2017  Diagnosis: Perforation bowel ([address]  Pneumonia  Wounds, multiple open, lower extremity  Anemia  Perforated Bowel Perforation bowel ([address]  Procedure(s) (LRB):  LAPAROTOMY EXPLORATORY, REPAIR OF GASTRIC PERFORATION (N/A) 13 Days Post-Op  Precautions:    Chart, occupational therapy assessment, plan of care, and goals were reviewed. ASSESSMENT:  Pt needing to use the restroom. After ambulation to the bathroom without assistive device pt transferred to [facility] with supervision. She was able to bathe seated on [facility] all except for her back. Encouraged pt to use grab bar if needed for bathing julia area/buttocks in standing. Pt able to doff/don hospital gown with stand by assist. She stood at the sink for 4 minutes to brush her teeth. Good progress. Pt left in chair with call bell in place. Recommend home health.    Progression toward goals:  [X]          Improving appropriately and progressing toward goals  [ ]          Improving slowly and progressing toward goals  [ ]          Not making progress toward goals and plan of care will be adjusted       PLAN:  Patient continues to benefit from skilled intervention to address the above impairments. Continue treatment per established plan of care. Discharge Recommendations:  Home health  Further Equipment Recommendations for Discharge:  None for OT       SUBJECTIVE:   Patient stated I am doing good.       OBJECTIVE DATA SUMMARY:   Cognitive/Behavioral Status:  Neurologic State: Alert; Appropriate for age  Orientation Level: Appropriate for age;Oriented X4  Cognition: Appropriate decision making           Functional Mobility and Transfers for ADLs:              Bed Mobility:      Supervision supine to sit. Transfers:     Functional Transfers  Toilet Transfer : Supervision to standard commode. Balance: Intact sitting balance. ADL Intervention:        Grooming  Grooming Assistance: Supervision/set up (stood at the sink for 4 minutes.)  Brushing Teeth: Supervision/set-up     Upper Body Bathing  Position Performed: Seated in chair     Lower Body Bathing  Bathing Assistance: Contact guard assistance  Lower Body : Supervision/set-up  Position Performed: Seated in chair     Upper [address] [facility] [address]: Stand-by assistance        Pain:  Pain Scale 1: Numeric (0 - 10)  Pain Intensity 1: 6           Pain Intervention(s) 1: Medication (see MAR)     Activity Tolerance:       Please refer to the flowsheet for vital signs taken during this treatment.   After treatment:   [X]  Patient left in no apparent distress sitting up in chair  [ ]  Patient left in no apparent distress in bed  [X]  Call bell left within reach  [X]  Nursing notified  [ ]  Caregiver present  [ ]  Bed alarm activated      COMMUNICATION/COLLABORATION:   The patients plan of care was discussed with: Occupational Therapy, Patient Care Tech     ARGENIS Hopkins/L  Time Calculation: 15 mins

## 2017-07-17 NOTE — PROGRESS NOTES
Received a call from Dr. Reva Madsen, [facility]. Reports that pt's VS have been reviewed. Most recent temp 101.3. Would like to know if pt has iv access. No access in place at this time, two RN's have attempted to place access without success. MD would like to be kept informed. 2020  Called notified Dr. Mauricio Chiu that pt now has iv access and iv abx's will be started. 2315  Pt's bp 94/60. Called, notified Dr. Mauricio Chiu. MD reviewed pt's vs. No new orders a this time.

## 2017-07-17 NOTE — ROUTINE PROCESS
IV inflitrated, nurse x 2 attempted to place another, unsuccessful. Call placed to ER, unable to assist at this time, will place call to ICU. Bedside and Verbal shift change report given to Gilson Arnold RN (oncoming nurse) by Viktoriya Sharma RN (offgoing nurse). Report included the following information SBAR, Procedure Summary, Intake/Output and MAR.

## 2017-07-18 ENCOUNTER — APPOINTMENT (OUTPATIENT)
Dept: GENERAL RADIOLOGY | Age: 47
DRG: 853 | End: 2017-07-18
Attending: PHYSICIAN ASSISTANT
Payer: COMMERCIAL

## 2017-07-18 ENCOUNTER — HOME HEALTH ADMISSION (OUTPATIENT)
Dept: HOME HEALTH SERVICES | Facility: HOME HEALTH | Age: 47
End: 2017-07-18
Payer: COMMERCIAL

## 2017-07-18 LAB
ALBUMIN SERPL BCP-MCNC: 2 G/DL (ref 3.5–5)
ALBUMIN/GLOB SERPL: 0.4 {RATIO} (ref 1.1–2.2)
ALP SERPL-CCNC: 200 U/L (ref 45–117)
ALT SERPL-CCNC: 13 U/L (ref 12–78)
ANION GAP BLD CALC-SCNC: 9 MMOL/L (ref 5–15)
AST SERPL W P-5'-P-CCNC: 17 U/L (ref 15–37)
BASOPHILS # BLD AUTO: 0.2 K/UL (ref 0–0.1)
BASOPHILS # BLD: 1 % (ref 0–1)
BILIRUB SERPL-MCNC: 0.3 MG/DL (ref 0.2–1)
BUN SERPL-MCNC: 8 MG/DL (ref 6–20)
BUN/CREAT SERPL: 9 (ref 12–20)
CALCIUM SERPL-MCNC: 8.8 MG/DL (ref 8.5–10.1)
CHLORIDE SERPL-SCNC: 104 MMOL/L (ref 97–108)
CO2 SERPL-SCNC: 26 MMOL/L (ref 21–32)
CREAT SERPL-MCNC: 0.85 MG/DL (ref 0.55–1.02)
EOSINOPHIL # BLD: 1 K/UL (ref 0–0.4)
EOSINOPHIL NFR BLD: 6 % (ref 0–7)
ERYTHROCYTE [DISTWIDTH] IN BLOOD BY AUTOMATED COUNT: 17.4 % (ref 11.5–14.5)
GLOBULIN SER CALC-MCNC: 4.5 G/DL (ref 2–4)
GLUCOSE SERPL-MCNC: 78 MG/DL (ref 65–100)
HCT VFR BLD AUTO: 27.7 % (ref 35–47)
HGB BLD-MCNC: 8.9 G/DL (ref 11.5–16)
LYMPHOCYTES # BLD AUTO: 14 % (ref 12–49)
LYMPHOCYTES # BLD: 2.3 K/UL (ref 0.8–3.5)
MAGNESIUM SERPL-MCNC: 1.7 MG/DL (ref 1.6–2.4)
MCH RBC QN AUTO: 26.4 PG (ref 26–34)
MCHC RBC AUTO-ENTMCNC: 32.1 G/DL (ref 30–36.5)
MCV RBC AUTO: 82.2 FL (ref 80–99)
MONOCYTES # BLD: 1.3 K/UL (ref 0–1)
MONOCYTES NFR BLD AUTO: 8 % (ref 5–13)
NEUTS SEG # BLD: 11.5 K/UL (ref 1.8–8)
NEUTS SEG NFR BLD AUTO: 71 % (ref 32–75)
PHOSPHATE SERPL-MCNC: 4.5 MG/DL (ref 2.6–4.7)
PLATELET # BLD AUTO: 895 K/UL (ref 150–400)
PLATELET COMMENTS,PCOM: ABNORMAL
POTASSIUM SERPL-SCNC: 4.1 MMOL/L (ref 3.5–5.1)
PROT SERPL-MCNC: 6.5 G/DL (ref 6.4–8.2)
RBC # BLD AUTO: 3.37 M/UL (ref 3.8–5.2)
RBC MORPH BLD: ABNORMAL
SODIUM SERPL-SCNC: 139 MMOL/L (ref 136–145)
WBC # BLD AUTO: 16.3 K/UL (ref 3.6–11)

## 2017-07-18 PROCEDURE — 74011250636 HC RX REV CODE- 250/636: Performed by: FAMILY MEDICINE

## 2017-07-18 PROCEDURE — 74011250637 HC RX REV CODE- 250/637: Performed by: FAMILY MEDICINE

## 2017-07-18 PROCEDURE — 74011250637 HC RX REV CODE- 250/637: Performed by: STUDENT IN AN ORGANIZED HEALTH CARE EDUCATION/TRAINING PROGRAM

## 2017-07-18 PROCEDURE — 77030018719 HC DRSG PTCH ANTIMIC J&J -A

## 2017-07-18 PROCEDURE — C1751 CATH, INF, PER/CENT/MIDLINE: HCPCS

## 2017-07-18 PROCEDURE — 94640 AIRWAY INHALATION TREATMENT: CPT

## 2017-07-18 PROCEDURE — C9113 INJ PANTOPRAZOLE SODIUM, VIA: HCPCS | Performed by: STUDENT IN AN ORGANIZED HEALTH CARE EDUCATION/TRAINING PROGRAM

## 2017-07-18 PROCEDURE — 74011250637 HC RX REV CODE- 250/637: Performed by: PHYSICIAN ASSISTANT

## 2017-07-18 PROCEDURE — 74011250636 HC RX REV CODE- 250/636: Performed by: PHYSICIAN ASSISTANT

## 2017-07-18 PROCEDURE — 93970 EXTREMITY STUDY: CPT

## 2017-07-18 PROCEDURE — 02HV33Z INSERTION OF INFUSION DEVICE INTO SUPERIOR VENA CAVA, PERCUTANEOUS APPROACH: ICD-10-PCS | Performed by: FAMILY MEDICINE

## 2017-07-18 PROCEDURE — 77030018786 HC NDL GD F/USND BARD -B

## 2017-07-18 PROCEDURE — 74011250636 HC RX REV CODE- 250/636: Performed by: STUDENT IN AN ORGANIZED HEALTH CARE EDUCATION/TRAINING PROGRAM

## 2017-07-18 PROCEDURE — 74011000258 HC RX REV CODE- 258: Performed by: SURGERY

## 2017-07-18 PROCEDURE — 76937 US GUIDE VASCULAR ACCESS: CPT

## 2017-07-18 PROCEDURE — 74011250636 HC RX REV CODE- 250/636: Performed by: SURGERY

## 2017-07-18 PROCEDURE — 83735 ASSAY OF MAGNESIUM: CPT | Performed by: SURGERY

## 2017-07-18 PROCEDURE — 36415 COLL VENOUS BLD VENIPUNCTURE: CPT | Performed by: SURGERY

## 2017-07-18 PROCEDURE — C8923 2D TTE W OR W/O FOL W/CON,CO: HCPCS

## 2017-07-18 PROCEDURE — 97530 THERAPEUTIC ACTIVITIES: CPT

## 2017-07-18 PROCEDURE — 85025 COMPLETE CBC W/AUTO DIFF WBC: CPT | Performed by: SURGERY

## 2017-07-18 PROCEDURE — 74011000250 HC RX REV CODE- 250: Performed by: STUDENT IN AN ORGANIZED HEALTH CARE EDUCATION/TRAINING PROGRAM

## 2017-07-18 PROCEDURE — 84100 ASSAY OF PHOSPHORUS: CPT | Performed by: SURGERY

## 2017-07-18 PROCEDURE — 36569 INSJ PICC 5 YR+ W/O IMAGING: CPT | Performed by: PHYSICIAN ASSISTANT

## 2017-07-18 PROCEDURE — 65660000000 HC RM CCU STEPDOWN

## 2017-07-18 PROCEDURE — 74011000250 HC RX REV CODE- 250: Performed by: FAMILY MEDICINE

## 2017-07-18 PROCEDURE — 74011000258 HC RX REV CODE- 258: Performed by: FAMILY MEDICINE

## 2017-07-18 PROCEDURE — 80053 COMPREHEN METABOLIC PANEL: CPT | Performed by: SURGERY

## 2017-07-18 RX ORDER — SODIUM CHLORIDE 0.9 % (FLUSH) 0.9 %
10-40 SYRINGE (ML) INJECTION EVERY 8 HOURS
Status: DISCONTINUED | OUTPATIENT
Start: 2017-07-18 | End: 2017-07-21 | Stop reason: HOSPADM

## 2017-07-18 RX ORDER — SODIUM CHLORIDE 0.9 % (FLUSH) 0.9 %
10 SYRINGE (ML) INJECTION EVERY 24 HOURS
Status: DISCONTINUED | OUTPATIENT
Start: 2017-07-18 | End: 2017-07-21 | Stop reason: HOSPADM

## 2017-07-18 RX ORDER — SODIUM CHLORIDE 0.9 % (FLUSH) 0.9 %
10 SYRINGE (ML) INJECTION AS NEEDED
Status: DISCONTINUED | OUTPATIENT
Start: 2017-07-18 | End: 2017-07-21 | Stop reason: HOSPADM

## 2017-07-18 RX ORDER — SODIUM CHLORIDE 0.9 % (FLUSH) 0.9 %
10-30 SYRINGE (ML) INJECTION AS NEEDED
Status: DISCONTINUED | OUTPATIENT
Start: 2017-07-18 | End: 2017-07-21 | Stop reason: HOSPADM

## 2017-07-18 RX ORDER — SODIUM CHLORIDE 0.9 % (FLUSH) 0.9 %
20 SYRINGE (ML) INJECTION EVERY 24 HOURS
Status: DISCONTINUED | OUTPATIENT
Start: 2017-07-18 | End: 2017-07-21 | Stop reason: HOSPADM

## 2017-07-18 RX ORDER — PANTOPRAZOLE SODIUM 40 MG/1
40 TABLET, DELAYED RELEASE ORAL
Status: DISCONTINUED | OUTPATIENT
Start: 2017-07-18 | End: 2017-07-21 | Stop reason: HOSPADM

## 2017-07-18 RX ADMIN — OXYCODONE HYDROCHLORIDE 10 MG: 5 TABLET ORAL at 22:20

## 2017-07-18 RX ADMIN — MEROPENEM 500 MG: 500 INJECTION, POWDER, FOR SOLUTION INTRAVENOUS at 19:43

## 2017-07-18 RX ADMIN — VANCOMYCIN HYDROCHLORIDE 1000 MG: 1 INJECTION, POWDER, LYOPHILIZED, FOR SOLUTION INTRAVENOUS at 22:19

## 2017-07-18 RX ADMIN — HYDROMORPHONE HYDROCHLORIDE 0.5 MG: 1 INJECTION, SOLUTION INTRAMUSCULAR; INTRAVENOUS; SUBCUTANEOUS at 06:56

## 2017-07-18 RX ADMIN — OXYCODONE HYDROCHLORIDE 10 MG: 5 TABLET ORAL at 09:26

## 2017-07-18 RX ADMIN — Medication 10 ML: at 22:00

## 2017-07-18 RX ADMIN — MEROPENEM 500 MG: 500 INJECTION, POWDER, FOR SOLUTION INTRAVENOUS at 02:46

## 2017-07-18 RX ADMIN — HEPARIN SODIUM 5000 UNITS: 5000 INJECTION, SOLUTION INTRAVENOUS; SUBCUTANEOUS at 06:05

## 2017-07-18 RX ADMIN — LEVALBUTEROL 1.25 MG: 1.25 SOLUTION RESPIRATORY (INHALATION) at 08:18

## 2017-07-18 RX ADMIN — Medication 10 ML: at 18:07

## 2017-07-18 RX ADMIN — LEVALBUTEROL 1.25 MG: 1.25 SOLUTION RESPIRATORY (INHALATION) at 19:31

## 2017-07-18 RX ADMIN — MEROPENEM 500 MG: 500 INJECTION, POWDER, FOR SOLUTION INTRAVENOUS at 07:55

## 2017-07-18 RX ADMIN — SODIUM CHLORIDE 40 MG: 9 INJECTION, SOLUTION INTRAMUSCULAR; INTRAVENOUS; SUBCUTANEOUS at 09:25

## 2017-07-18 RX ADMIN — Medication 20 ML: at 18:06

## 2017-07-18 RX ADMIN — PANTOPRAZOLE SODIUM 40 MG: 40 TABLET, DELAYED RELEASE ORAL at 16:03

## 2017-07-18 RX ADMIN — Medication 10 ML: at 15:16

## 2017-07-18 RX ADMIN — Medication 10 ML: at 22:22

## 2017-07-18 RX ADMIN — OXYCODONE HYDROCHLORIDE 10 MG: 5 TABLET ORAL at 02:45

## 2017-07-18 RX ADMIN — SODIUM CHLORIDE 100 MG: 900 INJECTION, SOLUTION INTRAVENOUS at 21:16

## 2017-07-18 RX ADMIN — HEPARIN SODIUM 5000 UNITS: 5000 INJECTION, SOLUTION INTRAVENOUS; SUBCUTANEOUS at 15:17

## 2017-07-18 RX ADMIN — ESCITALOPRAM 20 MG: 10 TABLET, FILM COATED ORAL at 09:26

## 2017-07-18 RX ADMIN — HEPARIN SODIUM 5000 UNITS: 5000 INJECTION, SOLUTION INTRAVENOUS; SUBCUTANEOUS at 21:16

## 2017-07-18 RX ADMIN — OXYCODONE HYDROCHLORIDE 10 MG: 5 TABLET ORAL at 16:04

## 2017-07-18 RX ADMIN — HYDROMORPHONE HYDROCHLORIDE 0.5 MG: 1 INJECTION, SOLUTION INTRAMUSCULAR; INTRAVENOUS; SUBCUTANEOUS at 18:06

## 2017-07-18 RX ADMIN — PROCHLORPERAZINE EDISYLATE 10 MG: 5 INJECTION INTRAMUSCULAR; INTRAVENOUS at 12:23

## 2017-07-18 RX ADMIN — LORAZEPAM 1 MG: 1 TABLET ORAL at 19:48

## 2017-07-18 RX ADMIN — Medication 10 ML: at 06:05

## 2017-07-18 RX ADMIN — VANCOMYCIN HYDROCHLORIDE 1000 MG: 1 INJECTION, POWDER, LYOPHILIZED, FOR SOLUTION INTRAVENOUS at 00:24

## 2017-07-18 RX ADMIN — VANCOMYCIN HYDROCHLORIDE 1000 MG: 1 INJECTION, POWDER, LYOPHILIZED, FOR SOLUTION INTRAVENOUS at 09:25

## 2017-07-18 NOTE — PROGRESS NOTES
Occupational Therapy Note: Pt has returned from testing however declined getting out of bed again or engaging with functional tasks awaiting food and fatigued. Will attempt OT tomorrow.

## 2017-07-18 NOTE — PROGRESS NOTES
is providing follow up support as requested. Prayer provided for pt and pt's mother. Pt discussed the loss of her mother and processed through grief and mother's legacy. Spiritual support will continue to follow as able and as needed.      0815 Aravind Dubon M.Div, M.S, [place] [address] available at [phone])

## 2017-07-18 NOTE — ROUTINE PROCESS
1500: Patient c/o pain at IV site. Spoke to [place] and received verbal orders to have PICC line placed for ABX continuation. Bedside and Verbal shift change report given to Miguel Angel Orozco RN (oncoming nurse) by Louise Mendieta RN (offgoing nurse). Report included the following information SBAR, Procedure Summary, Intake/Output and MAR.

## 2017-07-18 NOTE — PROCEDURES
[address]  *** FINAL REPORT ***    Name: Chastity Martin  MRN: [id number]    Inpatient  : 1970  HIS Order #: [id number]  [address] [facility] Visit #: [id number]  Date: 2017    TYPE OF TEST: Peripheral Venous Testing    REASON FOR TEST  Limb swelling    Right Leg:-  Deep venous thrombosis:           No  Superficial venous thrombosis:    No  Deep venous insufficiency:        No  Superficial venous insufficiency: No    Left Leg:-  Deep venous thrombosis:           No  Superficial venous thrombosis:    No  Deep venous insufficiency:        No  Superficial venous insufficiency: No      INTERPRETATION/FINDINGS  PROCEDURE:  BILATERAL LE VENOUS DUPLEX. Evaluation of lower extremity veins with ultrasound (B-mode imaging,  pulsed Doppler, color Doppler). Includes the common femoral, deep  femoral, femoral, popliteal, posterior tibial, peroneal, and great  saphenous veins. FINDINGS:  Natali Cisneros scale and color flow duplex images of the veins in  both lower extremities demonstrate normal compressibility, spontaneous   and augmented flow profiles, and absence of filling defects  throughout the deep and superficial veins in both lower extremities. CONCLUSION:  Bilateral lower extremity venous duplex negative for deep   venous thrombosis or thrombophlebitis. There is an incidental finding   of prominent bilateral groin lymph nodes, measuring 2.92 x 0.42 cm in   the right and 1.39 x 0.40 cm in the left. ADDITIONAL COMMENTS    I have personally reviewed the data relevant to the interpretation of  this  study.     TECHNOLOGIST: Cass Morris RDCS  Signed: 2017 02:26 PM    PHYSICIAN: Garrett Cooper MD  Signed: 2017 05:54 AM

## 2017-07-18 NOTE — PROGRESS NOTES
Problem: Mobility Impaired (Adult and Pediatric)  Goal: *Acute Goals and Plan of Care (Insert Text)  Physical Therapy Goals  Re-eval 7/13 Updated goals  Initiated 7/13/2017  1. Patient will move from supine to sit and sit to supine in bed with independence within 7 day(s). 2. Patient will transfer from bed to chair and chair to bed with modified independence using the least restrictive device within 7 day(s). 3. Patient will perform sit to stand with modified independence within 7 day(s). 4. Patient will ambulate with modified independence for 100 feet with the least restrictive device within 7 day(s). Initiated 7/6/2017  1. Patient will move from supine to sit and sit to supine in bed with minimal assistance/contact guard assist within 7 day(s). - MET  2. Patient will transfer from bed to chair and chair to bed with minimal assistance/contact guard assist using the least restrictive device within 7 day(s). -MET  3. Patient will perform sit to stand with moderate assistance within 7 day(s). -MET  4. Patient will ambulate with moderate assistance for 15 feet with the least restrictive device within 7 day(s). -MET  5. Patient will demonstrate independence with home exercise program for LE strengthening within 7 days. -Progressing   PHYSICAL THERAPY TREATMENT  Patient: Demetrius Jose (52 y.o. female)  Date: 7/18/2017  Diagnosis: Perforation bowel ([address]  Pneumonia  Wounds, multiple open, lower extremity  Anemia  Perforated Bowel Perforation bowel ([address]  Procedure(s) (LRB):  LAPAROTOMY EXPLORATORY, REPAIR OF GASTRIC PERFORATION (N/A) 14 Days Post-Op  Precautions:        ASSESSMENT:  Pt received in bed, agreeable to participate with physical therapy. SBA for functional transfers. Demostrate good standing balance. Bathroom transfers with SBA. Gait training x10' with SBA to w/c with transport for Vascular testing.     Progression toward goals:  [ ]    Improving appropriately and progressing toward goals  [X] Improving slowly and progressing toward goals  [ ]    Not making progress toward goals and plan of care will be adjusted       PLAN:  Patient continues to benefit from skilled intervention to address the above impairments. Continue treatment per established plan of care. Discharge Recommendations:  Home Health v none  Further Equipment Recommendations for Discharge:  none       SUBJECTIVE:   Patient stated I am not feeling great today.       OBJECTIVE DATA SUMMARY:   Critical Behavior:  Neurologic State: Alert, Appropriate for age  Orientation Level: Oriented X4  Cognition: Appropriate decision making, Appropriate for age attention/concentration, Appropriate safety awareness, Follows commands  Safety/Judgement: Fall prevention, Home safety, Awareness of environment  Functional Mobility Training:  Bed Mobility:     Supine to Sit: Stand-by asssistance  Sit to Supine: Stand-by asssistance           Transfers:  Sit to Stand: Stand-by asssistance  Stand to Sit: Stand-by asssistance                             Balance:  Sitting: Intact  Standing - Static: Good  Standing - Dynamic : Good  Ambulation/Gait Training:                                                                   Stairs:                       Neuro Re-Education:     Therapeutic Exercises:      Pain:  Pain Scale 1: Numeric (0 - 10)  Pain Intensity 1: 6  Pain Location 1: Abdomen  Pain Orientation 1: Anterior  Pain Description 1: Pressure  Pain Intervention(s) 1: Medication (see MAR)  Activity Tolerance:   Good  Please refer to the flowsheet for vital signs taken during this treatment.   After treatment:   [X]    Patient left in no apparent distress sitting up in w/c with transport  [ ]    Patient left in no apparent distress in bed  [ ]    Call bell left within reach  [ ]    Nursing notified  [ ]    Caregiver present  [ ]    Bed alarm activated      COMMUNICATION/COLLABORATION:   The patients plan of care was discussed with: Registered Nurse     Trung Dejesus Radha Radar   Time Calculation: 8 mins

## 2017-07-18 NOTE — PROGRESS NOTES
[facility]   Daily Progress Note    Date: 7/18/2017    Assessment/Plan:   Negar Luu is a 56 y.o. female who is hospitalized for sepsis 2/2 perforated gastric ulcers/peritonitis. 24 Hour Events: Tachy into the 140s. Sepsis 2/2 Perforated Gastric Ulcers s/p Operative Repair on 7/4/17 - POD 11. Was NPO with TPN until POD 7. Final drain removed. Cultures negative to date. Pain well controlled on po meds. Gastric ulcers thought to be related to chronic steroid use and Crohn's disease. CT showed improvement in inflammation. One of the fluid collections shown to be larger than previous scan.  -Appreciate surgery recommendations & support.  -Per surgery, current abx are eraxis, vancomycin, meropenem. Will consult ID  -IV protonix. -Oxy for pain meds  -Full liquid Diet  -Strict I&O. -Daily labs. -Replete lytes prn   -Encouraging incentive spirometry  -Encouraging movement and participation in PT    Left pleural effusion: Acute. Seen on CT chest this admission. S/p IR drainage 7/10. Left ptx appeared to improve on serial CXRs. O2 sats stable on RA.  -Continue to monitor for sxs of worsening respiratory status. Elevated Leukocytosis - Per heme, likely reactive. Down-trending today. There was concern that WBC reamined elevated despite being on broad spectrum antibiotics. Given increase in wbc today, Gen Surg is recommending repeat CT chest/abdomen/pelvis on Sunday if wbc continues to rise. Spiked a low-grade temp on 7/16 so repeat blood cultures were drawn. 1 bottle found to have bacteria. Sent for culture.  -Daily CBC  -CT chest/abdomen/pelvis with IV contract today    Thrombophilia - Unclear etiology. Platelets have continued to increase. On 7/10 platelets were 437, today 858. Likely secondary to reactive cause.     Community Acquired Pneumonia - Improvement of left basilar airspace disease noted on initial CXR. Could have been playing a role in SIRS/sepsis picture. Improved on serial CXR. Severe Acute Malnutrition - criteria evidenced by nutritional intake <50% of recommended intake for >5days, weight loss of 4.7% in 2 weeks, and moderate-severe edema. Improvement during hospitalization. Weight today 136lb (up from from 121 on 7/4). - Encourage full liquid diet with Ensure and Might Shakes     Anemia - lab work up done on admission.  Notable for reticulocyte count of 2.3, LD of 294.  Per hematology, most likely 2/2 acute blood loss from gastric ulcer.  Also a component of iron deficiency.  S/p 2 units pRBC on admission--Hgb responded appropriately.  Has been stable since.  Hgb down to 8.8 this morning.  -Hematology signed off.  -Have 2 units of pRBCs on hold. -Daily CBC.     Chron's Disease - no pharmacotherapy PTA.  Poor follow up history with GI.  Likely playing a role in patient's current clinical course.  -Will consider GI consult later in course when patient becomes more stable.  Needs better outpatient follow up. Lyme Disease - diagnosed ~1 month ago at [facility].  Was treated with a 21 day course of doxycycline.  Completed this course fully, but developed some lower extremity skin breakdown following treatment.  Tetracyclines are on patient's allergy list.  -Not repeating tic studies     Tobacco Abuse - reports to smoke 1.5 PPD.   -Encouraging cessation.  -Prescribed daily nicotine patch while patient admitted.     Healing Wounds on Bilateral Feet - most likely acute reaction to doxycycline treatment that patient underwent prior to admission.  Per patient, these wounds are healing.  Remain painful.  -Touch base with wound care, appreciate support.  -Tetracyclines on allergy list.  -Bacitracin PRN       3cm Laceration of Left Upper Back - healing.  Patient notes that this was from a fall.  Not amenable to sutures at this time.  -Wound care consulted, appreciate support.      Bilateral Lower Extremity Swelling/Pain - noted on admisison.  Thought to be 2/2 hypoalbuminemia.  Duplex studies of lower extremities negative for DVT. Improved.      Electrolyte deficiencies: Resolved s/p repletion and resuming diet. Will continue to trend and replete prn.      Depression/Panic Attacks - patient is showing some symptoms of this--mostly at night.  Takes klonopin, adderall, Lexapro, ativan, and trazodone at home. [facility] for withdrawal from SSRI. -Beginning to resume home meds now that pt is taking FLD      History of Falls - patient and family gave detailed history of multiple falls over the last few months/weeks. -PT/OT consulted.      FEN/GI - Full liquid diet  Activity - Out of bed with assistance  DVT prophylaxis - Heparin  GI prophylaxis - Protonix  Fall prophylaxis - Fall precautions ordered. Disposition- Anticipate rehab. C/s to CM placed. Code Status [facility], [place], operated by [facility]     Patient seen and discussed with Dr. Maico Kent (Attending physician)    Angelica Myrick MD  Family Medicine Resident  7/18/2017       CC: \"Doing okay\"    Subjective  Her pain is well controlled. No complaints. Concerned about her CT scan findings.     Inpatient Medications  Current Facility-Administered Medications   Medication Dose Route Frequency    pantoprazole (PROTONIX) tablet 40 mg  40 mg Oral ACB&D    HYDROmorphone (PF) (DILAUDID) injection 0.5 mg  0.5 mg IntraVENous Q8H PRN    oxyCODONE IR (ROXICODONE) tablet 10 mg  10 mg Oral Q6H PRN    oxyCODONE IR (ROXICODONE) tablet 5 mg  5 mg Oral Q6H PRN    vancomycin (VANCOCIN) 1,000 mg in 0.9% sodium chloride (MBP/ADV) 250 mL  1,000 mg IntraVENous Q12H    acetaminophen (TYLENOL) tablet 650 mg  650 mg Oral Q6H PRN    LORazepam (ATIVAN) tablet 1 mg  1 mg Oral TID PRN    meropenem (MERREM) 500 mg in 0.9% sodium chloride (MBP/ADV) 50 mL  500 mg IntraVENous Q6H    escitalopram oxalate (LEXAPRO) tablet 20 mg  20 mg Oral DAILY    levalbuterol (XOPENEX) nebulizer soln 1.25 mg/3 mL  1.25 mg Nebulization BID RT    fentaNYL citrate (PF) injection 100 mcg  100 mcg IntraVENous RAD PRN    lidocaine (LIDODERM) 5 % patch 2 Patch  2 Patch TransDERmal Q24H    heparin (porcine) injection 5,000 Units  5,000 Units SubCUTAneous Q8H    nicotine (NICODERM CQ) 21 mg/24 hr patch 1 Patch  1 Patch TransDERmal DAILY    glucose chewable tablet 16 g  4 Tab Oral PRN    dextrose (D50W) injection syrg 12.5-25 g  12.5-25 g IntraVENous PRN    glucagon (GLUCAGEN) injection 1 mg  1 mg IntraMUSCular PRN    prochlorperazine (COMPAZINE) injection 10 mg  10 mg IntraVENous Q6H PRN    sodium chloride (NS) flush 5-10 mL  5-10 mL IntraVENous PRN    0.9% sodium chloride infusion 250 mL  250 mL IntraVENous PRN    sodium chloride (NS) flush 5-10 mL  5-10 mL IntraVENous Q8H    sodium chloride (NS) flush 5-10 mL  5-10 mL IntraVENous PRN    naloxone (NARCAN) injection 0.4 mg  0.4 mg IntraVENous PRN    albuterol (PROVENTIL VENTOLIN) nebulizer solution 2.5 mg  2.5 mg Nebulization Q4H PRN    sodium chloride (NS) flush 5-10 mL  5-10 mL IntraVENous PRN    anidulafungin (ERAXIS) 100 mg in 0.9% sodium chloride 130 mL IVPB  100 mg IntraVENous Q24H         Allergies  Allergies   Allergen Reactions    Cephalosporins Hives    Penicillins Hives    Tetracyclines Nausea and Vomiting         Objective  Vitals:  Patient Vitals for the past 8 hrs:   Temp Pulse Resp BP SpO2   07/18/17 1155 99 °F (37.2 °C) 92 17 94/60 96 %   07/18/17 0818 - - - - 96 %         I/O:    Intake/Output Summary (Last 24 hours) at 07/18/17 1256  Last data filed at 07/18/17 0914   Gross per 24 hour   Intake              180 ml   Output              251 ml   Net              -71 ml     Last shift:    07/18 0701 - 07/18 1900  In: -   Out: 1   Last 3 shifts:    07/16 1901 - 07/18 0700  In: 180 [I.V.:180]  Out: 250 [Urine:250]    Physical Exam:  General: No acute distress. Alert. Cooperative. HEENT: Normocephalic. Atraumatic. Conjunctiva pink. Sclera white. PERRL. MMM   Respiratory: CTAB. No w/r/c.  Expiratory wheezing on ULL   Cardiovascular: RRR. Normal S1,S2. No m/r/g. 2+ pulses in DP bilaterally   GI: + bowel sounds. Non-distended. Soft abdomen. Incision c/d/i. Left drain in place. Extremities:  Skin: No edema. No tenderness. Healing lesions on toes. Surrounding erythema improved     Laboratory Data  Recent Results (from the past 24 hour(s))   METABOLIC PANEL, COMPREHENSIVE    Collection Time: 07/18/17  4:21 AM   Result Value Ref Range    Sodium 139 136 - 145 mmol/L    Potassium 4.1 3.5 - 5.1 mmol/L    Chloride 104 97 - 108 mmol/L    CO2 26 21 - 32 mmol/L    Anion gap 9 5 - 15 mmol/L    Glucose 78 65 - 100 mg/dL    BUN 8 6 - 20 MG/DL    Creatinine 0.85 0.55 - 1.02 MG/DL    BUN/Creatinine ratio 9 (L) 12 - 20      GFR est AA >60 >60 ml/min/1.73m2    GFR est non-AA >60 >60 ml/min/1.73m2    Calcium 8.8 8.5 - 10.1 MG/DL    Bilirubin, total 0.3 0.2 - 1.0 MG/DL    ALT (SGPT) 13 12 - 78 U/L    AST (SGOT) 17 15 - 37 U/L    Alk. phosphatase 200 (H) 45 - 117 U/L    Protein, total 6.5 6.4 - 8.2 g/dL    Albumin 2.0 (L) 3.5 - 5.0 g/dL    Globulin 4.5 (H) 2.0 - 4.0 g/dL    A-G Ratio 0.4 (L) 1.1 - 2.2     CBC WITH AUTOMATED DIFF    Collection Time: 07/18/17  4:21 AM   Result Value Ref Range    WBC 16.3 (H) 3.6 - 11.0 K/uL    RBC 3.37 (L) 3.80 - 5.20 M/uL    HGB 8.9 (L) 11.5 - 16.0 g/dL    HCT 27.7 (L) 35.0 - 47.0 %    MCV 82.2 80.0 - 99.0 FL    MCH 26.4 26.0 - 34.0 PG    MCHC 32.1 30.0 - 36.5 g/dL    RDW 17.4 (H) 11.5 - 14.5 %    PLATELET 066 (H) 634 - 400 K/uL    NEUTROPHILS 71 32 - 75 %    LYMPHOCYTES 14 12 - 49 %    MONOCYTES 8 5 - 13 %    EOSINOPHILS 6 0 - 7 %    BASOPHILS 1 0 - 1 %    ABS. NEUTROPHILS 11.5 (H) 1.8 - 8.0 K/UL    ABS. LYMPHOCYTES 2.3 0.8 - 3.5 K/UL    ABS. MONOCYTES 1.3 (H) 0.0 - 1.0 K/UL    ABS. EOSINOPHILS 1.0 (H) 0.0 - 0.4 K/UL    ABS.  BASOPHILS 0.2 (H) 0.0 - 0.1 K/UL    PLATELET COMMENTS LARGE PLATELETS      RBC COMMENTS ANISOCYTOSIS  1+       PHOSPHORUS    Collection Time: 07/18/17  4:21 AM   Result Value Ref Range Phosphorus 4.5 2.6 - 4.7 MG/DL   MAGNESIUM    Collection Time: 07/18/17  4:21 AM   Result Value Ref Range    Magnesium 1.7 1.6 - 2.4 mg/dL       Imaging  IMPRESSION:   1. Overall decreased inflammatory changes in the left upper abdomen with  decrease in size of one collection and slight increase in size of a second  peritoneal collection. A subcapsular splenic collection is decreased in size. There is no free air. There is no evidence for new abscess or other drainable  collection in the abdomen or pelvis.     2. Resolved right pleural effusion and decreased small to moderate left pleural  effusion with adjacent atelectasis. Decreased small left apical pneumothorax.     3. Decreased gallbladder       Hospital Problems:  Hospital Problems  Date Reviewed: 1/5/2017          Codes Class Noted POA    Thrombocytosis ([facility] ICD-10-CM: D47.3  ICD-9-CM: 238.71  7/5/2017 Yes        Neutrophilia ICD-10-CM: D72.9  ICD-9-CM: 288.8  7/5/2017 Yes        * (Principal)Perforation bowel ([facility] ICD-10-CM: K63.1  ICD-9-CM: 569.83  7/4/2017 Yes        Pneumonia ICD-10-CM: J18.9  ICD-9-CM: 905  7/4/2017 Yes        Anemia ICD-10-CM: D64.9  ICD-9-CM: 285.9  7/4/2017 Yes        Wounds, multiple open, lower extremity ICD-10-CM: S81.809A  ICD-9-CM: 894.0  7/4/2017 Yes        Crohn disease ([facility] ICD-10-CM: K50.90  ICD-9-CM: 555.9  4/19/2010 Yes    Overview Addendum 2/2/2012  4:33 PM by Yadira Mendez MD     She had 4 colon resections in the past.  Dr. Margaret Hall, Dr. Miguel Riggs abd/pekatelyn 2009: Thickened segment of neoileum proximal and adjacent to   anastomotic chain sutures and likely representing inflammatory bowel disease   recurrence. Partial small bowel obstruction at this level. No evidence of   perforation. No evidence of fistula or intra-abdominal abscess.

## 2017-07-18 NOTE — PROGRESS NOTES
Bedside and Verbal shift change report given to Paulina REILLY (oncoming nurse) by Corinne Bran (offgoing nurse).  Report included the following information SBAR, Kardex, Procedure Summary, Intake/Output, MAR, Recent Results and Cardiac Rhythm ST.

## 2017-07-18 NOTE — PROGRESS NOTES
PICC Placement Note    PRE-PROCEDURE VERIFICATION  Correct Procedure: yes  Correct Site:  yes  Temperature: Temp: 99 °F (37.2 °C), Temperature Source: Temp Source: Oral  Recent Labs      07/18/17   0421   BUN  8   CREA  0.85   PLT  895*   WBC  16.3*     Allergies: Cephalosporins; Penicillins; and Tetracyclines  Education materials for PICC Care given: yes. See Patient Education activity for further details. PICC Booklet placed at bedside: yes    Closed Ended PICC Catheters:  Flush Lumens as Follows:  Intermittent Medication:   Flush before and after each medication with 10 ml NS. Unused Ports:  Flush every 8 hours with 10 ml NS.  TPN Ports:  Flush every 24 hours with 20 ml NS prior to hanging new bag. Blood Draws: Stop infusion, draw off and waste 10 ml of blood. Draw sample with 10cc syringe or greater. DO NOT USE VACUTAINER . Transfer with appropriate device to lab  tubes. Flush with 20 ml NS. Dressing Change:  Every 7 days, and PRN using sterile technique if integrity of dressing is compromised. Initial dressing change for central line 24-48 hours post insertion if gauze is used. Apply new dressing per policy. PROCEDURE DETAIL  Consent was obtained and all questions were answered related to risks and benefits. A double lumen PICC line was inserted, as a sterile procedure using ultrasound and modified Seldinger technique for vascular access and antibiotic therapy. The following documentation is in addition to the PICC properties in the lines/airways flowsheet :  Lot #: [id number]  Lidocaine 1% administered intradermally :yes  Internal Catheter Total Length: 33 (cm)  Vein Selection for PICC:right brachial  Central Line Bundle followed yes  Complication Related to Insertion:no    The placement was verified by EKG, MAX P WAVE @ 33 (cm). PER EKG PICC TIP @ C/A junction.      Line is okay to use: yes    Boubacar Urbina RN

## 2017-07-18 NOTE — PROGRESS NOTES
General Surgery Daily Progress Note    Patient: Starr Beck MRN: [id number]  SSN: [id number]    YOB: 1970  Age: 56 y.o. Sex: female      Admit Date: 7/4/2017    Subjective:   Pain controlled, tolerating diet, bowel function continues.      Current Facility-Administered Medications   Medication Dose Route Frequency    HYDROmorphone (PF) (DILAUDID) injection 0.5 mg  0.5 mg IntraVENous Q8H PRN    oxyCODONE IR (ROXICODONE) tablet 10 mg  10 mg Oral Q6H PRN    oxyCODONE IR (ROXICODONE) tablet 5 mg  5 mg Oral Q6H PRN    vancomycin (VANCOCIN) 1,000 mg in 0.9% sodium chloride (MBP/ADV) 250 mL  1,000 mg IntraVENous Q12H    acetaminophen (TYLENOL) tablet 650 mg  650 mg Oral Q6H PRN    LORazepam (ATIVAN) tablet 1 mg  1 mg Oral TID PRN    meropenem (MERREM) 500 mg in 0.9% sodium chloride (MBP/ADV) 50 mL  500 mg IntraVENous Q6H    escitalopram oxalate (LEXAPRO) tablet 20 mg  20 mg Oral DAILY    levalbuterol (XOPENEX) nebulizer soln 1.25 mg/3 mL  1.25 mg Nebulization BID RT    fentaNYL citrate (PF) injection 100 mcg  100 mcg IntraVENous RAD PRN    lidocaine (LIDODERM) 5 % patch 2 Patch  2 Patch TransDERmal Q24H    heparin (porcine) injection 5,000 Units  5,000 Units SubCUTAneous Q8H    nicotine (NICODERM CQ) 21 mg/24 hr patch 1 Patch  1 Patch TransDERmal DAILY    glucose chewable tablet 16 g  4 Tab Oral PRN    dextrose (D50W) injection syrg 12.5-25 g  12.5-25 g IntraVENous PRN    glucagon (GLUCAGEN) injection 1 mg  1 mg IntraMUSCular PRN    prochlorperazine (COMPAZINE) injection 10 mg  10 mg IntraVENous Q6H PRN    sodium chloride (NS) flush 5-10 mL  5-10 mL IntraVENous PRN    0.9% sodium chloride infusion 250 mL  250 mL IntraVENous PRN    sodium chloride (NS) flush 5-10 mL  5-10 mL IntraVENous Q8H    sodium chloride (NS) flush 5-10 mL  5-10 mL IntraVENous PRN    naloxone (NARCAN) injection 0.4 mg  0.4 mg IntraVENous PRN    albuterol (PROVENTIL VENTOLIN) nebulizer solution 2.5 mg  2.5 mg Nebulization Q4H PRN    sodium chloride (NS) flush 5-10 mL  5-10 mL IntraVENous PRN    anidulafungin (ERAXIS) 100 mg in 0.9% sodium chloride 130 mL IVPB  100 mg IntraVENous Q24H    pantoprazole (PROTONIX) 40 mg in sodium chloride 0.9 % 10 mL injection  40 mg IntraVENous Q12H        Objective:   07/18 0701 - 07/18 1900  In: -   Out: 1   07/16 1901 - 07/18 0700  In: 180 [I.V.:180]  Out: 250 [Urine:250]  Patient Vitals for the past 8 hrs:   BP Temp Pulse Resp SpO2   07/18/17 0818 - - - - 96 %   07/18/17 0252 94/59 99.5 °F (37.5 °C) 99 17 97 %       Physical Exam:  General: Awake, cooperative, speech slurred  Lungs: Clear to auscultation bilaterally, unlabored  Heart:  RRR  Abdomen: Soft, ATTP, non-distended, incisions c/d/i. Extremities: Warm, moves all, no edema  Skin:  Warm and dry, no rash    Labs:   Recent Labs      07/18/17   0421   WBC  16.3*   HGB  8.9*   HCT  27.7*   PLT  895*     Recent Labs      07/18/17   0421   NA  139   K  4.1   CL  104   CO2  26   GLU  78   BUN  8   CREA  0.85   CA  8.8   MG  1.7   PHOS  4.5   ALB  2.0*   TBILI  0.3   SGOT  17   ALT  13       Assessment / Plan:   · POD#14 ex lap, primary repair perforated gastric ulcer x 2, omental intra-abdominal flap  · Overall improvement on CT chest/abd/pelvis. No drainable collections. Will discuss case with ID. · Continue full liquids x 1 week and then will transition to soft diet  · Leukocytosis- trending down, single fever overnight. Cultures thus far with no growth. Continue ABX  · Pain controlled with PO meds  · Heparin for DVT prophylaxis  · H pylori serologies negative. Continue BID PPI, change to PO  · Pathology benign  · Remove drain  · PT/OT, encourage IS    1046 Discussed case with Dr. Dain Hancock.

## 2017-07-19 LAB
ALBUMIN SERPL BCP-MCNC: 2 G/DL (ref 3.5–5)
ALBUMIN/GLOB SERPL: 0.6 {RATIO} (ref 1.1–2.2)
ALP SERPL-CCNC: 191 U/L (ref 45–117)
ALT SERPL-CCNC: 11 U/L (ref 12–78)
ANION GAP BLD CALC-SCNC: 5 MMOL/L (ref 5–15)
AST SERPL W P-5'-P-CCNC: 19 U/L (ref 15–37)
BASOPHILS # BLD AUTO: 0.1 K/UL (ref 0–0.1)
BASOPHILS # BLD: 1 % (ref 0–1)
BILIRUB SERPL-MCNC: 0.2 MG/DL (ref 0.2–1)
BUN SERPL-MCNC: 9 MG/DL (ref 6–20)
BUN/CREAT SERPL: 12 (ref 12–20)
CALCIUM SERPL-MCNC: 9.1 MG/DL (ref 8.5–10.1)
CHLORIDE SERPL-SCNC: 102 MMOL/L (ref 97–108)
CO2 SERPL-SCNC: 30 MMOL/L (ref 21–32)
CREAT SERPL-MCNC: 0.78 MG/DL (ref 0.55–1.02)
DATE LAST DOSE: ABNORMAL
DIFFERENTIAL METHOD BLD: ABNORMAL
EOSINOPHIL # BLD: 0.9 K/UL (ref 0–0.4)
EOSINOPHIL NFR BLD: 8 % (ref 0–7)
ERYTHROCYTE [DISTWIDTH] IN BLOOD BY AUTOMATED COUNT: 17.8 % (ref 11.5–14.5)
GLOBULIN SER CALC-MCNC: 3.5 G/DL (ref 2–4)
GLUCOSE SERPL-MCNC: 76 MG/DL (ref 65–100)
HCT VFR BLD AUTO: 25.5 % (ref 35–47)
HGB BLD-MCNC: 8 G/DL (ref 11.5–16)
LYMPHOCYTES # BLD AUTO: 18 % (ref 12–49)
LYMPHOCYTES # BLD: 2 K/UL (ref 0.8–3.5)
MAGNESIUM SERPL-MCNC: 1.5 MG/DL (ref 1.6–2.4)
MCH RBC QN AUTO: 26.3 PG (ref 26–34)
MCHC RBC AUTO-ENTMCNC: 31.4 G/DL (ref 30–36.5)
MCV RBC AUTO: 83.9 FL (ref 80–99)
MONOCYTES # BLD: 1.1 K/UL (ref 0–1)
MONOCYTES NFR BLD AUTO: 10 % (ref 5–13)
NEUTS SEG # BLD: 6.8 K/UL (ref 1.8–8)
NEUTS SEG NFR BLD AUTO: 63 % (ref 32–75)
PHOSPHATE SERPL-MCNC: 4.4 MG/DL (ref 2.6–4.7)
PLATELET # BLD AUTO: 806 K/UL (ref 150–400)
POTASSIUM SERPL-SCNC: 4.1 MMOL/L (ref 3.5–5.1)
PROT SERPL-MCNC: 5.5 G/DL (ref 6.4–8.2)
RBC # BLD AUTO: 3.04 M/UL (ref 3.8–5.2)
RBC MORPH BLD: ABNORMAL
RBC MORPH BLD: ABNORMAL
REPORTED DOSE,DOSE: ABNORMAL UNITS
REPORTED DOSE/TIME,TMG: 2100
SODIUM SERPL-SCNC: 137 MMOL/L (ref 136–145)
VANCOMYCIN TROUGH SERPL-MCNC: 21.9 UG/ML (ref 5–10)
WBC # BLD AUTO: 10.9 K/UL (ref 3.6–11)

## 2017-07-19 PROCEDURE — 36415 COLL VENOUS BLD VENIPUNCTURE: CPT | Performed by: SURGERY

## 2017-07-19 PROCEDURE — 74011250636 HC RX REV CODE- 250/636: Performed by: PHYSICIAN ASSISTANT

## 2017-07-19 PROCEDURE — 97110 THERAPEUTIC EXERCISES: CPT

## 2017-07-19 PROCEDURE — 80202 ASSAY OF VANCOMYCIN: CPT | Performed by: FAMILY MEDICINE

## 2017-07-19 PROCEDURE — 74011250637 HC RX REV CODE- 250/637: Performed by: STUDENT IN AN ORGANIZED HEALTH CARE EDUCATION/TRAINING PROGRAM

## 2017-07-19 PROCEDURE — 74011000250 HC RX REV CODE- 250: Performed by: FAMILY MEDICINE

## 2017-07-19 PROCEDURE — 65660000000 HC RM CCU STEPDOWN

## 2017-07-19 PROCEDURE — 74011250637 HC RX REV CODE- 250/637: Performed by: PHYSICIAN ASSISTANT

## 2017-07-19 PROCEDURE — 94640 AIRWAY INHALATION TREATMENT: CPT

## 2017-07-19 PROCEDURE — 74011250636 HC RX REV CODE- 250/636: Performed by: FAMILY MEDICINE

## 2017-07-19 PROCEDURE — 77030011256 HC DRSG MEPILEX <16IN NO BORD MOLN -A

## 2017-07-19 PROCEDURE — 74011250636 HC RX REV CODE- 250/636: Performed by: SURGERY

## 2017-07-19 PROCEDURE — 74011250637 HC RX REV CODE- 250/637: Performed by: FAMILY MEDICINE

## 2017-07-19 PROCEDURE — 85025 COMPLETE CBC W/AUTO DIFF WBC: CPT | Performed by: SURGERY

## 2017-07-19 PROCEDURE — 80053 COMPREHEN METABOLIC PANEL: CPT | Performed by: SURGERY

## 2017-07-19 PROCEDURE — 83735 ASSAY OF MAGNESIUM: CPT | Performed by: SURGERY

## 2017-07-19 PROCEDURE — 74011000258 HC RX REV CODE- 258: Performed by: FAMILY MEDICINE

## 2017-07-19 PROCEDURE — 74011000258 HC RX REV CODE- 258: Performed by: SURGERY

## 2017-07-19 PROCEDURE — 84100 ASSAY OF PHOSPHORUS: CPT | Performed by: SURGERY

## 2017-07-19 PROCEDURE — 74011250636 HC RX REV CODE- 250/636: Performed by: STUDENT IN AN ORGANIZED HEALTH CARE EDUCATION/TRAINING PROGRAM

## 2017-07-19 PROCEDURE — 97535 SELF CARE MNGMENT TRAINING: CPT

## 2017-07-19 PROCEDURE — 97116 GAIT TRAINING THERAPY: CPT

## 2017-07-19 PROCEDURE — 77030008027

## 2017-07-19 RX ORDER — TRAZODONE HYDROCHLORIDE 100 MG/1
200 TABLET ORAL
Status: DISCONTINUED | OUTPATIENT
Start: 2017-07-19 | End: 2017-07-21 | Stop reason: HOSPADM

## 2017-07-19 RX ORDER — HYDROMORPHONE HYDROCHLORIDE 1 MG/ML
0.5 INJECTION, SOLUTION INTRAMUSCULAR; INTRAVENOUS; SUBCUTANEOUS
Status: DISCONTINUED | OUTPATIENT
Start: 2017-07-19 | End: 2017-07-21 | Stop reason: HOSPADM

## 2017-07-19 RX ORDER — MAGNESIUM SULFATE HEPTAHYDRATE 40 MG/ML
2 INJECTION, SOLUTION INTRAVENOUS
Status: COMPLETED | OUTPATIENT
Start: 2017-07-19 | End: 2017-07-19

## 2017-07-19 RX ADMIN — OXYCODONE HYDROCHLORIDE 10 MG: 5 TABLET ORAL at 08:27

## 2017-07-19 RX ADMIN — SODIUM CHLORIDE 100 MG: 900 INJECTION, SOLUTION INTRAVENOUS at 21:51

## 2017-07-19 RX ADMIN — PANTOPRAZOLE SODIUM 40 MG: 40 TABLET, DELAYED RELEASE ORAL at 07:05

## 2017-07-19 RX ADMIN — MEROPENEM 500 MG: 500 INJECTION, POWDER, FOR SOLUTION INTRAVENOUS at 20:41

## 2017-07-19 RX ADMIN — Medication 10 ML: at 21:51

## 2017-07-19 RX ADMIN — VANCOMYCIN HYDROCHLORIDE 1000 MG: 1 INJECTION, POWDER, LYOPHILIZED, FOR SOLUTION INTRAVENOUS at 10:13

## 2017-07-19 RX ADMIN — HEPARIN SODIUM 5000 UNITS: 5000 INJECTION, SOLUTION INTRAVENOUS; SUBCUTANEOUS at 14:02

## 2017-07-19 RX ADMIN — MAGNESIUM SULFATE HEPTAHYDRATE 2 G: 40 INJECTION, SOLUTION INTRAVENOUS at 11:52

## 2017-07-19 RX ADMIN — LORAZEPAM 1 MG: 1 TABLET ORAL at 20:41

## 2017-07-19 RX ADMIN — HYDROMORPHONE HYDROCHLORIDE 0.5 MG: 1 INJECTION, SOLUTION INTRAMUSCULAR; INTRAVENOUS; SUBCUTANEOUS at 02:10

## 2017-07-19 RX ADMIN — TRAZODONE HYDROCHLORIDE 200 MG: 100 TABLET ORAL at 21:51

## 2017-07-19 RX ADMIN — LEVALBUTEROL 1.25 MG: 1.25 SOLUTION RESPIRATORY (INHALATION) at 20:04

## 2017-07-19 RX ADMIN — Medication 10 ML: at 14:03

## 2017-07-19 RX ADMIN — LORAZEPAM 1 MG: 1 TABLET ORAL at 11:52

## 2017-07-19 RX ADMIN — OXYCODONE HYDROCHLORIDE 10 MG: 5 TABLET ORAL at 15:26

## 2017-07-19 RX ADMIN — ESCITALOPRAM 20 MG: 10 TABLET, FILM COATED ORAL at 08:27

## 2017-07-19 RX ADMIN — HEPARIN SODIUM 5000 UNITS: 5000 INJECTION, SOLUTION INTRAVENOUS; SUBCUTANEOUS at 06:37

## 2017-07-19 RX ADMIN — Medication 20 ML: at 16:59

## 2017-07-19 RX ADMIN — LEVALBUTEROL 1.25 MG: 1.25 SOLUTION RESPIRATORY (INHALATION) at 08:20

## 2017-07-19 RX ADMIN — OXYCODONE HYDROCHLORIDE 10 MG: 5 TABLET ORAL at 22:05

## 2017-07-19 RX ADMIN — MEROPENEM 500 MG: 500 INJECTION, POWDER, FOR SOLUTION INTRAVENOUS at 08:26

## 2017-07-19 RX ADMIN — HEPARIN SODIUM 5000 UNITS: 5000 INJECTION, SOLUTION INTRAVENOUS; SUBCUTANEOUS at 21:51

## 2017-07-19 RX ADMIN — MEROPENEM 500 MG: 500 INJECTION, POWDER, FOR SOLUTION INTRAVENOUS at 02:00

## 2017-07-19 RX ADMIN — PANTOPRAZOLE SODIUM 40 MG: 40 TABLET, DELAYED RELEASE ORAL at 16:59

## 2017-07-19 RX ADMIN — Medication 10 ML: at 17:00

## 2017-07-19 RX ADMIN — MEROPENEM 500 MG: 500 INJECTION, POWDER, FOR SOLUTION INTRAVENOUS at 14:02

## 2017-07-19 RX ADMIN — HYDROMORPHONE HYDROCHLORIDE 0.5 MG: 1 INJECTION, SOLUTION INTRAMUSCULAR; INTRAVENOUS; SUBCUTANEOUS at 16:59

## 2017-07-19 RX ADMIN — Medication 10 ML: at 06:37

## 2017-07-19 NOTE — PROGRESS NOTES
Problem: Mobility Impaired (Adult and Pediatric)  Goal: *Acute Goals and Plan of Care (Insert Text)  Physical Therapy Goals  Re-eval 7/13 Updated goals  Initiated 7/13/2017  1. Patient will move from supine to sit and sit to supine in bed with independence within 7 day(s). 2. Patient will transfer from bed to chair and chair to bed with modified independence using the least restrictive device within 7 day(s). 3. Patient will perform sit to stand with modified independence within 7 day(s). 4. Patient will ambulate with modified independence for 100 feet with the least restrictive device within 7 day(s). Initiated 7/6/2017  1. Patient will move from supine to sit and sit to supine in bed with minimal assistance/contact guard assist within 7 day(s). - MET  2. Patient will transfer from bed to chair and chair to bed with minimal assistance/contact guard assist using the least restrictive device within 7 day(s). -MET  3. Patient will perform sit to stand with moderate assistance within 7 day(s). -MET  4. Patient will ambulate with moderate assistance for 15 feet with the least restrictive device within 7 day(s). -MET  5. Patient will demonstrate independence with home exercise program for LE strengthening within 7 days. -Progressing   PHYSICAL THERAPY TREATMENT  Patient: Afua Lr (53 y.o. female)  Date: 7/19/2017  Diagnosis: Perforation bowel ([address]  Pneumonia  Wounds, multiple open, lower extremity  Anemia  Perforated Bowel Perforation bowel ([address]  Procedure(s) (LRB):  LAPAROTOMY EXPLORATORY, REPAIR OF GASTRIC PERFORATION (N/A) 15 Days Post-Op  Precautions:        ASSESSMENT:  Pt received in chair, agreeable to participate with physical therapy. SBA for functional transfers without AD. Gait training x 600' with SBA. HR increased to 140 with gait training, no SOB noted.  Performed standing Thera ex for BLE with countertop support x10 each, including knee flexion, hip extension, mini squats, toe raises, hip abd. . Encouraged patient OOB and short hallway walks with nursing during evening hours. Progression toward goals:  [ ]    Improving appropriately and progressing toward goals  [X]    Improving slowly and progressing toward goals  [ ]    Not making progress toward goals and plan of care will be adjusted       PLAN:  Patient continues to benefit from skilled intervention to address the above impairments. Continue treatment per established plan of care. Discharge Recommendations:  Home Health  Further Equipment Recommendations for Discharge:  none       SUBJECTIVE:   Patient stated I am feeling better today.       OBJECTIVE DATA SUMMARY:   Critical Behavior:  Neurologic State: Alert  Orientation Level: Oriented X4  Cognition: Appropriate decision making, Follows commands  Safety/Judgement: Awareness of environment, Home safety, Fall prevention  Functional Mobility Training:  Bed Mobility:     Supine to Sit: Supervision  Sit to Supine: Stand-by asssistance           Transfers:  Sit to Stand: Stand-by asssistance  Stand to Sit: Stand-by asssistance        Bed to Chair: Supervision                    Balance:  Sitting: Intact  Sitting - Static: Good (unsupported)  Sitting - Dynamic: Good (unsupported)  Standing: Intact; Without support  Standing - Static: Good  Standing - Dynamic : Good  Ambulation/Gait Training:  Distance (ft): 600 Feet (ft)  Assistive Device: Gait belt  Ambulation - Level of Assistance: Stand-by asssistance        Gait Abnormalities: Decreased step clearance                                                 Stairs:                       Neuro Re-Education:     Therapeutic Exercises:      Pain:  Pain Scale 1: Numeric (0 - 10)  Pain Intensity 1: 3  Pain Location 1: Abdomen  Pain Orientation 1: Mid  Pain Description 1: Sore     Activity Tolerance:   Good  Please refer to the flowsheet for vital signs taken during this treatment.   After treatment:   [ ]    Patient left in no apparent distress sitting up in chair  [X]    Patient left in no apparent distress in bed  [X]    Call bell left within reach  [X]    Nursing notified  [ ]    Caregiver present  [X]    Bed alarm activated      COMMUNICATION/COLLABORATION:   The patients plan of care was discussed with: Registered Nurse     Neri Jaramillo   Time Calculation: 34 mins

## 2017-07-19 NOTE — PROGRESS NOTES
Visited with pt at her request for daily visits from . She spoke of the significant losses she has experienced in her life and the grief incumbent upon same. Chaplains will continue to follow.    Chaplain Edwards MDiv, MS, [facility]  [phone] PR[address] (2019)

## 2017-07-19 NOTE — PROGRESS NOTES
[id number] [facility] at [address] [place] [address]  [phone]    Hematology / Oncology Follow up    Reason for Visit:   Terna Rodriguez is a 56 y.o. female who is seen in consultation at the request of Dr. Ariadne Campos for evaluation of anemia. History of Present Illness:   Trena Rodriguez is a pleasant 56 y.o. female who was admitted for a perforated gastric ulcer. She presented to the ED yesterday complaining of fevers, rash, blisters, and abdominal pain. She had been started on doxycline about a month prior for Lyme Disease. In the ED she had labwork that demonstrated significant anemia, apparently new from a month prior. Given the timing, the ED physician was concerned about the possibility of a drug reaction leading to her anemia (ie aplastic anemia) and called me for guidance. I advised some additional labs, including a reticulocyte count. Shortly after that conversation the patient had a CT scan of her abdomen which revealed a perforated gastric ulcer, the more likely cause of her anemia. She was taken urgently to the operating room for repair. Currently, she is in the ICU, on a PCA for pain control. No apparent bleeding at this time. She reports considerable pain in her abdomen, only partially relieved by PCA. Complains of feeling very warm. Interval History:   No complaints at this time; hoping to be going home within the next couple of days. Denies pain or N/V. No questions at this time. No family at bedside.       Current Facility-Administered Medications   Medication Dose Route Frequency    traZODone (DESYREL) tablet 200 mg  200 mg Oral QHS    magnesium sulfate 2 g/50 ml IVPB (premix or compounded)  2 g IntraVENous NOW    HYDROmorphone (PF) (DILAUDID) injection 0.5 mg  0.5 mg IntraVENous Q12H PRN    pantoprazole (PROTONIX) tablet 40 mg  40 mg Oral ACB&D    alteplase (CATHFLO) 1 mg in sterile water (preservative free) 1 mL injection  1 mg InterCATHeter PRN    sodium chloride (NS) flush 10-30 mL  10-30 mL InterCATHeter PRN    sodium chloride (NS) flush 10 mL  10 mL InterCATHeter Q24H    sodium chloride (NS) flush 10 mL  10 mL InterCATHeter PRN    sodium chloride (NS) flush 10-40 mL  10-40 mL InterCATHeter Q8H    sodium chloride (NS) flush 20 mL  20 mL InterCATHeter Q24H    oxyCODONE IR (ROXICODONE) tablet 10 mg  10 mg Oral Q6H PRN    oxyCODONE IR (ROXICODONE) tablet 5 mg  5 mg Oral Q6H PRN    vancomycin (VANCOCIN) 1,000 mg in 0.9% sodium chloride (MBP/ADV) 250 mL  1,000 mg IntraVENous Q12H    acetaminophen (TYLENOL) tablet 650 mg  650 mg Oral Q6H PRN    LORazepam (ATIVAN) tablet 1 mg  1 mg Oral TID PRN    meropenem (MERREM) 500 mg in 0.9% sodium chloride (MBP/ADV) 50 mL  500 mg IntraVENous Q6H    escitalopram oxalate (LEXAPRO) tablet 20 mg  20 mg Oral DAILY    levalbuterol (XOPENEX) nebulizer soln 1.25 mg/3 mL  1.25 mg Nebulization BID RT    fentaNYL citrate (PF) injection 100 mcg  100 mcg IntraVENous RAD PRN    lidocaine (LIDODERM) 5 % patch 2 Patch  2 Patch TransDERmal Q24H    heparin (porcine) injection 5,000 Units  5,000 Units SubCUTAneous Q8H    nicotine (NICODERM CQ) 21 mg/24 hr patch 1 Patch  1 Patch TransDERmal DAILY    glucose chewable tablet 16 g  4 Tab Oral PRN    dextrose (D50W) injection syrg 12.5-25 g  12.5-25 g IntraVENous PRN    glucagon (GLUCAGEN) injection 1 mg  1 mg IntraMUSCular PRN    prochlorperazine (COMPAZINE) injection 10 mg  10 mg IntraVENous Q6H PRN    sodium chloride (NS) flush 5-10 mL  5-10 mL IntraVENous PRN    0.9% sodium chloride infusion 250 mL  250 mL IntraVENous PRN    sodium chloride (NS) flush 5-10 mL  5-10 mL IntraVENous Q8H    sodium chloride (NS) flush 5-10 mL  5-10 mL IntraVENous PRN    naloxone (NARCAN) injection 0.4 mg  0.4 mg IntraVENous PRN    albuterol (PROVENTIL VENTOLIN) nebulizer solution 2.5 mg  2.5 mg Nebulization Q4H PRN    sodium chloride (NS) flush 5-10 mL  5-10 mL IntraVENous PRN    anidulafungin (ERAXIS) 100 mg in 0.9% sodium chloride 130 mL IVPB  100 mg IntraVENous Q24H      Allergies   Allergen Reactions    Cephalosporins Hives    Penicillins Hives    Tetracyclines Nausea and Vomiting        Review of Systems: A complete review of systems was obtained, negative except as described above. Physical Exam:     Visit Vitals    BP 93/52 (BP 1 Location: Left arm, BP Patient Position: Head of bed elevated (Comment degrees))    Pulse 94    Temp 99.3 °F (37.4 °C)    Resp 16    Ht 5' 2\" (1.575 m)    Wt 55.5 kg (122 lb 5.7 oz)    SpO2 97%    BMI 22.38 kg/m2     General: No distress  Eyes:anicteric sclerae  HENT: Atraumatic, OP clear  Neck: Supple  Respiratory: normal respiratory effort  CV: Normal rate, regular rhythm, no murmurs, no peripheral edema; rt PICC line  Skin: No ecchymoses or petechiae. Normal temperature, turgor, and texture. Psych: Alert, oriented, appropriate affect, normal judgment/insight    Results:     Lab Results   Component Value Date/Time    WBC 10.9 07/19/2017 04:00 AM    HGB 8.0 07/19/2017 04:00 AM    HCT 25.5 07/19/2017 04:00 AM    PLATELET 434 08/64/5834 04:00 AM    MCV 83.9 07/19/2017 04:00 AM    ABS.  NEUTROPHILS 6.8 07/19/2017 04:00 AM    Hemoglobin (POC) 13.3 05/06/2014 07:55 AM    Hematocrit (POC) 39 05/06/2014 07:55 AM     Lab Results   Component Value Date/Time    Sodium 137 07/19/2017 04:00 AM    Potassium 4.1 07/19/2017 04:00 AM    Chloride 102 07/19/2017 04:00 AM    CO2 30 07/19/2017 04:00 AM    Glucose 76 07/19/2017 04:00 AM    BUN 9 07/19/2017 04:00 AM    Creatinine 0.78 07/19/2017 04:00 AM    GFR est AA >60 07/19/2017 04:00 AM    GFR est non-AA >60 07/19/2017 04:00 AM    Calcium 9.1 07/19/2017 04:00 AM    Sodium (POC) 141 05/06/2014 07:55 AM    Potassium (POC) 3.8 05/06/2014 07:55 AM    Chloride (POC) 110 05/06/2014 07:55 AM    Glucose (POC) 88 07/15/2017 07:48 AM    BUN (POC) 15 05/06/2014 07:55 AM    Creatinine (POC) 1.0 05/06/2014 07:55 AM    Calcium, ionized (POC) 1.23 05/06/2014 07:55 AM     Lab Results   Component Value Date/Time    Bilirubin, total 0.2 07/19/2017 04:00 AM    ALT (SGPT) 11 07/19/2017 04:00 AM    AST (SGOT) 19 07/19/2017 04:00 AM    Alk. phosphatase 191 07/19/2017 04:00 AM    Protein, total 5.5 07/19/2017 04:00 AM    Albumin 2.0 07/19/2017 04:00 AM    Globulin 3.5 07/19/2017 04:00 AM     Lab Results   Component Value Date/Time    Reticulocyte count 2.3 07/04/2017 02:56 PM    Iron % saturation 3 07/04/2017 04:45 PM    TIBC 192 07/04/2017 04:45 PM    Ferritin 46 07/04/2017 04:45 PM    Vitamin B12 298 07/04/2017 04:45 PM    Folate 7.5 07/04/2017 04:45 PM    Homocysteine, plasma 6.4 01/22/2012 04:42 AM    Methylmalonic acid 0.45 01/22/2012 02:30 AM    Haptoglobin 250 07/04/2017 04:45 PM     07/13/2017 04:06 PM    Sed rate (ESR) 3 01/22/2012 02:30 AM    Sed rate, automated 124 07/13/2017 04:06 PM    TSH 2.140 08/16/2011 11:37 AM    CORI, Direct None Detected 01/21/2012 02:10 PM    Lipase 150 09/06/2015 08:00 AM    HEP C VIRUS AB <0.1 03/19/2015 02:41 PM    HIV 1/O/2 Abs <1.00 03/19/2015 02:41 PM     Lab Results   Component Value Date/Time    INR 1.2 07/04/2017 02:54 PM    aPTT 32.1 07/04/2017 02:54 PM     7/13/2017 Peripheral smear    Mild microcytic hypochromic anemia with mild anisocytosis. Leukocytosis with neutrophilia with mild myeloid left shift. Thrombocytosis with platelet anisocytosis and large platelets noted. 7/6/2017 Path report    FINAL PATHOLOGIC DIAGNOSIS   1. Stomach, anterior partial resection:   Ulcer with no evidence of malignancy   Acute serositis   Changes compatible with perforation   2.  Stomach, posterior, partial resection:   Ulcer with inflammatory sinus tract   Acute serositis   Immunohistochemistry for H. pylori will be reported in an addendum   No evidence of malignancy     7/11/2017 Pleural Fluid  CYTOLOGIC INTERPRETATION:   Reactive mesothelial cells, acute and chronic inflammation and fibrin   General Categorization   No cells diagnostic for malignancy       Assessment/Recommendations:   1) Neutrophilia  Resolved  Appears reactive likely secondary to infection,  surgery and hx of Crohn's disease. Primary hematologic disorder seems unlikely. 2) Thrombocytosis  Reactive secondary to surgery and inflammation and Fe def   May take time to normalize  Low suspicion for bone marrow involvement. 3) Anemia, normocytic (s/p 2 units PRBCs 7/4/2017)  Likely secondary to bleeding related to recent surgery and  iron deficiency as well. Recommend starting her on oral iron supplementation Ferrous Sulfate po BID-TID      4) Lymph nodes  Noted on recent doppler study  Normal by size criteria with short access < 1 cm  No further testing needed    Plan reviewed with Dr Ulises Diez  .     Signed By: Jani Isidro NP     July 19, 2017

## 2017-07-19 NOTE — PROGRESS NOTES
General Surgery Daily Progress Note    Patient: Criss Yi MRN: [id number]  SSN: [id number]    YOB: 1970  Age: 56 y.o. Sex: female      Admit Date: 7/4/2017    Subjective:   Pain controlled, tolerating diet, bowel function continues.      Current Facility-Administered Medications   Medication Dose Route Frequency    traZODone (DESYREL) tablet 200 mg  200 mg Oral QHS    magnesium sulfate 2 g/50 ml IVPB (premix or compounded)  2 g IntraVENous NOW    HYDROmorphone (PF) (DILAUDID) injection 0.5 mg  0.5 mg IntraVENous Q12H PRN    pantoprazole (PROTONIX) tablet 40 mg  40 mg Oral ACB&D    alteplase (CATHFLO) 1 mg in sterile water (preservative free) 1 mL injection  1 mg InterCATHeter PRN    sodium chloride (NS) flush 10-30 mL  10-30 mL InterCATHeter PRN    sodium chloride (NS) flush 10 mL  10 mL InterCATHeter Q24H    sodium chloride (NS) flush 10 mL  10 mL InterCATHeter PRN    sodium chloride (NS) flush 10-40 mL  10-40 mL InterCATHeter Q8H    sodium chloride (NS) flush 20 mL  20 mL InterCATHeter Q24H    oxyCODONE IR (ROXICODONE) tablet 10 mg  10 mg Oral Q6H PRN    oxyCODONE IR (ROXICODONE) tablet 5 mg  5 mg Oral Q6H PRN    vancomycin (VANCOCIN) 1,000 mg in 0.9% sodium chloride (MBP/ADV) 250 mL  1,000 mg IntraVENous Q12H    acetaminophen (TYLENOL) tablet 650 mg  650 mg Oral Q6H PRN    LORazepam (ATIVAN) tablet 1 mg  1 mg Oral TID PRN    meropenem (MERREM) 500 mg in 0.9% sodium chloride (MBP/ADV) 50 mL  500 mg IntraVENous Q6H    escitalopram oxalate (LEXAPRO) tablet 20 mg  20 mg Oral DAILY    levalbuterol (XOPENEX) nebulizer soln 1.25 mg/3 mL  1.25 mg Nebulization BID RT    fentaNYL citrate (PF) injection 100 mcg  100 mcg IntraVENous RAD PRN    lidocaine (LIDODERM) 5 % patch 2 Patch  2 Patch TransDERmal Q24H    heparin (porcine) injection 5,000 Units  5,000 Units SubCUTAneous Q8H    nicotine (NICODERM CQ) 21 mg/24 hr patch 1 Patch  1 Patch TransDERmal DAILY    glucose chewable tablet 16 g  4 Tab Oral PRN    dextrose (D50W) injection syrg 12.5-25 g  12.5-25 g IntraVENous PRN    glucagon (GLUCAGEN) injection 1 mg  1 mg IntraMUSCular PRN    prochlorperazine (COMPAZINE) injection 10 mg  10 mg IntraVENous Q6H PRN    sodium chloride (NS) flush 5-10 mL  5-10 mL IntraVENous PRN    0.9% sodium chloride infusion 250 mL  250 mL IntraVENous PRN    sodium chloride (NS) flush 5-10 mL  5-10 mL IntraVENous Q8H    sodium chloride (NS) flush 5-10 mL  5-10 mL IntraVENous PRN    naloxone (NARCAN) injection 0.4 mg  0.4 mg IntraVENous PRN    albuterol (PROVENTIL VENTOLIN) nebulizer solution 2.5 mg  2.5 mg Nebulization Q4H PRN    sodium chloride (NS) flush 5-10 mL  5-10 mL IntraVENous PRN    anidulafungin (ERAXIS) 100 mg in 0.9% sodium chloride 130 mL IVPB  100 mg IntraVENous Q24H        Objective:      07/17 1901 - 07/19 0700  In: 180 [I.V.:180]  Out: 251 [Urine:250]  Patient Vitals for the past 8 hrs:   BP Temp Pulse Resp SpO2   07/19/17 1121 98/60 98.5 °F (36.9 °C) (!) 102 18 98 %   07/19/17 0820 - - - - 97 %   07/19/17 0738 93/52 99.3 °F (37.4 °C) 94 16 95 %   07/19/17 0422 90/51 97.9 °F (36.6 °C) 94 16 95 %       Physical Exam:  General: Awake, cooperative, speech slurred  Lungs: Clear to auscultation bilaterally, unlabored  Heart:  RRR  Abdomen: Soft, ATTP, non-distended, incisions c/d/i. Extremities: Warm, moves all, no edema  Skin:  Warm and dry, no rash    Labs:   Recent Labs      07/19/17   0400   WBC  10.9   HGB  8.0*   HCT  25.5*   PLT  806*     Recent Labs      07/19/17   0400   NA  137   K  4.1   CL  102   CO2  30   GLU  76   BUN  9   CREA  0.78   CA  9.1   MG  1.5*   PHOS  4.4   ALB  2.0*   TBILI  0.2   SGOT  19   ALT  11*       Assessment / Plan:   · POD#15 ex lap, primary repair perforated gastric ulcer x 2, omental intra-abdominal flap  · Overall improvement on CT chest/abd/pelvis. No drainable collections.   · Continue full liquids x 1 week and then will transition to soft diet  · Leukocytosis- trending down, now WNL. Cultures thus far with no growth. Continue ABX. ID recommendations pending  · Dopplers and echo unremarkable  · Pain controlled with PO meds  · Heparin for DVT prophylaxis  · H pylori serologies negative. Continue BID PPI, change to PO  · Pathology benign  · Remove staples  · PT/OT, encourage IS  · OK for discharge once antibiotic plan is finalized. Should never take NSAIDs. F/u with Dr. Rob Blake in 2 weeks. We will continue to follow till discharge.

## 2017-07-19 NOTE — PROGRESS NOTES
Bedside and Verbal shift change report given to [address] [place] [address] (oncoming nurse) by Demetrio Templeton (offgoing nurse). Report included the following information SBAR and MAR.

## 2017-07-19 NOTE — PROGRESS NOTES
Problem: Self Care Deficits Care Plan (Adult)  Goal: *Acute Goals and Plan of Care (Insert Text)  Occupational Therapy Goals  Initiated 7/6/2017 reviewed and upgraded 7-13  1. Patient will perform light grooming in unsupported sitting x5 mins with minimal assistance within 7 day(s). Met 7-13; upgrade to grooming in standing with S in 7 days  2. Patient will perform upper body dressing in unsupported sitting with min A within 7 day(s). Met; upgrade to mod I in 7 days  3. Patient will perform toilet transfers with minimal assistance with appropriate AD within 7 day(s). Met; upgrade to S in 7 days  4. Patient will participate in upper extremity therapeutic exercise/activities through comfortable ROM in supported sitting to prepare for ADL tasks with supervision/set-up for 6 minutes within 7 day(s). Cont 7 days  5. Patient will utilize energy conservation techniques during functional activities with verbal cues within 7 day(s). Cont 7 days  6. Patient will complete LE dressing with S in 7 days   OCCUPATIONAL THERAPY TREATMENT  Patient: Jeffery Albarado (76 y.o. female)  Date: 7/19/2017  Diagnosis: Perforation bowel ([address]  Pneumonia  Wounds, multiple open, lower extremity  Anemia  Perforated Bowel Perforation bowel ([address]  Procedure(s) (LRB):  LAPAROTOMY EXPLORATORY, REPAIR OF GASTRIC PERFORATION (N/A) 15 Days Post-Op  Precautions:        ASSESSMENT:  Patient is progressing well towards her goals. Performed functional transfers with supervision without assistive device today, demonstrated no LOB. Performed toilet hygiene with supervision and stood at sink to groom with supervision. Educated on use of theraband for UE exercises to increase strength for functional transfers and ADLs after prolonged hospitalization. She performed UE exercises for approximately 10 minutes. Educated pt on energy conservation techniques and had pt incorporate techniques into her UE exercises.  Discussed home safety techniques for fall prevention/energy conservation, recommended pt have a shower chair to increase safety with bathing. Pt will benefit from continued OT to further improve functional performance. Recommend HHOT upon discharge. Progression toward goals:  [X]       Improving appropriately and progressing toward goals  [ ]       Improving slowly and progressing toward goals  [ ]       Not making progress toward goals and plan of care will be adjusted       PLAN:  Patient continues to benefit from skilled intervention to address the above impairments. Continue treatment per established plan of care. Discharge Recommendations:  Home Health  Further Equipment Recommendations for Discharge:  Shower chair       SUBJECTIVE:   Patient stated Agustin Haimlton got to take a shower this morning.       OBJECTIVE DATA SUMMARY:   Cognitive/Behavioral Status:  Neurologic State: Alert  Orientation Level: Oriented X4  Cognition: Appropriate decision making; Follows commands  Perception: Appears intact  Perseveration: No perseveration noted  Safety/Judgement: Awareness of environment;Home safety; Fall prevention     Functional Mobility and Transfers for ADLs:  Bed Mobility:  Supine to Sit: Supervision     Transfers:     Functional Transfers  Toilet Transfer : Supervision      Balance:  Sitting: Intact; Without support  Sitting - Static: Good (unsupported)  Sitting - Dynamic: Good (unsupported)  Standing: Intact; Without support  Standing - Static: Good  Standing - Dynamic : Good     ADL Intervention:  Pt performed bladder hygiene with supervision in sitting at toilet. Stood at sink without assistive device to groom, demonstrated no LOB. Educated pt on energy conservation techniques, including taking purposeful rest breaks during ADLs and IADLs, sitting when possible during functional tasks, and pacing. Recommended pt have shower chair for safety and energy conservation during bathing. Feeding  Feeding Assistance: Independent  Drink to Mouth:  Independent Grooming  Grooming Assistance: Supervision/set up (standing at sink)  Washing Hands: Supervision/set-up        Lower Body Dressing Assistance  Slip on Shoes Without Back: Supervision/set-up     Toileting  Toileting Assistance: Supervision/set up  Bladder Hygiene: Supervision/set-up     Cognitive Retraining  Safety/Judgement: Awareness of environment;Home safety; Fall prevention     Therapeutic Exercises:   Educated pt on use of red theraband for UE exercise. Pt performed 1 set of 10 chest extension, bicep curl and tricep extension exercises. Pt also performed 1 set of 10 chair pushups. Initially instructed pt to take a rest break after 5 reps to incorporate energy conservation techniques, then instructed pt to determine when she needed a break. Pt demonstrated understanding of energy conservation by taking breaks without prompting. Pain:  Pain Scale 1: Numeric (0 - 10)  Pain Intensity 1: 3  Pain Location 1: Abdomen  Pain Orientation 1: Mid  Pain Description 1: Sore     Activity Tolerance:   Good  Please refer to the flowsheet for vital signs taken during this treatment.   After treatment:   [X] Patient left in no apparent distress sitting up in chair  [ ] Patient left in no apparent distress in bed  [X] Call bell left within reach  [X] Nursing notified  [ ] Caregiver present  [ ] Bed alarm activated      COMMUNICATION/COLLABORATION:   The patients plan of care was discussed with: Physical Therapist and Registered Nurse     PINKY Hernandez  Time Calculation: 38 mins

## 2017-07-19 NOTE — PROGRESS NOTES
[facility]   Daily Progress Note    Date: 7/19/2017    Assessment/Plan:   Talia Dhaliwal is a 56 y.o. female who is hospitalized for sepsis 2/2 perforated gastric ulcers/peritonitis. 24 Hour Events: Afebrile overnight. PICC line placed    Sepsis 2/2 Perforated Gastric Ulcers s/p Operative Repair on 7/4/17 - POD 13. Was NPO with TPN until POD 7. Final drain removed. Cultures negative to date. Pain well controlled on po meds. Gastric ulcers thought to be related to chronic steroid use and Crohn's disease. CT showed improvement in inflammation. One of the fluid collections shown to be larger than previous CT scan.  -Appreciate surgery recommendations & support.  -Per surgery, current abx are eraxis, vancomycin, meropenem. -ID consulted, will f/u recs  -IV protonix. -Oxy and Dilaudid for pain meds, continue to space out pain meds. -Full liquid Diet  -Strict I&O. -Daily labs. -Replete lytes prn   -Encouraging incentive spirometry  -Encouraging movement and participation in PT    Left pleural effusion: Acute. Seen on CT chest this admission. S/p IR drainage 7/10. Left ptx appeared to improve on serial CXRs. O2 sats stable on RA.  -Continue to monitor for sxs of worsening respiratory status. Elevated Leukocytosis - Per heme, likely reactive. Down-trending. There was concern that WBC reamined elevated despite being on broad spectrum antibiotics. Given increase in wbc today, Gen Surg is recommending repeat CT chest/abdomen/pelvis on Sunday if wbc continues to rise. Spiked a low-grade temp on 7/16 so repeat blood cultures were drawn. 1 bottle found to have bacteria. Sent for culture.  -Daily CBC  -CT chest/abdomen/pelvis with IV contract today    Thrombophilia - Per heme, likely reactive. Platelets now downtrending.  Platelets peaked at 982 and today are 806.     Community Acquired Pneumonia - Improvement of left basilar airspace disease noted on initial CXR. Could have been playing a role in SIRS/sepsis picture. Improved on serial CXR. Severe Acute Malnutrition - criteria evidenced by nutritional intake <50% of recommended intake for >5days, weight loss of 4.7% in 2 weeks, and moderate-severe edema. Improvement during hospitalization. Weight today 136lb (up from from 121 on 7/4). - Encourage full liquid diet with Ensure and Might Shakes     Anemia - lab work up done on admission.  Notable for reticulocyte count of 2.3, LD of 294.  Per hematology, most likely 2/2 acute blood loss from gastric ulcer.  Also a component of iron deficiency.  S/p 2 units pRBC on admission--Hgb responded appropriately.  Has been stable since.  Hgb down to 8.8 this morning.  -Hematology signed off.  -Have 2 units of pRBCs on hold. -Daily CBC.     Chron's Disease - no pharmacotherapy PTA.  Poor follow up history with GI.  Likely playing a role in patient's current clinical course.  -Will consider GI consult later in course when patient becomes more stable.  Needs better outpatient follow up. Lyme Disease - diagnosed ~1 month ago at [facility].  Was treated with a 21 day course of doxycycline.  Completed this course fully, but developed some lower extremity skin breakdown following treatment.  Tetracyclines are on patient's allergy list.  -Not repeating tic studies     Tobacco Abuse - reports to smoke 1.5 PPD.   -Encouraging cessation.  -Prescribed daily nicotine patch while patient admitted.     Healing Wounds on Bilateral Feet - most likely acute reaction to doxycycline treatment that patient underwent prior to admission.  Per patient, these wounds are healing.  Remain painful.  -Touch base with wound care, appreciate support.  -Tetracyclines on allergy list.  -Bacitracin PRN       3cm Laceration of Left Upper Back - healing.  Patient notes that this was from a fall.  Not amenable to sutures at this time.  -Wound care consulted, appreciate support.      Bilateral Lower Extremity Swelling/Pain - noted on admisison.  Thought to be 2/2 hypoalbuminemia.  Duplex studies of lower extremities negative for DVT. Improved.      Electrolyte deficiencies: Resolved s/p repletion and resuming diet. Will continue to trend and replete prn.      Depression/Panic Attacks - patient is showing some symptoms of this--mostly at night.  Takes klonopin, adderall, Lexapro, ativan, and trazodone at home. [facility] for withdrawal from SSRI. -Beginning to resume home meds now that pt is taking FLD      History of Falls - patient and family gave detailed history of multiple falls over the last few months/weeks. Had discussion with PT about sending pt home with [facility] verses rehab. Per PT is was unlikely that she would be accepted to to rehab as she is too high functioning. -PT/OT consulted.       FEN/GI - Full liquid diet  Activity - Out of bed with assistance  DVT prophylaxis - Heparin  GI prophylaxis - Protonix  Fall prophylaxis - Fall precautions ordered. Disposition- D/c with home health. Code Status [facility] [place], operated by [facility]     Patient seen and discussed with Dr. Faby Camacho (Attending physician)    Ramses Neal MD  Family Medicine Resident  7/19/2017       CC: Michelle Conn was fatigued yesterday\"    Subjective  Her pain is well controlled. No new complaints. Reports she did not participate in OT yesterday because she felt so fatigued from walking between imagining studies yesterday.     Inpatient Medications  Current Facility-Administered Medications   Medication Dose Route Frequency    traZODone (DESYREL) tablet 200 mg  200 mg Oral QHS    pantoprazole (PROTONIX) tablet 40 mg  40 mg Oral ACB&D    Vancomycin Trough 08:30 7/19  1 Each Other ONCE    alteplase (CATHFLO) 1 mg in sterile water (preservative free) 1 mL injection  1 mg InterCATHeter PRN    sodium chloride (NS) flush 10-30 mL  10-30 mL InterCATHeter PRN    sodium chloride (NS) flush 10 mL  10 mL InterCATHeter Q24H    sodium chloride (NS) flush 10 mL  10 mL InterCATHeter PRN    sodium chloride (NS) flush 10-40 mL  10-40 mL InterCATHeter Q8H    sodium chloride (NS) flush 20 mL  20 mL InterCATHeter Q24H    HYDROmorphone (PF) (DILAUDID) injection 0.5 mg  0.5 mg IntraVENous Q8H PRN    oxyCODONE IR (ROXICODONE) tablet 10 mg  10 mg Oral Q6H PRN    oxyCODONE IR (ROXICODONE) tablet 5 mg  5 mg Oral Q6H PRN    vancomycin (VANCOCIN) 1,000 mg in 0.9% sodium chloride (MBP/ADV) 250 mL  1,000 mg IntraVENous Q12H    acetaminophen (TYLENOL) tablet 650 mg  650 mg Oral Q6H PRN    LORazepam (ATIVAN) tablet 1 mg  1 mg Oral TID PRN    meropenem (MERREM) 500 mg in 0.9% sodium chloride (MBP/ADV) 50 mL  500 mg IntraVENous Q6H    escitalopram oxalate (LEXAPRO) tablet 20 mg  20 mg Oral DAILY    levalbuterol (XOPENEX) nebulizer soln 1.25 mg/3 mL  1.25 mg Nebulization BID RT    fentaNYL citrate (PF) injection 100 mcg  100 mcg IntraVENous RAD PRN    lidocaine (LIDODERM) 5 % patch 2 Patch  2 Patch TransDERmal Q24H    heparin (porcine) injection 5,000 Units  5,000 Units SubCUTAneous Q8H    nicotine (NICODERM CQ) 21 mg/24 hr patch 1 Patch  1 Patch TransDERmal DAILY    glucose chewable tablet 16 g  4 Tab Oral PRN    dextrose (D50W) injection syrg 12.5-25 g  12.5-25 g IntraVENous PRN    glucagon (GLUCAGEN) injection 1 mg  1 mg IntraMUSCular PRN    prochlorperazine (COMPAZINE) injection 10 mg  10 mg IntraVENous Q6H PRN    sodium chloride (NS) flush 5-10 mL  5-10 mL IntraVENous PRN    0.9% sodium chloride infusion 250 mL  250 mL IntraVENous PRN    sodium chloride (NS) flush 5-10 mL  5-10 mL IntraVENous Q8H    sodium chloride (NS) flush 5-10 mL  5-10 mL IntraVENous PRN    naloxone (NARCAN) injection 0.4 mg  0.4 mg IntraVENous PRN    albuterol (PROVENTIL VENTOLIN) nebulizer solution 2.5 mg  2.5 mg Nebulization Q4H PRN    sodium chloride (NS) flush 5-10 mL  5-10 mL IntraVENous PRN    anidulafungin (ERAXIS) 100 mg in 0.9% sodium chloride 130 mL IVPB  100 mg IntraVENous Q24H Allergies  Allergies   Allergen Reactions    Cephalosporins Hives    Penicillins Hives    Tetracyclines Nausea and Vomiting         Objective  Vitals:  Patient Vitals for the past 8 hrs:   Temp Pulse Resp BP SpO2   07/19/17 0820 - - - - 97 %   07/19/17 0738 99.3 °F (37.4 °C) 94 16 93/52 95 %   07/19/17 0422 97.9 °F (36.6 °C) 94 16 90/51 95 %         I/O:  No intake or output data in the 24 hours ending 07/19/17 0919  Last shift:       Last 3 shifts:    07/17 1901 - 07/19 0700  In: 180 [I.V.:180]  Out: 251 [Urine:250]    Physical Exam:  General: No acute distress. Alert. Cooperative. HEENT: Normocephalic. Atraumatic. Conjunctiva pink. Sclera white. PERRL. MMM   Respiratory: CTAB. No w/r/c. Expiratory wheezing on ULL   Cardiovascular: RRR. Normal S1,S2. No m/r/g. 2+ pulses in DP bilaterally   GI: + bowel sounds. Non-distended. Soft abdomen. Incision c/d/i. Drains out. Lady Gilman Extremities:  Skin: No edema. No tenderness. Healing lesions on toes. Surrounding erythema improved     Laboratory Data  Recent Results (from the past 24 hour(s))   METABOLIC PANEL, COMPREHENSIVE    Collection Time: 07/19/17  4:00 AM   Result Value Ref Range    Sodium 137 136 - 145 mmol/L    Potassium 4.1 3.5 - 5.1 mmol/L    Chloride 102 97 - 108 mmol/L    CO2 30 21 - 32 mmol/L    Anion gap 5 5 - 15 mmol/L    Glucose 76 65 - 100 mg/dL    BUN 9 6 - 20 MG/DL    Creatinine 0.78 0.55 - 1.02 MG/DL    BUN/Creatinine ratio 12 12 - 20      GFR est AA >60 >60 ml/min/1.73m2    GFR est non-AA >60 >60 ml/min/1.73m2    Calcium 9.1 8.5 - 10.1 MG/DL    Bilirubin, total 0.2 0.2 - 1.0 MG/DL    ALT (SGPT) 11 (L) 12 - 78 U/L    AST (SGOT) 19 15 - 37 U/L    Alk.  phosphatase 191 (H) 45 - 117 U/L    Protein, total 5.5 (L) 6.4 - 8.2 g/dL    Albumin 2.0 (L) 3.5 - 5.0 g/dL    Globulin 3.5 2.0 - 4.0 g/dL    A-G Ratio 0.6 (L) 1.1 - 2.2     CBC WITH AUTOMATED DIFF    Collection Time: 07/19/17  4:00 AM   Result Value Ref Range    WBC 10.9 3.6 - 11.0 K/uL    RBC 3.04 (L) 3.80 - 5.20 M/uL    HGB 8.0 (L) 11.5 - 16.0 g/dL    HCT 25.5 (L) 35.0 - 47.0 %    MCV 83.9 80.0 - 99.0 FL    MCH 26.3 26.0 - 34.0 PG    MCHC 31.4 30.0 - 36.5 g/dL    RDW 17.8 (H) 11.5 - 14.5 %    PLATELET 918 (H) 482 - 400 K/uL    NEUTROPHILS 63 32 - 75 %    LYMPHOCYTES 18 12 - 49 %    MONOCYTES 10 5 - 13 %    EOSINOPHILS 8 (H) 0 - 7 %    BASOPHILS 1 0 - 1 %    ABS. NEUTROPHILS 6.8 1.8 - 8.0 K/UL    ABS. LYMPHOCYTES 2.0 0.8 - 3.5 K/UL    ABS. MONOCYTES 1.1 (H) 0.0 - 1.0 K/UL    ABS. EOSINOPHILS 0.9 (H) 0.0 - 0.4 K/UL    ABS. BASOPHILS 0.1 0.0 - 0.1 K/UL    DF SMEAR SCANNED      RBC COMMENTS ANISOCYTOSIS  1+        RBC COMMENTS HYPOCHROMIA  1+       PHOSPHORUS    Collection Time: 07/19/17  4:00 AM   Result Value Ref Range    Phosphorus 4.4 2.6 - 4.7 MG/DL   MAGNESIUM    Collection Time: 07/19/17  4:00 AM   Result Value Ref Range    Magnesium 1.5 (L) 1.6 - 2.4 mg/dL       Imaging  LE dopplers were neg for DVT/SVT  Echo showed no presence of vegetations. EF of 55-60%      Hospital Problems:  Hospital Problems  Date Reviewed: 1/5/2017          Codes Class Noted POA    Thrombocytosis ([facility] ICD-10-CM: D47.3  ICD-9-CM: 238.71  7/5/2017 Yes        Neutrophilia ICD-10-CM: D72.9  ICD-9-CM: 288.8  7/5/2017 Yes        * (Principal)Perforation bowel ([facility].) ICD-10-CM: K63.1  ICD-9-CM: 569.83  7/4/2017 Yes        Pneumonia ICD-10-CM: J18.9  ICD-9-CM: 035  7/4/2017 Yes        Anemia ICD-10-CM: D64.9  ICD-9-CM: 285.9  7/4/2017 Yes        Wounds, multiple open, lower extremity ICD-10-CM: S81.809A  ICD-9-CM: 894.0  7/4/2017 Yes        Crohn disease ([facility] ICD-10-CM: K50.90  ICD-9-CM: 555.9  4/19/2010 Yes    Overview Addendum 2/2/2012  4:33 PM by Gus Gale MD     She had 4 colon resections in the past.  Dr. Romana Grill, Dr. Anne Drake abd/pevis 2009: Thickened segment of neoileum proximal and adjacent to   anastomotic chain sutures and likely representing inflammatory bowel disease   recurrence.  Partial small bowel obstruction at this level. No evidence of   perforation. No evidence of fistula or intra-abdominal abscess.

## 2017-07-19 NOTE — CONSULTS
Nilson Ndiaye [facility]   [address], [address]   [address]       Name:  Shin Gerardo   MR#:  [id number]   :  1970   Account #:  [de-identified]    Date of Consultation:  2017   Date of Adm:  2017       REQUESTING PHYSICIAN: Dr. Hudson Quarles. CHIEF COMPLAINT: Abdominal pain. HISTORY OF PRESENT ILLNESS: The patient is a 56-year-old female   with past medical history of Crohn disease, depression and vertigo   who was admitted for evaluation of abdominal pain. The patient states   that she discovered a tick imbedded on her neck some time around   Labor Day. The tick was removed and she subsequently developed   fever, headache and flu-like symptoms. She was seen by her primary   care doctor and started on a course of doxycycline. After about 2   weeks of the doxycycline, she developed lower extremity edema and   rash. This was thought to be due to drug reaction to the doxycycline,   she was subsequently started on steroids. The patient states that   despite the steroids, she developed providing lesions on her feet which   prompted her to go to the emergency department. Workup revealed her   to be anemic and she was transferred to [facility] for   further evaluation and treatment. Workup revealed a perforated gastric   ulcer. She was seen by the General Surgery team and taken to the OR   on  where she underwent exploratory laparotomy with repair of   perforated gastric ulcer x2 and with omental intra-abdominal flap. Postoperative course has been complicated by fever and leukocytosis. Antibiotics have been broadened to meropenem, anidulafungin and   vancomycin. All cultures have been sterile to date. Patient is clinically   improving and discharge planning is under way. The Infectious Disease   Service has been asked to assist with antibiotic management. PAST MEDICAL HISTORY: Crohn disease, depression, vertigo. ALLERGIES    1. CEPHALOSPORINS. 2. PENICILLINS. 3. TETRACYCLINES. ALL CAUSE HIVES. PAST SURGICAL HISTORY: Bowel resection x2 related to Crohn   disease. C section, tonsillectomy, tubal ligation. FAMILY HISTORY: Heart disease, cancer. SOCIAL HISTORY: She is a current tobacco smoker. REVIEW OF SYSTEMS: As per the HPI. The remainder of 12 system   review of systems is unremarkable. LABORATORY DATA: White blood cell count 10,900, platelet count   259,919. Creatinine is 0.7. CT scan of the abdomen and pelvis from   July 17 reveals decreased inflammatory changes in the left upper   abdomen with several fluid collections. No evidence for new abscess   or drainable collection. Please see body of chart for details. PHYSICAL EXAMINATION   VITAL SIGNS: Temperature 99.8, maximum temperature is 101.3,   heart rate 107 beats per minute, blood pressure 118/66, oxygen   saturation 98% on room air. GENERAL: Alert, in no acute distress. HEENT: Normocephalic, atraumatic. Pupils equal and reactive to light. No oropharyngeal lesions noted. HEART: Regular rate and rhythm. No murmurs, gallops, rubs. PULMONARY: Clear to auscultation anteriorly. ABDOMEN: Soft, mildly tender to palpation. Incision site is clean, dry   and intact. Positive bowel sounds. EXTREMITIES: No clubbing, cyanosis or edema. SKIN: No rashes. ASSESSMENT AND PLAN    1. Multiple abdominal abscesses status post repair of perforated   gastric ulcer. The patient underwent exploratory laparotomy with repair   of ulcer and omental intra-abdominal flap on 07/04/2017. Intraoperatively, the patient was found to have gross contamination of   the peritoneal cavity. Patient has clinically improved and discharge   planning is under way. Agree with the meropenem due to patient's   multiple drug allergies. We will stop the vancomycin and change the   anidulafungin to oral fluconazole.  Plan 28-day course of antibiotics at   the time of discharge. Intravenous antibiotic orders are in the chart. This was discussed at length with the patient and her family at   bedside. 2. HISTORY OF MULTIPLE ANTIBIOTIC ALLERGIES INCLUDING   CEPHALOSPORINS, PENICILLIN, AND TETRACYCLINES. She is   currently tolerating meropenem. 3. Leukocytosis due to infection as above. This is resolving. 4. Recent history of Lyme disease diagnosed in the outpatient setting. This was treated appropriately with a 14-day course of doxycycline. Thank you for allowing me to participate in the care of this patient.          Leartis Lesches, DO MG / Effie Palacios   D:  07/19/2017   15:54   T:  07/19/2017   17:06   Job #:  [id number]

## 2017-07-19 NOTE — PROGRESS NOTES
Bedside and Verbal shift change report given to SVITLANA Milner (oncoming nurse) by Lin Ladd (offgoing nurse). Report included the following information SBAR, Kardex and Recent Results.

## 2017-07-19 NOTE — PROGRESS NOTES
Order for IV abx noted. Orders sent to [facility] as discussed with pt. CM to follow. Thanks.   Kenia Navarrete LCSW

## 2017-07-19 NOTE — PROGRESS NOTES
[facility] Infectious Diseases Outpatient  IV Antibiotic Orders    1. Diagnosis:  Gastric perforation  2. Routine PICC/ Nidhi/ Portacath Care including PRN cath-flow/activase to declot   3. Antibiotic:  Meropenem 1 gm IV q 8 hours through 8/15/17                           eraxis 1gm IV daily through 8/15/17  4. Last labs  Lab Results   Component Value Date/Time    WBC 10.9 07/19/2017 04:00 AM    Hemoglobin (POC) 13.3 05/06/2014 07:55 AM    HGB 8.0 07/19/2017 04:00 AM    Hematocrit (POC) 39 05/06/2014 07:55 AM    HCT 25.5 07/19/2017 04:00 AM    PLATELET 097 68/95/2257 04:00 AM    MCV 83.9 07/19/2017 04:00 AM     Lab Results   Component Value Date/Time    Sodium 137 07/19/2017 04:00 AM    Potassium 4.1 07/19/2017 04:00 AM    Chloride 102 07/19/2017 04:00 AM    CO2 30 07/19/2017 04:00 AM    Anion gap 5 07/19/2017 04:00 AM    Glucose 76 07/19/2017 04:00 AM    BUN 9 07/19/2017 04:00 AM    Creatinine 0.78 07/19/2017 04:00 AM    BUN/Creatinine ratio 12 07/19/2017 04:00 AM    GFR est AA >60 07/19/2017 04:00 AM    GFR est non-AA >60 07/19/2017 04:00 AM    Calcium 9.1 07/19/2017 04:00 AM    Bilirubin, total 0.2 07/19/2017 04:00 AM    AST (SGOT) 19 07/19/2017 04:00 AM    Alk. phosphatase 191 07/19/2017 04:00 AM    Protein, total 5.5 07/19/2017 04:00 AM    Albumin 2.0 07/19/2017 04:00 AM    Globulin 3.5 07/19/2017 04:00 AM    A-G Ratio 0.6 07/19/2017 04:00 AM    ALT (SGPT) 11 07/19/2017 04:00 AM       Last Vanc trough:     5. _x__ Weekly Labs:      ____ Bi Weekly Labs:  __________     Start date:  _____     _x__CBC/diff/platelets   _x__AST/ALT/Alk phos/   ___CPK   _x__BUN/CRT   ___ESR   ___CRP   ___Gentamicin level  ___gentamicin peak/trough   ___Trough Vancomycin level    ___Goal 15-20 ___Goal 10-15    6. Fax Final lab results and critical values to Jeronimo @[phone]  7. Call urgent lab results to [phone]. Allergies:     Allergies   Allergen Reactions    Cephalosporins Hives    Penicillins Hives    Tetracyclines Nausea and Vomiting     9. Pharmacy: [facility]          Home Health Nursing:  10. Pharmacy Consult for Vancomycin/Aminoglycoside Dosing  11. First Dose protocol as needed.    12. Please pull PIC line at end of therapy or send to IR for [facility] removal     Home Health: Call Angio at [facility] to schedule Nidhi Removal :  P:  [phone]    Fax: [phone] or [address], [place]

## 2017-07-19 NOTE — PROGRESS NOTES
2330- Bedside shift change report given to Angela Fitzgerald  (oncoming nurse) by Angela Fitzgerald (offgoing nurse). Report included the following information SBAR, Kardex, Procedure Summary, Intake/Output, MAR, Recent Results and Cardiac Rhythm NSR/ST.

## 2017-07-19 NOTE — PROGRESS NOTES
Nutrition Assessment:    RECOMMENDATIONS/INTERVENTION(S):   Further diet advancement per Surgery    Continue Ensure Enlive (vanilla) TID - monitor need to increase based on PO intake of meals    Replete lytes PRN  ASSESSMENT:   7/19:  Full Liquid diet x 1 wk then start Soft diet per Surgery note (Soft diet tomorrow?). Visited pt this afternoon. Fair appetite. PO intake x 1 wk = cream of chicken soup, 1-2 Ensure Enlive (vanilla only). Dislikes the rest of the Full Liquid diet menu. States standing scale wt today of 109# - would be a 10% wt loss in 2 wks. No wt documented - will continue to monitor. Observed muscle wasting to clavicles, BUE. Mg low. 7/14:  Diet advanced to Full Liquids and TPN discontinued. +BS, LBM recorded 7/12. Labs/Meds reviewed. Stable BG, Mg low. Diet advanced per Surgery. 7/10:  NPO, NGT in place. Noted WBC remain elevated, for CT abd/pelvis, chest today. Labs/meds reviewed. BG stable. K+ and Mg low end of normal--repleted. BM recorded 7/10. Skin--abd incisions, 1+ pitting edema BLE. Current TPN and lipids meeting daily energy needs at this time. 7/7:  Remains NPO. Will need UGI prior to initiated PO per surgery. Lytes requiring repletion. BG stable 102-103-106. TG 41. Off pressors. Current TPN and Lipids meeting daily energy needs at this time. 7/5:  Noted consult for TPN recommendations. Chart reviewed. 55 yo female admitted with anemia, perforated gastric ulcer, s/p exp lap with repair. Hx Crohn's disease. Visited pt this morning. Lethargic but answered questions appropriately. Pt reports poor appetite/limited oral intake x 2 weeks along with a 6# weight loss which she attributes to new medication she was taking for Lyme Disease. IVF infusing. On Levo. -61. No recent TG level. Mg repleted. Phos and K+ WDL. NGT in place. Skin--abd incision, blisters/sores noted to bilateral feet, 3+ edema BLE.  NPO, TPN to begin today.   Pt with victor manuel 4.7% weight decrease x 2 weeks, amount considered significant for timeframe. See above for TPN recommendations. Meets Criteria for Severe Acute Malnutrition as evidenced by:   [] Moderate muscle wasting, loss of subcutaneous fat   [x] Nutritional intake of <50% of recommended intake for >5 days   [x] Weight loss of >1-2% in 1 week, >5% in 1 month, >7.5% in 3 months, or >10% in 6 months   [x] Moderate-severe edema           SUBJECTIVE/OBJECTIVE:     Diet Order: Full liquids, Ensure Enlive TID, Mighty Shake once daily   % Eaten:    Patient Vitals for the past 72 hrs:   % Diet Eaten   07/18/17 0755 90 %   07/17/17 0744 85 %     Pertinent Medications: [x] Reviewed    Chemistries:  Lab Results   Component Value Date/Time    Sodium 137 07/19/2017 04:00 AM    Potassium 4.1 07/19/2017 04:00 AM    Chloride 102 07/19/2017 04:00 AM    CO2 30 07/19/2017 04:00 AM    Anion gap 5 07/19/2017 04:00 AM    Glucose 76 07/19/2017 04:00 AM    BUN 9 07/19/2017 04:00 AM    Creatinine 0.78 07/19/2017 04:00 AM    BUN/Creatinine ratio 12 07/19/2017 04:00 AM    GFR est AA >60 07/19/2017 04:00 AM    GFR est non-AA >60 07/19/2017 04:00 AM    Calcium 9.1 07/19/2017 04:00 AM    AST (SGOT) 19 07/19/2017 04:00 AM    Alk. phosphatase 191 07/19/2017 04:00 AM    Protein, total 5.5 07/19/2017 04:00 AM    Albumin 2.0 07/19/2017 04:00 AM    Globulin 3.5 07/19/2017 04:00 AM    A-G Ratio 0.6 07/19/2017 04:00 AM    ALT (SGPT) 11 07/19/2017 04:00 AM      Anthropometrics: Height: 5' 2\" (157.5 cm) Weight: 55.5 kg (122 lb 5.7 oz)    IBW (%IBW): 50 kg (110 lb 3.7 oz) ( ) UBW (%UBW):   (  %)    BMI: Body mass index is 22.38 kg/(m^2). This BMI is indicative of:   [] Underweight    [x] Normal    [] Overweight    []  Obesity    []  Extreme Obesity (BMI>40)  Estimated Nutrition Needs (Based on): 1485 Kcals/day (BMR (1142) x 1.3 AF ) , 72 g (1.3 g/Kg actual body wt) Protein  Carbohydrate:  At Least 130 g/day  Fluids: 1500 mL/day    Last BM: 7/19, watery [x]Active     []Hyperactive  []Hypoactive       [] Absent   BS  Skin:    [] Intact   [x] Incision  [] Breakdown   [] DTI   [] Tears/Excoriation/Abrasion  []Edema [x] Other: blisters noted to bilateral feet, abrasion to back     Wt Readings from Last 30 Encounters:   07/17/17 55.5 kg (122 lb 5.7 oz)   03/15/17 56.7 kg (125 lb)   01/05/17 55.2 kg (121 lb 9.6 oz)   03/23/16 59 kg (130 lb)   09/06/15 59 kg (130 lb)   03/27/15 59 kg (130 lb)   03/19/15 57.2 kg (126 lb)   03/15/15 56.2 kg (124 lb)   03/06/15 57.6 kg (127 lb)   10/08/14 59 kg (130 lb)   05/06/14 57.6 kg (127 lb)   12/27/13 61.7 kg (136 lb)   05/22/13 57.2 kg (126 lb)   05/08/13 58.5 kg (129 lb)   08/31/12 47.6 kg (105 lb)   06/07/12 49.9 kg (110 lb)   05/31/12 49.6 kg (109 lb 6.4 oz)   02/16/12 48.4 kg (106 lb 9.6 oz)   02/09/12 48.8 kg (107 lb 9.6 oz)   02/02/12 48.5 kg (107 lb)   01/30/12 45.8 kg (101 lb)   01/21/12 46.7 kg (103 lb)   01/17/12 45.9 kg (101 lb 3.2 oz)   10/27/11 50.3 kg (111 lb)   08/16/11 46.7 kg (103 lb)   01/18/11 50.2 kg (110 lb 9.6 oz)   07/13/10 52.8 kg (116 lb 6.4 oz)   04/16/10 53.5 kg (118 lb)      NUTRITION DIAGNOSES:   Problem:  Altered GI function Unintended weight loss   Etiology: related to perforated gastric ulcer medication induced nausea, decreased appetite   Signs/Symptoms: as evidenced by s/p exp lap with repair, NPO, need for TPN 4.7% weight decrease x 2 weeks     Inadequate oral food/beverage intake related to limited diet, decreased appetite as evidenced by reports and observations of inadequate PO as compared to needs    NUTRITION INTERVENTIONS:  Meals/Snacks: General/healthful diet Supplements: Commercial supplement              GOAL:   Advanced diet w/ fair PO & continued good PO of ONS in the next 1-3 days    Cultural, Taoist, or Ethnic Dietary Needs: None     LEARNING NEEDS (Diet, Food/Nutrient-Drug Interaction):    [x] None Identified   [] Identified and Education Provided/Documented   [] Identified and Pt declined/was not appropriate      [x] Interdisciplinary Care Plan Reviewed/Documented    [x] Discharge Needs:  See dc order   [] No Nutrition Related Discharge Needs    NUTRITION RISK:   Pt Is At Nutrition Risk  [x]     No Nutrition Risk Identified  []       PT SEEN FOR:    []  MD Consult: []Calorie Count      []Diabetic Diet Education        []Diet Education     []Electrolyte Management     []General Nutrition Management and Supplements     []Management of Tube Feeding     []TPN Recommendations    []  RN Referral:  []MST score >=2     []Enteral/Parenteral Nutrition PTA     []Pregnant: Gestational DM or Multigestation                 [] Pressure Ulcer      []  Low BMI      []  Length of Stay       [] Dysphagia Diet         [] Ventilator      [x]  Follow-Up     Previous Recommendations:   [x] Implemented          [] Not Implemented          [] Not Applicable    Previous Goal:   [] Met              [x] Progressing Towards Goal              [] Not Progressing Towards Goal   [] Not Applicable              Stephanie Jones, [address] [place] [address]   Pager [phone]

## 2017-07-19 NOTE — PROGRESS NOTES
Vancomycin Pharmacy Consult 07/19/17  Vancomycin trough = 21.9 mcg/ml (drawn 11.83 hrs post dose), extrapolates to a trough of 21.62 mcg/ml. Level is supra-therapeutic   Goal Trough: 15-20 mcg/ml    Plan:   Will change Vancomycin to 750 mg IV q12hr and recheck level soon    Thank you  Latoya Ramos, PharmD  [phone]

## 2017-07-20 ENCOUNTER — APPOINTMENT (OUTPATIENT)
Dept: GENERAL RADIOLOGY | Age: 47
DRG: 853 | End: 2017-07-20
Attending: FAMILY MEDICINE
Payer: COMMERCIAL

## 2017-07-20 LAB
ALBUMIN SERPL BCP-MCNC: 2.1 G/DL (ref 3.5–5)
ALBUMIN/GLOB SERPL: 0.5 {RATIO} (ref 1.1–2.2)
ALP SERPL-CCNC: 204 U/L (ref 45–117)
ALT SERPL-CCNC: 13 U/L (ref 12–78)
ANION GAP BLD CALC-SCNC: 6 MMOL/L (ref 5–15)
APPEARANCE UR: ABNORMAL
AST SERPL W P-5'-P-CCNC: 22 U/L (ref 15–37)
BACTERIA URNS QL MICRO: NEGATIVE /HPF
BASOPHILS # BLD AUTO: 0.1 K/UL (ref 0–0.1)
BASOPHILS # BLD: 2 % (ref 0–1)
BILIRUB SERPL-MCNC: 0.2 MG/DL (ref 0.2–1)
BILIRUB UR QL: NEGATIVE
BUN SERPL-MCNC: 9 MG/DL (ref 6–20)
BUN/CREAT SERPL: 10 (ref 12–20)
CALCIUM SERPL-MCNC: 8.7 MG/DL (ref 8.5–10.1)
CHLORIDE SERPL-SCNC: 106 MMOL/L (ref 97–108)
CO2 SERPL-SCNC: 28 MMOL/L (ref 21–32)
COLOR UR: ABNORMAL
CREAT SERPL-MCNC: 0.86 MG/DL (ref 0.55–1.02)
EOSINOPHIL # BLD: 0.4 K/UL (ref 0–0.4)
EOSINOPHIL NFR BLD: 6 % (ref 0–7)
EPITH CASTS URNS QL MICRO: ABNORMAL /LPF
ERYTHROCYTE [DISTWIDTH] IN BLOOD BY AUTOMATED COUNT: 17.5 % (ref 11.5–14.5)
GLOBULIN SER CALC-MCNC: 4.1 G/DL (ref 2–4)
GLUCOSE SERPL-MCNC: 82 MG/DL (ref 65–100)
GLUCOSE UR STRIP.AUTO-MCNC: NEGATIVE MG/DL
HCT VFR BLD AUTO: 24.9 % (ref 35–47)
HGB BLD-MCNC: 8 G/DL (ref 11.5–16)
HGB UR QL STRIP: NEGATIVE
HYALINE CASTS URNS QL MICRO: ABNORMAL /LPF (ref 0–5)
KETONES UR QL STRIP.AUTO: NEGATIVE MG/DL
LEUKOCYTE ESTERASE UR QL STRIP.AUTO: NEGATIVE
LYMPHOCYTES # BLD AUTO: 28 % (ref 12–49)
LYMPHOCYTES # BLD: 1.9 K/UL (ref 0.8–3.5)
MAGNESIUM SERPL-MCNC: 2 MG/DL (ref 1.6–2.4)
MCH RBC QN AUTO: 26.5 PG (ref 26–34)
MCHC RBC AUTO-ENTMCNC: 32.1 G/DL (ref 30–36.5)
MCV RBC AUTO: 82.5 FL (ref 80–99)
MONOCYTES # BLD: 0.9 K/UL (ref 0–1)
MONOCYTES NFR BLD AUTO: 14 % (ref 5–13)
NEUTS SEG # BLD: 3.4 K/UL (ref 1.8–8)
NEUTS SEG NFR BLD AUTO: 50 % (ref 32–75)
NITRITE UR QL STRIP.AUTO: NEGATIVE
PH UR STRIP: 6.5 [PH] (ref 5–8)
PHOSPHATE SERPL-MCNC: 4.9 MG/DL (ref 2.6–4.7)
PLATELET # BLD AUTO: 727 K/UL (ref 150–400)
POTASSIUM SERPL-SCNC: 3.6 MMOL/L (ref 3.5–5.1)
PROT SERPL-MCNC: 6.2 G/DL (ref 6.4–8.2)
PROT UR STRIP-MCNC: ABNORMAL MG/DL
RBC # BLD AUTO: 3.02 M/UL (ref 3.8–5.2)
RBC #/AREA URNS HPF: ABNORMAL /HPF (ref 0–5)
RBC MORPH BLD: ABNORMAL
SODIUM SERPL-SCNC: 140 MMOL/L (ref 136–145)
SP GR UR REFRACTOMETRY: 1.01 (ref 1–1.03)
UA: UC IF INDICATED,UAUC: ABNORMAL
UROBILINOGEN UR QL STRIP.AUTO: 0.2 EU/DL (ref 0.2–1)
WBC # BLD AUTO: 6.7 K/UL (ref 3.6–11)
WBC URNS QL MICRO: ABNORMAL /HPF (ref 0–4)

## 2017-07-20 PROCEDURE — 83735 ASSAY OF MAGNESIUM: CPT | Performed by: SURGERY

## 2017-07-20 PROCEDURE — 74011250637 HC RX REV CODE- 250/637: Performed by: FAMILY MEDICINE

## 2017-07-20 PROCEDURE — 74011250637 HC RX REV CODE- 250/637: Performed by: PHYSICIAN ASSISTANT

## 2017-07-20 PROCEDURE — 36415 COLL VENOUS BLD VENIPUNCTURE: CPT | Performed by: SURGERY

## 2017-07-20 PROCEDURE — 74011000258 HC RX REV CODE- 258: Performed by: SURGERY

## 2017-07-20 PROCEDURE — 74011000250 HC RX REV CODE- 250: Performed by: FAMILY MEDICINE

## 2017-07-20 PROCEDURE — 74011250636 HC RX REV CODE- 250/636: Performed by: STUDENT IN AN ORGANIZED HEALTH CARE EDUCATION/TRAINING PROGRAM

## 2017-07-20 PROCEDURE — 80053 COMPREHEN METABOLIC PANEL: CPT | Performed by: SURGERY

## 2017-07-20 PROCEDURE — 85025 COMPLETE CBC W/AUTO DIFF WBC: CPT | Performed by: SURGERY

## 2017-07-20 PROCEDURE — 74011000258 HC RX REV CODE- 258: Performed by: FAMILY MEDICINE

## 2017-07-20 PROCEDURE — 74011250637 HC RX REV CODE- 250/637: Performed by: STUDENT IN AN ORGANIZED HEALTH CARE EDUCATION/TRAINING PROGRAM

## 2017-07-20 PROCEDURE — 97535 SELF CARE MNGMENT TRAINING: CPT

## 2017-07-20 PROCEDURE — 74011250636 HC RX REV CODE- 250/636: Performed by: SURGERY

## 2017-07-20 PROCEDURE — 65660000000 HC RM CCU STEPDOWN

## 2017-07-20 PROCEDURE — 94640 AIRWAY INHALATION TREATMENT: CPT

## 2017-07-20 PROCEDURE — 87040 BLOOD CULTURE FOR BACTERIA: CPT

## 2017-07-20 PROCEDURE — 81001 URINALYSIS AUTO W/SCOPE: CPT

## 2017-07-20 PROCEDURE — 71010 XR CHEST PORT: CPT

## 2017-07-20 PROCEDURE — 74011250636 HC RX REV CODE- 250/636: Performed by: PHYSICIAN ASSISTANT

## 2017-07-20 PROCEDURE — 74011250636 HC RX REV CODE- 250/636: Performed by: FAMILY MEDICINE

## 2017-07-20 PROCEDURE — 84100 ASSAY OF PHOSPHORUS: CPT | Performed by: SURGERY

## 2017-07-20 RX ADMIN — ACETAMINOPHEN 650 MG: 325 TABLET ORAL at 00:08

## 2017-07-20 RX ADMIN — Medication 10 ML: at 21:35

## 2017-07-20 RX ADMIN — MEROPENEM 500 MG: 500 INJECTION, POWDER, FOR SOLUTION INTRAVENOUS at 21:27

## 2017-07-20 RX ADMIN — LORAZEPAM 1 MG: 1 TABLET ORAL at 21:27

## 2017-07-20 RX ADMIN — OXYCODONE HYDROCHLORIDE 10 MG: 5 TABLET ORAL at 06:30

## 2017-07-20 RX ADMIN — HEPARIN SODIUM 5000 UNITS: 5000 INJECTION, SOLUTION INTRAVENOUS; SUBCUTANEOUS at 14:15

## 2017-07-20 RX ADMIN — SODIUM CHLORIDE 100 MG: 900 INJECTION, SOLUTION INTRAVENOUS at 19:52

## 2017-07-20 RX ADMIN — OXYCODONE HYDROCHLORIDE 10 MG: 5 TABLET ORAL at 21:27

## 2017-07-20 RX ADMIN — Medication 20 ML: at 16:52

## 2017-07-20 RX ADMIN — LEVALBUTEROL 1.25 MG: 1.25 SOLUTION RESPIRATORY (INHALATION) at 07:48

## 2017-07-20 RX ADMIN — ESCITALOPRAM 20 MG: 10 TABLET, FILM COATED ORAL at 08:51

## 2017-07-20 RX ADMIN — OXYCODONE HYDROCHLORIDE 10 MG: 5 TABLET ORAL at 14:15

## 2017-07-20 RX ADMIN — PANTOPRAZOLE SODIUM 40 MG: 40 TABLET, DELAYED RELEASE ORAL at 06:30

## 2017-07-20 RX ADMIN — PANTOPRAZOLE SODIUM 40 MG: 40 TABLET, DELAYED RELEASE ORAL at 16:52

## 2017-07-20 RX ADMIN — HEPARIN SODIUM 5000 UNITS: 5000 INJECTION, SOLUTION INTRAVENOUS; SUBCUTANEOUS at 21:27

## 2017-07-20 RX ADMIN — HYDROMORPHONE HYDROCHLORIDE 0.5 MG: 1 INJECTION, SOLUTION INTRAMUSCULAR; INTRAVENOUS; SUBCUTANEOUS at 16:51

## 2017-07-20 RX ADMIN — TRAZODONE HYDROCHLORIDE 200 MG: 100 TABLET ORAL at 21:27

## 2017-07-20 RX ADMIN — MEROPENEM 500 MG: 500 INJECTION, POWDER, FOR SOLUTION INTRAVENOUS at 03:06

## 2017-07-20 RX ADMIN — MEROPENEM 500 MG: 500 INJECTION, POWDER, FOR SOLUTION INTRAVENOUS at 14:15

## 2017-07-20 RX ADMIN — LEVALBUTEROL 1.25 MG: 1.25 SOLUTION RESPIRATORY (INHALATION) at 20:40

## 2017-07-20 RX ADMIN — Medication 10 ML: at 16:52

## 2017-07-20 RX ADMIN — LORAZEPAM 1 MG: 1 TABLET ORAL at 12:25

## 2017-07-20 RX ADMIN — HEPARIN SODIUM 5000 UNITS: 5000 INJECTION, SOLUTION INTRAVENOUS; SUBCUTANEOUS at 06:31

## 2017-07-20 RX ADMIN — MEROPENEM 500 MG: 500 INJECTION, POWDER, FOR SOLUTION INTRAVENOUS at 08:51

## 2017-07-20 RX ADMIN — Medication 20 ML: at 08:50

## 2017-07-20 NOTE — PROGRESS NOTES
Pending sale to [facility] Infectious Diseases Progress Note  Tayler Dueñas MD,             Larisa Villa MD,          Leonel Yates DO     [address] [place] [address]    [address], [address]  [phone]  Fax    2017      Assessment & Plan:   1. Multiple abdominal abscesses status post repair of perforated gastric ulcer. S/p exploratory laparotomy with repair of ulcer and omental intra-abdominal flap on 2017. Plan 28-day course of meropenem and anidulafungin at the time of discharge. IV antibiotic orders are in the chart. 2. Fever. Suspect due to above. Repeat BC's sent. Consider drug fever or atelectasis. LE dopplers if fevers persist  3. HISTORY OF MULTIPLE ANTIBIOTIC ALLERGIES INCLUDING CEPHALOSPORINS, PENICILLIN, AND TETRACYCLINES. She is currently tolerating meropenem. 4. Leukocytosis due to infection as above. Resolved. 5. Recent history of Lyme disease diagnosed in the outpatient setting. This was treated appropriately with a 14-day course of doxycycline. Subjective:     Fever overnight    Objective:     Vitals:   Visit Vitals    /60 (BP 1 Location: Left arm, BP Patient Position: At rest)    Pulse (!) 118    Temp 99.8 °F (37.7 °C)    Resp 18    Ht 5' 2\" (1.575 m)    Wt 49.4 kg (109 lb)    SpO2 95%    BMI 19.94 kg/m2        Tmax:  Temp (24hrs), Av.7 °F (37.6 °C), Min:98.6 °F (37 °C), Max:101.9 °F (38.8 °C)      Exam:   Patient is intubated:  no    Physical Examination:   GENERAL ASSESSMENT: NAD  HEENT:Nontraumatic   CHEST: CTA  HEART: S1S2  ABDOMEN: TTP. Hyperactive bowel sounds  :[place]: no  EXTREMITY: no edema  NEURO:Grossly non focal    Labs:        No lab exists for component: ITNL   No results for input(s): CPK, CKMB, TROIQ in the last 72 hours.   Recent Labs      17   0610  17   0400  17   0421   NA  140  137  139   K  3.6  4.1  4.1   CL  106  102  104   CO2  28  30  26   BUN  9  9  8   CREA  0.86  0.78  0.85   GLU  82  76  78 PHOS  4.9*  4.4  4.5   MG  2.0  1.5*  1.7   ALB  2.1*  2.0*  2.0*   WBC  6.7  10.9  16.3*   HGB  8.0*  8.0*  8.9*   HCT  24.9*  25.5*  27.7*   PLT  727*  806*  895*     No results for input(s): INR, PTP, APTT in the last 72 hours. No lab exists for component: INREXT  Needs: urine analysis, urine sodium, protein and creatinine  No results found for: BECKY, CREAU      Cultures:     Lab Results   Component Value Date/Time    Specimen Description: CEREBROSPINAL FLUID 03/16/2009 04:40 PM    Specimen Description: BLOOD 03/16/2009 02:35 PM     Lab Results   Component Value Date/Time    Culture result:  07/16/2017 11:44 PM     ONE OF TWO BOTTLE HAVE BEEN FLAGGED POSITIVE BY INSTRUMENTATION No organisms seen on gram stain. Multiple subcultures are in progress.  NO GROWTH THUS FAR    Culture result: REMAINING BOTTLE(S) HAS/HAVE NO GROWTH SO FAR 07/16/2017 11:44 PM    Culture result: NO GROWTH 3 DAYS 07/16/2017 11:44 PM       Radiology:     Medications       Current Facility-Administered Medications   Medication Dose Route Frequency Last Dose    traZODone (DESYREL) tablet 200 mg  200 mg Oral  mg at 07/19/17 2151    HYDROmorphone (PF) (DILAUDID) injection 0.5 mg  0.5 mg IntraVENous Q12H PRN 0.5 mg at 07/19/17 1659    pantoprazole (PROTONIX) tablet 40 mg  40 mg Oral ACB&D 40 mg at 07/20/17 0630    alteplase (CATHFLO) 1 mg in sterile water (preservative free) 1 mL injection  1 mg InterCATHeter PRN      sodium chloride (NS) flush 10-30 mL  10-30 mL InterCATHeter PRN      sodium chloride (NS) flush 10 mL  10 mL InterCATHeter Q24H 10 mL at 07/19/17 1700    sodium chloride (NS) flush 10 mL  10 mL InterCATHeter PRN      sodium chloride (NS) flush 10-40 mL  10-40 mL InterCATHeter Q8H 10 mL at 07/19/17 2151    sodium chloride (NS) flush 20 mL  20 mL InterCATHeter Q24H 20 mL at 07/19/17 1659    oxyCODONE IR (ROXICODONE) tablet 10 mg  10 mg Oral Q6H PRN 10 mg at 07/20/17 1415    oxyCODONE IR (ROXICODONE) tablet 5 mg  5 mg Oral Q6H PRN      acetaminophen (TYLENOL) tablet 650 mg  650 mg Oral Q6H  mg at 07/20/17 0008    LORazepam (ATIVAN) tablet 1 mg  1 mg Oral TID PRN 1 mg at 07/20/17 1225    meropenem (MERREM) 500 mg in 0.9% sodium chloride (MBP/ADV) 50 mL  500 mg IntraVENous Q6H 500 mg at 07/20/17 1415    escitalopram oxalate (LEXAPRO) tablet 20 mg  20 mg Oral DAILY 20 mg at 07/20/17 0851    levalbuterol (XOPENEX) nebulizer soln 1.25 mg/3 mL  1.25 mg Nebulization BID RT 1.25 mg at 07/20/17 0748    fentaNYL citrate (PF) injection 100 mcg  100 mcg IntraVENous RAD PRN 25 mcg at 07/10/17 1540    lidocaine (LIDODERM) 5 % patch 2 Patch  2 Patch TransDERmal Q24H 2 Patch at 07/20/17 1225    heparin (porcine) injection 5,000 Units  5,000 Units SubCUTAneous Q8H 5,000 Units at 07/20/17 1415    nicotine (NICODERM CQ) 21 mg/24 hr patch 1 Patch  1 Patch TransDERmal DAILY 1 Patch at 07/20/17 0851    glucose chewable tablet 16 g  4 Tab Oral PRN      dextrose (D50W) injection syrg 12.5-25 g  12.5-25 g IntraVENous PRN      glucagon (GLUCAGEN) injection 1 mg  1 mg IntraMUSCular PRN      prochlorperazine (COMPAZINE) injection 10 mg  10 mg IntraVENous Q6H PRN 10 mg at 07/18/17 1223    sodium chloride (NS) flush 5-10 mL  5-10 mL IntraVENous PRN      0.9% sodium chloride infusion 250 mL  250 mL IntraVENous PRN      sodium chloride (NS) flush 5-10 mL  5-10 mL IntraVENous Q8H 10 mL at 07/18/17 2200    sodium chloride (NS) flush 5-10 mL  5-10 mL IntraVENous PRN 10 mL at 07/17/17 1231    naloxone (NARCAN) injection 0.4 mg  0.4 mg IntraVENous PRN      albuterol (PROVENTIL VENTOLIN) nebulizer solution 2.5 mg  2.5 mg Nebulization Q4H PRN 2.5 mg at 07/11/17 1326    sodium chloride (NS) flush 5-10 mL  5-10 mL IntraVENous PRN 10 mL at 07/13/17 1845    anidulafungin (ERAXIS) 100 mg in 0.9% sodium chloride 130 mL IVPB  100 mg IntraVENous Q24H 100 mg at 07/19/17 2990           Case discussed with:  [facility] team      Dacia Mir, DO

## 2017-07-20 NOTE — PROGRESS NOTES
Tele called for elevated heart rate - pt working with OT getting bathed - all leads on - pt.  Feels fine - had pt sit on chair for a bit of recovery - notified RN

## 2017-07-20 NOTE — PROGRESS NOTES
[facility]       Resident Progress Note in Brief    S:  Patient seen and examined at bedside.  Sleeping comfortably without any complaints       O:  Patient Vitals for the past 12 hrs:   Temp Pulse Resp BP SpO2   07/20/17 0100 98.6 °F (37 °C) - - - -   07/20/17 0010 (!) 101.1 °F (38.4 °C) (!) 101 - - -   07/19/17 2355 (!) 101.9 °F (38.8 °C) (!) 102 15 93/51 95 %   07/19/17 2255 - (!) 110 - - -   07/19/17 2005 - - - - 99 %   07/19/17 1944 99.4 °F (37.4 °C) (!) 111 18 108/59 97 %   07/19/17 1521 99.8 °F (37.7 °C) (!) 107 18 118/66 98 %   07/19/17 1500 - (!) 104 - - -         Physical Examination:   General appearance - sleeping, well appearing, and in no distress  Chest - clear to auscultation, no wheezes, rales or rhonchi, symmetric air entry  Heart - normal rate, regular rhythm, normal S1, S2, no murmurs, rubs, clicks or gallops,   Abdomen - soft, nontender nondistended,incision c/d/i  Neurological - alert, oriented, normal speech, no focal findings  Extremities - PICC line in place, peripheral pulses normal, no pedal edema, no clubbing or cyanosis    A/P:   Onel Dutta is a 56 y.o. female who is hospitalized for sepsis 2/2 perforated gastric ulcers/peritonitis,currently on meropenem and Eraxis 100mg; still spiking fevers, resolved after one time dose of tylenol.    - Portable Chest xray  -  Blood cultures   -  Peripheral line Blood culture  -  UA with reflex urine culture  - Tylenol prn  - Continue current antibiotic regimen   - F/U ID recs in the AM  - Follow daily progress note for plan in its entirety      Camron Pradhan MD  Family Medicine Resident  PGY-2

## 2017-07-20 NOTE — PROGRESS NOTES
General Surgery Daily Progress Note    Patient: Tiffany Petty MRN: [id number]  SSN: [id number]    YOB: 1970  Age: 56 y.o. Sex: female      Admit Date: 7/4/2017    Subjective:   Pain controlled, tolerating diet, bowel function continues.      Current Facility-Administered Medications   Medication Dose Route Frequency    traZODone (DESYREL) tablet 200 mg  200 mg Oral QHS    HYDROmorphone (PF) (DILAUDID) injection 0.5 mg  0.5 mg IntraVENous Q12H PRN    pantoprazole (PROTONIX) tablet 40 mg  40 mg Oral ACB&D    alteplase (CATHFLO) 1 mg in sterile water (preservative free) 1 mL injection  1 mg InterCATHeter PRN    sodium chloride (NS) flush 10-30 mL  10-30 mL InterCATHeter PRN    sodium chloride (NS) flush 10 mL  10 mL InterCATHeter Q24H    sodium chloride (NS) flush 10 mL  10 mL InterCATHeter PRN    sodium chloride (NS) flush 10-40 mL  10-40 mL InterCATHeter Q8H    sodium chloride (NS) flush 20 mL  20 mL InterCATHeter Q24H    oxyCODONE IR (ROXICODONE) tablet 10 mg  10 mg Oral Q6H PRN    oxyCODONE IR (ROXICODONE) tablet 5 mg  5 mg Oral Q6H PRN    acetaminophen (TYLENOL) tablet 650 mg  650 mg Oral Q6H PRN    LORazepam (ATIVAN) tablet 1 mg  1 mg Oral TID PRN    meropenem (MERREM) 500 mg in 0.9% sodium chloride (MBP/ADV) 50 mL  500 mg IntraVENous Q6H    escitalopram oxalate (LEXAPRO) tablet 20 mg  20 mg Oral DAILY    levalbuterol (XOPENEX) nebulizer soln 1.25 mg/3 mL  1.25 mg Nebulization BID RT    fentaNYL citrate (PF) injection 100 mcg  100 mcg IntraVENous RAD PRN    lidocaine (LIDODERM) 5 % patch 2 Patch  2 Patch TransDERmal Q24H    heparin (porcine) injection 5,000 Units  5,000 Units SubCUTAneous Q8H    nicotine (NICODERM CQ) 21 mg/24 hr patch 1 Patch  1 Patch TransDERmal DAILY    glucose chewable tablet 16 g  4 Tab Oral PRN    dextrose (D50W) injection syrg 12.5-25 g  12.5-25 g IntraVENous PRN    glucagon (GLUCAGEN) injection 1 mg  1 mg IntraMUSCular PRN    prochlorperazine (COMPAZINE) injection 10 mg  10 mg IntraVENous Q6H PRN    sodium chloride (NS) flush 5-10 mL  5-10 mL IntraVENous PRN    0.9% sodium chloride infusion 250 mL  250 mL IntraVENous PRN    sodium chloride (NS) flush 5-10 mL  5-10 mL IntraVENous Q8H    sodium chloride (NS) flush 5-10 mL  5-10 mL IntraVENous PRN    naloxone (NARCAN) injection 0.4 mg  0.4 mg IntraVENous PRN    albuterol (PROVENTIL VENTOLIN) nebulizer solution 2.5 mg  2.5 mg Nebulization Q4H PRN    sodium chloride (NS) flush 5-10 mL  5-10 mL IntraVENous PRN    anidulafungin (ERAXIS) 100 mg in 0.9% sodium chloride 130 mL IVPB  100 mg IntraVENous Q24H        Objective:         Patient Vitals for the past 8 hrs:   BP Temp Pulse Resp SpO2   07/20/17 0722 97/59 99 °F (37.2 °C) 99 16 96 %   07/20/17 0348 90/53 98.7 °F (37.1 °C) 80 16 96 %       Physical Exam:  General: Awake, cooperative, speech slurred  Lungs: Clear to auscultation bilaterally, unlabored  Heart:  RRR  Abdomen: Soft, ATTP, non-distended, incisions c/d/i. Extremities: Warm, moves all, no edema  Skin:  Warm and dry, no rash    Labs:   Recent Labs      07/20/17   0610   WBC  6.7   HGB  8.0*   HCT  24.9*   PLT  727*     Recent Labs      07/20/17   0610   NA  140   K  3.6   CL  106   CO2  28   GLU  82   BUN  9   CREA  0.86   CA  8.7   MG  2.0   PHOS  4.9*   ALB  2.1*   TBILI  0.2   SGOT  22   ALT  13       Assessment / Plan:   · POD#16 ex lap, primary repair perforated gastric ulcer x 2, omental intra-abdominal flap  · Overall improvement on CT chest/abd/pelvis. No drainable collections. · Continue full liquids x 1 week and then will transition to soft diet  · Leukocytosis- normalized, continues to have intermittent fever. In absence of other infection source, this is likely from intra-abdominal fluid collections. Continue ABX per ID recommendations. · Dopplers and echo unremarkable  · Pain controlled with PO meds  · Heparin for DVT prophylaxis  · H pylori serologies negative.  Continue BID PPI, change to PO  · Pathology benign  · Staples removed  · PT/OT, encourage IS  · OK for discharge once antibiotic plan is finalized. Should never take NSAIDs. F/u with Dr. Marisa Gonzalez in 2 weeks. We will continue to follow till discharge.

## 2017-07-20 NOTE — PROGRESS NOTES
[facility]   Daily Progress Note    Date: 7/20/2017    Assessment/Plan:   Jordon Fitch is a 56 y.o. female who is hospitalized for sepsis 2/2 perforated gastric ulcers/peritonitis. 24 Hour Events: Afebrile overnight. Fever up to 101.9    Sepsis 2/2 Perforated Gastric Ulcers s/p Operative Repair on 7/4/17 - POD 13. Was NPO with TPN until POD 7. Final drain removed. Cultures negative to date. Pain well controlled on po meds. Gastric ulcers thought to be related to chronic steroid use and Crohn's disease. CT showed improvement in inflammation. One of the fluid collections shown to be larger than previous CT scan.  -Appreciate surgery recommendations & support.  -Per surgery, current abx are eraxis, vancomycin, meropenem. -ID consulted, with recs to discharge with IV meropenum and PO fluconazole for 28 days with weekly labs. Will touch base since fever spike to ensure change in recs with this new info  -PO protonix. -Oxy q6 and Dilaudid q12 for pain meds, continue to wean dilaudid.  -Full liquid Diet for next week with transition to saft diet  -Daily labs. -Replete lytes prn   -Encouraging incentive spirometry  -Encouraging movement and participation in PT    Left pleural effusion: Acute. Seen on CT chest this admission. S/p IR drainage 7/10. Left ptx appeared to improve on serial CXRs. O2 sats stable on RA.  -Continue to monitor for sxs of worsening respiratory status. Elevated Leukocytosis - Per heme, likely reactive. Down-trending. There was concern that WBC reamined elevated despite being on broad spectrum antibiotics. Given increase in wbc today, Gen Surg is recommending repeat CT chest/abdomen/pelvis on Sunday if wbc continues to rise. Spiked a low-grade temp on 7/16 so repeat blood cultures were drawn. 1 bottle found to have bacteria. Sent for culture.  -Daily CBC  -CT chest/abdomen/pelvis with IV contract today    Thrombophilia - Per heme, likely reactive. Platelets now downtrending. Platelets peaked at 840 and today are 806.     Community Acquired Pneumonia - Improvement of left basilar airspace disease noted on initial CXR. Could have been playing a role in SIRS/sepsis picture. Improved on serial CXR. Severe Acute Malnutrition - criteria evidenced by nutritional intake <50% of recommended intake for >5days, weight loss of 4.7% in 2 weeks, and moderate-severe edema. Improvement during hospitalization. Weight today 136lb (up from from 121 on 7/4). - Encourage full liquid diet with Ensure Enlive and Might Shakes     Anemia - lab work up done on admission.  Notable for reticulocyte count of 2.3, LD of 294.  Per hematology, most likely 2/2 acute blood loss from gastric ulcer.  Also a component of iron deficiency.  S/p 2 units pRBC on admission--Hgb responded appropriately.  Has been stable since.  Hgb down to 8.8 this morning.  -Hematology signed off.  -Have 2 units of pRBCs on hold. -Daily CBC.     Chron's Disease - no pharmacotherapy PTA.  Poor follow up history with GI.  Likely playing a role in patient's current clinical course.  -Will consider GI consult later in course when patient becomes more stable.  Needs better outpatient follow up. Lyme Disease - diagnosed ~1 month ago at [facility].  Was treated with a 21 day course of doxycycline.  Completed this course fully, but developed some lower extremity skin breakdown following treatment.  Tetracyclines are on patient's allergy list.  -Not repeating tic studies     Tobacco Abuse - reports to smoke 1.5 PPD.   -Encouraging cessation.  -Prescribed daily nicotine patch while patient admitted.     Healing Wounds on Bilateral Feet - most likely acute reaction to doxycycline treatment that patient underwent prior to admission.  Per patient, these wounds are healing.  Remain painful.  -Touch base with wound care, appreciate support.  -Tetracyclines on allergy list.  -Bacitracin PRN       3cm Laceration of Left Upper Back - healing.  Patient notes that this was from a fall.  Not amenable to sutures at this time.  -Wound care consulted, appreciate support.      Bilateral Lower Extremity Swelling/Pain - noted on admisison.  Thought to be 2/2 hypoalbuminemia.  Duplex studies of lower extremities negative for DVT. Improved.      Electrolyte deficiencies: Resolved s/p repletion and resuming diet. Will continue to trend and replete prn.      Depression/Panic Attacks - patient is showing some symptoms of this--mostly at night.  Takes klonopin, adderall, Lexapro, ativan, and trazodone at home. [facility] for withdrawal from SSRI. -Beginning to resume home meds now that pt is taking FLD      History of Falls - patient and family gave detailed history of multiple falls over the last few months/weeks. Had discussion with PT about sending pt home with [facility] verses rehab. Per PT is was unlikely that she would be accepted to to rehab as she is too high functioning. -PT/OT consulted.       FEN/GI - Full liquid diet  Activity - Out of bed with assistance  DVT prophylaxis - Heparin  GI prophylaxis - Protonix  Fall prophylaxis - Fall precautions ordered. Disposition- D/c with home health. Code Status [facility]     Patient seen and discussed with Dr. Jeferson Flores (Attending physician)    Selma Encinas MD  Family Medicine Resident  7/20/2017       CC: \"I felt some chills yesterday\"    Subjective  Her pain is controlled. No new complaints. States she went back and forth between \"hot and cold\" and she could feel when the fever broke. States nothing on her hurts.  Per night team, during febrile episode pt was sitting in chair eating ice cream.    Inpatient Medications  Current Facility-Administered Medications   Medication Dose Route Frequency    traZODone (DESYREL) tablet 200 mg  200 mg Oral QHS    HYDROmorphone (PF) (DILAUDID) injection 0.5 mg  0.5 mg IntraVENous Q12H PRN    pantoprazole (PROTONIX) tablet 40 mg  40 mg Oral ACB&D    alteplase (CATHFLO) 1 mg in sterile water (preservative free) 1 mL injection  1 mg InterCATHeter PRN    sodium chloride (NS) flush 10-30 mL  10-30 mL InterCATHeter PRN    sodium chloride (NS) flush 10 mL  10 mL InterCATHeter Q24H    sodium chloride (NS) flush 10 mL  10 mL InterCATHeter PRN    sodium chloride (NS) flush 10-40 mL  10-40 mL InterCATHeter Q8H    sodium chloride (NS) flush 20 mL  20 mL InterCATHeter Q24H    oxyCODONE IR (ROXICODONE) tablet 10 mg  10 mg Oral Q6H PRN    oxyCODONE IR (ROXICODONE) tablet 5 mg  5 mg Oral Q6H PRN    acetaminophen (TYLENOL) tablet 650 mg  650 mg Oral Q6H PRN    LORazepam (ATIVAN) tablet 1 mg  1 mg Oral TID PRN    meropenem (MERREM) 500 mg in 0.9% sodium chloride (MBP/ADV) 50 mL  500 mg IntraVENous Q6H    escitalopram oxalate (LEXAPRO) tablet 20 mg  20 mg Oral DAILY    levalbuterol (XOPENEX) nebulizer soln 1.25 mg/3 mL  1.25 mg Nebulization BID RT    fentaNYL citrate (PF) injection 100 mcg  100 mcg IntraVENous RAD PRN    lidocaine (LIDODERM) 5 % patch 2 Patch  2 Patch TransDERmal Q24H    heparin (porcine) injection 5,000 Units  5,000 Units SubCUTAneous Q8H    nicotine (NICODERM CQ) 21 mg/24 hr patch 1 Patch  1 Patch TransDERmal DAILY    glucose chewable tablet 16 g  4 Tab Oral PRN    dextrose (D50W) injection syrg 12.5-25 g  12.5-25 g IntraVENous PRN    glucagon (GLUCAGEN) injection 1 mg  1 mg IntraMUSCular PRN    prochlorperazine (COMPAZINE) injection 10 mg  10 mg IntraVENous Q6H PRN    sodium chloride (NS) flush 5-10 mL  5-10 mL IntraVENous PRN    0.9% sodium chloride infusion 250 mL  250 mL IntraVENous PRN    sodium chloride (NS) flush 5-10 mL  5-10 mL IntraVENous Q8H    sodium chloride (NS) flush 5-10 mL  5-10 mL IntraVENous PRN    naloxone (NARCAN) injection 0.4 mg  0.4 mg IntraVENous PRN    albuterol (PROVENTIL VENTOLIN) nebulizer solution 2.5 mg  2.5 mg Nebulization Q4H PRN    sodium chloride (NS) flush 5-10 mL  5-10 mL IntraVENous PRN    anidulafungin (ERAXIS) 100 mg in 0.9% sodium chloride 130 mL IVPB  100 mg IntraVENous Q24H         Allergies  Allergies   Allergen Reactions    Cephalosporins Hives    Penicillins Hives    Tetracyclines Nausea and Vomiting         Objective  Vitals:  Patient Vitals for the past 8 hrs:   Temp Pulse Resp BP SpO2   07/20/17 0348 98.7 °F (37.1 °C) 80 16 90/53 96 %   07/20/17 0100 98.6 °F (37 °C) - - - -   07/20/17 0010 (!) 101.1 °F (38.4 °C) (!) 101 - - -   07/19/17 2355 (!) 101.9 °F (38.8 °C) (!) 102 15 93/51 95 %   07/19/17 2255 - (!) 110 - - -         I/O:  No intake or output data in the 24 hours ending 07/20/17 0610  Last shift:       Last 3 shifts:    07/18 0701 - 07/19 1900  In: -   Out: 1     Physical Exam:  General: No acute distress. Alert. Cooperative. HEENT: Normocephalic. Atraumatic. Conjunctiva pink. Sclera white. PERRL. MMM   Respiratory: CTAB. No w/r/c. Expiratory wheezing on ULL   Cardiovascular: RRR. Normal S1,S2. No m/r/g. 2+ pulses in DP bilaterally   GI: + bowel sounds. Non-distended. Soft abdomen. Incision c/d/i. Drains out. Stables removed. Extremities:  Skin: No edema. No tenderness. Healing lesions on toes. Surrounding erythema resolved. Laboratory Data  Recent Results (from the past 12 hour(s))   METABOLIC PANEL, COMPREHENSIVE    Collection Time: 07/20/17  6:10 AM   Result Value Ref Range    Sodium 140 136 - 145 mmol/L    Potassium 3.6 3.5 - 5.1 mmol/L    Chloride 106 97 - 108 mmol/L    CO2 28 21 - 32 mmol/L    Anion gap 6 5 - 15 mmol/L    Glucose 82 65 - 100 mg/dL    BUN 9 6 - 20 MG/DL    Creatinine 0.86 0.55 - 1.02 MG/DL    BUN/Creatinine ratio 10 (L) 12 - 20      GFR est AA >60 >60 ml/min/1.73m2    GFR est non-AA >60 >60 ml/min/1.73m2    Calcium 8.7 8.5 - 10.1 MG/DL    Bilirubin, total 0.2 0.2 - 1.0 MG/DL    ALT (SGPT) 13 12 - 78 U/L    AST (SGOT) 22 15 - 37 U/L    Alk.  phosphatase 204 (H) 45 - 117 U/L    Protein, total 6.2 (L) 6.4 - 8.2 g/dL    Albumin 2.1 (L) 3.5 - 5.0 g/dL Globulin 4.1 (H) 2.0 - 4.0 g/dL    A-G Ratio 0.5 (L) 1.1 - 2.2     CBC WITH AUTOMATED DIFF    Collection Time: 07/20/17  6:10 AM   Result Value Ref Range    WBC 6.7 3.6 - 11.0 K/uL    RBC 3.02 (L) 3.80 - 5.20 M/uL    HGB 8.0 (L) 11.5 - 16.0 g/dL    HCT 24.9 (L) 35.0 - 47.0 %    MCV 82.5 80.0 - 99.0 FL    MCH 26.5 26.0 - 34.0 PG    MCHC 32.1 30.0 - 36.5 g/dL    RDW 17.5 (H) 11.5 - 14.5 %    PLATELET 337 (H) 684 - 400 K/uL    NEUTROPHILS 50 32 - 75 %    LYMPHOCYTES 28 12 - 49 %    MONOCYTES 14 (H) 5 - 13 %    EOSINOPHILS 6 0 - 7 %    BASOPHILS 2 (H) 0 - 1 %    ABS. NEUTROPHILS 3.4 1.8 - 8.0 K/UL    ABS. LYMPHOCYTES 1.9 0.8 - 3.5 K/UL    ABS. MONOCYTES 0.9 0.0 - 1.0 K/UL    ABS. EOSINOPHILS 0.4 0.0 - 0.4 K/UL    ABS. BASOPHILS 0.1 0.0 - 0.1 K/UL    RBC COMMENTS ANISOCYTOSIS  1+       PHOSPHORUS    Collection Time: 07/20/17  6:10 AM   Result Value Ref Range    Phosphorus 4.9 (H) 2.6 - 4.7 MG/DL   MAGNESIUM    Collection Time: 07/20/17  6:10 AM   Result Value Ref Range    Magnesium 2.0 1.6 - 2.4 mg/dL   URINALYSIS W/ REFLEX CULTURE    Collection Time: 07/20/17  7:10 AM   Result Value Ref Range    Color YELLOW/STRAW      Appearance CLOUDY (A) CLEAR      Specific gravity 1.012 1.003 - 1.030      pH (UA) 6.5 5.0 - 8.0      Protein TRACE (A) NEG mg/dL    Glucose NEGATIVE  NEG mg/dL    Ketone NEGATIVE  NEG mg/dL    Bilirubin NEGATIVE  NEG      Blood NEGATIVE  NEG      Urobilinogen 0.2 0.2 - 1.0 EU/dL    Nitrites NEGATIVE  NEG      Leukocyte Esterase NEGATIVE  NEG      WBC 0-4 0 - 4 /hpf    RBC 0-5 0 - 5 /hpf    Epithelial cells FEW FEW /lpf    Bacteria NEGATIVE  NEG /hpf    UA:UC IF INDICATED CULTURE NOT INDICATED BY UA RESULT CNI      Hyaline cast 2-5 0 - 5 /lpf       Imaging  CXR-Right PICC catheter tip overlies SVC right atrial junction. There  is a tiny left apical pneumothorax which is smaller. There is left basilar  atelectasis/effusion slightly improved.       Hospital Problems:  Hospital Problems  Date Reviewed: 1/5/2017          Codes Class Noted POA    Thrombocytosis ([facility] ICD-10-CM: D47.3  ICD-9-CM: 238.71  7/5/2017 Yes        Neutrophilia ICD-10-CM: D72.9  ICD-9-CM: 288.8  7/5/2017 Yes        * (Principal)Perforation bowel ([facility] ICD-10-CM: K63.1  ICD-9-CM: 569.83  7/4/2017 Yes        Pneumonia ICD-10-CM: J18.9  ICD-9-CM: 131  7/4/2017 Yes        Anemia ICD-10-CM: D64.9  ICD-9-CM: 285.9  7/4/2017 Yes        Wounds, multiple open, lower extremity ICD-10-CM: S81.809A  ICD-9-CM: 894.0  7/4/2017 Yes        Crohn disease ([facility] ICD-10-CM: K50.90  ICD-9-CM: 555.9  4/19/2010 Yes    Overview Addendum 2/2/2012  4:33 PM by Emma Schuster MD     She had 4 colon resections in the past.  Dr. Natalie Wilcox, Dr. Ontiveros Plater abd/pevis 2009: Thickened segment of neoileum proximal and adjacent to   anastomotic chain sutures and likely representing inflammatory bowel disease   recurrence. Partial small bowel obstruction at this level. No evidence of   perforation. No evidence of fistula or intra-abdominal abscess.

## 2017-07-20 NOTE — PROGRESS NOTES
Spiritual Care Partner Volunteer visited patient in Rm [id number] on 7/20/17.   Documented by:  Chaplain Ramirez MDiv, MS, [facility]  [phone]

## 2017-07-20 NOTE — PROGRESS NOTES
Problem: Self Care Deficits Care Plan (Adult)  Goal: *Acute Goals and Plan of Care (Insert Text)  Occupational Therapy Goals  Initiated 7/6/2017 reviewed and upgraded 7-13; Reviewed and upgraded 7/20  1. Patient will perform light grooming in unsupported sitting x5 mins with minimal assistance within 7 day(s). Met 7-13; upgrade to grooming in standing with S in 7 days; Met 7/20- upgrade to grooming in standing with independence within 7 days  2. Patient will perform upper body dressing in unsupported sitting with min A within 7 day(s). Met; upgrade to mod I in 7 days- Continue  3. Patient will perform toilet transfers with minimal assistance with appropriate AD within 7 day(s). Met; upgrade to S in 7 days- Met 7/20- upgrade to with independence within 7 days  4. Patient will participate in upper extremity therapeutic exercise/activities through comfortable ROM in supported sitting to prepare for ADL tasks with supervision/set-up for 6 minutes within 7 day(s). Cont 7 days- Met 7/20  5. Patient will utilize energy conservation techniques during functional activities with verbal cues within 7 day(s). Cont 7 days- Met 7/20; upgrade to with independence within 7 days  6. Patient will complete LE dressing with S in 7 days- 7/20 Continue   OCCUPATIONAL THERAPY TREATMENT: WEEKLY REASSESSMENT  Patient: Surinder Segal (28 y.o. female)  Date: 7/20/2017  Diagnosis: Perforation bowel ([address]  Pneumonia  Wounds, multiple open, lower extremity  Anemia  Perforated Bowel Perforation bowel ([address]  Procedure(s) (LRB):  LAPAROTOMY EXPLORATORY, REPAIR OF GASTRIC PERFORATION (N/A) 16 Days Post-Op  Precautions:        ASSESSMENT:  Patient seen for weekly reassessment, originally evaluation 7/6 after admission for repair of bowel perforation. Goals have been reviewed and upgraded. She is progressing well towards her goals, demonstrating increased endurance, strength and balance.  Performed functional transfers with supervision, no LOB or unsteadiness observed. Performed toileting, grooming and bathing ADLs with supervision. Instructed pt to incorporate energy conservation techniques taught in previous sessions into grooming and bathing tasks. Pt stood to wash face and hands, then sat to perform bathing. Minimal verbal cues for pt to take rest breaks during task. After standing at sink to groom, pt's . After sitting pt's HR decreased to 120s, pt asymptomatic and nurse informed. Pt returned to supine in bed with supervision. Pt reported that she talked with friend about shower chair after yesterday's session, and her friend has one that she can borrow. Pt will benefit from continued OT to further improve functional performance and safety. Recommend HHOT upon discharge. Progression toward goals:  [X]            Improving appropriately and progressing toward goals  [ ]            Improving slowly and progressing toward goals  [ ]            Not making progress toward goals and plan of care will be adjusted       PLAN:  Goals have been updated based on progression since last assessment. Patient continues to benefit from skilled intervention to address the above impairments. Continue to follow patient 5 times a week to address goals.   Planned Interventions:  [X]                    Self Care Training                  [X]             Therapeutic Activities  [X]                    Functional Mobility Training    [ ]             Cognitive Retraining  [X]                    Therapeutic Exercises           [X]             Endurance Activities  [X]                    Balance Training                   [ ]             Neuromuscular Re-Education  [ ]                    Visual/Perceptual Training     [X]        Home Safety Training  [X]                    Patient Education                 [X]             Family Training/Education  [ ]                    Other (comment):  Discharge Recommendations: Home Health  Further Equipment Recommendations for Discharge: shower chair       SUBJECTIVE:   Patient stated I'd love to wash my legs.       OBJECTIVE DATA SUMMARY:   Cognitive/Behavioral Status:  Neurologic State: Alert  Orientation Level: Oriented X4  Cognition: Appropriate for age attention/concentration; Follows commands; Appropriate decision making  Perception: Appears intact  Perseveration: No perseveration noted  Safety/Judgement: Awareness of environment; Fall prevention;Home safety     Functional Mobility and Transfers for ADLs:  Bed Mobility:  Supine to Sit: Supervision  Sit to Supine: Supervision     Transfers:  Sit to Stand: Supervision  Functional Transfers  Toilet Transfer : Supervision     Balance:  Sitting: Intact; Without support  Sitting - Static: Good (unsupported)  Sitting - Dynamic: Good (unsupported)  Standing: Intact; Without support  Standing - Static: Good  Standing - Dynamic : Good     ADL Intervention:   Pt performed toilet hygiene with supervision, then stood at sink to brush teeth and wash hands with supervision. No LOB observed in standing without mobility device. Instructed pt to incorporate energy conservation techniques into bathing tasks, pt sat for majority of UB and LB bathing and took rest breaks with minimal verbal cueing. Grooming  Grooming Assistance: Supervision/set up (standing at sink)  Washing Face: Supervision/set-up  Washing Hands: Supervision/set-up  Brushing Teeth: Supervision/set-up     Upper Body Bathing  Bathing Assistance: Supervision/set-up  Position Performed: Seated in chair     Lower Body Bathing  Bathing Assistance: Supervision/set-up  Perineal  : Supervision/set-up  Position Performed: Seated in chair     Upper Body Dressing Assistance  Dressing Assistance: Jonas Daley Gown: Supervision/ set-up           Toileting  Toileting Assistance: Supervision/set up  Bladder Hygiene: Supervision/set-up     Cognitive Retraining  Safety/Judgement: Awareness of environment; Fall prevention;Home safety Pain:  Pain Scale 1: Numeric (0 - 10)  Pain Intensity 1: 4     Activity Tolerance:   Good  Please refer to the flowsheet for vital signs taken during this treatment.   After treatment:   [ ] Patient left in no apparent distress sitting up in chair  [X] Patient left in no apparent distress in bed  [X] Call bell left within reach  [X] Nursing notified  [ ] Caregiver present  [ ] Bed alarm activated      COMMUNICATION/COLLABORATION:   The patients plan of care was discussed with: Registered Nurse     PINKY Platt  Time Calculation: 39 mins

## 2017-07-20 NOTE — PROGRESS NOTES
Bedside and Verbal shift change report given to Angela Villegas (oncoming nurse) by Matilda Brown (offgoing nurse). Report included the following information SBAR, Kardex, Intake/Output, MAR and Recent Results.

## 2017-07-21 VITALS
BODY MASS INDEX: 20.33 KG/M2 | OXYGEN SATURATION: 100 % | HEIGHT: 62 IN | DIASTOLIC BLOOD PRESSURE: 69 MMHG | SYSTOLIC BLOOD PRESSURE: 102 MMHG | WEIGHT: 110.5 LBS | HEART RATE: 81 BPM | RESPIRATION RATE: 16 BRPM | TEMPERATURE: 98.6 F

## 2017-07-21 LAB
ALBUMIN SERPL BCP-MCNC: 2 G/DL (ref 3.5–5)
ALBUMIN/GLOB SERPL: 0.5 {RATIO} (ref 1.1–2.2)
ALP SERPL-CCNC: 179 U/L (ref 45–117)
ALT SERPL-CCNC: 17 U/L (ref 12–78)
ANION GAP BLD CALC-SCNC: 8 MMOL/L (ref 5–15)
AST SERPL W P-5'-P-CCNC: 26 U/L (ref 15–37)
BASOPHILS # BLD AUTO: 0.1 K/UL (ref 0–0.1)
BASOPHILS # BLD: 1 % (ref 0–1)
BILIRUB SERPL-MCNC: 0.2 MG/DL (ref 0.2–1)
BUN SERPL-MCNC: 11 MG/DL (ref 6–20)
BUN/CREAT SERPL: 13 (ref 12–20)
CALCIUM SERPL-MCNC: 8.7 MG/DL (ref 8.5–10.1)
CHLORIDE SERPL-SCNC: 106 MMOL/L (ref 97–108)
CO2 SERPL-SCNC: 26 MMOL/L (ref 21–32)
CREAT SERPL-MCNC: 0.82 MG/DL (ref 0.55–1.02)
EOSINOPHIL # BLD: 0.5 K/UL (ref 0–0.4)
EOSINOPHIL NFR BLD: 6 % (ref 0–7)
ERYTHROCYTE [DISTWIDTH] IN BLOOD BY AUTOMATED COUNT: 17.3 % (ref 11.5–14.5)
ERYTHROCYTE [DISTWIDTH] IN BLOOD BY AUTOMATED COUNT: 17.4 % (ref 11.5–14.5)
GLOBULIN SER CALC-MCNC: 3.9 G/DL (ref 2–4)
GLUCOSE SERPL-MCNC: 81 MG/DL (ref 65–100)
HCT VFR BLD AUTO: 23.3 % (ref 35–47)
HCT VFR BLD AUTO: 25.5 % (ref 35–47)
HGB BLD-MCNC: 7.5 G/DL (ref 11.5–16)
HGB BLD-MCNC: 8 G/DL (ref 11.5–16)
LYMPHOCYTES # BLD AUTO: 36 % (ref 12–49)
LYMPHOCYTES # BLD: 3.2 K/UL (ref 0.8–3.5)
MAGNESIUM SERPL-MCNC: 1.5 MG/DL (ref 1.6–2.4)
MCH RBC QN AUTO: 26.1 PG (ref 26–34)
MCH RBC QN AUTO: 26.5 PG (ref 26–34)
MCHC RBC AUTO-ENTMCNC: 31.4 G/DL (ref 30–36.5)
MCHC RBC AUTO-ENTMCNC: 32.2 G/DL (ref 30–36.5)
MCV RBC AUTO: 82.3 FL (ref 80–99)
MCV RBC AUTO: 83.1 FL (ref 80–99)
MONOCYTES # BLD: 0.9 K/UL (ref 0–1)
MONOCYTES NFR BLD AUTO: 10 % (ref 5–13)
NEUTS SEG # BLD: 4.1 K/UL (ref 1.8–8)
NEUTS SEG NFR BLD AUTO: 47 % (ref 32–75)
PHOSPHATE SERPL-MCNC: 4.3 MG/DL (ref 2.6–4.7)
PLATELET # BLD AUTO: 599 K/UL (ref 150–400)
PLATELET # BLD AUTO: 679 K/UL (ref 150–400)
POTASSIUM SERPL-SCNC: 3.6 MMOL/L (ref 3.5–5.1)
PROT SERPL-MCNC: 5.9 G/DL (ref 6.4–8.2)
RBC # BLD AUTO: 2.83 M/UL (ref 3.8–5.2)
RBC # BLD AUTO: 3.07 M/UL (ref 3.8–5.2)
SODIUM SERPL-SCNC: 140 MMOL/L (ref 136–145)
WBC # BLD AUTO: 7.2 K/UL (ref 3.6–11)
WBC # BLD AUTO: 8.7 K/UL (ref 3.6–11)

## 2017-07-21 PROCEDURE — 97110 THERAPEUTIC EXERCISES: CPT

## 2017-07-21 PROCEDURE — 74011000250 HC RX REV CODE- 250: Performed by: FAMILY MEDICINE

## 2017-07-21 PROCEDURE — 74011250636 HC RX REV CODE- 250/636: Performed by: FAMILY MEDICINE

## 2017-07-21 PROCEDURE — 74011000258 HC RX REV CODE- 258: Performed by: FAMILY MEDICINE

## 2017-07-21 PROCEDURE — 74011250637 HC RX REV CODE- 250/637: Performed by: STUDENT IN AN ORGANIZED HEALTH CARE EDUCATION/TRAINING PROGRAM

## 2017-07-21 PROCEDURE — 74011250637 HC RX REV CODE- 250/637: Performed by: PHYSICIAN ASSISTANT

## 2017-07-21 PROCEDURE — 74011250637 HC RX REV CODE- 250/637: Performed by: FAMILY MEDICINE

## 2017-07-21 PROCEDURE — 85025 COMPLETE CBC W/AUTO DIFF WBC: CPT | Performed by: SURGERY

## 2017-07-21 PROCEDURE — 36415 COLL VENOUS BLD VENIPUNCTURE: CPT | Performed by: SURGERY

## 2017-07-21 PROCEDURE — 97530 THERAPEUTIC ACTIVITIES: CPT

## 2017-07-21 PROCEDURE — 97535 SELF CARE MNGMENT TRAINING: CPT

## 2017-07-21 PROCEDURE — 84100 ASSAY OF PHOSPHORUS: CPT | Performed by: SURGERY

## 2017-07-21 PROCEDURE — 80053 COMPREHEN METABOLIC PANEL: CPT | Performed by: SURGERY

## 2017-07-21 PROCEDURE — 94640 AIRWAY INHALATION TREATMENT: CPT

## 2017-07-21 PROCEDURE — 85027 COMPLETE CBC AUTOMATED: CPT

## 2017-07-21 PROCEDURE — 74011000258 HC RX REV CODE- 258: Performed by: SURGERY

## 2017-07-21 PROCEDURE — 83735 ASSAY OF MAGNESIUM: CPT | Performed by: SURGERY

## 2017-07-21 PROCEDURE — 74011250636 HC RX REV CODE- 250/636: Performed by: STUDENT IN AN ORGANIZED HEALTH CARE EDUCATION/TRAINING PROGRAM

## 2017-07-21 PROCEDURE — 74011250636 HC RX REV CODE- 250/636: Performed by: SURGERY

## 2017-07-21 PROCEDURE — 97116 GAIT TRAINING THERAPY: CPT

## 2017-07-21 RX ORDER — MAGNESIUM SULFATE HEPTAHYDRATE 40 MG/ML
2 INJECTION, SOLUTION INTRAVENOUS
Status: COMPLETED | OUTPATIENT
Start: 2017-07-21 | End: 2017-07-21

## 2017-07-21 RX ORDER — PANTOPRAZOLE SODIUM 40 MG/1
40 TABLET, DELAYED RELEASE ORAL
Qty: 30 TAB | Refills: 2 | Status: SHIPPED | OUTPATIENT
Start: 2017-07-21 | End: 2017-09-06 | Stop reason: SDUPTHER

## 2017-07-21 RX ORDER — LORAZEPAM 1 MG/1
1 TABLET ORAL
Qty: 12 TAB | Refills: 0 | Status: SHIPPED | OUTPATIENT
Start: 2017-07-21 | End: 2020-09-05

## 2017-07-21 RX ORDER — OXYCODONE HYDROCHLORIDE 10 MG/1
10 TABLET ORAL
Qty: 12 TAB | Refills: 0 | Status: SHIPPED | OUTPATIENT
Start: 2017-07-21 | End: 2017-07-24 | Stop reason: SDUPTHER

## 2017-07-21 RX ADMIN — Medication 10 ML: at 14:20

## 2017-07-21 RX ADMIN — MEROPENEM 500 MG: 500 INJECTION, POWDER, FOR SOLUTION INTRAVENOUS at 08:13

## 2017-07-21 RX ADMIN — MEROPENEM 500 MG: 500 INJECTION, POWDER, FOR SOLUTION INTRAVENOUS at 02:18

## 2017-07-21 RX ADMIN — ESCITALOPRAM 20 MG: 10 TABLET, FILM COATED ORAL at 08:13

## 2017-07-21 RX ADMIN — PANTOPRAZOLE SODIUM 40 MG: 40 TABLET, DELAYED RELEASE ORAL at 15:45

## 2017-07-21 RX ADMIN — LORAZEPAM 1 MG: 1 TABLET ORAL at 08:13

## 2017-07-21 RX ADMIN — MAGNESIUM SULFATE HEPTAHYDRATE 2 G: 40 INJECTION, SOLUTION INTRAVENOUS at 06:35

## 2017-07-21 RX ADMIN — PANTOPRAZOLE SODIUM 40 MG: 40 TABLET, DELAYED RELEASE ORAL at 06:35

## 2017-07-21 RX ADMIN — SODIUM CHLORIDE 100 MG: 900 INJECTION, SOLUTION INTRAVENOUS at 15:45

## 2017-07-21 RX ADMIN — LEVALBUTEROL 1.25 MG: 1.25 SOLUTION RESPIRATORY (INHALATION) at 07:24

## 2017-07-21 RX ADMIN — Medication 10 ML: at 06:35

## 2017-07-21 RX ADMIN — OXYCODONE HYDROCHLORIDE 10 MG: 5 TABLET ORAL at 06:40

## 2017-07-21 RX ADMIN — Medication 20 ML: at 09:35

## 2017-07-21 RX ADMIN — MEROPENEM 500 MG: 500 INJECTION, POWDER, FOR SOLUTION INTRAVENOUS at 14:20

## 2017-07-21 RX ADMIN — OXYCODONE HYDROCHLORIDE 10 MG: 5 TABLET ORAL at 14:18

## 2017-07-21 NOTE — PROGRESS NOTES
General Surgery Daily Progress Note    Patient: Emil Costa MRN: [id number]  SSN: [id number]    YOB: 1970  Age: 56 y.o. Sex: female      Admit Date: 7/4/2017    Subjective:   Pain controlled, tolerating diet, bowel function continues.      Current Facility-Administered Medications   Medication Dose Route Frequency    traZODone (DESYREL) tablet 200 mg  200 mg Oral QHS    HYDROmorphone (PF) (DILAUDID) injection 0.5 mg  0.5 mg IntraVENous Q12H PRN    pantoprazole (PROTONIX) tablet 40 mg  40 mg Oral ACB&D    alteplase (CATHFLO) 1 mg in sterile water (preservative free) 1 mL injection  1 mg InterCATHeter PRN    sodium chloride (NS) flush 10-30 mL  10-30 mL InterCATHeter PRN    sodium chloride (NS) flush 10 mL  10 mL InterCATHeter Q24H    sodium chloride (NS) flush 10 mL  10 mL InterCATHeter PRN    sodium chloride (NS) flush 10-40 mL  10-40 mL InterCATHeter Q8H    sodium chloride (NS) flush 20 mL  20 mL InterCATHeter Q24H    oxyCODONE IR (ROXICODONE) tablet 10 mg  10 mg Oral Q6H PRN    oxyCODONE IR (ROXICODONE) tablet 5 mg  5 mg Oral Q6H PRN    acetaminophen (TYLENOL) tablet 650 mg  650 mg Oral Q6H PRN    LORazepam (ATIVAN) tablet 1 mg  1 mg Oral TID PRN    meropenem (MERREM) 500 mg in 0.9% sodium chloride (MBP/ADV) 50 mL  500 mg IntraVENous Q6H    escitalopram oxalate (LEXAPRO) tablet 20 mg  20 mg Oral DAILY    levalbuterol (XOPENEX) nebulizer soln 1.25 mg/3 mL  1.25 mg Nebulization BID RT    fentaNYL citrate (PF) injection 100 mcg  100 mcg IntraVENous RAD PRN    lidocaine (LIDODERM) 5 % patch 2 Patch  2 Patch TransDERmal Q24H    heparin (porcine) injection 5,000 Units  5,000 Units SubCUTAneous Q8H    nicotine (NICODERM CQ) 21 mg/24 hr patch 1 Patch  1 Patch TransDERmal DAILY    glucose chewable tablet 16 g  4 Tab Oral PRN    dextrose (D50W) injection syrg 12.5-25 g  12.5-25 g IntraVENous PRN    glucagon (GLUCAGEN) injection 1 mg  1 mg IntraMUSCular PRN    prochlorperazine (COMPAZINE) injection 10 mg  10 mg IntraVENous Q6H PRN    sodium chloride (NS) flush 5-10 mL  5-10 mL IntraVENous PRN    0.9% sodium chloride infusion 250 mL  250 mL IntraVENous PRN    sodium chloride (NS) flush 5-10 mL  5-10 mL IntraVENous Q8H    sodium chloride (NS) flush 5-10 mL  5-10 mL IntraVENous PRN    naloxone (NARCAN) injection 0.4 mg  0.4 mg IntraVENous PRN    albuterol (PROVENTIL VENTOLIN) nebulizer solution 2.5 mg  2.5 mg Nebulization Q4H PRN    sodium chloride (NS) flush 5-10 mL  5-10 mL IntraVENous PRN    anidulafungin (ERAXIS) 100 mg in 0.9% sodium chloride 130 mL IVPB  100 mg IntraVENous Q24H        Objective:      07/19 1901 - 07/21 0700  In: 760 [I.V.:760]  Out: -   Patient Vitals for the past 8 hrs:   BP Temp Pulse Resp SpO2   07/21/17 0735 101/62 98.7 °F (37.1 °C) (!) 104 18 96 %   07/21/17 0725 - - - - 97 %   07/21/17 0417 92/53 99 °F (37.2 °C) 88 15 97 %       Physical Exam:  General: Awake, cooperative, speech slurred  Lungs: Clear to auscultation bilaterally, unlabored  Heart:  RRR  Abdomen: Soft, non-tender, non-distended, incisions c/d/i. Extremities: Warm, moves all, no edema  Skin:  Warm and dry, no rash    Labs:   Recent Labs      07/21/17   0213   WBC  8.7   HGB  7.5*   HCT  23.3*   PLT  599*     Recent Labs      07/21/17   0213   NA  140   K  3.6   CL  106   CO2  26   GLU  81   BUN  11   CREA  0.82   CA  8.7   MG  1.5*   PHOS  4.3   ALB  2.0*   TBILI  0.2   SGOT  26   ALT  17       Assessment / Plan:   · POD#17 ex lap, primary repair perforated gastric ulcer x 2, omental intra-abdominal flap  · Overall improvement on CT chest/abd/pelvis. No drainable collections. · Continue full liquids. · Leukocytosis- normalized, continues to have intermittent fever. In absence of other infection source, this is likely from intra-abdominal fluid collections. Continue ABX per ID recommendations.    · Dopplers and echo unremarkable  · Pain controlled with PO meds  · Heparin for DVT prophylaxis  · H pylori serologies negative. Continue BID PPI, change to PO  · Pathology benign  · Staples removed  · PT/OT, encourage IS  · OK for discharge from our perspective. Continue full liquids until 7/25, at which point she can move to soft diet. F/u with Dr. Hobbs Comes in 2 weeks.

## 2017-07-21 NOTE — PROGRESS NOTES
NUTRITION         Pt with expected discharge remains on Full Liquid diet until 7/25 at home and then instructed to gently reintroduce soft solids. Provided pt with coupons for Ensure ONS at home. Pt understands diet instructions from MARIANNE.      Kalpesh Galarza RD, MS, CDE

## 2017-07-21 NOTE — PROGRESS NOTES
I have reviewed discharge instructions with the patient. The patient verbalized understanding. All questions and concerns addressed, pt given 4 scripts. Dressing on shoulder changed, PICC flushed and net cover applied.

## 2017-07-21 NOTE — PROGRESS NOTES
57 - Spoke to Dr. Chelle Aponte in regards to patient hgb 7.5. Ok to hold morning dose of Heparin. Bedside and Verbal shift change report given to Willian Fletcher (oncoming nurse) by Medhat Velasco RN (offgoing nurse). Report included the following information SBAR, Kardex, Procedure Summary, Intake/Output, Recent Results and Med Rec Status.

## 2017-07-21 NOTE — PROGRESS NOTES
Problem: Patient Education: Go to Patient Education Activity  Goal: Patient/Family Education  PHYSICAL THERAPY TREATMENT  Patient: Theresa Duke (13 y.o. female)  Date: 7/21/2017  Diagnosis: Perforation bowel ([facility] [address]  Pneumonia  Wounds, multiple open, lower extremity  Anemia  Perforated Bowel Perforation bowel ([address]  Procedure(s) (LRB):  LAPAROTOMY EXPLORATORY, REPAIR OF GASTRIC PERFORATION (N/A) 17 Days Post-Op  Precautions:    Chart, physical therapy assessment, plan of care and goals were reviewed. ASSESSMENT:  Pt independent supine to sit to stand. Pt ambulated 200ft with no device supervision. Pt left sitting. Pt is progressing well with mobility. Continue goals. Progression toward goals:  [X]      Improving appropriately and progressing toward goals  [ ]      Improving slowly and progressing toward goals  [ ]      Not making progress toward goals and plan of care will be adjusted       PLAN:  Patient continues to benefit from skilled intervention to address the above impairments. Continue treatment per established plan of care. Discharge Recommendations:  Home Health and To Be Determined  Further Equipment Recommendations for Discharge:         SUBJECTIVE:          OBJECTIVE DATA SUMMARY:   Critical Behavior:  Neurologic State: Alert  Orientation Level: Oriented X4  Cognition: Appropriate decision making, Appropriate for age attention/concentration  Safety/Judgement: Fall prevention, Home safety, Awareness of environment  Functional Mobility Training:  Bed Mobility:     Supine to Sit:  independent  Sit to Supine:  independent                       Transfers:  Sit to Stand: independent           Bed to Chair:  independent                    Balance:  Sitting: Intact; Without support  Sitting - Static: Good (unsupported)  Sitting - Dynamic: Good (unsupported)  Standing: Intact; Without support  Standing - Static: Good  Standing - Dynamic : Good  Ambulation/Gait Training:      Pt ambulated 200ft with supervision. Pain:  Pain Scale 1: Numeric (0 - 10)  Pain Intensity 1: 4  Pain Location 1: Abdomen  Pain Orientation 1: Anterior  Pain Description 1: Aching; Sore  Pain Intervention(s) 1: Medication (see MAR)  Activity Tolerance:   Pt tolerated treatment well. Please refer to the flowsheet for vital signs taken during this treatment.   After treatment:   [X] Patient left in no apparent distress sitting up in chair  [ ] Patient left in no apparent distress in bed  [ ] Call bell left within reach  [ ] Nursing notified  [ ] Caregiver present  [ ] Bed alarm activated      COMMUNICATION/COLLABORATION:   The patients plan of care was discussed with: Physical Therapist     Warden Lita PTA   Time Calculation: 23 mins

## 2017-07-21 NOTE — PROGRESS NOTES
Problem: Self Care Deficits Care Plan (Adult)  Goal: *Acute Goals and Plan of Care (Insert Text)  Occupational Therapy Goals  Initiated 7/6/2017 reviewed and upgraded 7-13; Reviewed and upgraded 7/20  1. Patient will perform light grooming in unsupported sitting x5 mins with minimal assistance within 7 day(s). Met 7-13; upgrade to grooming in standing with S in 7 days; Met 7/20- upgrade to grooming in standing with independence within 7 days  2. Patient will perform upper body dressing in unsupported sitting with min A within 7 day(s). Met; upgrade to mod I in 7 days- Continue  3. Patient will perform toilet transfers with minimal assistance with appropriate AD within 7 day(s). Met; upgrade to S in 7 days- Met 7/20- upgrade to with independence within 7 days  4. Patient will participate in upper extremity therapeutic exercise/activities through comfortable ROM in supported sitting to prepare for ADL tasks with supervision/set-up for 6 minutes within 7 day(s). Cont 7 days- Met 7/20  5. Patient will utilize energy conservation techniques during functional activities with verbal cues within 7 day(s). Cont 7 days- Met 7/20; upgrade to with independence within 7 days  6. Patient will complete LE dressing with S in 7 days- 7/20 Continue   OCCUPATIONAL THERAPY TREATMENT  Patient: Paul Niño (9 y.o. female)  Date: 7/21/2017  Diagnosis: Perforation bowel ([address]  Pneumonia  Wounds, multiple open, lower extremity  Anemia  Perforated Bowel Perforation bowel ([address]  Procedure(s) (LRB):  LAPAROTOMY EXPLORATORY, REPAIR OF GASTRIC PERFORATION (N/A) 17 Days Post-Op  Precautions:        ASSESSMENT:   Pt is progressing well towards her goals. Performed functional transfers with mod I, some increased time and use of DME (toilet grabbar, bedrail) but no unsteadiness noted. Pt performed toilet hygiene and grooming mod I, no unsteadiness with standing at sink.  Pt performed UE exercises for approximately 10 minutes independently to increase strength with functional transfers/ADLs. She recalled theraband exercises taught in previous session and incorporated energy conservation techniques. Educated pt on home safety techniques such as using shower chair and ensuring walkways are cleared of tripping hazards. Pt will benefit from continued OT to further increase functional performance to goals of independence, but is safe to return home with family support. Recommend HHOT upon discharge. Progression toward goals:  [X]       Improving appropriately and progressing toward goals  [ ]       Improving slowly and progressing toward goals  [ ]       Not making progress toward goals and plan of care will be adjusted       PLAN:  Patient continues to benefit from skilled intervention to address the above impairments. Continue treatment per established plan of care. Discharge Recommendations:  Home Health  Further Equipment Recommendations for Discharge:  Shower chair       SUBJECTIVE:   Patient stated Keyona Lade we go for a walk? Moses Suggs      OBJECTIVE DATA SUMMARY:   Cognitive/Behavioral Status:  Neurologic State: Alert  Orientation Level: Oriented X4  Cognition: Appropriate decision making; Appropriate for age attention/concentration  Perception: Appears intact  Perseveration: No perseveration noted  Safety/Judgement: Fall prevention;Home safety; Awareness of environment     Functional Mobility and Transfers for ADLs:  Bed Mobility:  Supine to Sit: Modified independent  Sit to Supine: Modified independent     Transfers:  Sit to Stand: Modified independent  Functional Transfers  Toilet Transfer : Modified independent (with grabbar)     Balance:  Sitting: Intact; Without support  Sitting - Static: Good (unsupported)  Sitting - Dynamic: Good (unsupported)  Standing: Intact; Without support  Standing - Static: Good  Standing - Dynamic : Good     ADL Intervention:   Pt donned slip on shoes with supervision.  Performed toilet hygiene in standing holding grabbar mod I, and stood at sink without device to perform grooming. Reviewed energy conservation techniques and home safety techniques. Grooming  Grooming Assistance: Modified independent (standing at sink)  Washing Face: Modified independent  Washing Hands: Modified independent        Lower Body Dressing Assistance  Slip on Shoes Without Back: Supervision/set-up     Toileting  Toileting Assistance: Modified independent  Bladder Hygiene: Modified independent     Cognitive Retraining  Safety/Judgement: Fall prevention;Home safety; Awareness of environment     Therapeutic Exercises:   Pt performed 1 set of 10 bilateral bicep curls, bilateral tricep extension, and chest extension exercises with red theraband. Also performed 1 set of 10 chair pushups. Pt recalled exercises independently and incorporated rest breaks into exercises. Pain:  Pain Scale 1: Numeric (0 - 10)  Pain Intensity 1: 4  Pain Location 1: Abdomen  Pain Orientation 1: Anterior  Pain Description 1: Aching; Sore  Pain Intervention(s) 1: Medication (see MAR)  Activity Tolerance:   Excellent  Please refer to the flowsheet for vital signs taken during this treatment.   After treatment:   [ ] Patient left in no apparent distress sitting up in chair  [X] Patient left in no apparent distress in bed  [X] Call bell left within reach  [X] Nursing notified  [ ] Caregiver present  [ ] Bed alarm activated      COMMUNICATION/COLLABORATION:   The patients plan of care was discussed with: Registered Nurse     PINKY Jensen  Time Calculation: 28 mins

## 2017-07-21 NOTE — DISCHARGE SUMMARY
[facility]   Discharge/Transfer/Off-Service Note    Date: July 21, 2017    Lorena Bay  MRN: [id number]  YOB: 1970  Age: 56 y.o. Date of admission: 7/4/2017     Date of discharge/transfer: 7/21/2017    Primary Care Physician: Kathy Wilson MD     Attending physician at admission: Dr. Raciel Ruiz     Attending physician at discharge/transfer: Dr. Emelia Lance     Resident physician at discharge/transfer: Jermaine Balbuena MD     Consultants during hospitalization:  General Surgery (Dr. Sunny Morrissey)  Hematology/Oncology (Dr. Greyson Canseco)  ID (Dr. Cait Barriga)  Pulmonary (Dr. Clay Hua)    Admission diagnoses:   Perforation bowel [facility])  Pneumonia  Wounds, multiple open, lower extremity  Anemia  Perforated Bowel    Discharge diagnoses:  Hospital Problems  Date Reviewed: 1/5/2017          Codes Class Noted POA    Thrombocytosis ([facility] [address] ICD-10-CM: D47.3  ICD-9-CM: 238.71  7/5/2017 Yes        Neutrophilia ICD-10-CM: D72.9  ICD-9-CM: 288.8  7/5/2017 Yes        * (Principal)Perforation bowel ([facility] [address] ICD-10-CM: K63.1  ICD-9-CM: 569.83  7/4/2017 Yes        Pneumonia ICD-10-CM: J18.9  ICD-9-CM: 964  7/4/2017 Yes        Anemia ICD-10-CM: D64.9  ICD-9-CM: 285.9  7/4/2017 Yes        Wounds, multiple open, lower extremity ICD-10-CM: B32.911X  ICD-9-CM: 894.0  7/4/2017 Yes        Crohn disease ([facility] [address] [facility] ICD-10-CM: K50.90  ICD-9-CM: 555.9  4/19/2010 Yes    Overview Addendum 2/2/2012  4:33 PM by Kathy Wilson MD     She had 4 colon resections in the past.  Dr. Lori Payne, Dr. Henok Johnson abd/pevis 2009: Thickened segment of neoileum proximal and adjacent to   anastomotic chain sutures and likely representing inflammatory bowel disease   recurrence. Partial small bowel obstruction at this level. No evidence of   perforation. No evidence of fistula or intra-abdominal abscess.                        History of Present Illness per admitting resident Dr. Serenity Curtis Reddy: \"Onel Orellana is a 56 y.o. female with known Chron's disease (s/p bowel resection, not currently on immunotherapy) vertigo and depression who presents to the ER complaining of foot wounds and abdominal pain. The patient very somnolent and cannot provide much history--history is mainly provided by stepmother and father in the room. She was diagnosed with lyme disease approximately 1 month which was treated with Doxycycline. Father says after the initiation of treatment pt had leg swelling along with rash and blistering of the feet and that she was told it was a reaction of the medication. As per father, pt fell on Friday night and as a result pt has a left upper back laceration of about 3 cm and after the fall her abdominal pain started. Pt says is 10/10 in intensity, localized to the left, no radiation and is better while she's holding her breath. Pt also complains about chills and a headache of 10/10 in intensity. Pt also says that her feet are swollen and sore. Father doesn't know what other symptoms pt is having at the moment. [facility] course with associated diagnosis:  Sepsis 2/2 Perforated Gastric Ulcers s/p Operative Repair on 7/4/17 -  Gastric ulcers thought to be related to chronic steroid use and Crohn's disease. POD 17. Clinical recovered well from surgery. Pain was appropriately managed as opioids were weaned. All cultures negative to date. CT showed overall improvement in inflammation. She continued to have recurrent fevers despite antibiotic coverage and clinical improvement. Repeat blood and urine cultures were drawn and CXR was performed. Workup was negative. Per, ID likely 2/2 to multiple small abdominal abscesses. Per their rec, she was discharged after being afebrile for 24hours. She was sent home on 28 day course of meropenem and Eraxis.    - Continue antibiotics with weekly CBCs and CMPs  - Continue full liquid diet for 5 more days then switch to soft solid diet  - PO Oxy 10mg for pain TID PRN, reassessment at next PCP visit     Left pleural effusion: Acute. Seen on admission with Chest CT. Effusion drained via IR drainage on 7/10. Resulted in a Left ptx. Pt was placed on supplemental oxygen. Ptx appeared to improve on serial CXRs. O2 sats stable on RA at time of discharge.     Elevated Leukocytosis - Down-trending. Per heme, likely reactive. At time there was concern for another source of infection. Work-up was negative. WBC wnl at time of discharge.     Thrombophilia - Per heme, likely reactive. Platelets now downtrending. Platelets peaked at 702 and 599 at time of discharge.      Community Acquired Pneumonia - Improvement of left basilar airspace disease noted on initial CXR. Could have been playing a role in SIRS/sepsis picture. Improved with antibiotics. Stable at time of discharged-evidence by negative CXR.     Severe Acute Malnutrition - criteria evidenced by nutritional intake <50% of recommended intake for >5days, weight loss of 4.7% in 2 weeks, and moderate-severe edema. Improvement during hospitalization. Weight today 136lb (up from from 121 on 7/4). - Continue full liquid diet with Ensure Enlive supplementation      Anemia - Per hematology, most likely 2/2 acute blood loss from gastric ulcer. Lab work up done on admission. Received two units of blood, hgb response appropriate. Remained stable since.  Hgb 8.0 at time of discharge.  - Repeat CBC as outpatient      Chron's Disease - no pharmacotherapy PTA.  Poor follow-up history with GI.  Likely playing a role in patient's current clinical course. - Avoid NSAIDs  - F/u scheduled with GI outpatient     Lyme Disease - diagnosed ~1 month ago at [facility].  Was treated with a 21 day course of doxycycline. Stable during this admission    Tobacco Abuse - reports to smoke 1.5 PPD. -Encouraging cessation.   -f/u with PCP outpatient      Healing Wounds on Bilateral Feet - most likely acute reaction to doxycycline treatment that patient underwent prior to admission. Improved at time of discharge.      3cm Laceration of Left Upper Back - Improved. Only minor drainage at time of discharge.      Bilateral Lower Extremity Swelling/Pain - noted on admisison.  Thought to be 2/2 hypoalbuminemia.  Duplex studies of lower extremities negative for DVT. Improved.      Electrolyte deficiencies: Resolved s/p repletion and resuming diet. Continued repletion during hospitalization. Suspect will improve with return of normal diet.      Depression/Panic Attacks - Stable on hospital regimen. - Discharged home on Ativan TID with continued use of Lexapro 20mg daily       History of Falls - fall precautions while in the hospital.  -PT/OT consulted for home health. Physical exam at discharge:   Visit Vitals    /69 (BP 1 Location: Left arm, BP Patient Position: At rest)    Pulse 81    Temp 98.6 °F (37 °C)    Resp 16    Ht 5' 2\" (1.575 m)    Wt 110 lb 8 oz (50.1 kg)    SpO2 100%    BMI 20.21 kg/m2       General: No acute distress. Alert. Cooperative. HEENT: Normocephalic. Atraumatic.     Conjunctiva pink. Sclera white. PERRL. MMM   Respiratory: CTAB. No w/r/c. Expiratory wheezing on ULL   Cardiovascular: RRR. Normal S1,S2. No m/r/g. 2+ pulses in DP bilaterally   GI: + bowel sounds. Non-distended. Soft abdomen. Incision c/d/i. Drains out. Stables removed. Extremities:  Skin: No edema. No tenderness. Healing lesions on toes. Surrounding erythema resolved. FOLLOW-UP CARE RECOMMENDATIONS:  You will be going home with a six week course of Eraxis and Merrem for a total of six weeks. A home health nurse will be coming by daily to help with administration of your antibiotic and to check your PICC site for signs of infection. Keep PICC site clean and dry. You will also have weekly labs to assess how your body is handling these long-term antibiotics.  Never miss a dose of your antibiotic as this can cause bacteria to become resistant to the antibiotics--making the antibiotic ineffective in treatment. It is very important that you AVOID NSAIDs (aspirin, ibuprofen, naprosyn, BC or goody powders) as they can cause gastric ulcers which can lead to gastric perforations. Also, you will START taking Protonix 40mg daily. This will help protect the lining of your stomach and hopefully prevent ulcers from stomach acid. What to eat/Diet: Full liquid diet until 7/27/17 and then advance your diet to soft solid diet (mashed potatoes etc). Eat slowly, chew food extensively, and stop eating as soon as you reach satiety. Avoid taking in food and beverages at the same time. For pain control you are being given 10mg of Oxycodone. This can be used as needed up to three times a day. This prescription should last you until your appointment with your primary care provider on 8/24/17. You will also be given a prescription for Naloxone. This is given to all patients being sent home on narcotics as a reversal agent in case of an accidental overdose. Signs of opioid overdose include decreased breathing or respiratory rate, decreased bowel function (constipation), shallow breathing or pin point pupils. If you have decreased breaths or changes in mental status use the Naloxone as indicated. It can be physically and mentally exhausting when a person has a prolonged hospital stay such as you have. The body can become deconditioned. Due to this you will be receiving visits from physical therapy and occupational therapy. They are a part of your wellness team! They will work with you to improve your strength and functionality to do every day tasks that you were once able to do. You have gone more than 2 weeks without a cigarette! That is really awesome! Talk with your primary care physician about ways to continue to be smoke-free. Not only is being smoke free good for you lungs, it is also good for your gut.  People who smoke have an increased chance of forming a stomach ulcer. You were on the nicotine patch while in the hospital. If you like this method or want to explore other options (gum, lozenges) ask your primary care physician for all your options! Follow-up with your primary care physician about the listed medicines below: Adderall - you were not given this while inpatient. Have you doctor reassess whether or not you need to continue its use. Klonopin - you did not require this medication while inpatient to help with your anxiety. Discuss with your doctor whether or not you need to continue its use. Ativan- You were taking this medication four times daily but only required it three times daily while inpatient. Discuss with your doctor the frequency you need to take this medication. It was a pleasure taking care of you! Appointments  Follow-up Information     Follow up With Details Comments [address] [place] [address].  [facility] [id number] [place] [address] On 7/24/2017 9:55AM on Monday 7/24/17 with Dr. Ziggy Carroll [address] [place] [address], [address] [place] [address]  [phone]    Tony Guevara MD Schedule an appointment as soon as possible for a visit in 2 weeks For General Surgery follow up and wound re-check [facility] [facility]  [phone]             FOLLOW-UP TESTS RECOMMENDED:   · Weekly CBC and CMP while taking IV antibiotics. ONGOING TREATMENT PLAN: As per notes above    PENDING TEST RESULTS:  At the time of discharge the following test results are still pending: None. Please review these results as they become available. Specific symptoms to watch for: chest pain, shortness of breath, fever, chills, nausea, vomiting, diarrhea, change in mentation, falling, weakness, bleeding. DIET:  Full liquid diet until 7/27/17 and then advance your diet to soft solid diet (mashed potatoes etc).  Eat slowly, chew food extensively, and stop eating as soon as you reach satiety. Avoid taking in food and beverages at the same time. ACTIVITY:  Activity as tolerated; home PT/OT    WOUND CARE: Per home health wound care nurse    EQUIPMENT needed:  Shower chair per OT    INCIDENTAL FINDINGS:  None    Labs:  Recent Labs      07/21/17   1417  07/21/17   0213  07/20/17   0610   WBC  7.2  8.7  6.7   HGB  8.0*  7.5*  8.0*   HCT  25.5*  23.3*  24.9*   PLT  679*  599*  727*     Recent Labs      07/21/17   0213  07/20/17   0610  07/19/17   0400   NA  140  140  137   K  3.6  3.6  4.1   CL  106  106  102   CO2  26  28  30   BUN  11  9  9   CREA  0.82  0.86  0.78   GLU  81  82  76   CA  8.7  8.7  9.1   MG  1.5*  2.0  1.5*   PHOS  4.3  4.9*  4.4     Recent Labs      07/21/17   0213  07/20/17   0610  07/19/17   0400   SGOT  26  22  19   ALT  17  13  11*   AP  179*  204*  191*   TBILI  0.2  0.2  0.2   TP  5.9*  6.2*  5.5*   ALB  2.0*  2.1*  2.0*   GLOB  3.9  4.1*  3.5     No results for input(s): INR, PTP, APTT in the last 72 hours. No lab exists for component: INREXT, INREXT   No results for input(s): FE, TIBC, PSAT, FERR in the last 72 hours. No results for input(s): PH, PCO2, PO2 in the last 72 hours. No results for input(s): CPK, CKMB in the last 72 hours.     No lab exists for component: TROPONINI  Lab Results   Component Value Date/Time    Glucose (POC) 88 07/15/2017 07:48 AM    Glucose (POC) 114 07/14/2017 11:01 PM    Glucose (POC) 85 07/14/2017 04:41 PM    Glucose (POC) 100 07/14/2017 12:30 PM    Glucose (POC) 103 07/14/2017 07:37 AM        All Micro Results     Procedure Component Value Units Date/Time    CULTURE, BLOOD [[id number]] Collected:  07/20/17 0650    Order Status:  Completed Specimen:  Blood Updated:  07/21/17 0821     Special Requests: NO SPECIAL REQUESTS        Culture result: NO GROWTH 1 DAY       CULTURE, BLOOD, LINE [[id number]] Collected:  07/20/17 0610    Order Status:  Completed Specimen:  Blood Updated:  07/21/17 3178     Special Requests: NO SPECIAL REQUESTS        Culture result: NO GROWTH 1 DAY       CULTURE, BLOOD [[id number]] Collected:  07/16/17 2344    Order Status:  Completed Specimen:  Blood from Blood Updated:  07/21/17 0820     Special Requests: NO SPECIAL REQUESTS        Culture result: NO GROWTH 4 DAYS       CULTURE, BLOOD, PAIRED [[id number]]     Order Status:  Sent Specimen:  Blood     CULTURE, BLOOD [[id number]] Collected:  07/16/17 2344    Order Status:  Completed Specimen:  Blood from Blood Updated:  07/17/17 0930     Special Requests: NO SPECIAL REQUESTS        Culture result: ONE OF TWO BOTTLE HAVE BEEN FLAGGED POSITIVE BY INSTRUMENTATION No organisms seen on gram stain. Multiple subcultures are in progress.  NO GROWTH THUS FAR              REMAINING BOTTLE(S) HAS/HAVE NO GROWTH SO FAR    CULTURE, BODY FLUID Kenith Hoots STAIN [[id number]] Collected:  07/10/17 1519    Order Status:  Completed Specimen:  Pleural Fluid Updated:  07/14/17 0937     Special Requests: NO SPECIAL REQUESTS        GRAM STAIN 2+ WBCS SEEN         NO ORGANISMS SEEN        Culture result: NO GROWTH 4 DAYS       CULTURE, BODY FLUID Kenith Hoots STAIN [[id number]] Collected:  07/11/17 0356    Order Status:  Canceled Specimen:  Pleural Fluid     CULTURE, BLOOD [[id number]] Collected:  07/04/17 1454    Order Status:  Completed Specimen:  Blood from Blood Updated:  07/10/17 0726     Special Requests: NO SPECIAL REQUESTS        Culture result: NO GROWTH 6 DAYS       CULTURE, BLOOD [[id number]] Collected:  07/04/17 1448    Order Status:  Completed Specimen:  Blood from Blood Updated:  07/10/17 0726     Special Requests: NO SPECIAL REQUESTS        Culture result: NO GROWTH 6 DAYS       CULTURE, ANAEROBIC [[id number]] Collected:  07/04/17 1945    Order Status:  Completed Specimen:  Peritoneal Fluid Updated:  07/09/17 0929     Special Requests: NO SPECIAL REQUESTS        Culture result: NO GROWTH 4 DAYS       CULTURE, BODY FLUID [place] [[id number]] Collected:  07/04/17 1945 Order Status:  Completed Specimen:  Peritoneal Fluid Updated:  07/09/17 0929     Special Requests:         Culture performed on Fluid swab specimen     GRAM STAIN RARE  WBCS SEEN         NO ORGANISMS SEEN        Culture result: NO GROWTH 4 DAYS       CULTURE, URINE [[id number]] Collected:  07/04/17 1531    Order Status:  Completed Specimen:  Urine Updated:  07/06/17 0918     Special Requests: --        NO SPECIAL REQUESTS  Reflexed from [id number]       [place] Count --        <10,000  COLONIES/mL       Culture result: NO SIGNIFICANT GROWTH       CULTURE, MRSA [[id number]] Collected:  07/05/17 0449    Order Status:  Completed Specimen:  Nares Updated:  07/06/17 0902     Special Requests: NO SPECIAL REQUESTS        Culture result: MRSA NOT PRESENT                     Screening of patient nares for MRSA is for surveillance purposes and, if positive, to facilitate isolation considerations in high risk settings. It is not intended for automatic decolonization interventions per se as regimens are not sufficiently effective to warrant routine use. Diagnostic Studies and Procedures:  Serial CXR  CT abdomen, pelvis, and chest  Duplex dopplers  Thoracentesis     Discharge/Transfer Medications:  Current Discharge Medication List      START taking these medications    Details   pantoprazole (PROTONIX) 40 mg tablet Take 1 Tab by mouth Before breakfast and dinner. Qty: 30 Tab, Refills: 2      anidulafungin 100 mg 100 mg by IntraVENous route every twenty-four (24) hours. Qty: 1 Dose, Refills: 0      oxyCODONE IR (ROXICODONE) 10 mg tab immediate release tablet Take 1 Tab by mouth every eight (8) hours as needed. Max Daily Amount: 30 mg.  Qty: 12 Tab, Refills: 0      meropenem 500 mg, ADDaptor 1 Device IVPB 500 mg by IntraVENous route every six (6) hours. Qty: 1 Dose, Refills: 0      naloxone 2 mg/actuation spry Use 1 spray intranasally into 1 nostril.  Use a new Narcan nasal spray for subsequent doses and administer into alternating nostrils. May repeat every 2 to 3 minutes as needed. Qty: 1 Blister, Refills: 0         CONTINUE these medications which have CHANGED    Details   LORazepam (ATIVAN) 1 mg tablet Take 1 Tab by mouth every eight (8) hours as needed for Anxiety. Max Daily Amount: 3 mg. Qty: 12 Tab, Refills: 0         CONTINUE these medications which have NOT CHANGED    Details   traZODone (DESYREL) 100 mg tablet Take 200 mg by mouth nightly. escitalopram oxalate (LEXAPRO) 20 mg tablet Take 20 mg by mouth daily. fexofenadine (ALLEGRA) 180 mg tablet Take 180 mg by mouth daily. albuterol (PROVENTIL HFA, VENTOLIN HFA, PROAIR HFA) 90 mcg/actuation inhaler Take 2 Puffs by inhalation every four (4) hours as needed for Wheezing or Shortness of Breath. Qty: 1 Inhaler, Refills: 4    Associated Diagnoses: Wheezing; SOB (shortness of breath)         STOP taking these medications       dextroamphetamine-amphetamine (ADDERALL) 20 mg tablet Comments:   Reason for Stopping:         clonazePAM (KLONOPIN) 1 mg tablet Comments:   Reason for Stopping: Follow up plans/appointments:   Follow-up Information     Follow up With Details Comments [facility] [address]  1[address] Floor  [facility] [id number] [place] [address] On 7/24/2017 9:55AM on Monday 7/24/17 with Dr. Villareal [address] [place] [facility] [address]  [phone]    Chandra Purvis MD Schedule an appointment as soon as possible for a visit in 2 weeks For General Surgery follow up and wound re-check [facility]  175 Anand Noonan MD   [address] GODOY/ [address]ydia [address] [facility] [address]      [address] [place] [address]  [phone]           Discharge instructions to patient/family  Please seek medical attention for any new or worsening symptoms particularly fever, chest pain, shortness of breath, abdominal pain, nausea, vomiting      Josh Dior MD  Family Medicine Resident      Cc: Daniel Ozuna MD

## 2017-07-21 NOTE — DISCHARGE INSTRUCTIONS
HOME DISCHARGE INSTRUCTIONS    Jordon Fitch / [id number] : 1970    Admission date: 2017 Discharge date: 2017     Please bring this form with you to show your care provider at your follow-up appointment. Primary care provider:  Henry Miranda MD    Discharging provider: Chad Franco MD  - Family Medicine Resident  Adia Chen MD - Attending, Family Medicine     You have been admitted to the hospital with the following diagnoses:    ACUTE DIAGNOSES:  Perforation bowel  Pneumonia  Wounds, multiple open, lower extremity  Anemia  . . . . . . . . . . . . . . . . . . . . . . . . . . . . . . . . . . . . . . . . . . . . . . . . . . . . . . . . . . . . . . . . . . . . . . . Vijay Tinsley FOLLOW-UP CARE RECOMMENDATIONS:  You will be going home with a six week course of Eraxis and Merrem for a total of six weeks. A home health nurse will be coming by daily to help with administration of your antibiotic and to check your PICC site for signs of infection. Keep PICC site clean and dry. You will also have weekly labs to assess how your body is handling these long-term antibiotics. Never miss a dose of your antibiotic as this can cause bacteria to become resistant to the antibiotics--making the antibiotic ineffective in treatment. It is very important that you AVOID NSAIDs (aspirin, ibuprofen, naprosyn, BC or goody powders) as they can cause gastric ulcers which can lead to gastric perforations. Also, you will START taking Protonix 40mg daily. This will help protect the lining of your stomach and hopefully prevent ulcers from stomach acid. What to eat/Diet: Full liquid diet until 17 and then advance your diet to soft solid diet (mashed potatoes etc). Eat slowly, chew food extensively, and stop eating as soon as you reach satiety. Avoid taking in food and beverages at the same time. For pain control you are being given 10mg of Oxycodone. This can be used as needed up to three times a day.  This prescription should last you until your appointment with your primary care provider on 8/24/17. You will also be given a prescription for Naloxone. This is given to all patients being sent home on narcotics as a reversal agent in case of an accidental overdose. Signs of opioid overdose include decreased breathing or respiratory rate, decreased bowel function (constipation), shallow breathing or pin point pupils. If you have decreased breaths or changes in mental status use the Naloxone as indicated. It can be physically and mentally exhausting when a person has a prolonged hospital stay such as you have. The body can become deconditioned. Due to this you will be receiving visits from physical therapy and occupational therapy. They are a part of your wellness team! They will work with you to improve your strength and functionality to do every day tasks that you were once able to do. You have gone more than 2 weeks without a cigarette! That is really awesome! Talk with your primary care physician about ways to continue to be smoke-free. Not only is being smoke free good for you lungs, it is also good for your gut. People who smoke have an increased chance of forming a stomach ulcer. You were on the nicotine patch while in the hospital. If you like this method or want to explore other options (gum, lozenges) ask your primary care physician for all your options! Follow-up with your primary care physician about the listed medicines below: Adderall - you were not given this while inpatient. Have you doctor reassess whether or not you need to continue its use. Klonopin - you did not require this medication while inpatient to help with your anxiety. Discuss with your doctor whether or not you need to continue its use. Ativan- You were taking this medication four times daily but only required it three times daily while inpatient. Discuss with your doctor the frequency you need to take this medication.     It was a pleasure taking care of you! Appointments  Follow-up Information     Follow up With Details Comments [address] [facility]   [address]  1st Floor  [facility] [address]  [address] [address] On 7/24/2017 9:55AM on Monday 7/24/17 with Dr. Bárbara Diehl [address] [facility] 33  [phone]    Lilly Andre MD Schedule an appointment as soon as possible for a visit in 2 weeks For General Surgery follow up and wound re-check [address]  [address] Anand Noonan MD   [address] [facility]      Vijaya Man [id number]           FOLLOW-UP TESTS RECOMMENDED:   · Weekly CBC and CMP while taking IV antibiotics. ONGOING TREATMENT PLAN: As per notes above    PENDING TEST RESULTS:  At the time of discharge the following test results are still pending: None. Please review these results as they become available. Specific symptoms to watch for: chest pain, shortness of breath, fever, chills, nausea, vomiting, diarrhea, change in mentation, falling, weakness, bleeding. DIET:  Full liquid diet until 7/27/17 and then advance your diet to soft solid diet (mashed potatoes etc). Eat slowly, chew food extensively, and stop eating as soon as you reach satiety. Avoid taking in food and beverages at the same time. ACTIVITY:  Activity as tolerated; home PT/OT    WOUND CARE: Per home health wound care nurse    EQUIPMENT needed:  Shower chair per OT    INCIDENTAL FINDINGS:  None    What to do if new or unexpected symptoms occur? If you experience any of the above symptoms (or should other concerns or questions arise after discharge) please call your primary care physician. Return to the emergency room if you cannot get hold of your doctor.     · It is very important that you keep your follow-up appointment(s). · Please bring discharge papers, medication list (and/or medication bottles) to your follow-up appointments for review by your outpatient provider(s). · Please check the list of medications and be sure it includes every medication (even non-prescription medications) that your provider wants you to take. · It is important that you take the medication exactly as they are prescribed. · Keep your medication in the bottles provided by the pharmacist and keep a list of the medication names, dosages, and times to be taken in your wallet. · Do not take other medications without consulting your doctor. · If you have any questions about your medications or other instructions, please talk to your nurse or care provider before you leave the hospital.     Information obtained by:     I understand that if any problems occur once I am at home I am to contact my physician. These instructions were explained to me and I had the opportunity to ask questions. I understand and acknowledge receipt of the instructions indicated above. Physician's or R.N.'s Signature                                                                  Date/Time                                                                                                                                              Patient or Representative Signature                                                          Date/Time        Nutrition Recommendations for Discharge:    Continue Oral Nutrition Supplements at discharge:   Ensure Enlive or Ensure Plus twice daily between meals   for 30 days unless otherwise directed by your Primary Care Physician. This product can be purchased at your local grocery store or drug store and online.      Josh Dior MD, RD   _ General Surgery Instructions    Recommended diet: Full liquids. May start soft diet on 7/25    Recommended activity: No heavy lifting or strenuous activity for 6 weeks. You may shower. You may drive once pain has improved and you no longer require pain medication. DO NOT take any NSAID medications (ibuprofen, Motrin, Advil, Naproxen, Aleve, Mobic, Aspirin, etc)    Follow up with Dr. Sophia Dumont in 2 weeks. Call [facility] to make an appointment.

## 2017-07-21 NOTE — PROGRESS NOTES
[facility] Infectious Diseases Progress Note  Tayler Dueñas MD,             Larisa Villa MD,          Leonel Yates DO     [address] [place] [address]    [address], [address]  [phone]  Fax    2017      Assessment & Plan:   1. Multiple abdominal abscesses status post repair of perforated gastric ulcer. S/p exploratory laparotomy with repair of ulcer and omental intra-abdominal flap on 2017. Plan 28-day course of meropenem and anidulafungin at the time of discharge. IV antibiotic orders are in the chart. 2. Fever. Suspect due to above. Repeat BC's NGSF. Consider drug fever or atelectasis. LE dopplers negative. 3. HISTORY OF MULTIPLE ANTIBIOTIC ALLERGIES INCLUDING CEPHALOSPORINS, PENICILLIN, AND TETRACYCLINES. She is currently tolerating meropenem. 4. Leukocytosis due to infection as above. Resolved. 5. Recent history of Lyme disease diagnosed in the outpatient setting. This was treated appropriately with a 14-day course of doxycycline. Subjective:     No further fevers    Objective:     Vitals:   Visit Vitals    /69 (BP 1 Location: Left arm, BP Patient Position: At rest)    Pulse (!) 106    Temp 98.6 °F (37 °C)    Resp 16    Ht 5' 2\" (1.575 m)    Wt 50.1 kg (110 lb 8 oz)    SpO2 100%    BMI 20.21 kg/m2        Tmax:  Temp (24hrs), Av °F (37.2 °C), Min:98 °F (36.7 °C), Max:99.9 °F (37.7 °C)      Exam:   Patient is intubated:  no    Physical Examination:   GENERAL ASSESSMENT: NAD  HEENT:Nontraumatic   CHEST: CTA  HEART: S1S2  ABDOMEN: TTP. Hyperactive bowel sounds  :[place]: no  EXTREMITY: no edema  NEURO:Grossly non focal    Labs:        No lab exists for component: ITNL   No results for input(s): CPK, CKMB, TROIQ in the last 72 hours.   Recent Labs      17   0213  17   0610  17   0400   NA  140  140  137   K  3.6  3.6  4.1   CL  106  106  102   CO2  26  28  30   BUN  11  9  9   CREA  0.82  0.86  0.78   GLU  81  82  76 PHOS  4.3  4.9*  4.4   MG  1.5*  2.0  1.5*   ALB  2.0*  2.1*  2.0*   WBC  8.7  6.7  10.9   HGB  7.5*  8.0*  8.0*   HCT  23.3*  24.9*  25.5*   PLT  599*  727*  806*     No results for input(s): INR, PTP, APTT in the last 72 hours. No lab exists for component: INREXT, INREXT  Needs: urine analysis, urine sodium, protein and creatinine  No results found for: BECKY, CREAU      Cultures:     Lab Results   Component Value Date/Time    Specimen Description: CEREBROSPINAL FLUID 03/16/2009 04:40 PM    Specimen Description: BLOOD 03/16/2009 02:35 PM     Lab Results   Component Value Date/Time    Culture result: NO GROWTH 1 DAY 07/20/2017 06:50 AM    Culture result: NO GROWTH 1 DAY 07/20/2017 06:10 AM    Culture result:  07/16/2017 11:44 PM     ONE OF TWO BOTTLE HAVE BEEN FLAGGED POSITIVE BY INSTRUMENTATION No organisms seen on gram stain. Multiple subcultures are in progress.  NO GROWTH THUS FAR    Culture result: REMAINING BOTTLE(S) HAS/HAVE NO GROWTH SO FAR 07/16/2017 11:44 PM    Culture result: NO GROWTH 4 DAYS 07/16/2017 11:44 PM       Radiology:     Medications       Current Facility-Administered Medications   Medication Dose Route Frequency Last Dose    traZODone (DESYREL) tablet 200 mg  200 mg Oral  mg at 07/20/17 2127    HYDROmorphone (PF) (DILAUDID) injection 0.5 mg  0.5 mg IntraVENous Q12H PRN 0.5 mg at 07/20/17 1651    pantoprazole (PROTONIX) tablet 40 mg  40 mg Oral ACB&D 40 mg at 07/21/17 0635    alteplase (CATHFLO) 1 mg in sterile water (preservative free) 1 mL injection  1 mg InterCATHeter PRN      sodium chloride (NS) flush 10-30 mL  10-30 mL InterCATHeter PRN      sodium chloride (NS) flush 10 mL  10 mL InterCATHeter Q24H 10 mL at 07/20/17 1652    sodium chloride (NS) flush 10 mL  10 mL InterCATHeter PRN      sodium chloride (NS) flush 10-40 mL  10-40 mL InterCATHeter Q8H 10 mL at 07/21/17 1420    sodium chloride (NS) flush 20 mL  20 mL InterCATHeter Q24H 20 mL at 07/20/17 1652    oxyCODONE IR (ROXICODONE) tablet 10 mg  10 mg Oral Q6H PRN 10 mg at 07/21/17 1418    oxyCODONE IR (ROXICODONE) tablet 5 mg  5 mg Oral Q6H PRN      acetaminophen (TYLENOL) tablet 650 mg  650 mg Oral Q6H  mg at 07/20/17 0008    LORazepam (ATIVAN) tablet 1 mg  1 mg Oral TID PRN 1 mg at 07/21/17 0813    meropenem (MERREM) 500 mg in 0.9% sodium chloride (MBP/ADV) 50 mL  500 mg IntraVENous Q6H 500 mg at 07/21/17 1420    escitalopram oxalate (LEXAPRO) tablet 20 mg  20 mg Oral DAILY 20 mg at 07/21/17 0813    levalbuterol (XOPENEX) nebulizer soln 1.25 mg/3 mL  1.25 mg Nebulization BID RT 1.25 mg at 07/21/17 0724    fentaNYL citrate (PF) injection 100 mcg  100 mcg IntraVENous RAD PRN 25 mcg at 07/10/17 1540    lidocaine (LIDODERM) 5 % patch 2 Patch  2 Patch TransDERmal Q24H 2 Patch at 07/21/17 1138    heparin (porcine) injection 5,000 Units  5,000 Units SubCUTAneous Q8H Stopped at 07/21/17 0600    nicotine (NICODERM CQ) 21 mg/24 hr patch 1 Patch  1 Patch TransDERmal DAILY 1 Patch at 07/21/17 0815    glucose chewable tablet 16 g  4 Tab Oral PRN      dextrose (D50W) injection syrg 12.5-25 g  12.5-25 g IntraVENous PRN      glucagon (GLUCAGEN) injection 1 mg  1 mg IntraMUSCular PRN      prochlorperazine (COMPAZINE) injection 10 mg  10 mg IntraVENous Q6H PRN 10 mg at 07/18/17 1223    sodium chloride (NS) flush 5-10 mL  5-10 mL IntraVENous PRN      0.9% sodium chloride infusion 250 mL  250 mL IntraVENous PRN      sodium chloride (NS) flush 5-10 mL  5-10 mL IntraVENous Q8H 10 mL at 07/21/17 0635    sodium chloride (NS) flush 5-10 mL  5-10 mL IntraVENous PRN 10 mL at 07/17/17 1231    naloxone (NARCAN) injection 0.4 mg  0.4 mg IntraVENous PRN      albuterol (PROVENTIL VENTOLIN) nebulizer solution 2.5 mg  2.5 mg Nebulization Q4H PRN 2.5 mg at 07/11/17 1326    sodium chloride (NS) flush 5-10 mL  5-10 mL IntraVENous PRN 10 mL at 07/13/17 1845    anidulafungin (ERAXIS) 100 mg in 0.9% sodium chloride 130 mL IVPB  100 mg IntraVENous Q24H 100 mg at 07/20/17 1952           Case discussed with:  [facility] team      Lydia Ansari DO

## 2017-07-21 NOTE — WOUND CARE
Wound care follow up  Discharged to home today with  home health, plan of care and orders reviewed with  and Home health liaison, no new orders at this time. Left upper posterior shoulder wound- resolving, yellow drainage, foam dressing replaced.   Josue Paul

## 2017-07-21 NOTE — PROGRESS NOTES
ENJORE and JOSE LANGFORD [facility] contacted for dc today. ENJORE will do teaching after 12 today. Thanks. Hu Hernandez LCSW    Addendum:  4:21pm  Dc info sent to ENJORE. Thanks.   Hu Hernandez LCSW

## 2017-07-22 ENCOUNTER — HOME CARE VISIT (OUTPATIENT)
Dept: SCHEDULING | Facility: HOME HEALTH | Age: 47
End: 2017-07-22
Payer: COMMERCIAL

## 2017-07-22 PROCEDURE — G0299 HHS/HOSPICE OF RN EA 15 MIN: HCPCS

## 2017-07-23 LAB
BACTERIA SPEC CULT: NORMAL
SERVICE CMNT-IMP: NORMAL

## 2017-07-24 ENCOUNTER — TELEPHONE (OUTPATIENT)
Dept: FAMILY MEDICINE CLINIC | Age: 47
End: 2017-07-24

## 2017-07-24 ENCOUNTER — HOME CARE VISIT (OUTPATIENT)
Dept: SCHEDULING | Facility: HOME HEALTH | Age: 47
End: 2017-07-24
Payer: COMMERCIAL

## 2017-07-24 ENCOUNTER — OFFICE VISIT (OUTPATIENT)
Dept: FAMILY MEDICINE CLINIC | Age: 47
End: 2017-07-24

## 2017-07-24 ENCOUNTER — HOME CARE VISIT (OUTPATIENT)
Dept: HOME HEALTH SERVICES | Facility: HOME HEALTH | Age: 47
End: 2017-07-24
Payer: COMMERCIAL

## 2017-07-24 VITALS
HEIGHT: 62 IN | HEART RATE: 86 BPM | WEIGHT: 115 LBS | OXYGEN SATURATION: 98 % | SYSTOLIC BLOOD PRESSURE: 107 MMHG | RESPIRATION RATE: 16 BRPM | TEMPERATURE: 98.1 F | DIASTOLIC BLOOD PRESSURE: 70 MMHG | BODY MASS INDEX: 21.16 KG/M2

## 2017-07-24 VITALS
OXYGEN SATURATION: 98 % | DIASTOLIC BLOOD PRESSURE: 78 MMHG | HEART RATE: 122 BPM | SYSTOLIC BLOOD PRESSURE: 112 MMHG | TEMPERATURE: 98.3 F

## 2017-07-24 VITALS — OXYGEN SATURATION: 98 % | HEART RATE: 72 BPM | DIASTOLIC BLOOD PRESSURE: 75 MMHG | SYSTOLIC BLOOD PRESSURE: 117 MMHG

## 2017-07-24 VITALS
OXYGEN SATURATION: 96 % | DIASTOLIC BLOOD PRESSURE: 60 MMHG | SYSTOLIC BLOOD PRESSURE: 104 MMHG | WEIGHT: 110 LBS | HEART RATE: 79 BPM | RESPIRATION RATE: 18 BRPM | TEMPERATURE: 97.8 F | BODY MASS INDEX: 20.24 KG/M2 | HEIGHT: 62 IN

## 2017-07-24 DIAGNOSIS — A41.9 SEPSIS, DUE TO UNSPECIFIED ORGANISM: ICD-10-CM

## 2017-07-24 DIAGNOSIS — K25.1 ACUTE GASTRIC ULCER WITH PERFORATION (HCC): Primary | ICD-10-CM

## 2017-07-24 LAB
BACTERIA SPEC CULT: NORMAL
SERVICE CMNT-IMP: NORMAL

## 2017-07-24 PROCEDURE — G0151 HHCP-SERV OF PT,EA 15 MIN: HCPCS

## 2017-07-24 PROCEDURE — G0152 HHCP-SERV OF OT,EA 15 MIN: HCPCS

## 2017-07-24 RX ORDER — OXYCODONE HYDROCHLORIDE 5 MG/1
5 TABLET ORAL
Qty: 12 TAB | Refills: 0 | Status: SHIPPED | OUTPATIENT
Start: 2017-07-24 | End: 2017-08-30 | Stop reason: ALTCHOICE

## 2017-07-24 NOTE — TELEPHONE ENCOUNTER
Per call from [facility] with [facility], patient have been opened to Home Care. Per nadir, in reviewing patient's medication there is a discrepancy from discharge at hospital. Asking that nurse please call to discuss further.     Call [phone]    thanks

## 2017-07-24 NOTE — MR AVS SNAPSHOT
Visit Information Date & Time Provider Department Dept. Phone Encounter #  
 7/24/2017  9:55 AM Yaa Baldwin MD [facility] [phone] [id number] Follow-up Instructions Return in about 2 weeks (around 8/7/2017) for f/u visit from 7/24. Upcoming Health Maintenance Date Due Pneumococcal 19-64 Highest Risk (1 of 3 - PCV13) 4/9/1990 PAP AKA CERVICAL CYTOLOGY 4/8/2015 INFLUENZA AGE 9 TO ADULT 8/1/2017 DTaP/Tdap/Td series (2 - Td) 10/8/2024 Allergies as of 7/24/2017  Review Complete On: 7/24/2017 By: Yaa Baldwin MD  
  
 Severity Noted Reaction Type Reactions Cephalosporins  04/19/2010    Hives Doxycycline  07/22/2017    Swelling Penicillins  04/19/2010    Hives Tetracyclines  04/19/2010    Nausea and Vomiting Current Immunizations  Never Reviewed Name Date Tdap 10/8/2014 Not reviewed this visit You Were Diagnosed With   
  
 Codes Comments Acute gastric ulcer with perforation ([facility]    -  Primary ICD-10-CM: K25.1 ICD-9-CM: 531.10 Sepsis, due to unspecified organism [facility])     ICD-10-CM: A41.9 ICD-9-CM: 038.9, 995.91 Vitals BP Pulse Temp Resp Height(growth percentile) Weight(growth percentile) 107/70 (BP 1 Location: Left arm, BP Patient Position: Sitting) 86 98.1 °F (36.7 °C) (Oral) 16 5' 2\" (1.575 m) 115 lb (52.2 kg) SpO2 BMI OB Status Smoking Status 98% 21.03 kg/m2 Ablation Former Smoker Vitals History BMI and BSA Data Body Mass Index Body Surface Area 21.03 kg/m 2 1.51 m 2 Preferred Pharmacy Pharmacy Name Phone [facility] [address]  [facility] [phone] Your Updated Medication List  
  
   
This list is accurate as of: 7/24/17 11:24 AM.  Always use your most recent med list.  
  
  
  
  
 ADDERALL 20 mg tablet Generic drug:  dextroamphetamine-amphetamine Take 20 mg by mouth three (3) times daily. albuterol 90 mcg/actuation inhaler Commonly known as:  PROVENTIL HFA, VENTOLIN HFA, PROAIR HFA Take 2 Puffs by inhalation every four (4) hours as needed for Wheezing or Shortness of Breath. anidulafungin 100 mg  
100 mg by IntraVENous route every twenty-four (24) hours. clonazePAM 1 mg tablet Commonly known as:  Karene Naya Take 1 mg by mouth two (2) times a day. fexofenadine 180 mg tablet Commonly known as:  Pacheco Pleasant Take 180 mg by mouth daily. LEXAPRO 20 mg tablet Generic drug:  escitalopram oxalate Take 20 mg by mouth daily. LORazepam 1 mg tablet Commonly known as:  ATIVAN Take 1 Tab by mouth every eight (8) hours as needed for Anxiety. Max Daily Amount: 3 mg.  
  
 meropenem 500 mg, ADDaptor 1 Device IVPB 500 mg by IntraVENous route every six (6) hours. naloxone 2 mg/actuation Spry Use 1 spray intranasally into 1 nostril. Use a new Narcan nasal spray for subsequent doses and administer into alternating nostrils. May repeat every 2 to 3 minutes as needed. oxyCODONE IR 5 mg immediate release tablet Commonly known as:  Paras Arpan Take 1 Tab by mouth every eight (8) hours as needed. Max Daily Amount: 15 mg.  
  
 pantoprazole 40 mg tablet Commonly known as:  PROTONIX Take 1 Tab by mouth Before breakfast and dinner. traZODone 100 mg tablet Commonly known as:  Gwendolyn Hand Take 200 mg by mouth nightly. Prescriptions Printed Refills  
 oxyCODONE IR (ROXICODONE) 5 mg immediate release tablet 0 Sig: Take 1 Tab by mouth every eight (8) hours as needed. Max Daily Amount: 15 mg.  
 Class: Print Route: Oral  
  
Follow-up Instructions Return in about 2 weeks (around 8/7/2017) for f/u visit from 7/24. To-Do List   
 07/24/2017 To Be Determined   Appointment with Rosita Villalpando PT at [place] [address]  
  
 07/24/2017 3:00 PM  
 Appointment with Shan Corona OT at [place] [address]  
  
 07/25/2017 To Be Determined Appointment with Jade Martin RN at [place] [address]  
  
 07/27/2017 To Be Determined Appointment with Jr Hager RN at [place] [address]  
  
 07/31/2017 To Be Determined Appointment with Jr Hager RN at [place] [address]  
  
 08/07/2017 To Be Determined Appointment with Jr Hager RN at [place] [address]  
  
 08/14/2017 To Be Determined Appointment with Jr Hager RN at [address] SERVICES! Dear Justa Jeffrey: Thank you for requesting a [employer] account. Our records indicate that you already have an active [employer] account. You can access your account anytime at [email or web address] Did you know that you can access your hospital and ER discharge instructions at any time in [employer]? You can also review all of your test results from your hospital stay or ER visit. Additional Information If you have questions, please visit the Frequently Asked Questions section of the [employer] website at [email or web address] Remember, [employer] is NOT to be used for urgent needs. For medical emergencies, dial 911. Now available from your iPhone and Android! Please provide this summary of care documentation to your next provider. Your primary care clinician is listed as Jeanie Re. If you have any questions after today's visit, please call [phone].

## 2017-07-24 NOTE — PROGRESS NOTES
Chief Complaint   Patient presents with   [facility] Follow Up     1. Have you been to the ER, urgent care clinic since your last visit? Hospitalized since your last visit? Yes When: 7/4/2017 [facility]    2. Have you seen or consulted any other health care providers outside of the [address] [place] [address] since your last visit? Include any pap smears or colon screening. No     Reviewed record in preparation for visit and have obtained necessary documentation.

## 2017-07-24 NOTE — PROGRESS NOTES
Angy Payne is an 56 y.o. female who presents for   Chief Complaint   Patient presents with   [facility] Follow Up     Admitted for sepsis 2/2 perforated gastric ulcers/peritonitis. Discharged on POD 16 exploratory lap for gastric ulcer perforation repair. On meropenem, eraxis. Doing well now. Stretching pain pills, taking 2 tylenol over lunch and dinner in between. Currently has 4 left. Allergies - reviewed: Allergies   Allergen Reactions    Cephalosporins Hives    Doxycycline Swelling    Penicillins Hives    Tetracyclines Nausea and Vomiting         Medications - reviewed:   Current Outpatient Prescriptions   Medication Sig    oxyCODONE IR (ROXICODONE) 5 mg immediate release tablet Take 1 Tab by mouth every eight (8) hours as needed. Max Daily Amount: 15 mg.    dextroamphetamine-amphetamine (ADDERALL) 20 mg tablet Take 20 mg by mouth three (3) times daily.  clonazePAM (KLONOPIN) 1 mg tablet Take 1 mg by mouth two (2) times a day.  pantoprazole (PROTONIX) 40 mg tablet Take 1 Tab by mouth Before breakfast and dinner.  anidulafungin 100 mg 100 mg by IntraVENous route every twenty-four (24) hours.  LORazepam (ATIVAN) 1 mg tablet Take 1 Tab by mouth every eight (8) hours as needed for Anxiety. Max Daily Amount: 3 mg.  meropenem 500 mg, ADDaptor 1 Device IVPB 500 mg by IntraVENous route every six (6) hours.  naloxone 2 mg/actuation spry Use 1 spray intranasally into 1 nostril. Use a new Narcan nasal spray for subsequent doses and administer into alternating nostrils. May repeat every 2 to 3 minutes as needed.  traZODone (DESYREL) 100 mg tablet Take 200 mg by mouth nightly.  escitalopram oxalate (LEXAPRO) 20 mg tablet Take 20 mg by mouth daily.  fexofenadine (ALLEGRA) 180 mg tablet Take 180 mg by mouth daily.     albuterol (PROVENTIL HFA, VENTOLIN HFA, PROAIR HFA) 90 mcg/actuation inhaler Take 2 Puffs by inhalation every four (4) hours as needed for Wheezing or Shortness of Breath.  sodium chloride (NORMAL SALINE FLUSH) 0.9 % 5-10 mL by IntraVENous route daily.  HEPARIN SOD,PORCINE/0.9 % NACL (HEPARIN FLUSH,PORCINE,-0.9NACL IV) 5 mL by IntraVENous route daily. No current facility-administered medications for this visit. Past Medical History - reviewed:  Past Medical History:   Diagnosis Date    Autoimmune disease ([facility]     Crohn's Disease    Crohn disease ([facility] 2010    Crohn's     Depression 2010    History of vascular access device 2017    [facility] VAT - 33 cm right brachial PICC for abx and limited access    Vertigo          Past Surgical History - reviewed:   Past Surgical History:   Procedure Laterality Date    ABDOMEN SURGERY PROC UNLISTED      due to Crohn's-bowel resection x2    HX  SECTION      HX HEENT      HX TONSIL AND ADENOIDECTOMY      HX TUBAL LIGATION           Social History - reviewed:  Social History     Social History    Marital status:      Spouse name: N/A    Number of children: N/A    Years of education: N/A     Occupational History    Not on file.      Social History Main Topics    Smoking status: Former Smoker     Packs/day: 0.50     Years: 8.00     Types: Cigarettes    Smokeless tobacco: Never Used    Alcohol use No    Drug use: No      Comment: 1 pack a day    Sexual activity: Yes     Partners: Male     Birth control/ protection: Pill     Other Topics Concern    Not on file     Social History Narrative         Family History - reviewed:  Family History   Problem Relation Age of Onset    Heart Disease Father     Cancer Mother          Immunizations - reviewed:   Immunization History   Administered Date(s) Administered    Tdap 10/08/2014         ROS  CONSTITUTIONAL: Denies: fever  CARDIOVASCULAR: Denies: chest pain  RESPIRATORY: Denies: cough  GI: abdominal pain when sitting up from supine, Denies: n/v, blood in stool  : Denies: dysuria  SKIN: Denies: skin rash or swelling over scars in abdomen      Physical Exam  Visit Vitals    /70 (BP 1 Location: Left arm, BP Patient Position: Sitting)    Pulse 86    Temp 98.1 °F (36.7 °C) (Oral)    Resp 16    Ht 5' 2\" (1.575 m)    Wt 115 lb (52.2 kg)    SpO2 98%    BMI 21.03 kg/m2       General appearance - alert, cooperative, well appearing  Eyes - EOMI  Mouth - mucous membranes moist, pharynx normal without lesions  Neck - supple, no significant adenopathy  Chest - clear to auscultation, no wheezes, rales or rhonchi, symmetric air entry  Heart - normal rate, regular rhythm, normal S1, S2, no murmurs, rubs, clicks or gallops  Abdomen - soft, vertical incision mid-abdomen well-healing, 2 laparoscopy/port sites in lateral abdomen (1 in R, 1 in L) that are well-healing. Mild TTP over incision area without rebound or guarding. Neurological - alert, oriented, normal speech, no focal findings or movement disorder noted  Musculoskeletal - no joint tenderness, deformity or swelling  Extremities - peripheral pulses normal, no pedal edema, no clubbing or cyanosis  Skin - normal coloration and turgor, no rashes, no suspicious skin lesions noted      Assessment/Plan    ICD-10-CM ICD-9-CM    1. Acute gastric ulcer with perforation (HCC) K25.1 531.10 oxyCODONE IR (ROXICODONE) 5 mg immediate release tablet   2. Sepsis, due to unspecified organism ([facility] A41.9 038.9      995.91        Patient to continue IV abx and get weekly CBC, CMP through home health. Will give short course of roxicodone 5 mg (received 10 mg on discharge) for surgical pain but advised that she take tylenol in between for moderate pain. No NSAIDs.  normal.      Follow-up Disposition:  Return in about 2 weeks (around 8/7/2017) for f/u visit from 7/24. I have discussed the diagnosis with the patient and the intended plan as seen in the above orders. The patient has received an after-visit summary and questions were answered concerning future plans.   I have discussed medication side effects and warnings with the patient as well. Mirna Gilliam MD  Family Medicine Resident    Patient discussed with Dr. Danielle Vo, attending physician.

## 2017-07-25 NOTE — TELEPHONE ENCOUNTER
Voicemail left for Malathi at [facility] to return call as soon as she can regarding patient.

## 2017-07-26 LAB
BACTERIA SPEC CULT: NORMAL
BACTERIA SPEC CULT: NORMAL
SERVICE CMNT-IMP: NORMAL
SERVICE CMNT-IMP: NORMAL

## 2017-07-27 ENCOUNTER — HOME CARE VISIT (OUTPATIENT)
Dept: SCHEDULING | Facility: HOME HEALTH | Age: 47
End: 2017-07-27
Payer: COMMERCIAL

## 2017-07-27 PROCEDURE — G0300 HHS/HOSPICE OF LPN EA 15 MIN: HCPCS

## 2017-07-31 ENCOUNTER — HOME CARE VISIT (OUTPATIENT)
Dept: SCHEDULING | Facility: HOME HEALTH | Age: 47
End: 2017-07-31
Payer: COMMERCIAL

## 2017-07-31 VITALS
TEMPERATURE: 98.1 F | HEART RATE: 66 BPM | OXYGEN SATURATION: 98 % | SYSTOLIC BLOOD PRESSURE: 126 MMHG | DIASTOLIC BLOOD PRESSURE: 66 MMHG | RESPIRATION RATE: 20 BRPM

## 2017-07-31 PROCEDURE — G0300 HHS/HOSPICE OF LPN EA 15 MIN: HCPCS

## 2017-08-02 VITALS
TEMPERATURE: 98.3 F | OXYGEN SATURATION: 99 % | RESPIRATION RATE: 17 BRPM | SYSTOLIC BLOOD PRESSURE: 90 MMHG | HEART RATE: 100 BPM | DIASTOLIC BLOOD PRESSURE: 60 MMHG

## 2017-08-03 ENCOUNTER — HOME CARE VISIT (OUTPATIENT)
Dept: SCHEDULING | Facility: HOME HEALTH | Age: 47
End: 2017-08-03
Payer: COMMERCIAL

## 2017-08-03 PROCEDURE — G0299 HHS/HOSPICE OF RN EA 15 MIN: HCPCS

## 2017-08-06 VITALS
RESPIRATION RATE: 18 BRPM | OXYGEN SATURATION: 98 % | SYSTOLIC BLOOD PRESSURE: 108 MMHG | DIASTOLIC BLOOD PRESSURE: 52 MMHG | TEMPERATURE: 97.8 F | HEART RATE: 88 BPM

## 2017-08-07 ENCOUNTER — TELEPHONE (OUTPATIENT)
Dept: FAMILY MEDICINE CLINIC | Age: 47
End: 2017-08-07

## 2017-08-07 NOTE — TELEPHONE ENCOUNTER
Jaycee Rangel with [facility] left a voicemail,    Notes that patient is to have labs drawn today and had appointment with another provider and will not be able to do labs until late this evening. Asking if patient can have done tomorrow?     Please call [phone]    thanks

## 2017-08-10 ENCOUNTER — HOME CARE VISIT (OUTPATIENT)
Dept: SCHEDULING | Facility: HOME HEALTH | Age: 47
End: 2017-08-10
Payer: COMMERCIAL

## 2017-08-10 PROCEDURE — G0300 HHS/HOSPICE OF LPN EA 15 MIN: HCPCS

## 2017-08-11 VITALS
SYSTOLIC BLOOD PRESSURE: 100 MMHG | DIASTOLIC BLOOD PRESSURE: 62 MMHG | HEART RATE: 84 BPM | RESPIRATION RATE: 20 BRPM | TEMPERATURE: 98.7 F | OXYGEN SATURATION: 99 %

## 2017-08-14 ENCOUNTER — HOME CARE VISIT (OUTPATIENT)
Dept: SCHEDULING | Facility: HOME HEALTH | Age: 47
End: 2017-08-14
Payer: COMMERCIAL

## 2017-08-14 PROCEDURE — G0299 HHS/HOSPICE OF RN EA 15 MIN: HCPCS

## 2017-08-16 ENCOUNTER — HOSPITAL ENCOUNTER (OUTPATIENT)
Dept: CT IMAGING | Age: 47
Discharge: HOME | End: 2017-08-16
Attending: SURGERY
Payer: COMMERCIAL

## 2017-08-16 VITALS
SYSTOLIC BLOOD PRESSURE: 124 MMHG | RESPIRATION RATE: 16 BRPM | DIASTOLIC BLOOD PRESSURE: 70 MMHG | OXYGEN SATURATION: 97 % | TEMPERATURE: 98.1 F | HEART RATE: 83 BPM

## 2017-08-16 DIAGNOSIS — K65.1 INTRA-ABDOMINAL ABSCESS (HCC): ICD-10-CM

## 2017-08-16 PROCEDURE — 74011636320 HC RX REV CODE- 636/320: Performed by: RADIOLOGY

## 2017-08-16 PROCEDURE — 74177 CT ABD & PELVIS W/CONTRAST: CPT

## 2017-08-16 RX ADMIN — IOPAMIDOL 95 ML: 755 INJECTION, SOLUTION INTRAVENOUS at 10:27

## 2017-08-18 ENCOUNTER — HOME CARE VISIT (OUTPATIENT)
Dept: SCHEDULING | Facility: HOME HEALTH | Age: 47
End: 2017-08-18
Payer: COMMERCIAL

## 2017-08-18 ENCOUNTER — HOME CARE VISIT (OUTPATIENT)
Dept: HOME HEALTH SERVICES | Facility: HOME HEALTH | Age: 47
End: 2017-08-18
Payer: COMMERCIAL

## 2017-08-18 PROCEDURE — G0299 HHS/HOSPICE OF RN EA 15 MIN: HCPCS

## 2017-08-21 VITALS
RESPIRATION RATE: 18 BRPM | SYSTOLIC BLOOD PRESSURE: 122 MMHG | HEART RATE: 70 BPM | TEMPERATURE: 97.3 F | OXYGEN SATURATION: 97 % | DIASTOLIC BLOOD PRESSURE: 72 MMHG

## 2017-08-30 ENCOUNTER — OFFICE VISIT (OUTPATIENT)
Dept: FAMILY MEDICINE CLINIC | Age: 47
End: 2017-08-30

## 2017-08-30 ENCOUNTER — TELEPHONE (OUTPATIENT)
Dept: FAMILY MEDICINE CLINIC | Age: 47
End: 2017-08-30

## 2017-08-30 VITALS
HEART RATE: 87 BPM | OXYGEN SATURATION: 97 % | WEIGHT: 109 LBS | TEMPERATURE: 98.4 F | BODY MASS INDEX: 20.06 KG/M2 | RESPIRATION RATE: 16 BRPM | HEIGHT: 62 IN | SYSTOLIC BLOOD PRESSURE: 110 MMHG | DIASTOLIC BLOOD PRESSURE: 74 MMHG

## 2017-08-30 DIAGNOSIS — Z23 ENCOUNTER FOR IMMUNIZATION: ICD-10-CM

## 2017-08-30 DIAGNOSIS — F17.200 SMOKER: ICD-10-CM

## 2017-08-30 DIAGNOSIS — Z91.81 RISK FOR FALLS: ICD-10-CM

## 2017-08-30 DIAGNOSIS — I73.9 PERIPHERAL VASCULAR DISEASE (HCC): ICD-10-CM

## 2017-08-30 DIAGNOSIS — M54.9 BACK PAIN, UNSPECIFIED BACK LOCATION, UNSPECIFIED BACK PAIN LATERALITY, UNSPECIFIED CHRONICITY: ICD-10-CM

## 2017-08-30 DIAGNOSIS — E53.8 VITAMIN B12 DEFICIENCY: ICD-10-CM

## 2017-08-30 DIAGNOSIS — G62.9 PERIPHERAL POLYNEUROPATHY: ICD-10-CM

## 2017-08-30 DIAGNOSIS — K63.1 PERFORATION BOWEL (HCC): Primary | ICD-10-CM

## 2017-08-30 DIAGNOSIS — D51.9 ANEMIA DUE TO VITAMIN B12 DEFICIENCY, UNSPECIFIED B12 DEFICIENCY TYPE: ICD-10-CM

## 2017-08-30 RX ORDER — CYANOCOBALAMIN 1000 UG/ML
1000 INJECTION, SOLUTION INTRAMUSCULAR; SUBCUTANEOUS ONCE
Qty: 1 VIAL | Refills: 0
Start: 2017-08-30 | End: 2017-08-30

## 2017-08-30 RX ORDER — GABAPENTIN 100 MG/1
CAPSULE ORAL 3 TIMES DAILY
COMMUNITY
End: 2017-10-23 | Stop reason: SDUPTHER

## 2017-08-30 RX ORDER — CYANOCOBALAMIN 1000 UG/ML
1000 INJECTION, SOLUTION INTRAMUSCULAR; SUBCUTANEOUS ONCE
Qty: 1 ML | Refills: 0
Start: 2017-08-30 | End: 2017-08-30

## 2017-08-30 RX ORDER — BACLOFEN 10 MG/1
TABLET ORAL 3 TIMES DAILY
COMMUNITY
End: 2017-08-30 | Stop reason: ALTCHOICE

## 2017-08-30 RX ORDER — TRAMADOL HYDROCHLORIDE 50 MG/1
50-100 TABLET ORAL
Qty: 60 TAB | Refills: 0 | Status: SHIPPED | OUTPATIENT
Start: 2017-08-30 | End: 2017-10-11 | Stop reason: ALTCHOICE

## 2017-08-30 NOTE — LETTER
8/30/2017 9:42 AM 
 
Ms. Kirill Yousif No address on file. I strongly suggest that you avoid use of any tobacco products. This may be the most important thing you can do for your health. While medicines may help, the most important thing for you is the decision to quit. If you need a \"Quit \", please call 1-597-QUIT NOW (4-419.891.4384). If you need help with nicotine withdrawal, I suggest over-the-counter nicotine replacement aids such as nicotine gum or patches, used as directed. As you may know, use of tobacco products increases your risk for many forms of cancer, heart disease, stroke, and blood clotting. Your body is very forgiving. Quit now and improve your health! Sincerely, Bello Gonzalez MD

## 2017-08-30 NOTE — TELEPHONE ENCOUNTER
[phone], patient    Appt on 9/11 @10:15 am  [facility], next door. --[phone]      [id number] - REFERRAL TO PHYSICAL THERAPY   1 1      Diagnosis Information   Diagnosis   M54.9 (ICD-10-CM) - Back pain, unspecified back location, unspecified back pain laterality, unspecified chronicity   Z91.81 (ICD-10-CM) - Risk for falls      Referral Notes   Type Date User   Provider Comments 08/30/2017  9:53 AM Edwin Correia MD      Summary   Provider Comments      Note   Note     [facility] #[phone] or [facility] #[phone] or other local PT facility  Physical Therapy evaluate and treat:  Back pain and fall prevention     Please call [facility] to help arrange and authorize your tests and/or referrals.   Her # is [phone]

## 2017-08-30 NOTE — LETTER
8/30/2017 10:10 AM 
 
Ms. Gualberto Montaño No address on file. To Whom It May Concern, Please excuse form work today through 9/18/2017 due to severe medical illness. Sincerely, Gus Gale MD

## 2017-08-30 NOTE — LETTER
8/31/2017 2:57 PM 
 
Ms. Rhonda Barnard No address on file. Dear Rhonda Barnard: Please find your most recent results below. Resulted Orders VITAMIN B12 Result Value Ref Range Vitamin B12 >2000 (H) 211 - 946 pg/mL Narrative Performed at:  [address]  [id number] : Jeanne Delaney MD, Phone:  [phone] CBC W/O DIFF Result Value Ref Range WBC 9.8 3.4 - 10.8 x10E3/uL  
 RBC 3.78 3.77 - 5.28 x10E6/uL HGB 9.5 (L) 11.1 - 15.9 g/dL HCT 31.3 (L) 34.0 - 46.6 % MCV 83 79 - 97 fL  
 MCH 25.1 (L) 26.6 - 33.0 pg  
 MCHC 30.4 (L) 31.5 - 35.7 g/dL  
 RDW 18.8 (H) 12.3 - 15.4 % PLATELET 817 035 - 141 x10E3/uL Narrative Performed at:  [address]  [id number] : Jeanne Delaney MD, Phone:  [phone] METABOLIC PANEL, COMPREHENSIVE Result Value Ref Range Glucose 70 65 - 99 mg/dL BUN 13 6 - 24 mg/dL Creatinine 0.74 0.57 - 1.00 mg/dL GFR est non-AA 97 >59 mL/min/1.73 GFR est  >59 mL/min/1.73  
 BUN/Creatinine ratio 18 9 - 23 Sodium 141 134 - 144 mmol/L Potassium 4.6 3.5 - 5.2 mmol/L Chloride 103 96 - 106 mmol/L  
 CO2 21 18 - 29 mmol/L Calcium 9.8 8.7 - 10.2 mg/dL Protein, total 6.5 6.0 - 8.5 g/dL Albumin 3.8 3.5 - 5.5 g/dL GLOBULIN, TOTAL 2.7 1.5 - 4.5 g/dL A-G Ratio 1.4 1.2 - 2.2 Bilirubin, total <0.2 0.0 - 1.2 mg/dL Alk. phosphatase 97 39 - 117 IU/L  
 AST (SGOT) 15 0 - 40 IU/L  
 ALT (SGPT) 11 0 - 32 IU/L Narrative Performed at:  [address]  [id number] : Jeanne Delaney MD, Phone:  [phone] RECOMMENDATIONS: 
 
Your anemia is improving.  Follow up in 2 weeks as we discussed. Avoid tobacco products. Sincerely, Rhina Spaer MD

## 2017-08-30 NOTE — PATIENT INSTRUCTIONS
Keep up with female exams    Stop smoking    Follow up with your specialists    Stop baclofen    Use tramadol and tylenol for pain    [facility] #[phone] or [facility] #[phone] or other local PT facility   Physical Therapy evaluate and treat:  Back pain and fall prevention    Please call Jarred Acharya to help arrange and authorize your tests and/or referrals.   Her # is [phone]     Follow up in 2 weeks

## 2017-08-30 NOTE — MR AVS SNAPSHOT
Visit Information Date & Time Provider Department Dept. Phone Encounter #  
 8/30/2017  8:45 AM Yessy Aguiar MD [facility] [phone] [id number] Follow-up Instructions Return in about 2 weeks (around 9/13/2017). Upcoming Health Maintenance Date Due  
 PAP AKA CERVICAL CYTOLOGY 8/30/2018* INFLUENZA AGE 9 TO ADULT 8/30/2018* Pneumococcal 19-64 Highest Risk (2 of 3 - PCV13) 8/30/2018 DTaP/Tdap/Td series (2 - Td) 10/8/2024 *Topic was postponed. The date shown is not the original due date. Allergies as of 8/30/2017  Review Complete On: 8/30/2017 By: Son Waters LPN Severity Noted Reaction Type Reactions Cephalosporins  04/19/2010    Hives Doxycycline  07/22/2017    Swelling Penicillins  04/19/2010    Hives Tetracyclines  04/19/2010    Nausea and Vomiting Current Immunizations  Never Reviewed Name Date Influenza Vaccine (Quad)  Incomplete Pneumococcal Polysaccharide (PPSV-23)  Incomplete Tdap 10/8/2014 Not reviewed this visit You Were Diagnosed With   
  
 Codes Comments Perforation bowel ([facility]    -  Primary ICD-10-CM: K63.1 ICD-9-CM: 335.95 Vitamin B12 deficiency     ICD-10-CM: E53.8 ICD-9-CM: 266.2 Anemia due to vitamin B12 deficiency, unspecified B12 deficiency type     ICD-10-CM: D51.9 ICD-9-CM: 281.1 Smoker     ICD-10-CM: W36.603 ICD-9-CM: 305.1 Encounter for immunization     ICD-10-CM: I00 ICD-9-CM: V03.89 Back pain, unspecified back location, unspecified back pain laterality, unspecified chronicity     ICD-10-CM: M54.9 ICD-9-CM: 724.5 Risk for falls     ICD-10-CM: Z91.81 
ICD-9-CM: V15.88 Peripheral polyneuropathy (HCC)     ICD-10-CM: G62.9 ICD-9-CM: 356.9 Peripheral vascular disease ([facility]     ICD-10-CM: I73.9 ICD-9-CM: 443. 9 Vitals BP Pulse Temp Resp Height(growth percentile) Weight(growth percentile) 110/74 (BP 1 Location: Left arm, BP Patient Position: Sitting) 87 98.4 °F (36.9 °C) (Oral) 16 5' 2\" (1.575 m) 109 lb (49.4 kg) SpO2 BMI OB Status Smoking Status 97% 19.94 kg/m2 Ablation Former Smoker Vitals History BMI and BSA Data Body Mass Index Body Surface Area 19.94 kg/m 2 1.47 m 2 Preferred Pharmacy Pharmacy Name Phone [facility] [address]  [facility] [phone] Your Updated Medication List  
  
   
This list is accurate as of: 8/30/17  9:58 AM.  Always use your most recent med list.  
  
  
  
  
 ADDERALL 20 mg tablet Generic drug:  dextroamphetamine-amphetamine Take 20 mg by mouth three (3) times daily. albuterol 90 mcg/actuation inhaler Commonly known as:  PROVENTIL HFA, VENTOLIN HFA, PROAIR HFA Take 2 Puffs by inhalation every four (4) hours as needed for Wheezing or Shortness of Breath. clonazePAM 1 mg tablet Commonly known as:  Cyrilla Mayans Take 1 mg by mouth two (2) times a day. cyanocobalamin 1,000 mcg/mL injection Commonly known as:  VITAMIN B-12  
1 mL by IntraMUSCular route once for 1 dose. gabapentin 100 mg capsule Commonly known as:  NEURONTIN Take  by mouth three (3) times daily. LEXAPRO 20 mg tablet Generic drug:  escitalopram oxalate Take 20 mg by mouth daily. LORazepam 1 mg tablet Commonly known as:  ATIVAN Take 1 Tab by mouth every eight (8) hours as needed for Anxiety. Max Daily Amount: 3 mg.  
  
 multivitamin capsule Take 1 Cap by mouth daily. naloxone 2 mg/actuation Spry Use 1 spray intranasally into 1 nostril. Use a new Narcan nasal spray for subsequent doses and administer into alternating nostrils. May repeat every 2 to 3 minutes as needed. pantoprazole 40 mg tablet Commonly known as:  PROTONIX Take 1 Tab by mouth Before breakfast and dinner. traMADol 50 mg tablet Commonly known as:  ULTRAM  
Take 1-2 Tabs by mouth every six (6) hours as needed for Pain. Max Daily Amount: 400 mg.  
  
 traZODone 100 mg tablet Commonly known as:  Neftaly Tony Take 200 mg by mouth nightly. Prescriptions Printed Refills  
 traMADol (ULTRAM) 50 mg tablet 0 Sig: Take 1-2 Tabs by mouth every six (6) hours as needed for Pain. Max Daily Amount: 400 mg. Class: Print Route: Oral  
  
We Performed the Following CBC W/O DIFF [15501 CPT(R)] INFLUENZA VACCINE QUADRIVALENT VIAL, SPLIT, 3 YRS PLUS IM [id number] CPT(R)] METABOLIC PANEL, COMPREHENSIVE [50605 CPT(R)] PNEUMOCOCCAL POLYSACCHARIDE VACCINE, 23-VALENT, ADULT OR IMMUNOSUPPRESSED PT DOSE, [69319 CPT(R)] [place] SMOKING AND TOBACCO USE CESSATION 3 - 10 MINUTES [22359 CPT(R)] REFERRAL TO PHYSICAL THERAPY [VIT12 Custom] Comments:  
 [facility] #[phone] or [facility] #[phone] or other local PT facility Physical Therapy evaluate and treat:  Back pain and fall prevention Please call Isaura Daniels to help arrange and authorize your tests and/or referrals. Her # is [phone] THER/PROPH/DIAG INJECTION, SUBCUT/IM [id number] CPT(R)] VITAMIN B12 INJECTION [ [facility]] VITAMIN B12 [id number] CPT(R)] Follow-up Instructions Return in about 2 weeks (around 9/13/2017). Referral Information Referral ID Referred By Referred To  
  
 [id number] Lillian Rodríguez Not Available Visits Status Start Date End Date 1 New Request 8/30/17 8/30/18 If your referral has a status of pending review or denied, additional information will be sent to support the outcome of this decision. Patient Instructions Keep up with female exams Stop smoking Follow up with your specialists Stop baclofen Use tramadol and tylenol for pain [facility] #[phone] or [facility] #[phone] or other local PT facility Physical Therapy evaluate and treat:  Back pain and fall prevention Please call Leonor Richardson to help arrange and authorize your tests and/or referrals. Her # is [phone] Follow up in 2 weeks Introducing [facility] SERVICES! Dear Meir Mederos: Thank you for requesting a [employer] account. Our records indicate that you already have an active [employer] account. You can access your account anytime at [email or web address] Did you know that you can access your hospital and ER discharge instructions at any time in [employer]? You can also review all of your test results from your hospital stay or ER visit. Additional Information If you have questions, please visit the Frequently Asked Questions section of the [employer] website at [email or web address] Remember, [employer] is NOT to be used for urgent needs. For medical emergencies, dial 911. Now available from your iPhone and Android! Please provide this summary of care documentation to your next provider. Your primary care clinician is listed as Veverly Simmonds. If you have any questions after today's visit, please call [phone].

## 2017-08-30 NOTE — PROGRESS NOTES
Afua Lr is a 56 y.o. female      Issues discussed today include:    Recent extensive PMH:  Perforated ulcer s/p surgery. Lyme disease s/p DOxy. +Crohn's disease, +smoker. ADD/anxiety (Dr. Kae Avery)    [facility] record: \"Onel Dutta is a 56 y.o. female with known Chron's disease (s/p bowel resection, not currently on immunotherapy) vertigo and depression who presents to the ER complaining of foot wounds and abdominal pain.  The patient very somnolent and cannot provide much history--history is mainly provided by stepmother and father in the room. Rui Smoker was diagnosed with lyme disease approximately 1 month which was treated with Doxycycline. Father says after the initiation of treatment pt had leg swelling along with rash and blistering of the feet and that she was told it was a reaction of the medication. As per father, pt fell on Friday night and as a result pt has a left upper back laceration of about 3 cm and after the fall her abdominal pain started. Pt says is 10/10 in intensity, localized to the left, no radiation and is better while she's holding her breath. Pt also complains about chills and a headache of 10/10 in intensity. Pt also says that her feet are swollen and sore. Father doesn't know what other symptoms pt is having at the moment. \"     Hospital course with associated diagnosis:  Sepsis 2/2 Perforated Gastric Ulcers s/p Operative Repair on 7/4/17 -  Gastric ulcers thought to be related to chronic steroid use and Crohn's disease. POD 17. Clinical recovered well from surgery. Pain was appropriately managed as opioids were weaned. All cultures negative to date. CT showed overall improvement in inflammation. She continued to have recurrent fevers despite antibiotic coverage and clinical improvement. Repeat blood and urine cultures were drawn and CXR was performed. Workup was negative. Per, ID likely 2/2 to multiple small abdominal abscesses.  Per their rec, she was discharged after being afebrile for 24hours. She was sent home on 28 day course of meropenem and Eraxis. - Continue antibiotics with weekly CBCs and CMPs  - Continue full liquid diet for 5 more days then switch to soft solid diet  - PO Oxy 10mg for pain TID PRN, reassessment at next PCP visit      Left pleural effusion: Acute. Seen on admission with Chest CT. Effusion drained via IR drainage on 7/10. Resulted in a Left ptx. Pt was placed on supplemental oxygen. Ptx appeared to improve on serial CXRs. O2 sats stable on RA at time of discharge.      Elevated Leukocytosis - Down-trending. Per heme, likely reactive. At time there was concern for another source of infection. Work-up was negative. WBC wnl at time of discharge.      Thrombophilia - Per heme, likely reactive. Platelets now downtrending. Platelets peaked at 037 and 599 at time of discharge.      Community Acquired Pneumonia - Improvement of left basilar airspace disease noted on initial CXR. Could have been playing a role in SIRS/sepsis picture. Improved with antibiotics. Stable at time of discharged-evidence by negative CXR.     Severe Acute Malnutrition - criteria evidenced by nutritional intake <50% of recommended intake for >5days, weight loss of 4.7% in 2 weeks, and moderate-severe edema. Improvement during hospitalization. Weight today 136lb (up from from 121 on 7/4). - Continue full liquid diet with Ensure Enlive supplementation      Anemia - Per hematology, most likely 2/2 acute blood loss from gastric ulcer. Lab work up done on admission. Received two units of blood, hgb response appropriate. Remained stable since.  Hgb 8.0 at time of discharge.  - Repeat CBC as outpatient      Chron's Disease - no pharmacotherapy PTA.  Poor follow-up history with GI.  Likely playing a role in patient's current clinical course.   - Avoid NSAIDs  - F/u scheduled with GI outpatient      Lyme Disease - diagnosed ~1 month ago at [facility].  Was treated with a 21 day course of doxycycline. Stable during this admission     Tobacco Abuse - reports to smoke 1.5 PPD. -Encouraging cessation. -f/u with PCP outpatient      Healing Wounds on Bilateral Feet - most likely acute reaction to doxycycline treatment that patient underwent prior to admission. Improved at time of discharge.      3cm Laceration of Left Upper Back - Improved. Only minor drainage at time of discharge.      Bilateral Lower Extremity Swelling/Pain - noted on admisison.  Thought to be 2/2 hypoalbuminemia.  Duplex studies of lower extremities negative for DVT. Improved.      Electrolyte deficiencies: Resolved s/p repletion and resuming diet. Continued repletion during hospitalization. Suspect will improve with return of normal diet.      Depression/Panic Attacks - Stable on hospital regimen. - Discharged home on Ativan TID with continued use of Lexapro 20mg daily       History of Falls - fall precautions while in the hospital.  -PT/OT consulted for home health. Data reviewed or ordered today:  Labs today, see scan of note from Dr. Ervin Ireland    Other problems include:  Patient Active Problem List   Diagnosis Code    Crohn disease ([facility] K50.90    Depression F32.9    Vertigo R42    Vitamin B12 deficiency E53.8    Peripheral neuropathy ([facility] G62.9    Nonspecific abnormal electrocardiogram (ECG) (EKG) R94.31    Perforation bowel ([facility]) K63.1    Pneumonia J18.9    Anemia D64.9    Wounds, multiple open, lower extremity S81.809A    Thrombocytosis ([facility]) D47.3    Neutrophilia D72.9       Medications:  Current Outpatient Prescriptions   Medication Sig Dispense Refill    gabapentin (NEURONTIN) 100 mg capsule Take  by mouth three (3) times daily.  traMADol (ULTRAM) 50 mg tablet Take 1-2 Tabs by mouth every six (6) hours as needed for Pain. Max Daily Amount: 400 mg. 60 Tab 0    cyanocobalamin (VITAMIN B-12) 1,000 mcg/mL injection 1 mL by IntraMUSCular route once for 1 dose.  1 mL 0    multivitamin capsule Take 1 Cap by mouth daily.  dextroamphetamine-amphetamine (ADDERALL) 20 mg tablet Take 20 mg by mouth three (3) times daily.  clonazePAM (KLONOPIN) 1 mg tablet Take 1 mg by mouth two (2) times a day.  pantoprazole (PROTONIX) 40 mg tablet Take 1 Tab by mouth Before breakfast and dinner. 30 Tab 2    LORazepam (ATIVAN) 1 mg tablet Take 1 Tab by mouth every eight (8) hours as needed for Anxiety. Max Daily Amount: 3 mg. 12 Tab 0    traZODone (DESYREL) 100 mg tablet Take 200 mg by mouth nightly.  escitalopram oxalate (LEXAPRO) 20 mg tablet Take 20 mg by mouth daily.  albuterol (PROVENTIL HFA, VENTOLIN HFA, PROAIR HFA) 90 mcg/actuation inhaler Take 2 Puffs by inhalation every four (4) hours as needed for Wheezing or Shortness of Breath. 1 Inhaler 4    naloxone 2 mg/actuation spry Use 1 spray intranasally into 1 nostril. Use a new Narcan nasal spray for subsequent doses and administer into alternating nostrils. May repeat every 2 to 3 minutes as needed. 1 Blister 0       Allergies: Allergies   Allergen Reactions    Cephalosporins Hives    Doxycycline Swelling    Penicillins Hives    Tetracyclines Nausea and Vomiting       LMP:  No LMP recorded. Patient has had an ablation. Social History     Social History    Marital status:      Spouse name: N/A    Number of children: N/A    Years of education: N/A     Occupational History    Not on file.      Social History Main Topics    Smoking status: Former Smoker     Packs/day: 0.50     Years: 8.00     Types: Cigarettes    Smokeless tobacco: Never Used    Alcohol use No    Drug use: No      Comment: 1 pack a day    Sexual activity: Yes     Partners: Male     Birth control/ protection: Pill     Other Topics Concern    Not on file     Social History Narrative         Family History   Problem Relation Age of Onset    Heart Disease Father     Cancer Mother      Son is special needs    Meaningful use: done      ROS:  Headaches:  no  Chest Pain:  no  SOB:  no  Fevers:  no  Falls:  yes  anxiety/depression/losing interest in doing things that were previously enjoyed:  PHQ2 = 6, PHQ9 = 20. No SI/HI  Other significant ROS:  Back spasms. She has seen vascular surgery Dr. Wende Castleman and had JEANNETTE    No LMP recorded. Patient has had an ablation. Physical Exam  Visit Vitals    /74 (BP 1 Location: Left arm, BP Patient Position: Sitting)    Pulse 87    Temp 98.4 °F (36.9 °C) (Oral)    Resp 16    Ht 5' 2\" (1.575 m)    Wt 109 lb (49.4 kg)    SpO2 97%    BMI 19.94 kg/m2     BP Readings from Last 3 Encounters:   08/30/17 110/74   08/21/17 122/72   08/15/17 124/70     Constitutional:  Appears aged and thin,  No Acute Distress, Vitals noted  Psychiatric:   Affect normal, Alert and cooperative, Oriented to person/place/time    Eyes:   Pupils equally round and reactive, EOMI, conjunctiva clear, eyelids normal  ENT:   External ears and nose normal/lips, teeth=poor dentition/gums normal, TMs and Orophyarynx normal  Neck:   general inspection and Thyroid normal.  No abnormal cervical or supraclavicular nodes    Lungs:   clear to auscultation, good respiratory effort  Heart: Ausculation normal.  Regular rhythm. No cardiac murmurs.   No carotid bruits or palpable thrills  Chest wall normal  Abd:  Surgical car healing, no organomegaly or masses  Extremities:   without edema, poor peripheral pulses  Skin:  Feet are cool and distal toes dark colored           Assessment:    Patient Active Problem List   Diagnosis Code    Crohn disease ([facility]) K50.90    Depression F32.9    Vertigo R42    Vitamin B12 deficiency E53.8    Peripheral neuropathy ([facility]) G62.9    Nonspecific abnormal electrocardiogram (ECG) (EKG) R94.31    Perforation bowel ([facility]) K63.1    Pneumonia J18.9    Anemia D64.9    Wounds, multiple open, lower extremity S81.809A    Thrombocytosis ([facility]) D47.3    Neutrophilia D72.9       Today's diagnoses are: ICD-10-CM ICD-9-CM    1. Perforation bowel (HCC) K63.1 569.83 CBC W/O DIFF      METABOLIC PANEL, COMPREHENSIVE   2. Vitamin B12 deficiency E53.8 266.2 VITAMIN B12      VITAMIN B12 INJECTION      THER/PROPH/DIAG INJECTION, SUBCUT/IM      cyanocobalamin (VITAMIN B-12) 1,000 mcg/mL injection   3. Anemia due to vitamin B12 deficiency, unspecified B12 deficiency type D51.9 281.1    4. Smoker F17.200 305.1 LA SMOKING AND TOBACCO USE CESSATION 3 - 10 MINUTES   5. Encounter for immunization Z23 V03.89 PNEUMOCOCCAL POLYSACCHARIDE VACCINE, 23-VALENT, ADULT OR IMMUNOSUPPRESSED PT DOSE,      INFLUENZA VACCINE QUADRIVALENT VIAL, SPLIT, 3 YRS PLUS IM   6. Back pain, unspecified back location, unspecified back pain laterality, unspecified chronicity M54.9 724.5 REFERRAL TO PHYSICAL THERAPY   7. Risk for falls Z91.81 V15.88 REFERRAL TO PHYSICAL THERAPY   8. Peripheral polyneuropathy ([facility]) G62.9 356.9    9. Peripheral vascular disease ([facility]) I73.9 443.9        Plan:  Orders Placed This Encounter    THER/PROPH/DIAG INJ, SC/IM ([id number])    PNEUMOCOCCAL POLYSACCHARIDE VACCINE, 23-VALENT, ADULT OR IMMUNOSUPPRESSED PT DOSE,    INFLUENZA VACCINE QUADRIVALENT VIAL, SPLIT, 3 YRS PLUS IM    VITAMIN B12    CBC W/O DIFF    METABOLIC PANEL, COMPREHENSIVE    REFERRAL TO PHYSICAL THERAPY     Referral Priority:   Routine     Referral Type:   PT/OT/ST     Referral Reason:   Specialty Services Required     Requested Specialty:   Physical Therapy    VITAMIN B12 INJECTION ()     Order Specific Question:   Charge Quantity? Answer:   1    LA SMOKING AND TOBACCO USE CESSATION 3 - 10 MINUTES    DISCONTD: baclofen (LIORESAL) 10 mg tablet     Sig: Take  by mouth three (3) times daily.  gabapentin (NEURONTIN) 100 mg capsule     Sig: Take  by mouth three (3) times daily.  traMADol (ULTRAM) 50 mg tablet     Sig: Take 1-2 Tabs by mouth every six (6) hours as needed for Pain. Max Daily Amount: 400 mg.      Dispense:  60 Tab     Refill:  0  cyanocobalamin (VITAMIN B-12) 1,000 mcg/mL injection     Si mL by IntraMUSCular route once for 1 dose. Dispense:  1 mL     Refill:  0       See patient instructions  Patient Instructions   Keep up with female exams    Stop smoking    Follow up with your specialists    Stop baclofen    Use tramadol and tylenol for pain    [facility] #[phone] or [facility] #[phone] or other local PT facility   Physical Therapy evaluate and treat:  Back pain and fall prevention    Please call Travis Gregorio to help arrange and authorize your tests and/or referrals.   Her # [phone]     Follow up in 2 weeks        refresh note:  done    AVS Printed:  done    Greater than 50% of today's 40 minute visit was counseling or coordination of care for the following reasons:    See diagnoses and orders, see patient instructions

## 2017-08-31 LAB
ALBUMIN SERPL-MCNC: 3.8 G/DL (ref 3.5–5.5)
ALBUMIN/GLOB SERPL: 1.4 {RATIO} (ref 1.2–2.2)
ALP SERPL-CCNC: 97 IU/L (ref 39–117)
ALT SERPL-CCNC: 11 IU/L (ref 0–32)
AST SERPL-CCNC: 15 IU/L (ref 0–40)
BILIRUB SERPL-MCNC: <0.2 MG/DL (ref 0–1.2)
BUN SERPL-MCNC: 13 MG/DL (ref 6–24)
BUN/CREAT SERPL: 18 (ref 9–23)
CALCIUM SERPL-MCNC: 9.8 MG/DL (ref 8.7–10.2)
CHLORIDE SERPL-SCNC: 103 MMOL/L (ref 96–106)
CO2 SERPL-SCNC: 21 MMOL/L (ref 18–29)
CREAT SERPL-MCNC: 0.74 MG/DL (ref 0.57–1)
ERYTHROCYTE [DISTWIDTH] IN BLOOD BY AUTOMATED COUNT: 18.8 % (ref 12.3–15.4)
GLOBULIN SER CALC-MCNC: 2.7 G/DL (ref 1.5–4.5)
GLUCOSE SERPL-MCNC: 70 MG/DL (ref 65–99)
HCT VFR BLD AUTO: 31.3 % (ref 34–46.6)
HGB BLD-MCNC: 9.5 G/DL (ref 11.1–15.9)
MCH RBC QN AUTO: 25.1 PG (ref 26.6–33)
MCHC RBC AUTO-ENTMCNC: 30.4 G/DL (ref 31.5–35.7)
MCV RBC AUTO: 83 FL (ref 79–97)
PLATELET # BLD AUTO: 369 X10E3/UL (ref 150–379)
POTASSIUM SERPL-SCNC: 4.6 MMOL/L (ref 3.5–5.2)
PROT SERPL-MCNC: 6.5 G/DL (ref 6–8.5)
RBC # BLD AUTO: 3.78 X10E6/UL (ref 3.77–5.28)
SODIUM SERPL-SCNC: 141 MMOL/L (ref 134–144)
VIT B12 SERPL-MCNC: >2000 PG/ML (ref 211–946)
WBC # BLD AUTO: 9.8 X10E3/UL (ref 3.4–10.8)

## 2017-09-06 NOTE — TELEPHONE ENCOUNTER
Patient is requesting a refill on  Requested Prescriptions     Pending Prescriptions Disp Refills    pantoprazole (PROTONIX) 40 mg tablet 30 Tab 2     Sig: Take 1 Tab by mouth Before breakfast and dinner.      Thank you

## 2017-09-07 RX ORDER — PANTOPRAZOLE SODIUM 40 MG/1
40 TABLET, DELAYED RELEASE ORAL
Qty: 30 TAB | Refills: 2 | Status: SHIPPED | OUTPATIENT
Start: 2017-09-07 | End: 2020-09-05

## 2017-09-15 ENCOUNTER — OFFICE VISIT (OUTPATIENT)
Dept: FAMILY MEDICINE CLINIC | Age: 47
End: 2017-09-15

## 2017-09-15 VITALS
BODY MASS INDEX: 19.51 KG/M2 | HEIGHT: 62 IN | HEART RATE: 112 BPM | RESPIRATION RATE: 16 BRPM | DIASTOLIC BLOOD PRESSURE: 67 MMHG | TEMPERATURE: 98.7 F | SYSTOLIC BLOOD PRESSURE: 103 MMHG | WEIGHT: 106 LBS | OXYGEN SATURATION: 98 %

## 2017-09-15 DIAGNOSIS — K50.918 CROHN'S DISEASE WITH OTHER COMPLICATION: ICD-10-CM

## 2017-09-15 DIAGNOSIS — K25.1: ICD-10-CM

## 2017-09-15 DIAGNOSIS — D51.9 ANEMIA DUE TO VITAMIN B12 DEFICIENCY, UNSPECIFIED B12 DEFICIENCY TYPE: Primary | ICD-10-CM

## 2017-09-15 DIAGNOSIS — R11.0 NAUSEA: ICD-10-CM

## 2017-09-15 RX ORDER — SIMETHICONE 80 MG
40 TABLET,CHEWABLE ORAL
Qty: 40 TAB | Refills: 1 | Status: SHIPPED | OUTPATIENT
Start: 2017-09-15 | End: 2020-09-05

## 2017-09-15 RX ORDER — PROCHLORPERAZINE MALEATE 5 MG
5 TABLET ORAL
Qty: 30 TAB | Refills: 0 | Status: SHIPPED | OUTPATIENT
Start: 2017-09-15 | End: 2017-09-22

## 2017-09-15 RX ORDER — CYANOCOBALAMIN 1000 UG/ML
1000 INJECTION, SOLUTION INTRAMUSCULAR; SUBCUTANEOUS ONCE
Qty: 1 ML | Refills: 0
Start: 2017-09-15 | End: 2017-09-15

## 2017-09-15 NOTE — LETTER
9/15/2017 2:52 PM 
 
Ms. Rhonda Ang is currently under the care of Lukas Alanis. Patient was seen 9/15/17 in our clinic for follow up from surgery. On July 4, 2017, Ms. Anisa Ang had the following procedures done for an acute gastric ulcer with perforation. 1.) Exploratory laparotomy 2.) Primary repair of perforated gastric ulcer x 2 
3.) Creation of omental intra-abdominal flap 
4.) Bilateral transversus abdominus plane block Please excuse her from work through 9/30/17 Sincerely, Krystyna Santana MD

## 2017-09-15 NOTE — PROGRESS NOTES
HPI     CC: follow up after last appointment. Emil Costa is a 56 y.o. female with hx of Crohn's disease, hx of perforated gastric ulcer, who presents for follow up. Was last seen 8/30/17 in clinic for follow up of perforated gastric ulcer s/p surgery 7/4/17; perforated ulcer thought to be related to chronic steroid use and Crohn's disease. Endorses improved upper abdominal pain, but still with some bilateral lower abdominal pain 5/10. Endorses nausea and occasional vomiting. Having regular bowel movements. No diarrhea. Is scheduled to see Dr. Jing Nava on Monday 9/18. States that she was started on B12 injections for her anemia; 1st injection was on her previous visit 8/30/17. Per pt, she was told to get a B12 injection every 2 weeks initially, then eventually would space out to monthly injections. Pt states her next dose is today. Stated that she is still off of work; states that [employer] was previously filled out. Was employed as a . Requesting letter to supervisors stating that she needs to be off work until 9/30 and to indicate what procedures      PMHx:  Past Medical History:   Diagnosis Date    Autoimmune disease ([facility]     Crohn's Disease    Crohn disease ([facility] 4/19/2010    Crohn's     Depression 4/19/2010    History of vascular access device 07/18/2017    [facility] VAT - 33 cm right brachial PICC for abx and limited access    Vertigo        Meds:   Current Outpatient Prescriptions   Medication Sig Dispense Refill    simethicone (MYLICON) 80 mg chewable tablet Take 0.5 Tabs by mouth every six (6) hours as needed for Flatulence. 40 Tab 1    prochlorperazine (COMPAZINE) 5 mg tablet Take 1 Tab by mouth every six (6) hours as needed for Nausea for up to 7 days. 30 Tab 0    cyanocobalamin (VITAMIN B-12) 1,000 mcg/mL injection 1 mL by IntraMUSCular route once for 1 dose. 1 mL 0    pantoprazole (PROTONIX) 40 mg tablet Take 1 Tab by mouth Before breakfast and dinner.  30 Tab 2  gabapentin (NEURONTIN) 100 mg capsule Take  by mouth three (3) times daily.  multivitamin capsule Take 1 Cap by mouth daily.  dextroamphetamine-amphetamine (ADDERALL) 20 mg tablet Take 20 mg by mouth three (3) times daily.  clonazePAM (KLONOPIN) 1 mg tablet Take 1 mg by mouth two (2) times a day.  LORazepam (ATIVAN) 1 mg tablet Take 1 Tab by mouth every eight (8) hours as needed for Anxiety. Max Daily Amount: 3 mg. 12 Tab 0    naloxone 2 mg/actuation spry Use 1 spray intranasally into 1 nostril. Use a new Narcan nasal spray for subsequent doses and administer into alternating nostrils. May repeat every 2 to 3 minutes as needed. 1 Blister 0    traZODone (DESYREL) 100 mg tablet Take 200 mg by mouth nightly.  escitalopram oxalate (LEXAPRO) 20 mg tablet Take 20 mg by mouth daily.  albuterol (PROVENTIL HFA, VENTOLIN HFA, PROAIR HFA) 90 mcg/actuation inhaler Take 2 Puffs by inhalation every four (4) hours as needed for Wheezing or Shortness of Breath. 1 Inhaler 4    traMADol (ULTRAM) 50 mg tablet Take 1-2 Tabs by mouth every six (6) hours as needed for Pain. Max Daily Amount: 400 mg. 60 Tab 0       Allergies: Allergies   Allergen Reactions    Cephalosporins Hives    Doxycycline Swelling    Penicillins Hives    Tetracyclines Nausea and Vomiting       Smoker:  History   Smoking Status    Former Smoker    Packs/day: 0.50    Years: 8.00    Types: Cigarettes   Smokeless Tobacco    Never Used       ETOH:   History   Alcohol Use No       FH:   Family History   Problem Relation Age of Onset    Heart Disease Father     Cancer Mother        ROS:  Review of Systems   Constitutional: Negative for chills, diaphoresis, fatigue and fever. Respiratory: Negative for cough, chest tightness and shortness of breath. Gastrointestinal: Positive for abdominal pain and nausea. Negative for abdominal distention, constipation and vomiting. Genitourinary: Negative for dysuria and hematuria. Skin: Negative for pallor and rash. Neurological: Negative for light-headedness and headaches. Physical Exam:  Visit Vitals    /67    Pulse (!) 112    Temp 98.7 °F (37.1 °C) (Oral)    Resp 16    Ht 5' 2\" (1.575 m)    Wt 106 lb (48.1 kg)    SpO2 98%    BMI 19.39 kg/m2       Wt Readings from Last 3 Encounters:   09/15/17 106 lb (48.1 kg)   08/30/17 109 lb (49.4 kg)   07/24/17 115 lb (52.2 kg)     BP Readings from Last 3 Encounters:   09/15/17 103/67   08/30/17 110/74   08/21/17 122/72        Physical Exam   Constitutional: She is oriented to person, place, and time. She appears well-developed and well-nourished. No distress. HENT:   Head: Normocephalic and atraumatic. Eyes: EOM are normal. No scleral icterus. Cardiovascular:   Regular rhythm. Tachycardic. No murmur   Pulmonary/Chest: Effort normal and breath sounds normal. No respiratory distress. She has no wheezes. Abdominal:   Soft, nondistended. Mildly TTP at bilateral lower abdomen. Midline abdominal incision well healed, c/d/i; incision nontender without surrounding erythema or drainage. No guarding or rebound   Musculoskeletal: Normal range of motion. Neurological: She is alert and oriented to person, place, and time. She exhibits normal muscle tone. Skin: Skin is warm and dry. She is not diaphoretic. Psychiatric: She has a normal mood and affect. Her behavior is normal.   Nursing note and vitals reviewed.            Assessment     56 y.o. female with history of Crohns disease, perforated gastric ulcer s/p surgical repair, depression, Anemia 2/2 B 12 deficiency presents for follow up of surgery and anemia:  Patient Active Problem List   Diagnosis Code    Cro[facility] disease ([facility] K50.90    Depression F32.9    Vertigo R42    Vitamin B12 deficiency E53.8    Peripheral neuropathy ([facility] G62.9    Nonspecific abnormal electrocardiogram (ECG) (EKG) R94.31    Perforation bowel (HCC) K63.1    Pneumonia J18.9    Anemia D64.9    Wounds, multiple open, lower extremity S81.809A    Thrombocytosis (HCC) D47.3    Neutrophilia D72.9       Today's diagnoses are:    ICD-10-CM ICD-9-CM    1. Anemia due to vitamin B12 deficiency, unspecified B12 deficiency type D51.9 281.1 VITAMIN B12 INJECTION      THER/PROPH/DIAG INJECTION, SUBCUT/IM   2. Gastric ulcer, acute with perforation ([facility]) K25.1 531.10    3. Crohn's disease with other complication ([facility] T29.240 555.9    4. Nausea R11.0 787.02               Plan     1. Anemia 2/2 B12 deficiency - started on B12 injections at 8/30 visit.   - B12 injection today    2. Perforated gastric ulcer s/p surgical repair - incision well healed, c/d/i, no signs of infection, erythema, or drainage. Regular BMs  - follow up with Dr. Leana Chávez on Monday as scheduled   - Mylicon for gaseous cramping abdominal pain   - Compazine for nausea     Follow up as needed    Prior labs and imaging were reviewed. I have discussed the diagnosis with the patient and the intended plan as seen in the above orders. The patient has received an after-visit summary and questions were answered concerning future plans. I have discussed medication side effects and warnings with the patient as well. Patient discussed with Dr. Ketty Storm, Attending Physician.     Ju Bautista MD, PGY2  Family Medicine Resident

## 2017-09-15 NOTE — PROGRESS NOTES
Chief Complaint   Patient presents with    Follow-up     bowel perforation     1. Have you been to the ER, urgent care clinic since your last visit? Hospitalized since your last visit? no    2. Have you seen or consulted any other health care providers outside of the [address] [place] [address] since your last visit? Include any pap smears or colon screening.   No      Surgery July 4th--    Increased protonix to 2 a day---    Seeing dr Alon Rice on Monday    FMLA--from work  --needs a reason for not being at work

## 2017-09-15 NOTE — MR AVS SNAPSHOT
Visit Information Date & Time Provider Department Dept. Phone Encounter #  
 9/15/2017  1:55 PM Mikayla Pollard, 1000 [facility] [phone] [id number] Your Appointments 9/29/2017  2:15 PM  
ROUTINE CARE with Mikayla Pollard MD  
1000 [facility] [address] [place] [address]) Appt Note: follow up from 09-15  
 [address] [place] [address],[facility] [address] [place] [address]  
[phone]  
  
   
 [address] [place] [address] 3 Simmie Mir [address] Upcoming Health Maintenance Date Due  
 PAP AKA CERVICAL CYTOLOGY 8/30/2018* Pneumococcal 19-64 Highest Risk (2 of 3 - PCV13) 8/30/2018 DTaP/Tdap/Td series (2 - Td) 10/8/2024 *Topic was postponed. The date shown is not the original due date. Allergies as of 9/15/2017  Review Complete On: 8/30/2017 By: Raf Renee LPN Severity Noted Reaction Type Reactions Cephalosporins  04/19/2010    Hives Doxycycline  07/22/2017    Swelling Penicillins  04/19/2010    Hives Tetracyclines  04/19/2010    Nausea and Vomiting Current Immunizations  Reviewed on 8/30/2017 Name Date Influenza Vaccine (Quad) PF 8/30/2017 Pneumococcal Polysaccharide (PPSV-23) 8/30/2017 Tdap 10/8/2014 Not reviewed this visit You Were Diagnosed With   
  
 Codes Comments Anemia due to vitamin B12 deficiency, unspecified B12 deficiency type    -  Primary ICD-10-CM: D51.9 ICD-9-CM: 281.1 Gastric ulcer, acute with perforation ([facility].)     ICD-10-CM: K25.1 ICD-9-CM: 531.10 Crohn's disease with other complication ([facility].)     [facility]-CI: D76.670 ICD-9-CM: 074. 9 Vitals BP Pulse Temp Resp Height(growth percentile) Weight(growth percentile) 103/67 (!) 112 98.7 °F (37.1 °C) (Oral) 16 5' 2\" (1.575 m) 106 lb (48.1 kg) SpO2 BMI OB Status Smoking Status 98% 19.39 kg/m2 Ablation Former Smoker Vitals History BMI and BSA Data  Body Mass Index Body Surface Area  
 19.39 kg/m 2 1.45 m 2  
  
  
 Preferred Pharmacy Pharmacy Name Phone [facility]ORE [address]  [facility] [phone] Your Updated Medication List  
  
   
This list is accurate as of: 9/15/17  5:16 PM.  Always use your most recent med list.  
  
  
  
  
 ADDERALL 20 mg tablet Generic drug:  dextroamphetamine-amphetamine Take 20 mg by mouth three (3) times daily. albuterol 90 mcg/actuation inhaler Commonly known as:  PROVENTIL HFA, VENTOLIN HFA, PROAIR HFA Take 2 Puffs by inhalation every four (4) hours as needed for Wheezing or Shortness of Breath. clonazePAM 1 mg tablet Commonly known as:  [facility] Take 1 mg by mouth two (2) times a day. cyanocobalamin 1,000 mcg/mL injection Commonly known as:  VITAMIN B-12  
1 mL by IntraMUSCular route once for 1 dose. gabapentin 100 mg capsule Commonly known as:  NEURONTIN Take  by mouth three (3) times daily. LEXAPRO 20 mg tablet Generic drug:  escitalopram oxalate Take 20 mg by mouth daily. LORazepam 1 mg tablet Commonly known as:  ATIVAN Take 1 Tab by mouth every eight (8) hours as needed for Anxiety. Max Daily Amount: 3 mg.  
  
 multivitamin capsule Take 1 Cap by mouth daily. naloxone 2 mg/actuation Spry Use 1 spray intranasally into 1 nostril. Use a new Narcan nasal spray for subsequent doses and administer into alternating nostrils. May repeat every 2 to 3 minutes as needed. pantoprazole 40 mg tablet Commonly known as:  PROTONIX Take 1 Tab by mouth Before breakfast and dinner. prochlorperazine 5 mg tablet Commonly known as:  COMPAZINE Take 1 Tab by mouth every six (6) hours as needed for Nausea for up to 7 days. simethicone 80 mg chewable tablet Commonly known as:  Celeste Chamber Take 0.5 Tabs by mouth every six (6) hours as needed for Flatulence. traMADol 50 mg tablet Commonly known as:  Chava Blakeing  
 Take 1-2 Tabs by mouth every six (6) hours as needed for Pain. Max Daily Amount: 400 mg.  
  
 traZODone 100 mg tablet Commonly known as:  Vicki Homme Take 200 mg by mouth nightly. Prescriptions Sent to Pharmacy Refills  
 simethicone (MYLICON) 80 mg chewable tablet 1 Sig: Take 0.5 Tabs by mouth every six (6) hours as needed for Flatulence. Class: Normal  
 [facility] [address] AT [facility] of [address] ([facility] Ph #: [phone] Route: Oral  
 prochlorperazine (COMPAZINE) 5 mg tablet 0 Sig: Take 1 Tab by mouth every six (6) hours as needed for Nausea for up to 7 days. Class: Normal  
 Pharmacy: [facility] [address] [place] [address] [address] AT [facility] of [address] ([facility] Ph #: [phone] Route: Oral  
  
We Performed the Following THER/PROPH/DIAG INJECTION, SUBCUT/IM [id number] CPT(R)] VITAMIN B12 INJECTION [ [facility]] Introducing [facility]! Dear Luca Spencer: Thank you for requesting a C[employer] account. Our records indicate that you already have an active Codingpeople account. You can access your account anytime at [email or web address] Did you know that you can access your hospital and ER discharge instructions at any time in [employer]? You can also review all of your test results from your hospital stay or ER visit. Additional Information If you have questions, please visit the Frequently Asked Questions section of the [employer] website at [email or web address] Remember, [employer] is NOT to be used for urgent needs. For medical emergencies, dial 911. Now available from your iPhone and Android! Please provide this summary of care documentation to your next provider. Your primary care clinician is listed as Lucy Wiseman. If you have any questions after today's visit, please call [phone].

## 2017-09-15 NOTE — PROGRESS NOTES
56year old female with hx of Crohn's disease -- s/p recent perforated gastric ulcer    Scheduled to see Dr. Jessica Cardona on Monday    Also here for anemia -- Dr. Jayde Lopez started her on vitamin B12    I reviewed with the resident the medical history and the resident's findings on the physical examination. I discussed with the resident the patient's diagnosis and concur with the plan.

## 2017-09-29 ENCOUNTER — OFFICE VISIT (OUTPATIENT)
Dept: FAMILY MEDICINE CLINIC | Age: 47
End: 2017-09-29

## 2017-09-29 VITALS
HEIGHT: 62 IN | TEMPERATURE: 98.8 F | OXYGEN SATURATION: 98 % | RESPIRATION RATE: 16 BRPM | HEART RATE: 106 BPM | SYSTOLIC BLOOD PRESSURE: 103 MMHG | BODY MASS INDEX: 19.51 KG/M2 | DIASTOLIC BLOOD PRESSURE: 62 MMHG | WEIGHT: 106 LBS

## 2017-09-29 DIAGNOSIS — K25.5 CHRONIC GASTRIC ULCER WITH PERFORATION (HCC): ICD-10-CM

## 2017-09-29 DIAGNOSIS — K50.919 CROHN'S DISEASE WITH COMPLICATION, UNSPECIFIED GASTROINTESTINAL TRACT LOCATION (HCC): ICD-10-CM

## 2017-09-29 DIAGNOSIS — J01.90 ACUTE BACTERIAL SINUSITIS: ICD-10-CM

## 2017-09-29 DIAGNOSIS — E53.8 VITAMIN B 12 DEFICIENCY: Primary | ICD-10-CM

## 2017-09-29 DIAGNOSIS — B96.89 ACUTE BACTERIAL SINUSITIS: ICD-10-CM

## 2017-09-29 RX ORDER — LEVOFLOXACIN 750 MG/1
750 TABLET ORAL DAILY
Qty: 5 TAB | Refills: 0 | Status: SHIPPED | OUTPATIENT
Start: 2017-09-29 | End: 2017-10-04

## 2017-09-29 RX ORDER — CYANOCOBALAMIN 1000 UG/ML
1000 INJECTION, SOLUTION INTRAMUSCULAR; SUBCUTANEOUS ONCE
Qty: 1 ML | Refills: 0
Start: 2017-09-29 | End: 2017-09-29

## 2017-09-29 NOTE — LETTER
NOTIFICATION RETURN TO WORK / SCHOOL 
 
9/29/2017 2:55 PM 
 
Ms. Mara Segura [id number] [address] [place] [address] To Whom It May Concern: 
 
Mara Segura is [facility] the [facility] Patient was seen in the office today 9/29/17. She is continuing to participate in physical therapy. She will be out of work until at least 10/13/17, when she will be scheduled for her next clinic visit If there are questions or concerns please have the patient contact our office. Sincerely, Yamilka Ace MD

## 2017-09-29 NOTE — PROGRESS NOTES
Chief Complaint   Patient presents with    Follow-up     Pt is being seen for follow-up appt due to bowel obstruction.  PT states that condition has subsided half way

## 2017-09-29 NOTE — PROGRESS NOTES
HPI     CC: Vitamin B12 injection and follow up s/p gastric perforation. Sinus infection     Paul Niño is a 56 y.o. female with hx of Crohn's disease, hx of perforated gastric ulcer, and Anemia 2/2 vitamin B12 deficiency who presents for follow up for Vitamin B12 injection and s/p gastric perforation. Was last seen 9/15/17 in clinic for follow up of perforated gastric ulcer s/p surgery 7/4/17; perforated ulcer thought to be related to chronic steroid use and Crohn's disease. Endorses improved upper abdominal pain; pain mildly improved after starting Mylicon. Continues to have loose bowel movements. S/p colonoscopy 9/21/17 with Dr. Radha Pitts, showed that gastric ulcer is not fully healed. Biopsy did not show cancer and patient was recommended to continue Protonix BID.     States that she was started on B12 injections for her anemia by Dr. Azar Pérez; 1st injection was on her previous visit 8/30/17. Per pt, she was told to get a B12 injection every 2 weeks initially, then eventually would space out to monthly injections. Pt states her next dose is today.      Pt is still off of work due to continued pain and difficulty doing her job as a ; states that FMLA was previously filled out. Requesting letter to supervisors stating that she needs to be off work until 10/13      Also endorsed productive cough, sinus tenderness, rhinorrhea and congestion x7 days. Has been afebrile but with chills. Symptoms have progressively worsened over the past 7 days.      PMHx:  Past Medical History:   Diagnosis Date    Autoimmune disease ([facility]     Crohn's Disease    Crohn disease ([facility] 4/19/2010    Crohn's     Depression 4/19/2010    History of vascular access device 07/18/2017    [facility] VAT - 33 cm right brachial PICC for abx and limited access    Vertigo        Meds:   Current Outpatient Prescriptions   Medication Sig Dispense Refill    simethicone (MYLICON) 80 mg chewable tablet Take 0.5 Tabs by mouth every six (6) hours as needed for Flatulence. 40 Tab 1    pantoprazole (PROTONIX) 40 mg tablet Take 1 Tab by mouth Before breakfast and dinner. 30 Tab 2    gabapentin (NEURONTIN) 100 mg capsule Take  by mouth three (3) times daily.  traMADol (ULTRAM) 50 mg tablet Take 1-2 Tabs by mouth every six (6) hours as needed for Pain. Max Daily Amount: 400 mg. 60 Tab 0    multivitamin capsule Take 1 Cap by mouth daily.  dextroamphetamine-amphetamine (ADDERALL) 20 mg tablet Take 20 mg by mouth three (3) times daily.  clonazePAM (KLONOPIN) 1 mg tablet Take 1 mg by mouth two (2) times a day.  LORazepam (ATIVAN) 1 mg tablet Take 1 Tab by mouth every eight (8) hours as needed for Anxiety. Max Daily Amount: 3 mg. 12 Tab 0    naloxone 2 mg/actuation spry Use 1 spray intranasally into 1 nostril. Use a new Narcan nasal spray for subsequent doses and administer into alternating nostrils. May repeat every 2 to 3 minutes as needed. 1 Blister 0    traZODone (DESYREL) 100 mg tablet Take 200 mg by mouth nightly.  escitalopram oxalate (LEXAPRO) 20 mg tablet Take 20 mg by mouth daily.  albuterol (PROVENTIL HFA, VENTOLIN HFA, PROAIR HFA) 90 mcg/actuation inhaler Take 2 Puffs by inhalation every four (4) hours as needed for Wheezing or Shortness of Breath. 1 Inhaler 4       Allergies: Allergies   Allergen Reactions    Cephalosporins Hives    Doxycycline Swelling    Penicillins Hives    Tetracyclines Nausea and Vomiting       Smoker:  History   Smoking Status    Former Smoker    Packs/day: 0.50    Years: 8.00    Types: Cigarettes   Smokeless Tobacco    Never Used       ETOH:   History   Alcohol Use No       FH:   Family History   Problem Relation Age of Onset    Heart Disease Father     Cancer Mother        ROS:  Review of Systems   Constitutional: Positive for chills. Negative for activity change, appetite change, diaphoresis, fatigue and fever.    HENT: Positive for congestion, sinus pain and sinus pressure. Negative for ear pain, postnasal drip, rhinorrhea, sneezing, sore throat and trouble swallowing. Eyes: Negative for visual disturbance. Respiratory: Positive for cough. Negative for shortness of breath and wheezing. Cardiovascular: Negative for chest pain. Gastrointestinal: Positive for abdominal pain (improved from before ). Negative for blood in stool, constipation, nausea and vomiting. Diarrhea: loose stools. Genitourinary: Negative for dysuria and hematuria. Musculoskeletal: Negative for myalgias. Skin: Negative for rash. Neurological: Negative for dizziness and headaches. Physical Exam:  Visit Vitals    Temp 98.8 °F (37.1 °C) (Oral)    Resp 16    Ht 5' 2\" (1.575 m)    Wt 106 lb (48.1 kg)    BMI 19.39 kg/m2       Wt Readings from Last 3 Encounters:   09/29/17 106 lb (48.1 kg)   09/15/17 106 lb (48.1 kg)   08/30/17 109 lb (49.4 kg)     BP Readings from Last 3 Encounters:   09/29/17 103/62   09/15/17 103/67   08/30/17 110/74        Physical Exam   Constitutional: She is oriented to person, place, and time. She appears well-developed and well-nourished. No distress. HENT:   NCAT. Normal conjunctiva. TM normal bilaterally. + frontal and paranasal sinus tenderness. Posterior oropharynx erythematous, without tonsillar enlargement or exudate. No cervical adenopathy. Cardiovascular: Normal rate and regular rhythm. Pulmonary/Chest: Effort normal and breath sounds normal. No respiratory distress. She has no wheezes. Abdominal:   Soft, nondistended. nontender to palpation. Surgical incision at midline c/d/i, well healed. No guarding. Neurological: She is alert and oriented to person, place, and time. She exhibits normal muscle tone. Coordination normal.   Skin: Skin is warm and dry. She is not diaphoretic. Psychiatric: She has a normal mood and affect. Her behavior is normal. Judgment and thought content normal.   Nursing note and vitals reviewed.            Assessment 56 y.o. female with hx of Crohn's disease, hx of perforated gastric ulcer, and Anemia 2/2 vitamin B12 deficiency who presents for follow up for Vitamin B12 injection, follow up s/p gastric perforation, and sinusitis. Patient Active Problem List   Diagnosis Code    Crohn disease ([place] [address] K50.90    Depression F32.9    Vertigo R42    Vitamin B12 deficiency E53.8    Peripheral neuropathy ([place] [address].) G62.9    Nonspecific abnormal electrocardiogram (ECG) (EKG) R94.31    Perforation bowel ([facility]) K63.1    Pneumonia J18.9    Anemia D64.9    Wounds, multiple open, lower extremity S81.809A    Thrombocytosis ([facility]) D47.3    Neutrophilia D72.9       Today's diagnoses are:    ICD-10-CM ICD-9-CM    1. Vitamin B 12 deficiency E53.8 266.2 VITAMIN B12 INJECTION      THER/PROPH/DIAG INJECTION, SUBCUT/IM   2. Crohn's disease with complication, unspecified gastrointestinal tract location ([place] [address].) K50.919 555.9    3. Chronic gastric ulcer with perforation ([place] [address].) K25.5 531.50    4. Acute bacterial sinusitis J01.90 461.9     B96.89                Plan     1. Anemia 2/2 Vitamin B12 deficiency - started on B12 injections at 8/30 appointment with Dr. Azar Pérez. Has received 2 B12 injections already.   - Vitamin B12 injection today     2. History of Perforated gastric ulcer s/p surgical repair, Crohn's Disease - incision well healed, c/d/i, no signs of infection, erythema, or drainage. Regular BMs  - follow up with Dr. Radha Will for gaseous cramping abdominal pain   - Compazine for nausea     3. Acute bacterial sinusitis - symptoms x 1 week, with frontal and paranasal sinus tenderness. Afebrile. + chills. Worsening symptoms.   - Levaquin 750 mg daily x 5 days. Although Augmentin is first line initial treatment for acute bacterial sinusitis, pt with allergies to Penicillin; pt is also allergic to Doxycycline and Cephalosporins, which are the initial treatments in Penicillin-allergic patients.      Follow up in 2 weeks    Prior labs and imaging were reviewed. I have discussed the diagnosis with the patient and the intended plan as seen in the above orders. The patient has received an after-visit summary and questions were answered concerning future plans. I have discussed medication side effects and warnings with the patient as well. Patient discussed with Dr. Danielle Cruz, Attending Physician.     Karen Palafox MD, PGY2  Family Medicine Resident

## 2017-09-29 NOTE — MR AVS SNAPSHOT
Visit Information Date & Time Provider Department Dept. Phone Encounter #  
 9/29/2017  2:15 PM Deb Holm, [facility] [phone] [id number] Upcoming Health Maintenance Date Due  
 PAP AKA CERVICAL CYTOLOGY 8/30/2018* Pneumococcal 19-64 Highest Risk (2 of 3 - PCV13) 8/30/2018 DTaP/Tdap/Td series (2 - Td) 10/8/2024 *Topic was postponed. The date shown is not the original due date. Allergies as of 9/29/2017  Review Complete On: 9/29/2017 By: Liset Correia Severity Noted Reaction Type Reactions Cephalosporins  04/19/2010    Hives Doxycycline  07/22/2017    Swelling Penicillins  04/19/2010    Hives Tetracyclines  04/19/2010    Nausea and Vomiting Current Immunizations  Reviewed on 8/30/2017 Name Date Influenza Vaccine (Quad) PF 8/30/2017 Pneumococcal Polysaccharide (PPSV-23) 8/30/2017 Tdap 10/8/2014 Not reviewed this visit You Were Diagnosed With   
  
 Codes Comments Vitamin B 12 deficiency    -  Primary ICD-10-CM: E53.8 ICD-9-CM: 266.2 Vitals BP Pulse Temp Resp Height(growth percentile) Weight(growth percentile) 103/62 (BP 1 Location: Right arm, BP Patient Position: Sitting) (!) 106 98.8 °F (37.1 °C) (Oral) 16 5' 2\" (1.575 m) 106 lb (48.1 kg) SpO2 BMI OB Status Smoking Status 98% 19.39 kg/m2 Ablation Former Smoker Vitals History BMI and BSA Data Body Mass Index Body Surface Area  
 19.39 kg/m 2 1.45 m 2 Preferred Pharmacy Pharmacy Name Phone [facility] DR[facility]ORE [address]  [facility] [phone] Your Updated Medication List  
  
   
This list is accurate as of: 9/29/17  2:59 PM.  Always use your most recent med list.  
  
  
  
  
 ADDERALL 20 mg tablet Generic drug:  dextroamphetamine-amphetamine Take 20 mg by mouth three (3) times daily. albuterol 90 mcg/actuation inhaler Commonly known as:  PROVENTIL HFA, VENTOLIN HFA, PROAIR HFA Take 2 Puffs by inhalation every four (4) hours as needed for Wheezing or Shortness of Breath. clonazePAM 1 mg tablet Commonly known as:  Layman Sella Take 1 mg by mouth two (2) times a day. cyanocobalamin 1,000 mcg/mL injection Commonly known as:  VITAMIN B-12  
1 mL by IntraMUSCular route once for 1 dose. gabapentin 100 mg capsule Commonly known as:  NEURONTIN Take  by mouth three (3) times daily. levoFLOXacin 750 mg tablet Commonly known as:  Ana Maria Danes Take 1 Tab by mouth daily for 5 days. LEXAPRO 20 mg tablet Generic drug:  escitalopram oxalate Take 20 mg by mouth daily. LORazepam 1 mg tablet Commonly known as:  ATIVAN Take 1 Tab by mouth every eight (8) hours as needed for Anxiety. Max Daily Amount: 3 mg.  
  
 multivitamin capsule Take 1 Cap by mouth daily. naloxone 2 mg/actuation Spry Use 1 spray intranasally into 1 nostril. Use a new Narcan nasal spray for subsequent doses and administer into alternating nostrils. May repeat every 2 to 3 minutes as needed. pantoprazole 40 mg tablet Commonly known as:  PROTONIX Take 1 Tab by mouth Before breakfast and dinner. simethicone 80 mg chewable tablet Commonly known as:  Ellender Drown Take 0.5 Tabs by mouth every six (6) hours as needed for Flatulence. traMADol 50 mg tablet Commonly known as:  ULTRAM  
Take 1-2 Tabs by mouth every six (6) hours as needed for Pain. Max Daily Amount: 400 mg.  
  
 traZODone 100 mg tablet Commonly known as:  Nirmala Hail Take 200 mg by mouth nightly. Prescriptions Sent to Pharmacy Refills  
 levoFLOXacin (LEVAQUIN) 750 mg tablet 0 Sig: Take 1 Tab by mouth daily for 5 days. Class: Normal  
 Pharmacy: [facility] [address] [place] [address] [address] AT [facility] of [address] ([facility] Ph #: [phone]  Route: Oral  
  
 We Performed the Following THER/PROPH/DIAG INJECTION, SUBCUT/IM [id number] CPT(R)] VITAMIN B12 INJECTION [ [facility]] Introducing [facility]! Dear Washington Dennis: Thank you for requesting a [employer] account. Our records indicate that you already have an active [employer] account. You can access your account anytime at [email or web address] Did you know that you can access your hospital and ER discharge instructions at any time in [employer]? You can also review all of your test results from your hospital stay or ER visit. Additional Information If you have questions, please visit the Frequently Asked Questions section of the [employer] website at [email or web address] Remember, [employer] is NOT to be used for urgent needs. For medical emergencies, dial 911. Now available from your iPhone and Android! Please provide this summary of care documentation to your next provider. Your primary care clinician is listed as Camelia Orellana. If you have any questions after today's visit, please call [phone].

## 2017-10-11 ENCOUNTER — OFFICE VISIT (OUTPATIENT)
Dept: FAMILY MEDICINE CLINIC | Age: 47
End: 2017-10-11

## 2017-10-11 VITALS
OXYGEN SATURATION: 97 % | HEART RATE: 89 BPM | DIASTOLIC BLOOD PRESSURE: 68 MMHG | SYSTOLIC BLOOD PRESSURE: 116 MMHG | HEIGHT: 62 IN | RESPIRATION RATE: 18 BRPM | WEIGHT: 106 LBS | TEMPERATURE: 97.6 F | BODY MASS INDEX: 19.51 KG/M2

## 2017-10-11 DIAGNOSIS — D51.9 ANEMIA DUE TO VITAMIN B12 DEFICIENCY, UNSPECIFIED B12 DEFICIENCY TYPE: ICD-10-CM

## 2017-10-11 DIAGNOSIS — M54.50 ACUTE MIDLINE LOW BACK PAIN WITHOUT SCIATICA: Primary | ICD-10-CM

## 2017-10-11 RX ORDER — TRAMADOL HYDROCHLORIDE 50 MG/1
50 TABLET ORAL
Qty: 30 TAB | Refills: 0 | Status: SHIPPED | OUTPATIENT
Start: 2017-10-11 | End: 2017-12-22 | Stop reason: ALTCHOICE

## 2017-10-11 RX ORDER — TRAMADOL HYDROCHLORIDE 50 MG/1
50 TABLET ORAL
Qty: 30 TAB | Refills: 0 | Status: SHIPPED | OUTPATIENT
Start: 2017-10-11 | End: 2017-10-11 | Stop reason: SDUPTHER

## 2017-10-11 RX ORDER — CYANOCOBALAMIN 1000 UG/ML
1000 INJECTION, SOLUTION INTRAMUSCULAR; SUBCUTANEOUS ONCE
Qty: 1 VIAL | Refills: 0
Start: 2017-10-11 | End: 2017-10-11

## 2017-10-11 NOTE — LETTER
10/11/2017 10:31 AM 
 
Ms. Christian Joshua [id number] [address] [address] Due to low back pain, please hold on Physical Therapy until Monday October 16, 2017 If there are questions, please call our office. Sincerely, Jaky Graham MD

## 2017-10-11 NOTE — PROGRESS NOTES
HPI     CC: low back pain, Vitamin B12 injection     Malinda Wyman is a 56 y.o. female with hx of Crohn's disease, hx of perforated gastric ulcer s/p repair, and anemia 2/2 vitamin B12 deficiency, who presents for low back pain and B12 injection. Low back pain  - endorsed pain starting 10/4/17, after she bumped her low back during physical therapy. - denies change in sensation or strength. Denies bowel or bladder incontinence. - pain 10/10 today; Pain has not changed over the weekend. - is unable to take Motrin due to hx of Crohn's and hx of perforated gastric ulcer   - requested to hold PT sessions until Monday 10/16/17       Anemia 2/2 Vitamin B12 deficiency   - 1st injection 8/30/17   - was started on B12 injection by Dr. Erika Bob. Per pt, she was to receive B12 injection every 2 weeks initially, then eventually would space out to monthly injections      PMHx:  Past Medical History:   Diagnosis Date    Autoimmune disease ([facility] [address]     Crohn's Disease    Crohn disease ([facility] [address] 4/19/2010    Crohn's     Depression 4/19/2010    History of vascular access device 07/18/2017    [facility] VAT - 33 cm right brachial PICC for abx and limited access    Vertigo        Meds:   Current Outpatient Prescriptions   Medication Sig Dispense Refill    traMADol (ULTRAM) 50 mg tablet Take 1 Tab by mouth every six (6) hours as needed for Pain. Max Daily Amount: 200 mg. 30 Tab 0    cyanocobalamin (VITAMIN B12) 1,000 mcg/mL injection 1 mL by IntraMUSCular route once for 1 dose. 1 Vial 0    simethicone (MYLICON) 80 mg chewable tablet Take 0.5 Tabs by mouth every six (6) hours as needed for Flatulence. 40 Tab 1    pantoprazole (PROTONIX) 40 mg tablet Take 1 Tab by mouth Before breakfast and dinner. 30 Tab 2    gabapentin (NEURONTIN) 100 mg capsule Take  by mouth three (3) times daily.  multivitamin capsule Take 1 Cap by mouth daily.       dextroamphetamine-amphetamine (ADDERALL) 20 mg tablet Take 20 mg by mouth three (3) times daily.  clonazePAM (KLONOPIN) 1 mg tablet Take 1 mg by mouth two (2) times a day.  LORazepam (ATIVAN) 1 mg tablet Take 1 Tab by mouth every eight (8) hours as needed for Anxiety. Max Daily Amount: 3 mg. 12 Tab 0    naloxone 2 mg/actuation spry Use 1 spray intranasally into 1 nostril. Use a new Narcan nasal spray for subsequent doses and administer into alternating nostrils. May repeat every 2 to 3 minutes as needed. 1 Blister 0    traZODone (DESYREL) 100 mg tablet Take 200 mg by mouth nightly.  escitalopram oxalate (LEXAPRO) 20 mg tablet Take 20 mg by mouth daily.  albuterol (PROVENTIL HFA, VENTOLIN HFA, PROAIR HFA) 90 mcg/actuation inhaler Take 2 Puffs by inhalation every four (4) hours as needed for Wheezing or Shortness of Breath. 1 Inhaler 4       Allergies: Allergies   Allergen Reactions    Cephalosporins Hives    Doxycycline Swelling    Penicillins Hives    Tetracyclines Nausea and Vomiting       Smoker:  History   Smoking Status    Former Smoker    Packs/day: 0.50    Years: 8.00    Types: Cigarettes   Smokeless Tobacco    Never Used       ETOH:   History   Alcohol Use No       FH:   Family History   Problem Relation Age of Onset    Heart Disease Father     Cancer Mother        ROS:  Review of Systems   Constitutional: Negative for activity change, appetite change, chills, diaphoresis, fatigue and fever. Respiratory: Negative for shortness of breath and wheezing. Cardiovascular: Negative for chest pain. Gastrointestinal: Negative for nausea and vomiting. Musculoskeletal: Positive for back pain. Negative for arthralgias, myalgias and neck pain. Skin: Negative for color change and wound. Neurological: Negative for dizziness, weakness and numbness.        Physical Exam:  Visit Vitals    /68 (BP 1 Location: Left arm, BP Patient Position: Sitting)    Pulse 89    Temp 97.6 °F (36.4 °C) (Oral)    Resp 18    Ht 5' 2\" (1.575 m)    Wt 106 lb (48.1 kg)  SpO2 97%    BMI 19.39 kg/m2       Wt Readings from Last 3 Encounters:   10/11/17 106 lb (48.1 kg)   09/29/17 106 lb (48.1 kg)   09/15/17 106 lb (48.1 kg)     BP Readings from Last 3 Encounters:   10/11/17 116/68   09/29/17 103/62   09/15/17 103/67        Physical Exam   Constitutional: She appears well-developed and well-nourished. No distress. HENT:   Head: Normocephalic and atraumatic. Eyes: Conjunctivae are normal.   Cardiovascular: Normal rate and regular rhythm. Pulmonary/Chest: Effort normal. No respiratory distress. She has no wheezes. Musculoskeletal:   No spinal tenderness. Lumbar paraspinal muscle TTP. No open wounds or laceration. Neurological:   AOx3. Normal coordination and tone. Sensation intact. No saddle anesthesia. Strength normal. Able to ambulate. Skin: She is not diaphoretic. Warm, dry. No ecchymosis or discoloration at site of pain. Psychiatric: She has a normal mood and affect. Her behavior is normal.   Nursing note and vitals reviewed. Assessment     56 y.o. female with hx of Crohn's disease, hx of gastric perforation s/p repair, Vitamin B12 deficiency, who presents for low back pain and Vitamin B12 injection. Patient Active Problem List   Diagnosis Code    [facility] K50.90    Depression F32.9    Vertigo R42    Vitamin B12 deficiency E53.8    Peripheral neuropathy G62.9    Nonspecific abnormal electrocardiogram (ECG) (EKG) R94.31    Perforation bowel ([facility]) K63.1    Pneumonia J18.9    Anemia D64.9    Wounds, multiple open, lower extremity S81.809A    Thrombocytosis ([facility]) D47.3    Neutrophilia D72.9       Today's diagnoses are:    ICD-10-CM ICD-9-CM    1. Acute midline low back pain without sciatica M54.5 724.2 10-PANEL URINE DRUG SCREEN   2.  Anemia due to vitamin B12 deficiency, unspecified B12 deficiency type D51.9 281.1 VITAMIN B12 INJECTION      OK THER/PROPH/DIAG INJECTION, SUBCUT/IM      cyanocobalamin (VITAMIN B12) 1,000 mcg/mL injection              Plan     1. Acute lumbar back pain - likely from bruising of bone, which she hit at PT. Neuro exam reassuring; no change in sensation, saddle anesthesia, change in strength, or incontinence. - Tramadol 50 mg Q6H PRN for pain. Unable to tolerate NSAID/ ibuprofen given hx of gastric ulcer   - UDS     2. Vitamin B12 deficiency - started 8/30/17 with Dr. Suggs Patient. Last injection 9/29/17   - B12 injection today     Follow up as needed    Prior labs and imaging were reviewed. I have discussed the diagnosis with the patient and the intended plan as seen in the above orders. The patient has received an after-visit summary and questions were answered concerning future plans. I have discussed medication side effects and warnings with the patient as well. Patient discussed with Dr. Joey Guillermo, Attending Physician.     Carrillo Moeller MD, PGY2  Family Medicine Resident

## 2017-10-11 NOTE — LETTER
NOTIFICATION RETURN TO WORK / SCHOOL 
 
10/11/2017 10:32 AM 
 
Ms. Rhina Vences [address] [facility] [address] [address] To Whom It May Concern: 
 
Rhina Vences is currently under the care of [address] [facility]. She is recovering from pain from her abdominal surgery. She will be out of work until at least 11/3/2017, which is her next office appointment. If there are questions or concerns please have the patient contact our office. Sincerely, Brady Ceballos MD

## 2017-10-11 NOTE — MR AVS SNAPSHOT
Visit Information Date & Time Provider Department Dept. Phone Encounter #  
 10/11/2017  9:50 AM Dave Short MD [address] [place] [address] [phone] [id number] Upcoming Health Maintenance Date Due  
 PAP AKA CERVICAL CYTOLOGY 8/30/2018* Pneumococcal 19-64 Highest Risk (2 of 3 - PCV13) 8/30/2018 DTaP/Tdap/Td series (2 - Td) 10/8/2024 *Topic was postponed. The date shown is not the original due date. Allergies as of 10/11/2017  Review Complete On: 9/29/2017 By: Ivet Riggs Severity Noted Reaction Type Reactions Cephalosporins  04/19/2010    Hives Doxycycline  07/22/2017    Swelling Penicillins  04/19/2010    Hives Tetracyclines  04/19/2010    Nausea and Vomiting Current Immunizations  Reviewed on 8/30/2017 Name Date Influenza Vaccine (Quad) PF 8/30/2017 Pneumococcal Polysaccharide (PPSV-23) 8/30/2017 Tdap 10/8/2014 Not reviewed this visit Vitals BP Pulse Temp Resp Height(growth percentile) Weight(growth percentile) 116/68 (BP 1 Location: Left arm, BP Patient Position: Sitting) 89 97.6 °F (36.4 °C) (Oral) 18 5' 2\" (1.575 m) 106 lb (48.1 kg) SpO2 BMI OB Status Smoking Status 97% 19.39 kg/m2 Ablation Former Smoker BMI and BSA Data Body Mass Index Body Surface Area  
 19.39 kg/m 2 1.45 m 2 Preferred Pharmacy Pharmacy Name Phone [facility] [address] [place] [address] [address]  [facility] [phone] Your Updated Medication List  
  
   
This list is accurate as of: 10/11/17 10:42 AM.  Always use your most recent med list.  
  
  
  
  
 ADDERALL 20 mg tablet Generic drug:  dextroamphetamine-amphetamine Take 20 mg by mouth three (3) times daily. albuterol 90 mcg/actuation inhaler Commonly known as:  PROVENTIL HFA, VENTOLIN HFA, PROAIR HFA Take 2 Puffs by inhalation every four (4) hours as needed for Wheezing or Shortness of Breath. clonazePAM 1 mg tablet Commonly known as:  Payne Aw Take 1 mg by mouth two (2) times a day.  
  
 gabapentin 100 mg capsule Commonly known as:  NEURONTIN Take  by mouth three (3) times daily. LEXAPRO 20 mg tablet Generic drug:  escitalopram oxalate Take 20 mg by mouth daily. LORazepam 1 mg tablet Commonly known as:  ATIVAN Take 1 Tab by mouth every eight (8) hours as needed for Anxiety. Max Daily Amount: 3 mg.  
  
 multivitamin capsule Take 1 Cap by mouth daily. naloxone 2 mg/actuation Spry Use 1 spray intranasally into 1 nostril. Use a new Narcan nasal spray for subsequent doses and administer into alternating nostrils. May repeat every 2 to 3 minutes as needed. pantoprazole 40 mg tablet Commonly known as:  PROTONIX Take 1 Tab by mouth Before breakfast and dinner. simethicone 80 mg chewable tablet Commonly known as:  Joshua Berne Take 0.5 Tabs by mouth every six (6) hours as needed for Flatulence. traMADol 50 mg tablet Commonly known as:  ULTRAM  
Take 1 Tab by mouth every six (6) hours as needed for Pain. Max Daily Amount: 200 mg.  
  
 traZODone 100 mg tablet Commonly known as:  Tavares Voodoo Take 200 mg by mouth nightly. Prescriptions Printed Refills  
 traMADol (ULTRAM) 50 mg tablet 0 Sig: Take 1 Tab by mouth every six (6) hours as needed for Pain. Max Daily Amount: 200 mg. Class: Print Route: Oral  
  
Introducing [facility]! Dear Delmy Yu: Thank you for requesting a [employer] account. Our records indicate that you already have an active INc[employer] account. You can access your account anytime at [email or web address] Did you know that you can access your hospital and ER discharge instructions at any time in [employer]? You can also review all of your test results from your hospital stay or ER visit. Additional Information If you have questions, please visit the Frequently Asked Questions section of the [employer] website at [email or web address] Remember, [employer] is NOT to be used for urgent needs. For medical emergencies, dial 911. Now available from your iPhone and Android! Please provide this summary of care documentation to your next provider. Your primary care clinician is listed as Jud Hernandez. If you have any questions after today's visit, please call [phone].

## 2017-10-13 LAB
AMPHET+METHAMPHET UR QL: POSITIVE
BARBITURATES UR QL SCN: NEGATIVE NG/ML
BENZODIAZ UR QL: NEGATIVE NG/ML
BZE UR QL: NEGATIVE NG/ML
CANNABINOIDS UR QL SCN: NEGATIVE NG/ML
MDMA UR QL SCN: NEGATIVE NG/ML
METHADONE UR QL SCN: NEGATIVE NG/ML
METHAQUALONE UR QL: NEGATIVE NG/ML
OPIATES UR QL: NEGATIVE NG/ML
PCP UR QL: NEGATIVE NG/ML
PROPOXYPH UR QL: NEGATIVE NG/ML

## 2017-10-23 NOTE — TELEPHONE ENCOUNTER
Patient needs a refill of the following  Requested Prescriptions     Pending Prescriptions Disp Refills    gabapentin (NEURONTIN) 100 mg capsule       Sig: Take  by mouth three (3) times daily.

## 2017-10-24 RX ORDER — GABAPENTIN 100 MG/1
100 CAPSULE ORAL
Qty: 90 CAP | Refills: 0 | Status: SHIPPED | OUTPATIENT
Start: 2017-10-24 | End: 2017-11-27 | Stop reason: SDUPTHER

## 2017-10-26 NOTE — PROGRESS NOTES
Patient transferred out of the ICU and has no critical care issues at this time. We will sign off and be available as needed. Please call with questions. [facility]

## 2017-11-06 ENCOUNTER — OFFICE VISIT (OUTPATIENT)
Dept: FAMILY MEDICINE CLINIC | Age: 47
End: 2017-11-06

## 2017-11-06 VITALS
SYSTOLIC BLOOD PRESSURE: 102 MMHG | DIASTOLIC BLOOD PRESSURE: 69 MMHG | HEIGHT: 62 IN | RESPIRATION RATE: 16 BRPM | TEMPERATURE: 97 F | WEIGHT: 107 LBS | OXYGEN SATURATION: 96 % | HEART RATE: 109 BPM | BODY MASS INDEX: 19.69 KG/M2

## 2017-11-06 DIAGNOSIS — E53.8 B12 DEFICIENCY: ICD-10-CM

## 2017-11-06 DIAGNOSIS — G62.9 PERIPHERAL POLYNEUROPATHY: Primary | ICD-10-CM

## 2017-11-06 DIAGNOSIS — L81.9 DISCOLORATION OF SKIN OF TOE: ICD-10-CM

## 2017-11-06 DIAGNOSIS — R63.1 POLYDIPSIA: ICD-10-CM

## 2017-11-06 DIAGNOSIS — M54.50 BILATERAL LOW BACK PAIN WITHOUT SCIATICA, UNSPECIFIED CHRONICITY: ICD-10-CM

## 2017-11-06 DIAGNOSIS — R39.15 URGENCY OF URINATION: ICD-10-CM

## 2017-11-06 DIAGNOSIS — R35.0 FREQUENCY OF URINATION: ICD-10-CM

## 2017-11-06 LAB
BILIRUB UR QL STRIP: NEGATIVE
GLUCOSE UR-MCNC: NEGATIVE MG/DL
KETONES P FAST UR STRIP-MCNC: NORMAL MG/DL
PH UR STRIP: 6 [PH] (ref 4.6–8)
PROT UR QL STRIP: NEGATIVE
SP GR UR STRIP: 1.02 (ref 1–1.03)
UA UROBILINOGEN AMB POC: NORMAL (ref 0.2–1)
URINALYSIS CLARITY POC: CLEAR
URINALYSIS COLOR POC: YELLOW
URINE BLOOD POC: NEGATIVE
URINE LEUKOCYTES POC: NEGATIVE
URINE NITRITES POC: NEGATIVE

## 2017-11-06 RX ORDER — CYANOCOBALAMIN 1000 UG/ML
1000 INJECTION, SOLUTION INTRAMUSCULAR; SUBCUTANEOUS ONCE
Qty: 1 VIAL | Refills: 0
Start: 2017-11-06 | End: 2017-11-06

## 2017-11-06 RX ORDER — ACETAMINOPHEN 500 MG
TABLET ORAL
COMMUNITY
End: 2020-09-05

## 2017-11-06 NOTE — PROGRESS NOTES
HPI     CC: B12 injection, pain after fall     Margaret Keenan is a 56 y.o. female with hx of Crohn's disease, hx of perforated gastric ulcer s/p repair, and anemia 2/2 vitamin B12 deficiency, who presents for low back pain and B12 injection. Falls, Decreased Sensation of digits  - Has fallen 4 times since last visit due to decreased sensation in all LE digits, L>R. But worse with cold. - Frequency, urgency, polydipsia, urinary odor started about 1 month ago. - No saddle anesthesia. No bowel or bladder incontinence. - Weaker on LLE after fall due to pain; favors RLE.   - last fell yesterday. Joo [place] while ambulating across flat surface while at home; did not hit head or LOC. Fell on L buttocks. - Lives at home with children. Anemia 2/2 Vitamin B12 deficiency and exacerbated by malabsorption 2/2 Crohn's  - 1st injection 8/30/17   - was started on B12 injection by Dr. Orquidea Alonso. Per pt, she was to receive B12 injection every 2 weeks initially, then eventually would space out to monthly injections      PMHx:  Past Medical History:   Diagnosis Date    Autoimmune disease ([facility] [address]     Crohn's Disease    Crohn disease ([facility] [address] 4/19/2010    Crohn's     Depression 4/19/2010    History of vascular access device 07/18/2017    [facility] VAT - 33 cm right brachial PICC for abx and limited access    Vertigo        Meds:   Current Outpatient Prescriptions   Medication Sig Dispense Refill    melatonin 3 mg tablet Take 3 mg by mouth nightly.  acetaminophen (TYLENOL EXTRA STRENGTH) 500 mg tablet Take  by mouth every six (6) hours as needed for Pain.  gabapentin (NEURONTIN) 100 mg capsule Take 1 Cap by mouth three (3) times daily as needed. 90 Cap 0    simethicone (MYLICON) 80 mg chewable tablet Take 0.5 Tabs by mouth every six (6) hours as needed for Flatulence. 40 Tab 1    pantoprazole (PROTONIX) 40 mg tablet Take 1 Tab by mouth Before breakfast and dinner.  30 Tab 2    multivitamin capsule Take 1 Cap by mouth daily.  dextroamphetamine-amphetamine (ADDERALL) 20 mg tablet Take 20 mg by mouth three (3) times daily.  clonazePAM (KLONOPIN) 1 mg tablet Take 1 mg by mouth three (3) times daily.  LORazepam (ATIVAN) 1 mg tablet Take 1 Tab by mouth every eight (8) hours as needed for Anxiety. Max Daily Amount: 3 mg. (Patient taking differently: Take 1 mg by mouth two (2) times daily as needed for Anxiety.) 12 Tab 0    traZODone (DESYREL) 100 mg tablet Take 100 mg by mouth nightly.  escitalopram oxalate (LEXAPRO) 20 mg tablet Take 20 mg by mouth daily.  albuterol (PROVENTIL HFA, VENTOLIN HFA, PROAIR HFA) 90 mcg/actuation inhaler Take 2 Puffs by inhalation every four (4) hours as needed for Wheezing or Shortness of Breath. 1 Inhaler 4    traMADol (ULTRAM) 50 mg tablet Take 1 Tab by mouth every six (6) hours as needed for Pain. Max Daily Amount: 200 mg. 30 Tab 0    naloxone 2 mg/actuation spry Use 1 spray intranasally into 1 nostril. Use a new Narcan nasal spray for subsequent doses and administer into alternating nostrils. May repeat every 2 to 3 minutes as needed. 1 Blister 0       Allergies: Allergies   Allergen Reactions    Cephalosporins Hives    Doxycycline Swelling    Penicillins Hives    Tetracyclines Nausea and Vomiting       Smoker:  History   Smoking Status    Current Some Day Smoker    Packs/day: 0.50    Years: 8.00    Types: Cigarettes   Smokeless Tobacco    Never Used       ETOH:   History   Alcohol Use No       FH:   Family History   Problem Relation Age of Onset    Heart Disease Father     Cancer Mother        ROS:  Review of Systems   Constitutional: Negative. Negative for activity change, appetite change, chills, diaphoresis, fatigue and fever. Eyes: Negative for visual disturbance. Respiratory: Negative for chest tightness and shortness of breath. Cardiovascular: Negative for chest pain. Gastrointestinal: Negative for abdominal pain, nausea and vomiting. Endocrine: Positive for polydipsia and polyuria. Genitourinary: Negative for dysuria and hematuria. Musculoskeletal: Positive for back pain and gait problem. Skin: Positive for color change. Neurological: Positive for numbness. Negative for headaches. Physical Exam:  Visit Vitals    /69    Pulse (!) 109    Temp 97 °F (36.1 °C) (Oral)    Resp 16    Ht 5' 2\" (1.575 m)    Wt 107 lb (48.5 kg)    SpO2 96%    BMI 19.57 kg/m2       Wt Readings from Last 3 Encounters:   11/06/17 107 lb (48.5 kg)   10/11/17 106 lb (48.1 kg)   09/29/17 106 lb (48.1 kg)     BP Readings from Last 3 Encounters:   11/06/17 102/69   10/11/17 116/68   09/29/17 103/62        Physical Exam   Constitutional: She appears well-developed and well-nourished. No distress. HENT:   Head: Normocephalic and atraumatic. Eyes: Conjunctivae are normal. No scleral icterus. Neck: Normal range of motion. Cardiovascular: Normal rate, regular rhythm and intact distal pulses. No murmur heard. Pulmonary/Chest: Effort normal and breath sounds normal. No respiratory distress. She has no wheezes. Musculoskeletal:   BLE strength 4/5, sensation intact. No cauda equina symptoms. Neurological:   AOx3. Normal coordination and tone. Microfilament exam BLE with decreased sensation at the tip of all digits of R foot and the 1st 2 digits of L foot. Skin: She is not diaphoretic. Warm, dry. Tips of all digits of bilateral feet purple; non tender, non pruritic; nonblanching, not positional dependent. Nursing note and vitals reviewed. Assessment     56 y.o. female with hx of Crohn's disease, hx of gastric perforation s/p repair, Vitamin B12 deficiency, who presents for low back pain and Vitamin B12 injection.      Patient Active Problem List   Diagnosis Code    Crohn disease ([facility] K50.90    Depression F32.9    Vertigo R42    Vitamin B12 deficiency E53.8    Peripheral neuropathy G62.9    Nonspecific abnormal electrocardiogram (ECG) (EKG) R94.31    Perforation bowel ([facility]) K63.1    Pneumonia J18.9    Anemia D64.9    Wounds, multiple open, lower extremity S81.809A    Thrombocytosis ([facility]) D47.3    Neutrophilia D72.9       Today's diagnoses are:    ICD-10-CM ICD-9-CM    1. Peripheral polyneuropathy G62.9 356.9 HEMOGLOBIN A1C WITH EAG      AMB POC URINALYSIS DIP STICK AUTO W/O MICRO      CBC W/O DIFF      METABOLIC PANEL, BASIC      ANKLE BRACHIAL INDEX   2. Frequency of urination R35.0 788.41    3. Urgency of urination R39.15 788.63    4. Polydipsia R63.1 783.5    5. Bilateral low back pain without sciatica, unspecified chronicity M54.5 724.2 XR SPINE LUMB 2 OR 3 V   6. B12 deficiency E53.8 266.2 VITAMIN B12 INJECTION      [place] THER/PROPH/DIAG INJECTION, SUBCUT/IM      cyanocobalamin (VITAMIN B12) 1,000 mcg/mL injection   7. Discoloration of skin of toe L81.9 709.00               Plan     1. Low Back and bilateral hip pain - likely exacerbated by recent falls. - Lumbar spine xray personally examined and no fractures seen. Read shows no significant abnormalities. Per later discussion with radiology, no fractures noted; no bony abnormality identified as source of her pain. - continue pain control per ortho    2. Increased Falls - 2/2 decreased sensation of toes. Possibly 2/2 Raynaud's  - if workup for diabetes negative, will attempt treatment for Raynauds    3. Bilateral toes with peripheral polyneuropathy, skin discoloration - possibly 2/2 Raynauds, as pt wearing open toe shoes with a colder temperature. Unlikely thromboses, as skin discoloration present in all toes; 2+ pulse posterior tibial bilaterally. Possibly polyneuropathy 2/2 diabetes, as patient also with polydipsia, polyuria, frequency, urgency; pt with decreased/ minimal sensation in all digits on microfilament exam.   -  JEANNETTE ordered to evaluate distal pulses  - HbA1c screen for diabetes. - CBC, B12 to evaluate anemia     4.  Polydipsia, Polyuria, peripheral polyneuropathy, Urgency, Frequency  - UA to r/o UTI  - HbA1c to screen for diabetes    Follow up in 3-4 weeks    Prior labs and imaging were reviewed. I have discussed the diagnosis with the patient and the intended plan as seen in the above orders. The patient has received an after-visit summary and questions were answered concerning future plans. I have discussed medication side effects and warnings with the patient as well. Patient discussed with Dr. Zara Silva, Attending Physician.     Erika Rodgers MD, PGY2  Family Medicine Resident

## 2017-11-06 NOTE — MR AVS SNAPSHOT
Visit Information Date & Time Provider Department Dept. Phone Encounter #  
 11/6/2017  3:25 PM Sen Myles, [address] [facility] [phone] [id number] Upcoming Health Maintenance Date Due  
 PAP AKA CERVICAL CYTOLOGY 8/30/2018* Pneumococcal 19-64 Highest Risk (2 of 3 - PCV13) 8/30/2018 DTaP/Tdap/Td series (2 - Td) 10/8/2024 *Topic was postponed. The date shown is not the original due date. Allergies as of 11/6/2017  Review Complete On: 9/29/2017 By: Magnolia Art Severity Noted Reaction Type Reactions Cephalosporins  04/19/2010    Hives Doxycycline  07/22/2017    Swelling Penicillins  04/19/2010    Hives Tetracyclines  04/19/2010    Nausea and Vomiting Current Immunizations  Reviewed on 8/30/2017 Name Date Influenza Vaccine (Quad) PF 8/30/2017 Pneumococcal Polysaccharide (PPSV-23) 8/30/2017 Tdap 10/8/2014 Not reviewed this visit You Were Diagnosed With   
  
 Codes Comments Peripheral polyneuropathy    -  Primary ICD-10-CM: G62.9 ICD-9-CM: 356.9 Frequency of urination     ICD-10-CM: R35.0 ICD-9-CM: 788.41 Urgency of urination     ICD-10-CM: R39.15 ICD-9-CM: 688.00 Polydipsia     ICD-10-CM: R63.1 ICD-9-CM: 783.5 Bilateral low back pain without sciatica, unspecified chronicity     ICD-10-CM: M54.5 ICD-9-CM: 724.2 Vitals BP Pulse Temp Resp Height(growth percentile) Weight(growth percentile) 102/69 (!) 109 97 °F (36.1 °C) (Oral) 16 5' 2\" (1.575 m) 107 lb (48.5 kg) SpO2 BMI OB Status Smoking Status 96% 19.57 kg/m2 Ablation Current Some Day Smoker BMI and BSA Data Body Mass Index Body Surface Area  
 19.57 kg/m 2 1.46 m 2 Preferred Pharmacy Pharmacy Name Phone [facility] [address]  [facility] [phone] Your Updated Medication List  
  
   
 This list is accurate as of: 11/6/17  4:44 PM.  Always use your most recent med list.  
  
  
  
  
 ADDERALL 20 mg tablet Generic drug:  dextroamphetamine-amphetamine Take 20 mg by mouth three (3) times daily. albuterol 90 mcg/actuation inhaler Commonly known as:  PROVENTIL HFA, VENTOLIN HFA, PROAIR HFA Take 2 Puffs by inhalation every four (4) hours as needed for Wheezing or Shortness of Breath. clonazePAM 1 mg tablet Commonly known as:  [place] Listen Take 1 mg by mouth three (3) times daily. gabapentin 100 mg capsule Commonly known as:  NEURONTIN Take 1 Cap by mouth three (3) times daily as needed. LEXAPRO 20 mg tablet Generic drug:  escitalopram oxalate Take 20 mg by mouth daily. LORazepam 1 mg tablet Commonly known as:  ATIVAN Take 1 Tab by mouth every eight (8) hours as needed for Anxiety. Max Daily Amount: 3 mg.  
  
 multivitamin capsule Take 1 Cap by mouth daily. naloxone 2 mg/actuation Spry Use 1 spray intranasally into 1 nostril. Use a new Narcan nasal spray for subsequent doses and administer into alternating nostrils. May repeat every 2 to 3 minutes as needed. pantoprazole 40 mg tablet Commonly known as:  PROTONIX Take 1 Tab by mouth Before breakfast and dinner. simethicone 80 mg chewable tablet Commonly known as:  Dorlene Melody Take 0.5 Tabs by mouth every six (6) hours as needed for Flatulence. traMADol 50 mg tablet Commonly known as:  ULTRAM  
Take 1 Tab by mouth every six (6) hours as needed for Pain. Max Daily Amount: 200 mg.  
  
 traZODone 100 mg tablet Commonly known as:  Alexandro Evon Take 100 mg by mouth nightly. TYLENOL EXTRA STRENGTH 500 mg tablet Generic drug:  acetaminophen Take  by mouth every six (6) hours as needed for Pain. We Performed the Following AMB POC URINALYSIS DIP STICK AUTO W/O MICRO [44684 CPT(R)] CBC W/O DIFF [91206 CPT(R)] HEMOGLOBIN A1C WITH EAG [78151 CPT(R)] METABOLIC PANEL, BASIC [80451 CPT(R)] To-Do List   
 11/06/2017 Imaging:  XR SPINE LUMB 2 OR 3 V Introducing [facility]! Dear Dedrick Rosales: Thank you for requesting a Duel account. Our records indicate that you already have an active Duel account. You can access your account anytime at [email or web address] Did you know that you can access your hospital and ER discharge instructions at any time in Duel? You can also review all of your test results from your hospital stay or ER visit. Additional Information If you have questions, please visit the Frequently Asked Questions section of the Duel website at [email or web address] Remember, Duel is NOT to be used for urgent needs. For medical emergencies, dial 911. Now available from your iPhone and Android! Please provide this summary of care documentation to your next provider. Your primary care clinician is listed as Maico Richards. If you have any questions after today's visit, please call [phone].

## 2017-11-06 NOTE — PROGRESS NOTES
Chief Complaint   Patient presents with    LOW BACK PAIN     fell 11/3/17    Foot Problem     feet turning purple and numb     1. Have you been to the ER, urgent care clinic since your last visit? Hospitalized since your last visit? No    2. Have you seen or consulted any other health care providers outside of the [address] [place] [address] since your last visit? Include any pap smears or colon screening.  No

## 2017-11-06 NOTE — LETTER
NOTIFICATION RETURN TO WORK / SCHOOL 
 
11/6/2017 5:24 PM 
 
Ms. Tayler Cabezas [address] [facility] [address] To Whom It May Concern: 
 
Tayler Cabezas is currently under the care of [address] She will return to work/school on: 12/6/17. If there are questions or concerns please have the patient contact our office. Sincerely, Sarita Chew MD

## 2017-11-07 LAB
BUN SERPL-MCNC: 10 MG/DL (ref 6–24)
BUN/CREAT SERPL: 12 (ref 9–23)
CALCIUM SERPL-MCNC: 8.9 MG/DL (ref 8.7–10.2)
CHLORIDE SERPL-SCNC: 107 MMOL/L (ref 96–106)
CO2 SERPL-SCNC: 16 MMOL/L (ref 18–29)
CREAT SERPL-MCNC: 0.83 MG/DL (ref 0.57–1)
ERYTHROCYTE [DISTWIDTH] IN BLOOD BY AUTOMATED COUNT: 18.2 % (ref 12.3–15.4)
EST. AVERAGE GLUCOSE BLD GHB EST-MCNC: 103 MG/DL
GFR SERPLBLD CREATININE-BSD FMLA CKD-EPI: 85 ML/MIN/1.73
GFR SERPLBLD CREATININE-BSD FMLA CKD-EPI: 98 ML/MIN/1.73
GLUCOSE SERPL-MCNC: 86 MG/DL (ref 65–99)
HBA1C MFR BLD: 5.2 % (ref 4.8–5.6)
HCT VFR BLD AUTO: 35.4 % (ref 34–46.6)
HGB BLD-MCNC: 11.1 G/DL (ref 11.1–15.9)
MCH RBC QN AUTO: 26.6 PG (ref 26.6–33)
MCHC RBC AUTO-ENTMCNC: 31.4 G/DL (ref 31.5–35.7)
MCV RBC AUTO: 85 FL (ref 79–97)
PLATELET # BLD AUTO: 334 X10E3/UL (ref 150–379)
POTASSIUM SERPL-SCNC: 4.2 MMOL/L (ref 3.5–5.2)
RBC # BLD AUTO: 4.18 X10E6/UL (ref 3.77–5.28)
SODIUM SERPL-SCNC: 142 MMOL/L (ref 134–144)
WBC # BLD AUTO: 12 X10E3/UL (ref 3.4–10.8)

## 2017-11-08 RX ORDER — LANOLIN ALCOHOL/MO/W.PET/CERES
3 CREAM (GRAM) TOPICAL
COMMUNITY
End: 2020-09-05

## 2017-11-27 ENCOUNTER — OFFICE VISIT (OUTPATIENT)
Dept: FAMILY MEDICINE CLINIC | Age: 47
End: 2017-11-27

## 2017-11-27 VITALS
HEIGHT: 62 IN | WEIGHT: 111 LBS | HEART RATE: 94 BPM | RESPIRATION RATE: 16 BRPM | BODY MASS INDEX: 20.43 KG/M2 | DIASTOLIC BLOOD PRESSURE: 76 MMHG | SYSTOLIC BLOOD PRESSURE: 126 MMHG | OXYGEN SATURATION: 100 % | TEMPERATURE: 97.7 F

## 2017-11-27 DIAGNOSIS — M54.9 BILATERAL BACK PAIN, UNSPECIFIED BACK LOCATION, UNSPECIFIED CHRONICITY: Primary | ICD-10-CM

## 2017-11-27 DIAGNOSIS — D51.9 ANEMIA DUE TO VITAMIN B12 DEFICIENCY, UNSPECIFIED B12 DEFICIENCY TYPE: ICD-10-CM

## 2017-11-27 DIAGNOSIS — J01.00 ACUTE NON-RECURRENT MAXILLARY SINUSITIS: ICD-10-CM

## 2017-11-27 DIAGNOSIS — E53.8 VITAMIN B12 DEFICIENCY: ICD-10-CM

## 2017-11-27 DIAGNOSIS — G62.9 PERIPHERAL POLYNEUROPATHY: ICD-10-CM

## 2017-11-27 RX ORDER — GABAPENTIN 100 MG/1
200 CAPSULE ORAL
Qty: 90 CAP | Refills: 0 | Status: SHIPPED | OUTPATIENT
Start: 2017-11-27 | End: 2017-12-22 | Stop reason: SDUPTHER

## 2017-11-27 RX ORDER — FLUTICASONE PROPIONATE 50 MCG
2 SPRAY, SUSPENSION (ML) NASAL DAILY
Qty: 1 BOTTLE | Refills: 1 | Status: SHIPPED | OUTPATIENT
Start: 2017-11-27 | End: 2019-10-23 | Stop reason: SDUPTHER

## 2017-11-27 RX ORDER — CYANOCOBALAMIN 1000 UG/ML
1000 INJECTION, SOLUTION INTRAMUSCULAR; SUBCUTANEOUS ONCE
Qty: 1 ML | Refills: 0
Start: 2017-11-27 | End: 2017-11-27

## 2017-11-27 NOTE — PATIENT INSTRUCTIONS
Please call and schedule an appointment with Orthopedic Surgery, with Dr. Oscar Connor, to evaluate your back pain. When you have the time and date, please call our office with this information. Paulo Chavarria MD  [address]  For a physician appointment, please call [phone]       Saline Nasal Washes: Care Instructions  Your Care Instructions  Saline nasal washes help keep the nasal passages open by washing out thick or dried mucus. This simple remedy can help relieve symptoms of allergies, sinusitis, and colds. It also can make the nose feel more comfortable by keeping the mucous membranes moist. You may notice a little burning sensation in your nose the first few times you use the solution, but this usually gets better in a few days. Follow-up care is a key part of your treatment and safety. Be sure to make and go to all appointments, and call your doctor if you are having problems. It's also a good idea to know your test results and keep a list of the medicines you take. How can you care for yourself at home? · You can buy premixed saline solution in a squeeze bottle or other sinus rinse products at a drugstore. Read and follow the instructions on the label. · You also can make your own saline solution by adding 1 teaspoon of salt and 1 teaspoon of baking soda to 2 cups of distilled water. · If you use a homemade solution, pour a small amount into a clean bowl. Using a rubber bulb syringe, squeeze the syringe and place the tip in the salt water. Pull a small amount of the salt water into the syringe by relaxing your hand. · Sit down with your head tilted slightly back. Do not lie down. Put the tip of the bulb syringe or the squeeze bottle a little way into one of your nostrils. Gently drip or squirt a few drops into the nostril. Repeat with the other nostril. Some sneezing and gagging are normal at first.  · Gently blow your nose.   · Wipe the syringe or bottle tip clean after each use.  · Repeat this 2 or 3 times a day. · Use nasal washes gently if you have nosebleeds often. When should you call for help? Watch closely for changes in your health, and be sure to contact your doctor if:  ? · You often get nosebleeds. ? · You have problems doing the nasal washes. Where can you learn more? Go to http://abdifatah-mariusz.info/. Enter [id number] [id number] [phone] in the search box to learn more about \"Saline Nasal Washes: Care Instructions. \"  Current as of: May 12, 2017  Content Version: 11.4  © 3797-7817 [employer]. Care instructions adapted under license by [employer] (which disclaims liability or warranty for this information). If you have questions about a medical condition or this instruction, always ask your healthcare professional. [address] any warranty or liability for your use of this information. Sinusitis: Care Instructions  Your Care Instructions    Sinusitis is an infection of the lining of the sinus cavities in your head. Sinusitis often follows a cold. It causes pain and pressure in your head and face. In most cases, sinusitis gets better on its own in 1 to 2 weeks. But some mild symptoms may last for several weeks. Sometimes antibiotics are needed. Follow-up care is a key part of your treatment and safety. Be sure to make and go to all appointments, and call your doctor if you are having problems. It's also a good idea to know your test results and keep a list of the medicines you take. How can you care for yourself at home? · Take an over-the-counter pain medicine, such as acetaminophen (Tylenol), ibuprofen (Advil, Motrin), or naproxen (Aleve). Read and follow all instructions on the label. · If the doctor prescribed antibiotics, take them as directed. Do not stop taking them just because you feel better. You need to take the full course of antibiotics.   · Be careful when taking over-the-counter cold or flu medicines and Tylenol at the same time. Many of these medicines have acetaminophen, which is Tylenol. Read the labels to make sure that you are not taking more than the recommended dose. Too much acetaminophen (Tylenol) can be harmful. · Breathe warm, moist air from a steamy shower, a hot bath, or a sink filled with hot water. Avoid cold, dry air. Using a humidifier in your home may help. Follow the directions for cleaning the machine. · Use saline (saltwater) nasal washes to help keep your nasal passages open and wash out mucus and bacteria. You can buy saline nose drops at a grocery store or drugstore. Or you can make your own at home by adding 1 teaspoon of salt and 1 teaspoon of baking soda to 2 cups of distilled water. If you make your own, fill a bulb syringe with the solution, insert the tip into your nostril, and squeeze gently. Nellie Hals your nose. · Put a hot, wet towel or a warm gel pack on your face 3 or 4 times a day for 5 to 10 minutes each time. · Try a decongestant nasal spray like oxymetazoline (Afrin). Do not use it for more than 3 days in a row. Using it for more than 3 days can make your congestion worse. When should you call for help? Call your doctor now or seek immediate medical care if:  ? · You have new or worse swelling or redness in your face or around your eyes. ? · You have a new or higher fever. ? Watch closely for changes in your health, and be sure to contact your doctor if:  ? · You have new or worse facial pain. ? · The mucus from your nose becomes thicker (like pus) or has new blood in it. ? · You are not getting better as expected. Where can you learn more? Go to http://abdifatah-mariusz.info/. Enter K259 in the search box to learn more about \"Sinusitis: Care Instructions. \"  Current as of: May 12, 2017  Content Version: 11.4  © 0348-0778 [employer].  Care instructions adapted under license by [employer] (which disclaims liability or warranty for this information). If you have questions about a medical condition or this instruction, always ask your healthcare professional. [place] [address] any warranty or liability for your use of this information.

## 2017-11-27 NOTE — PROGRESS NOTES
HPI     CC: back pain, B12 injection     Christian Joshua is a 56 y.o. female with hx of Crohn's disease, hx of perforated gastric ulcer s/p repair, and anemia 2/2 vitamin B12 deficiency, who presents for low back pain and B12 injection. Back pain  - Endorses unchanged low back pain, 10/10 today. - Last participated at PT 3.5 weeks ago; per pt, PT wanted to defer treatment until after being seen by PCP.   - Denies bowel or bladder incontinence. Sensation intact. - at 11/6/17 appointment, Xray of Lumbar spine showed no acute process; no fracture or bony destruction visualized. Anemia 2/2 Vitamin B12 deficiency and exacerbated by malabsorption 2/2 Crohn's  - 1st injection 8/30/17. Last injection 11/6/17.   - was started on B12 injection by Dr. Angelic Sutherland. Per pt, she was to receive B12 injection every 2 weeks initially, then eventually would space out to monthly injections  Scheduled to see Dr. VELA 11/30 for follow up for perforated gastric ulcer s/p repair. Sinus tenderness   - afebrile. Symptoms started 1 week ago. Has postnasal drainage. No cough. PMHx:  Past Medical History:   Diagnosis Date    Autoimmune disease ([facility]     Crohn's Disease    Crohn disease ([facility] 4/19/2010    Crohn's     Depression 4/19/2010    History of vascular access device 07/18/2017    [facility] VAT - 33 cm right brachial PICC for abx and limited access    Vertigo        Meds:   Current Outpatient Prescriptions   Medication Sig Dispense Refill    gabapentin (NEURONTIN) 100 mg capsule Take 2 Caps by mouth three (3) times daily as needed. 90 Cap 0    cyanocobalamin (VITAMIN B-12) 1,000 mcg/mL injection 1 mL by IntraMUSCular route once for 1 dose. 1 mL 0    fluticasone (FLONASE) 50 mcg/actuation nasal spray 2 Sprays by Both Nostrils route daily. 1 Bottle 1    melatonin 3 mg tablet Take 3 mg by mouth nightly.       acetaminophen (TYLENOL EXTRA STRENGTH) 500 mg tablet Take  by mouth every six (6) hours as needed for Pain.      traMADol (ULTRAM) 50 mg tablet Take 1 Tab by mouth every six (6) hours as needed for Pain. Max Daily Amount: 200 mg. 30 Tab 0    simethicone (MYLICON) 80 mg chewable tablet Take 0.5 Tabs by mouth every six (6) hours as needed for Flatulence. 40 Tab 1    pantoprazole (PROTONIX) 40 mg tablet Take 1 Tab by mouth Before breakfast and dinner. 30 Tab 2    multivitamin capsule Take 1 Cap by mouth daily.  dextroamphetamine-amphetamine (ADDERALL) 20 mg tablet Take 20 mg by mouth three (3) times daily.  clonazePAM (KLONOPIN) 1 mg tablet Take 1 mg by mouth three (3) times daily.  LORazepam (ATIVAN) 1 mg tablet Take 1 Tab by mouth every eight (8) hours as needed for Anxiety. Max Daily Amount: 3 mg. (Patient taking differently: Take 1 mg by mouth two (2) times daily as needed for Anxiety.) 12 Tab 0    naloxone 2 mg/actuation spry Use 1 spray intranasally into 1 nostril. Use a new Narcan nasal spray for subsequent doses and administer into alternating nostrils. May repeat every 2 to 3 minutes as needed. 1 Blister 0    traZODone (DESYREL) 100 mg tablet Take 100 mg by mouth nightly.  escitalopram oxalate (LEXAPRO) 20 mg tablet Take 20 mg by mouth daily.  albuterol (PROVENTIL HFA, VENTOLIN HFA, PROAIR HFA) 90 mcg/actuation inhaler Take 2 Puffs by inhalation every four (4) hours as needed for Wheezing or Shortness of Breath. 1 Inhaler 4       Allergies: Allergies   Allergen Reactions    Cephalosporins Hives    Doxycycline Swelling    Penicillins Hives    Tetracyclines Nausea and Vomiting       Smoker:  History   Smoking Status    Current Some Day Smoker    Packs/day: 0.50    Years: 8.00    Types: Cigarettes   Smokeless Tobacco    Never Used       ETOH:   History   Alcohol Use No       FH:   Family History   Problem Relation Age of Onset    Heart Disease Father     Cancer Mother        ROS:  Review of Systems   Constitutional: Negative.   Negative for activity change, appetite change, chills, diaphoresis and fever. HENT: Positive for postnasal drip, sinus pain and sinus pressure. Negative for congestion, rhinorrhea, sore throat and trouble swallowing. Eyes: Negative for visual disturbance. Respiratory: Negative for cough, chest tightness and shortness of breath. Gastrointestinal: Negative for abdominal pain, nausea and vomiting. Genitourinary: Negative for dysuria and hematuria. Physical Exam:  Visit Vitals    /76    Pulse 94    Temp 97.7 °F (36.5 °C) (Oral)    Resp 16    Ht 5' 2\" (1.575 m)    Wt 111 lb (50.3 kg)    SpO2 100%    BMI 20.3 kg/m2       Wt Readings from Last 3 Encounters:   11/27/17 111 lb (50.3 kg)   11/06/17 107 lb (48.5 kg)   10/11/17 106 lb (48.1 kg)     BP Readings from Last 3 Encounters:   11/27/17 126/76   11/06/17 102/69   10/11/17 116/68        Physical Exam   Constitutional: She is oriented to person, place, and time. She appears well-developed and well-nourished. No distress. HENT:   NCAT. Conjunctiva normal. Bilateral ear and TM normal. Maxillary sinus tenderness. Nasal mucosa normal. Posterior oropharynx non-erythematous; no tonsillar enlargement or exudate. No cervical adenopathy. Cardiovascular: Normal rate and regular rhythm. No murmur heard. Pulmonary/Chest: Effort normal and breath sounds normal. No respiratory distress. She has no wheezes. Musculoskeletal:   Paraspinal lumbar muscle tenderness; no bony tenderness. No saddle anesthesia; sensation intact. Strength 4/5 BLE due to pain. Neurological: She is alert and oriented to person, place, and time. She exhibits normal muscle tone. Coordination normal.   Skin: Skin is warm. She is not diaphoretic. Psychiatric: She has a normal mood and affect. Her behavior is normal.   Nursing note and vitals reviewed. Assessment     56 y.o. female with hx of back pain, Crohns disease, Anemia 2/2 B12 deficiency presents for back pain, B12 injection, and sinusitis. Patient Active Problem List   Diagnosis Code    [facility] ([facility] K50.90    Depression F32.9    Vertigo R42    Vitamin B12 deficiency E53.8    Peripheral neuropathy G62.9    Nonspecific abnormal electrocardiogram (ECG) (EKG) R94.31    Perforation bowel ([facility]) K63.1    Pneumonia J18.9    Anemia D64.9    Wounds, multiple open, lower extremity S81.809A    Thrombocytosis ([facility]) D47.3    Neutrophilia D72.9       Today's diagnoses are:    ICD-10-CM ICD-9-CM    1. Bilateral back pain, unspecified back location, unspecified chronicity M54.9 724.5 REFERRAL TO ORTHOPEDICS   2. Vitamin B12 deficiency E53.8 266.2 cyanocobalamin (VITAMIN B-12) 1,000 mcg/mL injection   3. Peripheral polyneuropathy G62.9 356.9 gabapentin (NEURONTIN) 100 mg capsule   4. Anemia due to vitamin B12 deficiency, unspecified B12 deficiency type D51.9 281.1 VITAMIN B12 INJECTION      THER/PROPH/DIAG INJECTION, SUBCUT/IM   5. Acute non-recurrent maxillary sinusitis J01.00 461.0               Plan     1. Back pain - with minimal improvement with medication and PT. Recent Lumbar xray 11/6/17 showed no acute process; no fracture or bony destruction  - referral to Ortho  - continue PT     2. Anemia 2/2 Vitamin B12 deficiency    - Vitamin B12 injection     3. Peripheral Neuropathy  - increased to Gabapentin 200mg TID     4. Acute maxillary sinusitis - likely viral infection. Afebrile, VSS  - discussed symptomatic care, staying well hydrated, and nasal saline rinse  - trial of Flonase      Prior labs and imaging were reviewed. I have discussed the diagnosis with the patient and the intended plan as seen in the above orders. The patient has received an after-visit summary and questions were answered concerning future plans. I have discussed medication side effects and warnings with the patient as well. Patient discussed with Dr. Juancho Cuellar, Attending Physician.     Arianne Lora MD, PGY2  Family Medicine Resident

## 2017-11-27 NOTE — LETTER
NOTIFICATION RETURN TO WORK / SCHOOL 
 
11/27/2017 1:49 PM 
 
Ms. Nunu Ndiaye [address] [address] [address] To Whom It May Concern: 
 
Nunu Ndiaye is currently under the care of [address] [facility]. She will return to work/school 1/2/2018. She will be referred to Orthopedic surgery. If there are questions or concerns please have the patient contact our office. Sincerely, Adelfo Calderon MD

## 2017-11-27 NOTE — MR AVS SNAPSHOT
Visit Information Date & Time Provider Department Dept. Phone Encounter #  
 11/27/2017  1:30 PM Levi Thomson, [facility] [phone] [id number] Upcoming Health Maintenance Date Due  
 PAP AKA CERVICAL CYTOLOGY 8/30/2018* Pneumococcal 19-64 Highest Risk (2 of 3 - PCV13) 8/30/2018 DTaP/Tdap/Td series (2 - Td) 10/8/2024 *Topic was postponed. The date shown is not the original due date. Allergies as of 11/27/2017  Review Complete On: 11/27/2017 By: Dedrick Jones LPN Severity Noted Reaction Type Reactions Cephalosporins  04/19/2010    Hives Doxycycline  07/22/2017    Swelling Penicillins  04/19/2010    Hives Tetracyclines  04/19/2010    Nausea and Vomiting Current Immunizations  Reviewed on 8/30/2017 Name Date Influenza Vaccine (Quad) PF 8/30/2017 Pneumococcal Polysaccharide (PPSV-23) 8/30/2017 Tdap 10/8/2014 Not reviewed this visit You Were Diagnosed With   
  
 Codes Comments Bilateral back pain, unspecified back location, unspecified chronicity    -  Primary ICD-10-CM: M54.9 ICD-9-CM: 724.5 Vitals BP Pulse Temp Resp Height(growth percentile) Weight(growth percentile) 126/76 94 97.7 °F (36.5 °C) (Oral) 16 5' 2\" (1.575 m) 111 lb (50.3 kg) SpO2 BMI OB Status Smoking Status 100% 20.3 kg/m2 Ablation Current Some Day Smoker BMI and BSA Data Body Mass Index Body Surface Area  
 20.3 kg/m 2 1.48 m 2 Preferred Pharmacy Pharmacy Name Phone [facility] [address]  [facility] [phone] Your Updated Medication List  
  
   
This list is accurate as of: 11/27/17  1:52 PM.  Always use your most recent med list.  
  
  
  
  
 ADDERALL 20 mg tablet Generic drug:  dextroamphetamine-amphetamine Take 20 mg by mouth three (3) times daily. albuterol 90 mcg/actuation inhaler Commonly known as:  PROVENTIL HFA, VENTOLIN HFA, PROAIR HFA Take 2 Puffs by inhalation every four (4) hours as needed for Wheezing or Shortness of Breath. clonazePAM 1 mg tablet Commonly known as:  Peres Altes Take 1 mg by mouth three (3) times daily. gabapentin 100 mg capsule Commonly known as:  NEURONTIN Take 2 Caps by mouth three (3) times daily as needed. LEXAPRO 20 mg tablet Generic drug:  escitalopram oxalate Take 20 mg by mouth daily. LORazepam 1 mg tablet Commonly known as:  ATIVAN Take 1 Tab by mouth every eight (8) hours as needed for Anxiety. Max Daily Amount: 3 mg.  
  
 melatonin 3 mg tablet Take 3 mg by mouth nightly. multivitamin capsule Take 1 Cap by mouth daily. naloxone 2 mg/actuation Spry Use 1 spray intranasally into 1 nostril. Use a new Narcan nasal spray for subsequent doses and administer into alternating nostrils. May repeat every 2 to 3 minutes as needed. pantoprazole 40 mg tablet Commonly known as:  PROTONIX Take 1 Tab by mouth Before breakfast and dinner. simethicone 80 mg chewable tablet Commonly known as:  Marrie Myron Take 0.5 Tabs by mouth every six (6) hours as needed for Flatulence. traMADol 50 mg tablet Commonly known as:  ULTRAM  
Take 1 Tab by mouth every six (6) hours as needed for Pain. Max Daily Amount: 200 mg.  
  
 traZODone 100 mg tablet Commonly known as:  Jeremías Fradre Take 100 mg by mouth nightly. TYLENOL EXTRA STRENGTH 500 mg tablet Generic drug:  acetaminophen Take  by mouth every six (6) hours as needed for Pain. Prescriptions Sent to Pharmacy Refills  
 gabapentin (NEURONTIN) 100 mg capsule 0 Sig: Take 2 Caps by mouth three (3) times daily as needed. Class: Normal  
 Pharmacy: [facility] [address] [place] [address] BAILEY [address] AT [facility] [address] ([facility] Ph #: [phone] Route: Oral  
  
We Performed the Following REFERRAL TO ORTHOPEDICS [OEQ189 Custom] Referral Information Referral ID Referred By Referred To  
  
 [id number] KEYUR ORDONEZ [facility] [address] Phone: [phone] Fax: [phone] Visits Status Start Date End Date 1 New Request 11/27/17 11/27/18 If your referral has a status of pending review or denied, additional information will be sent to support the outcome of this decision. Patient Instructions Please call and schedule an appointment with Orthopedic Surgery, with Dr. Meryle Bolder, to evaluate your back pain. When you have the time and date, please call our office with this information. Facundo Calderon MD 
[address] [place] [address] For a physician appointment, please call [phone] Saline Nasal Washes: Care Instructions Your Care Instructions Saline nasal washes help keep the nasal passages open by washing out thick or dried mucus. This simple remedy can help relieve symptoms of allergies, sinusitis, and colds. It also can make the nose feel more comfortable by keeping the mucous membranes moist. You may notice a little burning sensation in your nose the first few times you use the solution, but this usually gets better in a few days. Follow-up care is a key part of your treatment and safety. Be sure to make and go to all appointments, and call your doctor if you are having problems. It's also a good idea to know your test results and keep a list of the medicines you take. How can you care for yourself at home? · You can buy premixed saline solution in a squeeze bottle or other sinus rinse products at a drugstore. Read and follow the instructions on the label. · You also can make your own saline solution by adding 1 teaspoon of salt and 1 teaspoon of baking soda to 2 cups of distilled water. · If you use a homemade solution, pour a small amount into a clean bowl. Using a rubber bulb syringe, squeeze the syringe and place the tip in the salt water. Pull a small amount of the salt water into the syringe by relaxing your hand. · Sit down with your head tilted slightly back. Do not lie down. Put the tip of the bulb syringe or the squeeze bottle a little way into one of your nostrils. Gently drip or squirt a few drops into the nostril. Repeat with the other nostril. Some sneezing and gagging are normal at first. 
· Gently blow your nose. · Wipe the syringe or bottle tip clean after each use. · Repeat this 2 or 3 times a day. · Use nasal washes gently if you have nosebleeds often. When should you call for help? Watch closely for changes in your health, and be sure to contact your doctor if: 
? · You often get nosebleeds. ? · You have problems doing the nasal washes. Where can you learn more? Go to http://abdifatah-mariusz.info/. Enter [id number] [id number] [phone] in the search box to learn more about \"Saline Nasal Washes: Care Instructions. \" Current as of: May 12, 2017 Content Version: 11.4 © 6652-3675 [employer]. Care instructions adapted under license by [employer] (which disclaims liability or warranty for this information). If you have questions about a medical condition or this instruction, always ask your healthcare professional. [address] any warranty or liability for your use of this information. Sinusitis: Care Instructions Your Care Instructions Sinusitis is an infection of the lining of the sinus cavities in your head. Sinusitis often follows a cold. It causes pain and pressure in your head and face. In most cases, sinusitis gets better on its own in 1 to 2 weeks. But some mild symptoms may last for several weeks. Sometimes antibiotics are needed. Follow-up care is a key part of your treatment and safety.  Be sure to make and go to all appointments, and call your doctor if you are having problems. It's also a good idea to know your test results and keep a list of the medicines you take. How can you care for yourself at home? · Take an over-the-counter pain medicine, such as acetaminophen (Tylenol), ibuprofen (Advil, Motrin), or naproxen (Aleve). Read and follow all instructions on the label. · If the doctor prescribed antibiotics, take them as directed. Do not stop taking them just because you feel better. You need to take the full course of antibiotics. · Be careful when taking over-the-counter cold or flu medicines and Tylenol at the same time. Many of these medicines have acetaminophen, which is Tylenol. Read the labels to make sure that you are not taking more than the recommended dose. Too much acetaminophen (Tylenol) can be harmful. · Breathe warm, moist air from a steamy shower, a hot bath, or a sink filled with hot water. Avoid cold, dry air. Using a humidifier in your home may help. Follow the directions for cleaning the machine. · Use saline (saltwater) nasal washes to help keep your nasal passages open and wash out mucus and bacteria. You can buy saline nose drops at a grocery store or drugstore. Or you can make your own at home by adding 1 teaspoon of salt and 1 teaspoon of baking soda to 2 cups of distilled water. If you make your own, fill a bulb syringe with the solution, insert the tip into your nostril, and squeeze gently. Giulianan Eric your nose. · Put a hot, wet towel or a warm gel pack on your face 3 or 4 times a day for 5 to 10 minutes each time. · Try a decongestant nasal spray like oxymetazoline (Afrin). Do not use it for more than 3 days in a row. Using it for more than 3 days can make your congestion worse. When should you call for help? Call your doctor now or seek immediate medical care if: 
? · You have new or worse swelling or redness in your face or around your eyes. ? · You have a new or higher fever. ?Watch closely for changes in your health, and be sure to contact your doctor if: 
? · You have new or worse facial pain. ? · The mucus from your nose becomes thicker (like pus) or has new blood in it. ? · You are not getting better as expected. Where can you learn more? Go to http://abdifatah-mariusz.info/. Enter S630 in the search box to learn more about \"Sinusitis: Care Instructions. \" Current as of: May 12, 2017 Content Version: 11.4 © 1584-8110 [employer]. Care instructions adapted under license by [employer] (which disclaims liability or warranty for this information). If you have questions about a medical condition or this instruction, always ask your healthcare professional. [address] any warranty or liability for your use of this information. Introducing [facility]! Dear Milvia Corey: Thank you for requesting a [employer] account. Our records indicate that you already have an active [employer] account. You can access your account anytime at [email or web address] Did you know that you can access your hospital and ER discharge instructions at any time in [employer]? You can also review all of your test results from your hospital stay or ER visit. Additional Information If you have questions, please visit the Frequently Asked Questions section of the [employer] website at [email or web address] Remember, [employer] is NOT to be used for urgent needs. For medical emergencies, dial 911. Now available from your iPhone and Android! Please provide this summary of care documentation to your next provider. Your primary care clinician is listed as Jud Hernandez. If you have any questions after today's visit, please call [phone].

## 2017-12-22 ENCOUNTER — TELEPHONE (OUTPATIENT)
Dept: FAMILY MEDICINE CLINIC | Age: 47
End: 2017-12-22

## 2017-12-22 ENCOUNTER — OFFICE VISIT (OUTPATIENT)
Dept: FAMILY MEDICINE CLINIC | Age: 47
End: 2017-12-22

## 2017-12-22 VITALS
BODY MASS INDEX: 20.98 KG/M2 | HEART RATE: 93 BPM | HEIGHT: 62 IN | RESPIRATION RATE: 16 BRPM | TEMPERATURE: 97.1 F | WEIGHT: 114 LBS | OXYGEN SATURATION: 100 % | SYSTOLIC BLOOD PRESSURE: 107 MMHG | DIASTOLIC BLOOD PRESSURE: 75 MMHG

## 2017-12-22 DIAGNOSIS — D51.9 ANEMIA DUE TO VITAMIN B12 DEFICIENCY, UNSPECIFIED B12 DEFICIENCY TYPE: Primary | ICD-10-CM

## 2017-12-22 DIAGNOSIS — G62.9 PERIPHERAL POLYNEUROPATHY: ICD-10-CM

## 2017-12-22 RX ORDER — CYANOCOBALAMIN 1000 UG/ML
1000 INJECTION, SOLUTION INTRAMUSCULAR; SUBCUTANEOUS ONCE
Qty: 1 VIAL | Refills: 0
Start: 2017-12-22 | End: 2017-12-22

## 2017-12-22 RX ORDER — GABAPENTIN 100 MG/1
300 CAPSULE ORAL
Qty: 90 CAP | Refills: 0 | Status: SHIPPED | OUTPATIENT
Start: 2017-12-22 | End: 2018-01-12 | Stop reason: SDUPTHER

## 2017-12-22 NOTE — PROGRESS NOTES
HPI     CC: vitamin B12 injection     Earline Enriquez is a 56 y.o. female with hx of Crohn's disease, hx of perforated gastric ulcer s/p repair, and anemia 2/2 vitamin B12 deficiency, who presents for B12 injection. Anemia 2/2 Vitamin B12 deficiency and exacerbated by malabsorption 2/2 Crohn's  - 1st injection 8/30/17. Last injection 11/27/17.   - was started on B12 injection by Dr. Abrahan Tiwari and recommended to continue by Dr. Sandip Rodrigez. Per pt, she was to receive B12 injection every 2 weeks initially, then eventually would space out to monthly injections    PMHx:  Past Medical History:   Diagnosis Date    Autoimmune disease ([facility] [address]     Crohn's Disease    Crohn disease ([facility] [address] 4/19/2010    Crohn's     Depression 4/19/2010    History of vascular access device 07/18/2017    [facility] VAT - 33 cm right brachial PICC for abx and limited access    Vertigo        Meds:   Current Outpatient Prescriptions   Medication Sig Dispense Refill    gabapentin (NEURONTIN) 100 mg capsule Take 2 Caps by mouth three (3) times daily as needed. 90 Cap 0    acetaminophen (TYLENOL EXTRA STRENGTH) 500 mg tablet Take  by mouth every six (6) hours as needed for Pain.  simethicone (MYLICON) 80 mg chewable tablet Take 0.5 Tabs by mouth every six (6) hours as needed for Flatulence. 40 Tab 1    pantoprazole (PROTONIX) 40 mg tablet Take 1 Tab by mouth Before breakfast and dinner. 30 Tab 2    multivitamin capsule Take 1 Cap by mouth daily.  dextroamphetamine-amphetamine (ADDERALL) 20 mg tablet Take 20 mg by mouth three (3) times daily.  clonazePAM (KLONOPIN) 1 mg tablet Take 1 mg by mouth three (3) times daily.  LORazepam (ATIVAN) 1 mg tablet Take 1 Tab by mouth every eight (8) hours as needed for Anxiety. Max Daily Amount: 3 mg. (Patient taking differently: Take 1 mg by mouth two (2) times daily as needed for Anxiety.) 12 Tab 0    traZODone (DESYREL) 100 mg tablet Take 100 mg by mouth nightly.       escitalopram oxalate (LEXAPRO) 20 mg tablet Take 20 mg by mouth daily.  albuterol (PROVENTIL HFA, VENTOLIN HFA, PROAIR HFA) 90 mcg/actuation inhaler Take 2 Puffs by inhalation every four (4) hours as needed for Wheezing or Shortness of Breath. 1 Inhaler 4    fluticasone (FLONASE) 50 mcg/actuation nasal spray 2 Sprays by Both Nostrils route daily. 1 Bottle 1    melatonin 3 mg tablet Take 3 mg by mouth nightly.  traMADol (ULTRAM) 50 mg tablet Take 1 Tab by mouth every six (6) hours as needed for Pain. Max Daily Amount: 200 mg. 30 Tab 0    naloxone 2 mg/actuation spry Use 1 spray intranasally into 1 nostril. Use a new Narcan nasal spray for subsequent doses and administer into alternating nostrils. May repeat every 2 to 3 minutes as needed. 1 Blister 0       Allergies: Allergies   Allergen Reactions    Cephalosporins Hives    Doxycycline Swelling    Penicillins Hives    Tetracyclines Nausea and Vomiting       Smoker:  History   Smoking Status    Current Some Day Smoker    Packs/day: 0.50    Years: 8.00    Types: Cigarettes   Smokeless Tobacco    Never Used       ETOH:   History   Alcohol Use No       FH:   Family History   Problem Relation Age of Onset    Heart Disease Father     Cancer Mother        ROS:  Review of Systems   Constitutional: Negative. Negative for activity change, appetite change, chills, diaphoresis, fatigue and fever. Eyes: Negative for visual disturbance. Respiratory: Negative for chest tightness and shortness of breath. Cardiovascular: Negative for chest pain. Gastrointestinal: Negative for abdominal pain, nausea and vomiting. Genitourinary: Negative for dysuria.        Physical Exam:  Visit Vitals    /75    Pulse 93    Temp 97.1 °F (36.2 °C) (Oral)    Resp 16    Ht 5' 2\" (1.575 m)    Wt 114 lb (51.7 kg)    SpO2 100%    BMI 20.85 kg/m2       Wt Readings from Last 3 Encounters:   12/22/17 114 lb (51.7 kg)   11/27/17 111 lb (50.3 kg)   11/06/17 107 lb (48.5 kg)     BP Readings from Last 3 Encounters:   12/22/17 107/75   11/27/17 126/76   11/06/17 102/69        Physical Exam   Constitutional: She is oriented to person, place, and time. She appears well-developed. No distress. HENT:   Head: Normocephalic and atraumatic. Mouth/Throat: Oropharynx is clear and moist.   Eyes: Conjunctivae are normal.   Cardiovascular: Normal rate and regular rhythm. Pulmonary/Chest: Effort normal and breath sounds normal. No respiratory distress. Neurological: She is alert and oriented to person, place, and time. Coordination normal.   Skin: Skin is warm and dry. She is not diaphoretic. Nursing note and vitals reviewed. Assessment     56 y.o. female with hx of Crohns disease and Anemia 2/2 B12 deficiency presents for B12 injection  Patient Active Problem List   Diagnosis Code    Crohn disease ([facility] [address] K50.90    Depression F32.9    Vertigo R42    Vitamin B12 deficiency E53.8    Peripheral neuropathy G62.9    Nonspecific abnormal electrocardiogram (ECG) (EKG) R94.31    Perforation bowel ([facility]) K63.1    Pneumonia J18.9    Anemia D64.9    Wounds, multiple open, lower extremity S81.809A    Thrombocytosis ([facility]) D47.3    Neutrophilia D72.9       Today's diagnoses are:    ICD-10-CM ICD-9-CM    1. Anemia due to vitamin B12 deficiency, unspecified B12 deficiency type D51.9 281.1 CBC W/O DIFF      VITAMIN B12      VITAMIN B12 INJECTION      ME THER/PROPH/DIAG INJECTION, SUBCUT/IM      cyanocobalamin (VITAMIN B12) 1,000 mcg/mL injection   2. Peripheral polyneuropathy G62.9 356.9 gabapentin (NEURONTIN) 100 mg capsule              Plan     1. Anemia 2/2 B12 deficiency with hx of Crohn's disease s/p gastric perforation   - Vitamin B12 injection  - recheck CBC and Vitamin B12. Previous B12 level was falsely elevated, as it was drawn shortly after B12 injection. Follow up in 1 month for next injection    Prior labs and imaging were reviewed.        I have discussed the diagnosis with the patient and the intended plan as seen in the above orders. The patient has received an after-visit summary and questions were answered concerning future plans. I have discussed medication side effects and warnings with the patient as well. Patient discussed with Dr. Raven Plata, Attending Physician.     Raza Saez MD, PGY2  Family Medicine Resident

## 2017-12-22 NOTE — LETTER
NOTIFICATION RETURN TO WORK / SCHOOL 
 
12/22/2017 11:22 AM 
 
Ms. Petra Bruno [id number] [address] [address] To Whom It May Concern: 
 
Unk Gauze is currently under the care of [address] [facility]. Is following with GI for prior GI bleed. Is awaiting appointment to be seen by Orthopedics for her back pain She will return to work/school on 1/22/17 If there are questions or concerns please have the patient contact our office. Sincerely, Santiago Vigil MD

## 2017-12-22 NOTE — PROGRESS NOTES
Chief Complaint   Patient presents with    Back Pain     1. Have you been to the ER, urgent care clinic since your last visit? Hospitalized since your last visit? No    2. Have you seen or consulted any other health care providers outside of the [address] [place] [address] since your last visit? Include any pap smears or colon screening.  No

## 2017-12-22 NOTE — TELEPHONE ENCOUNTER
Patient came into office today for an appointment and was inquiring about the phone call regarding her referral order to ortho. I have call and set up an appointment for her on 1/10/18 at 8am She will need a copy of her medication list witch I have printed for her and she will also need a copy of her xray witch I have explained to her how to obtain.

## 2017-12-22 NOTE — TELEPHONE ENCOUNTER
Lucie:    Her insurance will remain the same [employer] that she has already. Just a heads up to check if authorization is needed for her appointment with Dr. Faye Hernández on 01/10/18.

## 2017-12-23 LAB
ERYTHROCYTE [DISTWIDTH] IN BLOOD BY AUTOMATED COUNT: 17.2 % (ref 12.3–15.4)
HCT VFR BLD AUTO: 35.8 % (ref 34–46.6)
HGB BLD-MCNC: 11.2 G/DL (ref 11.1–15.9)
MCH RBC QN AUTO: 27.8 PG (ref 26.6–33)
MCHC RBC AUTO-ENTMCNC: 31.3 G/DL (ref 31.5–35.7)
MCV RBC AUTO: 89 FL (ref 79–97)
PLATELET # BLD AUTO: 262 X10E3/UL (ref 150–379)
RBC # BLD AUTO: 4.03 X10E6/UL (ref 3.77–5.28)
VIT B12 SERPL-MCNC: 510 PG/ML (ref 232–1245)
WBC # BLD AUTO: 8.7 X10E3/UL (ref 3.4–10.8)

## 2017-12-28 ENCOUNTER — OFFICE VISIT (OUTPATIENT)
Dept: FAMILY MEDICINE CLINIC | Age: 47
End: 2017-12-28

## 2017-12-28 VITALS
OXYGEN SATURATION: 98 % | TEMPERATURE: 98.6 F | BODY MASS INDEX: 20.72 KG/M2 | HEIGHT: 62 IN | WEIGHT: 112.6 LBS | DIASTOLIC BLOOD PRESSURE: 71 MMHG | RESPIRATION RATE: 18 BRPM | SYSTOLIC BLOOD PRESSURE: 104 MMHG | HEART RATE: 114 BPM

## 2017-12-28 DIAGNOSIS — M54.5 LOW BACK PAIN, UNSPECIFIED BACK PAIN LATERALITY, UNSPECIFIED CHRONICITY, WITH SCIATICA PRESENCE UNSPECIFIED: ICD-10-CM

## 2017-12-28 DIAGNOSIS — R63.4 WEIGHT LOSS: Primary | ICD-10-CM

## 2017-12-28 RX ORDER — TRAMADOL HYDROCHLORIDE 50 MG/1
50 TABLET ORAL
Refills: 0 | COMMUNITY
Start: 2017-11-29 | End: 2018-05-29

## 2017-12-28 NOTE — PROGRESS NOTES
Subjective:   History: This patient is a 56 y.o. female with a chief complaint of low back pain for 5 months. No bowel or bladder incontinence. No fever, night sweats, or weight changes. No saddle anesthesia. Review of Systems:  General/Constitutional:  No fever, chills, sweats, fatigue, night sweats, weakness, weight loss or weight gain   Head: No headache, no trauma   Neck: No swelling, masses, stiffness, pain, or limited movement   Cardiac: No chest pain   Respiratory: No cough, shortness of breath, or dyspnea on exertion   GI: No incontinence. No nausea/vomiting, diarrhea, abdominal pain, bloody or dark stools  : No incontinence. No change in urinary habits. Peripheral Vascular: No edema, coldness, numbness, discoloration, pain, or paresthesias   Musculoskeletal: As per HPI  Neurological: No saddle distribution paresthesia. No siatic pain. No loss of consciousness, dizziness, seizures, dysarthria, cognitive changes, problems with balance, or unilateral weakness. Past Medical History:   Diagnosis Date    Autoimmune disease ([facility]     Crohn's Disease    Crohn disease ([facility] 4/19/2010    Crohn's     Depression 4/19/2010    History of vascular access device 07/18/2017    [facility] VAT - 33 cm right brachial PICC for abx and limited access    Vertigo      Family History   Problem Relation Age of Onset    Heart Disease Father     Cancer Mother      Current Outpatient Prescriptions   Medication Sig Dispense Refill    traMADol (ULTRAM) 50 mg tablet 50 mg.  0    gabapentin (NEURONTIN) 100 mg capsule Take 3 Caps by mouth three (3) times daily as needed. 90 Cap 0    naloxone 2 mg/actuation spry Use 1 spray intranasally into 1 nostril. Use a new Narcan nasal spray for subsequent doses and administer into alternating nostrils. May repeat every 2 to 3 minutes as needed. 1 Blister 0    fluticasone (FLONASE) 50 mcg/actuation nasal spray 2 Sprays by Both Nostrils route daily.  1 Bottle 1    acetaminophen (TYLENOL EXTRA STRENGTH) 500 mg tablet Take  by mouth every six (6) hours as needed for Pain.  simethicone (MYLICON) 80 mg chewable tablet Take 0.5 Tabs by mouth every six (6) hours as needed for Flatulence. 40 Tab 1    pantoprazole (PROTONIX) 40 mg tablet Take 1 Tab by mouth Before breakfast and dinner. 30 Tab 2    multivitamin capsule Take 1 Cap by mouth daily.  dextroamphetamine-amphetamine (ADDERALL) 20 mg tablet Take 20 mg by mouth three (3) times daily.  clonazePAM (KLONOPIN) 1 mg tablet Take 1 mg by mouth three (3) times daily.  LORazepam (ATIVAN) 1 mg tablet Take 1 Tab by mouth every eight (8) hours as needed for Anxiety. Max Daily Amount: 3 mg. (Patient taking differently: Take 1 mg by mouth two (2) times daily as needed for Anxiety.) 12 Tab 0    traZODone (DESYREL) 100 mg tablet Take 100 mg by mouth nightly.  escitalopram oxalate (LEXAPRO) 20 mg tablet Take 20 mg by mouth daily.  albuterol (PROVENTIL HFA, VENTOLIN HFA, PROAIR HFA) 90 mcg/actuation inhaler Take 2 Puffs by inhalation every four (4) hours as needed for Wheezing or Shortness of Breath. 1 Inhaler 4    melatonin 3 mg tablet Take 3 mg by mouth nightly. Allergies   Allergen Reactions    Cephalosporins Hives    Doxycycline Swelling    Penicillins Hives    Tetracyclines Nausea and Vomiting     Social History     Social History    Marital status:      Spouse name: N/A    Number of children: N/A    Years of education: N/A     Occupational History    Not on file.      Social History Main Topics    Smoking status: Current Some Day Smoker     Packs/day: 0.50     Years: 8.00     Types: Cigarettes    Smokeless tobacco: Never Used    Alcohol use No    Drug use: No      Comment: 1 pack a day    Sexual activity: Yes     Partners: Male     Birth control/ protection: Pill     Other Topics Concern    Not on file     Social History Narrative       Objective:     Visit Vitals    /71    Pulse (!) 114    Temp 98.6 °F (37 °C) (Oral)    Resp 18    Ht 5' 2\" (1.575 m)    Wt 112 lb 9.6 oz (51.1 kg)    SpO2 98%    BMI 20.59 kg/m2       General: Alert and oriented and in no acute distress. Responds to all questions appropriately. LUNGS: Respirations unlabored. Skin: No obvious rash. MSK:    Posture: slumped posture   Deformity: None    ROM:     Flexion: Normal    Extension: Normal     Lateral bending: Normal      Gait: Normal but with poor posture      Palpation:    L1-L5: No tenderness    Sacrum: No tenderness    Coccyx: No tenderness    Left Paraspinal: tenderness    Right Paraspinal: tenderness     Strength (0-5/5)    Hip Flexion:   Left: 5/5  Right: 5/5    Hip Extension:  Left: 5/5  Right: 5/5    Hip Abduction:  Left: 5/5  Right: 5/5    Hip Adduction:  Left: 5/5  Right: 5/5    Knee Extension:  Left: 5/5  Right: 5/5    Knee Flexion:   Left: 5/5  Right: 5/5    Ankle dorsiflexion:  Left: 5/5  Right: 5/5    Ankle plantarflexion:  Left: 5/5  Right: 5/5    Great toe extension:  Left: 5/5  Right: 5/5     Sensation: Intact, no deficits      Special test:    Straight leg: Left: Negative  Right: Negative    Glens: Left: Negative  Right: Negative      Imaging: Radiographs of the lumbar spine from 11/6/17 personally reviewed and demonstrates no obvious fracture or dislocation. No significant degenerative changes. Assessment:       ICD-10-CM ICD-9-CM    1. Weight loss R63.4 783.21 TSH 3RD GENERATION   2. Low back pain, unspecified back pain laterality, unspecified chronicity, with sciatica presence unspecified M54.5 724.2 REFERRAL TO PHYSICAL THERAPY     Likely paraspinal muscle strain and spasm as well as weak core. She has poor posture after abd surgery 5 months ago. Discussed that she will have some tightness over the abd scares but OK to stand and sit straight. Plan:   1. Home Exercise Program as per handout. Referred to PT. She will need to focus on posture retraining and core strengthening.    2. Ice 15 minutes, three times a day PRN and after exercise. Can alternate with heat for 15 minutes. Medications:    1. Naproxin (Aleve): 220mg 1-2 tablets twice a day PRN. 2. Acetaminophen (Tylenol):  500mg 1-2 tablets every 6 hours as needed for pain.      RTC: 4-6 weeks

## 2017-12-28 NOTE — PROGRESS NOTES
Chief Complaint   Patient presents with    Back Pain     since june. Bere Jollyarben after abdominal surgery. . being in same position for a long period of time. .. went to PT/ has neuropathy as well. .      1. Have you been to the ER, urgent care clinic since your last visit? Hospitalized since your last visit? No    2. Have you seen or consulted any other health care providers outside of the [address] [place] [address] since your last visit? Include any pap smears or colon screening.  No

## 2017-12-29 LAB — TSH SERPL DL<=0.005 MIU/L-ACNC: 1.14 UIU/ML (ref 0.45–4.5)

## 2018-01-03 ENCOUNTER — TELEPHONE (OUTPATIENT)
Dept: FAMILY MEDICINE CLINIC | Age: 48
End: 2018-01-03

## 2018-01-12 ENCOUNTER — OFFICE VISIT (OUTPATIENT)
Dept: FAMILY MEDICINE CLINIC | Age: 48
End: 2018-01-12

## 2018-01-12 VITALS
DIASTOLIC BLOOD PRESSURE: 79 MMHG | SYSTOLIC BLOOD PRESSURE: 118 MMHG | RESPIRATION RATE: 16 BRPM | TEMPERATURE: 98.9 F | BODY MASS INDEX: 20.43 KG/M2 | OXYGEN SATURATION: 98 % | WEIGHT: 111 LBS | HEART RATE: 83 BPM | HEIGHT: 62 IN

## 2018-01-12 DIAGNOSIS — D51.9 ANEMIA DUE TO VITAMIN B12 DEFICIENCY, UNSPECIFIED B12 DEFICIENCY TYPE: ICD-10-CM

## 2018-01-12 DIAGNOSIS — J11.1 INFLUENZA: Primary | ICD-10-CM

## 2018-01-12 DIAGNOSIS — G62.9 PERIPHERAL POLYNEUROPATHY: ICD-10-CM

## 2018-01-12 LAB
QUICKVUE INFLUENZA TEST: NEGATIVE
VALID INTERNAL CONTROL?: YES

## 2018-01-12 RX ORDER — OSELTAMIVIR PHOSPHATE 75 MG/1
75 CAPSULE ORAL 2 TIMES DAILY
Qty: 10 CAP | Refills: 0 | Status: SHIPPED | OUTPATIENT
Start: 2018-01-12 | End: 2018-01-17

## 2018-01-12 RX ORDER — CYANOCOBALAMIN 1000 UG/ML
1000 INJECTION, SOLUTION INTRAMUSCULAR; SUBCUTANEOUS ONCE
Qty: 1 VIAL | Refills: 0
Start: 2018-01-12 | End: 2018-01-12

## 2018-01-12 RX ORDER — GABAPENTIN 100 MG/1
300 CAPSULE ORAL
Qty: 90 CAP | Refills: 1 | Status: SHIPPED | OUTPATIENT
Start: 2018-01-12 | End: 2018-05-29 | Stop reason: SDUPTHER

## 2018-01-12 NOTE — PROGRESS NOTES
HPI     CC: fever, generalized body ache. B12 injection. Alexa Morales is a 56 y.o. female who presents for fever and body ache    Started to feel unwell 3 days ago, with ear fullness and muscle aches. 2 days ago, endorsed productive cough, with green sputum. + rhinorrhea, + nasal congestion, + postnasal drip. + fever, Tmax 102.5 on Wednesday; has been febrile to 101.2 today. Last had tylenol at 1PM today. Endorsed chest tightness, but no chest pressure; no SOB. Feels as if symptoms feel worse today. Also endorsed L ear fullness on Tuesday, but then felt as if a \"something went off\" in her L ear and began to feel increased drainage into her throat. PMHx:  Past Medical History:   Diagnosis Date    Autoimmune disease ([facility] [facility]     Crohn's Disease    Crohn disease ([facility] 4/19/2010    Crohn's     Depression 4/19/2010    History of vascular access device 07/18/2017    [facility] VAT - 33 cm right brachial PICC for abx and limited access    Vertigo        Meds:   Current Outpatient Prescriptions   Medication Sig Dispense Refill    fluticasone (FLONASE) 50 mcg/actuation nasal spray 2 Sprays by Both Nostrils route daily. 1 Bottle 1    acetaminophen (TYLENOL EXTRA STRENGTH) 500 mg tablet Take  by mouth every six (6) hours as needed for Pain.  simethicone (MYLICON) 80 mg chewable tablet Take 0.5 Tabs by mouth every six (6) hours as needed for Flatulence. 40 Tab 1    pantoprazole (PROTONIX) 40 mg tablet Take 1 Tab by mouth Before breakfast and dinner. 30 Tab 2    multivitamin capsule Take 1 Cap by mouth daily.  dextroamphetamine-amphetamine (ADDERALL) 20 mg tablet Take 20 mg by mouth three (3) times daily.  clonazePAM (KLONOPIN) 1 mg tablet Take 1 mg by mouth three (3) times daily.  LORazepam (ATIVAN) 1 mg tablet Take 1 Tab by mouth every eight (8) hours as needed for Anxiety. Max Daily Amount: 3 mg.  (Patient taking differently: Take 1 mg by mouth two (2) times daily as needed for Anxiety.) 12 Tab 0    traZODone (DESYREL) 100 mg tablet Take 100 mg by mouth nightly.  escitalopram oxalate (LEXAPRO) 20 mg tablet Take 20 mg by mouth daily.  albuterol (PROVENTIL HFA, VENTOLIN HFA, PROAIR HFA) 90 mcg/actuation inhaler Take 2 Puffs by inhalation every four (4) hours as needed for Wheezing or Shortness of Breath. 1 Inhaler 4    traMADol (ULTRAM) 50 mg tablet 50 mg.  0    gabapentin (NEURONTIN) 100 mg capsule Take 3 Caps by mouth three (3) times daily as needed. 90 Cap 0    naloxone 2 mg/actuation spry Use 1 spray intranasally into 1 nostril. Use a new Narcan nasal spray for subsequent doses and administer into alternating nostrils. May repeat every 2 to 3 minutes as needed. 1 Blister 0    melatonin 3 mg tablet Take 3 mg by mouth nightly. Allergies: Allergies   Allergen Reactions    Cephalosporins Hives    Doxycycline Swelling    Penicillins Hives    Tetracyclines Nausea and Vomiting       Smoker:  History   Smoking Status    Current Some Day Smoker    Packs/day: 0.50    Years: 8.00    Types: Cigarettes   Smokeless Tobacco    Never Used       ETOH:   History   Alcohol Use No       FH:   Family History   Problem Relation Age of Onset    Heart Disease Father     Cancer Mother        ROS:  Review of Systems   Constitutional: Positive for chills, fatigue and fever. Negative for diaphoresis and unexpected weight change. HENT: Positive for congestion, ear pain, postnasal drip and rhinorrhea. Negative for sinus pain, sinus pressure, sore throat and trouble swallowing. Eyes: Negative for visual disturbance. Respiratory: Positive for cough. Negative for shortness of breath and wheezing. Gastrointestinal: Negative for abdominal pain, nausea and vomiting. Genitourinary: Negative. Neurological: Negative for dizziness, light-headedness and headaches.        Physical Exam:  Visit Vitals    /79    Pulse 83    Temp 98.9 °F (37.2 °C) (Oral)    Resp 16    Ht 5' 2\" (1.575 m)    Wt 111 lb (50.3 kg)    SpO2 98%    BMI 20.3 kg/m2       Wt Readings from Last 3 Encounters:   01/12/18 111 lb (50.3 kg)   12/28/17 112 lb 9.6 oz (51.1 kg)   12/22/17 114 lb (51.7 kg)     BP Readings from Last 3 Encounters:   01/12/18 118/79   12/28/17 104/71   12/22/17 107/75        Physical Exam   Constitutional: She is oriented to person, place, and time. Well developed. Mild distress from coughing. Not diaphoretic. HENT:   NCAT. Normal conjunctiva. No sinus tenderness. Posterior oropharynx mildly erythematous; normal tonsils without exudate. L ear with perforated TM; no drainage or erythema. R ear normal.    Cardiovascular: Normal rate and regular rhythm. Pulmonary/Chest: Effort normal and breath sounds normal. No respiratory distress. She has no wheezes. She has no rales. Musculoskeletal: Normal range of motion. Neurological: She is alert and oriented to person, place, and time. She exhibits normal muscle tone. Coordination normal.   Skin: Skin is warm and dry. Nursing note and vitals reviewed. Recent Results (from the past 12 hour(s))   AMB POC RAPID INFLUENZA TEST    Collection Time: 01/12/18  2:34 PM   Result Value Ref Range    VALID INTERNAL CONTROL POC Yes     QuickVue Influenza test Negative Negative              Assessment     56 y.o. female with hx of Crohn's disease, Anemia 2/2 Vitamin b12 deficiency, presents with fever and body ache, and for B12 injection   Patient Active Problem List   Diagnosis Code    [facility] K50.90    Depression F32.9    Vertigo R42    Vitamin B12 deficiency E53.8    Peripheral neuropathy G62.9    Nonspecific abnormal electrocardiogram (ECG) (EKG) R94.31    Perforation bowel ([facility]) K63.1    Pneumonia J18.9    Anemia D64.9    Wounds, multiple open, lower extremity S81.809A    Thrombocytosis ([facility]) D47.3    Neutrophilia D72.9       Today's diagnoses are:    ICD-10-CM ICD-9-CM    1.  Influenza J11.1 487.1 AMB POC RAPID INFLUENZA TEST   2. Anemia due to vitamin B12 deficiency, unspecified B12 deficiency type D51.9 281.1 VITAMIN B12 INJECTION      PA THER/PROPH/DIAG INJECTION, SUBCUT/IM      cyanocobalamin (VITAMIN B12) 1,000 mcg/mL injection   3. Peripheral polyneuropathy G62.9 356.9 gabapentin (NEURONTIN) 100 mg capsule              Plan     1. Fever, body ache - likely 2/2 influenza, given exposure from her son. Fever began 2 days ago. - POC rapid flu negative  - Tamiflu 75mg BID x 5 days; will treat given symptoms and son with flu diagnosis  - discussed symptomatic treatment, staying well hydrated, tylenol and mucinex PRN  - discussed returning for follow in 2-3 days     2. Anemia 2/2 B12 deficiency - last injection on 12/22/17. B12 level of 510.    - B12 injection today     3. Peripheral polyneuropathy - improved on gabapentin  - gabapentin 100mg TID PRN refilled    Prior labs and imaging were reviewed. I have discussed the diagnosis with the patient and the intended plan as seen in the above orders. The patient has received an after-visit summary and questions were answered concerning future plans. I have discussed medication side effects and warnings with the patient as well. Patient seen and discussed with Dr. Ilda Edgar, Attending Physician.     Bernett Lombard, MD, PGY2  Family Medicine Resident

## 2018-01-12 NOTE — MR AVS SNAPSHOT
Visit Information Date & Time Provider Department Dept. Phone Encounter #  
 1/12/2018  1:55 PM Carley Dailey [phone] [id number] Follow-up Instructions Return in about 3 days (around 1/15/2018) for follow up for flu . Upcoming Health Maintenance Date Due  
 PAP AKA CERVICAL CYTOLOGY 8/30/2018* Pneumococcal 19-64 Highest Risk (2 of 3 - PCV13) 8/30/2018 DTaP/Tdap/Td series (2 - Td) 10/8/2024 *Topic was postponed. The date shown is not the original due date. Allergies as of 1/12/2018  Review Complete On: 1/12/2018 By: Mary Jane Winters LPN Severity Noted Reaction Type Reactions Cephalosporins  04/19/2010    Hives Doxycycline  07/22/2017    Swelling Penicillins  04/19/2010    Hives Tetracyclines  04/19/2010    Nausea and Vomiting Current Immunizations  Reviewed on 8/30/2017 Name Date Influenza Vaccine (Quad) PF 8/30/2017 Pneumococcal Polysaccharide (PPSV-23) 8/30/2017 Tdap 10/8/2014 Not reviewed this visit You Were Diagnosed With   
  
 Codes Comments Fever in other diseases    -  Primary ICD-10-CM: R50.81 ICD-9-CM: 780.61 Vitals BP Pulse Temp Resp Height(growth percentile) Weight(growth percentile) 118/79 83 98.9 °F (37.2 °C) (Oral) 16 5' 2\" (1.575 m) 111 lb (50.3 kg) SpO2 BMI OB Status Smoking Status 98% 20.3 kg/m2 Ablation Current Some Day Smoker BMI and BSA Data Body Mass Index Body Surface Area  
 20.3 kg/m 2 1.48 m 2 Preferred Pharmacy Pharmacy Name Phone [facility] [address]  [facility] [phone] Your Updated Medication List  
  
   
This list is accurate as of: 1/12/18  2:43 PM.  Always use your most recent med list.  
  
  
  
  
 ADDERALL 20 mg tablet Generic drug:  dextroamphetamine-amphetamine Take 20 mg by mouth three (3) times daily. albuterol 90 mcg/actuation inhaler Commonly known as:  PROVENTIL HFA, VENTOLIN HFA, PROAIR HFA Take 2 Puffs by inhalation every four (4) hours as needed for Wheezing or Shortness of Breath. clonazePAM 1 mg tablet Commonly known as:  Elizabeth Yusuf Take 1 mg by mouth three (3) times daily. fluticasone 50 mcg/actuation nasal spray Commonly known as:  Sherrine Raúl 2 Sprays by Both Nostrils route daily. gabapentin 100 mg capsule Commonly known as:  NEURONTIN Take 3 Caps by mouth three (3) times daily as needed. LEXAPRO 20 mg tablet Generic drug:  escitalopram oxalate Take 20 mg by mouth daily. LORazepam 1 mg tablet Commonly known as:  ATIVAN Take 1 Tab by mouth every eight (8) hours as needed for Anxiety. Max Daily Amount: 3 mg.  
  
 melatonin 3 mg tablet Take 3 mg by mouth nightly. multivitamin capsule Take 1 Cap by mouth daily. naloxone 2 mg/actuation Spry Use 1 spray intranasally into 1 nostril. Use a new Narcan nasal spray for subsequent doses and administer into alternating nostrils. May repeat every 2 to 3 minutes as needed. oseltamivir 75 mg capsule Commonly known as:  TAMIFLU Take 1 Cap by mouth two (2) times a day for 5 days. pantoprazole 40 mg tablet Commonly known as:  PROTONIX Take 1 Tab by mouth Before breakfast and dinner. simethicone 80 mg chewable tablet Commonly known as:  Linnell Back Take 0.5 Tabs by mouth every six (6) hours as needed for Flatulence. traMADol 50 mg tablet Commonly known as:  ULTRAM  
50 mg.  
  
 traZODone 100 mg tablet Commonly known as:  Alena Pacific Take 100 mg by mouth nightly. TYLENOL EXTRA STRENGTH 500 mg tablet Generic drug:  acetaminophen Take  by mouth every six (6) hours as needed for Pain. Prescriptions Sent to Pharmacy Refills  
 oseltamivir (TAMIFLU) 75 mg capsule 0 Sig: Take 1 Cap by mouth two (2) times a day for 5 days.   
 Class: Normal  
 Pharmacy: [facility] [address] Ph #: [phone] Route: Oral  
  
We Performed the Following AMB POC RAPID INFLUENZA TEST [99409 CPT(R)] Follow-up Instructions Return in about 3 days (around 1/15/2018) for follow up for flu . Patient Instructions Influenza (Flu): Care Instructions Your Care Instructions Influenza (flu) is an infection in the lungs and breathing passages. It is caused by the influenza virus. There are different strains, or types, of the flu virus from year to year. Unlike the common cold, the flu comes on suddenly and the symptoms, such as a cough, congestion, fever, chills, fatigue, aches, and pains, are more severe. These symptoms may last up to 10 days. Although the flu can make you feel very sick, it usually doesn't cause serious health problems. Home treatment is usually all you need for flu symptoms. But your doctor may prescribe antiviral medicine to prevent other health problems, such as pneumonia, from developing. Older people and those who have a long-term health condition, such as lung disease, are most at risk for having pneumonia or other health problems. Follow-up care is a key part of your treatment and safety. Be sure to make and go to all appointments, and call your doctor if you are having problems. It's also a good idea to know your test results and keep a list of the medicines you take. How can you care for yourself at home? · Get plenty of rest. 
· Drink plenty of fluids, enough so that your urine is light yellow or clear like water. If you have kidney, heart, or liver disease and have to limit fluids, talk with your doctor before you increase the amount of fluids you drink.  
· Take an over-the-counter pain medicine if needed, such as acetaminophen (Tylenol), ibuprofen (Advil, Motrin), or naproxen (Aleve), to relieve fever, headache, and muscle aches. Read and follow all instructions on the label. No one younger than 20 should take aspirin. It has been linked to Reye syndrome, a serious illness. · Do not smoke. Smoking can make the flu worse. If you need help quitting, talk to your doctor about stop-smoking programs and medicines. These can increase your chances of quitting for good. · Breathe moist air from a hot shower or from a sink filled with hot water to help clear a stuffy nose. · Before you use cough and cold medicines, check the label. These medicines may not be safe for young children or for people with certain health problems. · If the skin around your nose and lips becomes sore, put some petroleum jelly on the area. · To ease coughing: ¨ Drink fluids to soothe a scratchy throat. ¨ Suck on cough drops or plain hard candy. ¨ Take an over-the-counter cough medicine that contains dextromethorphan to help you get some sleep. Read and follow all instructions on the label. ¨ Raise your head at night with an extra pillow. This may help you rest if coughing keeps you awake. · Take any prescribed medicine exactly as directed. Call your doctor if you think you are having a problem with your medicine. To avoid spreading the flu · Wash your hands regularly, and keep your hands away from your face. · Stay home from school, work, and other public places until you are feeling better and your fever has been gone for at least 24 hours. The fever needs to have gone away on its own without the help of medicine. · Ask people living with you to talk to their doctors about preventing the flu. They may get antiviral medicine to keep from getting the flu from you. · To prevent the flu in the future, get a flu vaccine every fall. Encourage people living with you to get the vaccine. · Cover your mouth when you cough or sneeze. When should you call for help? Call 911 anytime you think you may need emergency care. For example, call if: 
? · You have severe trouble breathing. ?Call your doctor now or seek immediate medical care if: 
? · You have new or worse trouble breathing. ? · You seem to be getting much sicker. ? · You feel very sleepy or confused. ? · You have a new or higher fever. ? · You get a new rash. ? Watch closely for changes in your health, and be sure to contact your doctor if: 
? · You begin to get better and then get worse. ? · You are not getting better after 1 week. Where can you learn more? Go to http://abdifatah-mariusz.info/. Enter W244 in the search box to learn more about \"Influenza (Flu): Care Instructions. \" Current as of: May 12, 2017 Content Version: 11.4 © 5919-4045 [employer]. Care instructions adapted under license by [employer] (which disclaims liability or warranty for this information). If you have questions about a medical condition or this instruction, always ask your healthcare professional. [address] any warranty or liability for your use of this information. Introducing [facility]! Dear Emily Carolina: Thank you for requesting a Mustbin account. Our records indicate that you already have an active Mustbin account. You can access your account anytime at [email or web address] Did you know that you can access your hospital and ER discharge instructions at any time in Mustbin? You can also review all of your test results from your hospital stay or ER visit. Additional Information If you have questions, please visit the Frequently Asked Questions section of the Mustbin website at [email or web address] Remember, Mustbin is NOT to be used for urgent needs. For medical emergencies, dial 911. Now available from your iPhone and Android! Please provide this summary of care documentation to your next provider. Your primary care clinician is listed as Olivier Dang. If you have any questions after today's visit, please call [phone].

## 2018-01-12 NOTE — PATIENT INSTRUCTIONS

## 2018-01-12 NOTE — PROGRESS NOTES
Chief Complaint   Patient presents with    Generalized Body Aches    Fever     1. Have you been to the ER, urgent care clinic since your last visit? Hospitalized since your last visit? No    2. Have you seen or consulted any other health care providers outside of the [address] [place] [address] since your last visit? Include any pap smears or colon screening.  No

## 2018-04-13 ENCOUNTER — TELEPHONE (OUTPATIENT)
Dept: FAMILY MEDICINE CLINIC | Age: 48
End: 2018-04-13

## 2018-04-13 NOTE — TELEPHONE ENCOUNTER
This message is to inform you we have attempted to reach out to your patient several times and have not been able to make contact and the patient has not responded to our messages. It is up to you to determine if you or your nurse needs to attempt to contact the patient to discuss the importance of an appointment with the specialist.  If you speak with the patient please make them aware they need to contact the office with appointment information including the date, time, doctors first and last name and the phone number to that office as soon as an appointment is made. If you receive appointment information please add to this telephone encounter and include all of the previously discussed information and route this message back to me. Please do not place a new order! I can reopen and edit the existing order.   PT Order

## 2018-04-13 NOTE — TELEPHONE ENCOUNTER
This message is to inform you we have attempted to reach out to your patient several times and have not been able to make contact and the patient has not responded to our messages. It is up to you to determine if you or your nurse needs to attempt to contact the patient to discuss the importance of an appointment with the specialist.  If you speak with the patient please make them aware they need to contact the office with appointment information including the date, time, doctors first and last name and the phone number to that office as soon as an appointment is made. If you receive appointment information please add to this telephone encounter and include all of the previously discussed information and route this message back to me. Please do not place a new order! I can reopen and edit the existing order.   Ortho Order

## 2018-05-29 ENCOUNTER — TELEPHONE (OUTPATIENT)
Dept: FAMILY MEDICINE CLINIC | Age: 48
End: 2018-05-29

## 2018-05-29 ENCOUNTER — OFFICE VISIT (OUTPATIENT)
Dept: FAMILY MEDICINE CLINIC | Age: 48
End: 2018-05-29

## 2018-05-29 VITALS
DIASTOLIC BLOOD PRESSURE: 74 MMHG | HEIGHT: 62 IN | BODY MASS INDEX: 21.86 KG/M2 | OXYGEN SATURATION: 99 % | RESPIRATION RATE: 18 BRPM | SYSTOLIC BLOOD PRESSURE: 105 MMHG | TEMPERATURE: 97.9 F | HEART RATE: 97 BPM | WEIGHT: 118.8 LBS

## 2018-05-29 DIAGNOSIS — G62.9 PERIPHERAL POLYNEUROPATHY: ICD-10-CM

## 2018-05-29 DIAGNOSIS — K52.9 GASTROENTERITIS: Primary | ICD-10-CM

## 2018-05-29 RX ORDER — BACLOFEN 10 MG/1
TABLET ORAL
Refills: 1 | COMMUNITY
Start: 2018-05-08 | End: 2020-06-22 | Stop reason: ALTCHOICE

## 2018-05-29 RX ORDER — GABAPENTIN 100 MG/1
300 CAPSULE ORAL
Qty: 90 CAP | Refills: 1 | Status: SHIPPED | OUTPATIENT
Start: 2018-05-29 | End: 2018-08-11 | Stop reason: DRUGHIGH

## 2018-05-29 RX ORDER — ONDANSETRON 4 MG/1
4 TABLET, ORALLY DISINTEGRATING ORAL
Qty: 15 TAB | Refills: 0 | Status: SHIPPED | OUTPATIENT
Start: 2018-05-29 | End: 2020-09-05

## 2018-05-29 NOTE — LETTER
NOTIFICATION RETURN TO WORK / SCHOOL 
 
5/29/2018 9:39 AM 
 
Ms. Jeffery Albarado [address] [place] [id number] To Whom It May Concern: 
 
Jeffery Albarado is currently under the care of [facility]. She was seen on 5/29/18. Please excuse her absence from work. She will return to work/school on: 5/31. If there are questions or concerns please have the patient contact our office.  
 
 
 
Sincerely, 
 
 
Codey Yan MD

## 2018-05-29 NOTE — MR AVS SNAPSHOT
[address] 
[phone] Patient: Chucho Rea MRN: [id number] :[id number] Visit Information Date & Time Provider Department Dept. Phone Encounter #  
 2018  9:40 AM Shavonne Lubin MD [facility] [phone] [id number] Follow-up Instructions Return if symptoms worsen or fail to improve. Upcoming Health Maintenance Date Due  
 PAP AKA CERVICAL CYTOLOGY 2018* Influenza Age 5 to Adult 2018 Pneumococcal 19-64 Highest Risk (2 of 3 - PCV13) 2018 DTaP/Tdap/Td series (2 - Td) 10/8/2024 *Topic was postponed. The date shown is not the original due date. Allergies as of 2018  Review Complete On: 2018 By: Shavonne Lubin MD  
  
 Severity Noted Reaction Type Reactions Cephalosporins  2010    Hives Doxycycline  2017    Swelling Penicillins  2010    Hives Tetracyclines  2010    Nausea and Vomiting Current Immunizations  Reviewed on 2017 Name Date Influenza Vaccine (Quad) PF 2017 Pneumococcal Polysaccharide (PPSV-23) 2017 Tdap 10/8/2014 Not reviewed this visit You Were Diagnosed With   
  
 Codes Comments Gastroenteritis    -  Primary ICD-10-CM: K52.9 ICD-9-CM: 558.9 Peripheral polyneuropathy     ICD-10-CM: G62.9 ICD-9-CM: 356.9 Vitals BP Pulse Temp Resp Height(growth percentile) Weight(growth percentile) 105/74 (BP 1 Location: Right arm, BP Patient Position: Sitting) 97 97.9 °F (36.6 °C) (Oral) 18 5' 2\" (1.575 m) 118 lb 12.8 oz (53.9 kg) SpO2 BMI OB Status Smoking Status 99% 21.73 kg/m2 Ablation Current Some Day Smoker Vitals History BMI and BSA Data Body Mass Index Body Surface Area 21.73 kg/m 2 1.54 m 2 Preferred Pharmacy Pharmacy Name Phone [facility] [address]  [facility] [phone] Your Updated Medication List  
  
   
This list is accurate as of 5/29/18  9:40 AM.  Always use your most recent med list.  
  
  
  
  
 ADDERALL 20 mg tablet Generic drug:  dextroamphetamine-amphetamine Take 20 mg by mouth three (3) times daily. albuterol 90 mcg/actuation inhaler Commonly known as:  PROVENTIL HFA, VENTOLIN HFA, PROAIR HFA Take 2 Puffs by inhalation every four (4) hours as needed for Wheezing or Shortness of Breath. baclofen 10 mg tablet Commonly known as:  LIORESAL  
TAKE 1 TABLET BY MOUTH THREE TIMES A DAY  
  
 clonazePAM 1 mg tablet Commonly known as:  Terryl Hamper Take 1 mg by mouth three (3) times daily. fluticasone 50 mcg/actuation nasal spray Commonly known as:  Arlys Pock 2 Sprays by Both Nostrils route daily. gabapentin 100 mg capsule Commonly known as:  NEURONTIN Take 3 Caps by mouth three (3) times daily as needed. LEXAPRO 20 mg tablet Generic drug:  escitalopram oxalate Take 20 mg by mouth daily. LORazepam 1 mg tablet Commonly known as:  ATIVAN Take 1 Tab by mouth every eight (8) hours as needed for Anxiety. Max Daily Amount: 3 mg.  
  
 melatonin 3 mg tablet Take 3 mg by mouth nightly. multivitamin capsule Take 1 Cap by mouth daily. naloxone 2 mg/actuation Spry Use 1 spray intranasally into 1 nostril. Use a new Narcan nasal spray for subsequent doses and administer into alternating nostrils. May repeat every 2 to 3 minutes as needed. ondansetron 4 mg disintegrating tablet Commonly known as:  ZOFRAN ODT Take 1 Tab by mouth every eight (8) hours as needed for Nausea. pantoprazole 40 mg tablet Commonly known as:  PROTONIX Take 1 Tab by mouth Before breakfast and dinner. simethicone 80 mg chewable tablet Commonly known as:  Leobardo Rodriguez  
 Take 0.5 Tabs by mouth every six (6) hours as needed for Flatulence. traZODone 100 mg tablet Commonly known as:  Eward Sheets Take 100 mg by mouth nightly. TYLENOL EXTRA STRENGTH 500 mg tablet Generic drug:  acetaminophen Take  by mouth every six (6) hours as needed for Pain. Prescriptions Sent to Pharmacy Refills  
 gabapentin (NEURONTIN) 100 mg capsule 1 Sig: Take 3 Caps by mouth three (3) times daily as needed. Class: Normal  
 Pharmacy: [facility] [address] [place] [address] [address] AT [facility] of [address] ([facility] Ph #: [phone] Route: Oral  
 ondansetron (ZOFRAN ODT) 4 mg disintegrating tablet 0 Sig: Take 1 Tab by mouth every eight (8) hours as needed for Nausea. Class: Normal  
 Pharmacy: [facility] - [address] AT [facility] of [address] ([facility] Ph #: [phone] Route: Oral  
  
Follow-up Instructions Return if symptoms worsen or fail to improve. Patient Instructions Gastroenteritis: Care Instructions Your Care Instructions Gastroenteritis is an illness that may cause nausea, vomiting, and diarrhea. It is sometimes called \"stomach flu. \" It can be caused by bacteria or a virus. You will probably begin to feel better in 1 to 2 days. In the meantime, get plenty of rest and make sure you do not become dehydrated. Dehydration occurs when your body loses too much fluid. Follow-up care is a key part of your treatment and safety. Be sure to make and go to all appointments, and call your doctor if you are having problems. It's also a good idea to know your test results and keep a list of the medicines you take. How can you care for yourself at home? · If your doctor prescribed antibiotics, take them as directed. Do not stop taking them just because you feel better. You need to take the full course of antibiotics. · Drink plenty of fluids to prevent dehydration, enough so that your urine is light yellow or clear like water. Choose water and other caffeine-free clear liquids until you feel better. If you have kidney, heart, or liver disease and have to limit fluids, talk with your doctor before you increase your fluid intake. · Drink fluids slowly, in frequent, small amounts, because drinking too much too fast can cause vomiting. · Begin eating mild foods, such as dry toast, yogurt, applesauce, bananas, and rice. Avoid spicy, hot, or high-fat foods, and do not drink alcohol or caffeine for a day or two. Do not drink milk or eat ice cream until you are feeling better. How to prevent gastroenteritis · Keep hot foods hot and cold foods cold. · Do not eat meats, dressings, salads, or other foods that have been kept at room temperature for more than 2 hours. · Use a thermometer to check your refrigerator. It should be between 34°F and 40°F. 
· Defrost meats in the refrigerator or microwave, not on the kitchen counter. · Keep your hands and your kitchen clean. Wash your hands, cutting boards, and countertops with hot soapy water frequently. · Cook meat until it is well done. · Do not eat raw eggs or uncooked sauces made with raw eggs. · Do not take chances. If food looks or tastes spoiled, throw it out. When should you call for help? Call 911 anytime you think you may need emergency care. For example, call if: 
? · You vomit blood or what looks like coffee grounds. ? · You passed out (lost consciousness). ? · You pass maroon or very bloody stools. ?Call your doctor now or seek immediate medical care if: 
? · You have severe belly pain. ? · You have signs of needing more fluids. You have sunken eyes, a dry mouth, and pass only a little dark urine. ? · You feel like you are going to faint. ? · You have increased belly pain that does not go away in 1 to 2 days. ? · You have new or increased nausea, or you are vomiting. ? · You have a new or higher fever. ? · Your stools are black and tarlike or have streaks of blood. ? Watch closely for changes in your health, and be sure to contact your doctor if: 
? · You are dizzy or lightheaded. ? · You urinate less than usual, or your urine is dark yellow or brown. ? · You do not feel better with each day that goes by. Where can you learn more? Go to http://abdifatah-mariusz.info/. Enter N142 in the search box to learn more about \"Gastroenteritis: Care Instructions. \" Current as of: March 3, 2017 Content Version: 11.4 © 2878-2992 [employer]. Care instructions adapted under license by [employer] (which disclaims liability or warranty for this information). If you have questions about a medical condition or this instruction, always ask your healthcare professional. [place] [address] any warranty or liability for your use of this information. Introducing [facility] & HEALTH SERVICES! Dear Alana Garces: Thank you for requesting a RegistryLove account. Our records indicate that you already have an active RegistryLove account. You can access your account anytime at [email or web address] Did you know that you can access your hospital and ER discharge instructions at any time in RegistryLove? You can also review all of your test results from your hospital stay or ER visit. Additional Information If you have questions, please visit the Frequently Asked Questions section of the RegistryLove website at [email or web address] Remember, RegistryLove is NOT to be used for urgent needs. For medical emergencies, dial 911. Now available from your iPhone and Android! Please provide this summary of care documentation to your next provider. Your primary care clinician is listed as Katharine Doyle.  If you have any questions after today's visit, please call [phone].

## 2018-05-29 NOTE — PROGRESS NOTES
Identified Patient with two Patient identifiers (Name and ). Two Patient Identifiers confirmed. Reviewed record in preparation for visit and have obtained necessary documentation. Chief Complaint   Patient presents with    Vomiting     Hx of Gus's Disease. States diarrhea and vomiting begin yesterday afternoon.  Diarrhea    Abdominal Pain    Medication Refill     Requesting Refill on Gabapentin       Visit Vitals    /74 (BP 1 Location: Right arm, BP Patient Position: Sitting)    Pulse 97    Temp 97.9 °F (36.6 °C) (Oral)    Resp 18    Ht 5' 2\" (1.575 m)    Wt 118 lb 12.8 oz (53.9 kg)    SpO2 99%    BMI 21.73 kg/m2       1. Have you been to the ER, urgent care clinic since your last visit? Hospitalized since your last visit? No    2. Have you seen or consulted any other health care providers outside of the [address] [place] [address] since your last visit? Include any pap smears or colon screening.  No

## 2018-05-29 NOTE — TELEPHONE ENCOUNTER
Patient returning call to nurse Polanco ( Float Nurse) about a letter being faxed to her job      Nurse took call

## 2018-05-29 NOTE — PROGRESS NOTES
Karey Anderson is an 53 y.o. female who presents for   Chief Complaint   Patient presents with    Vomiting     Hx of Gus's Disease. States diarrhea and vomiting begin yesterday afternoon.  Diarrhea    Abdominal Pain    Medication Refill     Requesting Refill on Gabapentin     Reports onset of diarrhea and vomiting starting yesterday afternoon. Last bowel movement 25 minutes ago. Vomiting has stopped but continues to have dry heaving. Feels fatigued. Had family gathering yesterday, but no one else is sick. No blood in stool. No fever. Allergies - reviewed: Allergies   Allergen Reactions    Cephalosporins Hives    Doxycycline Swelling    Penicillins Hives    Tetracyclines Nausea and Vomiting       Medications - reviewed:   Current Outpatient Prescriptions   Medication Sig    baclofen (LIORESAL) 10 mg tablet TAKE 1 TABLET BY MOUTH THREE TIMES A DAY    gabapentin (NEURONTIN) 100 mg capsule Take 3 Caps by mouth three (3) times daily as needed.  ondansetron (ZOFRAN ODT) 4 mg disintegrating tablet Take 1 Tab by mouth every eight (8) hours as needed for Nausea.  fluticasone (FLONASE) 50 mcg/actuation nasal spray 2 Sprays by Both Nostrils route daily.  melatonin 3 mg tablet Take 3 mg by mouth nightly.  acetaminophen (TYLENOL EXTRA STRENGTH) 500 mg tablet Take  by mouth every six (6) hours as needed for Pain.  simethicone (MYLICON) 80 mg chewable tablet Take 0.5 Tabs by mouth every six (6) hours as needed for Flatulence.  pantoprazole (PROTONIX) 40 mg tablet Take 1 Tab by mouth Before breakfast and dinner.  multivitamin capsule Take 1 Cap by mouth daily.  dextroamphetamine-amphetamine (ADDERALL) 20 mg tablet Take 20 mg by mouth three (3) times daily.  clonazePAM (KLONOPIN) 1 mg tablet Take 1 mg by mouth three (3) times daily.  LORazepam (ATIVAN) 1 mg tablet Take 1 Tab by mouth every eight (8) hours as needed for Anxiety. Max Daily Amount: 3 mg.  (Patient taking differently: Take 1 mg by mouth two (2) times daily as needed for Anxiety.)    traZODone (DESYREL) 100 mg tablet Take 100 mg by mouth nightly.  escitalopram oxalate (LEXAPRO) 20 mg tablet Take 20 mg by mouth daily.  albuterol (PROVENTIL HFA, VENTOLIN HFA, PROAIR HFA) 90 mcg/actuation inhaler Take 2 Puffs by inhalation every four (4) hours as needed for Wheezing or Shortness of Breath.  naloxone 2 mg/actuation spry Use 1 spray intranasally into 1 nostril. Use a new Narcan nasal spray for subsequent doses and administer into alternating nostrils. May repeat every 2 to 3 minutes as needed. No current facility-administered medications for this visit. Past Medical History - reviewed:  Past Medical History:   Diagnosis Date    Autoimmune disease ([facility] [address]     Crohn's Disease    Crohn disease ([facility] [address] 2010    Crohn's     Depression 2010    History of vascular access device 2017    [facility] VAT - 33 cm right brachial PICC for abx and limited access    Vertigo        Past Surgical History - reviewed:   Past Surgical History:   Procedure Laterality Date    ABDOMEN SURGERY PROC UNLISTED      due to Crohn's-bowel resection x2    HX  SECTION      HX HEENT      HX TONSIL AND ADENOIDECTOMY      HX TUBAL LIGATION         Social History - reviewed:  Social History     Social History    Marital status:      Spouse name: N/A    Number of children: N/A    Years of education: N/A     Occupational History    Not on file.      Social History Main Topics    Smoking status: Current Some Day Smoker     Packs/day: 0.50     Years: 8.00     Types: Cigarettes    Smokeless tobacco: Never Used    Alcohol use No    Drug use: No      Comment: 1 pack a day    Sexual activity: Yes     Partners: Male     Birth control/ protection: Pill     Other Topics Concern    Not on file     Social History Narrative       ROS  CONSTITUTIONAL: Denies: fever  GI: nausea, vomiting, diarrhea, Denies: blood in stool      Physical Exam  Visit Vitals    /74 (BP 1 Location: Right arm, BP Patient Position: Sitting)    Pulse 97    Temp 97.9 °F (36.6 °C) (Oral)    Resp 18    Ht 5' 2\" (1.575 m)    Wt 118 lb 12.8 oz (53.9 kg)    SpO2 99%    BMI 21.73 kg/m2       General appearance - alert, mildly ill appearing, cooperative  Mouth - mucous membranes moist, pharynx normal without lesions  Chest - clear to auscultation, no wheezes, rales or rhonchi, symmetric air entry  Heart - normal rate, regular rhythm, normal S1, S2, no murmurs, rubs, clicks or gallops  Abdomen - soft, non-distended, mild TTP RLQ and LLQ without guarding or rebound, hyperactive BS  Neurological - alert, oriented, normal speech, no focal findings or movement disorder noted  Skin - normal coloration and turgor, no rashes, no suspicious skin lesions noted      Assessment/Plan    ICD-10-CM ICD-9-CM    1. Gastroenteritis K52.9 558.9 ondansetron (ZOFRAN ODT) 4 mg disintegrating tablet   2. Peripheral polyneuropathy G62.9 356.9 gabapentin (NEURONTIN) 100 mg capsule     Symptoms c/w gastroenteritis. Will treat nausea with zofran and recommended aggressive fluid hydration. ER precautions given. Strongly advised patient to RTC if she sees blood in her stool given her hx of Crohn's disease. Urine pregnancy test not performed due to hx of tubal ligation. Gabapentin refilled. Follow-up Disposition:  Return if symptoms worsen or fail to improve. I have discussed the diagnosis with the patient and the intended plan as seen in the above orders. The patient has received an after-visit summary and questions were answered concerning future plans. I have discussed medication side effects and warnings with the patient as well. Bárbara Diehl MD  Family Medicine Resident    Patient discussed with Dr. Scott Dobbins, attending physician.

## 2018-05-29 NOTE — PATIENT INSTRUCTIONS
Gastroenteritis: Care Instructions  Your Care Instructions    Gastroenteritis is an illness that may cause nausea, vomiting, and diarrhea. It is sometimes called \"stomach flu. \" It can be caused by bacteria or a virus. You will probably begin to feel better in 1 to 2 days. In the meantime, get plenty of rest and make sure you do not become dehydrated. Dehydration occurs when your body loses too much fluid. Follow-up care is a key part of your treatment and safety. Be sure to make and go to all appointments, and call your doctor if you are having problems. It's also a good idea to know your test results and keep a list of the medicines you take. How can you care for yourself at home? · If your doctor prescribed antibiotics, take them as directed. Do not stop taking them just because you feel better. You need to take the full course of antibiotics. · Drink plenty of fluids to prevent dehydration, enough so that your urine is light yellow or clear like water. Choose water and other caffeine-free clear liquids until you feel better. If you have kidney, heart, or liver disease and have to limit fluids, talk with your doctor before you increase your fluid intake. · Drink fluids slowly, in frequent, small amounts, because drinking too much too fast can cause vomiting. · Begin eating mild foods, such as dry toast, yogurt, applesauce, bananas, and rice. Avoid spicy, hot, or high-fat foods, and do not drink alcohol or caffeine for a day or two. Do not drink milk or eat ice cream until you are feeling better. How to prevent gastroenteritis  · Keep hot foods hot and cold foods cold. · Do not eat meats, dressings, salads, or other foods that have been kept at room temperature for more than 2 hours. · Use a thermometer to check your refrigerator. It should be between 34°F and 40°F.  · Defrost meats in the refrigerator or microwave, not on the kitchen counter. · Keep your hands and your kitchen clean.  Wash your hands, cutting boards, and countertops with hot soapy water frequently. · Cook meat until it is well done. · Do not eat raw eggs or uncooked sauces made with raw eggs. · Do not take chances. If food looks or tastes spoiled, throw it out. When should you call for help? Call 911 anytime you think you may need emergency care. For example, call if:  ? · You vomit blood or what looks like coffee grounds. ? · You passed out (lost consciousness). ? · You pass maroon or very bloody stools. ?Call your doctor now or seek immediate medical care if:  ? · You have severe belly pain. ? · You have signs of needing more fluids. You have sunken eyes, a dry mouth, and pass only a little dark urine. ? · You feel like you are going to faint. ? · You have increased belly pain that does not go away in 1 to 2 days. ? · You have new or increased nausea, or you are vomiting. ? · You have a new or higher fever. ? · Your stools are black and tarlike or have streaks of blood. ? Watch closely for changes in your health, and be sure to contact your doctor if:  ? · You are dizzy or lightheaded. ? · You urinate less than usual, or your urine is dark yellow or brown. ? · You do not feel better with each day that goes by. Where can you learn more? Go to http://abdifatah-mariusz.info/. Enter N142 in the search box to learn more about \"Gastroenteritis: Care Instructions. \"  Current as of: March 3, 2017  Content Version: 11.4  © 3910-4240 [employer]. Care instructions adapted under license by [employer] (which disclaims liability or warranty for this information). If you have questions about a medical condition or this instruction, always ask your healthcare professional. [address] any warranty or liability for your use of this information.

## 2018-05-31 ENCOUNTER — OFFICE VISIT (OUTPATIENT)
Dept: FAMILY MEDICINE CLINIC | Age: 48
End: 2018-05-31

## 2018-05-31 VITALS
OXYGEN SATURATION: 98 % | HEART RATE: 109 BPM | DIASTOLIC BLOOD PRESSURE: 72 MMHG | SYSTOLIC BLOOD PRESSURE: 106 MMHG | RESPIRATION RATE: 14 BRPM | TEMPERATURE: 98.9 F

## 2018-05-31 DIAGNOSIS — R11.0 NAUSEA: Primary | ICD-10-CM

## 2018-05-31 DIAGNOSIS — R11.2 NAUSEA AND VOMITING, INTRACTABILITY OF VOMITING NOT SPECIFIED, UNSPECIFIED VOMITING TYPE: ICD-10-CM

## 2018-05-31 LAB
BILIRUB UR QL STRIP: NEGATIVE
GLUCOSE UR-MCNC: NEGATIVE MG/DL
HCG URINE, QL. (POC): NEGATIVE
KETONES P FAST UR STRIP-MCNC: NEGATIVE MG/DL
PH UR STRIP: 6 [PH] (ref 4.6–8)
PROT UR QL STRIP: NEGATIVE
SP GR UR STRIP: 1.03 (ref 1–1.03)
UA UROBILINOGEN AMB POC: NORMAL (ref 0.2–1)
URINALYSIS CLARITY POC: CLEAR
URINALYSIS COLOR POC: YELLOW
URINE BLOOD POC: NORMAL
URINE LEUKOCYTES POC: NEGATIVE
URINE NITRITES POC: NEGATIVE
VALID INTERNAL CONTROL?: YES

## 2018-05-31 NOTE — LETTER
NOTIFICATION RETURN TO WORK / SCHOOL 
 
5/31/2018 3:51 PM 
 
Ms. Chucho Rea [address] [address] [id number] To Whom It May Concern: 
 
Chucho Rea is currently under the care of [facility]. She will return to work/school on: 06/04/2018 If there are questions or concerns please have the patient contact our office.  
 
 
 
Sincerely, 
 
 
Annamaria Solis MD

## 2018-05-31 NOTE — MR AVS SNAPSHOT
[address] 
[phone] Patient: Carlos Enrique Person MRN: [id number] :1970 Visit Information Date & Time Provider Department Dept. Phone Encounter #  
 2018  3:00 PM Amelia Lui MD [facility]5 [facility] [phone] [id number] Your Appointments 2018 11:00 AM  
Nurse Visit with Ayush Ugalde MD  
[facility] Appt Note: B-12  
 [address] 1007 [facility]  
[phone]  
  
   
 [address] [facility]ly [phone] Upcoming Health Maintenance Date Due  
 PAP AKA CERVICAL CYTOLOGY 2018* Influenza Age 5 to Adult 2018 Pneumococcal 19-64 Highest Risk (2 of 3 - PCV13) 2018 DTaP/Tdap/Td series (2 - Td) 10/8/2024 *Topic was postponed. The date shown is not the original due date. Allergies as of 2018  Review Complete On: 2018 By: Yan Schaefer Severity Noted Reaction Type Reactions Cephalosporins  2010    Hives Doxycycline  2017    Swelling Penicillins  2010    Hives Tetracyclines  2010    Nausea and Vomiting Current Immunizations  Reviewed on 2017 Name Date Influenza Vaccine (Quad) PF 2017 Pneumococcal Polysaccharide (PPSV-23) 2017 Tdap 10/8/2014 Not reviewed this visit You Were Diagnosed With   
  
 Codes Comments Nausea    -  Primary ICD-10-CM: R11.0 ICD-9-CM: 787.02 Nausea and vomiting, intractability of vomiting not specified, unspecified vomiting type     ICD-10-CM: R11.2 ICD-9-CM: 787.01 Vitals BP Pulse Temp Resp SpO2 OB Status 106/72 (BP 1 Location: Left arm, BP Patient Position: Sitting) (!) 109 98.9 °F (37.2 °C) (Oral) 14 98% Ablation Smoking Status Current Some Day Smoker Preferred Pharmacy Pharmacy Name Phone [facility] [address]  [facility] [phone] Your Updated Medication List  
  
   
This list is accurate as of 5/31/18  5:00 PM.  Always use your most recent med list.  
  
  
  
  
 ADDERALL 20 mg tablet Generic drug:  dextroamphetamine-amphetamine Take 20 mg by mouth three (3) times daily. albuterol 90 mcg/actuation inhaler Commonly known as:  PROVENTIL HFA, VENTOLIN HFA, PROAIR HFA Take 2 Puffs by inhalation every four (4) hours as needed for Wheezing or Shortness of Breath. baclofen 10 mg tablet Commonly known as:  LIORESAL  
TAKE 1 TABLET BY MOUTH THREE TIMES A DAY  
  
 clonazePAM 1 mg tablet Commonly known as:  Shaun Grave Take 1 mg by mouth three (3) times daily. fluticasone 50 mcg/actuation nasal spray Commonly known as:  Indira Mills 2 Sprays by Both Nostrils route daily. gabapentin 100 mg capsule Commonly known as:  NEURONTIN Take 3 Caps by mouth three (3) times daily as needed. LEXAPRO 20 mg tablet Generic drug:  escitalopram oxalate Take 20 mg by mouth daily. LORazepam 1 mg tablet Commonly known as:  ATIVAN Take 1 Tab by mouth every eight (8) hours as needed for Anxiety. Max Daily Amount: 3 mg.  
  
 melatonin 3 mg tablet Take 3 mg by mouth nightly. multivitamin capsule Take 1 Cap by mouth daily. naloxone 2 mg/actuation Spry Use 1 spray intranasally into 1 nostril. Use a new Narcan nasal spray for subsequent doses and administer into alternating nostrils. May repeat every 2 to 3 minutes as needed. ondansetron 4 mg disintegrating tablet Commonly known as:  ZOFRAN ODT Take 1 Tab by mouth every eight (8) hours as needed for Nausea. pantoprazole 40 mg tablet Commonly known as:  PROTONIX Take 1 Tab by mouth Before breakfast and dinner. simethicone 80 mg chewable tablet Commonly known as:  Genevia Starch  
 Take 0.5 Tabs by mouth every six (6) hours as needed for Flatulence. traZODone 100 mg tablet Commonly known as:  Illene Dus Take 100 mg by mouth nightly. TYLENOL EXTRA STRENGTH 500 mg tablet Generic drug:  acetaminophen Take  by mouth every six (6) hours as needed for Pain. We Performed the Following AMB POC URINALYSIS DIP STICK AUTO W/ MICRO [82840 CPT(R)] AMB POC URINE PREGNANCY TEST, VISUAL COLOR COMPARISON [58336 CPT(R)] Introducing [facility] SERVICES! Dear Giuliana Garnica: Thank you for requesting a A[employer]vark account. Our records indicate that you already have an active Aardvark account. You can access your account anytime at [email or web address] Did you know that you can access your hospital and ER discharge instructions at any time in Aardvark? You can also review all of your test results from your hospital stay or ER visit. Additional Information If you have questions, please visit the Frequently Asked Questions section of the Aardvark website at [email or web address] Remember, Aardvark is NOT to be used for urgent needs. For medical emergencies, dial 911. Now available from your iPhone and Android! Please provide this summary of care documentation to your next provider. Your primary care clinician is listed as Joe Estrada. If you have any questions after today's visit, please call [phone].

## 2018-05-31 NOTE — PROGRESS NOTES
Chief Complaint   Patient presents with    Abdominal Pain     F/U 2 days ago, unresolved vomiting, chills, hx chrons     1. Have you been to the ER, urgent care clinic since your last visit? Hospitalized since your last visit? No    2. Have you seen or consulted any other health care providers outside of the [address] [place] [address] since your last visit? Include any pap smears or colon screening.  No

## 2018-05-31 NOTE — LETTER
NOTIFICATION RETURN TO WORK / SCHOOL 
 
5/31/2018 3:45 PM 
 
Ms. Starr Beck [address] [facility] [address] To Whom It May Concern: 
 
Starr Beck is currently under the care of [address] [facility]. She will return to work/school on: 06/01/2018 If there are questions or concerns please have the patient contact our office.  
 
 
 
Sincerely, 
 
 
Gilson Chapman MD

## 2018-06-04 ENCOUNTER — TELEPHONE (OUTPATIENT)
Dept: FAMILY MEDICINE CLINIC | Age: 48
End: 2018-06-04

## 2018-06-04 NOTE — PROGRESS NOTES
Carlos Enrique Person is a 53 y.o. female who presents for n/v. She was seen in clinic 2 days ago. She was given zofran which she thinks is offering some relief. She has been able to tolerate liquids and minimal solids (she reports doing the ResiModel). She had diarrhea last night but has resolved this morning. No blood in stool or emesis. No fever. Overall feeling a little bit better. 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 PMHx:  Past Medical History:   Diagnosis Date    Autoimmune disease ([facility] [facility]     Crohn's Disease    Crohn disease ([facility] [address] [facility] 4/19/2010    Crohn's     Depression 4/19/2010    History of vascular access device 07/18/2017    [facility] VAT - 33 cm right brachial PICC for abx and limited access    Vertigo        Meds:   Current Outpatient Prescriptions   Medication Sig Dispense Refill    baclofen (LIORESAL) 10 mg tablet TAKE 1 TABLET BY MOUTH THREE TIMES A DAY  1    gabapentin (NEURONTIN) 100 mg capsule Take 3 Caps by mouth three (3) times daily as needed. 90 Cap 1    ondansetron (ZOFRAN ODT) 4 mg disintegrating tablet Take 1 Tab by mouth every eight (8) hours as needed for Nausea. 15 Tab 0    fluticasone (FLONASE) 50 mcg/actuation nasal spray 2 Sprays by Both Nostrils route daily. 1 Bottle 1    melatonin 3 mg tablet Take 3 mg by mouth nightly.  simethicone (MYLICON) 80 mg chewable tablet Take 0.5 Tabs by mouth every six (6) hours as needed for Flatulence. 40 Tab 1    pantoprazole (PROTONIX) 40 mg tablet Take 1 Tab by mouth Before breakfast and dinner. 30 Tab 2    multivitamin capsule Take 1 Cap by mouth daily.  dextroamphetamine-amphetamine (ADDERALL) 20 mg tablet Take 20 mg by mouth three (3) times daily.  clonazePAM (KLONOPIN) 1 mg tablet Take 1 mg by mouth three (3) times daily.  LORazepam (ATIVAN) 1 mg tablet Take 1 Tab by mouth every eight (8) hours as needed for Anxiety. Max Daily Amount: 3 mg.  (Patient taking differently: Take 1 mg by mouth two (2) times daily as needed for Anxiety.) 12 Tab 0    traZODone (DESYREL) 100 mg tablet Take 100 mg by mouth nightly.  escitalopram oxalate (LEXAPRO) 20 mg tablet Take 20 mg by mouth daily.  albuterol (PROVENTIL HFA, VENTOLIN HFA, PROAIR HFA) 90 mcg/actuation inhaler Take 2 Puffs by inhalation every four (4) hours as needed for Wheezing or Shortness of Breath. 1 Inhaler 4    naloxone 2 mg/actuation spry Use 1 spray intranasally into 1 nostril. Use a new Narcan nasal spray for subsequent doses and administer into alternating nostrils. May repeat every 2 to 3 minutes as needed. 1 Blister 0    acetaminophen (TYLENOL EXTRA STRENGTH) 500 mg tablet Take  by mouth every six (6) hours as needed for Pain. Allergies:    Allergies   Allergen Reactions    Cephalosporins Hives    Doxycycline Swelling    Penicillins Hives    Tetracyclines Nausea and Vomiting       Smoker:  History   Smoking Status    Current Some Day Smoker    Packs/day: 0.50    Years: 8.00    Types: Cigarettes   Smokeless Tobacco    Never Used       ETOH:   History   Alcohol Use No       FH:   Family History   Problem Relation Age of Onset    Heart Disease Father     Cancer Mother        ROS:  General/Constitutional:   No headache, fever, fatigue, weight loss or weight gain       Eyes:   No redness, pruritis, pain, visual changes, swelling, or discharge      Ears:    No pain, loss or changes in hearing     Neck:   No swelling, masses, stiffness, pain, or limited movement     Cardiac:    No chest pain      Respiratory:   No cough or shortness of breath     GI:   No nausea/vomiting, diarrhea, abdominal pain, bloody or dark stools       :   No dysuria or  hematuria    Neurological:   No loss of consciousness, dizziness, seizures, dysarthria, cognitive changes, memory changes,  problems with balance, or unilateral weakness     Skin: No rash     Physical Exam:  Visit Vitals    /72 (BP 1 Location: Left arm, BP Patient Position: Sitting)    Pulse (!) 109    Temp 98.9 °F (37.2 °C) (Oral)    Resp 14    SpO2 98%     GEN: No apparent distress. Alert and oriented and responds to all questions appropriately. EYES:  Conjunctiva clear;   OROPHYARYNX: No oral lesions or exudates. Moist mucous membranes. LUNGS: Respirations unlabored; clear to auscultation bilaterally  CARDIOVASCULAR: Regular, rate, and rhythm without murmurs, gallops or rubs   ABDOMEN: Soft; nontender; nondistended; normoactive bowel sounds; no masses or organomegaly  NEUROLOGIC:  No focal neurologic deficits. EXT: Well perfused. No edema. SKIN: No obvious rashes. Labs:  Component      Latest Ref Rng & Units 5/31/2018           3:22 PM   Color (UA POC)       Yellow   Clarity (UA POC)       Clear   Glucose (UA POC)      Negative Negative   Bilirubin (UA POC)      Negative Negative   Ketones (UA POC)      Negative Negative   Specific gravity (UA POC)      1.001 - 1.035 1.030   Blood (UA POC)      Negative Trace   pH (UA POC)      4.6 - 8.0 6.0   Protein (UA POC)      Negative Negative   Urobilinogen (UA POC)      0.2 - 1 0.2 mg/dL   Nitrites (UA POC)      Negative Negative   Leukocyte esterase (UA POC)      Negative Negative     Urine pregnancy test: negative     Assessment:    ICD-10-CM ICD-9-CM    1. Nausea R11.0 787.02 AMB POC URINALYSIS DIP STICK AUTO W/ MICRO      AMB POC URINE PREGNANCY TEST, VISUAL COLOR COMPARISON   2. Nausea and vomiting, intractability of vomiting not specified, unspecified vomiting type R11.2 787.01 AMB POC URINALYSIS DIP STICK AUTO W/ MICRO      AMB POC URINE PREGNANCY TEST, VISUAL COLOR COMPARISON   Gastroenteritis vs crohn's. She was able to drink water without difficulty in clinic prior to discharge (no pain, no n/v). Abd exam was benign. No fever. Plan:  GENERAL INSTRUCTIONS:  1.  Plenty of fluids:    Adults: Water, Gatorade, decaffeinated defizzed soda, ice chips, etc.    Wait 30-60 min after vomiting to try again. 2. As symptoms subsides, advance food as tolerated to bananas, rice, applesauce, toast, tea, and then to a regular diet or full-strength formula  3. No milk, meat, fried/fatty products, aspirin or ibuprofen for several days. 4. Return to clinic or go to the Emergency Room if you have increased diarrhea, vomiting, fever, pain, swelling of the abdomen, bloody stool or vomitus, or signs of dehydration: decreased urination, dry mouth, fatigue, lightheaded, (suken soft spot, no tears, dry diapers in infants) or no improvement in 48 hours . 5. Zofran PRN as previously prescribed. She will continue to f/u with GI for monitoring of Crohn's disease.

## 2018-06-04 NOTE — TELEPHONE ENCOUNTER
----- Message from Dane Jones sent at 6/4/2018 12:05 PM EDT -----  Regarding: /Telephone  Pt was recent admitted into [facility] on 06.02.18 discharged on 06.04.18. Pt was diagnosed with Lime Disease last year and went in for some abdominal issues, pt's bowels busted. Pt would like a return call. She wants to speak with a doctor or nurse, and will need a FU appointment. Pt wanted Neptali Becerra, because she is familiar with her condition, but I have advised there was nothing available in her time frame.        Best contact is [phone]

## 2018-06-05 ENCOUNTER — OFFICE VISIT (OUTPATIENT)
Dept: FAMILY MEDICINE CLINIC | Age: 48
End: 2018-06-05

## 2018-06-05 VITALS
OXYGEN SATURATION: 97 % | SYSTOLIC BLOOD PRESSURE: 129 MMHG | TEMPERATURE: 98.2 F | RESPIRATION RATE: 16 BRPM | BODY MASS INDEX: 22.08 KG/M2 | HEART RATE: 102 BPM | HEIGHT: 62 IN | DIASTOLIC BLOOD PRESSURE: 81 MMHG | WEIGHT: 120 LBS

## 2018-06-05 DIAGNOSIS — I73.00 RAYNAUD'S DISEASE WITHOUT GANGRENE: ICD-10-CM

## 2018-06-05 DIAGNOSIS — G62.9 PERIPHERAL POLYNEUROPATHY: Primary | ICD-10-CM

## 2018-06-05 DIAGNOSIS — E53.8 VITAMIN B12 DEFICIENCY: ICD-10-CM

## 2018-06-05 RX ORDER — SULFAMETHOXAZOLE AND TRIMETHOPRIM 800; 160 MG/1; MG/1
TABLET ORAL
COMMUNITY
End: 2019-08-12

## 2018-06-05 RX ORDER — CYANOCOBALAMIN 1000 UG/ML
1000 INJECTION, SOLUTION INTRAMUSCULAR; SUBCUTANEOUS ONCE
Qty: 1 VIAL | Refills: 0
Start: 2018-06-05 | End: 2018-06-05

## 2018-06-05 RX ORDER — NIFEDIPINE 30 MG/1
30 TABLET, FILM COATED, EXTENDED RELEASE ORAL DAILY
Qty: 30 TAB | Refills: 1 | Status: SHIPPED | OUTPATIENT
Start: 2018-06-05 | End: 2018-08-26 | Stop reason: SDUPTHER

## 2018-06-05 RX ORDER — MUPIROCIN CALCIUM 20 MG/G
CREAM TOPICAL
COMMUNITY
End: 2020-09-05

## 2018-06-05 RX ORDER — GABAPENTIN 100 MG/1
200 CAPSULE ORAL 3 TIMES DAILY
Qty: 90 CAP | Refills: 1 | Status: SHIPPED | OUTPATIENT
Start: 2018-06-05 | End: 2018-08-11 | Stop reason: DRUGHIGH

## 2018-06-05 NOTE — PROGRESS NOTES
[address] [facility] [address]   Office [phone], Fax [phone]      Subjective:     CC:Leg pain  History provided by patient    HPI:  Mara Segura is a 53 y.o. WHITE OR  female with significant history of crohn's disease presenting for ER follow up for neuropathy. Patient was [facility] on 06/04 for neuropathy and was given Gabapentin 200 mg BID and instructed to follow up with PCP. Patient presents today with persistence of foot and toe pain bilaterally. Patient reports she's unable to walk and drive her car. States her father had to bring her to the clinic today as she was unable to drive. Patient states that she's a . Of note, patient was seen in 11/2017  for bilateral toes with peripheral polyneuropathy and skin discoloration and was started on Gabapentin. JEANNETTE was ordered at that time but was not completed. Patient was also seen in 06/03 in urgent care for folliculitis in the right tibia and was given bactrim for 10 days. Patient is a smoker and smokes about 1/2 PPD for 12 years.      ROS (bolded are positive):   General Negative for fever, chills, changes in weight, changes in appetite   HEENT Negative for hearing loss, earache, congestion, sore throat   CV Negative for chest pain, palpitations, edema   Respiratory Negative for cough, shortness of breath, wheezing   GI Negative for change in bowel habits, abdominal pain, black or bloody stools, nausea or vomiting, diarrhea    Negative for frequency, dysuria, hematuria, vaginal discharge   MSK Negative for back pain, joint pain, muscle pain                         Past Medical History:   Diagnosis Date    Autoimmune disease ([facility] [address]     Crohn's Disease    Crohn disease ([facility] [address] 4/19/2010    Crohn's     Depression 4/19/2010    History of vascular access device 07/18/2017    [facility] VAT - 33 cm right brachial PICC for abx and limited access    Vertigo        Current Outpatient Prescriptions on File Prior to Visit   Medication Sig Dispense Refill    baclofen (LIORESAL) 10 mg tablet TAKE 1 TABLET BY MOUTH THREE TIMES A DAY  1    gabapentin (NEURONTIN) 100 mg capsule Take 3 Caps by mouth three (3) times daily as needed. 90 Cap 1    ondansetron (ZOFRAN ODT) 4 mg disintegrating tablet Take 1 Tab by mouth every eight (8) hours as needed for Nausea. 15 Tab 0    naloxone 2 mg/actuation spry Use 1 spray intranasally into 1 nostril. Use a new Narcan nasal spray for subsequent doses and administer into alternating nostrils. May repeat every 2 to 3 minutes as needed. 1 Blister 0    fluticasone (FLONASE) 50 mcg/actuation nasal spray 2 Sprays by Both Nostrils route daily. 1 Bottle 1    melatonin 3 mg tablet Take 3 mg by mouth nightly.  acetaminophen (TYLENOL EXTRA STRENGTH) 500 mg tablet Take  by mouth every six (6) hours as needed for Pain.  simethicone (MYLICON) 80 mg chewable tablet Take 0.5 Tabs by mouth every six (6) hours as needed for Flatulence. 40 Tab 1    pantoprazole (PROTONIX) 40 mg tablet Take 1 Tab by mouth Before breakfast and dinner. 30 Tab 2    multivitamin capsule Take 1 Cap by mouth daily.  dextroamphetamine-amphetamine (ADDERALL) 20 mg tablet Take 20 mg by mouth three (3) times daily.  clonazePAM (KLONOPIN) 1 mg tablet Take 1 mg by mouth three (3) times daily.  LORazepam (ATIVAN) 1 mg tablet Take 1 Tab by mouth every eight (8) hours as needed for Anxiety. Max Daily Amount: 3 mg. (Patient taking differently: Take 1 mg by mouth two (2) times daily as needed for Anxiety.) 12 Tab 0    traZODone (DESYREL) 100 mg tablet Take 100 mg by mouth nightly.  escitalopram oxalate (LEXAPRO) 20 mg tablet Take 20 mg by mouth daily.  albuterol (PROVENTIL HFA, VENTOLIN HFA, PROAIR HFA) 90 mcg/actuation inhaler Take 2 Puffs by inhalation every four (4) hours as needed for Wheezing or Shortness of Breath.  1 Inhaler 4     No current facility-administered medications on file prior to visit. Allergies   Allergen Reactions    Cephalosporins Hives    Doxycycline Swelling    Penicillins Hives    Tetracyclines Nausea and Vomiting       Past Surgical History:   Procedure Laterality Date    ABDOMEN SURGERY PROC UNLISTED      due to Crohn's-bowel resection x2    HX  SECTION      HX HEENT      HX TONSIL AND ADENOIDECTOMY      HX TUBAL LIGATION         Social History     Social History    Marital status:      Spouse name: N/A    Number of children: N/A    Years of education: N/A     Occupational History    Not on file. Social History Main Topics    Smoking status: Current Some Day Smoker     Packs/day: 0.50     Years: 8.00     Types: Cigarettes    Smokeless tobacco: Never Used    Alcohol use No    Drug use: No      Comment: 1 pack a day    Sexual activity: Yes     Partners: Male     Birth control/ protection: Pill     Other Topics Concern    Not on file     Social History Narrative       Patient Active Problem List   Diagnosis Code    [facility] K50.90    Depression F32.9    Vertigo R42    Vitamin B12 deficiency E53.8    Peripheral neuropathy G62.9    Nonspecific abnormal electrocardiogram (ECG) (EKG) R94.31    Perforation bowel ([facility]) K63.1    Pneumonia J18.9    Anemia D64.9    Wounds, multiple open, lower extremity S81.809A    Thrombocytosis ([facility]) D47.3    Neutrophilia D72.9         Objective:   Vitals - reviewed  Visit Vitals    /81    Pulse (!) 102    Temp 98.2 °F (36.8 °C) (Oral)    Resp 16    Ht 5' 2\" (1.575 m)    Wt 120 lb (54.4 kg)    SpO2 97%    BMI 21.95 kg/m2        Physical Exam   Constitutional: She appears well-developed and well-nourished. HENT:   Head: Normocephalic and atraumatic. Cardiovascular: Normal rate, regular rhythm and normal heart sounds. Pulmonary/Chest: Effort normal and breath sounds normal. No respiratory distress. Abdominal: Soft.  Bowel sounds are normal.   Musculoskeletal: Normal range of motion. Pulses 2+ DP/PT bilaterally, purple like changes in all 10 toes, cold to touch. Sensations intact with 1 sec capillary refill         Assessment and orders:   Diagnoses and all orders for this visit:    1. Peripheral polyneuropathy with Raynaud's disease  Recurrent problem, patient seen in the past for the same reason and was on Gabapentin 100 mg TID. Patient [facility] for neuropathy pain/ Raynaud's disease on 06/04 and was discharged home with gabapentin 200 mg BID and was instructed to follow up with PCP. Patient with increased pain in the feet and purple discoloration in all 10 toes. Cold to touch. DP and TP pulses intact. -     Strongly advised to stop smoking as it has contributing factor for raynaud's disease        -      Discuss with your psychiatrist for other options other than aderall for ADHD as aderall is a stimulant with adrenergic effect may be contributing to vasoconstriction        -     Increase dose of gabapentin and will start CCB  -     NIFEdipine ER (ADALAT CC) 30 mg ER tablet; Take 1 Tab by mouth daily.  -     gabapentin (NEURONTIN) 100 mg capsule; Take 2 Caps by mouth three (3) times daily.  -     RTC if symptoms fail to improve. 2. Vitamin B12 deficiency  - Patient with history of anemia 2/2 vitamin B12 deficiency. Has been getting monthly injections in the past at Dr. Nidia Stratton office. Patient had nursing appointment for B12 injection today but was canceled. Will go ahead and give Vitamin B12 now and recheck Vitamin B12 levels at the next visit. -     VITAMIN B12 INJECTION  -     GA THER/PROPH/DIAG INJECTION, SUBCUT/IM      Pt was discussed with Dr Ketan Fernandez (attending physician). I have reviewed patient medical and social history and medications. I have reviewed pertinent labs results and other data. I have discussed the diagnosis with the patient and the intended plan as seen in the above orders.  The patient has received an after-visit summary and questions were answered concerning future plans. I have discussed medication side effects and warnings with the patient as well.     Alex Ivan MD  Resident [facility]  06/05/18

## 2018-06-05 NOTE — MR AVS SNAPSHOT
2100 [facility] 1007 [facility] 
[phone] Patient: Talia Dhaliwal MRN: [id number] :1970 Visit Information Date & Time Provider Department Dept. Phone Encounter #  
 2018 11:15 AM Ananth Potter MD 1515 [facility] [phone] [id number] Follow-up Instructions Return in about 1 week (around 2018). Your Appointments 2018  4:10 PM  
ACUTE CARE with Yessy Phillip MD  
1515 [address]) Appt Note: fv from 18 needs 30 minutes per nurse, prefers Dr. Deon Fernandez[address],[facility] [address] [place] [address]  
[phone]  
  
   
 [address] [place] [address] 3 [facility] 99 [id number] Upcoming Health Maintenance Date Due  
 PAP AKA CERVICAL CYTOLOGY 2018* Influenza Age 5 to Adult 2018 Pneumococcal 19-64 Highest Risk (2 of 3 - PCV13) 2018 DTaP/Tdap/Td series (2 - Td) 10/8/2024 *Topic was postponed. The date shown is not the original due date. Allergies as of 2018  Review Complete On: 2018 By: Ananth Potter MD  
  
 Severity Noted Reaction Type Reactions Cephalosporins  2010    Hives Doxycycline  2017    Swelling Penicillins  2010    Hives Tetracyclines  2010    Nausea and Vomiting Current Immunizations  Reviewed on 2017 Name Date Influenza Vaccine (Quad) PF 2017 Pneumococcal Polysaccharide (PPSV-23) 2017 Tdap 10/8/2014 Not reviewed this visit You Were Diagnosed With   
  
 Codes Comments Peripheral polyneuropathy    -  Primary ICD-10-CM: G62.9 ICD-9-CM: 356.9 Raynaud's disease without gangrene     ICD-10-CM: I73.00 ICD-9-CM: 443.0 Vitamin B12 deficiency     ICD-10-CM: E53.8 ICD-9-CM: 266.2 Vitals BP Pulse Temp Resp Height(growth percentile) Weight(growth percentile) 129/81 (!) 102 98.2 °F (36.8 °C) (Oral) 16 5' 2\" (1.575 m) 120 lb (54.4 kg) SpO2 BMI OB Status Smoking Status 97% 21.95 kg/m2 Ablation Current Some Day Smoker Vitals History BMI and BSA Data Body Mass Index Body Surface Area  
 21.95 kg/m 2 1.54 m 2 Preferred Pharmacy Pharmacy Name Phone [facility] 1 [address]  [facility] [phone] Your Updated Medication List  
  
   
This list is accurate as of 6/5/18  4:51 PM.  Always use your most recent med list.  
  
  
  
  
 ADDERALL 20 mg tablet Generic drug:  dextroamphetamine-amphetamine Take 20 mg by mouth three (3) times daily. albuterol 90 mcg/actuation inhaler Commonly known as:  PROVENTIL HFA, VENTOLIN HFA, PROAIR HFA Take 2 Puffs by inhalation every four (4) hours as needed for Wheezing or Shortness of Breath. baclofen 10 mg tablet Commonly known as:  LIORESAL  
TAKE 1 TABLET BY MOUTH THREE TIMES A DAY  
  
 BACTRIM -800 mg per tablet Generic drug:  trimethoprim-sulfamethoxazole Take 1 tablet every 12 hours by oral route for 10 days. BACTROBAN 2 % topical cream  
Generic drug:  mupirocin calcium APPLY A SMALL AMOUNT TO THE AFFECTED AREA BY TOPICAL ROUTE 3 TIMES PER DAY FOR 10 DAYS  
  
 clonazePAM 1 mg tablet Commonly known as:  Delon Matthew Take 1 mg by mouth three (3) times daily. cyanocobalamin 1,000 mcg/mL injection Commonly known as:  VITAMIN B12  
1 mL by IntraMUSCular route once for 1 dose. fluticasone 50 mcg/actuation nasal spray Commonly known as:  Christine Mealy 2 Sprays by Both Nostrils route daily. * gabapentin 100 mg capsule Commonly known as:  NEURONTIN Take 3 Caps by mouth three (3) times daily as needed. * gabapentin 100 mg capsule Commonly known as:  NEURONTIN Take 2 Caps by mouth three (3) times daily. LEXAPRO 20 mg tablet Generic drug:  escitalopram oxalate Take 20 mg by mouth daily. LORazepam 1 mg tablet Commonly known as:  ATIVAN Take 1 Tab by mouth every eight (8) hours as needed for Anxiety. Max Daily Amount: 3 mg.  
  
 melatonin 3 mg tablet Take 3 mg by mouth nightly. multivitamin capsule Take 1 Cap by mouth daily. naloxone 2 mg/actuation Spry Use 1 spray intranasally into 1 nostril. Use a new Narcan nasal spray for subsequent doses and administer into alternating nostrils. May repeat every 2 to 3 minutes as needed. NIFEdipine ER 30 mg ER tablet Commonly known as:  ADALAT CC Take 1 Tab by mouth daily. ondansetron 4 mg disintegrating tablet Commonly known as:  ZOFRAN ODT Take 1 Tab by mouth every eight (8) hours as needed for Nausea. pantoprazole 40 mg tablet Commonly known as:  PROTONIX Take 1 Tab by mouth Before breakfast and dinner. simethicone 80 mg chewable tablet Commonly known as:  Marshell [place] Take 0.5 Tabs by mouth every six (6) hours as needed for Flatulence. traZODone 100 mg tablet Commonly known as:  Donzella Eaton Take 100 mg by mouth nightly. TYLENOL EXTRA STRENGTH 500 mg tablet Generic drug:  acetaminophen Take  by mouth every six (6) hours as needed for Pain. * Notice: This list has 2 medication(s) that are the same as other medications prescribed for you. Read the directions carefully, and ask your doctor or other care provider to review them with you. Prescriptions Sent to Pharmacy Refills NIFEdipine ER (ADALAT CC) 30 mg ER tablet 1 Sig: Take 1 Tab by mouth daily. Class: Normal  
 Pharmacy: [facility] [address] [place] [address] [address] AT [facility] [address] ([facility] Ph #: [phone] Route: Oral  
 gabapentin (NEURONTIN) 100 mg capsule 1 Sig: Take 2 Caps by mouth three (3) times daily.   
 Class: Normal  
 Pharmacy: [facility] [place], [place] - [address] AT [address] Ph #: [phone] Route: Oral  
  
We Performed the Following [place] THER/PROPH/DIAG INJECTION, SUBCUT/IM [id number] CPT(R)] VITAMIN B12 INJECTION [ [facility]] Follow-up Instructions Return in about 1 week (around 6/12/2018). Introducing [facility] SERVICES! Dear Angelica Hester: Thank you for requesting a [employer] account. Our records indicate that you already have an active MaintenanceNet account. You can access your account anytime at [email or web address] Did you know that you can access your hospital and ER discharge instructions at any time in MaintenanceNet? You can also review all of your test results from your hospital stay or ER visit. Additional Information If you have questions, please visit the Frequently Asked Questions section of the Maintenance[employer] website at [email or web address] Remember, MaintenanceNet is NOT to be used for urgent needs. For medical emergencies, dial 911. Now available from your iPhone and Android! Please provide this summary of care documentation to your next provider. Your primary care clinician is listed as Akila Herbert. If you have any questions after today's visit, please call [phone].

## 2018-06-05 NOTE — PROGRESS NOTES
Chief Complaint   Patient presents with   [facility] Follow Up     1. Have you been to the ER, urgent care clinic since your last visit? Hospitalized since your last visit? Yes When: 6/3/18 Where: [facility]  Reason for visit: Foot Pain    2. Have you seen or consulted any other health care providers outside of the [address] [place] [address] since your last visit? Include any pap smears or colon screening.  No

## 2018-06-05 NOTE — LETTER
NOTIFICATION RETURN TO WORK / SCHOOL 
 
6/5/2018 12:29 PM 
 
Ms. James Garrett [address] [facility] [address] To Whom It May Concern: 
 
James Garrett is currently under the care of [address]. Recommended to avoid driving for one week and to return back to clinic for further follow up. If there are questions or concerns please have the patient contact our office.  
 
 
 
Sincerely, 
 
 
Susan Horner MD

## 2018-06-05 NOTE — LETTER
NOTIFICATION RETURN TO WORK / SCHOOL 
 
6/5/2018 12:20 PM 
 
Ms. Raphael Neumann [address] [address] To Whom It May Concern: 
 
Raphael Neumann is currently under the care of [address] [facility]. She was seen in the office today on 06/05/2018 If there are questions or concerns please have the patient contact our office.  
 
 
 
Sincerely, 
 
 
Denisse Wakefield MD

## 2018-06-11 NOTE — PROGRESS NOTES
I reviewed with the resident the medical history and the resident's findings on the physical examination. I discussed with the resident the patient's diagnosis and concur with the plan. Viral gastroenteritis. Cont supportive care. Prn meds.

## 2018-08-09 ENCOUNTER — APPOINTMENT (OUTPATIENT)
Dept: GENERAL RADIOLOGY | Age: 48
End: 2018-08-09
Attending: EMERGENCY MEDICINE
Payer: COMMERCIAL

## 2018-08-09 ENCOUNTER — HOSPITAL ENCOUNTER (EMERGENCY)
Age: 48
Discharge: HOME | End: 2018-08-09
Attending: EMERGENCY MEDICINE
Payer: COMMERCIAL

## 2018-08-09 VITALS
DIASTOLIC BLOOD PRESSURE: 72 MMHG | TEMPERATURE: 97.4 F | HEIGHT: 62 IN | HEART RATE: 70 BPM | RESPIRATION RATE: 16 BRPM | BODY MASS INDEX: 22.08 KG/M2 | WEIGHT: 120 LBS | SYSTOLIC BLOOD PRESSURE: 105 MMHG | OXYGEN SATURATION: 99 %

## 2018-08-09 DIAGNOSIS — L03.115 CELLULITIS OF RIGHT LOWER EXTREMITY: Primary | ICD-10-CM

## 2018-08-09 DIAGNOSIS — R47.01 APHASIA: ICD-10-CM

## 2018-08-09 LAB
ALBUMIN SERPL-MCNC: 3.5 G/DL (ref 3.5–5)
ALBUMIN/GLOB SERPL: 0.9 {RATIO} (ref 1.1–2.2)
ALP SERPL-CCNC: 119 U/L (ref 45–117)
ALT SERPL-CCNC: 22 U/L (ref 12–78)
ANION GAP SERPL CALC-SCNC: 13 MMOL/L (ref 5–15)
APPEARANCE UR: CLEAR
AST SERPL-CCNC: 16 U/L (ref 15–37)
BACTERIA URNS QL MICRO: NEGATIVE /HPF
BASOPHILS # BLD: 0.1 K/UL (ref 0–0.1)
BASOPHILS NFR BLD: 1 % (ref 0–1)
BILIRUB SERPL-MCNC: 0.3 MG/DL (ref 0.2–1)
BILIRUB UR QL: NEGATIVE
BNP SERPL-MCNC: 201 PG/ML (ref 0–125)
BUN SERPL-MCNC: 13 MG/DL (ref 6–20)
BUN/CREAT SERPL: 10 (ref 12–20)
CALCIUM SERPL-MCNC: 9.3 MG/DL (ref 8.5–10.1)
CHLORIDE SERPL-SCNC: 107 MMOL/L (ref 97–108)
CO2 SERPL-SCNC: 21 MMOL/L (ref 21–32)
COLOR UR: ABNORMAL
CREAT SERPL-MCNC: 1.24 MG/DL (ref 0.55–1.02)
DIFFERENTIAL METHOD BLD: ABNORMAL
EOSINOPHIL # BLD: 0.2 K/UL (ref 0–0.4)
EOSINOPHIL NFR BLD: 2 % (ref 0–7)
EPITH CASTS URNS QL MICRO: ABNORMAL /LPF
ERYTHROCYTE [DISTWIDTH] IN BLOOD BY AUTOMATED COUNT: 15.1 % (ref 11.5–14.5)
GLOBULIN SER CALC-MCNC: 4.1 G/DL (ref 2–4)
GLUCOSE BLD STRIP.AUTO-MCNC: 88 MG/DL (ref 65–100)
GLUCOSE SERPL-MCNC: 60 MG/DL (ref 65–100)
GLUCOSE UR STRIP.AUTO-MCNC: NEGATIVE MG/DL
HCT VFR BLD AUTO: 33.3 % (ref 35–47)
HGB BLD-MCNC: 10.6 G/DL (ref 11.5–16)
HGB UR QL STRIP: ABNORMAL
HYALINE CASTS URNS QL MICRO: ABNORMAL /LPF (ref 0–5)
IMM GRANULOCYTES # BLD: 0.1 K/UL (ref 0–0.04)
IMM GRANULOCYTES NFR BLD AUTO: 1 % (ref 0–0.5)
KETONES UR QL STRIP.AUTO: NEGATIVE MG/DL
LEUKOCYTE ESTERASE UR QL STRIP.AUTO: NEGATIVE
LYMPHOCYTES # BLD: 3.6 K/UL (ref 0.8–3.5)
LYMPHOCYTES NFR BLD: 37 % (ref 12–49)
MCH RBC QN AUTO: 26.8 PG (ref 26–34)
MCHC RBC AUTO-ENTMCNC: 31.8 G/DL (ref 30–36.5)
MCV RBC AUTO: 84.1 FL (ref 80–99)
MONOCYTES # BLD: 0.6 K/UL (ref 0–1)
MONOCYTES NFR BLD: 6 % (ref 5–13)
NEUTS SEG # BLD: 5.1 K/UL (ref 1.8–8)
NEUTS SEG NFR BLD: 54 % (ref 32–75)
NITRITE UR QL STRIP.AUTO: NEGATIVE
NRBC # BLD: 0 K/UL (ref 0–0.01)
NRBC BLD-RTO: 0 PER 100 WBC
PH UR STRIP: 6.5 [PH] (ref 5–8)
PLATELET # BLD AUTO: 317 K/UL (ref 150–400)
PMV BLD AUTO: 10.6 FL (ref 8.9–12.9)
POTASSIUM SERPL-SCNC: 3.2 MMOL/L (ref 3.5–5.1)
PROT SERPL-MCNC: 7.6 G/DL (ref 6.4–8.2)
PROT UR STRIP-MCNC: NEGATIVE MG/DL
RBC # BLD AUTO: 3.96 M/UL (ref 3.8–5.2)
RBC #/AREA URNS HPF: ABNORMAL /HPF (ref 0–5)
SERVICE CMNT-IMP: NORMAL
SODIUM SERPL-SCNC: 141 MMOL/L (ref 136–145)
SP GR UR REFRACTOMETRY: 1.01 (ref 1–1.03)
TROPONIN I SERPL-MCNC: <0.05 NG/ML
UR CULT HOLD, URHOLD: NORMAL
UROBILINOGEN UR QL STRIP.AUTO: 0.2 EU/DL (ref 0.2–1)
WBC # BLD AUTO: 9.5 K/UL (ref 3.6–11)
WBC URNS QL MICRO: ABNORMAL /HPF (ref 0–4)

## 2018-08-09 PROCEDURE — 83880 ASSAY OF NATRIURETIC PEPTIDE: CPT | Performed by: EMERGENCY MEDICINE

## 2018-08-09 PROCEDURE — 74011250636 HC RX REV CODE- 250/636: Performed by: EMERGENCY MEDICINE

## 2018-08-09 PROCEDURE — 80053 COMPREHEN METABOLIC PANEL: CPT | Performed by: EMERGENCY MEDICINE

## 2018-08-09 PROCEDURE — 99284 EMERGENCY DEPT VISIT MOD MDM: CPT

## 2018-08-09 PROCEDURE — 81001 URINALYSIS AUTO W/SCOPE: CPT | Performed by: EMERGENCY MEDICINE

## 2018-08-09 PROCEDURE — 93005 ELECTROCARDIOGRAM TRACING: CPT

## 2018-08-09 PROCEDURE — 93971 EXTREMITY STUDY: CPT

## 2018-08-09 PROCEDURE — 71046 X-RAY EXAM CHEST 2 VIEWS: CPT

## 2018-08-09 PROCEDURE — 85025 COMPLETE CBC W/AUTO DIFF WBC: CPT | Performed by: EMERGENCY MEDICINE

## 2018-08-09 PROCEDURE — 84484 ASSAY OF TROPONIN QUANT: CPT | Performed by: EMERGENCY MEDICINE

## 2018-08-09 PROCEDURE — 36415 COLL VENOUS BLD VENIPUNCTURE: CPT | Performed by: EMERGENCY MEDICINE

## 2018-08-09 PROCEDURE — 96374 THER/PROPH/DIAG INJ IV PUSH: CPT

## 2018-08-09 PROCEDURE — 82962 GLUCOSE BLOOD TEST: CPT

## 2018-08-09 RX ORDER — KETOROLAC TROMETHAMINE 30 MG/ML
15 INJECTION, SOLUTION INTRAMUSCULAR; INTRAVENOUS
Status: COMPLETED | OUTPATIENT
Start: 2018-08-09 | End: 2018-08-09

## 2018-08-09 RX ORDER — CLINDAMYCIN HYDROCHLORIDE 150 MG/1
450 CAPSULE ORAL 3 TIMES DAILY
Qty: 63 CAP | Refills: 0 | Status: SHIPPED | OUTPATIENT
Start: 2018-08-09 | End: 2018-08-16

## 2018-08-09 RX ADMIN — KETOROLAC TROMETHAMINE 15 MG: 30 INJECTION, SOLUTION INTRAMUSCULAR at 22:54

## 2018-08-10 LAB
ATRIAL RATE: 82 BPM
CALCULATED P AXIS, ECG09: 61 DEGREES
CALCULATED R AXIS, ECG10: 79 DEGREES
CALCULATED T AXIS, ECG11: 40 DEGREES
DIAGNOSIS, 93000: NORMAL
P-R INTERVAL, ECG05: 130 MS
Q-T INTERVAL, ECG07: 368 MS
QRS DURATION, ECG06: 82 MS
QTC CALCULATION (BEZET), ECG08: 429 MS
VENTRICULAR RATE, ECG03: 82 BPM

## 2018-08-10 NOTE — ED NOTES
Dr. Radha Gallardo gave and reviewed discharge instructions with the patient. The patient verbalized understanding. The patient was given opportunity for questions. Patient discharged in stable condition to the waiting room via ambulatory.

## 2018-08-10 NOTE — ED NOTES
ED EKG interpretation:  Rhythm: normal sinus rhythm; and regular .  Rate (approx.): 82; Axis: normal; ST/T wave: normal.  Note written by Bj Walsh, as dictated by Esperanza Leigh MD 2:51 AM

## 2018-08-10 NOTE — ED TRIAGE NOTES
Pt arrives with c/o of SOB starting Sunday with bilateral leg swelling. Swelling noted to both feet. Pt needing to take breaks while walking to triage. Pt reports hx of lyme disease.

## 2018-08-10 NOTE — DISCHARGE INSTRUCTIONS
Cellulitis: Care Instructions  Your Care Instructions    Cellulitis is a skin infection caused by bacteria, most often strep or staph. It often occurs after a break in the skin from a scrape, cut, bite, or puncture, or after a rash. Cellulitis may be treated without doing tests to find out what caused it. But your doctor may do tests, if needed, to look for a specific bacteria, like methicillin-resistant Staphylococcus aureus (MRSA). The doctor has checked you carefully, but problems can develop later. If you notice any problems or new symptoms, get medical treatment right away. Follow-up care is a key part of your treatment and safety. Be sure to make and go to all appointments, and call your doctor if you are having problems. It's also a good idea to know your test results and keep a list of the medicines you take. How can you care for yourself at home? · Take your antibiotics as directed. Do not stop taking them just because you feel better. You need to take the full course of antibiotics. · Prop up the infected area on pillows to reduce pain and swelling. Try to keep the area above the level of your heart as often as you can. · If your doctor told you how to care for your wound, follow your doctor's instructions. If you did not get instructions, follow this general advice:  ¨ Wash the wound with clean water 2 times a day. Don't use hydrogen peroxide or alcohol, which can slow healing. ¨ You may cover the wound with a thin layer of petroleum jelly, such as Vaseline, and a nonstick bandage. ¨ Apply more petroleum jelly and replace the bandage as needed. · Be safe with medicines. Take pain medicines exactly as directed. ¨ If the doctor gave you a prescription medicine for pain, take it as prescribed. ¨ If you are not taking a prescription pain medicine, ask your doctor if you can take an over-the-counter medicine.   To prevent cellulitis in the future  · Try to prevent cuts, scrapes, or other injuries to your skin. Cellulitis most often occurs where there is a break in the skin. · If you get a scrape, cut, mild burn, or bite, wash the wound with clean water as soon as you can to help avoid infection. Don't use hydrogen peroxide or alcohol, which can slow healing. · If you have swelling in your legs (edema), support stockings and good skin care may help prevent leg sores and cellulitis. · Take care of your feet, especially if you have diabetes or other conditions that increase the risk of infection. Wear shoes and socks. Do not go barefoot. If you have athlete's foot or other skin problems on your feet, talk to your doctor about how to treat them. When should you call for help? Call your doctor now or seek immediate medical care if:    · You have signs that your infection is getting worse, such as:  ¨ Increased pain, swelling, warmth, or redness. ¨ Red streaks leading from the area. ¨ Pus draining from the area. ¨ A fever.     · You get a rash.    Watch closely for changes in your health, and be sure to contact your doctor if:    · You do not get better as expected. Where can you learn more? Go to http://abdifatah-mariusz.info/. Ricardo Wilkinson in the search box to learn more about \"Cellulitis: Care Instructions. \"  Current as of: May 10, 2017  Content Version: 11.7  © 8231-6253 [employer]. Care instructions adapted under license by [employer] (which disclaims liability or warranty for this information). If you have questions about a medical condition or this instruction, always ask your healthcare professional. [place] [address] any warranty or liability for your use of this information.

## 2018-08-10 NOTE — ED PROVIDER NOTES
HPI Comments: 53 y.o. female with past medical history significant for crohn disease, depression who presents via private vehicle with chief complaint of leg swelling. Pt c/o bilateral leg swelling and pain for the past 5 days. Pt also c/o HA, fever, chills, SOB and rash with scabbing to her bilateral lower legs. She also states that her mouth feels dry and \"these are not even my lips. \" Pt also reports trouble getting her words out for the past few weeks. Pt reports she was recently put on a new medication for the rash and  That it has made her a \"basket case. \" Pt denies hx of heart problems or blood clots. Denies recent surgery or travel. Denies hormonal birth control. There are no other acute medical concerns at this time. Social hx: +tobacco smoker, denies EtOH consumption     PCP: Joanie Soliz MD    Note written by Bj Mathew, as dictated by Aisha Ash. Essie Freitas MD 9:14 PM      The history is provided by the patient. No  was used. Past Medical History:   Diagnosis Date    Autoimmune disease ([facility]     Crohn's Disease    Crohn disease ([facility] 2010    Crohn's     Depression 2010    History of vascular access device 2017    [facility] VAT - 33 cm right brachial PICC for abx and limited access    Vertigo        Past Surgical History:   Procedure Laterality Date    ABDOMEN SURGERY PROC UNLISTED      due to Crohn's-bowel resection x2    HX  SECTION      HX HEENT      HX TONSIL AND ADENOIDECTOMY      HX TUBAL LIGATION           Family History:   Problem Relation Age of Onset    Heart Disease Father     Cancer Mother        Social History     Social History    Marital status:      Spouse name: N/A    Number of children: N/A    Years of education: N/A     Occupational History    Not on file.      Social History Main Topics    Smoking status: Current Some Day Smoker     Packs/day: 0.50     Years: 8.00     Types: Cigarettes    Smokeless tobacco: Never Used    Alcohol use No    Drug use: No      Comment: 1 pack a day    Sexual activity: Yes     Partners: Male     Birth control/ protection: Pill     Other Topics Concern    Not on file     Social History Narrative         ALLERGIES: Cephalosporins; Doxycycline; Penicillins; and Tetracyclines    Review of Systems   Constitutional: Positive for chills and fever. HENT: Negative for ear pain and sore throat. Eyes: Negative for pain. Respiratory: Positive for shortness of breath. Negative for chest tightness. Cardiovascular: Positive for leg swelling. Negative for chest pain. Gastrointestinal: Negative for abdominal pain, nausea and vomiting. Genitourinary: Negative for dysuria and flank pain. Musculoskeletal: Negative for back pain. Skin: Positive for rash. Neurological: Positive for speech difficulty and headaches. All other systems reviewed and are negative. Vitals:    08/09/18 2110   BP: 125/74   Pulse: 90   Resp: 18   Temp: 97.8 °F (36.6 °C)   SpO2: 99%   Weight: 54.4 kg (120 lb)   Height: 5' 2\" (1.575 m)            Physical Exam   Constitutional: No distress. HENT:   Head: Normocephalic and atraumatic. Mouth/Throat: Oropharynx is clear and moist.   Eyes: Conjunctivae are normal. Pupils are equal, round, and reactive to light. No scleral icterus. Neck: Neck supple. No tracheal deviation present. Cardiovascular: Normal rate, regular rhythm and intact distal pulses. Pulmonary/Chest: Effort normal. No respiratory distress. She has no wheezes. She has no rales. Abdominal: Soft. She exhibits no distension. There is no tenderness. Genitourinary:   Genitourinary Comments: deferred   Musculoskeletal: She exhibits edema (RLE>LLE). She exhibits no deformity. Neurological: She is alert. Skin: Skin is warm and dry. There is erythema (RLE>LLE). Psychiatric: She has a normal mood and affect. Nursing note and vitals reviewed. MDM  Number of Diagnoses or Management Options  Aphasia:   Cellulitis of right lower extremity:         ED Course       Procedures           11:07 PM  The patient has been reevaluated. The patient is ready for discharge. The patient's signs, symptoms, diagnosis, and discharge instructions have been discussed and the patient/ family has conveyed their understanding. The patient is to follow up as recommended or return to the ED should their symptoms worsen. Plan has been discussed and the patient is in agreement. LABORATORY TESTS:  Labs Reviewed   CBC WITH AUTOMATED DIFF - Abnormal; Notable for the following:        Result Value    HGB 10.6 (*)     HCT 33.3 (*)     RDW 15.1 (*)     IMMATURE GRANULOCYTES 1 (*)     ABS. LYMPHOCYTES 3.6 (*)     ABS. IMM. GRANS. 0.1 (*)     All other components within normal limits   METABOLIC PANEL, COMPREHENSIVE - Abnormal; Notable for the following:     Potassium 3.2 (*)     Glucose 60 (*)     Creatinine 1.24 (*)     BUN/Creatinine ratio 10 (*)     GFR est AA 56 (*)     GFR est non-AA 46 (*)     Alk. phosphatase 119 (*)     Globulin 4.1 (*)     A-G Ratio 0.9 (*)     All other components within normal limits   NT-PRO BNP - Abnormal; Notable for the following:     NT pro- (*)     All other components within normal limits   URINE CULTURE HOLD SAMPLE   TROPONIN I   SAMPLES BEING HELD   URINALYSIS W/MICROSCOPIC   GLUCOSE, POC       IMAGING RESULTS:  Xr Chest Pa Lat    Result Date: 8/9/2018  Exam:  2 view chest Indication: Shortness of breath Comparison to 7/20/2017. PA and lateral views demonstrate normal heart size. There is no acute process in the lung fields. The osseous structures are unremarkable. Impression: No acute abnormality. MEDICATIONS GIVEN:  Medications   ketorolac (TORADOL) injection 15 mg (15 mg IntraVENous Given 8/9/18 7986)       IMPRESSION:  1. Cellulitis of right lower extremity        PLAN:  1.    Current Discharge Medication List      START taking these medications    Details   clindamycin (CLEOCIN) 150 mg capsule Take 3 Caps by mouth three (3) times daily for 7 days. Qty: 63 Cap, Refills: 0           2. Follow-up Information     Follow up With Details Comments [id number]  MD QUINTEN Schedule an appointment as soon as possible for a visit  [facility] [address]  [phone]      [facility] EMERGENCY DEPT  If symptoms worsen or new concerns Lizbeth 5684    Ino Crowley MD Schedule an appointment as soon as possible for a visit  99 [facility] [address]  [phone]          3. Return to ED for new or worsening symptoms       Amanda Creed.  Jany Edmonds MD

## 2018-08-10 NOTE — ED NOTES
Assumed care of pt from triage. Pt presents to ED with chief complaint of SOB. Pt is A&O x 4. Pt denies any other symptoms at this time. Pt resting comfortably on the stretcher in a position of comfort. Pt in no acute distress at this time. Call bell within reach. Side rails x 2. Cardiac monitor x 3. Stretcher locked in the lowest position. Pt aware of plan to await for MD/PA-C/NP assessment, and pt/family verbalizes understanding. Will continue to monitor.

## 2018-08-11 RX ORDER — GABAPENTIN 100 MG/1
CAPSULE ORAL
Qty: 90 CAP | Refills: 0 | Status: SHIPPED | OUTPATIENT
Start: 2018-08-11 | End: 2018-09-24 | Stop reason: SDUPTHER

## 2018-08-11 RX ORDER — GABAPENTIN 100 MG/1
200 CAPSULE ORAL 3 TIMES DAILY
Qty: 90 CAP | Refills: 1 | Status: SHIPPED | OUTPATIENT
Start: 2018-08-11 | End: 2018-08-11 | Stop reason: SDUPTHER

## 2018-08-11 NOTE — TELEPHONE ENCOUNTER
Refill error saying that patient was not found. Re-ordering to see if it can be resolved. Covering for Dr Pravin Montano.     Stephanie Escalona MD.

## 2018-08-27 RX ORDER — NIFEDIPINE 30 MG/1
TABLET, FILM COATED, EXTENDED RELEASE ORAL
Qty: 30 TAB | Refills: 0 | Status: SHIPPED | OUTPATIENT
Start: 2018-08-27 | End: 2018-09-20 | Stop reason: SDUPTHER

## 2018-09-20 RX ORDER — NIFEDIPINE 30 MG/1
TABLET, EXTENDED RELEASE ORAL
Qty: 30 TAB | Refills: 0 | Status: SHIPPED | OUTPATIENT
Start: 2018-09-20 | End: 2018-11-02 | Stop reason: SDUPTHER

## 2018-09-23 RX ORDER — GABAPENTIN 100 MG/1
CAPSULE ORAL
Qty: 90 CAP | Refills: 0 | OUTPATIENT
Start: 2018-09-23

## 2018-09-24 RX ORDER — GABAPENTIN 100 MG/1
200 CAPSULE ORAL 3 TIMES DAILY
Qty: 90 CAP | Refills: 0 | Status: SHIPPED | OUTPATIENT
Start: 2018-09-24 | End: 2019-09-11 | Stop reason: SDUPTHER

## 2018-11-02 RX ORDER — NIFEDIPINE 30 MG/1
TABLET, EXTENDED RELEASE ORAL
Qty: 30 TAB | Refills: 0 | Status: SHIPPED | OUTPATIENT
Start: 2018-11-02 | End: 2020-09-05

## 2019-06-22 NOTE — PROGRESS NOTES
Follow up visit with pt who requests daily  visits. She lamented the fact that her hospital stay is much longer than she would like. She misses her dog Ivana Escobar. Chaplains will follow as needed.   Chaplain Afia, MDiv, MS, [facility]  [address] ([phone]) EMS

## 2019-08-12 ENCOUNTER — OFFICE VISIT (OUTPATIENT)
Dept: FAMILY MEDICINE CLINIC | Age: 49
End: 2019-08-12

## 2019-08-12 VITALS
DIASTOLIC BLOOD PRESSURE: 68 MMHG | SYSTOLIC BLOOD PRESSURE: 102 MMHG | OXYGEN SATURATION: 100 % | HEIGHT: 62 IN | WEIGHT: 118 LBS | TEMPERATURE: 98.2 F | RESPIRATION RATE: 18 BRPM | BODY MASS INDEX: 21.71 KG/M2 | HEART RATE: 93 BPM
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
## 2019-08-12 RX ORDER — IBUPROFEN 200 MG
CAPSULE ORAL
COMMUNITY
End: 2020-06-22

## 2019-08-12 RX ORDER — METRONIDAZOLE 500 MG/1
500 TABLET ORAL 3 TIMES DAILY
Qty: 15 TAB | Refills: 0 | Status: SHIPPED | OUTPATIENT
Start: 2019-08-12 | End: 2019-08-17

## 2019-08-12 RX ORDER — SULFAMETHOXAZOLE AND TRIMETHOPRIM 800; 160 MG/1; MG/1
1 TABLET ORAL 2 TIMES DAILY
Qty: 10 TAB | Refills: 0 | Status: SHIPPED | OUTPATIENT
Start: 2019-08-12 | End: 2019-08-17

## 2019-08-12 RX ORDER — METRONIDAZOLE 500 MG/1
500 TABLET ORAL 3 TIMES DAILY
Qty: 15 TAB | Refills: 0 | Status: SHIPPED | OUTPATIENT
Start: 2019-08-12 | End: 2019-08-12

## 2019-08-12 RX ORDER — SULFAMETHOXAZOLE AND TRIMETHOPRIM 800; 160 MG/1; MG/1
1 TABLET ORAL 2 TIMES DAILY
Qty: 10 TAB | Refills: 0 | Status: SHIPPED | OUTPATIENT
Start: 2019-08-12 | End: 2019-08-12

## 2019-08-13 NOTE — PROGRESS NOTES
Chief Complaint   Patient presents with    Dog Bite     Dog bite 8/11/2019, dog up to date on vaccines     1. Have you been to the ER, urgent care clinic since your last visit? Hospitalized since your last visit? No    2. Have you seen or consulted any other health care providers outside of the [address] [place] [address] since your last visit? Include any pap smears or colon screening. No     Reviewed record in preparation for visit and have obtained necessary documentation.

## 2019-08-13 NOTE — PROGRESS NOTES
Joanie Wesley is a 51 y.o. female here today to address the following issues:  Chief Complaint   Patient presents with    Dog Bite     Dog bite 2019, dog up to date on vaccines     Patient here for concerns of a dog bite yesterday on her right forearm. This was her own dog. States vaccinations are up-to-date. Patient with a history of Crohn's disease. Has been on [employer] in the past.  Patient is also chronic pain patient. Denies any fevers, drainage from the site, surrounding erythema, or fluctuant mass.     Past Medical History:   Diagnosis Date    Autoimmune disease ([facility] [address]     Crohn's Disease    Crohn disease ([facility] [address] 2010    Crohn's     Depression 2010    History of vascular access device 2017    [facility] VAT - 33 cm right brachial PICC for abx and limited access    Vertigo      Past Surgical History:   Procedure Laterality Date    ABDOMEN SURGERY PROC UNLISTED      due to Crohn's-bowel resection x2    HX  SECTION      HX HEENT      HX TONSIL AND ADENOIDECTOMY      HX TUBAL LIGATION       Social History     Socioeconomic History    Marital status:      Spouse name: Not on file    Number of children: Not on file    Years of education: Not on file    Highest education level: Not on file   Occupational History    Not on file   Social Needs    Financial resource strain: Not on file    Food insecurity:     Worry: Not on file     Inability: Not on file    Transportation needs:     Medical: Not on file     Non-medical: Not on file   Tobacco Use    Smoking status: Current Some Day Smoker     Packs/day: 0.50     Years: 8.00     Pack years: 4.00     Types: Cigarettes    Smokeless tobacco: Never Used   Substance and Sexual Activity    Alcohol use: No    Drug use: No     Comment: 1 pack a day    Sexual activity: Yes     Partners: Male     Birth control/protection: Pill   Lifestyle    Physical activity:     Days per week: Not on file     Minutes per session: Not on file    Stress: Not on file   Relationships    Social connections:     Talks on phone: Not on file     Gets together: Not on file     Attends Orthodoxy service: Not on file     Active member of club or organization: Not on file     Attends meetings of clubs or organizations: Not on file     Relationship status: Not on file    Intimate partner violence:     Fear of current or ex partner: Not on file     Emotionally abused: Not on file     Physically abused: Not on file     Forced sexual activity: Not on file   Other Topics Concern    Not on file   Social History Narrative    Not on file       Allergies   Allergen Reactions    Cephalosporins Hives    Doxycycline Swelling    Penicillins Hives    Tetracyclines Nausea and Vomiting       Current Outpatient Medications   Medication Sig    ibuprofen (MOTRIN IB) 200 mg cap Take  by mouth.  metroNIDAZOLE (FLAGYL) 500 mg tablet Take 1 Tab by mouth three (3) times daily for 5 days.  trimethoprim-sulfamethoxazole (BACTRIM DS, SEPTRA DS) 160-800 mg per tablet Take 1 Tab by mouth two (2) times a day for 5 days.  gabapentin (NEURONTIN) 100 mg capsule Take 2 Caps by mouth three (3) times daily.  baclofen (LIORESAL) 10 mg tablet TAKE 1 TABLET BY MOUTH THREE TIMES A DAY    ondansetron (ZOFRAN ODT) 4 mg disintegrating tablet Take 1 Tab by mouth every eight (8) hours as needed for Nausea.  fluticasone (FLONASE) 50 mcg/actuation nasal spray 2 Sprays by Both Nostrils route daily.  acetaminophen (TYLENOL EXTRA STRENGTH) 500 mg tablet Take  by mouth every six (6) hours as needed for Pain.  pantoprazole (PROTONIX) 40 mg tablet Take 1 Tab by mouth Before breakfast and dinner.  multivitamin capsule Take 1 Cap by mouth daily.  dextroamphetamine-amphetamine (ADDERALL) 20 mg tablet Take 20 mg by mouth three (3) times daily.  clonazePAM (KLONOPIN) 1 mg tablet Take 1 mg by mouth three (3) times daily.     LORazepam (ATIVAN) 1 mg tablet Take 1 Tab by mouth every eight (8) hours as needed for Anxiety. Max Daily Amount: 3 mg. (Patient taking differently: Take 1 mg by mouth two (2) times daily as needed for Anxiety.)    traZODone (DESYREL) 100 mg tablet Take 100 mg by mouth nightly.  escitalopram oxalate (LEXAPRO) 20 mg tablet Take 20 mg by mouth daily.  albuterol (PROVENTIL HFA, VENTOLIN HFA, PROAIR HFA) 90 mcg/actuation inhaler Take 2 Puffs by inhalation every four (4) hours as needed for Wheezing or Shortness of Breath.  NIFEdipine ER (PROCARDIA XL) 30 mg ER tablet TAKE 1 TABLET BY MOUTH EVERY DAY    mupirocin calcium (BACTROBAN) 2 % topical cream APPLY A SMALL AMOUNT TO THE AFFECTED AREA BY TOPICAL ROUTE 3 TIMES PER DAY FOR 10 DAYS    naloxone 2 mg/actuation spry Use 1 spray intranasally into 1 nostril. Use a new Narcan nasal spray for subsequent doses and administer into alternating nostrils. May repeat every 2 to 3 minutes as needed.  melatonin 3 mg tablet Take 3 mg by mouth nightly.  simethicone (MYLICON) 80 mg chewable tablet Take 0.5 Tabs by mouth every six (6) hours as needed for Flatulence. No current facility-administered medications for this visit. ROS  A comprehensive review of systems was negative except for that written in the HPI and listed above. Visit Vitals  /68 (BP 1 Location: Left arm, BP Patient Position: Sitting)   Pulse 93   Temp 98.2 °F (36.8 °C) (Oral)   Resp 18   Ht 5' 2\" (1.575 m)   Wt 118 lb (53.5 kg)   SpO2 100%   BMI 21.58 kg/m²       Physical Exam   Nursing note and vitals reviewed. GEN: Well appearing. No apparent distress. Responds to all questions appropriately. Lungs: No labored respirations. Talking in  complete sentences without difficulty. Bite wound noted in mid volar right forearm. Ecchymosis surrounding bite wound noted. Minimally tender to palpation. No surrounding erythema or drainage noted.     No results found for this or any previous visit (from the past 12 hour(s)). 1. Dog bite, initial encounter  Patient with multiple allergies. Given medical history, will give prophylactic antibiotics for 5 days. She states she has had these in the past with no issues. She says she will sometimes get yeast infections and if she does she will let us know and I can call her him diflucan. - metroNIDAZOLE (FLAGYL) 500 mg tablet; Take 1 Tab by mouth three (3) times daily for 5 days. Dispense: 15 Tab; Refill: 0  - trimethoprim-sulfamethoxazole (BACTRIM DS, SEPTRA DS) 160-800 mg per tablet; Take 1 Tab by mouth two (2) times a day for 5 days. Dispense: 10 Tab; Refill: 0      Follow-up and Dispositions    · Return if symptoms worsen or fail to improve.            Mekhi Mercedes MD, CAQSM, RMSK

## 2019-08-13 NOTE — PATIENT INSTRUCTIONS
Animal Bites: After Your Visit to the Emergency Room  Your Care Instructions  The biggest danger from an animal bite is that it might get infected. The doctor has cleaned your wound. Take good care of your wound at home to help it heal and to reduce your chance of infection. If your bite puts you at risk for rabies, you will get a series of shots over the next few weeks to prevent rabies. Your doctor will tell you when to get the shots. It is very important that you get the full cycle of shots. Follow your doctor's instructions exactly. Even though you have been released from the emergency room, you still need to watch for any problems. The doctor carefully checked you. But sometimes problems can develop later. If you have new symptoms, or if your symptoms do not get better, return to the emergency room or call your doctor right away. A visit to the emergency room is only one step in your treatment. Even if you feel better, you still need to do what your doctor recommends, such as going to all suggested follow-up appointments and taking medicines exactly as directed. This will help you recover and help prevent future problems. How can you care for yourself at home? · Wash the area with warm water 2 times a day, or as often as your doctor tells you. · Your wound may be covered with a thin layer of antibiotic ointment and a nonstick bandage. Apply more ointment and replace the bandage as needed. · If your doctor has told you that you need rabies shots, follow his or her instructions about getting the shots. · If your doctor prescribed antibiotics, take them as directed. Do not stop taking them just because you feel better. You need to take the full course of antibiotics. · Ask your doctor if you can take an over-the-counter pain medicine, such as acetaminophen (Tylenol), ibuprofen (Advil, Motrin), or naproxen (Aleve). Read and follow all instructions on the label.   · Do not take two or more pain medicines at the same time unless the doctor told you to. Many pain medicines have acetaminophen, which is Tylenol. Too much acetaminophen (Tylenol) can be harmful. · Your wound may itch or feel irritated. A little redness and swelling are normal. Do not scratch or rub the wound. · Some pain is normal with a bite wound, but do not ignore pain that is getting worse instead of better. You could have an infection. When should you call for help? Return to the emergency room now if:  · The skin near the bite turns pale or cool. · The area near the bite feels numb or tingly. · You have trouble moving a limb near the bite. · You have signs of infection, such as:  ¨ Increased pain, swelling, warmth, or redness around the wound. ¨ Red streaks leading from the wound. ¨ Pus draining from the wound. ¨ Swollen lymph nodes in your neck, armpits, or groin. ¨ A fever. · The wound starts to bleed, and blood soaks through the bandage. A small amount of blood is normal.  Call your doctor today if:  · The wound is not getting better. Where can you learn more? Go to AboutOne.be  Enter S601 in the search box to learn more about \"Animal Bites: After Your Visit to the Emergency Room. \"   © 7123-1946 Healthwise, Incorporated. Care instructions adapted under license by [employer] (which disclaims liability or warranty for this information). This care instruction is for use with your licensed healthcare professional. If you have questions about a medical condition or this instruction, always ask your healthcare professional. [place] [address] any warranty or liability for your use of this information. Content Version: 9.4.51166;  Last Revised: October 6, 2010

## 2019-08-15 ENCOUNTER — TELEPHONE (OUTPATIENT)
Dept: FAMILY MEDICINE CLINIC | Age: 49
End: 2019-08-15

## 2019-08-15 NOTE — TELEPHONE ENCOUNTER
Patient stated ( I'm on my way ) she is late for her appointment in the bathroom with diarrhea and complain of swollen tongue. Advised patient the next available appointment is at 2:30 if it the tongue gets worse and she can not wait go to the ER.

## 2019-08-15 NOTE — TELEPHONE ENCOUNTER
Pt called and stated that she is taking the following two medications from a dog bite. She stated that her tongue is swelling and she is having trouble swallowing. Forwarding to nurse for triage. trimethoprim-sulfamethoxazole (BACTRIM DS, SEPTRA DS) 160-800 mg per tablet    08/12/19 08/17/19  Dudley Espinal MD     Take 1 Tab by mouth two (2) times a day for 5 days. metroNIDAZOLE (FLAGYL) 500 mg tablet    08/12/19 08/17/19  Dudley Espinal MD     Take 1 Tab by mouth three (3) times daily for 5 days.

## 2019-08-15 NOTE — TELEPHONE ENCOUNTER
Patient called back with issues with her allergic reaction. Could hardly understand what patient was saying with swollen tongue.     Nurse Mira Ji , spoke with patient

## 2019-08-16 NOTE — TELEPHONE ENCOUNTER
Patient went to [facility] 8/15/2019. They told her to stop both medications ( Flagyl and Bactrim ). Patient is now on nystatin  and a topical antibiotic for the dog bite. Advised patient to schedule an appointment for follow up.

## 2019-09-11 ENCOUNTER — OFFICE VISIT (OUTPATIENT)
Dept: FAMILY MEDICINE CLINIC | Age: 49
End: 2019-09-11

## 2019-09-11 VITALS
OXYGEN SATURATION: 99 % | RESPIRATION RATE: 18 BRPM | WEIGHT: 114 LBS | BODY MASS INDEX: 20.98 KG/M2 | HEIGHT: 62 IN | HEART RATE: 90 BPM | SYSTOLIC BLOOD PRESSURE: 113 MMHG | DIASTOLIC BLOOD PRESSURE: 72 MMHG

## 2019-09-11 DIAGNOSIS — D75.839 THROMBOCYTOSIS: ICD-10-CM

## 2019-09-11 DIAGNOSIS — E53.8 VITAMIN B12 DEFICIENCY: ICD-10-CM

## 2019-09-11 DIAGNOSIS — D51.9 ANEMIA DUE TO VITAMIN B12 DEFICIENCY, UNSPECIFIED B12 DEFICIENCY TYPE: ICD-10-CM

## 2019-09-11 DIAGNOSIS — K50.10 CROHN'S DISEASE OF LARGE INTESTINE WITHOUT COMPLICATION (HCC): Primary | ICD-10-CM

## 2019-09-11 DIAGNOSIS — G62.9 PERIPHERAL POLYNEUROPATHY: ICD-10-CM

## 2019-09-11 PROBLEM — A69.20 LYME DISEASE: Status: ACTIVE | Noted: 2017-06-01

## 2019-09-11 PROBLEM — J18.9 PNEUMONIA: Status: RESOLVED | Noted: 2017-07-04 | Resolved: 2019-09-11

## 2019-09-11 PROBLEM — K63.1 PERFORATION BOWEL (HCC): Status: RESOLVED | Noted: 2017-07-04 | Resolved: 2019-09-11

## 2019-09-11 PROBLEM — S81.809A WOUNDS, MULTIPLE OPEN, LOWER EXTREMITY: Status: RESOLVED | Noted: 2017-07-04 | Resolved: 2019-09-11

## 2019-09-11 RX ORDER — GABAPENTIN 100 MG/1
100 CAPSULE ORAL 3 TIMES DAILY
Qty: 30 CAP | Refills: 0 | Status: SHIPPED | OUTPATIENT
Start: 2019-09-11 | End: 2019-09-23 | Stop reason: SDUPTHER

## 2019-09-11 RX ORDER — DULOXETIN HYDROCHLORIDE 60 MG/1
CAPSULE, DELAYED RELEASE ORAL
COMMUNITY
Start: 2019-05-17 | End: 2019-12-10

## 2019-09-11 NOTE — PROGRESS NOTES
Identified Patient with two Patient identifiers (Name and ). Two Patient Identifiers confirmed. Reviewed record in preparation for visit and have obtained necessary documentation. Chief Complaint   Patient presents with    Anemia    Vitamin B12 Deficiency    Neuropathy       Visit Vitals  /72 (BP 1 Location: Right arm, BP Patient Position: Sitting)   Pulse 90   Resp 18   Ht 5' 2\" (1.575 m)   Wt 114 lb (51.7 kg)   SpO2 99%   BMI 20.85 kg/m²       1. Have you been to the ER, urgent care clinic since your last visit? Hospitalized since your last visit? No    2. Have you seen or consulted any other health care providers outside of the [address] [place] [address] since your last visit? Include any pap smears or colon screening.  No

## 2019-09-11 NOTE — PROGRESS NOTES
History of Present Illness:     Chief Complaint   Patient presents with    Anemia    Vitamin B12 Deficiency    Neuropathy     Pt is a 51y.o. year old female     Presenting today for numerous complaints  Tearful and tangential throughout encounter    Crohn's disease  Unclear if still followed by GI  Required numerous surgeries in the past    Lyme disease  Thinks she is suffering from sequela of the disease  Neuropathy, fatigue  Not followed by ID    Vitamin B12  Last injection was in 2018  Last B 12 level 510 in 2017  Per chart review, anemia and neuropathy attributed to B12 deficiency    Peripheral neuropathy  Previously prescribed Gabapentin but stopped after loss to follow up  Also prescribed Cymbalta  Asking for new script today    Very emotional and depressed  Not the same since her Lyme disease, Crohns disease and mother's death  C/o body pain, back pain     Anxiety  Followed by Psych  Taking Benzos    Past Medical History:   Diagnosis Date    Autoimmune disease ([address]     Crohn's Disease    Crohn disease ([facility] [address] 4/19/2010    Crohn's     Depression 4/19/2010    History of vascular access device 07/18/2017    [facility] VAT - 33 cm right brachial PICC for abx and limited access    Perforation bowel ([facility] [address] 7/4/2017    S/p palak Frost 7/2017    Vertigo          Current Outpatient Medications on File Prior to Visit   Medication Sig Dispense Refill    DULoxetine (CYMBALTA) 60 mg capsule TAKE 2 CAPSULES BY MOUTH IN THE MORNING      mupirocin calcium (BACTROBAN) 2 % topical cream APPLY A SMALL AMOUNT TO THE AFFECTED AREA BY TOPICAL ROUTE 3 TIMES PER DAY FOR 10 DAYS      baclofen (LIORESAL) 10 mg tablet TAKE 1 TABLET BY MOUTH THREE TIMES A DAY  1    ondansetron (ZOFRAN ODT) 4 mg disintegrating tablet Take 1 Tab by mouth every eight (8) hours as needed for Nausea. 15 Tab 0    fluticasone (FLONASE) 50 mcg/actuation nasal spray 2 Sprays by Both Nostrils route daily.  1 Bottle 1    acetaminophen (TYLENOL EXTRA STRENGTH) 500 mg tablet Take  by mouth every six (6) hours as needed for Pain.  pantoprazole (PROTONIX) 40 mg tablet Take 1 Tab by mouth Before breakfast and dinner. 30 Tab 2    dextroamphetamine-amphetamine (ADDERALL) 20 mg tablet Take 20 mg by mouth three (3) times daily.  clonazePAM (KLONOPIN) 1 mg tablet Take 1 mg by mouth three (3) times daily.  LORazepam (ATIVAN) 1 mg tablet Take 1 Tab by mouth every eight (8) hours as needed for Anxiety. Max Daily Amount: 3 mg. (Patient taking differently: Take 1 mg by mouth two (2) times daily as needed for Anxiety.) 12 Tab 0    traZODone (DESYREL) 100 mg tablet Take 50 mg by mouth nightly.  albuterol (PROVENTIL HFA, VENTOLIN HFA, PROAIR HFA) 90 mcg/actuation inhaler Take 2 Puffs by inhalation every four (4) hours as needed for Wheezing or Shortness of Breath. 1 Inhaler 4    ibuprofen (MOTRIN IB) 200 mg cap Take  by mouth.  NIFEdipine ER (PROCARDIA XL) 30 mg ER tablet TAKE 1 TABLET BY MOUTH EVERY DAY 30 Tab 0    naloxone 2 mg/actuation spry Use 1 spray intranasally into 1 nostril. Use a new Narcan nasal spray for subsequent doses and administer into alternating nostrils. May repeat every 2 to 3 minutes as needed. 1 Blister 0    melatonin 3 mg tablet Take 3 mg by mouth nightly.  simethicone (MYLICON) 80 mg chewable tablet Take 0.5 Tabs by mouth every six (6) hours as needed for Flatulence. 40 Tab 1    multivitamin capsule Take 1 Cap by mouth daily.  escitalopram oxalate (LEXAPRO) 20 mg tablet Take 20 mg by mouth daily. No current facility-administered medications on file prior to visit. Allergies:   Allergies   Allergen Reactions    Cephalosporins Hives    Doxycycline Swelling    Penicillins Hives    Tetracyclines Nausea and Vomiting         Review of Systems:  Denies fever, chills, sweats  +fatigue, low energy  Denies chest pain, ROSALES, palpitations, LE edema  Denies cough, sputum production, SOB, pleuritic chest pain, wheezing  Denies n/v/d, constipation, melena, blood in stool  + pain in whole body, back pain      Objective:     Vitals:    09/11/19 1053   BP: 113/72   Pulse: 90   Resp: 18   SpO2: 99%   Weight: 114 lb (51.7 kg)   Height: 5' 2\" (1.575 m)       Physical Exam:  General appearance - Alert, appears older than stated age, thin, NAD, disheveled   Mental status - Agitated, restless. Hyperverbal and tangential during interview; interrupting frequently. Very scattered. Crying intermittently. Nearly disrobed completely when time for back exam, inappropriately disinhibited   Chest - no tachypnea, retractions or cyanosis  Back exam - full range of motion. +TTP with very minimal palpation of the lumbar spine and paraspinus muscles; out of proportion to exam.   Neurological - alert, oriented, normal speech, no focal findings or movement disorder noted      Recent Labs:  No results found for this or any previous visit (from the past 12 hour(s)). Assessment and Plan:   Pt is a 51y.o. year old female,      ICD-10-CM ICD-9-CM    1. Crohn's disease of large intestine without complication (HCC) R04.51 276.4 METABOLIC PANEL, COMPREHENSIVE   2. Peripheral polyneuropathy E88.9 548.8 METABOLIC PANEL, COMPREHENSIVE      gabapentin (NEURONTIN) 100 mg capsule   3. Vitamin B12 deficiency E53.8 266.2 CBC WITH AUTOMATED DIFF      VITAMIN B12      cyanocobalamin, vitamin B-12, (B-12 KIT) 1,000 mcg/mL kit   4. Anemia due to vitamin B12 deficiency, unspecified B12 deficiency type D51.9 281.1 CBC WITH AUTOMATED DIFF   5.  Thrombocytosis ([facility] [address] D47.3 238.71      Patient was a poor historian  Very difficult to follow during the exam  Labile emotions    Issue most concerining for her was her body pain, back pain and neuropathy  Begging for B12 injection; she believes this will alleviate her symptoms    Starting work up with labs today  Ordered B12 injection    Follow up in 1 week to review labs and further assess and address her issues. Nandini Perez MD      I have discussed the diagnosis with the patient and the intended plan as seen in the above orders. The patient has received an after-visit summary and questions were answered concerning future plans.

## 2019-09-11 NOTE — PATIENT INSTRUCTIONS
Vitamin B12: About This Test  What is it? This blood test measures the amount of vitamin B12 in your blood. Your body needs this B vitamin to make blood cells and to keep your nervous system healthy. Why is this test done? This test is used to:  · Check for vitamin B12 deficiency anemia, especially in those who have had stomach or intestinal surgery or who have small intestine problems or a family history of this anemia. · Diagnose the cause of certain types of anemia. · Help find the cause of a decrease in mental abilities or other nervous system symptoms, such as tingling or numbness of the arms or legs. How can you prepare for the test?  · In general, you don't need to prepare before having this test. Your doctor may give you some specific instructions. What happens during the test?  · A health professional takes a sample of your blood. What else should you know about the test?  · Your results will include an explanation of what a \"normal\" result is. This is called a \"reference range. \" It is just a guide. Your doctor will evaluate your results based on your health and other factors. This means that a value that falls outside the normal values listed may still be normal for you. · Vitamin B12 is usually measured at the same time as folic acid is tested, because a lack of either one can lead to a form of anemia called megaloblastic anemia. How long does the test take? · The test will take a few minutes. What happens after the test?  · You will probably be able to go home right away. · You can go back to your usual activities right away. Follow-up care is a key part of your treatment and safety. Be sure to make and go to all appointments, and call your doctor if you are having problems. It's also a good idea to keep a list of the medicines you take. Ask your doctor when you can expect to have your test results. Where can you learn more?   Go to http://abdifatah-mariusz.info/. Enter Q213 in the search box to learn more about \"Vitamin B12: About This Test.\"  Current as of: March 28, 2019  Content Version: 12.1  © 7596-6039 Healthwise, Incorporated. Care instructions adapted under license by [employer] (which disclaims liability or warranty for this information). If you have questions about a medical condition or this instruction, always ask your healthcare professional. [place] [address] any warranty or liability for your use of this information.

## 2019-09-12 LAB
ALBUMIN SERPL-MCNC: 3.3 G/DL (ref 3.5–5.5)
ALBUMIN/GLOB SERPL: 1.3 {RATIO} (ref 1.2–2.2)
ALP SERPL-CCNC: 74 IU/L (ref 39–117)
ALT SERPL-CCNC: 12 IU/L (ref 0–32)
AST SERPL-CCNC: 13 IU/L (ref 0–40)
BASOPHILS # BLD AUTO: 0 X10E3/UL (ref 0–0.2)
BASOPHILS NFR BLD AUTO: 0 %
BILIRUB SERPL-MCNC: <0.2 MG/DL (ref 0–1.2)
BUN SERPL-MCNC: 14 MG/DL (ref 6–24)
BUN/CREAT SERPL: 13 (ref 9–23)
CALCIUM SERPL-MCNC: 8.7 MG/DL (ref 8.7–10.2)
CHLORIDE SERPL-SCNC: 112 MMOL/L (ref 96–106)
CO2 SERPL-SCNC: 15 MMOL/L (ref 20–29)
CREAT SERPL-MCNC: 1.04 MG/DL (ref 0.57–1)
EOSINOPHIL # BLD AUTO: 0.1 X10E3/UL (ref 0–0.4)
EOSINOPHIL NFR BLD AUTO: 1 %
ERYTHROCYTE [DISTWIDTH] IN BLOOD BY AUTOMATED COUNT: 17 % (ref 12.3–15.4)
GLOBULIN SER CALC-MCNC: 2.6 G/DL (ref 1.5–4.5)
GLUCOSE SERPL-MCNC: 84 MG/DL (ref 65–99)
HCT VFR BLD AUTO: 36.8 % (ref 34–46.6)
HGB BLD-MCNC: 11.4 G/DL (ref 11.1–15.9)
IMM GRANULOCYTES # BLD AUTO: 0 X10E3/UL (ref 0–0.1)
IMM GRANULOCYTES NFR BLD AUTO: 0 %
LYMPHOCYTES # BLD AUTO: 1.8 X10E3/UL (ref 0.7–3.1)
LYMPHOCYTES NFR BLD AUTO: 23 %
MCH RBC QN AUTO: 27.7 PG (ref 26.6–33)
MCHC RBC AUTO-ENTMCNC: 31 G/DL (ref 31.5–35.7)
MCV RBC AUTO: 90 FL (ref 79–97)
MONOCYTES # BLD AUTO: 0.5 X10E3/UL (ref 0.1–0.9)
MONOCYTES NFR BLD AUTO: 7 %
NEUTROPHILS # BLD AUTO: 5.5 X10E3/UL (ref 1.4–7)
NEUTROPHILS NFR BLD AUTO: 69 %
PLATELET # BLD AUTO: 314 X10E3/UL (ref 150–450)
POTASSIUM SERPL-SCNC: 3.2 MMOL/L (ref 3.5–5.2)
PROT SERPL-MCNC: 5.9 G/DL (ref 6–8.5)
RBC # BLD AUTO: 4.11 X10E6/UL (ref 3.77–5.28)
SODIUM SERPL-SCNC: 143 MMOL/L (ref 134–144)
VIT B12 SERPL-MCNC: 164 PG/ML (ref 232–1245)
WBC # BLD AUTO: 8 X10E3/UL (ref 3.4–10.8)

## 2019-09-12 NOTE — PROGRESS NOTES
Vitamin B 12 low  CBC normal  Low albumin on CMP; pt appears to be malnourished and this has been an issue in the past  Notified patient via [address]  Will review further at upcoming visit

## 2019-09-23 ENCOUNTER — OFFICE VISIT (OUTPATIENT)
Dept: FAMILY MEDICINE CLINIC | Age: 49
End: 2019-09-23

## 2019-09-23 VITALS
HEART RATE: 117 BPM | SYSTOLIC BLOOD PRESSURE: 129 MMHG | WEIGHT: 112.4 LBS | BODY MASS INDEX: 20.68 KG/M2 | TEMPERATURE: 98.6 F | RESPIRATION RATE: 18 BRPM | DIASTOLIC BLOOD PRESSURE: 84 MMHG | OXYGEN SATURATION: 98 % | HEIGHT: 62 IN

## 2019-09-23 DIAGNOSIS — E53.8 VITAMIN B12 DEFICIENCY: Primary | ICD-10-CM

## 2019-09-23 DIAGNOSIS — G62.9 PERIPHERAL POLYNEUROPATHY: ICD-10-CM

## 2019-09-23 RX ORDER — GABAPENTIN 100 MG/1
100 CAPSULE ORAL 3 TIMES DAILY
Qty: 30 CAP | Refills: 0 | Status: SHIPPED | OUTPATIENT
Start: 2019-10-11 | End: 2019-10-03 | Stop reason: SDUPTHER

## 2019-09-23 NOTE — PROGRESS NOTES
History of Present Illness:     Chief Complaint   Patient presents with    Vitamin B12 Deficiency     Needs B 12 injection      Pt is a 51y.o. year old female    Presents to clinic for low B12. Levels low at last check  Wants B12 injection in office today    Seen by ID since last visit  Will have records sent over    Patient very emotional because she had to take the transport van to her appointment     Past Medical History:   Diagnosis Date    Autoimmune disease ([facility]     Crohn's Disease    Crohn disease ([facility] [address] [facility] 4/19/2010    Crohn's     Depression 4/19/2010    History of vascular access device 07/18/2017    [facility] VAT - 33 cm right brachial PICC for abx and limited access    Perforation bowel ([facility] [address] [facility] 7/4/2017    S/p palak Montoya 7/2017    Vertigo          Current Outpatient Medications on File Prior to Visit   Medication Sig Dispense Refill    DULoxetine (CYMBALTA) 60 mg capsule TAKE 2 CAPSULES BY MOUTH IN THE MORNING      cyanocobalamin, vitamin B-12, (B-12 KIT) 1,000 mcg/mL kit 1,000 mcg by Injection route every thirty (30) days. 1 Kit 1    ibuprofen (MOTRIN IB) 200 mg cap Take  by mouth.  NIFEdipine ER (PROCARDIA XL) 30 mg ER tablet TAKE 1 TABLET BY MOUTH EVERY DAY 30 Tab 0    mupirocin calcium (BACTROBAN) 2 % topical cream APPLY A SMALL AMOUNT TO THE AFFECTED AREA BY TOPICAL ROUTE 3 TIMES PER DAY FOR 10 DAYS      baclofen (LIORESAL) 10 mg tablet TAKE 1 TABLET BY MOUTH THREE TIMES A DAY  1    ondansetron (ZOFRAN ODT) 4 mg disintegrating tablet Take 1 Tab by mouth every eight (8) hours as needed for Nausea. 15 Tab 0    naloxone 2 mg/actuation spry Use 1 spray intranasally into 1 nostril. Use a new Narcan nasal spray for subsequent doses and administer into alternating nostrils. May repeat every 2 to 3 minutes as needed. 1 Blister 0    fluticasone (FLONASE) 50 mcg/actuation nasal spray 2 Sprays by Both Nostrils route daily. 1 Bottle 1    melatonin 3 mg tablet Take 3 mg by mouth nightly.  acetaminophen (TYLENOL EXTRA STRENGTH) 500 mg tablet Take  by mouth every six (6) hours as needed for Pain.  simethicone (MYLICON) 80 mg chewable tablet Take 0.5 Tabs by mouth every six (6) hours as needed for Flatulence. 40 Tab 1    pantoprazole (PROTONIX) 40 mg tablet Take 1 Tab by mouth Before breakfast and dinner. 30 Tab 2    multivitamin capsule Take 1 Cap by mouth daily.  dextroamphetamine-amphetamine (ADDERALL) 20 mg tablet Take 20 mg by mouth three (3) times daily.  clonazePAM (KLONOPIN) 1 mg tablet Take 1 mg by mouth three (3) times daily.  LORazepam (ATIVAN) 1 mg tablet Take 1 Tab by mouth every eight (8) hours as needed for Anxiety. Max Daily Amount: 3 mg. (Patient taking differently: Take 1 mg by mouth two (2) times daily as needed for Anxiety.) 12 Tab 0    traZODone (DESYREL) 100 mg tablet Take 50 mg by mouth nightly.  escitalopram oxalate (LEXAPRO) 20 mg tablet Take 20 mg by mouth daily.  albuterol (PROVENTIL HFA, VENTOLIN HFA, PROAIR HFA) 90 mcg/actuation inhaler Take 2 Puffs by inhalation every four (4) hours as needed for Wheezing or Shortness of Breath. 1 Inhaler 4     No current facility-administered medications on file prior to visit. Allergies: Allergies   Allergen Reactions    Cephalosporins Hives    Doxycycline Swelling    Penicillins Hives    Tetracyclines Nausea and Vomiting         Review of Systems:  Denies fever, chills, sweats  +Generalized body pain      Objective:     Vitals:    09/23/19 1622   BP: 129/84   Pulse: (!) 117   Resp: 18   Temp: 98.6 °F (37 °C)   TempSrc: Oral   SpO2: 98%   Weight: 112 lb 6.4 oz (51 kg)   Height: 5' 2\" (1.575 m)       Physical Exam:  General appearance - alert, well appearing, and in no distress  Mental status - Labile emotions and very expressive. Tangential but easier to direct today. Much less disheveled, casually dressed, well groomed.    Neurological - alert, oriented, normal speech, no focal findings or movement disorder noted      Recent Labs:  No results found for this or any previous visit (from the past 12 hour(s)). Assessment and Plan:   Pt is a 51y.o. year old female,      ICD-10-CM ICD-9-CM    1. Vitamin B12 deficiency E53.8 266.2 VITAMIN B12 INJECTION      OK THER/PROPH/DIAG INJECTION, SUBCUT/IM   2. Peripheral polyneuropathy G62.9 356.9 gabapentin (NEURONTIN) 100 mg capsule     B12 injection today  Weekly injections for 4 wks then recheck Vit B12 levels    Gabapentin will run out before next visit  Given future dated script    Follow up in October as planned      Scarlet Baumann MD      I have discussed the diagnosis with the patient and the intended plan as seen in the above orders. The patient has received an after-visit summary and questions were answered concerning future plans.

## 2019-09-23 NOTE — PROGRESS NOTES
Chief Complaint   Patient presents with    Vitamin B12 Deficiency     Needs B 12 injection      Paper chart requested. 1. Have you been to the ER, urgent care clinic since your last visit? Hospitalized since your last visit? No    2. Have you seen or consulted any other health care providers outside of the [address] [place] [address] since your last visit? Include any pap smears or colon screening.  No

## 2019-09-23 NOTE — PATIENT INSTRUCTIONS
Results for Dillon Sanchez (MRN [id number]) as of 9/23/2019 16:40   Ref. Range 9/11/2019 14:54   WBC Latest Ref Range: 3.4 - 10.8 x10E3/uL 8.0   RBC Latest Ref Range: 3.77 - 5.28 x10E6/uL 4.11   HGB Latest Ref Range: 11.1 - 15.9 g/dL 11.4   HCT Latest Ref Range: 34.0 - 46.6 % 36.8   MCV Latest Ref Range: 79 - 97 fL 90   MCH Latest Ref Range: 26.6 - 33.0 pg 27.7   MCHC Latest Ref Range: 31.5 - 35.7 g/dL 31.0 (L)   RDW Latest Ref Range: 12.3 - 15.4 % 17.0 (H)   PLATELET Latest Ref Range: 150 - 450 x10E3/uL 314   NEUTROPHILS Latest Ref Range: Not Estab. % 69   Lymphocytes Latest Ref Range: Not Estab. % 23   MONOCYTES Latest Ref Range: Not Estab. % 7   EOSINOPHILS Latest Ref Range: Not Estab. % 1   BASOPHILS Latest Ref Range: Not Estab. % 0   IMMATURE GRANULOCYTES Latest Ref Range: Not Estab. % 0   ABS. NEUTROPHILS Latest Ref Range: 1.4 - 7.0 x10E3/uL 5.5   ABS. IMM. GRANS. Latest Ref Range: 0.0 - 0.1 x10E3/uL 0.0   Abs Lymphocytes Latest Ref Range: 0.7 - 3.1 x10E3/uL 1.8   ABS. MONOCYTES Latest Ref Range: 0.1 - 0.9 x10E3/uL 0.5   ABS. EOSINOPHILS Latest Ref Range: 0.0 - 0.4 x10E3/uL 0.1   ABS.  BASOPHILS Latest Ref Range: 0.0 - 0.2 x10E3/uL 0.0   Sodium Latest Ref Range: 134 - 144 mmol/L 143   Potassium Latest Ref Range: 3.5 - 5.2 mmol/L 3.2 (L)   Chloride Latest Ref Range: 96 - 106 mmol/L 112 (H)   CO2 Latest Ref Range: 20 - 29 mmol/L 15 (L)   Glucose Latest Ref Range: 65 - 99 mg/dL 84   BUN Latest Ref Range: 6 - 24 mg/dL 14   Creatinine Latest Ref Range: 0.57 - 1.00 mg/dL 1.04 (H)   BUN/Creatinine ratio Latest Ref Range: 9 - 23  13   Calcium Latest Ref Range: 8.7 - 10.2 mg/dL 8.7   GFR est non-AA Latest Ref Range: >59 mL/min/1.73 64   GFR est AA Latest Ref Range: >59 mL/min/1.73 73   Bilirubin, total Latest Ref Range: 0.0 - 1.2 mg/dL <0.2   Protein, total Latest Ref Range: 6.0 - 8.5 g/dL 5.9 (L)   Albumin Latest Ref Range: 3.5 - 5.5 g/dL 3.3 (L)   A-G Ratio Latest Ref Range: 1.2 - 2.2  1.3   ALT (SGPT) Latest Ref Range: 0 - 32 IU/L 12   AST Latest Ref Range: 0 - 40 IU/L 13   Alk.  phosphatase Latest Ref Range: 39 - 117 IU/L 74   Vitamin B12 Latest Ref Range: 232 - 1,245 pg/mL 164 (L)

## 2019-10-01 ENCOUNTER — TELEPHONE (OUTPATIENT)
Dept: FAMILY MEDICINE CLINIC | Age: 49
End: 2019-10-01

## 2019-10-03 ENCOUNTER — CLINICAL SUPPORT (OUTPATIENT)
Dept: FAMILY MEDICINE CLINIC | Age: 49
End: 2019-10-03

## 2019-10-03 VITALS
OXYGEN SATURATION: 98 % | TEMPERATURE: 97.9 F | DIASTOLIC BLOOD PRESSURE: 80 MMHG | HEART RATE: 105 BPM | WEIGHT: 113 LBS | BODY MASS INDEX: 20.67 KG/M2 | SYSTOLIC BLOOD PRESSURE: 115 MMHG | RESPIRATION RATE: 20 BRPM

## 2019-10-03 DIAGNOSIS — E53.8 VITAMIN B12 DEFICIENCY: Primary | ICD-10-CM

## 2019-10-21 ENCOUNTER — TELEPHONE (OUTPATIENT)
Dept: FAMILY MEDICINE CLINIC | Age: 49
End: 2019-10-21

## 2019-10-21 NOTE — TELEPHONE ENCOUNTER
----- Message from [employer]ype Link sent at 10/21/2019  3:23 PM EDT -----  Regarding: Dr. Torito Can  Pt request a call back from the practice to reschedule her appt with the doctor. She states that she only wants to see Dr. Pankaj Angela specifcally and there is another currently available for me to schedule with her. Critical access hospital number is [phone].

## 2019-10-22 NOTE — TELEPHONE ENCOUNTER
Verified patient by 2 identifiers. Scheduled patient an appointment for 11/4/19 at 4:00 PM for follow up. Patient inquired if she could also see Dr Sourav Miner for her cough and fever. Informed patient per Dr Sourav Miner after speaking with her, that she is willing to see her after 5:30 PM on 10/23/19. Patient agreed to coming in clinic after 5:30 OM on 10/23/19.

## 2019-10-23 ENCOUNTER — OFFICE VISIT (OUTPATIENT)
Dept: FAMILY MEDICINE CLINIC | Age: 49
End: 2019-10-23

## 2019-10-23 VITALS
DIASTOLIC BLOOD PRESSURE: 79 MMHG | HEIGHT: 62 IN | RESPIRATION RATE: 20 BRPM | SYSTOLIC BLOOD PRESSURE: 123 MMHG | BODY MASS INDEX: 20.61 KG/M2 | HEART RATE: 78 BPM | TEMPERATURE: 98.8 F | WEIGHT: 112 LBS | OXYGEN SATURATION: 100 %

## 2019-10-23 DIAGNOSIS — R42 VERTIGO: ICD-10-CM

## 2019-10-23 DIAGNOSIS — E53.8 VITAMIN B12 DEFICIENCY: ICD-10-CM

## 2019-10-23 DIAGNOSIS — J01.90 ACUTE NON-RECURRENT SINUSITIS, UNSPECIFIED LOCATION: Primary | ICD-10-CM

## 2019-10-23 RX ORDER — MECLIZINE HYDROCHLORIDE 25 MG/1
12.5 TABLET ORAL
Qty: 10 TAB | Refills: 0 | Status: SHIPPED | OUTPATIENT
Start: 2019-10-23 | End: 2019-11-02

## 2019-10-23 RX ORDER — CYANOCOBALAMIN 1000 UG/ML
1000 INJECTION, SOLUTION INTRAMUSCULAR; SUBCUTANEOUS ONCE
Qty: 1 VIAL | Refills: 0
Start: 2019-10-23 | End: 2019-10-23

## 2019-10-23 RX ORDER — FLUTICASONE PROPIONATE 50 MCG
2 SPRAY, SUSPENSION (ML) NASAL DAILY
Qty: 1 BOTTLE | Refills: 1 | Status: SHIPPED | OUTPATIENT
Start: 2019-10-23 | End: 2019-12-10 | Stop reason: SDUPTHER

## 2019-10-23 RX ORDER — AZITHROMYCIN 500 MG/1
500 TABLET, FILM COATED ORAL DAILY
Qty: 3 TAB | Refills: 0 | Status: SHIPPED | OUTPATIENT
Start: 2019-10-23 | End: 2019-10-26

## 2019-10-23 NOTE — PROGRESS NOTES
Chief Complaint   Patient presents with    Fall     Patient present with multiple falls since last night feels off balance.  Cough     Complains cough and drainage x 2 weeks.  Other     Patient states if feel as if food is getting lodged in her throat. 1. Have you been to the ER, urgent care clinic since your last visit? Hospitalized since your last visit? [facility] on 10/20/19  2. Have you seen or consulted any other health care providers outside of the [address] [place] [address] since your last visit? Include any pap smears or colon screening.  no    Visit Vitals  /79 (BP 1 Location: Left arm, BP Patient Position: Sitting)   Pulse 78   Temp 98.8 °F (37.1 °C)   Resp 20   Ht 5' 2\" (1.575 m)   Wt 112 lb (50.8 kg)   SpO2 100%   BMI 20.49 kg/m²

## 2019-10-24 NOTE — PROGRESS NOTES
I saw and evaluated the patient, performing the key elements of the service. I discussed the findings, assessment and plan with the resident and agree with the resident's findings and plan as documented in the resident's note. Dr. Rani Calhoun and I reviewed the patient's allergy list in detail. Given numerous abx allergies, settled on Azithromycin for sinusitis though it does not provide the best coverage. Pt to follow up in 1 week if no improvement in symptoms. Given strict ED precautions: passing out/ blacking out, worsening dizziness, CP/palpitations/ SOB, weakness or other concerning symptoms.

## 2019-11-21 ENCOUNTER — TELEPHONE (OUTPATIENT)
Dept: FAMILY MEDICINE CLINIC | Age: 49
End: 2019-11-21

## 2019-11-21 DIAGNOSIS — G62.9 PERIPHERAL POLYNEUROPATHY: ICD-10-CM

## 2019-11-21 RX ORDER — GABAPENTIN 100 MG/1
100 CAPSULE ORAL 2 TIMES DAILY
Qty: 30 CAP | Refills: 0 | Status: SHIPPED | OUTPATIENT
Start: 2019-11-21 | End: 2019-12-10

## 2019-11-21 NOTE — TELEPHONE ENCOUNTER
Sent temporary supply of Gabapentin to pharmacy until next visit due to need for pt to reschedule appointment.      Crys Claros MD

## 2019-11-21 NOTE — TELEPHONE ENCOUNTER
----- Message from Maia Kate sent at 11/21/2019  1:22 PM EST -----  Regarding: gabapentin  I'm uncertain on how to address her Gabapentin. She have rescheduled for 12/10/19. Patient inquiring on Gabapentin due to being out after today. Please advise on how we should address. Informed patient that once I find the answer out will reach back out to patient. Please advise.     Thanks    Sarita Yu

## 2019-11-22 NOTE — TELEPHONE ENCOUNTER
Verified patient by 2 identifiers. Informed patient that a temporary Gabapentin prescription was sent to Pharmacy in chart per Dr Jumana Horner. Scheduled patient a Nurse visit appointment for 11/26/19 at 2:30 PM for B12 injection. Patient verbalized agreement for time and date of appointment and that medication was sent to the pharmacy.

## 2019-12-10 ENCOUNTER — OFFICE VISIT (OUTPATIENT)
Dept: FAMILY MEDICINE CLINIC | Age: 49
End: 2019-12-10

## 2019-12-10 VITALS
SYSTOLIC BLOOD PRESSURE: 115 MMHG | BODY MASS INDEX: 21.9 KG/M2 | OXYGEN SATURATION: 100 % | WEIGHT: 119 LBS | HEIGHT: 62 IN | DIASTOLIC BLOOD PRESSURE: 76 MMHG | RESPIRATION RATE: 18 BRPM | HEART RATE: 76 BPM | TEMPERATURE: 98.2 F

## 2019-12-10 DIAGNOSIS — E53.8 VITAMIN B12 DEFICIENCY: ICD-10-CM

## 2019-12-10 DIAGNOSIS — G62.9 PERIPHERAL POLYNEUROPATHY: ICD-10-CM

## 2019-12-10 DIAGNOSIS — J01.90 ACUTE NON-RECURRENT SINUSITIS, UNSPECIFIED LOCATION: ICD-10-CM

## 2019-12-10 DIAGNOSIS — F33.41 RECURRENT MAJOR DEPRESSIVE DISORDER, IN PARTIAL REMISSION (HCC): Primary | ICD-10-CM

## 2019-12-10 DIAGNOSIS — G89.4 CHRONIC PAIN SYNDROME: ICD-10-CM

## 2019-12-10 RX ORDER — FLUTICASONE PROPIONATE 50 MCG
2 SPRAY, SUSPENSION (ML) NASAL DAILY
Qty: 1 BOTTLE | Refills: 1 | Status: SHIPPED | OUTPATIENT
Start: 2019-12-10 | End: 2020-03-17

## 2019-12-10 RX ORDER — GABAPENTIN 300 MG/1
300 CAPSULE ORAL 3 TIMES DAILY
Qty: 90 CAP | Refills: 1 | Status: SHIPPED | OUTPATIENT
Start: 2019-12-10 | End: 2020-02-26

## 2019-12-10 RX ORDER — DULOXETINE 40 MG/1
40 CAPSULE, DELAYED RELEASE ORAL DAILY
Qty: 30 CAP | Refills: 1 | Status: SHIPPED | OUTPATIENT
Start: 2019-12-10 | End: 2020-02-26 | Stop reason: DRUGHIGH

## 2019-12-10 RX ORDER — CYANOCOBALAMIN 1000 UG/ML
1000 INJECTION, SOLUTION INTRAMUSCULAR; SUBCUTANEOUS ONCE
Qty: 1 ML | Refills: 0
Start: 2019-12-10 | End: 2019-12-10

## 2019-12-10 NOTE — PROGRESS NOTES
History of Present Illness:     Chief Complaint   Patient presents with   Char [place] Injection B12     Pt is a 51y.o. year old female    Presents to clinic for follow up of pain. Chronic pain  Low back pain that radiates down to legs  C/o numbness in the legs  Noticing she is wetting the bed at night    Recent dental work  Had all of her teeth pulled  Now being fitted for dentures    Past Medical History:   Diagnosis Date    [facility]immune disease ([facility] [address]     Crohn's Disease    Crohn disease ([facility] [address] 4/19/2010    Crohn's     Depression 4/19/2010    History of vascular access device 07/18/2017    [facility] VAT - 33 cm right brachial PICC for abx and limited access    Perforation bowel ([facility] [address] 7/4/2017    S/p palak Thornton 7/2017    Vertigo          Current Outpatient Medications on File Prior to Visit   Medication Sig Dispense Refill    ibuprofen (MOTRIN IB) 200 mg cap Take  by mouth.  NIFEdipine ER (PROCARDIA XL) 30 mg ER tablet TAKE 1 TABLET BY MOUTH EVERY DAY 30 Tab 0    mupirocin calcium (BACTROBAN) 2 % topical cream APPLY A SMALL AMOUNT TO THE AFFECTED AREA BY TOPICAL ROUTE 3 TIMES PER DAY FOR 10 DAYS      baclofen (LIORESAL) 10 mg tablet TAKE 1 TABLET BY MOUTH THREE TIMES A DAY  1    ondansetron (ZOFRAN ODT) 4 mg disintegrating tablet Take 1 Tab by mouth every eight (8) hours as needed for Nausea. 15 Tab 0    naloxone 2 mg/actuation spry Use 1 spray intranasally into 1 nostril. Use a new Narcan nasal spray for subsequent doses and administer into alternating nostrils. May repeat every 2 to 3 minutes as needed. 1 Blister 0    melatonin 3 mg tablet Take 3 mg by mouth nightly.  acetaminophen (TYLENOL EXTRA STRENGTH) 500 mg tablet Take  by mouth every six (6) hours as needed for Pain.  simethicone (MYLICON) 80 mg chewable tablet Take 0.5 Tabs by mouth every six (6) hours as needed for Flatulence.  40 Tab 1    pantoprazole (PROTONIX) 40 mg tablet Take 1 Tab by mouth Before breakfast and dinner. 30 Tab 2    multivitamin capsule Take 1 Cap by mouth daily.  dextroamphetamine-amphetamine (ADDERALL) 20 mg tablet Take 20 mg by mouth three (3) times daily.  clonazePAM (KLONOPIN) 1 mg tablet Take 1 mg by mouth three (3) times daily.  LORazepam (ATIVAN) 1 mg tablet Take 1 Tab by mouth every eight (8) hours as needed for Anxiety. Max Daily Amount: 3 mg. (Patient taking differently: Take 1 mg by mouth two (2) times daily as needed for Anxiety.) 12 Tab 0    traZODone (DESYREL) 100 mg tablet Take 50 mg by mouth nightly.  escitalopram oxalate (LEXAPRO) 20 mg tablet Take 20 mg by mouth daily.  albuterol (PROVENTIL HFA, VENTOLIN HFA, PROAIR HFA) 90 mcg/actuation inhaler Take 2 Puffs by inhalation every four (4) hours as needed for Wheezing or Shortness of Breath. 1 Inhaler 4     No current facility-administered medications on file prior to visit. Allergies: Allergies   Allergen Reactions    Cephalosporins Hives    Doxycycline Swelling    Penicillins Hives    Tetracyclines Nausea and Vomiting         Review of Systems:  +Low back pain  + numbness/ tingling/ weakness in legs      Objective:     Vitals:    12/10/19 1601   BP: 115/76   Pulse: 76   Resp: 18   Temp: 98.2 °F (36.8 °C)   TempSrc: Oral   SpO2: 100%   Weight: 119 lb (54 kg)   Height: 5' 2\" (1.575 m)       Physical Exam:  General appearance - alert, well appearing, and in no distress  Back exam - +TTP over bilateral lumbar para spinous muscles and over SI joint. Neurological - alert, oriented, normal speech, no focal findings or movement disorder noted. 4/5 bilateral LE strength with hip flexors. Limited remaining LE exam; pt declined due to pain. Positive straight leg raise. 2+ patellar DTRs bilaterally. Musculoskeletal - no joint tenderness, deformity or swelling      Recent Labs:  No results found for this or any previous visit (from the past 12 hour(s)).       Assessment and Plan:   Pt is a 51 y.o. year old female,      ICD-10-CM ICD-9-CM    1. Recurrent major depressive disorder, in partial remission (HCC) F33.41 296.35 DULoxetine 40 mg cpDR   2. Peripheral polyneuropathy G62.9 356.9 gabapentin (NEURONTIN) 300 mg capsule      DULoxetine 40 mg cpDR      REFERRAL TO ORTHOPEDICS   3. Chronic pain syndrome G89.4 338.4 DULoxetine 40 mg cpDR      REFERRAL TO ORTHOPEDICS   4. Vitamin B12 deficiency E53.8 266.2 VITAMIN B12 INJECTION      THER/PROPH/DIAG INJECTION, SUBCUT/IM      cyanocobalamin (VITAMIN B-12) 1,000 mcg/mL injection   5. Acute non-recurrent sinusitis, unspecified location J01.90 461.9 fluticasone propionate (FLONASE) 50 mcg/actuation nasal spray     Start weaning Cymbalta   Pt would like to stop medication  Decrease Cymbalta to 40mg once daily    Increase Gabapentin to 300mg TID    B12 injection administered today  Continue monthly    Refer to Ortho surgery for chronic back pain with ?radicular symptoms    Follow up in 1 month    Crys Claros MD      I have discussed the diagnosis with the patient and the intended plan as seen in the above orders. The patient has received an after-visit summary and questions were answered concerning future plans.

## 2019-12-11 ENCOUNTER — TELEPHONE (OUTPATIENT)
Dept: FAMILY MEDICINE CLINIC | Age: 49
End: 2019-12-11

## 2019-12-11 NOTE — TELEPHONE ENCOUNTER
Prior authorization needed for Duloxetine HCI 40 mg DR Capsules. Go to Key. [email or web address] and click Enter a Key    Key: [id number]  Patient last Name: Claire Jean  : 1970    Complete form and click Send to Plan.     Notify [facility] when you receive a determination [phone]

## 2019-12-14 NOTE — TELEPHONE ENCOUNTER
PA completed and approved will notify patient of approval of medication. Approvedtoday  Questionnaire submitted. PA Case [id number] Status: Approved. Authorization and Notifications Completed.

## 2019-12-14 NOTE — TELEPHONE ENCOUNTER
PA completed 12/14/19. Awaiting response from Yoni Mena Hooper: [id number] - PA Case ID: [id number] - Rx #: [id number]

## 2020-01-06 ENCOUNTER — OFFICE VISIT (OUTPATIENT)
Dept: FAMILY MEDICINE CLINIC | Age: 50
End: 2020-01-06

## 2020-01-06 VITALS
SYSTOLIC BLOOD PRESSURE: 121 MMHG | OXYGEN SATURATION: 95 % | RESPIRATION RATE: 18 BRPM | TEMPERATURE: 98 F | DIASTOLIC BLOOD PRESSURE: 78 MMHG | WEIGHT: 121.6 LBS | HEIGHT: 62 IN | HEART RATE: 87 BPM | BODY MASS INDEX: 22.38 KG/M2

## 2020-01-06 DIAGNOSIS — M25.642 STIFFNESS OF JOINTS OF BOTH HANDS: ICD-10-CM

## 2020-01-06 DIAGNOSIS — L60.3 BRITTLE NAILS: ICD-10-CM

## 2020-01-06 DIAGNOSIS — E53.8 B12 DEFICIENCY: Primary | ICD-10-CM

## 2020-01-06 DIAGNOSIS — M25.641 STIFFNESS OF JOINTS OF BOTH HANDS: ICD-10-CM

## 2020-01-06 NOTE — PROGRESS NOTES
Arturo Soriano is a 51 y.o. female    No chief complaint on file. 1. Have you been to the ER, urgent care clinic since your last visit? Hospitalized since your last visit? No  M  2. Have you seen or consulted any other health care providers outside of the [facility] since your last visit? Include any pap smears or colon screening. No      Visit Vitals  /78 (BP 1 Location: Left arm, BP Patient Position: Sitting)   Pulse 87   Temp 98 °F (36.7 °C) (Oral)   Resp 18   Ht 5' 2\" (1.575 m)   Wt 121 lb 9.6 oz (55.2 kg)   SpO2 95%   BMI 22.24 kg/m²           Health Maintenance Due   Topic Date Due    PAP AKA CERVICAL CYTOLOGY  04/08/2015    Pneumococcal 0-64 years (2 of 3 - PCV13) 08/30/2018         Medication Reconciliation completed, changes noted.   Please  Update medication list.

## 2020-01-06 NOTE — PROGRESS NOTES
History of Present Illness:     Chief Complaint   Patient presents with    Injection B12    Lyme Disease     history      Pt is a 51y.o. year old female    Presents to clinic for B12 injection. Energy is better on B12 injections    C/o brittle nails  Skin cracking at tips of fingers  Same issue with her toes    C/o morning stiffness   Worse in morning  Better after a couple of hours and a hot shower  Joints hurt all over, but worse in hands    Past Medical History:   Diagnosis Date    Autoimmune disease ([facility] [address]     Crohn's Disease    Crohn disease ([facility] [address] 4/19/2010    Crohn's     Depression 4/19/2010    History of vascular access device 07/18/2017    [facility] VAT - 33 cm right brachial PICC for abx and limited access    Perforation bowel ([facility] [address] 7/4/2017    S/p palak Chin 7/2017    Vertigo          Current Outpatient Medications on File Prior to Visit   Medication Sig Dispense Refill    gabapentin (NEURONTIN) 300 mg capsule Take 1 Cap by mouth three (3) times daily. Max Daily Amount: 900 mg. 90 Cap 1    DULoxetine 40 mg cpDR Take 40 mg by mouth daily. 30 Cap 1    fluticasone propionate (FLONASE) 50 mcg/actuation nasal spray 2 Sprays by Both Nostrils route daily. 1 Bottle 1    ibuprofen (MOTRIN IB) 200 mg cap Take  by mouth.  NIFEdipine ER (PROCARDIA XL) 30 mg ER tablet TAKE 1 TABLET BY MOUTH EVERY DAY 30 Tab 0    mupirocin calcium (BACTROBAN) 2 % topical cream APPLY A SMALL AMOUNT TO THE AFFECTED AREA BY TOPICAL ROUTE 3 TIMES PER DAY FOR 10 DAYS      baclofen (LIORESAL) 10 mg tablet TAKE 1 TABLET BY MOUTH THREE TIMES A DAY  1    ondansetron (ZOFRAN ODT) 4 mg disintegrating tablet Take 1 Tab by mouth every eight (8) hours as needed for Nausea. 15 Tab 0    naloxone 2 mg/actuation spry Use 1 spray intranasally into 1 nostril. Use a new Narcan nasal spray for subsequent doses and administer into alternating nostrils. May repeat every 2 to 3 minutes as needed.  1 Blister 0    melatonin 3 mg tablet Take 3 mg by mouth nightly.  acetaminophen (TYLENOL EXTRA STRENGTH) 500 mg tablet Take  by mouth every six (6) hours as needed for Pain.  simethicone (MYLICON) 80 mg chewable tablet Take 0.5 Tabs by mouth every six (6) hours as needed for Flatulence. 40 Tab 1    pantoprazole (PROTONIX) 40 mg tablet Take 1 Tab by mouth Before breakfast and dinner. 30 Tab 2    multivitamin capsule Take 1 Cap by mouth daily.  dextroamphetamine-amphetamine (ADDERALL) 20 mg tablet Take 20 mg by mouth three (3) times daily.  clonazePAM (KLONOPIN) 1 mg tablet Take 1 mg by mouth three (3) times daily.  LORazepam (ATIVAN) 1 mg tablet Take 1 Tab by mouth every eight (8) hours as needed for Anxiety. Max Daily Amount: 3 mg. (Patient taking differently: Take 1 mg by mouth two (2) times daily as needed for Anxiety.) 12 Tab 0    traZODone (DESYREL) 100 mg tablet Take 50 mg by mouth nightly.  escitalopram oxalate (LEXAPRO) 20 mg tablet Take 20 mg by mouth daily.  albuterol (PROVENTIL HFA, VENTOLIN HFA, PROAIR HFA) 90 mcg/actuation inhaler Take 2 Puffs by inhalation every four (4) hours as needed for Wheezing or Shortness of Breath. 1 Inhaler 4     No current facility-administered medications on file prior to visit. Allergies: Allergies   Allergen Reactions    Cephalosporins Hives    Doxycycline Swelling    Penicillins Hives    Tetracyclines Nausea and Vomiting         Review of Systems:  Denies fever, chills, sweats. Weight stable.    +Joint stiffness      Objective:     Vitals:    01/06/20 1708   BP: 121/78   Pulse: 87   Resp: 18   Temp: 98 °F (36.7 °C)   TempSrc: Oral   SpO2: 95%   Weight: 121 lb 9.6 oz (55.2 kg)   Height: 5' 2\" (1.575 m)       Physical Exam:  General appearance - Alert, anxious, slightly disheveled but good hygiene   Mental status - alert, oriented to person, place, and time, affect appropriate to mood, anxious, agitated  Neurological - alert, oriented, normal speech, no focal findings or movement disorder noted  Extremities - peripheral pulses normal, no pedal edema, no clubbing or cyanosis. No erythema, edema or warmth over joints of upper extremity. Skin - Brittle, short nails without pitting or splitting. Cracking skin at finger tips bilaterally. Recent Labs:  No results found for this or any previous visit (from the past 12 hour(s)). Assessment and Plan:   Pt is a 51y.o. year old female,      ICD-10-CM ICD-9-CM    1. B12 deficiency E53.8 266.2 VITAMIN B12      FOLATE      VITAMIN B12 INJECTION      THER/PROPH/DIAG INJECTION, SUBCUT/IM      cyanocobalamin (VITAMIN B-12) 1,000 mcg/mL injection   2. Stiffness of joints of both hands M25.641 719.58 SED RATE (ESR)    M25.642  C REACTIVE PROTEIN, QT   3. Brittle nails L60.3 703.8      B12 injection given today  Repeat B12 and folate labs    ESR and CRP for initial joint stiffness testing    Follow up in 1 month  Smita Gonzalez MD      I have discussed the diagnosis with the patient and the intended plan as seen in the above orders. The patient has received an after-visit summary and questions were answered concerning future plans.

## 2020-01-07 ENCOUNTER — LAB ONLY (OUTPATIENT)
Dept: FAMILY MEDICINE CLINIC | Age: 50
End: 2020-01-07

## 2020-01-07 DIAGNOSIS — E53.8 B12 DEFICIENCY: ICD-10-CM

## 2020-01-07 DIAGNOSIS — M25.641 STIFFNESS OF JOINTS OF BOTH HANDS: ICD-10-CM

## 2020-01-07 DIAGNOSIS — M25.642 STIFFNESS OF JOINTS OF BOTH HANDS: ICD-10-CM

## 2020-01-07 RX ORDER — CYANOCOBALAMIN 1000 UG/ML
1000 INJECTION, SOLUTION INTRAMUSCULAR; SUBCUTANEOUS ONCE
Qty: 1 ML | Refills: 0
Start: 2020-01-07 | End: 2020-01-07

## 2020-01-08 LAB
CRP SERPL-MCNC: 1 MG/L (ref 0–10)
ERYTHROCYTE [SEDIMENTATION RATE] IN BLOOD BY WESTERGREN METHOD: 30 MM/HR (ref 0–32)
FOLATE SERPL-MCNC: 9.6 NG/ML
VIT B12 SERPL-MCNC: >2000 PG/ML (ref 232–1245)

## 2020-01-09 NOTE — PROGRESS NOTES
Labs are all normal  Negative inflammation labs  B12 therapeutic range    Please call and notify patient that her labs are all normal.  Her inflammation labs are all normal.  And her B12 is now well above normal, so we will continue once monthly B12 injections for now.

## 2020-01-10 ENCOUNTER — TELEPHONE (OUTPATIENT)
Dept: FAMILY MEDICINE CLINIC | Age: 50
End: 2020-01-10

## 2020-01-10 NOTE — TELEPHONE ENCOUNTER
Verified patient by 2 identifiers. Notified patient of lab results noted below. Patient verbalized understanding.    ----- Message from Pankaj Braga MD sent at 1/9/2020  3:32 PM EST -----  Labs are all normal  Negative inflammation labs  B12 therapeutic range    Please call and notify patient that her labs are all normal.  Her inflammation labs are all normal.  And her B12 is now well above normal, so we will continue once monthly B12 injections for now.

## 2020-02-03 ENCOUNTER — OFFICE VISIT (OUTPATIENT)
Dept: FAMILY MEDICINE CLINIC | Age: 50
End: 2020-02-03

## 2020-02-03 ENCOUNTER — TELEPHONE (OUTPATIENT)
Dept: FAMILY MEDICINE CLINIC | Age: 50
End: 2020-02-03

## 2020-02-03 VITALS
BODY MASS INDEX: 24.14 KG/M2 | SYSTOLIC BLOOD PRESSURE: 121 MMHG | HEIGHT: 62 IN | OXYGEN SATURATION: 99 % | HEART RATE: 106 BPM | RESPIRATION RATE: 18 BRPM | TEMPERATURE: 97.8 F | WEIGHT: 131.2 LBS | DIASTOLIC BLOOD PRESSURE: 82 MMHG

## 2020-02-03 DIAGNOSIS — R63.5 WEIGHT GAIN: ICD-10-CM

## 2020-02-03 DIAGNOSIS — E53.8 VITAMIN B12 DEFICIENCY: ICD-10-CM

## 2020-02-03 DIAGNOSIS — G62.9 PERIPHERAL POLYNEUROPATHY: ICD-10-CM

## 2020-02-03 DIAGNOSIS — R13.10 DYSPHAGIA, UNSPECIFIED TYPE: Primary | ICD-10-CM

## 2020-02-03 RX ORDER — CYANOCOBALAMIN 1000 UG/ML
1000 INJECTION, SOLUTION INTRAMUSCULAR; SUBCUTANEOUS ONCE
Qty: 1 ML | Refills: 0
Start: 2020-02-03 | End: 2020-02-03

## 2020-02-03 NOTE — PROGRESS NOTES
History of Present Illness:     Chief Complaint   Patient presents with    Weight Gain    Breathing Problem     Pt is a 51y.o. year old female    Presents to clinic for weight gain. Followed by Psych (Dr. Gina Delarosa)  Seeing him since she was 13  Treats her for anxiety and panic attacks  He thinks her current weight gain and shortness of breath is from Gabapentin  Cymbalta increased for her anxiety    We previously started cutting back to her Cymbalta bc she wanted to stop the medication     C/o diarrhea, swelling since starting the increased dose of Gabapentin    C/o sensation of food getting stuck and choking occasionally  Ongoing for past 1-2 months  Last seen by GI 1 year ago    Past Medical History:   Diagnosis Date    Autoimmune disease ([facility]     Crohn's Disease    Crohn disease ([facility] [facility] 4/19/2010    Crohn's     Depression 4/19/2010    History of vascular access device 07/18/2017    [facility] VAT - 33 cm right brachial PICC for abx and limited access    [facility] 7/4/2017    S/p palak Vogt 7/2017    Vertigo          Current Outpatient Medications on File Prior to Visit   Medication Sig Dispense Refill    gabapentin (NEURONTIN) 300 mg capsule Take 1 Cap by mouth three (3) times daily. Max Daily Amount: 900 mg. 90 Cap 1    DULoxetine 40 mg cpDR Take 40 mg by mouth daily. 30 Cap 1    fluticasone propionate (FLONASE) 50 mcg/actuation nasal spray 2 Sprays by Both Nostrils route daily. 1 Bottle 1    ibuprofen (MOTRIN IB) 200 mg cap Take  by mouth.  mupirocin calcium (BACTROBAN) 2 % topical cream APPLY A SMALL AMOUNT TO THE AFFECTED AREA BY TOPICAL ROUTE 3 TIMES PER DAY FOR 10 DAYS      baclofen (LIORESAL) 10 mg tablet TAKE 1 TABLET BY MOUTH THREE TIMES A DAY  1    ondansetron (ZOFRAN ODT) 4 mg disintegrating tablet Take 1 Tab by mouth every eight (8) hours as needed for Nausea. 15 Tab 0    naloxone 2 mg/actuation spry Use 1 spray intranasally into 1 nostril.  Use a new Narcan nasal spray for subsequent doses and administer into alternating nostrils. May repeat every 2 to 3 minutes as needed. 1 Blister 0    melatonin 3 mg tablet Take 3 mg by mouth nightly.  acetaminophen (TYLENOL EXTRA STRENGTH) 500 mg tablet Take  by mouth every six (6) hours as needed for Pain.  simethicone (MYLICON) 80 mg chewable tablet Take 0.5 Tabs by mouth every six (6) hours as needed for Flatulence. 40 Tab 1    pantoprazole (PROTONIX) 40 mg tablet Take 1 Tab by mouth Before breakfast and dinner. 30 Tab 2    multivitamin capsule Take 1 Cap by mouth daily.  dextroamphetamine-amphetamine (ADDERALL) 20 mg tablet Take 20 mg by mouth three (3) times daily.  clonazePAM (KLONOPIN) 1 mg tablet Take 1 mg by mouth three (3) times daily.  LORazepam (ATIVAN) 1 mg tablet Take 1 Tab by mouth every eight (8) hours as needed for Anxiety. Max Daily Amount: 3 mg. (Patient taking differently: Take 1 mg by mouth two (2) times daily as needed for Anxiety.) 12 Tab 0    traZODone (DESYREL) 100 mg tablet Take 50 mg by mouth nightly.  escitalopram oxalate (LEXAPRO) 20 mg tablet Take 20 mg by mouth daily.  albuterol (PROVENTIL HFA, VENTOLIN HFA, PROAIR HFA) 90 mcg/actuation inhaler Take 2 Puffs by inhalation every four (4) hours as needed for Wheezing or Shortness of Breath. 1 Inhaler 4    NIFEdipine ER (PROCARDIA XL) 30 mg ER tablet TAKE 1 TABLET BY MOUTH EVERY DAY 30 Tab 0     No current facility-administered medications on file prior to visit. Allergies:   Allergies   Allergen Reactions    Cephalosporins Hives    Doxycycline Swelling    Penicillins Hives    Tetracyclines Nausea and Vomiting         Review of Systems:  Denies fever, chills, sweats  Denies chest pain, ROSALES, palpitations, LE edema  + shortness of breath   +Body pain      Objective:     Vitals:    02/03/20 1614   BP: 121/82   Pulse: (!) 106   Resp: 18   Temp: 97.8 °F (36.6 °C)   TempSrc: Oral   SpO2: 99%   Weight: 131 lb 3.2 oz (59.5 kg)   Height: 5' 2\" (1.575 m)       Physical Exam:  General appearance - alert, well appearing, and in no distress  Mental status - alert, oriented to person, place, and time, anxious, agitated  Chest - clear to auscultation, no wheezes, rales or rhonchi, symmetric air entry  Heart -tachycardic, regular rhythm, normal S1, S2, no murmurs, rubs, clicks or gallops  Neurological - alert, oriented, normal speech, no focal findings or movement disorder noted      Recent Labs:  No results found for this or any previous visit (from the past 12 hour(s)). Assessment and Plan:   Pt is a 51y.o. year old female,      ICD-10-CM ICD-9-CM    1. Dysphagia, unspecified type R13.10 787.20 REFERRAL TO ENT-OTOLARYNGOLOGY      REFERRAL TO GASTROENTEROLOGY   2. Peripheral polyneuropathy G62.9 356.9    3. Vitamin B12 deficiency E53.8 266.2    4. Weight gain R63.5 783.1      Known history of esophageal dysphagia  Previously seen by GI; not seen since last year  Refer back to GI for further eval  Will also refer to ENT    B12 injection today    Wean off of Gabapentin  Pt back up to Cymbalta at previous dose per Psych    Follow up in 2-4 weeks    MD EVERT Magdaleno have discussed the diagnosis with the patient and the intended plan as seen in the above orders. The patient has received an after-visit summary and questions were answered concerning future plans.

## 2020-02-03 NOTE — PATIENT INSTRUCTIONS
Wean Gabapentin as follows:  - Cut back to 300mg twice daily for 5 days  - Cut back to 300 mg once daily for 5 days  - Then stop the Gabapentin

## 2020-02-03 NOTE — TELEPHONE ENCOUNTER
Verified patient by 2 identifiers. Reminded patient of appointment time and 15 minute maría elena period, in regards to being late to appointment. Patient verbalized understanding and was wondering if she was getting her B12 shot. Informed patient that if it's less than a month she will have to wait until the time.

## 2020-02-03 NOTE — PROGRESS NOTES
Cici Wyman is a 51 y.o. female    No chief complaint on file. 1. Have you been to the ER, urgent care clinic since your last visit? Hospitalized since your last visit? No  M  2. Have you seen or consulted any other health care providers outside of the [address] [place] [address] since your last visit? Include any pap smears or colon screening. No      There were no vitals taken for this visit. Health Maintenance Due   Topic Date Due    PAP AKA CERVICAL CYTOLOGY  04/08/2015    Pneumococcal 0-64 years (2 of 3 - PCV13) 08/30/2018         Medication Reconciliation completed, changes noted.   Please  Update medication list.

## 2020-02-20 ENCOUNTER — CLINICAL SUPPORT (OUTPATIENT)
Dept: FAMILY MEDICINE CLINIC | Age: 50
End: 2020-02-20

## 2020-02-20 VITALS
OXYGEN SATURATION: 97 % | DIASTOLIC BLOOD PRESSURE: 78 MMHG | HEART RATE: 81 BPM | SYSTOLIC BLOOD PRESSURE: 116 MMHG | TEMPERATURE: 97.8 F | RESPIRATION RATE: 18 BRPM

## 2020-02-20 DIAGNOSIS — E53.8 B12 DEFICIENCY: Primary | ICD-10-CM

## 2020-02-20 PROBLEM — Z87.11 HX OF GASTRIC ULCER: Status: ACTIVE | Noted: 2020-02-20

## 2020-02-26 ENCOUNTER — OFFICE VISIT (OUTPATIENT)
Dept: FAMILY MEDICINE CLINIC | Age: 50
End: 2020-02-26

## 2020-02-26 VITALS
DIASTOLIC BLOOD PRESSURE: 77 MMHG | BODY MASS INDEX: 24.25 KG/M2 | TEMPERATURE: 98.3 F | WEIGHT: 131.8 LBS | SYSTOLIC BLOOD PRESSURE: 117 MMHG | OXYGEN SATURATION: 97 % | RESPIRATION RATE: 18 BRPM | HEART RATE: 97 BPM | HEIGHT: 62 IN

## 2020-02-26 DIAGNOSIS — M79.89 LEG SWELLING: Primary | ICD-10-CM

## 2020-02-26 DIAGNOSIS — G62.9 PERIPHERAL POLYNEUROPATHY: ICD-10-CM

## 2020-02-26 RX ORDER — DULOXETIN HYDROCHLORIDE 60 MG/1
120 CAPSULE, DELAYED RELEASE ORAL DAILY
COMMUNITY
Start: 2020-02-24 | End: 2022-02-09

## 2020-02-26 RX ORDER — HYDROCHLOROTHIAZIDE 12.5 MG/1
6.25 TABLET ORAL DAILY
Qty: 30 TAB | Refills: 0 | Status: SHIPPED | OUTPATIENT
Start: 2020-02-26 | End: 2020-04-20

## 2020-02-26 NOTE — PROGRESS NOTES
Marlyn Varela is a 51 y.o. female    Chief Complaint   Patient presents with    Medication Evaluation     neurotin        1. Have you been to the ER, urgent care clinic since your last visit? Hospitalized since your last visit? No  M  2. Have you seen or consulted any other health care providers outside of the [address] [place] [address] since your last visit? Include any pap smears or colon screening. No      Visit Vitals  /77 (BP 1 Location: Left arm, BP Patient Position: Sitting)   Pulse 97   Temp 98.3 °F (36.8 °C) (Oral)   Resp 18   Ht 5' 2\" (1.575 m)   Wt 131 lb 12.8 oz (59.8 kg)   SpO2 97%   BMI 24.11 kg/m²           Health Maintenance Due   Topic Date Due    PAP AKA CERVICAL CYTOLOGY  04/08/2015    Lipid Screen  07/13/2015    Pneumococcal 0-64 years (2 of 3 - PCV13) 08/30/2018         Medication Reconciliation completed, changes noted.   Please  Update medication list.

## 2020-02-26 NOTE — PATIENT INSTRUCTIONS

## 2020-02-26 NOTE — PROGRESS NOTES
History of Present Illness:     Chief Complaint   Patient presents with    Medication Evaluation     neurontin     Pt is a 51y.o. year old female    Presents to clinic for medication follow up. Started weaning Gabapentin at last visit  Her psychiatrist increased Cymbalta to 60mg    Since last visit  Feeling a little better since stopping the Gabapentin  Still complaining of feeling bloating/ swelling in her hands and feet  Complaining of her usual body pain  Says she swells in her hands, feet    Past Medical History:   Diagnosis Date    Autoimmune disease ([facility] [facility]     Crohn's Disease    Crohn disease ([facility] 4/19/2010    Crohn's     Depression 4/19/2010    History of vascular access device 07/18/2017    [facility] VAT - 33 cm right brachial PICC for abx and limited access    Perforation bowel ([facility] 7/4/2017    S/p palak [facility] 7/2017    Vertigo          Current Outpatient Medications on File Prior to Visit   Medication Sig Dispense Refill    fluticasone propionate (FLONASE) 50 mcg/actuation nasal spray 2 Sprays by Both Nostrils route daily. 1 Bottle 1    ibuprofen (MOTRIN IB) 200 mg cap Take  by mouth.  NIFEdipine ER (PROCARDIA XL) 30 mg ER tablet TAKE 1 TABLET BY MOUTH EVERY DAY 30 Tab 0    mupirocin calcium (BACTROBAN) 2 % topical cream APPLY A SMALL AMOUNT TO THE AFFECTED AREA BY TOPICAL ROUTE 3 TIMES PER DAY FOR 10 DAYS      baclofen (LIORESAL) 10 mg tablet TAKE 1 TABLET BY MOUTH THREE TIMES A DAY  1    ondansetron (ZOFRAN ODT) 4 mg disintegrating tablet Take 1 Tab by mouth every eight (8) hours as needed for Nausea. 15 Tab 0    naloxone 2 mg/actuation spry Use 1 spray intranasally into 1 nostril. Use a new Narcan nasal spray for subsequent doses and administer into alternating nostrils. May repeat every 2 to 3 minutes as needed. 1 Blister 0    melatonin 3 mg tablet Take 3 mg by mouth nightly.       acetaminophen (TYLENOL EXTRA STRENGTH) 500 mg tablet Take  by mouth every six (6) hours as needed for Pain.  simethicone (MYLICON) 80 mg chewable tablet Take 0.5 Tabs by mouth every six (6) hours as needed for Flatulence. 40 Tab 1    pantoprazole (PROTONIX) 40 mg tablet Take 1 Tab by mouth Before breakfast and dinner. 30 Tab 2    multivitamin capsule Take 1 Cap by mouth daily.  dextroamphetamine-amphetamine (ADDERALL) 20 mg tablet Take 20 mg by mouth three (3) times daily.  clonazePAM (KLONOPIN) 1 mg tablet Take 1 mg by mouth three (3) times daily.  LORazepam (ATIVAN) 1 mg tablet Take 1 Tab by mouth every eight (8) hours as needed for Anxiety. Max Daily Amount: 3 mg. (Patient taking differently: Take 1 mg by mouth two (2) times daily as needed for Anxiety.) 12 Tab 0    traZODone (DESYREL) 100 mg tablet Take 50 mg by mouth nightly.  escitalopram oxalate (LEXAPRO) 20 mg tablet Take 20 mg by mouth daily.  albuterol (PROVENTIL HFA, VENTOLIN HFA, PROAIR HFA) 90 mcg/actuation inhaler Take 2 Puffs by inhalation every four (4) hours as needed for Wheezing or Shortness of Breath. 1 Inhaler 4    DULoxetine (CYMBALTA) 60 mg capsule Take 60 mg by mouth daily. No current facility-administered medications on file prior to visit. Allergies: Allergies   Allergen Reactions    Cephalosporins Hives    Doxycycline Swelling    Penicillins Hives    Tetracyclines Nausea and Vomiting         Review of Systems:  +swellingin hands, feet  + numbness/ tingling/ weakness in extremities      Objective:     Vitals:    02/26/20 1617   BP: 117/77   Pulse: 97   Resp: 18   Temp: 98.3 °F (36.8 °C)   TempSrc: Oral   SpO2: 97%   Weight: 131 lb 12.8 oz (59.8 kg)   Height: 5' 2\" (1.575 m)       Physical Exam:  General appearance - alert, well appearing, and in no distress  Mental status - alert, oriented to person, place, and time, anxious. Much more focused  Extremities - Trace ankle edema.        Recent Labs:  No results found for this or any previous visit (from the past 12 hour(s)). Assessment and Plan:   Pt is a 51y.o. year old female,      ICD-10-CM ICD-9-CM    1. Leg swelling M79.89 729.81 hydroCHLOROthiazide (HYDRODIURIL) 12.5 mg tablet   2. Peripheral polyneuropathy G62.9 356.9      Will trial low dose of HCTZ to see if it helps with swelling  No other medication changes today  Not due for B12  Encouraged her to follow up with GI as discussed    Follow up in 4 weeks  Check BMP, TSH, lipid panel    Dora Solis MD      I have discussed the diagnosis with the patient and the intended plan as seen in the above orders. The patient has received an after-visit summary and questions were answered concerning future plans.

## 2020-03-17 DIAGNOSIS — J01.90 ACUTE NON-RECURRENT SINUSITIS, UNSPECIFIED LOCATION: ICD-10-CM

## 2020-03-17 RX ORDER — FLUTICASONE PROPIONATE 50 MCG
SPRAY, SUSPENSION (ML) NASAL
Qty: 1 BOTTLE | Refills: 1 | Status: SHIPPED | OUTPATIENT
Start: 2020-03-17 | End: 2020-05-06

## 2020-03-30 ENCOUNTER — TELEPHONE (OUTPATIENT)
Dept: FAMILY MEDICINE CLINIC | Age: 50
End: 2020-03-30

## 2020-03-30 NOTE — TELEPHONE ENCOUNTER
Attempted to call patient in regards to appointment on 3/31/2020. Per Dr Aguero Six patient, no need for a visit. We can send 1 month refill of Gabapentin when she is out. Her B12 injection can also wait. Patient unavailable, LVM to call the office.

## 2020-03-30 NOTE — TELEPHONE ENCOUNTER
Message left on machine  That appointment schedule for 03/31/2020 at 4 pm is cancelled due to COVID-19.   Patient to return writers call

## 2020-04-02 ENCOUNTER — TELEPHONE (OUTPATIENT)
Dept: FAMILY MEDICINE CLINIC | Age: 50
End: 2020-04-02

## 2020-04-02 NOTE — TELEPHONE ENCOUNTER
----- Message from [employer] sent at 4/2/2020  1:37 PM EDT -----  Regarding: Naye Haddad MD  General Message/Vendor Calls    Caller's first and last name: Latonya Reno [[id number]]      Reason for call: rx consultation      Callback required yes/no and why: Yes      Best contact number(s): [phone]      Details to clarify the request: pt requesting consultation regarding tramadol and ibuprofen. Pt is willing to schedule a virtual visit.       Juan Dial

## 2020-04-10 NOTE — TELEPHONE ENCOUNTER
Called patient to try to set up virtual appt and to explain process. Patient asked if I could call her back. I ask that she call me back when she was ready and I would explain process.

## 2020-04-15 ENCOUNTER — VIRTUAL VISIT (OUTPATIENT)
Dept: FAMILY MEDICINE CLINIC | Age: 50
End: 2020-04-15

## 2020-04-15 DIAGNOSIS — H10.33 ACUTE CONJUNCTIVITIS OF BOTH EYES, UNSPECIFIED ACUTE CONJUNCTIVITIS TYPE: ICD-10-CM

## 2020-04-15 DIAGNOSIS — Z91.09 ENVIRONMENTAL ALLERGIES: ICD-10-CM

## 2020-04-15 DIAGNOSIS — G89.4 CHRONIC PAIN SYNDROME: ICD-10-CM

## 2020-04-15 DIAGNOSIS — J01.10 ACUTE NON-RECURRENT FRONTAL SINUSITIS: Primary | ICD-10-CM

## 2020-04-15 RX ORDER — ERYTHROMYCIN 5 MG/G
OINTMENT OPHTHALMIC
Qty: 3.5 G | Refills: 0 | Status: SHIPPED | OUTPATIENT
Start: 2020-04-15 | End: 2020-06-22 | Stop reason: ALTCHOICE

## 2020-04-15 RX ORDER — AZITHROMYCIN 250 MG/1
TABLET, FILM COATED ORAL
Qty: 6 TAB | Refills: 0 | Status: SHIPPED | OUTPATIENT
Start: 2020-04-15 | End: 2020-04-20

## 2020-04-15 RX ORDER — FEXOFENADINE HCL AND PSEUDOEPHEDRINE HCI 180; 240 MG/1; MG/1
1 TABLET, EXTENDED RELEASE ORAL DAILY
Qty: 30 TAB | Refills: 0 | Status: SHIPPED | OUTPATIENT
Start: 2020-04-15 | End: 2020-06-22 | Stop reason: ALTCHOICE

## 2020-04-15 NOTE — PATIENT INSTRUCTIONS
Sinusitis: Care Instructions Your Care Instructions Sinusitis is an infection of the lining of the sinus cavities in your head. Sinusitis often follows a cold. It causes pain and pressure in your head and face. In most cases, sinusitis gets better on its own in 1 to 2 weeks. But some mild symptoms may last for several weeks. Sometimes antibiotics are needed. Follow-up care is a key part of your treatment and safety. Be sure to make and go to all appointments, and call your doctor if you are having problems. It's also a good idea to know your test results and keep a list of the medicines you take. How can you care for yourself at home? · Take an over-the-counter pain medicine, such as acetaminophen (Tylenol), ibuprofen (Advil, Motrin), or naproxen (Aleve). Read and follow all instructions on the label. · If the doctor prescribed antibiotics, take them as directed. Do not stop taking them just because you feel better. You need to take the full course of antibiotics. · Be careful when taking over-the-counter cold or flu medicines and Tylenol at the same time. Many of these medicines have acetaminophen, which is Tylenol. Read the labels to make sure that you are not taking more than the recommended dose. Too much acetaminophen (Tylenol) can be harmful. · Breathe warm, moist air from a steamy shower, a hot bath, or a sink filled with hot water. Avoid cold, dry air. Using a humidifier in your home may help. Follow the directions for cleaning the machine. · Use saline (saltwater) nasal washes to help keep your nasal passages open and wash out mucus and bacteria. You can buy saline nose drops at a grocery store or drugstore. Or you can make your own at home by adding 1 teaspoon of salt and 1 teaspoon of baking soda to 2 cups of distilled water. If you make your own, fill a bulb syringe with the solution, insert the tip into your nostril, and squeeze gently. Izora Booty your nose. · Put a hot, wet towel or a warm gel pack on your face 3 or 4 times a day for 5 to 10 minutes each time. · Try a decongestant nasal spray like oxymetazoline (Afrin). Do not use it for more than 3 days in a row. Using it for more than 3 days can make your congestion worse. When should you call for help? Call your doctor now or seek immediate medical care if: 
  · You have new or worse swelling or redness in your face or around your eyes.  
  · You have a new or higher fever.  
 Watch closely for changes in your health, and be sure to contact your doctor if: 
  · You have new or worse facial pain.  
  · The mucus from your nose becomes thicker (like pus) or has new blood in it.  
  · You are not getting better as expected. Where can you learn more? Go to http://abdifatah-mariusz.info/ Enter H628 in the search box to learn more about \"Sinusitis: Care Instructions. \" Current as of: July 28, 2019Content Version: 12.4 © 5307-7673 [employer]. Care instructions adapted under license by [employer] (which disclaims liability or warranty for this information). If you have questions about a medical condition or this instruction, always ask your healthcare professional. [address] any warranty or liability for your use of this information.

## 2020-04-15 NOTE — PROGRESS NOTES
TELEMEDICINE VISIT    Consent: She and/or the health care decision maker is aware that that she may receive a bill for this telephone service, depending on her insurance coverage, and has provided verbal consent to proceed: Yes  History of Present Illness:     Chief Complaint   Patient presents with    Sinus Infection    Pain (Chronic)       Jessi Hair is a 53 y.o. female was evaluated by synchronous (real-time) audio-video technology from home, through the [facility].    Sinus congestion. Reports sinus pain and fullness over her eyes, worsening over the past 10 days. Thick, yellow nasal drainage. Cough at night. Using eye drops, saline nasal spray and Flonase without significant relief. No productive cough. No fever or chills. C/o eye irritation and eye lid swelling. Eyes are itching, constantly watering. She wakes with crusting of the eye lids. Has thick drainage at times. No pain in the eye. No change in vision. C/o bodily pain. Says her psychiatrist told her to ask me about starting Tramadol. Whole body hurts and feels like bone pain. Past Medical History:   Diagnosis Date    Autoimmune disease ([facility]     Crohn's Disease    Crohn disease ([facility] 4/19/2010    Crohn's     Depression 4/19/2010    History of vascular access device 07/18/2017    [facility] VAT - 33 cm right brachial PICC for abx and limited access    Perforation bowel ([facility] 7/4/2017    S/p palak Amezquita 7/2017    Vertigo        Current Medications/Allergies (REVIEWED)     Current Outpatient Medications on File Prior to Visit   Medication Sig Dispense Refill    fluticasone propionate (FLONASE) 50 mcg/actuation nasal spray SPRAY 2 SPRAYS INTO EACH NOSTRIL EVERY DAY 1 Bottle 1    DULoxetine (CYMBALTA) 60 mg capsule Take 60 mg by mouth daily.  hydroCHLOROthiazide (HYDRODIURIL) 12.5 mg tablet Take 0.5 Tabs by mouth daily. 30 Tab 0    ibuprofen (MOTRIN IB) 200 mg cap Take  by mouth.       NIFEdipine ER (PROCARDIA XL) 30 mg ER tablet TAKE 1 TABLET BY MOUTH EVERY DAY 30 Tab 0    mupirocin calcium (BACTROBAN) 2 % topical cream APPLY A SMALL AMOUNT TO THE AFFECTED AREA BY TOPICAL ROUTE 3 TIMES PER DAY FOR 10 DAYS      baclofen (LIORESAL) 10 mg tablet TAKE 1 TABLET BY MOUTH THREE TIMES A DAY  1    ondansetron (ZOFRAN ODT) 4 mg disintegrating tablet Take 1 Tab by mouth every eight (8) hours as needed for Nausea. 15 Tab 0    naloxone 2 mg/actuation spry Use 1 spray intranasally into 1 nostril. Use a new Narcan nasal spray for subsequent doses and administer into alternating nostrils. May repeat every 2 to 3 minutes as needed. 1 Blister 0    melatonin 3 mg tablet Take 3 mg by mouth nightly.  acetaminophen (TYLENOL EXTRA STRENGTH) 500 mg tablet Take  by mouth every six (6) hours as needed for Pain.  simethicone (MYLICON) 80 mg chewable tablet Take 0.5 Tabs by mouth every six (6) hours as needed for Flatulence. 40 Tab 1    pantoprazole (PROTONIX) 40 mg tablet Take 1 Tab by mouth Before breakfast and dinner. 30 Tab 2    multivitamin capsule Take 1 Cap by mouth daily.  dextroamphetamine-amphetamine (ADDERALL) 20 mg tablet Take 20 mg by mouth three (3) times daily.  clonazePAM (KLONOPIN) 1 mg tablet Take 1 mg by mouth three (3) times daily.  LORazepam (ATIVAN) 1 mg tablet Take 1 Tab by mouth every eight (8) hours as needed for Anxiety. Max Daily Amount: 3 mg. (Patient taking differently: Take 1 mg by mouth two (2) times daily as needed for Anxiety.) 12 Tab 0    traZODone (DESYREL) 100 mg tablet Take 50 mg by mouth nightly.  escitalopram oxalate (LEXAPRO) 20 mg tablet Take 20 mg by mouth daily.  albuterol (PROVENTIL HFA, VENTOLIN HFA, PROAIR HFA) 90 mcg/actuation inhaler Take 2 Puffs by inhalation every four (4) hours as needed for Wheezing or Shortness of Breath. 1 Inhaler 4     No current facility-administered medications on file prior to visit.         Allergies   Allergen Reactions  Cephalosporins Hives    Doxycycline Swelling    Penicillins Hives    Tetracyclines Nausea and Vomiting         Review of Systems     Denies fever, chills, sweats  +congestion, nasal drainage, sinus pressure, ear fullness  Denies chest pain, ROSALES, palpitations, LE edema  + cough, sputum production  Denies SOB, pleuritic chest pain, wheezing    Objective:     General: alert, cooperative, no distress, appears older than stated age   Mental  status: mental status: alert, oriented to person, place, and time, anxious, hyperverbal   Resp: resp: normal effort and no respiratory distress   Neuro: neuro: no gross deficits   Eyes: Puffy eye lids bilaterally. EOM intact bilaterally. Due to this being a TeleHealth evaluation, many elements of the physical examination are unable to be assessed. Assessment and Plan:       ICD-10-CM ICD-9-CM    1. Acute non-recurrent frontal sinusitis J01.10 461.1 fexofenadine-pseudoephedrine (ALLEGRA-D 24) 180-240 mg per tablet      azithromycin (ZITHROMAX) 250 mg tablet   2. Environmental allergies Z91.09 V15.09 fexofenadine-pseudoephedrine (ALLEGRA-D 24) 180-240 mg per tablet   3. Acute conjunctivitis of both eyes, unspecified acute conjunctivitis type H10.33 372.00 erythromycin (ILOTYCIN) ophthalmic ointment   4. Chronic pain syndrome G89.4 338.4      Treat for sinusitis with Zithromax  Not the best option but given her numerous abx allergies, will start here  Encouraged her to continue using Flonase  Will also send Allegra-D to help with congestion    Bilateral conjunctivitis  Will treat with abx ointment    Discussed her chronic pain, given her current use of other controlled meds (benzos, stimulants) and numerous psych issues, I am very hesitant to start on any controlled pain medications. Will consider pain management referral if desired.      Phone follow up in 1 week      Electronically Signed: Kiersten Vera MD  Providers location when delivering service (clinic, hospital, home): Home    Time-based coding, delete if not needed: I spent at least 25 minutes with this established patient, and >50% of the time was spent counseling and/or coordinating care regarding management of allergies, treatment of sinusitis, addressing chronic pain    We discussed the expected course, resolution and complications of the diagnosis(es) in detail. Medication risks, benefits, costs, interactions, and alternatives were discussed as indicated. I advised her to contact the office if her condition worsens, changes or fails to improve as anticipated. She expressed understanding with the diagnosis(es) and plan. Patient understands that this encounter was a temporary measure, and the importance of further follow up and examination was emphasized. Patient verbalized understanding. Pursuant to the emergency declaration under the [facility] waiver authority and the [employer] Act, this Virtual  Visit was conducted, with patient's consent, to reduce the patient's risk of exposure to COVID-19 and provide continuity of care for an established patient. Services were provided through a video synchronous discussion virtually to substitute for in-person clinic visit.

## 2020-04-20 DIAGNOSIS — M79.89 LEG SWELLING: ICD-10-CM

## 2020-04-20 RX ORDER — HYDROCHLOROTHIAZIDE 12.5 MG/1
TABLET ORAL
Qty: 15 TAB | Refills: 1 | Status: SHIPPED | OUTPATIENT
Start: 2020-04-20 | End: 2020-06-15

## 2020-05-05 DIAGNOSIS — J01.90 ACUTE NON-RECURRENT SINUSITIS, UNSPECIFIED LOCATION: ICD-10-CM

## 2020-05-06 RX ORDER — FLUTICASONE PROPIONATE 50 MCG
SPRAY, SUSPENSION (ML) NASAL
Qty: 1 BOTTLE | Refills: 1 | Status: SHIPPED | OUTPATIENT
Start: 2020-05-06 | End: 2020-07-20

## 2020-06-01 ENCOUNTER — CLINICAL SUPPORT (OUTPATIENT)
Dept: FAMILY MEDICINE CLINIC | Age: 50
End: 2020-06-01

## 2020-06-01 VITALS
HEIGHT: 62 IN | SYSTOLIC BLOOD PRESSURE: 115 MMHG | BODY MASS INDEX: 23.55 KG/M2 | OXYGEN SATURATION: 99 % | WEIGHT: 128 LBS | DIASTOLIC BLOOD PRESSURE: 77 MMHG | RESPIRATION RATE: 17 BRPM

## 2020-06-01 DIAGNOSIS — D50.9 IRON DEFICIENCY ANEMIA, UNSPECIFIED IRON DEFICIENCY ANEMIA TYPE: Primary | ICD-10-CM

## 2020-06-01 RX ORDER — CYANOCOBALAMIN 1000 UG/ML
1000 INJECTION, SOLUTION INTRAMUSCULAR; SUBCUTANEOUS ONCE
Qty: 1 VIAL | Refills: 0
Start: 2020-06-01 | End: 2020-06-01

## 2020-06-14 DIAGNOSIS — M79.89 LEG SWELLING: ICD-10-CM

## 2020-06-15 ENCOUNTER — TELEPHONE (OUTPATIENT)
Dept: FAMILY MEDICINE CLINIC | Age: 50
End: 2020-06-15

## 2020-06-15 DIAGNOSIS — E87.6 HYPOKALEMIA: ICD-10-CM

## 2020-06-15 DIAGNOSIS — M79.89 LEG SWELLING: Primary | ICD-10-CM

## 2020-06-15 DIAGNOSIS — G62.9 PERIPHERAL POLYNEUROPATHY: ICD-10-CM

## 2020-06-15 RX ORDER — HYDROCHLOROTHIAZIDE 12.5 MG/1
TABLET ORAL
Qty: 15 TAB | Refills: 1 | Status: SHIPPED | OUTPATIENT
Start: 2020-06-15 | End: 2020-08-10

## 2020-06-15 NOTE — TELEPHONE ENCOUNTER
----- Message from Ivory Winter sent at 6/15/2020  1:25 PM EDT -----  Regarding: Dr. Mp Rodgers  Contact: [phone]  Caller's first and last name: n/a  Reason for call: She was advised by Dr. Maykel Pang to come in for a lab appt to do a full work up.    Callback required yes/no and why: yes  Best contact number(s): [phone]  Details to clarify the request: n/a

## 2020-06-17 ENCOUNTER — TELEPHONE (OUTPATIENT)
Dept: FAMILY MEDICINE CLINIC | Age: 50
End: 2020-06-17

## 2020-06-17 NOTE — TELEPHONE ENCOUNTER
Dr. Steve Johnson   Received:  Today   Message The [address] Office             Caller's first and last name and relationship (if not the patient): PT   Best contact number(s): [phone]   Whose call is being returned: \"Donna\"   Details to clarify the request:

## 2020-06-17 NOTE — TELEPHONE ENCOUNTER
----- Message from Joce Marge sent at 6/16/2020  4:53 PM EDT -----  Regarding: Dr. Brijesh Covarrubias  Contact: [phone]  Caller's first and last name: n/a  Callback required yes/no and why: yes  Best contact number(s): [phone]  Details to clarify the request: She needs to be seen in-person to address her lymes disease, chromes disease and the regimen she just started before the pandemic. She is swollen, in pain, and having female issues. It is affecting her speech.

## 2020-06-17 NOTE — TELEPHONE ENCOUNTER
Patient denied scheduling telemed visit with LUCINDA MCCLENDON [facility] staff nurse to triage call

## 2020-06-17 NOTE — TELEPHONE ENCOUNTER
I attempted to call patient and let her know that she is able to come into the clinic to be seen for her current issues, she already had a virtual appt with Dr Michael Stephenson.

## 2020-06-18 NOTE — TELEPHONE ENCOUNTER
Dr. Mario Bhagat   Received: Yesterday   Message Contents   Dena WOODRUFF [facility]             Caller's first and last name and relationship (if not the patient): N/A   Best contact number(s): [phone]   What are the symptoms: Swelling in legs and feet, Flare up of Crohn's and Lyme disease   Transfer successful - yes/no (include outcome): No, Pt rejected offer to transfer to the answering service   Transfer declined - yes/no (include reason): N/A   Was caller advised to seek appropriate level of care - yes/no: Yes   Details to clarify the request: Pt stated she has been calling for three days to get an in office appt for her symptoms, but has not received a call back yet.

## 2020-06-18 NOTE — TELEPHONE ENCOUNTER
Called patient to let her know appt scheduled for:  Upcoming Monday, June 22, 2020 02:20 PM f [facility], [facility], Acute Care, 15min  cancel or reschedule  in office appt Ok per nurse/COVID protocol      LVM with details of the visit and COVID protocol    Close enc

## 2020-06-22 ENCOUNTER — OFFICE VISIT (OUTPATIENT)
Dept: FAMILY MEDICINE CLINIC | Age: 50
End: 2020-06-22

## 2020-06-22 DIAGNOSIS — I73.00 RAYNAUD'S DISEASE WITHOUT GANGRENE: ICD-10-CM

## 2020-06-22 DIAGNOSIS — F41.0 PANIC ATTACK: ICD-10-CM

## 2020-06-22 DIAGNOSIS — K50.10 CROHN'S DISEASE OF LARGE INTESTINE WITHOUT COMPLICATION (HCC): ICD-10-CM

## 2020-06-22 DIAGNOSIS — M13.0 POLYARTHROPATHY: ICD-10-CM

## 2020-06-22 DIAGNOSIS — G89.4 CHRONIC PAIN SYNDROME: ICD-10-CM

## 2020-06-22 DIAGNOSIS — E53.8 B12 DEFICIENCY: ICD-10-CM

## 2020-06-22 DIAGNOSIS — Z86.19 HX OF LYME DISEASE: ICD-10-CM

## 2020-06-22 DIAGNOSIS — G62.9 PERIPHERAL POLYNEUROPATHY: ICD-10-CM

## 2020-06-22 DIAGNOSIS — G62.9 PERIPHERAL POLYNEUROPATHY: Primary | ICD-10-CM

## 2020-06-22 RX ORDER — TRAMADOL HYDROCHLORIDE AND ACETAMINOPHEN 37.5; 325 MG/1; MG/1
1 TABLET ORAL
Qty: 21 TAB | Refills: 0 | Status: SHIPPED | OUTPATIENT
Start: 2020-06-22 | End: 2020-06-29

## 2020-06-22 RX ORDER — DICLOFENAC SODIUM 30 MG/G
GEL TOPICAL 2 TIMES DAILY
Qty: 100 G | Refills: 1 | Status: SHIPPED | OUTPATIENT
Start: 2020-06-22 | End: 2020-07-01 | Stop reason: SDUPTHER

## 2020-06-22 RX ORDER — CETIRIZINE HYDROCHLORIDE 10 MG/1
CAPSULE, LIQUID FILLED ORAL
COMMUNITY
End: 2020-09-05

## 2020-06-22 RX ORDER — CYANOCOBALAMIN 1000 UG/ML
1000 INJECTION, SOLUTION INTRAMUSCULAR; SUBCUTANEOUS ONCE
Qty: 1 ML | Refills: 0
Start: 2020-06-22 | End: 2020-06-22

## 2020-06-22 RX ORDER — CYANOCOBALAMIN 1000 UG/ML
1000 INJECTION, SOLUTION INTRAMUSCULAR; SUBCUTANEOUS
COMMUNITY
End: 2022-05-16 | Stop reason: SDUPTHER

## 2020-06-22 RX ORDER — NALOXONE HYDROCHLORIDE 4 MG/.1ML
SPRAY NASAL
Qty: 2 EACH | Refills: 1 | Status: SHIPPED | OUTPATIENT
Start: 2020-06-22 | End: 2020-09-05

## 2020-06-22 NOTE — PATIENT INSTRUCTIONS
I will be happy to see you for your other medical needs. I have provided you a list of pain specialists below.     Dr. [facility]isa [facility]  [phone]    Dr. Gautam [facility])  Interventional Spine and Pain Management  [phone]    Dr. Luke Aguayo (does not accept insurance)  [phone]    Dr. [facility]  ([facility])  Javi Thompson  [phone]    Dr. Nabeel Dunbar [facility])  [facility]  [phone]

## 2020-06-22 NOTE — PROGRESS NOTES
Chief Complaint   Patient presents with    Joint Pain     1. Have you been to the ER, urgent care clinic since your last visit? Hospitalized since your last visit? No    2. Have you seen or consulted any other health care providers outside of the [address] [place] [address] since your last visit? Include any pap smears or colon screening.  No

## 2020-06-22 NOTE — PROGRESS NOTES
Isaac Lewis  53 y.o. female  1970  [address] [place]  [id number]   [address]: Progress Note  Carlos Manuel Canales MD       Encounter Date: 6/22/2020    Chief Complaint   Patient presents with    Joint Pain     History of Present Illness   Isaac Lewis is a 53 y.o. female who presents to clinic today for concerns for concerns of chronic pain. States that she was diagnosed with lyme disease about 2 years ago and had a severe complication of the treatment requiring hospitalization. Since that time she has had worsening polyarthralgia in her fingers/hands, wrists, back and neck. Her pain is complicated by a patient reported hx of raynaud's, as well as a known hx of Crohn's  Disease s/p bowel resections. She rates her pain a 10/10 today. She is sensitive to light touch (stating even a pillow at night causes significant pain). Her sleep is affected due to her pain. Performing her ADLs has been difficult due to the pain. Currently only taking 1000 mg tylenol for pain. Had benefit from tramadol in the past, but this was discontinued. Also notes a severe allergic reaction to gabapentin (swelling with dyspnea) and therefore cannot take this. NSAIDS have not been advised given her crohn's disease and hx of multiple bowel resections. She has severe anxiety and depression and presented today in the midst of a panic attack. She was extremely tearful, anxious, sweats, tachycardic, partially prompted by seeing a new provider today. She has a significant loss of independence over the past 2 years, but feels things have become worse over the past several months. She has limited social support (no family in the area) and is responsible for caring for an adult child with special needs. She is followed by a psychiatrist who is managing her psychiatric medications. Review of Systems   Review of Systems   Respiratory: Negative.     Cardiovascular: Negative for chest pain and palpitations. Musculoskeletal: Positive for back pain, joint pain and neck pain. Psychiatric/Behavioral: Positive for depression. Negative for hallucinations, substance abuse and suicidal ideas. The patient is nervous/anxious and has insomnia. Vitals/Objective:     Vitals:    06/22/20 1435   BP: (!) 132/93   Pulse: (!) 118   Resp: 17   Temp: 97.8 °F (36.6 °C)   TempSrc: Temporal   SpO2: 99%   Weight: 128 lb (58.1 kg)   Height: 5' 2\" (1.575 m)     Body mass index is 23.41 kg/m². General: Patient alert and oriented and in NAD  Heart: Tachycardic (), with regular rate and rhythm, No murmurs, rubs or gallops. 2+ peripheral pulses, cyanosis in toes bilaterally. Lungs: Clear to auscultation bilaterally, no wheezing, rales or rhonchi  MSK: No significant edema in fingers or wrist.  Generally tender to light touch throughout. Skin: Severe fingernail atrophy   Psych: Anxious, tearful, Denies SI      Assessment and Plan:   1. Peripheral polyneuropathy  Hx of lyme and known hx of B12 deficiency (see below). Unable to take gabapentin/pregabalin due to prior adverse reaction.     - REFERRAL TO PAIN MANAGEMENT  - LYME AB, IGG & IGM BY WB; Future  - BABESIA MICROTI AB, IGG/IGM; Future    2. Chronic pain syndrome  Given her complex medical hx, multiple medication allergy/intolerances and high opioid risk, patient referred to pain management. May try topical diclofenac on hands if pain is severe. - REFERRAL TO PAIN MANAGEMENT  - diclofenac (SOLARAZE) 3 % topical gel; Apply  to affected area two (2) times a day. Dispense: 100 g; Refill: 1  - TSH 3RD GENERATION; Future    3. Cro[facility]'s disease of large intestine without complication [facility]  Followed by Dr. Alfonso Alcazar. Known B12 deficiency. Likely to have other nutritional deficiencies related to her disease. Given nail and skin conditions, zinc deficiency is of a concern. Will check this today. - ZINC; Future    4. Polyarthropathy  Patient with polyarthropathy, worsened after lyme infection. Given her persistent pain, I believe it is reasonable for her to re-establish with a rheumatologist for follow-up evaluation and recommendations. I have no record of prior lyme studies. Given persistent sx, will follow-up with IgG and IgM, as well as screen for babesiosis. I have provided a 1 time Rx for ultracet to allow for treatment of her severe pain. I stressed the importance of establishing with a pain specialist to help manage her pain. Given concurrent BZDs, narcan was also prescribed. Risks/Benefits discussed. - REFERRAL TO RHEUMATOLOGY  - LYME AB, IGG & IGM BY WB; Future  - BABESIA MICROTI AB, IGG/IGM; Future  - traMADol-acetaminophen (ULTRACET) 37.5-325 mg per tablet; Take 1 Tab by mouth every eight (8) hours as needed for Pain for up to 7 days. Max Daily Amount: 3 Tabs. Dispense: 21 Tab; Refill: 0    5. Raynaud's disease without gangrene  Currently on nifedipine. Would benefit from smoking cessation  - TSH 3RD GENERATION; Future    6. B12 deficiency  Last checked 1/2020. At goal with replacement  - VITAMIN B12 INJECTION  - THER/PROPH/DIAG INJECTION, SUBCUT/IM  - cyanocobalamin (Vitamin B-12) 1,000 mcg/mL injection; 1 mL by IntraMUSCular route once for 1 dose. Dispense: 1 mL; Refill: 0  - LIPID PANEL; Future  - METABOLIC PANEL, COMPREHENSIVE; Future  - CBC W/O DIFF; Future    7. Hx of Lyme disease  - LYME AB, IGG & IGM BY WB; Future  - BABESIA MICROTI AB, IGG/IGM; Future      8. Panic attack  Underlying anxiety and depression with panic attack today. Very tearful and emotional.  Denies SI. Followed by psychiatry. Encouraged to continue to do this. On a side note, I believe some component of her pain is non-organic and it will be important to continue a comprehensive treatment plan that includes both pain management and behavioral health.        40 minutes was spent face to face with patient with >50% of the time spent on counseling and coordination of care    I have discussed the diagnosis with the patient and the intended plan as seen in the above orders. she has expressed understanding. The patient has received an after-visit summary and questions were answered concerning future plans. I have discussed medication side effects and warnings with the patient as well. Electronically Signed: Du Yates MD      CC: Edilberto Kern MD     History   Patients past medical, surgical and family histories were reviewed and updated.     Past Medical History:   Diagnosis Date    Autoimmune disease ([facility]     Crohn's Disease    Crohn disease ([facility] 2010    Crohn's     Depression 2010    History of vascular access device 2017    [facility] VAT - 33 cm right brachial PICC for abx and limited access    Perforation bowel ([facility] 2017    S/p palak Love 2017    Vertigo      Past Surgical History:   Procedure Laterality Date    ABDOMEN SURGERY [address] UNLISTED      due to Crohn's-bowel resection x2    HX  SECTION      HX HEENT      HX TONSIL AND ADENOIDECTOMY      HX TUBAL LIGATION       Family History   Problem Relation Age of Onset    Heart Disease Father     Cancer Mother      Social History     Socioeconomic History    Marital status:      Spouse name: Not on file    Number of children: Not on file    Years of education: Not on file    Highest education level: Not on file   Occupational History    Not on file   Social Needs    Financial resource strain: Not on file    Food insecurity     Worry: Not on file     Inability: Not on file    Transportation needs     Medical: Not on file     Non-medical: Not on file   Tobacco Use    Smoking status: Current Some Day Smoker     Packs/day: 0.50     Years: 8.00     Pack years: 4.00     Types: Cigarettes    Smokeless tobacco: Never Used   Substance and Sexual Activity    Alcohol use: No    Drug use: No     Comment: 1 pack a day    Sexual activity: Yes     Partners: Male     Birth control/protection: Pill   Lifestyle    Physical activity     Days per week: Not on file     Minutes per session: Not on file    Stress: Not on file   Relationships    Social connections     Talks on phone: Not on file     Gets together: Not on file     Attends Protestant service: Not on file     Active member of club or organization: Not on file     Attends meetings of clubs or organizations: Not on file     Relationship status: Not on file    Intimate partner violence     Fear of current or ex partner: Not on file     Emotionally abused: Not on file     Physically abused: Not on file     Forced sexual activity: Not on file   Other Topics Concern    Not on file   Social History Narrative    Not on file            Current Medications/Allergies     Current Outpatient Medications   Medication Sig Dispense Refill    Cetirizine (ZyrTEC) 10 mg cap Take  by mouth.  cyanocobalamin (VITAMIN B12) 1,000 mcg/mL injection 1,000 mcg by IntraMUSCular route once.  hydroCHLOROthiazide (HYDRODIURIL) 12.5 mg tablet TAKE 1/2 TABLET BY MOUTH EVERY DAY 15 Tab 1    fluticasone propionate (FLONASE) 50 mcg/actuation nasal spray SPRAY 2 SPRAYS INTO EACH NOSTRIL EVERY DAY 1 Bottle 1    DULoxetine (CYMBALTA) 60 mg capsule Take 60 mg by mouth daily.  NIFEdipine ER (PROCARDIA XL) 30 mg ER tablet TAKE 1 TABLET BY MOUTH EVERY DAY 30 Tab 0    mupirocin calcium (BACTROBAN) 2 % topical cream APPLY A SMALL AMOUNT TO THE AFFECTED AREA BY TOPICAL ROUTE 3 TIMES PER DAY FOR 10 DAYS      baclofen (LIORESAL) 10 mg tablet TAKE 1 TABLET BY MOUTH THREE TIMES A DAY  1    ondansetron (ZOFRAN ODT) 4 mg disintegrating tablet Take 1 Tab by mouth every eight (8) hours as needed for Nausea. 15 Tab 0    acetaminophen (TYLENOL EXTRA STRENGTH) 500 mg tablet Take  by mouth every six (6) hours as needed for Pain.       simethicone (MYLICON) 80 mg chewable tablet Take 0.5 Tabs by mouth every six (6) hours as needed for Flatulence. 40 Tab 1    pantoprazole (PROTONIX) 40 mg tablet Take 1 Tab by mouth Before breakfast and dinner. 30 Tab 2    multivitamin capsule Take 1 Cap by mouth daily.  dextroamphetamine-amphetamine (ADDERALL) 20 mg tablet Take 20 mg by mouth three (3) times daily.  clonazePAM (KLONOPIN) 1 mg tablet Take 1 mg by mouth three (3) times daily.  LORazepam (ATIVAN) 1 mg tablet Take 1 Tab by mouth every eight (8) hours as needed for Anxiety. Max Daily Amount: 3 mg. (Patient taking differently: Take 1 mg by mouth two (2) times daily as needed for Anxiety.) 12 Tab 0    traZODone (DESYREL) 100 mg tablet Take 50 mg by mouth nightly.  albuterol (PROVENTIL HFA, VENTOLIN HFA, PROAIR HFA) 90 mcg/actuation inhaler Take 2 Puffs by inhalation every four (4) hours as needed for Wheezing or Shortness of Breath. 1 Inhaler 4    fexofenadine-pseudoephedrine (ALLEGRA-D 24) 180-240 mg per tablet Take 1 Tab by mouth daily. 30 Tab 0    ibuprofen (MOTRIN IB) 200 mg cap Take  by mouth.  naloxone 2 mg/actuation spry Use 1 spray intranasally into 1 nostril. Use a new Narcan nasal spray for subsequent doses and administer into alternating nostrils. May repeat every 2 to 3 minutes as needed. 1 Blister 0    melatonin 3 mg tablet Take 3 mg by mouth nightly.  escitalopram oxalate (LEXAPRO) 20 mg tablet Take 20 mg by mouth daily.        Allergies   Allergen Reactions    Cephalosporins Hives    Doxycycline Swelling    Penicillins Hives    Tetracyclines Nausea and Vomiting

## 2020-06-23 ENCOUNTER — TELEPHONE (OUTPATIENT)
Dept: FAMILY MEDICINE CLINIC | Age: 50
End: 2020-06-23

## 2020-06-23 VITALS
DIASTOLIC BLOOD PRESSURE: 88 MMHG | HEART RATE: 99 BPM | OXYGEN SATURATION: 99 % | HEIGHT: 62 IN | BODY MASS INDEX: 23.55 KG/M2 | TEMPERATURE: 97.8 F | WEIGHT: 128 LBS | SYSTOLIC BLOOD PRESSURE: 130 MMHG | RESPIRATION RATE: 17 BRPM

## 2020-06-23 NOTE — TELEPHONE ENCOUNTER
[phone]    Patient called to say the pharmacy will not fill this medication.   Please call them         diclofenac (SOLARAZE) 3 % topical gel

## 2020-06-24 NOTE — TELEPHONE ENCOUNTER
PA needed. Called pharmacy regarding this medication and will not fill for this reason. Will initiate.

## 2020-06-29 LAB
ALBUMIN SERPL-MCNC: 4.9 G/DL (ref 3.8–4.8)
ALBUMIN/GLOB SERPL: 2.1 {RATIO} (ref 1.2–2.2)
ALP SERPL-CCNC: 73 IU/L (ref 39–117)
ALT SERPL-CCNC: 10 IU/L (ref 0–32)
AST SERPL-CCNC: 15 IU/L (ref 0–40)
B BURGDOR IGG PATRN SER IB-IMP: NEGATIVE
B BURGDOR IGM PATRN SER IB-IMP: NEGATIVE
B BURGDOR18KD IGG SER QL IB: NORMAL
B BURGDOR23KD IGG SER QL IB: NORMAL
B BURGDOR23KD IGM SER QL IB: NORMAL
B BURGDOR28KD IGG SER QL IB: NORMAL
B BURGDOR30KD IGG SER QL IB: NORMAL
B BURGDOR39KD IGG SER QL IB: NORMAL
B BURGDOR39KD IGM SER QL IB: NORMAL
B BURGDOR41KD IGG SER QL IB: NORMAL
B BURGDOR41KD IGM SER QL IB: NORMAL
B BURGDOR45KD IGG SER QL IB: NORMAL
B BURGDOR58KD IGG SER QL IB: NORMAL
B BURGDOR66KD IGG SER QL IB: NORMAL
B BURGDOR93KD IGG SER QL IB: NORMAL
B MICROTI IGG TITR SER: NORMAL {TITER}
B MICROTI IGM TITR SER: NORMAL {TITER}
BILIRUB SERPL-MCNC: <0.2 MG/DL (ref 0–1.2)
BUN SERPL-MCNC: 14 MG/DL (ref 6–24)
BUN/CREAT SERPL: 16 (ref 9–23)
CALCIUM SERPL-MCNC: 10 MG/DL (ref 8.7–10.2)
CHLORIDE SERPL-SCNC: 100 MMOL/L (ref 96–106)
CHOLEST SERPL-MCNC: 200 MG/DL (ref 100–199)
CO2 SERPL-SCNC: 19 MMOL/L (ref 20–29)
CREAT SERPL-MCNC: 0.9 MG/DL (ref 0.57–1)
ERYTHROCYTE [DISTWIDTH] IN BLOOD BY AUTOMATED COUNT: 16.7 % (ref 11.7–15.4)
GLOBULIN SER CALC-MCNC: 2.3 G/DL (ref 1.5–4.5)
GLUCOSE SERPL-MCNC: 122 MG/DL (ref 65–99)
HCT VFR BLD AUTO: 46.4 % (ref 34–46.6)
HDLC SERPL-MCNC: 93 MG/DL
HGB BLD-MCNC: 14.2 G/DL (ref 11.1–15.9)
INTERPRETATION, 910389: NORMAL
LDLC SERPL CALC-MCNC: 74 MG/DL (ref 0–99)
MCH RBC QN AUTO: 27 PG (ref 26.6–33)
MCHC RBC AUTO-ENTMCNC: 30.6 G/DL (ref 31.5–35.7)
MCV RBC AUTO: 88 FL (ref 79–97)
PLATELET # BLD AUTO: 260 X10E3/UL (ref 150–450)
POTASSIUM SERPL-SCNC: 4.2 MMOL/L (ref 3.5–5.2)
PROT SERPL-MCNC: 7.2 G/DL (ref 6–8.5)
RBC # BLD AUTO: 5.26 X10E6/UL (ref 3.77–5.28)
SODIUM SERPL-SCNC: 139 MMOL/L (ref 134–144)
TRIGL SERPL-MCNC: 163 MG/DL (ref 0–149)
TSH SERPL DL<=0.005 MIU/L-ACNC: 3.08 UIU/ML (ref 0.45–4.5)
VLDLC SERPL CALC-MCNC: 33 MG/DL (ref 5–40)
WBC # BLD AUTO: 9.8 X10E3/UL (ref 3.4–10.8)
ZINC SERPL-MCNC: 88 UG/DL (ref 56–134)

## 2020-07-01 DIAGNOSIS — G89.4 CHRONIC PAIN SYNDROME: ICD-10-CM

## 2020-07-02 RX ORDER — DICLOFENAC SODIUM 30 MG/G
GEL TOPICAL 2 TIMES DAILY
Qty: 100 G | Refills: 1 | Status: SHIPPED | OUTPATIENT
Start: 2020-07-02 | End: 2020-09-05

## 2020-07-06 ENCOUNTER — TELEPHONE (OUTPATIENT)
Dept: FAMILY MEDICINE CLINIC | Age: 50
End: 2020-07-06

## 2020-07-06 NOTE — TELEPHONE ENCOUNTER
----- Message from Malorie Carrizales sent at 7/2/2020  6:46 PM EDT -----  Regarding: Dr. Alba Loera  Medication Refill    Caller (if not patient):      Relationship of caller (if not patient):      Best contact number(s):[phone]      Name of medication and dosage if known:  diclofenac (SOLARAZE) 3 % topical gel [[id number]]    Is patient out of this medication (yes/no): Y      Pharmacy name:  [facility] on file   Pharmacy listed in chart? (yes/no):  Pharmacy phone number:      Details to clarify the request: Pt is waiting on the Rx for the prior authorization for the Rx to be completed.   Pt has been waiting over a week for this to be complete pt was very upset       [employer]

## 2020-07-09 NOTE — TELEPHONE ENCOUNTER
Received a fax needing a prior auth form filled out? I have scanned into the PTs chart for reference.

## 2020-07-20 DIAGNOSIS — J01.90 ACUTE NON-RECURRENT SINUSITIS, UNSPECIFIED LOCATION: ICD-10-CM

## 2020-07-20 RX ORDER — FLUTICASONE PROPIONATE 50 MCG
SPRAY, SUSPENSION (ML) NASAL
Qty: 1 BOTTLE | Refills: 1 | Status: SHIPPED | OUTPATIENT
Start: 2020-07-20 | End: 2020-09-05

## 2020-08-09 DIAGNOSIS — M79.89 LEG SWELLING: ICD-10-CM

## 2020-08-10 RX ORDER — HYDROCHLOROTHIAZIDE 12.5 MG/1
TABLET ORAL
Qty: 15 TAB | Refills: 1 | Status: SHIPPED | OUTPATIENT
Start: 2020-08-10 | End: 2020-09-05

## 2020-09-05 ENCOUNTER — APPOINTMENT (OUTPATIENT)
Dept: GENERAL RADIOLOGY | Age: 50
End: 2020-09-05
Attending: UROLOGY
Payer: MEDICAID

## 2020-09-05 ENCOUNTER — ANESTHESIA EVENT (OUTPATIENT)
Dept: SURGERY | Age: 50
End: 2020-09-05
Payer: MEDICAID

## 2020-09-05 ENCOUNTER — HOSPITAL ENCOUNTER (OUTPATIENT)
Age: 50
Setting detail: OBSERVATION
Discharge: HOME | End: 2020-09-07
Attending: EMERGENCY MEDICINE | Admitting: FAMILY MEDICINE
Payer: MEDICAID

## 2020-09-05 ENCOUNTER — APPOINTMENT (OUTPATIENT)
Dept: CT IMAGING | Age: 50
End: 2020-09-05
Attending: EMERGENCY MEDICINE
Payer: MEDICAID

## 2020-09-05 ENCOUNTER — ANESTHESIA (OUTPATIENT)
Dept: SURGERY | Age: 50
End: 2020-09-05
Payer: MEDICAID

## 2020-09-05 DIAGNOSIS — N20.0 STAGHORN CALCULUS: Primary | ICD-10-CM

## 2020-09-05 DIAGNOSIS — N12 PYELONEPHRITIS: ICD-10-CM

## 2020-09-05 DIAGNOSIS — N17.9 AKI (ACUTE KIDNEY INJURY) (HCC): ICD-10-CM

## 2020-09-05 DIAGNOSIS — E53.8 VITAMIN B12 DEFICIENCY: ICD-10-CM

## 2020-09-05 DIAGNOSIS — K52.9 COLITIS: ICD-10-CM

## 2020-09-05 DIAGNOSIS — N15.1 RENAL ABSCESS: ICD-10-CM

## 2020-09-05 DIAGNOSIS — K50.10 CROHN'S DISEASE OF LARGE INTESTINE WITHOUT COMPLICATION (HCC): ICD-10-CM

## 2020-09-05 LAB
ALBUMIN SERPL-MCNC: 2.8 G/DL (ref 3.5–5)
ALBUMIN/GLOB SERPL: 0.6 {RATIO} (ref 1.1–2.2)
ALP SERPL-CCNC: 148 U/L (ref 45–117)
ALT SERPL-CCNC: 14 U/L (ref 12–78)
ANION GAP SERPL CALC-SCNC: 10 MMOL/L (ref 5–15)
APPEARANCE UR: ABNORMAL
AST SERPL-CCNC: 12 U/L (ref 15–37)
BACTERIA URNS QL MICRO: NEGATIVE /HPF
BASOPHILS # BLD: 0.2 K/UL (ref 0–0.1)
BASOPHILS NFR BLD: 1 % (ref 0–1)
BILIRUB SERPL-MCNC: 0.2 MG/DL (ref 0.2–1)
BILIRUB UR QL CFM: NEGATIVE
BUN SERPL-MCNC: 15 MG/DL (ref 6–20)
BUN/CREAT SERPL: 13 (ref 12–20)
CALCIUM SERPL-MCNC: 9 MG/DL (ref 8.5–10.1)
CHLORIDE SERPL-SCNC: 108 MMOL/L (ref 97–108)
CO2 SERPL-SCNC: 19 MMOL/L (ref 21–32)
COLOR UR: ABNORMAL
COMMENT, HOLDF: NORMAL
CREAT SERPL-MCNC: 1.16 MG/DL (ref 0.55–1.02)
DIFFERENTIAL METHOD BLD: ABNORMAL
EOSINOPHIL # BLD: 0.5 K/UL (ref 0–0.4)
EOSINOPHIL NFR BLD: 3 % (ref 0–7)
EPITH CASTS URNS QL MICRO: ABNORMAL /LPF
ERYTHROCYTE [DISTWIDTH] IN BLOOD BY AUTOMATED COUNT: 15 % (ref 11.5–14.5)
GLOBULIN SER CALC-MCNC: 4.7 G/DL (ref 2–4)
GLUCOSE SERPL-MCNC: 87 MG/DL (ref 65–100)
GLUCOSE UR STRIP.AUTO-MCNC: NEGATIVE MG/DL
HCT VFR BLD AUTO: 31.8 % (ref 35–47)
HGB BLD-MCNC: 10.3 G/DL (ref 11.5–16)
HGB UR QL STRIP: ABNORMAL
IMM GRANULOCYTES # BLD AUTO: 0.2 K/UL (ref 0–0.04)
IMM GRANULOCYTES NFR BLD AUTO: 1 % (ref 0–0.5)
KETONES UR QL STRIP.AUTO: NEGATIVE MG/DL
LEUKOCYTE ESTERASE UR QL STRIP.AUTO: ABNORMAL
LIPASE SERPL-CCNC: 106 U/L (ref 73–393)
LYMPHOCYTES # BLD: 3 K/UL (ref 0.8–3.5)
LYMPHOCYTES NFR BLD: 18 % (ref 12–49)
MCH RBC QN AUTO: 27.8 PG (ref 26–34)
MCHC RBC AUTO-ENTMCNC: 32.4 G/DL (ref 30–36.5)
MCV RBC AUTO: 85.7 FL (ref 80–99)
MONOCYTES # BLD: 0.8 K/UL (ref 0–1)
MONOCYTES NFR BLD: 5 % (ref 5–13)
NEUTS SEG # BLD: 11.7 K/UL (ref 1.8–8)
NEUTS SEG NFR BLD: 72 % (ref 32–75)
NITRITE UR QL STRIP.AUTO: POSITIVE
NRBC # BLD: 0 K/UL (ref 0–0.01)
NRBC BLD-RTO: 0 PER 100 WBC
PH UR STRIP: 6.5 [PH] (ref 5–8)
PLATELET # BLD AUTO: 661 K/UL (ref 150–400)
PMV BLD AUTO: 9.5 FL (ref 8.9–12.9)
POTASSIUM SERPL-SCNC: 3.6 MMOL/L (ref 3.5–5.1)
PROT SERPL-MCNC: 7.5 G/DL (ref 6.4–8.2)
PROT UR STRIP-MCNC: 30 MG/DL
RBC # BLD AUTO: 3.71 M/UL (ref 3.8–5.2)
RBC #/AREA URNS HPF: ABNORMAL /HPF (ref 0–5)
RBC MORPH BLD: ABNORMAL
SAMPLES BEING HELD,HOLD: NORMAL
SODIUM SERPL-SCNC: 137 MMOL/L (ref 136–145)
SP GR UR REFRACTOMETRY: 1.02 (ref 1–1.03)
UR CULT HOLD, URHOLD: NORMAL
UROBILINOGEN UR QL STRIP.AUTO: 1 EU/DL (ref 0.2–1)
WBC # BLD AUTO: 16.4 K/UL (ref 3.6–11)
WBC URNS QL MICRO: >100 /HPF (ref 0–4)

## 2020-09-05 PROCEDURE — 87077 CULTURE AEROBIC IDENTIFY: CPT

## 2020-09-05 PROCEDURE — 87186 SC STD MICRODIL/AGAR DIL: CPT

## 2020-09-05 PROCEDURE — 96374 THER/PROPH/DIAG INJ IV PUSH: CPT

## 2020-09-05 PROCEDURE — 96361 HYDRATE IV INFUSION ADD-ON: CPT

## 2020-09-05 PROCEDURE — C2617 STENT, NON-COR, TEM W/O DEL: HCPCS | Performed by: UROLOGY

## 2020-09-05 PROCEDURE — 74011000250 HC RX REV CODE- 250: Performed by: NURSE ANESTHETIST, CERTIFIED REGISTERED

## 2020-09-05 PROCEDURE — 76210000006 HC OR PH I REC 0.5 TO 1 HR: Performed by: UROLOGY

## 2020-09-05 PROCEDURE — 65660000000 HC RM CCU STEPDOWN

## 2020-09-05 PROCEDURE — 77030008684 HC TU ET CUF COVD -B: Performed by: NURSE ANESTHETIST, CERTIFIED REGISTERED

## 2020-09-05 PROCEDURE — 36415 COLL VENOUS BLD VENIPUNCTURE: CPT

## 2020-09-05 PROCEDURE — 87086 URINE CULTURE/COLONY COUNT: CPT

## 2020-09-05 PROCEDURE — 99285 EMERGENCY DEPT VISIT HI MDM: CPT

## 2020-09-05 PROCEDURE — 74011636320 HC RX REV CODE- 636/320: Performed by: UROLOGY

## 2020-09-05 PROCEDURE — 76010000138 HC OR TIME 0.5 TO 1 HR: Performed by: UROLOGY

## 2020-09-05 PROCEDURE — 76060000032 HC ANESTHESIA 0.5 TO 1 HR: Performed by: UROLOGY

## 2020-09-05 PROCEDURE — 99218 HC RM OBSERVATION: CPT

## 2020-09-05 PROCEDURE — 80053 COMPREHEN METABOLIC PANEL: CPT

## 2020-09-05 PROCEDURE — 74011250637 HC RX REV CODE- 250/637: Performed by: EMERGENCY MEDICINE

## 2020-09-05 PROCEDURE — 85025 COMPLETE CBC W/AUTO DIFF WBC: CPT

## 2020-09-05 PROCEDURE — 74011000636 HC RX REV CODE- 636: Performed by: EMERGENCY MEDICINE

## 2020-09-05 PROCEDURE — C1758 CATHETER, URETERAL: HCPCS | Performed by: UROLOGY

## 2020-09-05 PROCEDURE — 74011000258 HC RX REV CODE- 258: Performed by: NURSE ANESTHETIST, CERTIFIED REGISTERED

## 2020-09-05 PROCEDURE — 77030012961 HC IRR KT CYSTO/TUR ICUM -A: Performed by: UROLOGY

## 2020-09-05 PROCEDURE — 74011250636 HC RX REV CODE- 250/636: Performed by: NURSE ANESTHETIST, CERTIFIED REGISTERED

## 2020-09-05 PROCEDURE — 77030018832 HC SOL IRR H20 ICUM -A: Performed by: UROLOGY

## 2020-09-05 PROCEDURE — 83690 ASSAY OF LIPASE: CPT

## 2020-09-05 PROCEDURE — 81001 URINALYSIS AUTO W/SCOPE: CPT

## 2020-09-05 PROCEDURE — 96375 TX/PRO/DX INJ NEW DRUG ADDON: CPT

## 2020-09-05 PROCEDURE — 77030026438 HC STYL ET INTUB CARD -A: Performed by: NURSE ANESTHETIST, CERTIFIED REGISTERED

## 2020-09-05 PROCEDURE — 74011250636 HC RX REV CODE- 250/636: Performed by: EMERGENCY MEDICINE

## 2020-09-05 PROCEDURE — 74177 CT ABD & PELVIS W/CONTRAST: CPT

## 2020-09-05 PROCEDURE — 74011250636 HC RX REV CODE- 250/636: Performed by: ANESTHESIOLOGY

## 2020-09-05 DEVICE — URETERAL STENT
Type: IMPLANTABLE DEVICE | Site: URETER | Status: FUNCTIONAL
Brand: CONTOUR™

## 2020-09-05 RX ORDER — ONDANSETRON 2 MG/ML
4 INJECTION INTRAMUSCULAR; INTRAVENOUS AS NEEDED
Status: DISCONTINUED | OUTPATIENT
Start: 2020-09-05 | End: 2020-09-05 | Stop reason: HOSPADM

## 2020-09-05 RX ORDER — LORAZEPAM 1 MG/1
1 TABLET ORAL 2 TIMES DAILY
COMMUNITY

## 2020-09-05 RX ORDER — SODIUM CHLORIDE 0.9 % (FLUSH) 0.9 %
5-40 SYRINGE (ML) INJECTION AS NEEDED
Status: DISCONTINUED | OUTPATIENT
Start: 2020-09-05 | End: 2020-09-05 | Stop reason: HOSPADM

## 2020-09-05 RX ORDER — LIDOCAINE HYDROCHLORIDE 20 MG/ML
INJECTION, SOLUTION EPIDURAL; INFILTRATION; INTRACAUDAL; PERINEURAL AS NEEDED
Status: DISCONTINUED | OUTPATIENT
Start: 2020-09-05 | End: 2020-09-05 | Stop reason: HOSPADM

## 2020-09-05 RX ORDER — PANTOPRAZOLE SODIUM 40 MG/1
40 TABLET, DELAYED RELEASE ORAL DAILY
COMMUNITY

## 2020-09-05 RX ORDER — ALBUTEROL SULFATE 0.83 MG/ML
2.5 SOLUTION RESPIRATORY (INHALATION) AS NEEDED
Status: DISCONTINUED | OUTPATIENT
Start: 2020-09-05 | End: 2020-09-05 | Stop reason: HOSPADM

## 2020-09-05 RX ORDER — SODIUM CHLORIDE, SODIUM LACTATE, POTASSIUM CHLORIDE, CALCIUM CHLORIDE 600; 310; 30; 20 MG/100ML; MG/100ML; MG/100ML; MG/100ML
125 INJECTION, SOLUTION INTRAVENOUS CONTINUOUS
Status: CANCELLED | OUTPATIENT
Start: 2020-09-05 | End: 2020-09-06

## 2020-09-05 RX ORDER — ONDANSETRON 2 MG/ML
INJECTION INTRAMUSCULAR; INTRAVENOUS AS NEEDED
Status: DISCONTINUED | OUTPATIENT
Start: 2020-09-05 | End: 2020-09-05 | Stop reason: HOSPADM

## 2020-09-05 RX ORDER — SODIUM CHLORIDE 0.9 % (FLUSH) 0.9 %
5-40 SYRINGE (ML) INJECTION AS NEEDED
Status: DISCONTINUED | OUTPATIENT
Start: 2020-09-05 | End: 2020-09-07 | Stop reason: HOSPADM

## 2020-09-05 RX ORDER — IBUPROFEN 200 MG
1 TABLET ORAL DAILY
Status: DISCONTINUED | OUTPATIENT
Start: 2020-09-06 | End: 2020-09-07 | Stop reason: HOSPADM

## 2020-09-05 RX ORDER — SODIUM CHLORIDE 0.9 % (FLUSH) 0.9 %
5-40 SYRINGE (ML) INJECTION AS NEEDED
Status: CANCELLED | OUTPATIENT
Start: 2020-09-05

## 2020-09-05 RX ORDER — KETOROLAC TROMETHAMINE 30 MG/ML
15 INJECTION, SOLUTION INTRAMUSCULAR; INTRAVENOUS
Status: COMPLETED | OUTPATIENT
Start: 2020-09-05 | End: 2020-09-05

## 2020-09-05 RX ORDER — LIDOCAINE HYDROCHLORIDE 10 MG/ML
0.1 INJECTION, SOLUTION EPIDURAL; INFILTRATION; INTRACAUDAL; PERINEURAL AS NEEDED
Status: CANCELLED | OUTPATIENT
Start: 2020-09-05

## 2020-09-05 RX ORDER — SODIUM CHLORIDE 0.9 % (FLUSH) 0.9 %
5-40 SYRINGE (ML) INJECTION EVERY 8 HOURS
Status: DISCONTINUED | OUTPATIENT
Start: 2020-09-05 | End: 2020-09-05 | Stop reason: HOSPADM

## 2020-09-05 RX ORDER — SODIUM CHLORIDE 0.9 % (FLUSH) 0.9 %
5-40 SYRINGE (ML) INJECTION EVERY 8 HOURS
Status: DISCONTINUED | OUTPATIENT
Start: 2020-09-05 | End: 2020-09-07 | Stop reason: HOSPADM

## 2020-09-05 RX ORDER — FLUMAZENIL 0.1 MG/ML
0.2 INJECTION INTRAVENOUS
Status: CANCELLED | OUTPATIENT
Start: 2020-09-05

## 2020-09-05 RX ORDER — EPHEDRINE SULFATE/0.9% NACL/PF 50 MG/5 ML
SYRINGE (ML) INTRAVENOUS AS NEEDED
Status: DISCONTINUED | OUTPATIENT
Start: 2020-09-05 | End: 2020-09-05 | Stop reason: HOSPADM

## 2020-09-05 RX ORDER — PROPOFOL 10 MG/ML
INJECTION, EMULSION INTRAVENOUS AS NEEDED
Status: DISCONTINUED | OUTPATIENT
Start: 2020-09-05 | End: 2020-09-05 | Stop reason: HOSPADM

## 2020-09-05 RX ORDER — ACETAMINOPHEN 650 MG/1
650 SUPPOSITORY RECTAL
Status: DISCONTINUED | OUTPATIENT
Start: 2020-09-05 | End: 2020-09-07 | Stop reason: HOSPADM

## 2020-09-05 RX ORDER — MIDAZOLAM HYDROCHLORIDE 1 MG/ML
INJECTION, SOLUTION INTRAMUSCULAR; INTRAVENOUS AS NEEDED
Status: DISCONTINUED | OUTPATIENT
Start: 2020-09-05 | End: 2020-09-05 | Stop reason: HOSPADM

## 2020-09-05 RX ORDER — DIPHENHYDRAMINE HYDROCHLORIDE 50 MG/ML
12.5 INJECTION, SOLUTION INTRAMUSCULAR; INTRAVENOUS AS NEEDED
Status: DISCONTINUED | OUTPATIENT
Start: 2020-09-05 | End: 2020-09-05 | Stop reason: HOSPADM

## 2020-09-05 RX ORDER — LEVOFLOXACIN 5 MG/ML
750 INJECTION, SOLUTION INTRAVENOUS ONCE
Status: COMPLETED | OUTPATIENT
Start: 2020-09-05 | End: 2020-09-05

## 2020-09-05 RX ORDER — HYDROCHLOROTHIAZIDE 12.5 MG/1
6.25 TABLET ORAL
COMMUNITY
End: 2020-09-07

## 2020-09-05 RX ORDER — DEXAMETHASONE SODIUM PHOSPHATE 4 MG/ML
INJECTION, SOLUTION INTRA-ARTICULAR; INTRALESIONAL; INTRAMUSCULAR; INTRAVENOUS; SOFT TISSUE AS NEEDED
Status: DISCONTINUED | OUTPATIENT
Start: 2020-09-05 | End: 2020-09-05 | Stop reason: HOSPADM

## 2020-09-05 RX ORDER — FENTANYL CITRATE 50 UG/ML
INJECTION, SOLUTION INTRAMUSCULAR; INTRAVENOUS AS NEEDED
Status: DISCONTINUED | OUTPATIENT
Start: 2020-09-05 | End: 2020-09-05 | Stop reason: HOSPADM

## 2020-09-05 RX ORDER — FENTANYL CITRATE 50 UG/ML
25 INJECTION, SOLUTION INTRAMUSCULAR; INTRAVENOUS
Status: DISCONTINUED | OUTPATIENT
Start: 2020-09-05 | End: 2020-09-05 | Stop reason: HOSPADM

## 2020-09-05 RX ORDER — HYDROMORPHONE HYDROCHLORIDE 1 MG/ML
.25-1 INJECTION, SOLUTION INTRAMUSCULAR; INTRAVENOUS; SUBCUTANEOUS
Status: DISCONTINUED | OUTPATIENT
Start: 2020-09-05 | End: 2020-09-05 | Stop reason: HOSPADM

## 2020-09-05 RX ORDER — FAMOTIDINE 10 MG/ML
INJECTION INTRAVENOUS AS NEEDED
Status: DISCONTINUED | OUTPATIENT
Start: 2020-09-05 | End: 2020-09-05 | Stop reason: HOSPADM

## 2020-09-05 RX ORDER — ACETAMINOPHEN 325 MG/1
650 TABLET ORAL
Status: DISCONTINUED | OUTPATIENT
Start: 2020-09-05 | End: 2020-09-07 | Stop reason: HOSPADM

## 2020-09-05 RX ORDER — SODIUM CHLORIDE, SODIUM LACTATE, POTASSIUM CHLORIDE, CALCIUM CHLORIDE 600; 310; 30; 20 MG/100ML; MG/100ML; MG/100ML; MG/100ML
125 INJECTION, SOLUTION INTRAVENOUS CONTINUOUS
Status: DISCONTINUED | OUTPATIENT
Start: 2020-09-05 | End: 2020-09-05 | Stop reason: HOSPADM

## 2020-09-05 RX ORDER — ONDANSETRON 2 MG/ML
4 INJECTION INTRAMUSCULAR; INTRAVENOUS
Status: COMPLETED | OUTPATIENT
Start: 2020-09-05 | End: 2020-09-05

## 2020-09-05 RX ORDER — ALPRAZOLAM 0.5 MG/1
0.5 TABLET ORAL
Status: COMPLETED | OUTPATIENT
Start: 2020-09-05 | End: 2020-09-05

## 2020-09-05 RX ORDER — SODIUM CHLORIDE, SODIUM LACTATE, POTASSIUM CHLORIDE, CALCIUM CHLORIDE 600; 310; 30; 20 MG/100ML; MG/100ML; MG/100ML; MG/100ML
INJECTION, SOLUTION INTRAVENOUS
Status: DISCONTINUED | OUTPATIENT
Start: 2020-09-05 | End: 2020-09-05 | Stop reason: HOSPADM

## 2020-09-05 RX ORDER — MORPHINE SULFATE 4 MG/ML
4 INJECTION INTRAVENOUS ONCE
Status: COMPLETED | OUTPATIENT
Start: 2020-09-05 | End: 2020-09-05

## 2020-09-05 RX ORDER — NALOXONE HYDROCHLORIDE 0.4 MG/ML
0.04 INJECTION, SOLUTION INTRAMUSCULAR; INTRAVENOUS; SUBCUTANEOUS
Status: CANCELLED | OUTPATIENT
Start: 2020-09-05

## 2020-09-05 RX ORDER — BACLOFEN 10 MG/1
10 TABLET ORAL 3 TIMES DAILY
COMMUNITY
End: 2021-09-23 | Stop reason: SDUPTHER

## 2020-09-05 RX ORDER — SODIUM CHLORIDE 0.9 % (FLUSH) 0.9 %
5-40 SYRINGE (ML) INJECTION EVERY 8 HOURS
Status: CANCELLED | OUTPATIENT
Start: 2020-09-05

## 2020-09-05 RX ORDER — SUCCINYLCHOLINE CHLORIDE 20 MG/ML
INJECTION INTRAMUSCULAR; INTRAVENOUS AS NEEDED
Status: DISCONTINUED | OUTPATIENT
Start: 2020-09-05 | End: 2020-09-05 | Stop reason: HOSPADM

## 2020-09-05 RX ADMIN — MIDAZOLAM HYDROCHLORIDE 2 MG: 2 INJECTION, SOLUTION INTRAMUSCULAR; INTRAVENOUS at 21:20

## 2020-09-05 RX ADMIN — SODIUM CHLORIDE, POTASSIUM CHLORIDE, SODIUM LACTATE AND CALCIUM CHLORIDE: 600; 310; 30; 20 INJECTION, SOLUTION INTRAVENOUS at 21:05

## 2020-09-05 RX ADMIN — LIDOCAINE HYDROCHLORIDE 40 MG: 20 INJECTION, SOLUTION EPIDURAL; INFILTRATION; INTRACAUDAL; PERINEURAL at 21:27

## 2020-09-05 RX ADMIN — Medication 20 MG: at 21:36

## 2020-09-05 RX ADMIN — LEVOFLOXACIN 750 MG: 5 INJECTION, SOLUTION INTRAVENOUS at 19:14

## 2020-09-05 RX ADMIN — Medication 10 ML: at 22:00

## 2020-09-05 RX ADMIN — DEXMEDETOMIDINE 4 MCG: 100 INJECTION, SOLUTION, CONCENTRATE INTRAVENOUS at 21:28

## 2020-09-05 RX ADMIN — SODIUM CHLORIDE, SODIUM LACTATE, POTASSIUM CHLORIDE, AND CALCIUM CHLORIDE 125 ML/HR: 600; 310; 30; 20 INJECTION, SOLUTION INTRAVENOUS at 23:19

## 2020-09-05 RX ADMIN — ONDANSETRON HYDROCHLORIDE 4 MG: 2 SOLUTION INTRAMUSCULAR; INTRAVENOUS at 21:32

## 2020-09-05 RX ADMIN — SODIUM CHLORIDE 1000 ML: 900 INJECTION, SOLUTION INTRAVENOUS at 16:24

## 2020-09-05 RX ADMIN — KETOROLAC TROMETHAMINE 15 MG: 30 INJECTION, SOLUTION INTRAMUSCULAR at 16:30

## 2020-09-05 RX ADMIN — DEXMEDETOMIDINE 4 MCG: 100 INJECTION, SOLUTION, CONCENTRATE INTRAVENOUS at 21:26

## 2020-09-05 RX ADMIN — DEXAMETHASONE SODIUM PHOSPHATE 4 MG: 4 INJECTION, SOLUTION INTRAMUSCULAR; INTRAVENOUS at 21:33

## 2020-09-05 RX ADMIN — PROPOFOL 175 MG: 10 INJECTION, EMULSION INTRAVENOUS at 21:27

## 2020-09-05 RX ADMIN — DEXMEDETOMIDINE 4 MCG: 100 INJECTION, SOLUTION, CONCENTRATE INTRAVENOUS at 21:22

## 2020-09-05 RX ADMIN — ONDANSETRON 4 MG: 2 INJECTION INTRAMUSCULAR; INTRAVENOUS at 16:29

## 2020-09-05 RX ADMIN — IOPAMIDOL 100 ML: 755 INJECTION, SOLUTION INTRAVENOUS at 17:20

## 2020-09-05 RX ADMIN — MORPHINE SULFATE 4 MG: 4 INJECTION INTRAVENOUS at 17:47

## 2020-09-05 RX ADMIN — DEXMEDETOMIDINE 4 MCG: 100 INJECTION, SOLUTION, CONCENTRATE INTRAVENOUS at 21:20

## 2020-09-05 RX ADMIN — ALPRAZOLAM 0.5 MG: 0.5 TABLET ORAL at 16:06

## 2020-09-05 RX ADMIN — SODIUM CHLORIDE, POTASSIUM CHLORIDE, SODIUM LACTATE AND CALCIUM CHLORIDE: 600; 310; 30; 20 INJECTION, SOLUTION INTRAVENOUS at 21:51

## 2020-09-05 RX ADMIN — Medication 10 ML: at 23:00

## 2020-09-05 RX ADMIN — HYDROMORPHONE HYDROCHLORIDE 0.5 MG: 1 INJECTION, SOLUTION INTRAMUSCULAR; INTRAVENOUS; SUBCUTANEOUS at 22:39

## 2020-09-05 RX ADMIN — SUCCINYLCHOLINE CHLORIDE 100 MG: 20 INJECTION, SOLUTION INTRAMUSCULAR; INTRAVENOUS; PARENTERAL at 21:27

## 2020-09-05 RX ADMIN — DEXMEDETOMIDINE 4 MCG: 100 INJECTION, SOLUTION, CONCENTRATE INTRAVENOUS at 21:24

## 2020-09-05 RX ADMIN — FENTANYL CITRATE 50 MCG: 0.05 INJECTION, SOLUTION INTRAMUSCULAR; INTRAVENOUS at 21:27

## 2020-09-05 RX ADMIN — Medication 20 MG: at 21:30

## 2020-09-05 RX ADMIN — FENTANYL CITRATE 50 MCG: 0.05 INJECTION, SOLUTION INTRAMUSCULAR; INTRAVENOUS at 21:20

## 2020-09-05 RX ADMIN — FAMOTIDINE 20 MG: 10 INJECTION, SOLUTION INTRAVENOUS at 21:20

## 2020-09-05 RX ADMIN — SODIUM CHLORIDE 100 MCG: 9 INJECTION INTRAMUSCULAR; INTRAVENOUS; SUBCUTANEOUS at 21:37

## 2020-09-05 RX ADMIN — SODIUM CHLORIDE 1000 ML: 900 INJECTION, SOLUTION INTRAVENOUS at 19:12

## 2020-09-05 NOTE — ED PROVIDER NOTES
Patient is a 52-year-old male with history of Crohn's disease, depression, anxiety, panic attacks, Lyme's disease, vitamin B12 deficiency presenting to emergency department for evaluation of left abdominal and left flank pain with onset 1 month ago. She is also been experiencing burning with urination and blood in her urine for about 1 month. She is had recent fevers, fever this morning was 101 °F, treated with Tylenol prior to arrival.  [facility], was diagnosed with a urinary tract infection, she went back yesterday evening for follow-up and was diagnosed with pyelonephritis and was told to come to the emergency department for further evaluation. She has been treating with Bactrim twice daily for the past 2 days. She also had a negative COVID-19 test done last week, she notes that she is had a dry cough for the past 3 months which has been unchanged. She denies headache, dizziness, chest pain, shortness of breath, diarrhea, vaginal discharge, vaginal bleeding, no medical at this time.            Past Medical History:   Diagnosis Date    Autoimmune disease ([address]     Crohn's Disease    Crohn disease ([address] 2010    Crohn's     Depression 2010    History of vascular access device 2017    [facility] VAT - 33 cm right brachial PICC for abx and limited access    Perforation bowel [address] [address] [facility] 2017    S/p palak Hernandez 2017    Peripheral neuropathy 2012    B12 deficiency MRI brain normal 2012 MRA head normal 2012 CTA neck normal 2012     Vertigo     Vitamin B12 deficiency 2012    164 on 2012 Needs 1000 mcg IM twice a week        Past Surgical History:   Procedure Laterality Date    ABDOMEN SURGERY PROC UNLISTED      due to Crohn's-bowel resection x2    HX  SECTION      HX HEENT      HX TONSIL AND ADENOIDECTOMY      HX TUBAL LIGATION           Family History:   Problem Relation Age of Onset    Heart Disease Father     Cancer Mother        Social History     Socioeconomic History    Marital status:      Spouse name: Not on file    Number of children: Not on file    Years of education: Not on file    Highest education level: Not on file   Occupational History    Not on file   Social Needs    Financial resource strain: Not on file    Food insecurity     Worry: Not on file     Inability: Not on file    Transportation needs     Medical: Not on file     Non-medical: Not on file   Tobacco Use    Smoking status: Current Some Day Smoker     Packs/day: 0.50     Years: 8.00     Pack years: 4.00     Types: Cigarettes    Smokeless tobacco: Never Used   Substance and Sexual Activity    Alcohol use: No    Drug use: No     Comment: 1 pack a day    Sexual activity: Yes     Partners: Male     Birth control/protection: Pill   Lifestyle    Physical activity     Days per week: Not on file     Minutes per session: Not on file    Stress: Not on file   Relationships    Social connections     Talks on phone: Not on file     Gets together: Not on file     Attends Shinto service: Not on file     Active member of club or organization: Not on file     Attends meetings of clubs or organizations: Not on file     Relationship status: Not on file    Intimate partner violence     Fear of current or ex partner: Not on file     Emotionally abused: Not on file     Physically abused: Not on file     Forced sexual activity: Not on file   Other Topics Concern    Not on file   Social History Narrative    Not on file         ALLERGIES: Cephalosporins; Doxycycline; Penicillins; and Tetracyclines    Review of Systems   Constitutional: Positive for fever. Negative for chills. HENT: Negative for ear pain and sore throat. Eyes: Negative for visual disturbance. Respiratory: Negative for shortness of breath. Cardiovascular: Negative for chest pain, palpitations and leg swelling. Gastrointestinal: Positive for abdominal pain and nausea. Negative for diarrhea and vomiting. Genitourinary: Positive for dysuria and hematuria. Negative for flank pain. Musculoskeletal: Negative for back pain. Skin: Negative for color change. Neurological: Negative for dizziness and headaches. Psychiatric/Behavioral: Negative for confusion. Vitals:    09/05/20 1511   BP: 130/72   Pulse: 70   Resp: 18   Temp: 99.1 °F (37.3 °C)   SpO2: 97%   Weight: 58.1 kg (128 lb)   Height: 5' 2\" (1.575 m)            Physical Exam  Vitals signs and nursing note reviewed. Constitutional:       General: She is not in acute distress. Appearance: Normal appearance. She is not ill-appearing. HENT:      Head: Normocephalic and atraumatic. Mouth/Throat:      Pharynx: Oropharynx is clear. Eyes:      Extraocular Movements: Extraocular movements intact. Conjunctiva/sclera: Conjunctivae normal.   Neck:      Musculoskeletal: Normal range of motion. No neck rigidity. Cardiovascular:      Rate and Rhythm: Normal rate and regular rhythm. Pulmonary:      Effort: Pulmonary effort is normal. No respiratory distress. Breath sounds: Normal breath sounds. No wheezing or rales. Abdominal:      Palpations: Abdomen is soft. Tenderness: There is abdominal tenderness (LLQ and Left flank pain). There is left CVA tenderness and guarding. Musculoskeletal: Normal range of motion. Right lower leg: No edema. Left lower leg: No edema. Skin:     General: Skin is warm and dry. Neurological:      General: No focal deficit present. Mental Status: She is alert and oriented to person, place, and time. Psychiatric:         Mood and Affect: Mood is anxious. MDM  Number of Diagnoses or Management Options  FATMATA (acute kidney injury) [facility]):   Pyelonephritis:   Renal abscess:   Staghorn calculus:   Diagnosis management comments: Patient is alert, appears anxious, afebrile, oxygen saturation 97% on room air, vitals stable.   1 month history of dysuria, hematuria left-sided flank pain. [facility] to emergency department for suspected pyelonephritis. She been treating with Bactrim twice daily for the past 2 days without improvement in symptoms. Throughout my exam with her she is extremely anxious with rapid speech. She has a history of anxiety and panic attacks. Lungs are clear to auscultation bilaterally. Abdomen is soft, nondistended, with tenderness and guarding with the left lower quadrant and left flank. Positive left-sided CVA tenderness. Plan: CBC, CMP, urine, IV fluids, pain control, Zofran, CT abdomen pelvis, monitor, reassess    6:36 PM  Leukocytosis at 16.5. Creatinine elevated at 1.16. Urinalysis shows infection, clinical correlation with pyelonephritis. Urine cultures pending. CT abdomen pelvis shows an obstructing staghorn stone in the right renal pelvis with concern for pyelonephritis and possible developing abscess. Patient has been given 1 L of IV fluid bolus and pain control. Will give additional liter of fluids. IV Levaquin started, patient is allergic to penicillin and cephalosporins. Urology consult pending. Patient is admitted to hospital for further work-up, observation, and management. Family medicine resident notified and agrees. Attending ED provider is aware of patient and has had the opportunity to personally evaluate the patient, and agrees with admission and current plan. Patient informed of current management plan. All questions answered at this time. Will continue to monitor patient while in ED. CONSULT NOTE:  7:10 PM SADAF Macias spoke with Dr. Jaja Peters, Consult for Urology. Discussed available diagnostic tests and clinical findings. He is in agreement with care plans as outlined. Dr. Sherman Alvarenga recommends medical admission and he will take the patient to the OR this evening to place a stent.   Discussed options for antibiotics, he is in agreement with Levaquin due to patient allergies and presentation. Amount and/or Complexity of Data Reviewed  Clinical lab tests: reviewed  Tests in the radiology section of CPT®: reviewed  Tests in the medicine section of CPT®: reviewed  Discuss the patient with other providers: yes (ED attending Dr. Antonio Oconnor)      ED Course as of Sep 05 1731   Sat Sep 05, 2020   1731 CBC WITH AUTOMATED DIFF(!):    WBC 16.4(!)   RBC 3.71(!)   HGB 10.3(!)   HCT 31.8(!)   MCV 85.7   MCH 27.8   MCHC 32.4   RDW 15.0(!)   PLATELET 080(!)   MPV 9.5   NRBC 0.0   ABSOLUTE NRBC 0.00   NEUTROPHILS 72   LYMPHOCYTES 18   MONOCYTES 5   EOSINOPHILS 3   BASOPHILS 1   IMMATURE GRANULOCYTES 1(!)   ABS. NEUTROPHILS 11.7(!)   ABS. LYMPHOCYTES 3.0   ABS. MONOCYTES 0.8   ABS. EOSINOPHILS 0.5(!)   ABS. BASOPHILS 0.2(!)   ABS. IMM. GRANS. 0.2(!)   DF AUTOMATED   RBC COMMENTS NORM. .. [EH]   1731 URINALYSIS W/MICROSCOPIC(!):    Color YELLOW/STRAW   Appearance TURBID(!)   Specific gravity 1.020   pH (UA) 6.5   Protein 30(!)   Glucose Negative   Ketone Negative   Blood SMALL(!)   Urobilinogen 1.0   Nitrites Positive(!)   Leukocyte Esterase LARGE(!)   WBC >100(!)   RBC 10-20   Epithelial cells FEW   Bacteria Negative [EH]   1731 LIPASE:    Lipase 106 [EH]   2832 METABOLIC PANEL, COMPREHENSIVE(!):    Sodium 137   Potassium 3.6   Chloride 108   CO2 19(!)   Anion gap 10   Glucose 87   BUN 15   Creatinine 1.16(!)   BUN/Creatinine ratio 13   GFR est AA >60   GFR est non-AA 50(!)   Calcium 9.0   Bilirubin, total 0.2   ALT 14   AST 12(!)   Alk. phosphatase 148(!)   Protein, total 7.5   Albumin 2.8(!)   Globulin 4.7(!)   A-G Ratio 0.6(!) [EH]      ED Course User Index  [EH] SADAF Will     Perfect Serve Consult for Admission  7:07 PM    ED Room Number: ER11/11  Patient Name and age:  Tatiana Guillaume 53 y.o.  female  Working Diagnosis:   1. Staghorn calculus    2. Pyelonephritis    3. Renal abscess    4.  FATMATA (acute kidney injury) ([address]        COVID-19 Suspicion:  no  Sepsis present: no  Reassessment needed: no  Code Status:  Full Code  Readmission: no  Isolation Requirements:  no  Recommended Level of Care:  med/surg  Department:[facility] ED - [phone]  Other: Patient is a 52-year-old female with history of Crohn's, Lyme's, panic attacks to ED for evaluation of 1 month of dysuria and left flank pain. [facility] and was started on Bactrim twice daily. She is leukocytosis at 16.5, currently afebrile. CT scan shows an obstructing staghorn calculus with possible developing renal abscess. Pyelonephritis, urine culture is pending. I spoke with Dr. Anai Silva with urology who is planning to take the patient to the OR this evening to place a stent. She is allergic to penicillin and cephalosporin, so she has been started on IV Levaquin. Vitals are currently stable.       Procedures

## 2020-09-05 NOTE — ED NOTES
Medications administered. Patient tolerated well. Pt updated on plan of care. Pt instructed to use call bell as needed. Pt does not appear to be in any acute distress/shows no evidence of clinical instability at this time. Pt is speaking loudly on the phone at this time.

## 2020-09-05 NOTE — ED NOTES
Ultrasound IV by ED Staff Procedure Note    Patient meets criteria for US IV insertion. Ultrasound IV verbal education provided to patient. Opportunities for questions given. IV ultrasound technique used for PIV placement:  20 gauge  RAC  location. 1 X Attempt(s). Procedure tolerated well. Primary RN aware of IV placement and added to LDA.                                 Philip Felix RN

## 2020-09-05 NOTE — ED TRIAGE NOTES
Patient arrives with complaint of left flank pain, nasal congestion, SOB, and dysuria, diarrhea, headache and dizziness. [facility] at [facility]. Patient was told she has a kidney infection and that she should come to ER. Patient states having SOB and cough for past 3 months. Was tested for Covid-19 two weeks ago. Result was negative. Patient reports history of Chrons and Lime Disease. Patient reports being treated for vaginal and oral fungal infection for past three months.

## 2020-09-05 NOTE — ED NOTES
Verbal/Bedside report was given to ISAAK RN who will assume care of patient at this time. SBAR report consisted of ED summary, MAR, recent results, and additional pertinent information. Receiving nurse given opportunity to ask questions and seek clarifications. Receiving nurse aware of pending lab results and orders that are to be completed.

## 2020-09-06 PROBLEM — N20.0 RENAL CALCULI: Status: ACTIVE | Noted: 2020-09-06

## 2020-09-06 PROBLEM — K52.9 COLITIS: Status: ACTIVE | Noted: 2020-09-06

## 2020-09-06 LAB
AMPHET UR QL SCN: POSITIVE
ANION GAP SERPL CALC-SCNC: 9 MMOL/L (ref 5–15)
BARBITURATES UR QL SCN: NEGATIVE
BASOPHILS # BLD: 0 K/UL (ref 0–0.1)
BASOPHILS NFR BLD: 0 % (ref 0–1)
BENZODIAZ UR QL: POSITIVE
BUN SERPL-MCNC: 11 MG/DL (ref 6–20)
BUN/CREAT SERPL: 12 (ref 12–20)
CALCIUM SERPL-MCNC: 9.4 MG/DL (ref 8.5–10.1)
CANNABINOIDS UR QL SCN: NEGATIVE
CHLORIDE SERPL-SCNC: 109 MMOL/L (ref 97–108)
CO2 SERPL-SCNC: 20 MMOL/L (ref 21–32)
COCAINE UR QL SCN: NEGATIVE
CREAT SERPL-MCNC: 0.95 MG/DL (ref 0.55–1.02)
DIFFERENTIAL METHOD BLD: ABNORMAL
DRUG SCRN COMMENT,DRGCM: ABNORMAL
EOSINOPHIL # BLD: 0 K/UL (ref 0–0.4)
EOSINOPHIL NFR BLD: 0 % (ref 0–7)
ERYTHROCYTE [DISTWIDTH] IN BLOOD BY AUTOMATED COUNT: 15.1 % (ref 11.5–14.5)
GLUCOSE SERPL-MCNC: 144 MG/DL (ref 65–100)
HCT VFR BLD AUTO: 32.6 % (ref 35–47)
HGB BLD-MCNC: 10.3 G/DL (ref 11.5–16)
IMM GRANULOCYTES # BLD AUTO: 0.1 K/UL (ref 0–0.04)
IMM GRANULOCYTES NFR BLD AUTO: 1 % (ref 0–0.5)
LACTATE SERPL-SCNC: 1.3 MMOL/L (ref 0.4–2)
LYMPHOCYTES # BLD: 1.1 K/UL (ref 0.8–3.5)
LYMPHOCYTES NFR BLD: 11 % (ref 12–49)
MCH RBC QN AUTO: 27.4 PG (ref 26–34)
MCHC RBC AUTO-ENTMCNC: 31.6 G/DL (ref 30–36.5)
MCV RBC AUTO: 86.7 FL (ref 80–99)
METHADONE UR QL: NEGATIVE
MONOCYTES # BLD: 0.1 K/UL (ref 0–1)
MONOCYTES NFR BLD: 1 % (ref 5–13)
NEUTS SEG # BLD: 8.4 K/UL (ref 1.8–8)
NEUTS SEG NFR BLD: 87 % (ref 32–75)
NRBC # BLD: 0 K/UL (ref 0–0.01)
NRBC BLD-RTO: 0 PER 100 WBC
OPIATES UR QL: POSITIVE
PCP UR QL: NEGATIVE
PLATELET # BLD AUTO: 587 K/UL (ref 150–400)
PMV BLD AUTO: 9.4 FL (ref 8.9–12.9)
POTASSIUM SERPL-SCNC: 4.5 MMOL/L (ref 3.5–5.1)
RBC # BLD AUTO: 3.76 M/UL (ref 3.8–5.2)
SODIUM SERPL-SCNC: 138 MMOL/L (ref 136–145)
VIT B12 SERPL-MCNC: 275 PG/ML (ref 193–986)
WBC # BLD AUTO: 9.7 K/UL (ref 3.6–11)

## 2020-09-06 PROCEDURE — 74011250637 HC RX REV CODE- 250/637: Performed by: STUDENT IN AN ORGANIZED HEALTH CARE EDUCATION/TRAINING PROGRAM

## 2020-09-06 PROCEDURE — 80048 BASIC METABOLIC PNL TOTAL CA: CPT

## 2020-09-06 PROCEDURE — 36415 COLL VENOUS BLD VENIPUNCTURE: CPT

## 2020-09-06 PROCEDURE — 83605 ASSAY OF LACTIC ACID: CPT

## 2020-09-06 PROCEDURE — 99222 1ST HOSP IP/OBS MODERATE 55: CPT | Performed by: FAMILY MEDICINE

## 2020-09-06 PROCEDURE — 74011250636 HC RX REV CODE- 250/636: Performed by: STUDENT IN AN ORGANIZED HEALTH CARE EDUCATION/TRAINING PROGRAM

## 2020-09-06 PROCEDURE — 65660000000 HC RM CCU STEPDOWN

## 2020-09-06 PROCEDURE — 80307 DRUG TEST PRSMV CHEM ANLYZR: CPT

## 2020-09-06 PROCEDURE — 82607 VITAMIN B-12: CPT

## 2020-09-06 PROCEDURE — 99218 HC RM OBSERVATION: CPT

## 2020-09-06 PROCEDURE — 85025 COMPLETE CBC W/AUTO DIFF WBC: CPT

## 2020-09-06 PROCEDURE — 74011250637 HC RX REV CODE- 250/637: Performed by: UROLOGY

## 2020-09-06 PROCEDURE — C9113 INJ PANTOPRAZOLE SODIUM, VIA: HCPCS | Performed by: STUDENT IN AN ORGANIZED HEALTH CARE EDUCATION/TRAINING PROGRAM

## 2020-09-06 RX ORDER — FLUCONAZOLE 100 MG/1
100 TABLET ORAL DAILY
Status: DISCONTINUED | OUTPATIENT
Start: 2020-09-06 | End: 2020-09-07 | Stop reason: HOSPADM

## 2020-09-06 RX ORDER — PHENAZOPYRIDINE HYDROCHLORIDE 100 MG/1
200 TABLET, FILM COATED ORAL
Status: DISCONTINUED | OUTPATIENT
Start: 2020-09-06 | End: 2020-09-07 | Stop reason: HOSPADM

## 2020-09-06 RX ORDER — CLONAZEPAM 1 MG/1
1 TABLET ORAL 2 TIMES DAILY
Status: DISCONTINUED | OUTPATIENT
Start: 2020-09-06 | End: 2020-09-07 | Stop reason: HOSPADM

## 2020-09-06 RX ORDER — SODIUM CHLORIDE 9 MG/ML
100 INJECTION, SOLUTION INTRAVENOUS CONTINUOUS
Status: DISCONTINUED | OUTPATIENT
Start: 2020-09-06 | End: 2020-09-07 | Stop reason: HOSPADM

## 2020-09-06 RX ORDER — KETOROLAC TROMETHAMINE 30 MG/ML
15 INJECTION, SOLUTION INTRAMUSCULAR; INTRAVENOUS
Status: DISCONTINUED | OUTPATIENT
Start: 2020-09-06 | End: 2020-09-06

## 2020-09-06 RX ORDER — DULOXETIN HYDROCHLORIDE 30 MG/1
120 CAPSULE, DELAYED RELEASE ORAL DAILY
Status: DISCONTINUED | OUTPATIENT
Start: 2020-09-06 | End: 2020-09-07 | Stop reason: HOSPADM

## 2020-09-06 RX ORDER — TRAZODONE HYDROCHLORIDE 50 MG/1
50 TABLET ORAL
Status: DISCONTINUED | OUTPATIENT
Start: 2020-09-06 | End: 2020-09-06

## 2020-09-06 RX ORDER — TRAZODONE HYDROCHLORIDE 50 MG/1
50 TABLET ORAL
Status: DISCONTINUED | OUTPATIENT
Start: 2020-09-06 | End: 2020-09-07 | Stop reason: HOSPADM

## 2020-09-06 RX ORDER — ENOXAPARIN SODIUM 100 MG/ML
40 INJECTION SUBCUTANEOUS EVERY 24 HOURS
Status: DISCONTINUED | OUTPATIENT
Start: 2020-09-06 | End: 2020-09-07 | Stop reason: HOSPADM

## 2020-09-06 RX ORDER — LEVOFLOXACIN 5 MG/ML
750 INJECTION, SOLUTION INTRAVENOUS EVERY 24 HOURS
Status: DISCONTINUED | OUTPATIENT
Start: 2020-09-06 | End: 2020-09-07 | Stop reason: HOSPADM

## 2020-09-06 RX ORDER — HYDROMORPHONE HYDROCHLORIDE 1 MG/ML
0.5 INJECTION, SOLUTION INTRAMUSCULAR; INTRAVENOUS; SUBCUTANEOUS ONCE
Status: COMPLETED | OUTPATIENT
Start: 2020-09-06 | End: 2020-09-06

## 2020-09-06 RX ADMIN — Medication 10 ML: at 14:07

## 2020-09-06 RX ADMIN — KETOROLAC TROMETHAMINE 15 MG: 30 INJECTION, SOLUTION INTRAMUSCULAR at 16:39

## 2020-09-06 RX ADMIN — SODIUM CHLORIDE 50 ML/HR: 900 INJECTION, SOLUTION INTRAVENOUS at 00:34

## 2020-09-06 RX ADMIN — SODIUM CHLORIDE 100 ML/HR: 900 INJECTION, SOLUTION INTRAVENOUS at 16:44

## 2020-09-06 RX ADMIN — CLONAZEPAM 1 MG: 1 TABLET ORAL at 18:05

## 2020-09-06 RX ADMIN — TRAZODONE HYDROCHLORIDE 50 MG: 50 TABLET ORAL at 20:16

## 2020-09-06 RX ADMIN — DULOXETINE HYDROCHLORIDE 120 MG: 30 CAPSULE, DELAYED RELEASE ORAL at 08:48

## 2020-09-06 RX ADMIN — FLUCONAZOLE 100 MG: 100 TABLET ORAL at 09:46

## 2020-09-06 RX ADMIN — PANTOPRAZOLE SODIUM 40 MG: 40 INJECTION, POWDER, FOR SOLUTION INTRAVENOUS at 08:48

## 2020-09-06 RX ADMIN — PHENAZOPYRIDINE HYDROCHLORIDE 200 MG: 100 TABLET ORAL at 14:06

## 2020-09-06 RX ADMIN — Medication 10 ML: at 20:20

## 2020-09-06 RX ADMIN — HYDROMORPHONE HYDROCHLORIDE 0.5 MG: 1 INJECTION, SOLUTION INTRAMUSCULAR; INTRAVENOUS; SUBCUTANEOUS at 01:59

## 2020-09-06 RX ADMIN — ENOXAPARIN SODIUM 40 MG: 40 INJECTION SUBCUTANEOUS at 09:46

## 2020-09-06 RX ADMIN — ACETAMINOPHEN 650 MG: 325 TABLET ORAL at 14:33

## 2020-09-06 RX ADMIN — TRAZODONE HYDROCHLORIDE 50 MG: 50 TABLET ORAL at 03:25

## 2020-09-06 RX ADMIN — LEVOFLOXACIN 750 MG: 5 INJECTION, SOLUTION INTRAVENOUS at 18:05

## 2020-09-06 NOTE — PROGRESS NOTES
9/6/2020  Reason for Admission:   Pyelonephritis                   RUR Score:   10% low                  Plan for utilizing home health:    Not at this time, patient believes she has had [address] in the past      PCP: First and Last name:  Angela Perez MD   Name of Practice:    Are you a current patient: Yes/No:  Yes   Approximate date of last visit:  Virtual 1 month ago   Can you participate in a virtual visit with your PCP:  Yes                    Current Advanced Directive/Advance Care Plan:  Full code, no ACP on file, NOK would be father/parents                         Transition of Care Plan:                    I completed the assessment in the room with the patient. Both masked. Patient lives in a single story home with her 2 grown sons usually. After discharge she will be going to stay with a friend. She states she has had trouble lately with ADLs due to her Lyme's disease. She does not drive. She does use a cane regularly and has had [address] in the past. Her father Erin Dubon is her emergency contact and can be reached at [phone]. When she is ready for discharge her friend Ginette Torre will pick her up and she will stay with him. Patient usually gets her prescriptions at [facility] and they are typically covered. No needs identified at this time. Transition of Care Plan  1. Continue medical management/treatment  2. Patient will likely go home with friends' assistance   3. Nikolas Domínguez to transport  4. Patient will need to follow up with PCP, specialties as necessary   5. CM will continue to follow and assist with discharge planning    Care Management Interventions  PCP Verified by CM: Yes(Dr. Angela Perez)  Mode of Transport at Discharge:  Other (see comment)(Friend)  Transition of Care Consult (CM Consult): Discharge Planning  MyChart Signup: No  Discharge Durable Medical Equipment: No  Physical Therapy Consult: No  Occupational Therapy Consult: No  Speech Therapy Consult: No  Current Support Network: Family Lives Nearby  Confirm Follow Up Transport: Friends  Discharge Location  Discharge Placement: Home with family assistance    KAMRON Ford

## 2020-09-06 NOTE — OP NOTES
[facility] 80  OPERATIVE REPORT    Name:  Yovani Choi  MR#:  [id number]  :  1970  ACCOUNT #:  [de-identified]  DATE OF SERVICE:  2020    PREOPERATIVE DIAGNOSES:  Left-sided flank pain, right-sided renal stone and hydronephrosis. POSTOPERATIVE DIAGNOSES:  Left-sided flank pain, right-sided renal stone and hydronephrosis as well as infection. PROCEDURES PERFORMED:  1. Cystourethroscopy. 2.  Left retrograde pyelogram with 6 x 24 double-J ureteral stent placement. 3.  Right 6 x 24 double-J ureteral stent placement. SURGEON:  Iram Joyner MD    ASSISTANT:  None. ANESTHESIA:  General.    COMPLICATIONS:  None. SPECIMENS REMOVED:  None. IMPLANTS:  None. ESTIMATED BLOOD LOSS:  Minimal.    FINDINGS:  1.  Mild left-sided renal pelvis fullness; left ureteral stent placed; right ureteral stent placed beyond the stone, no strings were left attached, 6 x 24's. INDICATIONS FOR THE PROCEDURE:  The patient is a 52-year-old woman who presented with a history of fever and a large right-sided renal stone, who endorsed left-sided flank pain. She had a fever of greater than 101 today and had been on oral antibiotics. The patient was taken to the operating room for bilateral ureteral stent placement for inspection of the left side given her pain on the left, although the stone was on the right. DESCRIPTION OF PROCEDURE IN DETAIL:  After informed consent was obtained, the patient was brought to the operating room and placed in the supine position where general anesthesia was undertaken. She was then prepped and draped in dorsal lithotomy position. A proper timeout was performed including confirmation of preoperative antibiotics. To begin the procedure, a standard cystourethroscopy was performed. The patient did have a narrow urethra, although it could be navigated with the scope, slightly dilated.   At this time, attention was turned to the left ureteral orifice where the patient endorsed her pain. At this time, it was cannulated with a Sensor wire, and a 5-Uzbek open-ended catheter was advanced over the wire up to the level of the renal pelvis where a retrograde pyelogram showed some mild fullness of the left kidney. At this time, the decision was made to place a 6 x 24 double-J ureteral stents with good flow noted in the kidney as well as in the urinary bladder. Attention was turned to the right ureteral orifice where a Sensor wire was then advanced up beyond the stone into the renal pelvis. At this time, a 6 x 24 double-J ureteral stent was placed into the renal pelvis beyond the stone. Although the stone did occupy a considerable portion of the renal pelvis, it was difficult to obtain a curl. At this time, as the stent was then beyond the stone, it was placed with a good curl noted in the bladder. Both strings were removed, and the patient's bladder was emptied. She was awoken in the operating room in stable condition without complication. DISPOSITION:  The patient will be admitted for IV antibiotics and continued monitoring. Ultimately, she will require stone treatment on the right as well as a possible left-sided ureteroscopy to inspect the patient's fullness and her pain.       Maverick Mena MD      [id number]  D:  09/05/2020 22:50  T:  09/06/2020 6:39  JOB #:  [id number]

## 2020-09-06 NOTE — ANESTHESIA PREPROCEDURE EVALUATION
Anesthetic History          Comments: itching     Review of Systems / Medical History  Patient summary reviewed and pertinent labs reviewed    Pulmonary          Smoker         Neuro/Psych         Psychiatric history    Comments: Chronic pain; polyarthropathy, neuropathy Cardiovascular  Within defined limits                Exercise tolerance: >4 METS     GI/Hepatic/Renal         Renal disease: stones      Comments: Staghorn calculous right kidney; WBC 16  Crohn's Disease s/p ileocecetomy in 2017 Endo/Other        Anemia     Other Findings            Physical Exam    Airway  Mallampati: II  TM Distance: 4 - 6 cm  Neck ROM: normal range of motion   Mouth opening: Normal     Cardiovascular    Rhythm: regular  Rate: normal         Dental    Dentition: Edentulous     Pulmonary  Breath sounds clear to auscultation               Abdominal  GI exam deferred       Other Findings            Anesthetic Plan    ASA: 3, emergent  Anesthesia type: general          Induction: RSI  Anesthetic plan and risks discussed with: Patient

## 2020-09-06 NOTE — PROGRESS NOTES
[address] [place] [address]   Office [phone]  Fax [phone]          Assessment and [address] is a 53 y.o. female admitted for Pyelonephritis 2/2 obstructing staghorn calculi. She has spent 1 night(s) in the hospital.    24 Hour Events: No acute events. 1. Pyelonephritis 2/2 Renal Calculi: WBC: 16.4. bilateral CVA tenderness. Urinalysis positive for nitrites, small amount of blood, >100 WBC. CT abd pelv W cont shows obstructing staghorn calculus in the right renal pelvis with mild right hydronephrosis. Urology was consulted and patient underwent bilateral ureteral stents. Outpatient follow up next week at [facility] urology. · Pt on Levofloxacin 750 mg    · Follow up on urine culture. 2. Crohn's disease s/p ileocecectomy: CT abdomen with mild mucosal hyperenhacnement of the neoterminal ileum suspicious for active inflammatory bowel disease. Diffuse distention of the colon with stool and air-fluid levels which may represent an infectious or inflammatory colitis. Patient is not currently having any symptoms of  Crohn's disease. · Monitor symptoms. If worsening abdominal pain low threshold to start metronidazole and Sulfasalazine. 3. Depression/Anxiety:   · Continue home med Cymbalta  · Continue home med Trazodone  · Held Klonopin and Ativan    5. Vitamin B12 deficiency: last B12 in January was in therapeutic range. Pt is due for Vitamin B12 shot. · Follow up on B12     6. ADD: holding home Adderall 20mg       FEN/GI - Regular diet. NS at 50 mL/hr. Activity - Ambulate with assistance  DVT prophylaxis - SCDs  GI prophylaxis - Protonix  Fall prophylaxis - Not indicated at this time. Disposition - Admit to Medical. Plan to d/c to Home. Code Status - Full, discussed with patient / caregivers.   Next of Larisa Solis Name and Contact: Washington Dubon ([phone])      Nohemy Griffin MD  Family Medicine Resident         Subjective / Objective     Subjective  Pt is feeling a little better after the procedure, still complains of mild pain. Temp (24hrs), Av.9 °F (36.6 °C), Min:97.4 °F (36.3 °C), Max:99.1 °F (37.3 °C)     Objective  Respiratory:   O2 Device: Room air   Visit Vitals  /75 (BP 1 Location: Right arm, BP Patient Position: At rest)   Pulse 77   Temp 97.4 °F (36.3 °C)   Resp 12   Ht 5' 2\" (1.575 m)   Wt 128 lb (58.1 kg)   SpO2 97%   BMI 23.41 kg/m²     General: No acute distress. Alert. Cooperative  Head: Normocephalic. Atraumatic  Respiratory: clear on ausculation. Cardiovascular: RRR   GI: pos bowel sound. Nontender.  No rebound tenderness or guarding   Skin: No rashes      I/O:  Date 20 - 20 - 20 0659   Shift 1667-9292 6589-3230 24 Hour Total 0969-8030 1493-0871 24 Hour Total   INTAKE   I.V.(mL/kg/hr)  450 450        Volume (lactated Ringers infusion)  300 300        Volume (lactated Ringers infusion)  150 150      Shift Total(mL/kg)  450(7.8) 450(7.8)      OUTPUT   Urine(mL/kg/hr)  550 550        Urine Voided  550 550      Shift Total(mL/kg)  550(9.5) 550(9.5)      NET  -100 -100      Weight (kg) 58.1 58.1 58.1 58.1 58.1 58.1       CBC:  Recent Labs     20  1627   WBC 16.4*   HGB 10.3*   HCT 31.8*   *       Metabolic Panel:  Recent Labs     20  1627      K 3.6      CO2 19*   BUN 15   CREA 1.16*   GLU 87   CA 9.0   ALB 2.8*   ALT 14          For Billing    Chief Complaint   Patient presents with    Flank Pain    Bladder Infection       Hospital Problems  Date Reviewed: 2020          Codes Class Noted POA    Pyelonephritis ICD-10-CM: N12  ICD-9-CM: 590.80  2020 Unknown

## 2020-09-06 NOTE — PROGRESS NOTES
[facility]: MED RECONCILIATION    Comments/Recommendations:    Reviewed PTA medications with patient at bedside- Patient is tangential in conversation. She provides a medication list from home that is \"completely up to date\" with the exception of flonase nasal spray PRN. Patient recently treated for vaginal/oral fungal infection w/ nystatin swish and split plus fluconazole 150 mg x 1 dose ~2 weeks ago.  Suspect noncompliance with maintenance Rx medications- Patient is prescribed HCTZ 12.5 mg tabs take 1/2 tab once daily, she only takes \"PRN\" for swelling. She has Rx listed for nifedipine XL 30 mg daily which she reports not taking- this is listed as current on her PCP office notes. Noted baclofen was discontinued however patient is adamant that she still takes this.  Regarding narcotic medications: [facility] does not appear to have a current supply of these medications if she is taking as she reports. Clonazepam filled 60 tabs for 30 day supply 6-23-20, lorazepam 120 tabs for 30 day supply 6/23/20 and adderall 20 mg filled #90 for 30 day supply 5-19-20. Suspect she takes these PRN throughout the day however patient is adamant that she takes clonazepam and lorazepam BID, and adderall TID scheduled.  Patient requests nicotine patch and is PPD smoker- d/w MD and suggested 21 mg patch    Medications added:     · Baclofen 10 mg TID- Filled 30 day supply 7-15-20    Medications removed:    · APAP  · Albuterol HFA  · Cetrizine  · Melatonin  · MVI  · Mupirocin  · Naloxone  · Nifedipine ER 30 mg  · Zofran 4 mg ODT  · Simethicone 80 mg  · Diclofenac 3% gel    Medications adjusted:    · Trazodone- Fills 100 mg tabs for 30 day supply, reports taking 1/2 tab once daily  · HCTZ- Fills for 1/2 tab daily (6.25 mg)- Takes PRN for \"swelling\"  · Lorazepam- Reports taking twice daily @ 0600 and 1200. States she may take additional PRN throughout the day.    · Clonazepam- Reports taking scheduled BID  · Adderall 20 mg- Reports taking scheduled TID  · B12 shot once monthly- Past due  · Duloxetine- 120 mg daily    Information obtained from: Patient, [facility], [facility] notes    Allergies: Cephalosporins; Doxycycline; Penicillins; and Tetracyclines    Prior to Admission Medications:     Medication Documentation Review Audit       Reviewed by Tara Lemon, PHARMD (Pharmacist) on 09/05/20 at 2000      Medication Sig Documenting Provider Last Dose Status Taking?   baclofen (LIORESAL) 10 mg tablet Take 10 mg by mouth three (3) times daily. Provider, Historical 9/5/2020 Active Yes   clonazePAM (KLONOPIN) 1 mg tablet Take 1 mg by mouth two (2) times a day. Patient reports taking twice daily in the AM and PM ([id number]) Provider, Historical 9/5/2020 0900 Active Yes   cyanocobalamin (VITAMIN B12) 1,000 mcg/mL injection 1,000 mcg by IntraMUSCular route every thirty (30) days. Provider, Historical 7/5/2020 Active Yes   dextroamphetamine-amphetamine (ADDERALL) 20 mg tablet Take 20 mg by mouth three (3) times daily. Provider, Historical 9/5/2020 0600 Active Yes   DULoxetine (CYMBALTA) 60 mg capsule Take 120 mg by mouth daily. Provider, Historical 9/5/2020 Active Yes   hydroCHLOROthiazide (HYDRODIURIL) 12.5 mg tablet Take 6.25 mg by mouth daily as needed (Swelling). Provider, Historical 8/5/2020 Active Yes   LORazepam (ATIVAN) 1 mg tablet Take 1 mg by mouth two (2) times a day. Patient reports taking twice daily [id number] Provider, Historical 9/5/2020 0600 Active Yes   pantoprazole (PROTONIX) 40 mg tablet Take 40 mg by mouth daily. Provider, Historical 9/5/2020 Active Yes   traZODone (DESYREL) 100 mg tablet Take 50 mg by mouth nightly.  Provider, Historical 9/4/2020 Active Yes                  Ruth Thayer, PHARMD   Contact: [phone]

## 2020-09-06 NOTE — PROGRESS NOTES
[address] [place] [address] with [facility] and [facility]     Resident Progress Post-op Note in Brief    S:     Patient seen and examined at bedside. Patient is awake and alert   Denies cp/sob/n/v   Pt has no other concerns at the time     O:  Visit Vitals  /75 (BP 1 Location: Right arm, BP Patient Position: At rest)   Pulse 77   Temp 97.4 °F (36.3 °C)   Resp 12   Ht 5' 2\" (1.575 m)   Wt 128 lb (58.1 kg)   SpO2 97%   BMI 23.41 kg/m²       Physical Examination  General appearance - alert, well appearing, and in no distress  Chest - clear to auscultation, no wheezes, rales or rhonchi, symmetric air entry  Heart - normal rate, regular rhythm, normal S1, S2, no murmurs, rubs, clicks or gallops,   Abdomen - soft, tenderness in R/L lower quadrant. Nondistended, no masses or organomegaly. Neurological - alert, oriented, normal speech, no focal findings  Extremities - peripheral pulses normal, no pedal edema, no clubbing or cyanosis    A/P:   Bhargavi Ramirez is a 53 y.o. female with known medical history of crohn's disease s/p ileocecectomy, vitamin B12 deficiency, depression and anxiety. Pt was admitted for pyelonephritis secondary to renal calculi, s/p bilateral ureteral stent placement. Please see daily progress note for detailed plan.      Vikki Blevins MD  Family Medicine Resident

## 2020-09-06 NOTE — PROGRESS NOTES
TRANSFER - IN REPORT:    Verbal report received from [facility] (name) on Mariano London  being received from [facility] (unit) for routine progression of care      Report consisted of patients Situation, Background, Assessment and   Recommendations(SBAR). Information from the following report(s) SBAR, Kardex, ED Summary, OR Summary and Procedure Summary was reviewed with the receiving nurse. Opportunity for questions and clarification was provided. Assessment completed upon patients arrival to unit and care assumed.

## 2020-09-06 NOTE — PROGRESS NOTES
Bedside, Verbal and Written shift change report given to Yasir Page  (oncoming nurse) by Sunny Vitale RN (offgoing nurse). Report included the following information SBAR, Kardex, ED Summary, Procedure Summary, Intake/Output, Recent Results and Med Rec Status.

## 2020-09-06 NOTE — PROGRESS NOTES
Problem: Falls - Risk of  Goal: *Absence of Falls  Description: Document Lemon Dank Fall Risk and appropriate interventions in the flowsheet.   Outcome: Progressing Towards Goal  Note: Fall Risk Interventions:            Medication Interventions: Patient to call before getting OOB

## 2020-09-06 NOTE — BRIEF OP NOTE
Brief Postoperative Note Patient: Ludmila Mckeon YOB: 1970 MRN: [id number] Date of Procedure: 9/5/2020 Pre-Op Diagnosis: hydronephrosis Post-Op Diagnosis: Left flank pain, right renal stone with infection Procedure(s): 
CYSTOSCOPY, BILATERAL URETERAL STENT INSERTION Surgeon(s): 
Frederic Monroy MD 
 
Surgical Assistant: None Anesthesia: General  
 
Estimated Blood Loss (mL): Minimal 
 
Complications: None Specimens: * No specimens in log * Implants:  
Implant Name Type Inv. Item Serial No.  Lot No. LRB No. Used Action STENT URET FLX SFT [id number] -- CONTOUR PERCUFLEX - SN/A  STENT URET FLX SFT [id number] -- CONTOUR PERCUFLEX N/A [facility] [id number] Left 1 Implanted STENT URET FLX SFT [id number] -- CONTOUR PERCUFLEX - S/A  STENT URET FLX SFT [id number] -- CONTOUR PERCUFLEX /A [facility] [id number] Right 1 Implanted Drains:  
Giovanna Sosa #1 07/04/17 Left;Posterior Abdomen (Removed) Findings: 1. Mild left renal pelvis fullness, left ureteral stent placed, right ureteral stent placed beyond stone. No string. 6x24. Bilateral stents.  
 
Electronically Signed by Portillo Garcia MD on 9/5/2020 at 10:41 PM

## 2020-09-06 NOTE — ANESTHESIA POSTPROCEDURE EVALUATION
Procedure(s):  CYSTOSCOPY, BILATERAL URETERAL STENT INSERTION. general    Anesthesia Post Evaluation      Multimodal analgesia: multimodal analgesia not used between 6 hours prior to anesthesia start to PACU discharge  Patient location during evaluation: PACU  Patient participation: complete - patient participated  Level of consciousness: awake  Pain management: adequate  Airway patency: patent  Anesthetic complications: no  Cardiovascular status: acceptable, blood pressure returned to baseline and hemodynamically stable  Respiratory status: acceptable  Hydration status: acceptable  Post anesthesia nausea and vomiting:  controlled  Final Post Anesthesia Temperature Assessment:  Normothermia (36.0-37.5 degrees C)      INITIAL Post-op Vital signs:   Vitals Value Taken Time   /71 9/5/2020 10:20 PM   Temp 36.3 °C (97.4 °F) 9/5/2020 10:18 PM   Pulse 84 9/5/2020 10:23 PM   Resp 16 9/5/2020 10:23 PM   SpO2 100 % 9/5/2020 10:23 PM   Vitals shown include unvalidated device data.

## 2020-09-06 NOTE — PROGRESS NOTES
[address] [facility] RESIDENCY PROGRAM  PROGRESS NOTE     9/7/2020  PCP: Aneta Ansari MD     Assessment/Plan:     Micheal Spain is a 53 y.o. female w/ h/o crohn's dz, depression, vit B12 def, anemia, vertigo, peripheral neuropathy that is admitted for admitted for Pyelonephritis 2/2 obstructing staghorn calculi. 24 hour events: no acute events    Pyelonephritis in setting of staghorn calculus: s/p b/l stent placement on 9/5/20. Previous urine culture grew klebsiella pneumoniae sensitive to levaquin. Urine cx with gram negative rods.  -Continue Levaquin 750mg IV daily (total 7 days) transition to 750mg PO on discharge  -urology recs appreciated, toradol 15mg IV prn for bladder spasm (can also add oxybutin but caution with benzos). Patient is still complaining of spasms. Discharge on oxybutynin for a 3d. -Urology placed R and L stents, f/u on Tuesday if discharged  -pyridine for dysuria      Crohn's disease: Patient reportedly on Protonix only. CT abdomen infectious vs inflammatory, possible Chron's flare. Denies diarrhea over baseline.  -Dr. Deepa Vang for outpatient follow up     B12 deficiency anemia: POA B12: 275  -Patient due for B12 injection, f/u opt w/ nml B12    Depression and anxiety: Concern for withdrawal, will continue scheduled home meds. -Continue home meds of Cymbalta 60 mg daily and trazadone.  -Continue home Klonopin 1mg po BID  -UDS positive for amphetamines, benzos, opiates    FEN/GI - Regular diet. NS at 100 mL/hr. Activity - Ambulate with assistance  DVT prophylaxis - SCDs  GI prophylaxis - Protonix  Fall prophylaxis - Not indicated at this time. Disposition - Admit to Medical. Plan to d/c to Home. Code Status - Full, discussed with patient / caregivers. Next of Kin Name and Contact: Belkys Hdez ([phone])    Micheal Spain discussed with Dr. Lord Foy. Subjective:   Pt was seen and examined at bedside. Afebrile and hemodynamically stable.  Concerns overnight include: bladder spasm when urinating. Denies chest pain, SOB, nausea, vomiting, abdominal pain, dizziness. Objective:   Physical examination  Patient Vitals for the past 24 hrs:   Temp Pulse Resp BP SpO2   20 0708 97.9 °F (36.6 °C) 71 14 (!) 86/54 --   20 0700 -- 80 -- -- --   20 0314 -- -- -- (!) 83/58 --   20 0301 97.4 °F (36.3 °C) 64 17 (!) 83/54 97 %   20 0027 98 °F (36.7 °C) 81 18 94/62 98 %   20 2325 -- 81 -- -- --   20 2017 98 °F (36.7 °C) 74 16 95/62 98 %   20 1553 98.4 °F (36.9 °C) 85 14 97/66 99 %   20 1506 -- 88 -- -- --   20 1105 98 °F (36.7 °C) 77 14 97/58 97 %   20 0903 -- 73 -- -- --      Temp (24hrs), Av °F (36.7 °C), Min:97.4 °F (36.3 °C), Max:98.4 °F (36.9 °C)         O2 Device: Room air    Date 20 - 20 - 20   Shift  24 Hour Total 2027-6270 3668-8714 24 Hour Total   INTAKE   P.O. 420 340 760        P. O. 420 340 760      Shift Total(mL/kg) 420(7.2) 340(5.9) 760(13.1)      OUTPUT   Urine(mL/kg/hr) 500(0.7)  500(0.4)        Urine Voided 500  500        Urine Occurrence(s) 3 x  3 x      Shift Total(mL/kg) 500(8.6)  500(8.6)      NET -80 340 260      Weight (kg) 58.1 58.1 58.1 58.1 58.1 58.1     General:   Alert, cooperative, no acute distress   Head:   Atraumatic   Eyes:   Conjunctivae clear   ENT:  Oral mucosa normal   Neck:  Trachea midline   Back:    Bilateral CVA tenderness    Chest wall:    No tenderness or deformities    Lungs:   Clear to auscultation bilaterally    Heart:   Normal rate, regular rhythm, no murmur, rubs or gallops   Abdomen:    Soft, non-tender   No masses or organomegaly    Extremities:  No edema or DVT signs   Pulses:  Symmetric all extremities   Skin:  Warm and dry    No rashes or lesions   Neurologic:  Alert and oriented x4   No focal deficits     Data Review:     CBC:  Recent Labs     20  0303 20  7659 09/05/20  1627   WBC 12.1* 9.7 16.4*   HGB 8.2* 10.3* 10.3*   HCT 26.7* 32.6* 31.8*   * 587* 497*     Metabolic Panel:  Recent Labs     09/07/20  0303 09/06/20  0953 09/05/20  1627    138 137   K 4.2 4.5 3.6   * 109* 108   CO2 19* 20* 19*   BUN 15 11 15   CREA 0.97 0.95 1.16*   * 144* 87   CA 8.4* 9.4 9.0   ALB  --   --  2.8*   TBILI  --   --  0.2   ALT  --   --  14     Micro:  Lab Results   Component Value Date/Time    Culture result: GRAM NEGATIVE RODS (A) 09/05/2020 04:27 PM    Culture result: NO GROWTH 6 DAYS 07/20/2017 06:50 AM    Culture result: NO GROWTH 6 DAYS 07/20/2017 06:10 AM     Imaging:  Ct Abd Pelv W Cont    Result Date: 9/5/2020  EXAM:  CT ABD PELV W CONT INDICATION: LLQ pain, L flank pain COMPARISON: CT abdomen pelvis 8/16/2017. CONTRAST:  100 mL of Isovue-370. TECHNIQUE: Following the uneventful intravenous administration of contrast, thin axial images were obtained through the abdomen and pelvis. Coronal and sagittal reconstructions were generated. Oral contrast was not administered. CT dose reduction was achieved through use of a standardized protocol tailored for this examination and automatic exposure control for dose modulation. FINDINGS: Lower Thorax: Lung Bases: Clear. Heart: The heart is normal in size. No pericardial effusion. Abdomen/Pelvis: Liver:  No focal liver lesions. Biliary system: Gallbladder is unremarkable. No intrahepatic or extrahepatic biliary ductal dilatation. Spleen: Normal. Pancreas: Normal. Kidneys/Ureters/Bladder: There is an obstructing staghorn calculus in the right renal pelvis measuring 1.4 x 0.9 cm, with mild right hydronephrosis. There is an additional nonobstructing stone in the lower pole of the right kidney measuring 9 mm. In the lower pole the right kidney, there is a hypodensity extending to the cortex measuring 10 x 9 mm (series 601 image 55). There are other small hypodensities noted in the lower pole of the right kidney.  There is also mild right hydroureter with abnormal urothelial enhancement, but no evidence of a distal obstructing stone. The bladder is normal. Adrenals: Normal. Stomach/bowel: Postsurgical changes of ileocecectomy. There is diffuse distention of the colon and rectum containing stool and air-fluid levels. There is mild abnormal mucosal enhancement of the neoterminal ileum. No free intraperitoneal air noted. Reproductive Organs: Uterus is present. No suspicious adnexal masses. Vasculature: Normal caliber arteries. Portal vein, SMV, and splenic vein are patent. Nodes: No pathologically enlarged lymph nodes. Fluid: No free fluid. Bones/Soft Tissue: No acute fractures or aggressive osseous lesions are seen. IMPRESSION: 1. Obstructing staghorn calculus in the right renal pelvis with mild right hydronephrosis. Hypodensities in the lower pole of the right kidney are favored to represent pyelonephritis, with a more focal 10 x 9 mm hypodensity in the lower pole suspicious for a developing abscess. There is also mild right hydroureter with abnormal urothelial enhancement, consistent with infection. 2. Additional nonobstructing stone in the lower pole of the right kidney. 3. Postsurgical changes of ileocecectomy. Mild mucosal hyperenhancement of the neoterminal ileum, suspicious for active inflammatory bowel disease. Diffuse distention of the colon with stool and air-fluid levels, which may represent an infectious or inflammatory colitis.      Medications reviewed  Current Facility-Administered Medications   Medication Dose Route Frequency    oxybutynin (DITROPAN) tablet 5 mg  5 mg Oral BID    0.9% sodium chloride infusion  100 mL/hr IntraVENous CONTINUOUS    pantoprazole (PROTONIX) 40 mg in 0.9% sodium chloride 10 mL injection  40 mg IntraVENous DAILY    DULoxetine (CYMBALTA) capsule 120 mg  120 mg Oral DAILY    fluconazole (DIFLUCAN) tablet 100 mg  100 mg Oral DAILY    enoxaparin (LOVENOX) injection 40 mg  40 mg SubCUTAneous Q24H    levoFLOXacin (LEVAQUIN) 750 mg in D5W IVPB  750 mg IntraVENous Q24H    clonazePAM (KlonoPIN) tablet 1 mg  1 mg Oral BID    phenazopyridine (PYRIDIUM) tablet 200 mg  200 mg Oral TID PRN    traZODone (DESYREL) tablet 50 mg  50 mg Oral QHS    sodium chloride (NS) flush 5-40 mL  5-40 mL IntraVENous Q8H    sodium chloride (NS) flush 5-40 mL  5-40 mL IntraVENous PRN    acetaminophen (TYLENOL) tablet 650 mg  650 mg Oral Q6H PRN    Or    acetaminophen (TYLENOL) suppository 650 mg  650 mg Rectal Q6H PRN    nicotine (NICODERM CQ) 14 mg/24 hr patch 1 Patch  1 Patch TransDERmal DAILY         Signed:   Oziel Sumner MD   Resident, Family Medicine      Attending note: Attending note to follow. ..

## 2020-09-06 NOTE — PROGRESS NOTES
S:Pain improving on left. C/o urinary frequency    O:  Patient Vitals for the past 24 hrs:   Temp Pulse Resp BP SpO2   09/06/20 0749 97.5 °F (36.4 °C) 60 13 97/58 93 %   09/06/20 0516 97.9 °F (36.6 °C) 76 12 95/64 98 %   09/05/20 2316 97.4 °F (36.3 °C) 77 12 113/75 97 %   09/05/20 2255 97.5 °F (36.4 °C) -- -- -- --   09/05/20 2250 -- 78 12 92/73 97 %   09/05/20 2245 -- 87 15 121/69 100 %   09/05/20 2241 -- -- 21 110/73 100 %   09/05/20 2225 -- 82 15 102/73 100 %   09/05/20 2220 -- 80 13 107/71 100 %   09/05/20 2218 97.4 °F (36.3 °C) 82 14 105/77 100 %   09/05/20 2215 -- 84 16 105/77 100 %   09/05/20 2210 -- 84 18 (!) 67/31 100 %   09/05/20 1845 -- -- -- 106/74 100 %   09/05/20 1830 -- -- -- 106/77 100 %   09/05/20 1815 -- -- -- 131/85 100 %   09/05/20 1800 -- -- -- 110/82 100 %   09/05/20 1511 99.1 °F (37.3 °C) 70 18 130/72 97 %     Gen:NAD, A&OX3  Resp:Nml effort  Abd:soft, Nt/ND    Recent Results (from the past 24 hour(s))   CBC WITH AUTOMATED DIFF    Collection Time: 09/05/20  4:27 PM   Result Value Ref Range    WBC 16.4 (H) 3.6 - 11.0 K/uL    RBC 3.71 (L) 3.80 - 5.20 M/uL    HGB 10.3 (L) 11.5 - 16.0 g/dL    HCT 31.8 (L) 35.0 - 47.0 %    MCV 85.7 80.0 - 99.0 FL    MCH 27.8 26.0 - 34.0 PG    MCHC 32.4 30.0 - 36.5 g/dL    RDW 15.0 (H) 11.5 - 14.5 %    PLATELET 520 (H) 958 - 400 K/uL    MPV 9.5 8.9 - 12.9 FL    NRBC 0.0 0  WBC    ABSOLUTE NRBC 0.00 0.00 - 0.01 K/uL    NEUTROPHILS 72 32 - 75 %    LYMPHOCYTES 18 12 - 49 %    MONOCYTES 5 5 - 13 %    EOSINOPHILS 3 0 - 7 %    BASOPHILS 1 0 - 1 %    IMMATURE GRANULOCYTES 1 (H) 0.0 - 0.5 %    ABS. NEUTROPHILS 11.7 (H) 1.8 - 8.0 K/UL    ABS. LYMPHOCYTES 3.0 0.8 - 3.5 K/UL    ABS. MONOCYTES 0.8 0.0 - 1.0 K/UL    ABS. EOSINOPHILS 0.5 (H) 0.0 - 0.4 K/UL    ABS. BASOPHILS 0.2 (H) 0.0 - 0.1 K/UL    ABS. IMM.  GRANS. 0.2 (H) 0.00 - 0.04 K/UL    DF AUTOMATED      RBC COMMENTS NORMOCYTIC, NORMOCHROMIC     METABOLIC PANEL, COMPREHENSIVE    Collection Time: 09/05/20  4:27 PM Result Value Ref Range    Sodium 137 136 - 145 mmol/L    Potassium 3.6 3.5 - 5.1 mmol/L    Chloride 108 97 - 108 mmol/L    CO2 19 (L) 21 - 32 mmol/L    Anion gap 10 5 - 15 mmol/L    Glucose 87 65 - 100 mg/dL    BUN 15 6 - 20 MG/DL    Creatinine 1.16 (H) 0.55 - 1.02 MG/DL    BUN/Creatinine ratio 13 12 - 20      GFR est AA >60 >60 ml/min/1.73m2    GFR est non-AA 50 (L) >60 ml/min/1.73m2    Calcium 9.0 8.5 - 10.1 MG/DL    Bilirubin, total 0.2 0.2 - 1.0 MG/DL    ALT (SGPT) 14 12 - 78 U/L    AST (SGOT) 12 (L) 15 - 37 U/L    Alk. phosphatase 148 (H) 45 - 117 U/L    Protein, total 7.5 6.4 - 8.2 g/dL    Albumin 2.8 (L) 3.5 - 5.0 g/dL    Globulin 4.7 (H) 2.0 - 4.0 g/dL    A-G Ratio 0.6 (L) 1.1 - 2.2     SAMPLES BEING HELD    Collection Time: 09/05/20  4:27 PM   Result Value Ref Range    SAMPLES BEING HELD 1sst,1red,1blue     COMMENT        Add-on orders for these samples will be processed based on acceptable specimen integrity and analyte stability, which may vary by analyte. LIPASE    Collection Time: 09/05/20  4:27 PM   Result Value Ref Range    Lipase 106 73 - 393 U/L   URINALYSIS W/MICROSCOPIC    Collection Time: 09/05/20  4:27 PM   Result Value Ref Range    Color YELLOW/STRAW      Appearance TURBID (A) CLEAR      Specific gravity 1.020 1.003 - 1.030      pH (UA) 6.5 5.0 - 8.0      Protein 30 (A) NEG mg/dL    Glucose Negative NEG mg/dL    Ketone Negative NEG mg/dL    Blood SMALL (A) NEG      Urobilinogen 1.0 0.2 - 1.0 EU/dL    Nitrites Positive (A) NEG      Leukocyte Esterase LARGE (A) NEG      WBC >100 (H) 0 - 4 /hpf    RBC 10-20 0 - 5 /hpf    Epithelial cells FEW FEW /lpf    Bacteria Negative NEG /hpf   URINE CULTURE HOLD SAMPLE    Collection Time: 09/05/20  4:27 PM    Specimen: Serum; Urine   Result Value Ref Range    Urine culture hold        Urine on hold in Microbiology dept for 2 days. If unpreserved urine is submitted, it cannot be used for addtional testing after 24 hours, recollection will be required. BILIRUBIN, CONFIRM    Collection Time: 09/05/20  4:27 PM   Result Value Ref Range    Bilirubin UA, confirm Negative NEG     LACTIC ACID    Collection Time: 09/06/20  3:15 AM   Result Value Ref Range    Lactic acid 1.3 0.4 - 2.0 MMOL/L       A/P:49 yo woman s/p bilateral stent placement. Continue abx. Await cultures and sensitivity. Will require outpatient follow up. Will add fluconazole for possible yeast infection. If patient is to be discharge she can return to urology on Tuesday. Patient has clinical improvement.

## 2020-09-06 NOTE — H&P
[address] [facility] [address] [place] [address]   Office [phone]  Fax [phone]       Admission H&P     Name: Cordelia Waggoner MRN: [id number]  Sex: Female   YOB: 1970  Age: 53 y.o. PCP: Saba Catherine MD     Source of Information: patient, medical records    Chief complaint: Bilateral flank pain    History of Present Illness  Cordelia Waggoner is a 53 y.o. female with known medical history of crohn's disease s/p ileocecectomy, vitamin B12 deficiency. Patient presents to the ED with bilateral flank pain of 1 month duration, the pain got worse 3 days prior to admission and the pain is worse in the Left flank. Patient endorses dysuria, urinary frequency of 3 days duration and non-radiating right and left lower quadrant abdominal pain. Pt also endorses fever of 101. She went to [facility] first 3 days prior to admission where she was treated with Bactrim for UTI. She went back to [facility] 1 day prior to admission for re-evaluation, she was diagnosed with pyelonephritis and was told to come to the ED for further evaluation. COVID questions: : Positive fever. Denies cough, sob, fever, diarrhea. Lives with family. Denies any sick or COVID contacts. No recent travel. In the ED:       Vitals: Temp: 99.1 BP: 130/72 HR: 70 RR: 18 SatO2  97 % on rm    Labs:  WBC: 16.4 HGB: 10.3  HCT: 31.8 Platelet: 316  CMP wnl, Alk phos 148. Lipase was normal. UA with 30 protein, small blood, positive nitrates, large LE, >100 wBC, 10-20 bacteria    Imaging:  CT abd pelv W cont: Obstructing staghorn calculus in the right renal pelvis. Hypodensities in the lower pole of the right kidney are favored to represent pyelonephritis, with a more focal 10 x 9 mm hypodensity in the lower pole suspicious for a developing abscess. Diffuse distention of the colon with stool and air-fluid levels, which may represent an infectious or inflammatory colitis. Treatment: Levaquin 750 mg for pyelonephritis.  Urology was consulted, plan for bilateral stent placement. Patient Vitals for the past 12 hrs:   Temp Pulse Resp BP SpO2   09/05/20 2316 97.4 °F (36.3 °C) 77 12 113/75 97 %   09/05/20 2255 97.5 °F (36.4 °C) -- -- -- --   09/05/20 2250 -- 78 12 92/73 97 %   09/05/20 2245 -- 87 15 121/69 100 %   09/05/20 2241 -- -- 21 110/73 100 %   09/05/20 2225 -- 82 15 102/73 100 %   09/05/20 2220 -- 80 13 107/71 100 %   09/05/20 2218 97.4 °F (36.3 °C) 82 14 105/77 100 %   09/05/20 2215 -- 84 16 105/77 100 %   09/05/20 2210 -- 84 18 (!) 67/31 100 %   09/05/20 1845 -- -- -- 106/74 100 %   09/05/20 1830 -- -- -- 106/77 100 %   09/05/20 1815 -- -- -- 131/85 100 %   09/05/20 1800 -- -- -- 110/82 100 %   09/05/20 1511 99.1 °F (37.3 °C) 70 18 130/72 97 %       Review of Systems  Review of Systems    Home Medications   Prior to Admission medications    Medication Sig Start Date End Date Taking? Authorizing Provider   LORazepam (ATIVAN) 1 mg tablet Take 1 mg by mouth two (2) times a day. Patient reports taking twice daily [id number]   Yes Provider, Historical   hydroCHLOROthiazide (HYDRODIURIL) 12.5 mg tablet Take 6.25 mg by mouth daily as needed (Swelling). Yes Provider, Historical   pantoprazole (PROTONIX) 40 mg tablet Take 40 mg by mouth daily. Yes Provider, Historical   baclofen (LIORESAL) 10 mg tablet Take 10 mg by mouth three (3) times daily. Yes Provider, Historical   cyanocobalamin (VITAMIN B12) 1,000 mcg/mL injection 1,000 mcg by IntraMUSCular route every thirty (30) days. Yes Provider, Historical   DULoxetine (CYMBALTA) 60 mg capsule Take 120 mg by mouth daily. 2/24/20  Yes Provider, Historical   dextroamphetamine-amphetamine (ADDERALL) 20 mg tablet Take 20 mg by mouth three (3) times daily. Yes Provider, Historical   clonazePAM (KLONOPIN) 1 mg tablet Take 1 mg by mouth two (2) times a day.  Patient reports taking twice daily in the AM and PM ([id number])   Yes Provider, Historical   traZODone (DESYREL) 100 mg tablet Take 50 mg by mouth nightly. Yes Provider, Historical   hydroCHLOROthiazide (HYDRODIURIL) 12.5 mg tablet TAKE 1/2 TABLET BY MOUTH EVERY DAY 8/10/20 9/5/20  Sonal Shepherd MD   fluticasone propionate (FLONASE) 50 mcg/actuation nasal spray SPRAY 2 SPRAYS INTO EACH NOSTRIL EVERY DAY 7/20/20 9/5/20  Sonal Shepherd MD   diclofenac (SOLARAZE) 3 % topical gel Apply  to affected area two (2) times a day. 7/2/20 9/5/20  Sonal Shepherd MD   Cetirizine (ZyrTEC) 10 mg cap Take  by mouth. 9/5/20  Provider, Historical   naloxone (Narcan) 4 mg/actuation nasal spray Use 1 spray intranasally, then discard. Repeat with new spray every 2 min as needed for opioid overdose symptoms, alternating nostrils. 6/22/20 9/5/20  Ceasar Marie MD   NIFEdipine ER (PROCARDIA XL) 30 mg ER tablet TAKE 1 TABLET BY MOUTH EVERY DAY 11/2/18 9/5/20  Home [place] MD   mupirocin calcium (BACTROBAN) 2 % topical cream APPLY A SMALL AMOUNT TO THE AFFECTED AREA BY TOPICAL ROUTE 3 TIMES PER DAY FOR 10 DAYS  9/5/20  Provider, Historical   ondansetron (ZOFRAN ODT) 4 mg disintegrating tablet Take 1 Tab by mouth every eight (8) hours as needed for Nausea. 5/29/18 9/5/20  Juan Carlos Brady MD   naloxone 2 mg/actuation spry Use 1 spray intranasally into 1 nostril. Use a new Narcan nasal spray for subsequent doses and administer into alternating nostrils. May repeat every 2 to 3 minutes as needed. 12/22/17 9/5/20  Maggie Shine MD   melatonin 3 mg tablet Take 3 mg by mouth nightly. 9/5/20  Provider, Historical   acetaminophen (TYLENOL EXTRA STRENGTH) 500 mg tablet Take  by mouth every six (6) hours as needed for Pain. 9/5/20  Provider, Historical   simethicone (MYLICON) 80 mg chewable tablet Take 0.5 Tabs by mouth every six (6) hours as needed for Flatulence. 9/15/17 9/5/20  Maggie Shine MD   pantoprazole (PROTONIX) 40 mg tablet Take 1 Tab by mouth Before breakfast and dinner.  9/7/17 9/5/20  lForence Iqbal MD   multivitamin capsule Take 1 Cap by mouth daily.  20  Provider, Historical   LORazepam (ATIVAN) 1 mg tablet Take 1 Tab by mouth every eight (8) hours as needed for Anxiety. Max Daily Amount: 3 mg. Patient taking differently: Take 1 mg by mouth two (2) times daily as needed for Anxiety. 17  Briana Torrez MD   albuterol (PROVENTIL HFA, VENTOLIN HFA, PROAIR HFA) 90 mcg/actuation inhaler Take 2 Puffs by inhalation every four (4) hours as needed for Wheezing or Shortness of Breath.  17  Farshad Lacy MD       Allergies  Allergies   Allergen Reactions    Cephalosporins Hives    Doxycycline Swelling    Penicillins Hives    Tetracyclines Nausea and Vomiting       Past Medical History  Past Medical History:   Diagnosis Date    Autoimmune disease ([facility]     Crohn's Disease    Crohn disease ([facility] 2010    Crohn's     Depression 2010    History of vascular access device 2017    [facility] VAT - 33 cm right brachial PICC for abx and limited access    Perforation bowel ([facility] [address] 2017    S/p palak Tse 2017    Peripheral neuropathy 2012    B12 deficiency MRI brain normal 2012 MRA head normal 2012 CTA neck normal 2012     Vertigo     Vitamin B12 deficiency 2012    164 on 2012 Needs 1000 mcg IM twice a week        Previous Hospitalization(s)  Past Surgical History:   Procedure Laterality Date    ABDOMEN SURGERY PROC UNLISTED      due to Crohn's-bowel resection x2    HX  SECTION      HX HEENT      HX TONSIL AND ADENOIDECTOMY      HX TUBAL LIGATION         Family History  Family History   Problem Relation Age of Onset    Heart Disease Father     Cancer Mother        Social History  Social History     Socioeconomic History    Marital status:      Spouse name: Not on file    Number of children: Not on file    Years of education: Not on file    Highest education level: Not on file   Occupational History    Not on file   Social Needs    Financial resource strain: Not on file    Food insecurity     Worry: Not on file     Inability: Not on file    Transportation needs     Medical: Not on file     Non-medical: Not on file   Tobacco Use    Smoking status: Current Some Day Smoker     Packs/day: 0.50     Years: 8.00     Pack years: 4.00     Types: Cigarettes    Smokeless tobacco: Never Used   Substance and Sexual Activity    Alcohol use: No    Drug use: No     Comment: 1 pack a day    Sexual activity: Yes     Partners: Male     Birth control/protection: Pill   Lifestyle    Physical activity     Days per week: Not on file     Minutes per session: Not on file    Stress: Not on file   Relationships    Social connections     Talks on phone: Not on file     Gets together: Not on file     Attends [employer] service: Not on file     Active member of club or organization: Not on file     Attends meetings of clubs or organizations: Not on file     Relationship status: Not on file    Intimate partner violence     Fear of current or ex partner: Not on file     Emotionally abused: Not on file     Physically abused: Not on file     Forced sexual activity: Not on file   Other Topics Concern    Not on file   Social History Narrative    Not on file       Alcohol history: Not at all  Smoking history: Smokes 1 ppd x 33 years  Illicit drug history: Not at all  Living arrangement: patient lives with children  Ambulates: Independently     Vital Signs  Visit Vitals  /75 (BP 1 Location: Right arm, BP Patient Position: At rest)   Pulse 77   Temp 97.4 °F (36.3 °C)   Resp 12   Ht 5' 2\" (1.575 m)   Wt 128 lb (58.1 kg)   SpO2 97%   BMI 23.41 kg/m²       Physical Exam    General: No acute distress. Alert. Cooperative. Head: Normocephalic, Atraumatic. Respiratory: CTAB. No w/r/r/c.   Cardiovascular:  RRR. Normal S1, S2. No m/r/g   GI: positive bowel sounds, tenderness in the Right and Left Lower quadrant. No rebound tenderness or guarding. Non-distended. CVA tenderness bilaterally. Extremities: No LE edema  Skin: Warm, dry. No rashes. Neuro: No focal deficit         Laboratory Data  Recent Results (from the past 8 hour(s))   CBC WITH AUTOMATED DIFF    Collection Time: 09/05/20  4:27 PM   Result Value Ref Range    WBC 16.4 (H) 3.6 - 11.0 K/uL    RBC 3.71 (L) 3.80 - 5.20 M/uL    HGB 10.3 (L) 11.5 - 16.0 g/dL    HCT 31.8 (L) 35.0 - 47.0 %    MCV 85.7 80.0 - 99.0 FL    MCH 27.8 26.0 - 34.0 PG    MCHC 32.4 30.0 - 36.5 g/dL    RDW 15.0 (H) 11.5 - 14.5 %    PLATELET 843 (H) 890 - 400 K/uL    MPV 9.5 8.9 - 12.9 FL    NRBC 0.0 0  WBC    ABSOLUTE NRBC 0.00 0.00 - 0.01 K/uL    NEUTROPHILS 72 32 - 75 %    LYMPHOCYTES 18 12 - 49 %    MONOCYTES 5 5 - 13 %    EOSINOPHILS 3 0 - 7 %    BASOPHILS 1 0 - 1 %    IMMATURE GRANULOCYTES 1 (H) 0.0 - 0.5 %    ABS. NEUTROPHILS 11.7 (H) 1.8 - 8.0 K/UL    ABS. LYMPHOCYTES 3.0 0.8 - 3.5 K/UL    ABS. MONOCYTES 0.8 0.0 - 1.0 K/UL    ABS. EOSINOPHILS 0.5 (H) 0.0 - 0.4 K/UL    ABS. BASOPHILS 0.2 (H) 0.0 - 0.1 K/UL    ABS. IMM. GRANS. 0.2 (H) 0.00 - 0.04 K/UL    DF AUTOMATED      RBC COMMENTS NORMOCYTIC, NORMOCHROMIC     METABOLIC PANEL, COMPREHENSIVE    Collection Time: 09/05/20  4:27 PM   Result Value Ref Range    Sodium 137 136 - 145 mmol/L    Potassium 3.6 3.5 - 5.1 mmol/L    Chloride 108 97 - 108 mmol/L    CO2 19 (L) 21 - 32 mmol/L    Anion gap 10 5 - 15 mmol/L    Glucose 87 65 - 100 mg/dL    BUN 15 6 - 20 MG/DL    Creatinine 1.16 (H) 0.55 - 1.02 MG/DL    BUN/Creatinine ratio 13 12 - 20      GFR est AA >60 >60 ml/min/1.73m2    GFR est non-AA 50 (L) >60 ml/min/1.73m2    Calcium 9.0 8.5 - 10.1 MG/DL    Bilirubin, total 0.2 0.2 - 1.0 MG/DL    ALT (SGPT) 14 12 - 78 U/L    AST (SGOT) 12 (L) 15 - 37 U/L    Alk.  phosphatase 148 (H) 45 - 117 U/L    Protein, total 7.5 6.4 - 8.2 g/dL    Albumin 2.8 (L) 3.5 - 5.0 g/dL    Globulin 4.7 (H) 2.0 - 4.0 g/dL    A-G Ratio 0.6 (L) 1.1 - 2.2     SAMPLES BEING HELD    Collection Time: 09/05/20  4:27 PM   Result Value Ref Range SAMPLES BEING HELD 1sst,1red,1blue     COMMENT        Add-on orders for these samples will be processed based on acceptable specimen integrity and analyte stability, which may vary by analyte. LIPASE    Collection Time: 09/05/20  4:27 PM   Result Value Ref Range    Lipase 106 73 - 393 U/L   URINALYSIS W/MICROSCOPIC    Collection Time: 09/05/20  4:27 PM   Result Value Ref Range    Color YELLOW/STRAW      Appearance TURBID (A) CLEAR      Specific gravity 1.020 1.003 - 1.030      pH (UA) 6.5 5.0 - 8.0      Protein 30 (A) NEG mg/dL    Glucose Negative NEG mg/dL    Ketone Negative NEG mg/dL    Blood SMALL (A) NEG      Urobilinogen 1.0 0.2 - 1.0 EU/dL    Nitrites Positive (A) NEG      Leukocyte Esterase LARGE (A) NEG      WBC >100 (H) 0 - 4 /hpf    RBC 10-20 0 - 5 /hpf    Epithelial cells FEW FEW /lpf    Bacteria Negative NEG /hpf   URINE CULTURE HOLD SAMPLE    Collection Time: 09/05/20  4:27 PM    Specimen: Serum; Urine   Result Value Ref Range    Urine culture hold        Urine on hold in Microbiology dept for 2 days. If unpreserved urine is submitted, it cannot be used for addtional testing after 24 hours, recollection will be required. BILIRUBIN, CONFIRM    Collection Time: 09/05/20  4:27 PM   Result Value Ref Range    Bilirubin UA, confirm Negative NEG         Imaging  CXR Results  (Last 48 hours)    None        CT Results  (Last 48 hours)               09/05/20 1729  CT ABD PELV W CONT Final result    Impression:  IMPRESSION:       1. Obstructing staghorn calculus in the right renal pelvis with mild right   hydronephrosis. Hypodensities in the lower pole of the right kidney are favored   to represent pyelonephritis, with a more focal 10 x 9 mm hypodensity in the   lower pole suspicious for a developing abscess. There is also mild right   hydroureter with abnormal urothelial enhancement, consistent with infection. 2. Additional nonobstructing stone in the lower pole of the right kidney.    3. Postsurgical changes of ileocecectomy. Mild mucosal hyperenhancement of the   neoterminal ileum, suspicious for active inflammatory bowel disease. Diffuse   distention of the colon with stool and air-fluid levels, which may represent an   infectious or inflammatory colitis. Narrative:  EXAM:  CT ABD PELV W CONT       INDICATION: LLQ pain, L flank pain        COMPARISON: CT abdomen pelvis 8/16/2017. CONTRAST:  100 mL of Isovue-370. TECHNIQUE:    Following the uneventful intravenous administration of contrast, thin axial   images were obtained through the abdomen and pelvis. Coronal and sagittal   reconstructions were generated. Oral contrast was not administered. CT dose   reduction was achieved through use of a standardized protocol tailored for this   examination and automatic exposure control for dose modulation. FINDINGS:    Lower Thorax:   Lung Bases: Clear. Heart: The heart is normal in size. No pericardial effusion. Abdomen/Pelvis:   Liver:  No focal liver lesions. Biliary system: Gallbladder is unremarkable. No intrahepatic or extrahepatic   biliary ductal dilatation. Spleen: Normal.       Pancreas: Normal.       Kidneys/Ureters/Bladder: There is an obstructing staghorn calculus in the right   renal pelvis measuring 1.4 x 0.9 cm, with mild right hydronephrosis. There is an   additional nonobstructing stone in the lower pole of the right kidney measuring   9 mm. In the lower pole the right kidney, there is a hypodensity extending to   the cortex measuring 10 x 9 mm (series 601 image 55). There are other small   hypodensities noted in the lower pole of the right kidney. There is also mild   right hydroureter with abnormal urothelial enhancement, but no evidence of a   distal obstructing stone. The bladder is normal.       Adrenals: Normal.       Stomach/bowel: Postsurgical changes of ileocecectomy.  There is diffuse   distention of the colon and rectum containing stool and air-fluid levels. There   is mild abnormal mucosal enhancement of the neoterminal ileum. No free   intraperitoneal air noted. Reproductive Organs: Uterus is present. No suspicious adnexal masses. Vasculature: Normal caliber arteries. Portal vein, SMV, and splenic vein are   patent. Nodes: No pathologically enlarged lymph nodes. Fluid: No free fluid. Bones/Soft Tissue: No acute fractures or aggressive osseous lesions are seen. Assessment and Plan     Wally Neal is a 53 y.o. female who is admitted for Pyelonephritis 2/2 obstructing staghorn calculus. 1. Pyelonephritis 2/2 Renal Calculi: WBC: 16.4. bilateral CVA tenderness. Urinalysis positive for nitrites, small amount of blood, >100 WBC. CT abd pelv W cont shows obstructing staghorn calculus in the right renal pelvis with mild right hydronephrosis. Urology was consulted and patient underwent bilateral ureteral stents. Outpatient follow up next week at [address] [place] [address] urology. · Admitted to medical  · Pt on Levofloxacin 750 mg    · Follow up on urine culture. 2. Crohn's disease s/p ileocecectomy: CT abdomen with mild mucosal hyperenhacnement of the neoterminal ileum suspicious for active inflammatory bowel disease. Diffuse distention of the colon with stool and air-fluid levels which may represent an infectious or inflammatory colitis. Patient is not currently having any symptoms of  Crohn's disease. · Monitor symptoms. If worsening abdominal pain low threshold to start metronidazole and Sulfasalazine. 3. Depression/Anxiety:   · Continue home med Cymbalta  · Continue home med Trazodone  · Held Klonopin and Ativan    5. Vitamin B12 deficiency: last B12 in January was in therapeutic range. Pt is due for Vitamin B12 shot. · Follow up on B12        6. ADD: holding home Adderall 20mg     FEN/GI - NPO. NS at 50 mL/hr.   Activity - Ambulate with assistance  DVT prophylaxis - SCDs  GI prophylaxis - Protonix  Fall prophylaxis - Not indicated at this time. Disposition - Admit to Medical. Plan to d/c to Home. Code Status - Full, discussed with patient / caregivers. Next of Kin Name and Contact: Yamilka Mata ([phone])    Patient Alexandra Connolly will be discussed with Dr. Gloria Rosario.     8:54 PM, 09/05/20  Aida Hernandez MD  Family Medicine Resident       For Billing    Chief Complaint   Patient presents with    Flank Pain    Bladder Infection       Hospital Problems  Date Reviewed: 9/5/2020          Codes Class Noted POA    Pyelonephritis ICD-10-CM: N12  ICD-9-CM: 590.80  9/5/2020 Unknown

## 2020-09-06 NOTE — PROGRESS NOTES
[facility]   Senior Resident Admission Note    CC: back pain, pain with urination    HPI:  Cj Allen is a 53 y.o. female with known Crohn's disease, depression, B12 deficiency who presents to the ER complaining of back pain and pain with urination. Patient states that a couple of months ago presented with back pain. States that for the past two weeks has been experiencing increased urniary frequency and burning with urination. Noticed blood in her urine a couple of days ago so [facility]. She was told she had a urinary tract infection so was started on bactrim. She had a fever of 101 today so came to the emergency room for further evaluation. . Chart reviewed. Patient seen, examined, and discussed with Dr. Olivier Ivey (PGY-1). See her note for more details. In the ED:  VS: T 99.1 Pulse 70, RR 18, /72 O2 sat 97%  PE CVA tenderness bilaterally. Thrush  Labs:  WBC 16.4, Hgb 10.3 (BL10-11), Platelets 192. CMP wnl, Alk phos 148. Lipase was normal. UA with 30 protein, small blood, positive nitrates, large LE, >100 wBC, 10-20 bacteria  Imaging: CT abdomen with obstructing staghorn calculus in the R pelvis with mild right hydroephoris and possible pyelonephritis. Changes of iileocecetomy concern for active inflamatory colitis or infectious colitis. A/P:     1. Pyelonephritis in setting of staghorn calculus: s/p b/l stent placement on 9/5/20. Previous urine culture grew klebsiella pneumoniae sensitive to levaquin. 1. Admit to medical  2. Continue levaquin x 7 days  3. Follow up with urology outpatient. 2. Crohn's disease: CT abdomen with mild mucosal hyperenhacnement of the neoterminal ileum suspicious for active inflammatory bowel disease. Diffuse distention of the colon with stool and air-fluid levels which may represent an infectious or inflammatory colitis. Patient denies any hematochezia, abdominal pain. 1. Monitor symptoms.  If worsening abdominal pain low threshold to start metronidazole and Sulfasalazine. 3. B12 deficiency anemia: POA hgb 10/3 ( BL 10-11)  Last B12 was in January >2000   1. Follow up B12 level. 2. Patient due for B12 injection, consider administering during hospitalization. 4. Depression and anxiety  1. Continue home meds of Cymbalta 60 mg daily and trazadone. 2. Holding home ativan and klonopin, restart at your discretion    5. ADD         1. Holding home adderall       I agree with remaining assessment and plan as documented in Dr. Brittnee Da Silva note.       Pt discussed with Dr. Steve Ruiz (on-call attending physician)    Juliet Meredith MD  Family Medicine Resident

## 2020-09-07 VITALS
SYSTOLIC BLOOD PRESSURE: 91 MMHG | WEIGHT: 128 LBS | OXYGEN SATURATION: 97 % | HEIGHT: 62 IN | TEMPERATURE: 97.9 F | RESPIRATION RATE: 18 BRPM | HEART RATE: 76 BPM | DIASTOLIC BLOOD PRESSURE: 60 MMHG | BODY MASS INDEX: 23.55 KG/M2

## 2020-09-07 LAB
ANION GAP SERPL CALC-SCNC: 8 MMOL/L (ref 5–15)
BASOPHILS # BLD: 0 K/UL (ref 0–0.1)
BASOPHILS # BLD: 0 K/UL (ref 0–0.1)
BASOPHILS NFR BLD: 0 % (ref 0–1)
BASOPHILS NFR BLD: 0 % (ref 0–1)
BUN SERPL-MCNC: 15 MG/DL (ref 6–20)
BUN/CREAT SERPL: 15 (ref 12–20)
CALCIUM SERPL-MCNC: 8.4 MG/DL (ref 8.5–10.1)
CHLORIDE SERPL-SCNC: 114 MMOL/L (ref 97–108)
CO2 SERPL-SCNC: 19 MMOL/L (ref 21–32)
CREAT SERPL-MCNC: 0.97 MG/DL (ref 0.55–1.02)
DIFFERENTIAL METHOD BLD: ABNORMAL
DIFFERENTIAL METHOD BLD: ABNORMAL
EOSINOPHIL # BLD: 0 K/UL (ref 0–0.4)
EOSINOPHIL # BLD: 0 K/UL (ref 0–0.4)
EOSINOPHIL NFR BLD: 0 % (ref 0–7)
EOSINOPHIL NFR BLD: 0 % (ref 0–7)
ERYTHROCYTE [DISTWIDTH] IN BLOOD BY AUTOMATED COUNT: 15.2 % (ref 11.5–14.5)
ERYTHROCYTE [DISTWIDTH] IN BLOOD BY AUTOMATED COUNT: 15.2 % (ref 11.5–14.5)
GLUCOSE SERPL-MCNC: 114 MG/DL (ref 65–100)
HCT VFR BLD AUTO: 26.7 % (ref 35–47)
HCT VFR BLD AUTO: 28.3 % (ref 35–47)
HGB BLD-MCNC: 8.2 G/DL (ref 11.5–16)
HGB BLD-MCNC: 8.6 G/DL (ref 11.5–16)
IMM GRANULOCYTES # BLD AUTO: 0 K/UL (ref 0–0.04)
IMM GRANULOCYTES # BLD AUTO: 0.1 K/UL (ref 0–0.04)
IMM GRANULOCYTES NFR BLD AUTO: 0 % (ref 0–0.5)
IMM GRANULOCYTES NFR BLD AUTO: 0 % (ref 0–0.5)
LYMPHOCYTES # BLD: 1.9 K/UL (ref 0.8–3.5)
LYMPHOCYTES # BLD: 2.9 K/UL (ref 0.8–3.5)
LYMPHOCYTES NFR BLD: 16 % (ref 12–49)
LYMPHOCYTES NFR BLD: 21 % (ref 12–49)
MCH RBC QN AUTO: 26.8 PG (ref 26–34)
MCH RBC QN AUTO: 26.9 PG (ref 26–34)
MCHC RBC AUTO-ENTMCNC: 30.4 G/DL (ref 30–36.5)
MCHC RBC AUTO-ENTMCNC: 30.7 G/DL (ref 30–36.5)
MCV RBC AUTO: 87.5 FL (ref 80–99)
MCV RBC AUTO: 88.2 FL (ref 80–99)
MONOCYTES # BLD: 0.6 K/UL (ref 0–1)
MONOCYTES # BLD: 0.8 K/UL (ref 0–1)
MONOCYTES NFR BLD: 5 % (ref 5–13)
MONOCYTES NFR BLD: 6 % (ref 5–13)
NEUTS SEG # BLD: 10 K/UL (ref 1.8–8)
NEUTS SEG # BLD: 9.5 K/UL (ref 1.8–8)
NEUTS SEG NFR BLD: 73 % (ref 32–75)
NEUTS SEG NFR BLD: 79 % (ref 32–75)
NRBC # BLD: 0 K/UL (ref 0–0.01)
NRBC # BLD: 0 K/UL (ref 0–0.01)
NRBC BLD-RTO: 0 PER 100 WBC
NRBC BLD-RTO: 0 PER 100 WBC
PLATELET # BLD AUTO: 518 K/UL (ref 150–400)
PLATELET # BLD AUTO: 578 K/UL (ref 150–400)
PMV BLD AUTO: 10.1 FL (ref 8.9–12.9)
PMV BLD AUTO: 9.9 FL (ref 8.9–12.9)
POTASSIUM SERPL-SCNC: 4.2 MMOL/L (ref 3.5–5.1)
RBC # BLD AUTO: 3.05 M/UL (ref 3.8–5.2)
RBC # BLD AUTO: 3.21 M/UL (ref 3.8–5.2)
SODIUM SERPL-SCNC: 141 MMOL/L (ref 136–145)
WBC # BLD AUTO: 12.1 K/UL (ref 3.6–11)
WBC # BLD AUTO: 13.8 K/UL (ref 3.6–11)

## 2020-09-07 PROCEDURE — 74011250637 HC RX REV CODE- 250/637: Performed by: STUDENT IN AN ORGANIZED HEALTH CARE EDUCATION/TRAINING PROGRAM

## 2020-09-07 PROCEDURE — C9113 INJ PANTOPRAZOLE SODIUM, VIA: HCPCS | Performed by: STUDENT IN AN ORGANIZED HEALTH CARE EDUCATION/TRAINING PROGRAM

## 2020-09-07 PROCEDURE — 96372 THER/PROPH/DIAG INJ SC/IM: CPT

## 2020-09-07 PROCEDURE — 85025 COMPLETE CBC W/AUTO DIFF WBC: CPT

## 2020-09-07 PROCEDURE — 36415 COLL VENOUS BLD VENIPUNCTURE: CPT

## 2020-09-07 PROCEDURE — 74011250637 HC RX REV CODE- 250/637: Performed by: UROLOGY

## 2020-09-07 PROCEDURE — 80048 BASIC METABOLIC PNL TOTAL CA: CPT

## 2020-09-07 PROCEDURE — 74011250636 HC RX REV CODE- 250/636: Performed by: STUDENT IN AN ORGANIZED HEALTH CARE EDUCATION/TRAINING PROGRAM

## 2020-09-07 PROCEDURE — 99218 HC RM OBSERVATION: CPT

## 2020-09-07 PROCEDURE — 96375 TX/PRO/DX INJ NEW DRUG ADDON: CPT

## 2020-09-07 RX ORDER — FLUCONAZOLE 100 MG/1
100 TABLET ORAL DAILY
Qty: 3 TAB | Refills: 0 | Status: SHIPPED | OUTPATIENT
Start: 2020-09-08 | End: 2020-09-12 | Stop reason: SDUPTHER

## 2020-09-07 RX ORDER — OXYBUTYNIN CHLORIDE 5 MG/1
5 TABLET ORAL 2 TIMES DAILY
Qty: 6 TAB | Refills: 0 | Status: SHIPPED | OUTPATIENT
Start: 2020-09-07 | End: 2020-09-10

## 2020-09-07 RX ORDER — OXYBUTYNIN CHLORIDE 5 MG/1
5 TABLET ORAL 2 TIMES DAILY
Status: DISCONTINUED | OUTPATIENT
Start: 2020-09-07 | End: 2020-09-07 | Stop reason: HOSPADM

## 2020-09-07 RX ORDER — PHENAZOPYRIDINE HYDROCHLORIDE 200 MG/1
200 TABLET, FILM COATED ORAL
Qty: 30 TAB | Refills: 0 | Status: SHIPPED | OUTPATIENT
Start: 2020-09-07 | End: 2020-09-17

## 2020-09-07 RX ORDER — LEVOFLOXACIN 750 MG/1
750 TABLET ORAL DAILY
Qty: 6 TAB | Refills: 0 | Status: SHIPPED | OUTPATIENT
Start: 2020-09-07 | End: 2020-09-12 | Stop reason: SDUPTHER

## 2020-09-07 RX ADMIN — Medication 10 ML: at 03:07

## 2020-09-07 RX ADMIN — ENOXAPARIN SODIUM 40 MG: 40 INJECTION SUBCUTANEOUS at 09:08

## 2020-09-07 RX ADMIN — FLUCONAZOLE 100 MG: 100 TABLET ORAL at 09:08

## 2020-09-07 RX ADMIN — PHENAZOPYRIDINE HYDROCHLORIDE 200 MG: 100 TABLET ORAL at 10:42

## 2020-09-07 RX ADMIN — CLONAZEPAM 1 MG: 1 TABLET ORAL at 09:08

## 2020-09-07 RX ADMIN — DULOXETINE HYDROCHLORIDE 120 MG: 30 CAPSULE, DELAYED RELEASE ORAL at 09:08

## 2020-09-07 RX ADMIN — SODIUM CHLORIDE 100 ML/HR: 900 INJECTION, SOLUTION INTRAVENOUS at 03:07

## 2020-09-07 RX ADMIN — PANTOPRAZOLE SODIUM 40 MG: 40 INJECTION, POWDER, FOR SOLUTION INTRAVENOUS at 09:07

## 2020-09-07 RX ADMIN — OXYBUTYNIN CHLORIDE 5 MG: 5 TABLET ORAL at 09:08

## 2020-09-07 NOTE — PROGRESS NOTES
Bedside, Verbal and Written shift change report given to Yasir Page (oncoming nurse) by Fozia Godoy RN (offgoing nurse). Report included the following information SBAR, Kardex, ED Summary, Procedure Summary, Intake/Output, MAR, Recent Results and Med Rec Status.  [id number]

## 2020-09-07 NOTE — PROGRESS NOTES
Discharge reviewed with patient, verbalize understanding. Peripheral IV's removed. Aware of med changes and meds available at preferred pharmacy. Aware of follow up appointments to schedule with PCP and urology. Friend to transport home.

## 2020-09-07 NOTE — PROGRESS NOTES
Bedside shift change report given to Elise (oncoming nurse) by Alex Varghese (offgoing nurse). Report included the following information SBAR, Kardex, MAR and Recent Results.

## 2020-09-07 NOTE — DISCHARGE INSTRUCTIONS
HOME DISCHARGE INSTRUCTIONS    Wilbert Boeck / [id number] : 1970    Admission date: 2020 Discharge date: 2020     Please bring this form with you to show your care provider at your follow-up appointment. Primary care provider:  Rosie Mckinley MD    Discharging provider:  Amirah Morrow MD  - Family Medicine Resident  Dr. Ceferino Jeffrey - Attending, Family Medicine     You have been admitted to the hospital with the following diagnoses:    ACUTE DIAGNOSES:  Pyelonephritis [N12]      . . . . . . . . . . . . . . . . . . . . . . . . . . . . . . . . . . . . . . . . . . . . . . . . . . . . . . . . . . . . . . . . . . . . . . . Torito Galan FOLLOW-UP CARE RECOMMENDATIONS:  You are well enough to be discharged from the hospital. However, because you were staying in a hospital, you are at greater risk of having been exposed to the coronavirus. PLEASE stay inside and quarantine for 14 days to prevent further spread of the coronavirus. Appointments  Follow-up Information     Follow up With Specialties Details Why [facility]  Call in 1 day Please call [facility] [phone] tomorrow to make an appointment for as soon as possible for follow up of stents. [address] [address]    Rosie Mckinley MD Family Medicine   [address] [address]  [phone]             Please follow up with your PCP regarding:  - Vitamin B12 deficiency  - Blood work for blood counts and electrolytes    Please follow up with Urology regarding:  - Ureteral stents and kidney stones    Please follow up with GI doctor regarding:  - Management of Chron's Disease      Follow-up tests needed: none    Pending test results: At the time of your discharge the following test results are still pending: Final Read on Urine Culture. Please make sure you review these results with your outpatient follow-up provider(s).     Specific symptoms to watch for: chest pain, shortness of breath, fever, chills, nausea, vomiting, diarrhea, change in mentation, falling, weakness, bleeding. DIET/what to eat:  Regular Diet    ACTIVITY:  Activity as tolerated    Wound care: none    Equipment needed:  none    What to do if new or unexpected symptoms occur? If you experience any of the above symptoms (or should other concerns or questions arise after discharge) please call your primary care physician. Return to the emergency room if you cannot get hold of your doctor. · It is very important that you keep your follow-up appointment(s). · Please bring discharge papers, medication list (and/or medication bottles) to your follow-up appointments for review by your outpatient provider(s). · Please check the list of medications and be sure it includes every medication (even non-prescription medications) that your provider wants you to take. · It is important that you take the medication exactly as they are prescribed. · Keep your medication in the bottles provided by the pharmacist and keep a list of the medication names, dosages, and times to be taken in your wallet. · Do not take other medications without consulting your doctor. · If you have any questions about your medications or other instructions, please talk to your nurse or care provider before you leave the hospital.     Information obtained by:     I understand that if any problems occur once I am at home I am to contact my physician. These instructions were explained to me and I had the opportunity to ask questions. I understand and acknowledge receipt of the instructions indicated above.                                                                                                                                                Physician's or R.N.'s Signature                                                                  Date/Time Patient or Representative Signature                                                          Date/Time

## 2020-09-07 NOTE — DISCHARGE SUMMARY
[address] [facility] [address] [place] [address]   Office [phone]  Fax [phone]       Discharge / Transfer / Off-Service Note     Name: Rafael Hobbs MRN: [id number]  Sex: Female   YOB: 1970  Age: 53 y.o. PCP: Sallie Cifuentes MD     Date of admission: 9/5/2020  Date of discharge/transfer: 9/7/2020    Attending physician at admission: Joce Patel MD     Attending physician at discharge/transfer: Dr. Zayra Jc    Resident physician at discharge/transfer: Nerissa Henry MD     Consultants during hospitalization  IP CONSULT TO UROLOGY     Admission diagnoses   Pyelonephritis [N12]    Recommended follow-up after discharge  1. PCP: Sallie Cifuentes MD    Things to follow up on with PCP:  Vitamin B12 Deficiency  Obtain CBC and CMP for monitoring of blood counts and electrolytes    History of Present Illness  Per admitting provider, Rafael Hobbs is a 53 y.o. female with known medical history of crohn's disease s/p ileocecectomy, vitamin B12 deficiency. Patient presents to the ED with bilateral flank pain of 1 month duration, the pain got worse 3 days prior to admission and the pain is worse in the Left flank. Patient endorses dysuria, urinary frequency of 3 days duration and non-radiating right and left lower quadrant abdominal pain. Pt also endorses fever of 101. [facility] where she was treated with Bactrim for UTI. [facility] to admission for re-evaluation, she was diagnosed with pyelonephritis and was told to come to the ED for further evaluation.      HOSPITAL COURSE    Onel Dutat is a 53 y.o. female w/ h/o crohn's dz, depression, vit B12 def, anemia, vertigo, peripheral neuropathy that is admitted for admitted for Pyelonephritis 2/2 obstructing staghorn calculi.     24 hour events: no acute events     Pyelonephritis in setting of staghorn calculus: s/p b/l stent placement on 9/5/20.  Previous urine culture grew klebsiella pneumoniae sensitive to levaquin. Urine cx with gram negative rods.  -Levaquin 750mg PO daily (total 7 days)  -Fluconazole for possible yeast infxn  -Oxybutynin for 3d on discharge for urinary tract spasms (be careful with concurrent use with benzos)  -Urology placed R and L stents, f/u on Tuesday (as soon as possible)  -Pyridine for dysuria  -F/u final urine culture       Crohn's disease: Patient reportedly on Protonix only. CT abdomen infectious vs inflammatory, possible Chron's flare. Denies diarrhea over baseline.  -Dr. Prudencio Oseguera for outpatient follow up      B12 deficiency anemia: POA B12: 275  -Patient due for B12 injection, f/u opt w/ nml B12     Depression and anxiety: UDS positive for amphetamines, benzos, opiates  -Continue home meds of Cymbalta 60 mg daily and trazadone.  -Continue home Klonopin 1mg po BID      Physical exam at discharge:    Vitals Reviewed.   Visit Vitals  BP 91/60 (BP 1 Location: Left arm, BP Patient Position: At rest)   Pulse 76   Temp 97.9 °F (36.6 °C)   Resp 18   Ht 5' 2\" (1.575 m)   Wt 128 lb (58.1 kg)   SpO2 97%   BMI 23.41 kg/m²        General:   Alert, cooperative, no acute distress   Head:   Atraumatic   Eyes:   Conjunctivae clear   ENT:  Oral mucosa normal   Neck:  Trachea midline   Back:    Bilateral CVA tenderness    Chest wall:    No tenderness or deformities    Lungs:   Clear to auscultation bilaterally    Heart:   Normal rate, regular rhythm, no murmur, rubs or gallops   Abdomen:    Soft, non-tender   No masses or organomegaly    Extremities:  No edema or DVT signs   Pulses:  Symmetric all extremities   Skin:  Warm and dry    No rashes or lesions   Neurologic:  Alert and oriented x4   No focal deficits     Condition at discharge: Stable    Labs  Recent Labs     09/07/20  1146 09/07/20  0303 09/06/20  0953   WBC 13.8* 12.1* 9.7   HGB 8.6* 8.2* 10.3*   HCT 28.3* 26.7* 32.6*   * 518* 587*     Recent Labs 09/07/20  0303 09/06/20  0953 09/05/20  1627    138 137   K 4.2 4.5 3.6   * 109* 108   CO2 19* 20* 19*   BUN 15 11 15   CREA 0.97 0.95 1.16*   * 144* 87   CA 8.4* 9.4 9.0     Recent Labs     09/05/20  1627   ALT 14   *   TBILI 0.2   TP 7.5   ALB 2.8*   GLOB 4.7*   LPSE 106     No results for input(s): PH, PCO2, PO2, TNIPOC, TROIQ, INR, PTP, APTT, FE, TIBC, PSAT, FERR, GLUCPOC, INREXT, INREXT in the last 72 hours. No lab exists for component: [place]    Microbiology  Results     Procedure Component Value Units Date/Time    CULTURE, BLOOD, PAIRED [[id number]]     Order Status:  Canceled Specimen:  Blood     CULTURE, BLOOD [[id number]]     Order Status:  Canceled Specimen:  Blood     CULTURE, URINE [[id number]]     Order Status:  Canceled Specimen:  Urine from Clean catch     URINE CULTURE HOLD SAMPLE [[id number]] Collected:  09/05/20 1627    Order Status:  Completed Specimen:  Urine from Serum Updated:  09/05/20 1638     Urine culture hold       Urine on hold in Microbiology dept for 2 days. If unpreserved urine is submitted, it cannot be used for addtional testing after 24 hours, recollection will be required. CULTURE, URINE [[id number]]  (Abnormal) Collected:  09/05/20 1627    Order Status:  Completed Specimen:  Urine from Clean catch Updated:  09/06/20 1434     Special Requests: NO SPECIAL REQUESTS        [place] Count --        >100,000  COLONIES/mL       Culture result: GRAM NEGATIVE RODS            Procedures / Diagnostic Studies  Echo Results  (Last 48 hours)    None         Imaging  Ct Abd Pelv W Cont    Result Date: 9/5/2020  IMPRESSION: 1. Obstructing staghorn calculus in the right renal pelvis with mild right hydronephrosis. Hypodensities in the lower pole of the right kidney are favored to represent pyelonephritis, with a more focal 10 x 9 mm hypodensity in the lower pole suspicious for a developing abscess.  There is also mild right hydroureter with abnormal urothelial enhancement, consistent with infection. 2. Additional nonobstructing stone in the lower pole of the right kidney. 3. Postsurgical changes of ileocecectomy. Mild mucosal hyperenhancement of the neoterminal ileum, suspicious for active inflammatory bowel disease. Diffuse distention of the colon with stool and air-fluid levels, which may represent an infectious or inflammatory colitis. Nc Xr Technologist Service    Result Date: 9/6/2020  FINDINGS/IMPRESSION: Bilateral ureteral stents are in place. INTERPRETATION PROVIDED FOR COMPLIANCE ONLY AT NO CHARGE        Chronic diagnoses   Problem List as of 9/7/2020 Date Reviewed: 9/5/2020          Codes Class Noted - Resolved    Staghorn renal calculus ICD-10-CM: N20.0  ICD-9-CM: 592.0  9/6/2020 - Present        * (Principal) Pyelonephritis ICD-10-CM: N12  ICD-9-CM: 590.80  9/5/2020 - Present        Colitis ICD-10-CM: K52.9  ICD-9-CM: 558.9  9/6/2020 - Present        [facility] ([facility] ICD-10-CM: K50.90  ICD-9-CM: 555.9  4/19/2010 - Present    Overview Addendum 9/11/2019 11:29 AM by Keyur Salinas MD     She had 4 colon resections in the past.  Dr. Moises Linda, Dr. Topete Sees abd/pekatelyn 2009: Thickened segment of neoileum proximal and adjacent to   anastomotic chain sutures and likely representing inflammatory bowel disease   recurrence. Partial small bowel obstruction at this level. No evidence of   perforation. No evidence of fistula or intra-abdominal abscess.                 Anemia ICD-10-CM: D64.9  ICD-9-CM: 285.9  7/4/2017 - Present        Depression ICD-10-CM: F32.9  ICD-9-CM: 496  4/19/2010 - Present        Vitamin B12 deficiency ICD-10-CM: E53.8  ICD-9-CM: 266.2  1/21/2012 - Present    Overview Addendum 2/16/2012 10:14 AM by Pankaj Johnson     164 on 1/2012  Needs 1000 mcg IM twice a week             Hx of perforated gastric ulcer ICD-10-CM: Z87.19  ICD-9-CM: V12.79  2/20/2020 - Present    Overview Signed 2/20/2020 11:26 AM by Keyur Salinas MD 2017, Dr. Timothy Armenta  Hx of perforated gastric ulcers   S/p laparotomy with repair of anterior and posterior perforated gastric ulcers              Neutrophilia ICD-10-CM: D72.9  ICD-9-CM: 288.8  7/5/2017 - Present        Lyme disease ICD-10-CM: A69.20  ICD-9-CM: 088.81  6/1/2017 - Present        Peripheral neuropathy ICD-10-CM: G62.9  ICD-9-CM: 356.9  2/2/2012 - Present    Overview Addendum 2/2/2012  4:18 PM by Kaiden Santos     B12 deficiency  MRI brain normal 1/2012  MRA head normal 1/2012  CTA neck normal 1/2012             Abnormal electrocardiogram ICD-10-CM: R94.31  ICD-9-CM: 794.31  2/2/2012 - Present    Overview Addendum 9/6/2020  8:30 AM by Demi Valera MD     See EKG from 1/2012    See EKG from 1/2012             Vertigo ICD-10-CM: R42  ICD-9-CM: 780.4  1/20/2012 - Present    Overview Signed 2/2/2012  4:19 PM by Kaiden Santos     Has seen Dr. Valeri Steve for neur evaluation             RESOLVED: Thrombocytosis ([facility] ICD-10-CM: D47.3  ICD-9-CM: 238.71  7/5/2017 - 9/11/2019        RESOLVED: Perforation bowel ([facility] ICD-10-CM: K63.1  ICD-9-CM: 569.83  7/4/2017 - 9/11/2019    Overview Signed 8/30/2017  9:40 AM by Kaiden Santos     S/p palak Tse 7/2017             RESOLVED: Pneumonia ICD-10-CM: J18.9  ICD-9-CM: 486  7/4/2017 - 9/11/2019        RESOLVED: Wounds, multiple open, lower extremity ICD-10-CM: F98.215E  ICD-9-CM: 894.0  7/4/2017 - 9/11/2019        RESOLVED: Dehydration ICD-10-CM: E86.0  ICD-9-CM: 276.51  1/20/2012 - 2/2/2012              Discharge/Transfer Medications  Current Discharge Medication List      START taking these medications    Details   fluconazole (DIFLUCAN) 100 mg tablet Take 1 Tab by mouth daily for 3 days. FDA advises cautious prescribing of oral fluconazole in pregnancy. Qty: 3 Tab, Refills: 0      levoFLOXacin (LEVAQUIN) 750 mg tablet Take 1 Tab by mouth daily for 6 days.   Qty: 6 Tab, Refills: 0      oxybutynin (DITROPAN) 5 mg tablet Take 1 Tab by mouth two (2) times a day for 3 days. Indications: ureteral spasm from stents  Qty: 6 Tab, Refills: 0      phenazopyridine (PYRIDIUM) 200 mg tablet Take 1 Tab by mouth three (3) times daily as needed for Pain for up to 10 days. Qty: 30 Tab, Refills: 0         CONTINUE these medications which have NOT CHANGED    Details   LORazepam (ATIVAN) 1 mg tablet Take 1 mg by mouth two (2) times a day. Patient reports taking twice daily [id number]      pantoprazole (PROTONIX) 40 mg tablet Take 40 mg by mouth daily. baclofen (LIORESAL) 10 mg tablet Take 10 mg by mouth three (3) times daily. cyanocobalamin (VITAMIN B12) 1,000 mcg/mL injection 1,000 mcg by IntraMUSCular route every thirty (30) days. DULoxetine (CYMBALTA) 60 mg capsule Take 120 mg by mouth daily. dextroamphetamine-amphetamine (ADDERALL) 20 mg tablet Take 20 mg by mouth three (3) times daily. clonazePAM (KLONOPIN) 1 mg tablet Take 1 mg by mouth two (2) times a day. Patient reports taking twice daily in the AM and PM ([id number])      traZODone (DESYREL) 100 mg tablet Take 50 mg by mouth nightly. STOP taking these medications       hydroCHLOROthiazide (HYDRODIURIL) 12.5 mg tablet Comments:   Reason for Stopping:                Diet:  Regular diet. Activity:  As tolerated    Disposition: Home    Discharge instructions to patient/family  Please seek medical attention for any new or worsening symptoms particularly fever, chest pain, shortness of breath, abdominal pain, nausea, vomiting    Follow up plans/appointments  Follow-up Information     Follow up With Specialties Details Why [address] [place] [address] Urology  Call in 1 day Please call [facility] [phone] tomorrow to make an appointment for as soon as possible for follow up of stents.  [address] [facility], [address] [place] [address]  [phone]             Adam Leiva MD  Family Medicine Resident     For Billing    Chief Complaint   Patient presents with    Flank Pain    Bladder Infection       Hospital Problems  Date Reviewed: 9/5/2020          Codes Class Noted POA    Staghorn renal calculus ICD-10-CM: N20.0  ICD-9-CM: 592.0  9/6/2020         * (Principal) Pyelonephritis ICD-10-CM: N12  ICD-9-CM: 590.80  9/5/2020 Unknown        Colitis ICD-10-CM: K52.9  ICD-9-CM: 558.9  9/6/2020 Yes        Crohn's disease ([facility] ICD-10-CM: K50.90  ICD-9-CM: 555.9  4/19/2010 Yes    Overview Addendum 9/11/2019 11:29 AM by Juan Pablo Weir MD     She had 4 colon resections in the past.  Dr. Meri Morales, Dr. Jalen Ramos abd/kaye 2009: Thickened segment of neoileum proximal and adjacent to   anastomotic chain sutures and likely representing inflammatory bowel disease   recurrence. Partial small bowel obstruction at this level. No evidence of   perforation. No evidence of fistula or intra-abdominal abscess.                 Anemia ICD-10-CM: D64.9  ICD-9-CM: 285.9  7/4/2017 Yes        Depression ICD-10-CM: F32.9  ICD-9-CM: 560  4/19/2010 Yes        Vitamin B12 deficiency ICD-10-CM: E53.8  ICD-9-CM: 266.2  1/21/2012 Yes    Overview Addendum 2/16/2012 10:14 AM by Savi Kamara on 1/2012  Needs 1000 mcg IM twice a week

## 2020-09-07 NOTE — PROGRESS NOTES
9/7/2020  Case Management Progress Note    1:32 PM  Noted patient has discharge order. No identified needs at this time; patient is ready for discharge from CM standpoint. Transition of Care Plan  1. Patient will discharge home with friend today  2. Friend to transport  3. Patient will need to follow up with PCP, specialties as necessary   4. OK for discharge from CM standpoint. Care Management Interventions  PCP Verified by CM: Yes(Dr. Zaki Sung)  Mode of Transport at Discharge:  Other (see comment)(Friend Viri Cruz )  Transition of Care Consult (CM Consult): Discharge Planning  MyChart Signup: No  Discharge Durable Medical Equipment: No  Physical Therapy Consult: No  Occupational Therapy Consult: No  Speech Therapy Consult: No  Current Support Network: Family Lives Nearby  Confirm Follow Up Transport: Friends  Discharge Location  Discharge Placement: Home with family assistance    KAMRON Blunt

## 2020-09-07 NOTE — PROGRESS NOTES
S:Pain continues to improve. Flank pain improving. Bladder pressure likely from stents. O:  Patient Vitals for the past 24 hrs:   Temp Pulse Resp BP SpO2   09/07/20 0708 97.9 °F (36.6 °C) 71 14 (!) 86/54 --   09/07/20 0700 -- 80 -- -- --   09/07/20 0314 -- -- -- (!) 83/58 --   09/07/20 0301 97.4 °F (36.3 °C) 64 17 (!) 83/54 97 %   09/07/20 0027 98 °F (36.7 °C) 81 18 94/62 98 %   09/06/20 2325 -- 81 -- -- --   09/06/20 2017 98 °F (36.7 °C) 74 16 95/62 98 %   09/06/20 1553 98.4 °F (36.9 °C) 85 14 97/66 99 %   09/06/20 1506 -- 88 -- -- --   09/06/20 1105 98 °F (36.7 °C) 77 14 97/58 97 %     Gen:NAD, A&OX3  Resp:Nml effort  Abd:soft, Nt/Nd    Recent Results (from the past 24 hour(s))   CBC WITH AUTOMATED DIFF    Collection Time: 09/06/20  9:53 AM   Result Value Ref Range    WBC 9.7 3.6 - 11.0 K/uL    RBC 3.76 (L) 3.80 - 5.20 M/uL    HGB 10.3 (L) 11.5 - 16.0 g/dL    HCT 32.6 (L) 35.0 - 47.0 %    MCV 86.7 80.0 - 99.0 FL    MCH 27.4 26.0 - 34.0 PG    MCHC 31.6 30.0 - 36.5 g/dL    RDW 15.1 (H) 11.5 - 14.5 %    PLATELET 701 (H) 904 - 400 K/uL    MPV 9.4 8.9 - 12.9 FL    NRBC 0.0 0  WBC    ABSOLUTE NRBC 0.00 0.00 - 0.01 K/uL    NEUTROPHILS 87 (H) 32 - 75 %    LYMPHOCYTES 11 (L) 12 - 49 %    MONOCYTES 1 (L) 5 - 13 %    EOSINOPHILS 0 0 - 7 %    BASOPHILS 0 0 - 1 %    IMMATURE GRANULOCYTES 1 (H) 0.0 - 0.5 %    ABS. NEUTROPHILS 8.4 (H) 1.8 - 8.0 K/UL    ABS. LYMPHOCYTES 1.1 0.8 - 3.5 K/UL    ABS. MONOCYTES 0.1 0.0 - 1.0 K/UL    ABS. EOSINOPHILS 0.0 0.0 - 0.4 K/UL    ABS. BASOPHILS 0.0 0.0 - 0.1 K/UL    ABS. IMM.  GRANS. 0.1 (H) 0.00 - 0.04 K/UL    DF AUTOMATED     METABOLIC PANEL, BASIC    Collection Time: 09/06/20  9:53 AM   Result Value Ref Range    Sodium 138 136 - 145 mmol/L    Potassium 4.5 3.5 - 5.1 mmol/L    Chloride 109 (H) 97 - 108 mmol/L    CO2 20 (L) 21 - 32 mmol/L    Anion gap 9 5 - 15 mmol/L    Glucose 144 (H) 65 - 100 mg/dL    BUN 11 6 - 20 MG/DL    Creatinine 0.95 0.55 - 1.02 MG/DL    BUN/Creatinine ratio 12 12 - 20      GFR est AA >60 >60 ml/min/1.73m2    GFR est non-AA >60 >60 ml/min/1.73m2    Calcium 9.4 8.5 - 10.1 MG/DL   DRUG SCREEN, URINE    Collection Time: 09/06/20  1:45 PM   Result Value Ref Range    AMPHETAMINES Positive (A) NEG      BARBITURATES Negative NEG      BENZODIAZEPINES Positive (A) NEG      COCAINE Negative NEG      METHADONE Negative NEG      OPIATES Positive (A) NEG      PCP(PHENCYCLIDINE) Negative NEG      THC (TH-CANNABINOL) Negative NEG      Drug screen comment (NOTE)    METABOLIC PANEL, BASIC    Collection Time: 09/07/20  3:03 AM   Result Value Ref Range    Sodium 141 136 - 145 mmol/L    Potassium 4.2 3.5 - 5.1 mmol/L    Chloride 114 (H) 97 - 108 mmol/L    CO2 19 (L) 21 - 32 mmol/L    Anion gap 8 5 - 15 mmol/L    Glucose 114 (H) 65 - 100 mg/dL    BUN 15 6 - 20 MG/DL    Creatinine 0.97 0.55 - 1.02 MG/DL    BUN/Creatinine ratio 15 12 - 20      GFR est AA >60 >60 ml/min/1.73m2    GFR est non-AA >60 >60 ml/min/1.73m2    Calcium 8.4 (L) 8.5 - 10.1 MG/DL   CBC WITH AUTOMATED DIFF    Collection Time: 09/07/20  3:03 AM   Result Value Ref Range    WBC 12.1 (H) 3.6 - 11.0 K/uL    RBC 3.05 (L) 3.80 - 5.20 M/uL    HGB 8.2 (L) 11.5 - 16.0 g/dL    HCT 26.7 (L) 35.0 - 47.0 %    MCV 87.5 80.0 - 99.0 FL    MCH 26.9 26.0 - 34.0 PG    MCHC 30.7 30.0 - 36.5 g/dL    RDW 15.2 (H) 11.5 - 14.5 %    PLATELET 246 (H) 831 - 400 K/uL    MPV 10.1 8.9 - 12.9 FL    NRBC 0.0 0  WBC    ABSOLUTE NRBC 0.00 0.00 - 0.01 K/uL    NEUTROPHILS 79 (H) 32 - 75 %    LYMPHOCYTES 16 12 - 49 %    MONOCYTES 5 5 - 13 %    EOSINOPHILS 0 0 - 7 %    BASOPHILS 0 0 - 1 %    IMMATURE GRANULOCYTES 0 0.0 - 0.5 %    ABS. NEUTROPHILS 9.5 (H) 1.8 - 8.0 K/UL    ABS. LYMPHOCYTES 1.9 0.8 - 3.5 K/UL    ABS. MONOCYTES 0.6 0.0 - 1.0 K/UL    ABS. EOSINOPHILS 0.0 0.0 - 0.4 K/UL    ABS. BASOPHILS 0.0 0.0 - 0.1 K/UL    ABS. IMM. GRANS. 0.1 (H) 0.00 - 0.04 K/UL    DF AUTOMATED         A/P:51 yo woman s/p bilateral stent placement. Continue abx. Can be discharged on PO abx. Will require outpatient follow up. If patient is to be discharge she can return to urology on Tuesday. Patient has clinical improvement. Hgb drop suspected from dilution however awaiting recheck. - Patient will follow up next week at [facility] [phone]  - Possible scripts would be oxybutynin 5mg tid prn bladder spasms, pyridium 200 mg for voiding discomfort, Toradol for pain control non-narcotic.

## 2020-09-08 LAB
BACTERIA SPEC CULT: ABNORMAL
BACTERIA SPEC CULT: ABNORMAL
CC UR VC: ABNORMAL
SERVICE CMNT-IMP: ABNORMAL

## 2020-09-12 RX ORDER — FLUCONAZOLE 100 MG/1
TABLET ORAL
Qty: 3 TAB | Refills: 0 | Status: SHIPPED | OUTPATIENT
Start: 2020-09-12 | End: 2022-02-09 | Stop reason: ALTCHOICE

## 2020-09-12 RX ORDER — LEVOFLOXACIN 750 MG/1
TABLET ORAL
Qty: 6 TAB | Refills: 0 | Status: SHIPPED | OUTPATIENT
Start: 2020-09-12 | End: 2021-06-03

## 2020-09-14 ENCOUNTER — TELEPHONE (OUTPATIENT)
Dept: FAMILY MEDICINE CLINIC | Age: 50
End: 2020-09-14

## 2020-09-14 NOTE — TELEPHONE ENCOUNTER
----- Message from Antonella Milner sent at 9/9/2020  4:37 PM EDT -----  Regarding: DR Kvng Cueto / Richard Coppola Message/Vendor Calls      Pt is requesting to speak with nurse in regard to getting a B-12 INJ and blood work.      Denies coughing, fever or SOB       Callback required   Best contact number(s):         Antonella Milner

## 2020-09-22 NOTE — TELEPHONE ENCOUNTER
Patient appointment 9/23/2020 with [facility] for kidney stones removal.      Patient will call back to schedule an appointment with Dr. Lorena Foote and B12 injection.

## 2020-09-29 ENCOUNTER — TELEPHONE (OUTPATIENT)
Dept: FAMILY MEDICINE CLINIC | Age: 50
End: 2020-09-29

## 2020-09-29 NOTE — TELEPHONE ENCOUNTER
Patient calling to office frustrated in states that she spoke with Dr. Elisabet Evangelista nurse about her visit as [facility] in her being anemic. Patient states she was seen on Labor day. She states when she spoke to nurse, she was told that she was going to get her appt ASAP with Dr. Elisabet Evangelista. She kept going on in on about this in that and which I couldn't keep up in she sounded very muffled. I had to kindly interrupt her numerous times to figure out what I needed to do. Dr. Elisabet Evangelista have no openings until 10/12/20. She wants to see Dr. Elisabet Evangelista. Asking to be contacted.     Call/    [phone]

## 2020-10-20 DIAGNOSIS — M13.0 POLYARTHROPATHY: ICD-10-CM

## 2020-10-21 RX ORDER — TRAMADOL HYDROCHLORIDE AND ACETAMINOPHEN 37.5; 325 MG/1; MG/1
1 TABLET ORAL
Qty: 21 TAB | Refills: 0 | OUTPATIENT
Start: 2020-10-21 | End: 2020-10-28

## 2020-11-22 DIAGNOSIS — J01.90 ACUTE NON-RECURRENT SINUSITIS, UNSPECIFIED LOCATION: ICD-10-CM

## 2020-11-23 RX ORDER — FLUTICASONE PROPIONATE 50 MCG
SPRAY, SUSPENSION (ML) NASAL
Qty: 1 BOTTLE | Refills: 1 | OUTPATIENT
Start: 2020-11-23

## 2020-11-27 ENCOUNTER — TELEPHONE (OUTPATIENT)
Dept: FAMILY MEDICINE CLINIC | Age: 50
End: 2020-11-27

## 2020-11-27 NOTE — TELEPHONE ENCOUNTER
Scheduled appt with patient. She then said she has been trying for the longest time along with her pharmacy to get the medication filled; She said flonase is the medication, but it is not that but something similar.

## 2020-11-27 NOTE — TELEPHONE ENCOUNTER
----- Message from Shirin Starks sent at 11/25/2020 12:50 PM EST -----  Regarding: /Telephone    General Message/Vendor Calls    Caller's first and last name: Self      Reason for call: Patient at facility       Callback required yes/no and why: Yes to assist      Best contact number(s):[phone]      Details to clarify the request: Pt is calling to be scheduled for a B12 shot. Pt was advised to come to the office when she was done her dental appointment and she is there now. Advised pt she will be able to come into the office at 1pm. Pt requesting to send a message.         Shirin Starks

## 2020-11-30 NOTE — TELEPHONE ENCOUNTER
Medication refusal says  \"discontinued\"              /Telephone   Received: 5 days ago   Message Contents   Josh, Annette6 Josi Noonan                 Medication Refill     Caller (if not patient):Self     Relationship of caller (if not patient):Self     Best contact number(s): [phone]     Name of medication and dosage if known: Flonase 50 mcg nasal spray     Is patient out of this medication (yes/no): Yes     Pharmacy name: [address] listed in chart? (yes/no):   Pharmacy phone number: [phone]     Details to clarify the request: Pt has been out of nasal spray for 3 months. Pt advised [facility] has reached out over the last 2 months.        Brody Daniels

## 2020-12-01 RX ORDER — FLUTICASONE PROPIONATE 50 MCG
SPRAY, SUSPENSION (ML) NASAL
Qty: 1 BOTTLE | Refills: 1 | Status: SHIPPED | OUTPATIENT
Start: 2020-12-01 | End: 2021-01-18

## 2020-12-22 ENCOUNTER — CLINICAL SUPPORT (OUTPATIENT)
Dept: FAMILY MEDICINE CLINIC | Age: 50
End: 2020-12-22
Payer: MEDICAID

## 2020-12-22 DIAGNOSIS — D50.9 IRON DEFICIENCY ANEMIA, UNSPECIFIED IRON DEFICIENCY ANEMIA TYPE: Primary | ICD-10-CM

## 2020-12-22 PROCEDURE — 96372 THER/PROPH/DIAG INJ SC/IM: CPT | Performed by: FAMILY MEDICINE

## 2020-12-22 RX ORDER — CYANOCOBALAMIN 1000 UG/ML
1000 INJECTION, SOLUTION INTRAMUSCULAR; SUBCUTANEOUS ONCE
Qty: 1 VIAL | Refills: 0
Start: 2020-12-22 | End: 2020-12-22

## 2021-01-18 RX ORDER — FLUTICASONE PROPIONATE 50 MCG
SPRAY, SUSPENSION (ML) NASAL
Qty: 1 BOTTLE | Refills: 1 | Status: SHIPPED | OUTPATIENT
Start: 2021-01-18 | End: 2021-03-10

## 2021-02-10 ENCOUNTER — TELEPHONE (OUTPATIENT)
Dept: FAMILY MEDICINE CLINIC | Age: 51
End: 2021-02-10

## 2021-02-10 NOTE — TELEPHONE ENCOUNTER
Pt called with complaints of swollen face/ mouth/ lips sore throat , airway feels tight ,trouble swollowing , 4 days nothing to eat due to symptoms   diagnosed with stomatits at [facility] ED 2/9/21. Taking  new medication  Oxyutynin 5mg. Has had very little to drink due to symptoms. Says she cant spit or cough due to swelling and pain . Instructed pt to return to the ED as we have no appt's today. Pt agreed. Appt made for f/u 2/11/21.

## 2021-02-10 NOTE — TELEPHONE ENCOUNTER
Pt called with complaints of swollen face/ mouth/ lips sore throat , airway feels tight ,trouble swollowing , 4 days nothing to eat due to symptoms   diagnosed with stomatits at [facility] er 2.9.21   I asked pt if she had taken any new medication she stated oxyutynin 5mg   Has had very little to drink due to symptoms   Says she cant spit or cough due to swelling and pain . I called nurse nelia to triage pt and she talk to pt and informed her to get checked at [address] since she was seen there yesterday. We also got her scheduled for a vv for tomorrow to follow up .

## 2021-02-11 ENCOUNTER — TELEPHONE (OUTPATIENT)
Dept: FAMILY MEDICINE CLINIC | Age: 51
End: 2021-02-11

## 2021-02-11 ENCOUNTER — VIRTUAL VISIT (OUTPATIENT)
Dept: FAMILY MEDICINE CLINIC | Age: 51
End: 2021-02-11
Payer: MEDICAID

## 2021-02-11 DIAGNOSIS — F90.9 ATTENTION DEFICIT HYPERACTIVITY DISORDER (ADHD), UNSPECIFIED ADHD TYPE: ICD-10-CM

## 2021-02-11 DIAGNOSIS — K50.10 CROHN'S DISEASE OF LARGE INTESTINE WITHOUT COMPLICATION (HCC): ICD-10-CM

## 2021-02-11 DIAGNOSIS — T78.3XXA ANGIOEDEMA, INITIAL ENCOUNTER: Primary | ICD-10-CM

## 2021-02-11 PROCEDURE — 99214 OFFICE O/P EST MOD 30 MIN: CPT | Performed by: STUDENT IN AN ORGANIZED HEALTH CARE EDUCATION/TRAINING PROGRAM

## 2021-02-11 RX ORDER — CIMETIDINE HYDROCHLORIDE ORAL SOLUTION 300 MG/5ML
1 SOLUTION ORAL 4 TIMES DAILY
Qty: 237 ML | Refills: 0 | Status: SHIPPED | OUTPATIENT
Start: 2021-02-11 | End: 2021-02-24

## 2021-02-11 NOTE — TELEPHONE ENCOUNTER
Spoke w/ pt after getting a message from on-call answering service. Pt seen via VV today for sores in her mouth, related to possible stomatitis vs angioedema. Benadryl and cimetidine oral solution sent into pharmacy however pt unable to locate the rx. I confirmed the successful rx of these medications to the [facility] pharmacy at [address] [place] [address] and informed pt that both medications had been called in. I advised pt to pick these medications up and gave strict precautions to present to the ED if her respiratory status were to worsen or she would have increased difficulty swallowing.

## 2021-02-11 NOTE — PROGRESS NOTES
Elzbieta Rothman  53 y.o. female  1970  [address]  Krys Leblanc  [id number]   [address]:    Telemedicine Progress Note  Lula Rose MD       Encounter Date and Time: February 14, 2021 at 2:25 PM    Consent:  She and/or the health care decision maker is aware that that she may receive a bill for this telephone service, depending on her insurance coverage, and has provided verbal consent to proceed: Yes    Chief Complaint   Patient presents with    Facial Swelling     History of Present Illness   Elzbieta Rothman is a 53 y.o. female was evaluated by synchronous (real-time) audio-video technology from home, through the [facility].    Patient c/o of lip and throat swelling for 1 week. Denies SOB/dyspnea, wheezing, cough. Reports that she was advised to go to ED yesterday per telephone call with our practice, but refused as she does not think sx are severe enough to warrant going to ED. Stopped oxybutynin 2 weeks ago. No new medications in past 1-2 weeks prior to start of swelling, but tried new dentures adhesive (polygrip). In the past used Fixodent dentures adhesive but ran out and was wearing dentures without adhesive. Seen by dentist who rec'd new adhesive. Reports gum swelling with new adhesive on first day. Swelling in mouth & lips worsened with continued use of new adhesive. Lips started to crack with swelling. Saw dentist again after swelling started and was given nystatin mouthwash. Reports she is unable to swallow her meds (including Adderral for ADHD prescribed by Dr. Delores Masters) due to mouth/throat swelling. Review of Systems   Review of Systems   Respiratory: Negative for cough, shortness of breath and wheezing. Cardiovascular: Negative for chest pain. Gastrointestinal: Negative for abdominal pain.        Vitals/Objective:     General: alert, cooperative, no distress   Mental  status: mental status: alert, oriented to person, place, and time, pressured speech, wondering thoughts   Resp: resp: normal effort and no respiratory distress   Neuro: neuro: no gross deficits   Skin: skin: swollen and cracked lips, no skin erythema visible   Due to this being a TeleHealth evaluation, many elements of the physical examination are unable to be assessed. Assessment and Plan:   Time spent in direct conversation with the patient to include medical condition(s) discussed, assessment and treatment plan:    1. Angioedema, initial encounter  - Strongly advised patient to go to ED, however patient refused. Encouraged patient to go to ED for any SOB/dyspnea/wheezing or worsening swelling  - Stop new adhesive   - diphenhydrAMINE (BENADRYL) 12.5 mg/5 mL; Take 10 mL by mouth every four (4) hours for 7 days. Dispense: 1 Bottle; Refill: 0  - cimetidine HCl (TAGAMET) 300 mg/5 mL solution; Take 5 mL by mouth four (4) times daily. Dispense: 237 mL; Refill: 0    2. Attention deficit hyperactivity disorder (ADHD), unspecified ADHD type  - Prescribed Adderall, by Dr. Evens Stratton but currently not taking meds due to difficulty swallowing pills due to swelling  - Sx not controlled d/t not taking meds  - Attempted to perform med rec but unsuccesful to complete d/t patient's inattention/wondering thoughts   - Follows with Dr. Evens Stratton (also prescribes Klonopin)    3. Crohn's disease of large intestine without complication ([facility] [address]  - Sx stable        We discussed the expected course, resolution and complications of the diagnosis(es) in detail. Medication risks, benefits, costs, interactions, and alternatives were discussed as indicated. I advised her to contact the office if her condition worsens, changes or fails to improve as anticipated. She expressed understanding with the diagnosis(es) and plan. Patient understands that this encounter was a temporary measure, and the importance of further follow up and xamination was emphasized. Patient verbalized understanding. Patient informed to follow up: advised to go to ED patient refused. Strongly recommended going to ED for SOB/Wheezing/worsening swelling. If sx do not improve with prescribed medication patient to schedule follow up visit. Electronically Signed: Monica Albright MD    Providers location when delivering service (clinic, hospital, home): clinic    CPT Codes 65606-99269 for Established Patients may apply to this Telehealth Visit. POS code: 18. Modifier GT      Pursuant to the emergency declaration under the [address] [place] [address] waiver authority and the [employer] Act, this Virtual  Visit was conducted, with patient's consent, to reduce the patient's risk of exposure to COVID-19 and provide continuity of care for an established patient. Services were provided through a video synchronous discussion virtually to substitute for in-person clinic visit. History   Patients past medical, surgical and family histories were reviewed.       Past Medical History:   Diagnosis Date    Autoimmune disease ([facility] [address] [facility]     Crohn's Disease    Crohn disease ([facility] [facility] 2010    Crohn's     Depression 2010    History of vascular access device 2017    [facility] VAT - 33 cm right brachial PICC for abx and limited access    Perforation bowel ([facility] [address] [facility] 2017    S/p palak Love 2017    Peripheral neuropathy 2012    B12 deficiency MRI brain normal 2012 MRA head normal 2012 CTA neck normal 2012     Renal calculi 2020    Vertigo     Vitamin B12 deficiency 2012    164 on 2012 Needs 1000 mcg IM twice a week      Past Surgical History:   Procedure Laterality Date    HX  SECTION      HX HEENT      HX TONSIL AND ADENOIDECTOMY      HX TUBAL LIGATION      DE ABDOMEN SURGERY [address] UNLISTED      due to Crohn's-bowel resection x2     Family History   Problem Relation Age of Onset    Heart Disease Father     Cancer Mother      Social History     Socioeconomic History    Marital status:      Spouse name: Not on file    Number of children: Not on file    Years of education: Not on file    Highest education level: Not on file   Occupational History    Not on file   Social Needs    Financial resource strain: Not on file    Food insecurity     Worry: Not on file     Inability: Not on file    Transportation needs     Medical: Not on file     Non-medical: Not on file   Tobacco Use    Smoking status: Current Some Day Smoker     Packs/day: 0.50     Years: 8.00     Pack years: 4.00     Types: Cigarettes    Smokeless tobacco: Never Used   Substance and Sexual Activity    Alcohol use: No    Drug use: No     Comment: 1 pack a day    Sexual activity: Yes     Partners: Male     Birth control/protection: Pill   Lifestyle    Physical activity     Days per week: Not on file     Minutes per session: Not on file    Stress: Not on file   Relationships    Social connections     Talks on phone: Not on file     Gets together: Not on file     Attends Yarsanism service: Not on file     Active member of club or organization: Not on file     Attends meetings of clubs or organizations: Not on file     Relationship status: Not on file    Intimate partner violence     Fear of current or ex partner: Not on file     Emotionally abused: Not on file     Physically abused: Not on file     Forced sexual activity: Not on file   Other Topics Concern    Not on file   Social History Narrative    Not on file     Patient Active Problem List   Diagnosis Code    [facility] K50.90    Depression F32.9    Vertigo R42    Vitamin B12 deficiency E53.8    Peripheral neuropathy G62.9    Abnormal electrocardiogram R94.31    Anemia D64.9    Neutrophilia D72.9    Lyme disease A69.20    Hx of perforated gastric ulcer Z87.19    Pyelonephritis N12    Staghorn renal calculus N20.0    Colitis K52.9          Current Medications/Allergies   Medications and Allergies reviewed:    Current Outpatient Medications   Medication Sig Dispense Refill    diphenhydrAMINE (BENADRYL) 12.5 mg/5 mL Take 10 mL by mouth every four (4) hours for 7 days. 1 Bottle 0    cimetidine HCl (TAGAMET) 300 mg/5 mL solution Take 5 mL by mouth four (4) times daily. 237 mL 0    fluticasone propionate (FLONASE) 50 mcg/actuation nasal spray SPRAY 2 SPRAYS INTO EACH NOSTRIL EVERY DAY 1 Bottle 1    levoFLOXacin (LEVAQUIN) 750 mg tablet TAKE 1 TABLET BY MOUTH DAILY FOR 6 DAYS. 6 Tab 0    fluconazole (DIFLUCAN) 100 mg tablet PLEASE SEE ATTACHED FOR DETAILED DIRECTIONS 3 Tab 0    LORazepam (ATIVAN) 1 mg tablet Take 1 mg by mouth two (2) times a day. Patient reports taking twice daily [id number]      pantoprazole (PROTONIX) 40 mg tablet Take 40 mg by mouth daily.  baclofen (LIORESAL) 10 mg tablet Take 10 mg by mouth three (3) times daily.  cyanocobalamin (VITAMIN B12) 1,000 mcg/mL injection 1,000 mcg by IntraMUSCular route every thirty (30) days.  DULoxetine (CYMBALTA) 60 mg capsule Take 120 mg by mouth daily.  dextroamphetamine-amphetamine (ADDERALL) 20 mg tablet Take 20 mg by mouth three (3) times daily.  clonazePAM (KLONOPIN) 1 mg tablet Take 1 mg by mouth two (2) times a day. Patient reports taking twice daily in the AM and PM ([id number])      traZODone (DESYREL) 100 mg tablet Take 50 mg by mouth nightly.        Allergies   Allergen Reactions    Cephalosporins Hives    Doxycycline Swelling    Penicillins Hives    Tetracyclines Nausea and Vomiting

## 2021-02-14 NOTE — PATIENT INSTRUCTIONS
Angioedema: Care Instructions Your Care Instructions Angioedema is an allergic reaction. It causes swelling and welts in the deep layers of the skin. Angioedema can sometimes occur along with hives. Hives are an allergic reaction in the outer layers of the skin. Angioedema can range from mild to severe. Painful welts can develop on the face. Angioedema can also occur on other parts of the body. In severe cases, the inside of the throat can swell and make it hard to breathe. Many things can cause this condition, including foods, insect bites, and medicines (such as aspirin and some blood pressure medicines). It also can run in families. Sometimes you may know what caused the reaction, but other times you may not know. Follow-up care is a key part of your treatment and safety. Be sure to make and go to all appointments, and call your doctor if you are having problems. It's also a good idea to know your test results and keep a list of the medicines you take. How can you care for yourself at home? · Take your medicines exactly as prescribed. Call your doctor if you think you are having a problem with your medicine. You will get more details on the specific medicines your doctor prescribes. Some medicines used to treat angioedema can make you too sleepy to drive safely. Do not drive if you take medicine that may make you sleepy. · Avoid foods or medicine that may have triggered the swelling. · For comfort: ? Try taking a cool bath. Or place a cool, wet towel on the swollen area. ? Avoid hot baths and showers. ? Wear loose clothing. · Your doctor may prescribe a shot of epinephrine to carry with you in case you have a severe reaction. Learn how to give yourself the shot and keep it with you at all times. Make sure it has not . When should you call for help? Give an epinephrine shot if: 
  · You think you are having a severe allergic reaction. After giving an epinephrine shot call 911, even if you feel better. Call 911 if: 
  · You have symptoms of a severe allergic reaction. These may include: 
? Sudden raised, red areas (hives) all over your body. ? Swelling of the throat, mouth, lips, or tongue. ? Trouble breathing. ? Passing out (losing consciousness). Or you may feel very lightheaded or suddenly feel weak, confused, or restless.  
  · You have been given an epinephrine shot, even if you feel better. Call your doctor now or seek immediate medical care if: 
  · You have symptoms of an allergic reaction, such as: ? A rash or hives (raised, red areas on the skin). ? Itching. ? Swelling. ? Belly pain, nausea, or vomiting. Watch closely for changes in your health, and be sure to contact your doctor if: 
  · You do not get better as expected. Where can you learn more? Go to http://www.gray.com/ Enter N676 in the search box to learn more about \"Angioedema: Care Instructions. \" Current as of: June 29, 2020               Content Version: 12.6 © 2673-1657 [employer]. Care instructions adapted under license by [employer] (which disclaims liability or warranty for this information). If you have questions about a medical condition or this instruction, always ask your healthcare professional. [address] any warranty or liability for your use of this information.

## 2021-02-22 DIAGNOSIS — T78.3XXA ANGIOEDEMA, INITIAL ENCOUNTER: ICD-10-CM

## 2021-02-24 RX ORDER — CIMETIDINE HYDROCHLORIDE ORAL SOLUTION 300 MG/5ML
1 SOLUTION ORAL 4 TIMES DAILY
Qty: 237 ML | Refills: 0 | Status: SHIPPED | OUTPATIENT
Start: 2021-02-24 | End: 2021-08-19

## 2021-02-24 NOTE — TELEPHONE ENCOUNTER
Called patient after requested refill for medication prescribed for angioedema. Left voicemail advising patient to schedule follow up visit as I am concerned sx have likely not resolved since patient is asking for refill. Sent message to  asking them to reach out to schedule a follow up appointment.      Emma Martinez MD

## 2021-02-26 ENCOUNTER — TELEPHONE (OUTPATIENT)
Dept: FAMILY MEDICINE CLINIC | Age: 51
End: 2021-02-26

## 2021-03-10 ENCOUNTER — VIRTUAL VISIT (OUTPATIENT)
Dept: FAMILY MEDICINE CLINIC | Age: 51
End: 2021-03-10
Payer: MEDICAID

## 2021-03-10 DIAGNOSIS — G25.71 AKATHISIA: ICD-10-CM

## 2021-03-10 DIAGNOSIS — R21 RASH OF MOUTH PRESENT ON EXAMINATION: ICD-10-CM

## 2021-03-10 DIAGNOSIS — K12.1 MOUTH ULCERS: Primary | ICD-10-CM

## 2021-03-10 PROCEDURE — 99214 OFFICE O/P EST MOD 30 MIN: CPT | Performed by: FAMILY MEDICINE

## 2021-03-10 RX ORDER — FLUTICASONE PROPIONATE 50 MCG
SPRAY, SUSPENSION (ML) NASAL
Qty: 1 BOTTLE | Refills: 1 | Status: SHIPPED | OUTPATIENT
Start: 2021-03-10 | End: 2021-05-05

## 2021-03-10 RX ORDER — CHLORPHENIRAMINE MALEATE 4 MG
TABLET ORAL 2 TIMES DAILY
Qty: 15 G | Refills: 0 | Status: SHIPPED | OUTPATIENT
Start: 2021-03-10 | End: 2021-08-19

## 2021-03-10 NOTE — PATIENT INSTRUCTIONS
Mucositis Mouth infections can be dangerous. Mouth and throat sores can arise due to this condition also known as \"mucositis. \" Examine your mouth every day for any irritation, abnormal appearances or feelings. Report any changes to your doctor. If you do get an infection, it should be treated promptly. Please call your doctor's office for treatment options. To prevent this side effect of therapy, maintain strict oral mouthcare while under chemotherapy treatments:  Brush your teeth and tongue after each meal and before you go to bed. Use a very soft toothbrush.  Rinse your mouth with a baking soda, salt, and water mixture. DO NOT USE MOUTHWASH WITH ALCOHOL IN IT 
 Keep your mouth and lips moist 
 
Every 3 hours during the day, mix together:  1 cup warm water + 1/8 teaspoon salt.  Take small sips and swish and gargle around in your mouth and throat, then spit out Do not have things that can make your mouth hurt.  Don't drink orange, lemon, tomato, or grapefruit juice.  Don't drink alcohol, such as beer or wine. Dont smoke or use tobacco products.  Don't eat crunchy or spicy foods.  Stay away from acidic foods and drinks  Eat foods that are soft, wet, and easy to swallow  Soften food with gravy, sauce, or other liquids

## 2021-03-10 NOTE — PROGRESS NOTES
TELEMEDICINE VISIT    Consent: She and/or the health care decision maker is aware that that she may receive a bill for this telephone service, depending on her insurance coverage, and has provided verbal consent to proceed: Yes  History of Present Illness:     Chief Complaint   Patient presents with    Mouth Lesions       Francisca Fuentes is a 53 y.o. female was evaluated by synchronous (real-time) audio-video technology from home, through the [employer].    C/o blisters all in her mouth. Per patient, it was a reaction to the denture adhesive and prolonged use. Reporting 2-3 months of continuous head movements. No new medications since the onset. The only recent change in medication was her Trazodone was decreased and is being weaned off. Past Medical History:   Diagnosis Date    Autoimmune disease ([facility] [address]     Crohn's Disease    Crohn disease ([facility] [address] 4/19/2010    Crohn's     Depression 4/19/2010    History of vascular access device 07/18/2017    [facility] VAT - 33 cm right brachial PICC for abx and limited access    Perforation bowel ([facility] [address] 7/4/2017    S/p palak Gómez 7/2017    Peripheral neuropathy 2/2/2012    B12 deficiency MRI brain normal 1/2012 MRA head normal 1/2012 CTA neck normal 1/2012     Renal calculi 9/6/2020    Vertigo     Vitamin B12 deficiency 1/21/2012    164 on 1/2012 Needs 1000 mcg IM twice a week        Current Medications/Allergies (REVIEWED)     Current Outpatient Medications on File Prior to Visit   Medication Sig Dispense Refill    fluticasone propionate (FLONASE) 50 mcg/actuation nasal spray SPRAY 2 SPRAYS INTO EACH NOSTRIL EVERY DAY 1 Bottle 1    cimetidine HCl (TAGAMET) 300 mg/5 mL solution Take 5 mL by mouth four (4) times daily. Do NOT take with Protonix/Pantoprazole. 237 mL 0    levoFLOXacin (LEVAQUIN) 750 mg tablet TAKE 1 TABLET BY MOUTH DAILY FOR 6 DAYS.  6 Tab 0    fluconazole (DIFLUCAN) 100 mg tablet PLEASE SEE ATTACHED FOR DETAILED DIRECTIONS 3 Tab 0    LORazepam (ATIVAN) 1 mg tablet Take 1 mg by mouth two (2) times a day. Patient reports taking twice daily [id number]      pantoprazole (PROTONIX) 40 mg tablet Take 40 mg by mouth daily.  baclofen (LIORESAL) 10 mg tablet Take 10 mg by mouth three (3) times daily.  cyanocobalamin (VITAMIN B12) 1,000 mcg/mL injection 1,000 mcg by IntraMUSCular route every thirty (30) days.  DULoxetine (CYMBALTA) 60 mg capsule Take 120 mg by mouth daily.  dextroamphetamine-amphetamine (ADDERALL) 20 mg tablet Take 20 mg by mouth three (3) times daily.  clonazePAM (KLONOPIN) 1 mg tablet Take 1 mg by mouth two (2) times a day. Patient reports taking twice daily in the AM and PM ([id number])      traZODone (DESYREL) 100 mg tablet Take 50 mg by mouth nightly. No current facility-administered medications on file prior to visit. Allergies   Allergen Reactions    Cephalosporins Hives    Doxycycline Swelling    Penicillins Hives    Tetracyclines Nausea and Vomiting         Review of Systems     Review of Systems   Constitutional: Negative for chills and fever. HENT: Negative for sore throat. Mouth sores   Musculoskeletal: Positive for neck pain. Objective:     General: alert, cooperative, no distress   Mental  status: mental status: alert, oriented to person, place, and time, normal mood, behavior, speech, dress, motor activity, and thought processes   Resp: resp: normal effort and no respiratory distress   Neuro: Uncontrollable, repetitive head motions throughout exam   Skin: skin: dried, erythematous skin around mouth   Due to this being a TeleHealth evaluation, many elements of the physical examination are unable to be assessed. Assessment and Plan:       ICD-10-CM ICD-9-CM    1. Mouth ulcers  K12.1 528.9 magic mouthwash solution   2. Akathisia  G25.71 781.0    3. Rash of mouth present on examination  R21 782.1 clotrimazole (LOTRIMIN) 1 % topical cream       Mouth ulcers from dentures.  No longer wearing. Will send magic mouthwash for symptomatic relief while healing    Akathisia. Reviewed current meds and most likely culprit is her Trazodone, of which she was previously on a high dose. Currently, Trazodone is being weaned down by her psychiatrist; down from 200mg daily to 50mg daily. She will see psych in 1 w.k. Rash around mouth. Trial antifungal cream. Cont use of barrier cream.    Follow up as scheduled later this month    Electronically Signed: Alex Galvan MD  Providers location when delivering service (clinic, hospital, home): Clinic      We discussed the expected course, resolution and complications of the diagnosis(es) in detail. Medication risks, benefits, costs, interactions, and alternatives were discussed as indicated. I advised her to contact the office if her condition worsens, changes or fails to improve as anticipated. She expressed understanding with the diagnosis(es) and plan. Patient understands that this encounter was a temporary measure, and the importance of further follow up and examination was emphasized. Patient verbalized understanding. Pursuant to the emergency declaration under the [facility] waiver authority and the [employer] Act, this Virtual  Visit was conducted, with patient's consent, to reduce the patient's risk of exposure to COVID-19 and provide continuity of care for an established patient. Services were provided through a video synchronous discussion virtually to substitute for in-person clinic visit.

## 2021-03-24 ENCOUNTER — OFFICE VISIT (OUTPATIENT)
Dept: FAMILY MEDICINE CLINIC | Age: 51
End: 2021-03-24
Payer: MEDICAID

## 2021-03-24 ENCOUNTER — PATIENT MESSAGE (OUTPATIENT)
Dept: FAMILY MEDICINE CLINIC | Age: 51
End: 2021-03-24

## 2021-03-24 VITALS
WEIGHT: 116 LBS | TEMPERATURE: 97.5 F | OXYGEN SATURATION: 99 % | RESPIRATION RATE: 20 BRPM | SYSTOLIC BLOOD PRESSURE: 117 MMHG | HEIGHT: 62 IN | HEART RATE: 97 BPM | DIASTOLIC BLOOD PRESSURE: 76 MMHG | BODY MASS INDEX: 21.35 KG/M2

## 2021-03-24 DIAGNOSIS — M79.671 PAIN IN BOTH FEET: ICD-10-CM

## 2021-03-24 DIAGNOSIS — R31.9 HEMATURIA, UNSPECIFIED TYPE: ICD-10-CM

## 2021-03-24 DIAGNOSIS — E53.8 B12 DEFICIENCY: ICD-10-CM

## 2021-03-24 DIAGNOSIS — K13.79 MOUTH SORES: Primary | ICD-10-CM

## 2021-03-24 DIAGNOSIS — A69.20 LYME DISEASE: Primary | ICD-10-CM

## 2021-03-24 DIAGNOSIS — A69.20 LYME DISEASE: ICD-10-CM

## 2021-03-24 DIAGNOSIS — G62.9 NEUROPATHY: ICD-10-CM

## 2021-03-24 DIAGNOSIS — M79.672 PAIN IN BOTH FEET: ICD-10-CM

## 2021-03-24 PROBLEM — R68.2 DRY MOUTH: Status: ACTIVE | Noted: 2021-03-24

## 2021-03-24 LAB
BILIRUB UR QL STRIP: NEGATIVE
GLUCOSE UR-MCNC: NEGATIVE MG/DL
KETONES P FAST UR STRIP-MCNC: NEGATIVE MG/DL
PH UR STRIP: 6.5 [PH] (ref 4.6–8)
PROT UR QL STRIP: NEGATIVE
SP GR UR STRIP: 1.01 (ref 1–1.03)
UA UROBILINOGEN AMB POC: NORMAL (ref 0.2–1)
URINALYSIS CLARITY POC: CLEAR
URINALYSIS COLOR POC: YELLOW
URINE BLOOD POC: NEGATIVE
URINE LEUKOCYTES POC: NORMAL
URINE NITRITES POC: NEGATIVE

## 2021-03-24 PROCEDURE — 99214 OFFICE O/P EST MOD 30 MIN: CPT | Performed by: FAMILY MEDICINE

## 2021-03-24 PROCEDURE — 81003 URINALYSIS AUTO W/O SCOPE: CPT | Performed by: FAMILY MEDICINE

## 2021-03-24 RX ORDER — ESCITALOPRAM OXALATE 10 MG/1
20 TABLET ORAL DAILY
COMMUNITY
End: 2022-09-01 | Stop reason: DRUGHIGH

## 2021-03-24 RX ORDER — VALACYCLOVIR HYDROCHLORIDE 1 G/1
1000 TABLET, FILM COATED ORAL 2 TIMES DAILY
Qty: 14 TAB | Refills: 0 | Status: SHIPPED | OUTPATIENT
Start: 2021-03-24 | End: 2021-03-31

## 2021-03-24 NOTE — PROGRESS NOTES
History of Present Illness:     Chief Complaint   Patient presents with    Leg Swelling     bilateral leg swelling x 3 weeks       Barry Mar is a 53 y.o. female     Leg swelling. Reports some areas of sores. Sores in mouth. Improving with magic mouthwash. Concerned about oral herpes. Sores on her back/ legs. Cold sores. C/o frequent falls and weakness in her legs. Thinks it is a chronic issue from her Lyme disease. PMH (REVIEWED):  Past Medical History:   Diagnosis Date    Autoimmune disease [facility]     Crohn's Disease    Crohn disease ([facility] 4/19/2010    Crohn's     Depression 4/19/2010    History of vascular access device 07/18/2017    [facility] VAT - 33 cm right brachial PICC for abx and limited access    [facility] 7/4/2017    S/p palak Ga 7/2017    Peripheral neuropathy 2/2/2012    B12 deficiency MRI brain normal 1/2012 MRA head normal 1/2012 CTA neck normal 1/2012     Renal calculi 9/6/2020    Vertigo     Vitamin B12 deficiency 1/21/2012    164 on 1/2012 Needs 1000 mcg IM twice a week        Current Medications/Allergies (REVIEWED):     Current Outpatient Medications on File Prior to Visit   Medication Sig Dispense Refill    escitalopram oxalate (LEXAPRO) 10 mg tablet Take 10 mg by mouth daily.  fluticasone propionate (FLONASE) 50 mcg/actuation nasal spray SPRAY 2 SPRAYS INTO EACH NOSTRIL EVERY DAY 1 Bottle 1    magic mouthwash solution Magic mouth wash Maalox Lidocaine 2% viscous Diphenhydramine oral solution Pharmacy to mix equal portions of ingredients to a total volume as indicated in the dispense amount. 50 mL 0    clotrimazole (LOTRIMIN) 1 % topical cream Apply  to affected area two (2) times a day. 15 g 0    cimetidine HCl (TAGAMET) 300 mg/5 mL solution Take 5 mL by mouth four (4) times daily. Do NOT take with Protonix/Pantoprazole. 237 mL 0    levoFLOXacin (LEVAQUIN) 750 mg tablet TAKE 1 TABLET BY MOUTH DAILY FOR 6 DAYS.  6 Tab 0    fluconazole (DIFLUCAN) 100 mg tablet PLEASE SEE ATTACHED FOR DETAILED DIRECTIONS 3 Tab 0    LORazepam (ATIVAN) 1 mg tablet Take 1 mg by mouth two (2) times a day. Patient reports taking twice daily [id number]      pantoprazole (PROTONIX) 40 mg tablet Take 40 mg by mouth daily.  baclofen (LIORESAL) 10 mg tablet Take 10 mg by mouth three (3) times daily.  cyanocobalamin (VITAMIN B12) 1,000 mcg/mL injection 1,000 mcg by IntraMUSCular route every thirty (30) days.  DULoxetine (CYMBALTA) 60 mg capsule Take 120 mg by mouth daily.  dextroamphetamine-amphetamine (ADDERALL) 20 mg tablet Take 20 mg by mouth three (3) times daily.  clonazePAM (KLONOPIN) 1 mg tablet Take 1 mg by mouth two (2) times a day. Patient reports taking twice daily in the AM and PM ([id number])      traZODone (DESYREL) 100 mg tablet Take 50 mg by mouth nightly. No current facility-administered medications on file prior to visit. Allergies   Allergen Reactions    Doxycycline Swelling     Other reaction(s): Other (See Comments), WHELPS, ITCHING, NAUSEA  blisters      Adhesive Rash    Cephalosporins Hives    Penicillins Hives    Sulfa (Sulfonamide Antibiotics) Unknown (comments)    Tetracyclines Nausea and Vomiting         Review of Systems:     Review of Systems   Constitutional: Negative for chills, fever and weight loss. HENT: Positive for congestion. Respiratory: Negative for shortness of breath. Cardiovascular: Negative for chest pain. Skin: Positive for rash. Objective:     Vitals:    03/24/21 1449   BP: 117/76   Pulse: 97   Resp: 20   Temp: 97.5 °F (36.4 °C)   TempSrc: Temporal   SpO2: 99%   Weight: 116 lb (52.6 kg)   Height: 5' 2\" (1.575 m)       Physical Exam:  General appearance - alert, well appearing, and in no distress  Mental status - Well groomed today, not disheveled.  Inappropriately disinhibited (nearly completely disrobed to show me lesions on her back), anxious, hyper verbal but seemed to have a more linear though process than usual.   Mouth - edentulous and dry mucus membranes  Neuro - 4+/5 bilateral LE strength. Unsteady gait. Extremities - peripheral pulses normal, no pedal edema, no clubbing or cyanosis  Skin - Sparse, tiny hyperpigmented lesions scattered on legs. Hyperpigmented lesions scattered on back. No lesions appeared to be inflamed; no erythema, induration, ulcerations. Recent Labs:  No results found for this or any previous visit (from the past 12 hour(s)). Assessment and Plan:       ICD-10-CM ICD-9-CM    1. Mouth sores  K13.79 528.9 valACYclovir (VALTREX) 1 gram tablet      CBC W/O DIFF      METABOLIC PANEL, BASIC      AMB POC URINALYSIS DIP STICK AUTO W/O MICRO      CBC W/O DIFF      METABOLIC PANEL, BASIC   2. B12 deficiency  E53.8 266.2 CBC W/O DIFF      CBC W/O DIFF   3. Hematuria, unspecified type  R31.9 599.70 CBC W/O DIFF      METABOLIC PANEL, BASIC      AMB POC URINALYSIS DIP STICK AUTO W/O MICRO      CBC W/O DIFF      METABOLIC PANEL, BASIC   4. Pain in both feet  M79.671 729.5 REFERRAL TO PODIATRY    M79.672     5. Neuropathy  G62.9 355.9    6. Lyme disease  A69.20 088.81 Walker misc       Mouth sores. Resolved. Thought to be due to new dentures and resolved with use of Magic Mouthwash. She does complain of cold sores that seem to occur with the mouth sores. Given script of Valtrex to trial should her cold sores recur. B12 def. Given B12 injection. Hematuria during prior ED visit. Resolved; normal UA today. C/o foot  pain. Will refer to Podiatry as pt would like to consider getting custom shoe inserts. Addendum: Ordered rolling walker to aid with frequent falls and weakness/ neuropathy. Follow up: 1 month    Kiersten Vera MD      We discussed the expected course, resolution and complications of the diagnosis(es) in detail. Medication risks, benefits, costs, interactions, and alternatives were discussed as indicated.   I advised her to contact the office if her condition worsens, changes or fails to improve as anticipated. She expressed understanding with the diagnosis(es) and plan.

## 2021-03-25 LAB
BUN SERPL-MCNC: 14 MG/DL (ref 6–24)
BUN/CREAT SERPL: 16 (ref 9–23)
CALCIUM SERPL-MCNC: 9.4 MG/DL (ref 8.7–10.2)
CHLORIDE SERPL-SCNC: 108 MMOL/L (ref 96–106)
CO2 SERPL-SCNC: 19 MMOL/L (ref 20–29)
CREAT SERPL-MCNC: 0.87 MG/DL (ref 0.57–1)
ERYTHROCYTE [DISTWIDTH] IN BLOOD BY AUTOMATED COUNT: 14.5 % (ref 11.7–15.4)
GLUCOSE SERPL-MCNC: 81 MG/DL (ref 65–99)
HCT VFR BLD AUTO: 36.2 % (ref 34–46.6)
HGB BLD-MCNC: 11 G/DL (ref 11.1–15.9)
MCH RBC QN AUTO: 27.3 PG (ref 26.6–33)
MCHC RBC AUTO-ENTMCNC: 30.4 G/DL (ref 31.5–35.7)
MCV RBC AUTO: 90 FL (ref 79–97)
PLATELET # BLD AUTO: 223 X10E3/UL (ref 150–450)
POTASSIUM SERPL-SCNC: 4.9 MMOL/L (ref 3.5–5.2)
RBC # BLD AUTO: 4.03 X10E6/UL (ref 3.77–5.28)
SODIUM SERPL-SCNC: 140 MMOL/L (ref 134–144)
WBC # BLD AUTO: 9.1 X10E3/UL (ref 3.4–10.8)

## 2021-03-25 NOTE — TELEPHONE ENCOUNTER
From: Anthony Scanlon MD  To: Lillian Asif  Sent: 3/24/2021 6:17 PM EDT  Subject: Todays Visit    Hi Aprill,    It was really good seeing you today! !    I realized I totally forgot to send in the Podiatry referral for your feet! I have placed it and you should be able to see the referral information in your chart. Let me know if you have any problems/ questions. Did you get your B12 injection? ? If not, we should get you scheduled for a nurse visit to get it.      ADITYA

## 2021-03-26 ENCOUNTER — TELEPHONE (OUTPATIENT)
Dept: FAMILY MEDICINE CLINIC | Age: 51
End: 2021-03-26

## 2021-03-26 NOTE — TELEPHONE ENCOUNTER
Regarding: RE:Todays Visit  Contact: [phone]  ----- Message from Alonso Scanlon MD sent at 3/25/2021  5:33 PM EDT -----  Camilo Escobedo,    I placed order for rolling walker. Pt put all the details about where to fax the orders and what to send in the [employer] message. Would really appreciate your help!!! Thanks!     ----- Message sent from Yaritza Martin MD to Mega Johnson at 3/25/2021  5:32 PM -----   And I meant it too! You looked so nice yesterday! I placed the order for the Rollator. I will get Divina to help me start working on it. Thanks for sending those details!      ----- Message -----       From:Onel Ro Reach       Sent:3/25/2021  4:04 PM EDT         Yolanda Mendoza MD    Subject:RE:Todays Visit    Yes Maam I Did Received My B-12 Shot Yesterday. Was Also Able Too Check With My Insurance and They Referred Me Too A Place Called [facility]: I Left a Message there With Diaz Benjamin. But Since Your The Absolute Best Doctor, No Offense Meant Besides !!! This is what they need from you marcel   The Prescription For The Chair Its Called A ( ROLLATOR)   My Diagnosis Lymes Disease   And The Copy Of Yesterdays 3-24-21 visit  Needed Height 52 and Weight 116 Lb  And If Possible A Copy Of My Insurance Card. .  And Then Fax It To [facility] ( [phone])     Thank You For [facility] Get Very Many. . ( But My Sweet Friend Youre Absolutely Beautiful! I Was Simmie Karma around about the wheelchair thing! ! ( You Have People Lol      ----- Message -----       Mindi Kelly MD       Sent:3/24/2021  6:17 PM EDT         To:Onel Dutta    Subject:Todays Visit    Hi Onel,    It was really good seeing you today! !    I realized I totally forgot to send in the Podiatry referral for your feet! I have placed it and you should be able to see the referral information in your chart. Let me know if you have any problems/ questions.     Did you get your B12 injection? ? If not, we should get you scheduled for a nurse visit to get it.      ADELAT

## 2021-03-29 ENCOUNTER — TELEPHONE (OUTPATIENT)
Dept: FAMILY MEDICINE CLINIC | Age: 51
End: 2021-03-29

## 2021-03-29 NOTE — TELEPHONE ENCOUNTER
----- Message from [place] sent at 3/25/2021 10:51 AM EDT -----  Regarding: Dr. Mohr Fuelling  General Message/Vendor Calls    Caller's first and last name:  Kirill Gordon      Reason for call:  Requesting a call back to see if she can reschedule her nurse visit  for her B12 shot to 4/21/21 which is the same day she will see Dr. Kenisha Nielsen @ 11 am.       Callback required yes/no and why: yes      Best contact number(s):[phone]      Details to clarify the request:  Alexandru Robertson [facility]

## 2021-03-29 NOTE — TELEPHONE ENCOUNTER
----- Message from General Tiffany sent at 3/25/2021  2:49 PM EDT -----  Regarding: FW: Dr. Phillips  Contact: [phone]  Forwarding so this can be addressed . Thanks   ----- Message -----  From: Tiffany Ear  Sent: 3/25/2021   1:48 PM EDT  To: Chintan Alvarez Front Office  Subject: Dr. Phillips                            General Message/Vendor Calls    Caller's first and last name:N/A      Reason for call: Vani Roberts required yes/no and why: Yes/Confirm      Best contact number(s):[phone]      Details to clarify the request: Patient needs doctor to fax over a prescription with a copy of yesterdays office note, her height and weight, a copy front and back of her insurance card and the reason why she needs this wheel chair to [phone]. Please call patient once this is done.         Perez Cummings

## 2021-03-29 NOTE — TELEPHONE ENCOUNTER
I left voicemail to let the patient know that she can get her B-12 injection on the same day as her office visit with Dr. Lady Antonio. No need to schedule this separately, I noted on the office visit.

## 2021-04-21 ENCOUNTER — VIRTUAL VISIT (OUTPATIENT)
Dept: FAMILY MEDICINE CLINIC | Age: 51
End: 2021-04-21
Payer: MEDICAID

## 2021-04-21 DIAGNOSIS — K13.79 MOUTH SORES: ICD-10-CM

## 2021-04-21 DIAGNOSIS — Z91.09 ENVIRONMENTAL ALLERGIES: ICD-10-CM

## 2021-04-21 DIAGNOSIS — E55.9 VITAMIN D DEFICIENCY: ICD-10-CM

## 2021-04-21 DIAGNOSIS — K12.1 MOUTH ULCERS: Primary | ICD-10-CM

## 2021-04-21 PROCEDURE — 99214 OFFICE O/P EST MOD 30 MIN: CPT | Performed by: FAMILY MEDICINE

## 2021-04-21 RX ORDER — ERGOCALCIFEROL 1.25 MG/1
50000 CAPSULE ORAL
Qty: 8 CAP | Refills: 0 | Status: SHIPPED | OUTPATIENT
Start: 2021-04-21 | End: 2021-06-02

## 2021-04-21 RX ORDER — VALACYCLOVIR HYDROCHLORIDE 500 MG/1
500 TABLET, FILM COATED ORAL DAILY
Qty: 90 TAB | Refills: 1 | Status: SHIPPED | OUTPATIENT
Start: 2021-04-21 | End: 2021-09-02

## 2021-04-21 NOTE — PATIENT INSTRUCTIONS
Podiatry Referral: [facility] [address] [place], [address] 
[phone]

## 2021-04-21 NOTE — Clinical Note
Please schedule for a nurse visit for her B12 injection. Pt requested Tuesday around 2 or 230 if possible. Told her I'd ask. Thanks!

## 2021-04-21 NOTE — PROGRESS NOTES
TELEMEDICINE VISIT    Consent: She and/or the health care decision maker is aware that that she may receive a bill for this telephone service, depending on her insurance coverage, and has provided verbal consent to proceed: Yes  History of Present Illness:     Chief Complaint   Patient presents with   Nancey McCarthy Symptoms       Selina Miranda is a 49 y.o. female was evaluated by synchronous (real-time) audio-video technology from home, through the [employer].    Weaning off of the Trazodone per psych due to reaction. Stopped Cymbalta and put her back on Lexapro. C/o being congested and febrile. She got her first COVID vaccine 4 days ago. Blisters in her mouth. Resolved when we did the antiviral. Sores returned within a week after stopping. Past Medical History:   Diagnosis Date    Autoimmune disease ([facility] [address]     Crohn's Disease    Crohn disease ([facility] 4/19/2010    Crohn's     Depression 4/19/2010    History of vascular access device 07/18/2017    [facility] VAT - 33 cm right brachial PICC for abx and limited access    Perforation bowel ([facility] 7/4/2017    S/p palak Tejeda 7/2017    Peripheral neuropathy 2/2/2012    B12 deficiency MRI brain normal 1/2012 MRA head normal 1/2012 CTA neck normal 1/2012     Renal calculi 9/6/2020    Vertigo     Vitamin B12 deficiency 1/21/2012    164 on 1/2012 Needs 1000 mcg IM twice a week        Current Medications/Allergies (REVIEWED)     Current Outpatient Medications on File Prior to Visit   Medication Sig Dispense Refill    Walker misc Rollator walker. Dx: Lyme disease, falls, neuropathy 1 Each 0    escitalopram oxalate (LEXAPRO) 10 mg tablet Take 10 mg by mouth daily.       fluticasone propionate (FLONASE) 50 mcg/actuation nasal spray SPRAY 2 SPRAYS INTO EACH NOSTRIL EVERY DAY 1 Bottle 1    magic mouthwash solution Magic mouth wash Maalox Lidocaine 2% viscous Diphenhydramine oral solution Pharmacy to mix equal portions of ingredients to a total volume as indicated in the dispense amount. 50 mL 0    clotrimazole (LOTRIMIN) 1 % topical cream Apply  to affected area two (2) times a day. 15 g 0    cimetidine HCl (TAGAMET) 300 mg/5 mL solution Take 5 mL by mouth four (4) times daily. Do NOT take with Protonix/Pantoprazole. 237 mL 0    levoFLOXacin (LEVAQUIN) 750 mg tablet TAKE 1 TABLET BY MOUTH DAILY FOR 6 DAYS. 6 Tab 0    fluconazole (DIFLUCAN) 100 mg tablet PLEASE SEE ATTACHED FOR DETAILED DIRECTIONS 3 Tab 0    LORazepam (ATIVAN) 1 mg tablet Take 1 mg by mouth two (2) times a day. Patient reports taking twice daily [id number]      pantoprazole (PROTONIX) 40 mg tablet Take 40 mg by mouth daily.  baclofen (LIORESAL) 10 mg tablet Take 10 mg by mouth three (3) times daily.  cyanocobalamin (VITAMIN B12) 1,000 mcg/mL injection 1,000 mcg by IntraMUSCular route every thirty (30) days.  DULoxetine (CYMBALTA) 60 mg capsule Take 120 mg by mouth daily.  dextroamphetamine-amphetamine (ADDERALL) 20 mg tablet Take 20 mg by mouth three (3) times daily.  clonazePAM (KLONOPIN) 1 mg tablet Take 1 mg by mouth two (2) times a day. Patient reports taking twice daily in the AM and PM ([id number])      traZODone (DESYREL) 100 mg tablet Take 50 mg by mouth nightly. No current facility-administered medications on file prior to visit. Allergies   Allergen Reactions    Doxycycline Swelling     Other reaction(s): Other (See Comments), WHELPS, ITCHING, NAUSEA  blisters      Adhesive Rash    Cephalosporins Hives    Penicillins Hives    Sulfa (Sulfonamide Antibiotics) Unknown (comments)    Tetracyclines Nausea and Vomiting         Review of Systems     Review of Systems   Constitutional: Negative for chills, fever and malaise/fatigue. HENT: Positive for congestion, sinus pain and sore throat. Skin: Positive for rash.      Objective:     General: alert, cooperative, no distress   Mental  status: mental status: alert, oriented to person, place, and time, anxious   Resp: resp: normal effort and no respiratory distress   Neuro: neuro: no gross deficits   Skin: skin: no discoloration or lesions of concern on visible areas   Due to this being a TeleHealth evaluation, many elements of the physical examination are unable to be assessed. Assessment and Plan:       ICD-10-CM ICD-9-CM    1. Mouth ulcers  K12.1 528.9    2. Mouth sores  K13.79 528.9 valACYclovir (VALTREX) 500 mg tablet   3. Vitamin D deficiency  E55.9 268.9 ergocalciferol (ERGOCALCIFEROL) 1,250 mcg (50,000 unit) capsule      VITAMIN D, 25 HYDROXY      VITAMIN D, 25 HYDROXY   4. Environmental allergies  Z91.09 V15.09        Trial of Valtrex, which seemed to help mouth sores. Will start daily suppressive therapy and re-eval.     Vit D def. Needs Vit D level checked. Recommended she start by her Psychiatrist but I do not have his records to note whether labs were done. Will start Vit D2 supplement and plan to check lab at next visit. Allergies. Continue Flonase and nasal rinses. Will have her follow up for nurse visit for her vitamin B12 injection    Electronically Signed: Lilliana Acharya MD  Providers location when delivering service (clinic, hospital, home): Clinic    We discussed the expected course, resolution and complications of the diagnosis(es) in detail. Medication risks, benefits, costs, interactions, and alternatives were discussed as indicated. I advised her to contact the office if her condition worsens, changes or fails to improve as anticipated. She expressed understanding with the diagnosis(es) and plan. Patient understands that this encounter was a temporary measure, and the importance of further follow up and examination was emphasized. Patient verbalized understanding.     Pursuant to the emergency declaration under the [facility], [address] [place] [address] and the [employer], this Virtual Visit was conducted, with patient's consent, to reduce the patient's risk of exposure to COVID-19 and provide continuity of care for an established patient. Services were provided through a video synchronous discussion virtually to substitute for in-person clinic visit.

## 2021-04-26 ENCOUNTER — TELEPHONE (OUTPATIENT)
Dept: FAMILY MEDICINE CLINIC | Age: 51
End: 2021-04-26

## 2021-04-26 NOTE — TELEPHONE ENCOUNTER
[facility]   phone # [phone] stated patient is aware to  [facility].

## 2021-04-26 NOTE — TELEPHONE ENCOUNTER
[facility]   phone # [phone] stated patient is aware to  [facility].

## 2021-04-26 NOTE — TELEPHONE ENCOUNTER
[facility]   phone # [phone] stated patient is aware to  [facility].

## 2021-05-05 RX ORDER — FLUTICASONE PROPIONATE 50 MCG
SPRAY, SUSPENSION (ML) NASAL
Qty: 1 BOTTLE | Refills: 1 | Status: SHIPPED | OUTPATIENT
Start: 2021-05-05 | End: 2021-06-03 | Stop reason: ALTCHOICE

## 2021-05-11 ENCOUNTER — CLINICAL SUPPORT (OUTPATIENT)
Dept: FAMILY MEDICINE CLINIC | Age: 51
End: 2021-05-11
Payer: MEDICAID

## 2021-05-11 VITALS
OXYGEN SATURATION: 97 % | RESPIRATION RATE: 16 BRPM | HEART RATE: 99 BPM | SYSTOLIC BLOOD PRESSURE: 127 MMHG | TEMPERATURE: 98.9 F | DIASTOLIC BLOOD PRESSURE: 76 MMHG | BODY MASS INDEX: 20.06 KG/M2 | WEIGHT: 109 LBS | HEIGHT: 62 IN

## 2021-05-11 DIAGNOSIS — E53.8 B12 DEFICIENCY: Primary | ICD-10-CM

## 2021-05-11 PROCEDURE — 96372 THER/PROPH/DIAG INJ SC/IM: CPT | Performed by: FAMILY MEDICINE

## 2021-05-11 RX ORDER — CYANOCOBALAMIN 1000 UG/ML
1000 INJECTION, SOLUTION INTRAMUSCULAR; SUBCUTANEOUS ONCE
Status: COMPLETED | OUTPATIENT
Start: 2021-05-11 | End: 2021-05-11

## 2021-05-11 RX ADMIN — CYANOCOBALAMIN 1000 MCG: 1000 INJECTION, SOLUTION INTRAMUSCULAR; SUBCUTANEOUS at 17:32

## 2021-06-01 DIAGNOSIS — E55.9 VITAMIN D DEFICIENCY: ICD-10-CM

## 2021-06-01 DIAGNOSIS — K12.1 MOUTH ULCERS: Primary | ICD-10-CM

## 2021-06-02 RX ORDER — ERGOCALCIFEROL 1.25 MG/1
CAPSULE ORAL
Qty: 8 CAPSULE | Refills: 0 | Status: SHIPPED | OUTPATIENT
Start: 2021-06-02 | End: 2021-06-07 | Stop reason: DRUGHIGH

## 2021-06-03 ENCOUNTER — OFFICE VISIT (OUTPATIENT)
Dept: FAMILY MEDICINE CLINIC | Age: 51
End: 2021-06-03
Payer: MEDICAID

## 2021-06-03 VITALS
WEIGHT: 109.6 LBS | BODY MASS INDEX: 20.17 KG/M2 | HEIGHT: 62 IN | DIASTOLIC BLOOD PRESSURE: 75 MMHG | HEART RATE: 95 BPM | TEMPERATURE: 98.5 F | RESPIRATION RATE: 16 BRPM | SYSTOLIC BLOOD PRESSURE: 116 MMHG | OXYGEN SATURATION: 100 %

## 2021-06-03 DIAGNOSIS — E53.8 VITAMIN B12 DEFICIENCY: ICD-10-CM

## 2021-06-03 DIAGNOSIS — Z91.09 ENVIRONMENTAL ALLERGIES: ICD-10-CM

## 2021-06-03 DIAGNOSIS — R68.2 DRY MOUTH: Primary | ICD-10-CM

## 2021-06-03 DIAGNOSIS — K12.1 MOUTH ULCERS: ICD-10-CM

## 2021-06-03 DIAGNOSIS — E55.9 VITAMIN D DEFICIENCY: ICD-10-CM

## 2021-06-03 PROCEDURE — 99214 OFFICE O/P EST MOD 30 MIN: CPT | Performed by: FAMILY MEDICINE

## 2021-06-03 RX ORDER — MINERAL OIL
180 ENEMA (ML) RECTAL
Qty: 90 TABLET | Refills: 3 | Status: SHIPPED | OUTPATIENT
Start: 2021-06-03 | End: 2022-03-23

## 2021-06-03 RX ORDER — MOMETASONE FUROATE 50 UG/1
2 SPRAY, METERED NASAL DAILY
Qty: 17 CONTAINER | Refills: 3 | Status: SHIPPED | OUTPATIENT
Start: 2021-06-03 | End: 2021-09-02

## 2021-06-03 NOTE — PROGRESS NOTES
Chief Complaint   Patient presents with    Medication Refill     valtrex, magic mouthwash, flonase, wants Rx for allegra 24 hr     1. Have you been to the ER, urgent care clinic since your last visit? Hospitalized since your last visit? No    2. Have you seen or consulted any other health care providers outside of the [address] [place] [address] since your last visit? Include any pap smears or colon screening.  No

## 2021-06-03 NOTE — PROGRESS NOTES
History of Present Illness:     Chief Complaint   Patient presents with    Medication Refill     valtrex, magic mouthwash, flonase, wants Rx for allegra 24 hr       Wally Lee is a 49 y.o. female     Mouth sores. Doing well with Valtrex. Asking for more Magic Mouthwash. Allergies. Wants refills for Allegra 24 hours. Feels like the Flonase is not working well, which she takes 2 times daily. Asking to have her vitamin levels re-checked. Currently on high dose Vitamin D and gets monthly B12 injections    PMH (REVIEWED):  Past Medical History:   Diagnosis Date    Autoimmune disease ([facility]     Crohn's Disease    Crohn disease ([facility] 4/19/2010    Crohn's     Depression 4/19/2010    History of vascular access device 07/18/2017    [facility] VAT - 33 cm right brachial PICC for abx and limited access    Perforation bowel ([facility] 7/4/2017    S/p palak Osman 7/2017    Peripheral neuropathy 2/2/2012    B12 deficiency MRI brain normal 1/2012 MRA head normal 1/2012 CTA neck normal 1/2012     Renal calculi 9/6/2020    Vertigo     Vitamin B12 deficiency 1/21/2012    164 on 1/2012 Needs 1000 mcg IM twice a week        Current Medications/Allergies (REVIEWED):     Current Outpatient Medications on File Prior to Visit   Medication Sig Dispense Refill    ergocalciferol (ERGOCALCIFEROL) 1,250 mcg (50,000 unit) capsule TAKE 1 CAPSULE BY MOUTH ONE TIME PER WEEK 8 Capsule 0    valACYclovir (VALTREX) 500 mg tablet Take 1 Tab by mouth daily. 90 Tab 1    Walker misc Rollator walker. Dx: Lyme disease, falls, neuropathy 1 Each 0    escitalopram oxalate (LEXAPRO) 10 mg tablet Take 10 mg by mouth daily.  clotrimazole (LOTRIMIN) 1 % topical cream Apply  to affected area two (2) times a day. 15 g 0    cimetidine HCl (TAGAMET) 300 mg/5 mL solution Take 5 mL by mouth four (4) times daily. Do NOT take with Protonix/Pantoprazole.  237 mL 0    fluconazole (DIFLUCAN) 100 mg tablet PLEASE SEE ATTACHED FOR DETAILED DIRECTIONS 3 Tab 0    LORazepam (ATIVAN) 1 mg tablet Take 1 mg by mouth two (2) times a day. Patient reports taking twice daily [id number]      pantoprazole (PROTONIX) 40 mg tablet Take 40 mg by mouth daily.  baclofen (LIORESAL) 10 mg tablet Take 10 mg by mouth three (3) times daily.  cyanocobalamin (VITAMIN B12) 1,000 mcg/mL injection 1,000 mcg by IntraMUSCular route every thirty (30) days.  DULoxetine (CYMBALTA) 60 mg capsule Take 120 mg by mouth daily.  dextroamphetamine-amphetamine (ADDERALL) 20 mg tablet Take 20 mg by mouth three (3) times daily.  clonazePAM (KLONOPIN) 1 mg tablet Take 1 mg by mouth two (2) times a day. Patient reports taking twice daily in the AM and PM ([id number])      traZODone (DESYREL) 100 mg tablet Take 50 mg by mouth nightly. No current facility-administered medications on file prior to visit. Allergies   Allergen Reactions    Doxycycline Swelling     Other reaction(s): Other (See Comments), WHELPS, ITCHING, NAUSEA  blisters      Adhesive Rash    Cephalosporins Hives    Penicillins Hives    Sulfa (Sulfonamide Antibiotics) Unknown (comments)    Tetracyclines Nausea and Vomiting         Review of Systems:     Review of Systems   Constitutional: Negative for fever. HENT: Positive for congestion and sinus pain. Objective:     Vitals:    06/03/21 1516   BP: 116/75   Pulse: 95   Resp: 16   Temp: 98.5 °F (36.9 °C)   TempSrc: Temporal   SpO2: 100%   Weight: 109 lb 9.6 oz (49.7 kg)   Height: 5' 2\" (1.575 m)       Physical Exam:  General appearance - alert, well appearing, and in no distress  Mental status - alert, oriented to person, place, and time, anxious, crying throughout parts of the exam, scattered (baseline).   Mouth - thrush noted and dry mucus membranes  Neurological - alert, oriented, normal speech, no focal findings or movement disorder noted    Recent Labs:  No results found for this or any previous visit (from the past 12 hour(s)). Assessment and Plan:       ICD-10-CM ICD-9-CM    1. Dry mouth  R68.2 527.7    2. Mouth ulcers  K12.1 528.9 magic mouthwash solution   3. Vitamin B12 deficiency  E53.8 266.2 VITAMIN B12 & FOLATE      VITAMIN B12 & FOLATE   4. Vitamin D deficiency  E55.9 268.9 VITAMIN D, 25 HYDROXY      VITAMIN D, 25 HYDROXY   5. Environmental allergies  Z91.09 V15.09 fexofenadine (ALLEGRA) 180 mg tablet      mometasone (NASONEX) 50 mcg/actuation nasal spray       Refilled needed meds    Discussed Allegra D. Advised against regular use, especially because this is likely contributing to her dry mouth. Can use Allegra and will trial Nasonex. Counseled on appropriate use of nasal spray, reported amount was too much. Re-checking vitamin levels    Follow up: 1 month    Octavio Marino MD      We discussed the expected course, resolution and complications of the diagnosis(es) in detail. Medication risks, benefits, costs, interactions, and alternatives were discussed as indicated. I advised her to contact the office if her condition worsens, changes or fails to improve as anticipated. She expressed understanding with the diagnosis(es) and plan.

## 2021-06-04 LAB
25(OH)D3+25(OH)D2 SERPL-MCNC: 43.4 NG/ML (ref 30–100)
FOLATE SERPL-MCNC: 13.9 NG/ML
VIT B12 SERPL-MCNC: 185 PG/ML (ref 232–1245)

## 2021-06-07 DIAGNOSIS — E55.9 VITAMIN D DEFICIENCY: ICD-10-CM

## 2021-06-07 RX ORDER — MELATONIN
1000 DAILY
Qty: 90 TABLET | Refills: 3 | Status: SHIPPED | OUTPATIENT
Start: 2021-06-07 | End: 2021-08-31 | Stop reason: ALTCHOICE

## 2021-06-07 RX ORDER — ERGOCALCIFEROL 1.25 MG/1
CAPSULE ORAL
Qty: 8 CAPSULE | Refills: 0 | OUTPATIENT
Start: 2021-06-07

## 2021-06-07 NOTE — TELEPHONE ENCOUNTER
Reviewed request. Pt had since had her Vitamin D levels checked since requesting med refill. Vitamin D levels acceptable so sending in daily Vitamin D supplement in armond of the high dose therapy which is no longer indicated.      Barbara Llamas MD

## 2021-06-07 NOTE — TELEPHONE ENCOUNTER
----- Message from Jaqueline Phoenix sent at 6/4/2021  4:10 PM EDT -----  Regarding: Dr. Av Hatch  General Message/Vendor Calls    Caller's first and last name:      Reason for call: [facility] advising they don't have Rx for ergocalciferol (ERGOCALCIFEROL) 1,250 mcg (50,000 unit) capsule           Callback required yes/no and why: yes      Best contact number(s):[phone]      Details to clarify the request:April advising [facility] doesn't have her Rx even though system showing they have been sent to [facility]      Jaqueline Phoenix

## 2021-06-09 DIAGNOSIS — Z91.09 ENVIRONMENTAL ALLERGIES: ICD-10-CM

## 2021-06-09 RX ORDER — MINERAL OIL
180 ENEMA (ML) RECTAL
Qty: 90 TABLET | Refills: 3 | Status: CANCELLED | OUTPATIENT
Start: 2021-06-09

## 2021-06-10 ENCOUNTER — CLINICAL SUPPORT (OUTPATIENT)
Dept: FAMILY MEDICINE CLINIC | Age: 51
End: 2021-06-10
Payer: MEDICAID

## 2021-06-10 VITALS
TEMPERATURE: 98.2 F | BODY MASS INDEX: 20.17 KG/M2 | DIASTOLIC BLOOD PRESSURE: 76 MMHG | RESPIRATION RATE: 16 BRPM | HEIGHT: 62 IN | OXYGEN SATURATION: 99 % | WEIGHT: 109.6 LBS | SYSTOLIC BLOOD PRESSURE: 118 MMHG | HEART RATE: 98 BPM

## 2021-06-10 DIAGNOSIS — E53.8 VITAMIN B12 DEFICIENCY: Primary | ICD-10-CM

## 2021-06-10 PROCEDURE — 96372 THER/PROPH/DIAG INJ SC/IM: CPT | Performed by: FAMILY MEDICINE

## 2021-06-10 RX ORDER — CYANOCOBALAMIN 1000 UG/ML
1000 INJECTION, SOLUTION INTRAMUSCULAR; SUBCUTANEOUS ONCE
Status: COMPLETED | OUTPATIENT
Start: 2021-06-10 | End: 2021-06-10

## 2021-06-10 RX ORDER — NYSTATIN 100000 [USP'U]/ML
1 SUSPENSION ORAL 4 TIMES DAILY
Qty: 473 ML | Refills: 1 | Status: SHIPPED | OUTPATIENT
Start: 2021-06-10 | End: 2021-08-31

## 2021-06-10 RX ADMIN — CYANOCOBALAMIN 1000 MCG: 1000 INJECTION, SOLUTION INTRAMUSCULAR; SUBCUTANEOUS at 17:14

## 2021-06-10 NOTE — PROGRESS NOTES
Chief Complaint   Patient presents with    Injection B12     per Dr. Mandeep Mackey 12 injection give in the right dorsogluteal site. 1. Have you been to the ER, urgent care clinic since your last visit? Hospitalized since your last visit? No    2. Have you seen or consulted any other health care providers outside of the [address] [place] [address] since your last visit? Include any pap smears or colon screening.  No

## 2021-06-30 ENCOUNTER — TELEPHONE (OUTPATIENT)
Dept: FAMILY MEDICINE CLINIC | Age: 51
End: 2021-06-30

## 2021-06-30 NOTE — TELEPHONE ENCOUNTER
----- Message from Carrie Gilford sent at 6/30/2021 11:09 AM EDT -----  Regarding: Dr. Randolph Bañuelos  Contact: [phone]  Appointment not available    Caller's first and last name and relationship to patient (if not the patient):      Best contact number:[phone]      Preferred date and time:Week of July 6, last appointment of the day. Scheduled appointment date and time: N/A      Reason for appointment: med check and B12 shot. Details to clarify the request: Please have Anali call patient back to set up appointment.         Carrie Gilford

## 2021-07-14 NOTE — TELEPHONE ENCOUNTER
Dr. Priya Tabares  Received: Today  [address] [employer] Message/Vendor Calls     Caller's first and last name:self       Reason for call:Pt advised checking on status of appointment for B-12 injection and med check. Callback required yes/no and why:yes, to clarify       Best contact number(s):[phone]       Details to clarify the request:Pt advised spoke with someone on 06.30.21 and has yet to hear anything back from anyone in regards to this. Pt advised also believes she has URI and wants to know if she can be seen today by anyone or prescribed something to help with it. Pt advised last B-12 was on 06-10-21.        [place]

## 2021-07-15 ENCOUNTER — TELEPHONE (OUTPATIENT)
Dept: FAMILY MEDICINE CLINIC | Age: 51
End: 2021-07-15

## 2021-07-15 NOTE — TELEPHONE ENCOUNTER
Late documentation of call of yesterday 7/14/21 at about 4:27 pm. Pt wants to peak directly to Dr. Luis weber about getting appt. Pt states she has been trying to get appt for a while now.     Nurse Ila NOGUEIRA spoke to pt further

## 2021-07-15 NOTE — TELEPHONE ENCOUNTER
Spoke with patient in regards to her concerns. Patient states she has been experiencing cold symptoms for the past week. She's had a cough that produces a green phlegm as well as sneezing and a runny nose. She stated that her recent temperature readings have been around 99.9 as well.

## 2021-07-27 ENCOUNTER — VIRTUAL VISIT (OUTPATIENT)
Dept: FAMILY MEDICINE CLINIC | Age: 51
End: 2021-07-27
Payer: MEDICAID

## 2021-07-27 ENCOUNTER — TELEPHONE (OUTPATIENT)
Dept: FAMILY MEDICINE CLINIC | Age: 51
End: 2021-07-27

## 2021-07-27 DIAGNOSIS — R05.8 COUGH PRODUCTIVE OF PURULENT SPUTUM: Primary | ICD-10-CM

## 2021-07-27 DIAGNOSIS — J06.9 UPPER RESPIRATORY TRACT INFECTION, UNSPECIFIED TYPE: ICD-10-CM

## 2021-07-27 PROCEDURE — 99214 OFFICE O/P EST MOD 30 MIN: CPT | Performed by: STUDENT IN AN ORGANIZED HEALTH CARE EDUCATION/TRAINING PROGRAM

## 2021-07-27 NOTE — PROGRESS NOTES
[address] Medicine Residency Attending Addendum:  Dr. Malathi Malave MD,  the patient and I were not physically present during this encounter. The resident and I are concurrently monitoring the patient care through appropriate telecommunication technology. I discussed the findings, assessment and plan with the resident and agree with the resident's findings and plan as documented in the resident's note.       Du Ritter MD

## 2021-07-27 NOTE — PROGRESS NOTES
Garry Simental  49 y.o. female  1970  [address] [address]  [id number]   [address]:    Telemedicine Progress Note  Johnny Ortiz DO       Encounter Date and Time: July 27, 2021 at 1:05 PM    Consent: Garry Simental, who was seen by synchronous (real-time) audio-video technology, and/or her healthcare decision maker, is aware that this patient-initiated, Telehealth encounter on 7/27/2021 is a billable service, with coverage as determined by her insurance carrier. She is aware that she may receive a bill and has provided verbal consent to proceed: Yes. No chief complaint on file. History of Present Illness   Onel Londono is a 49 y.o. female was evaluated by synchronous (real-time) audio-video technology from home, through a secure patient portal.    Patient reports that she has been feeling sick with generalized malaise, fevers (T-max 101 at home), chills, nasal congestion, ear fullness, sore throat, productive cough (brown/green phlegm), SOB, chest pain/tightness, headaches, and body aches over the last 1.5 weeks. She notes hx of Crohn's disease and states that her chronic diarrhea has also been worse over the last 1-2 weeks. She has been able to eat/drink okay, but has a poor appetite/poor PO intake at baseline secondary to chronic mouth sores. She has been trying to get an appointment to see Dr. David Baumann at our clinic but has been unable to get in. Her son was sick with similar symptoms recently and was seen at [facility] and treated with antibiotics. He tested negative for COVID. Patient notes that she has received both COVID vaccines. She denies any nausea, vomiting, blood in stool. She has tried taking prescribed Allegra and Nasonex spray without relief. Review of Systems   Review of Systems   Constitutional: Positive for chills, fever and malaise/fatigue. HENT: Positive for congestion and sore throat. Negative for ear pain. + ear fullness   Respiratory: Positive for cough, sputum production and shortness of breath. Cardiovascular: Positive for chest pain. Gastrointestinal: Positive for diarrhea. Negative for blood in stool, nausea and vomiting. Musculoskeletal: Positive for myalgias. Neurological: Positive for headaches. Psychiatric/Behavioral: The patient is nervous/anxious. Vitals/Objective:     General: alert, distracted, mild distress, ill-appearing, tearful and crying throughout encounter   Mental  status: mental status: alert, oriented to person, place, and time, anxious   Resp: resp: coughing - productive and tachypnea (actively coughing up green/brown phlegm)   Neuro: neuro: no gross deficits   Skin: skin: no discoloration or lesions of concern on visible areas   Due to this being a TeleHealth evaluation, many elements of the physical examination are unable to be assessed. Assessment and Plan:   Time-based coding, delete if not needed: I spent at least 15 minutes with this established patient, and >50% of the time was spent counseling and/or coordinating care regarding plan of care    1. Cough productive of purulent sputum  2. Upper respiratory tract infection, unspecified type    Assessment/Plan: Patient appears ill and distressed, mildly dyspneic and coughing up green/brown sputum during encounter. Tearful and anxious, unable to complete full med rec during encounter. Given fever and mild respiratory distress recommended that patient go to urgent care center for in-person examination, chest X-ray to evaluate for possible PNA, and possible lab work including COVID testing and possible strep/flu testing. Patient agrees with plan. She said that a friend can transport her to an urgent care center or the ED.          Time spent in direct conversation with the patient to include medical condition(s) discussed, assessment and treatment plan:       We discussed the expected course, resolution and complications of the diagnosis(es) in detail. Medication risks, benefits, costs, interactions, and alternatives were discussed as indicated. I advised her to contact the office if her condition worsens, changes or fails to improve as anticipated. She expressed understanding with the diagnosis(es) and plan. Patient understands that this encounter was a temporary measure, and the importance of further follow up and examination was emphasized. Patient verbalized understanding. Electronically Signed: Trevor Azevedo DO    CPT Codes 98370-38313 for Established Patients may apply to this Telehealth Visit. POS code: 18. Modifier GT    Pamela Vital is a 49 y.o. female who was evaluated by an audio-video encounter for concerns as above. Patient identification was verified prior to start of the visit. A caregiver was present when appropriate. Due to this being a TeleHealth encounter (During [facility]- public health emergency), evaluation of the following organ systems was limited: Vitals/Constitutional/EENT/Resp/CV/GI//MS/Neuro/Skin/Heme-Lymph-Imm. Pursuant to the emergency declaration under the [facility] waiver authority and the [employer] Act, this Virtual Visit was conducted, with patient's (and/or legal guardian's) consent, to reduce the patient's risk of exposure to COVID-19 and provide necessary medical care. Services were provided through a synchronous discussion virtually to substitute for in-person clinic visit. I was at home. The patient was at home. History   Patients past medical, surgical and family histories were reviewed and updated.       Past Medical History:   Diagnosis Date    Autoimmune disease ([facility] [address]     Crohn's Disease    Crohn disease ([facility] [address] 4/19/2010    Crohn's     Depression 4/19/2010    History of vascular access device 07/18/2017    [facility] VAT - 33 cm right brachial PICC for abx and limited access  Perforation bowel ([facility] [address] 2017    S/p palak Rodgers 2017    Peripheral neuropathy 2012    B12 deficiency MRI brain normal 2012 MRA head normal 2012 CTA neck normal 2012     Renal calculi 2020    Vertigo     Vitamin B12 deficiency 2012    164 on 2012 Needs 1000 mcg IM twice a week      Past Surgical History:   Procedure Laterality Date    HX  SECTION      HX HEENT      HX TONSIL AND ADENOIDECTOMY      HX TUBAL LIGATION      ME ABDOMEN SURGERY [address] UNLISTED      due to Crohn's-bowel resection x2     Family History   Problem Relation Age of Onset    Heart Disease Father     Cancer Mother      Social History     Socioeconomic History    Marital status:      Spouse name: Not on file    Number of children: Not on file    Years of education: Not on file    Highest education level: Not on file   Occupational History    Not on file   Tobacco Use    Smoking status: Current Some Day Smoker     Packs/day: 0.50     Years: 8.00     Pack years: 4.00     Types: Cigarettes    Smokeless tobacco: Never Used   Substance and Sexual Activity    Alcohol use: No    Drug use: No     Comment: 1 pack a day    Sexual activity: Yes     Partners: Male     Birth control/protection: Pill   Other Topics Concern    Not on file   Social History Narrative    Not on file     Social Determinants of Health     Financial Resource Strain:     Difficulty of Paying Living Expenses:    Food Insecurity:     Worried About Running Out of Food in the Last Year:     Ran Out of Food in the Last Year:    Transportation Needs:     Lack of Transportation (Medical):      Lack of Transportation (Non-Medical):    Physical Activity:     Days of Exercise per Week:     Minutes of Exercise per Session:    Stress:     Feeling of Stress :    Social Connections:     Frequency of Communication with Friends and Family:     Frequency of Social Gatherings with Friends and Family:     Attends [employer] Services:     Active Member of Clubs or Organizations:     Attends Club or Organization Meetings:     Marital Status:    Intimate Partner Violence:     Fear of Current or Ex-Partner:     Emotionally Abused:     Physically Abused:     Sexually Abused:      Patient Active Problem List   Diagnosis Code    Crohn's disease ([facility] [address] K50.90    Depression F32.9    Vertigo R42    Vitamin B12 deficiency E53.8    Neuropathy G62.9    Abnormal electrocardiogram R94.31    Anemia D64.9    Neutrophilia D72.9    Lyme disease A69.20    Hx of perforated gastric ulcer Z87.11    Pyelonephritis N12    Staghorn renal calculus N20.0    Colitis K52.9    Dry mouth R68.2          Current Medications/Allergies   Medications and Allergies reviewed:    Current Outpatient Medications   Medication Sig Dispense Refill    nystatin (MYCOSTATIN) 100,000 unit/mL suspension Take 5 mL by mouth four (4) times daily. swish and spit 473 mL 1    cholecalciferol (Vitamin D3) (1000 Units /25 mcg) tablet Take 1 Tablet by mouth daily. 90 Tablet 3    magic mouthwash solution Magic mouth wash Maalox Lidocaine 2% viscous Diphenhydramine oral solution Pharmacy to mix equal portions of ingredients to a total volume as indicated in the dispense amount. 100 mL 3    fexofenadine (ALLEGRA) 180 mg tablet Take 1 Tablet by mouth daily as needed for Allergies. 90 Tablet 3    mometasone (NASONEX) 50 mcg/actuation nasal spray 2 Sprays by Both Nostrils route daily. 17 Container 3    valACYclovir (VALTREX) 500 mg tablet Take 1 Tab by mouth daily. 90 Tab 1    Walker misc Rollator walker. Dx: Lyme disease, falls, neuropathy 1 Each 0    escitalopram oxalate (LEXAPRO) 10 mg tablet Take 10 mg by mouth daily.  clotrimazole (LOTRIMIN) 1 % topical cream Apply  to affected area two (2) times a day. 15 g 0    cimetidine HCl (TAGAMET) 300 mg/5 mL solution Take 5 mL by mouth four (4) times daily. Do NOT take with Protonix/Pantoprazole.  237 mL 0    fluconazole (DIFLUCAN) 100 mg tablet PLEASE SEE ATTACHED FOR DETAILED DIRECTIONS 3 Tab 0    LORazepam (ATIVAN) 1 mg tablet Take 1 mg by mouth two (2) times a day. Patient reports taking twice daily [id number]      pantoprazole (PROTONIX) 40 mg tablet Take 40 mg by mouth daily.  baclofen (LIORESAL) 10 mg tablet Take 10 mg by mouth three (3) times daily.  cyanocobalamin (VITAMIN B12) 1,000 mcg/mL injection 1,000 mcg by IntraMUSCular route every thirty (30) days.  DULoxetine (CYMBALTA) 60 mg capsule Take 120 mg by mouth daily.  dextroamphetamine-amphetamine (ADDERALL) 20 mg tablet Take 20 mg by mouth three (3) times daily.  clonazePAM (KLONOPIN) 1 mg tablet Take 1 mg by mouth two (2) times a day. Patient reports taking twice daily in the AM and PM ([id number])      traZODone (DESYREL) 100 mg tablet Take 50 mg by mouth nightly. Allergies   Allergen Reactions    Doxycycline Swelling     Other reaction(s):  Other (See Comments), WHELPS, ITCHING, NAUSEA  blisters      Adhesive Rash    Cephalosporins Hives    Penicillins Hives    Sulfa (Sulfonamide Antibiotics) Unknown (comments)    Tetracyclines Nausea and Vomiting

## 2021-07-27 NOTE — PATIENT INSTRUCTIONS
Upper Respiratory Infection (Cold): Care Instructions  Your Care Instructions     An upper respiratory infection, or URI, is an infection of the nose, sinuses, or throat. URIs are spread by coughs, sneezes, and direct contact. The common cold is the most frequent kind of URI. The flu and sinus infections are other kinds of URIs. Almost all URIs are caused by viruses. Antibiotics won't cure them. But you can treat most infections with home care. This may include drinking lots of fluids and taking over-the-counter pain medicine. You will probably feel better in 4 to 10 days. The doctor has checked you carefully, but problems can develop later. If you notice any problems or new symptoms, get medical treatment right away. Follow-up care is a key part of your treatment and safety. Be sure to make and go to all appointments, and call your doctor if you are having problems. It's also a good idea to know your test results and keep a list of the medicines you take. How can you care for yourself at home? · To prevent dehydration, drink plenty of fluids. Choose water and other caffeine-free clear liquids until you feel better. If you have kidney, heart, or liver disease and have to limit fluids, talk with your doctor before you increase the amount of fluids you drink. · Take an over-the-counter pain medicine, such as acetaminophen (Tylenol), ibuprofen (Advil, Motrin), or naproxen (Aleve). Read and follow all instructions on the label. · Before you use cough and cold medicines, check the label. These medicines may not be safe for young children or for people with certain health problems. · Be careful when taking over-the-counter cold or flu medicines and Tylenol at the same time. Many of these medicines have acetaminophen, which is Tylenol. Read the labels to make sure that you are not taking more than the recommended dose. Too much acetaminophen (Tylenol) can be harmful.   · Get plenty of rest.  · Do not smoke or allow others to smoke around you. If you need help quitting, talk to your doctor about stop-smoking programs and medicines. These can increase your chances of quitting for good. When should you call for help? Call 911 anytime you think you may need emergency care. For example, call if:    · You have severe trouble breathing. Call your doctor now or seek immediate medical care if:    · You seem to be getting much sicker.     · You have new or worse trouble breathing.     · You have a new or higher fever.     · You have a new rash. Watch closely for changes in your health, and be sure to contact your doctor if:    · You have a new symptom, such as a sore throat, an earache, or sinus pain.     · You cough more deeply or more often, especially if you notice more mucus or a change in the color of your mucus.     · You do not get better as expected. Where can you learn more? Go to http://www.gray.com/  Enter K520 in the search box to learn more about \"Upper Respiratory Infection (Cold): Care Instructions. \"  Current as of: October 26, 2020               Content Version: 12.8  © 8831-0989 [employer]. Care instructions adapted under license by [employer] (which disclaims liability or warranty for this information). If you have questions about a medical condition or this instruction, always ask your healthcare professional. [place] [address] any warranty or liability for your use of this information. Cough: Care Instructions  Your Care Instructions     A cough is your body's response to something that bothers your throat or airways. Many things can cause a cough. You might cough because of a cold or the flu, bronchitis, or asthma. Smoking, postnasal drip, allergies, and stomach acid that backs up into your throat also can cause coughs. A cough is a symptom, not a disease. Most coughs stop when the cause, such as a cold, goes away.  You can take a few steps at home to cough less and feel better. Follow-up care is a key part of your treatment and safety. Be sure to make and go to all appointments, and call your doctor if you are having problems. It's also a good idea to know your test results and keep a list of the medicines you take. How can you care for yourself at home? · Drink lots of water and other fluids. This helps thin the mucus and soothes a dry or sore throat. Honey or lemon juice in hot water or tea may ease a dry cough. · Take cough medicine as directed by your doctor. · Prop up your head on pillows to help you breathe and ease a dry cough. · Try cough drops to soothe a dry or sore throat. Cough drops don't stop a cough. Medicine-flavored cough drops are no better than candy-flavored drops or hard candy. · Do not smoke. Avoid secondhand smoke. If you need help quitting, talk to your doctor about stop-smoking programs and medicines. These can increase your chances of quitting for good. When should you call for help? Call 911 anytime you think you may need emergency care. For example, call if:    · You have severe trouble breathing. Call your doctor now or seek immediate medical care if:    · You cough up blood.     · You have new or worse trouble breathing.     · You have a new or higher fever.     · You have a new rash. Watch closely for changes in your health, and be sure to contact your doctor if:    · You cough more deeply or more often, especially if you notice more mucus or a change in the color of your mucus.     · You have new symptoms, such as a sore throat, an earache, or sinus pain.     · You do not get better as expected. Where can you learn more? Go to http://www.gray.com/  Enter D279 in the search box to learn more about \"Cough: Care Instructions. \"  Current as of: October 26, 2020               Content Version: 12.8  © 3685-9366 [employer].    Care instructions adapted under license by [employer] (which disclaims liability or warranty for this information). If you have questions about a medical condition or this instruction, always ask your healthcare professional. [address] any warranty or liability for your use of this information.

## 2021-07-27 NOTE — TELEPHONE ENCOUNTER
Pt called into the office upset , she said she has been trying to get an appt with dr padilla for over 2 months now and can never get an appt with her . Pt has sx of a upper resp infection so per nurse carlos eduardo I scheduled her for VV . I scheduled her  today for a VV with dr Maggie Huerta to get this taken care of . Pt wants to make an appt to come in clinic and have a follow up visit with dr Steff Velascos when possible as she says she really needs to talk to her . Pt seemed very upset and was crying the whole call as she says she feels like no one ever wants to see her because of her lyme and crohns diease . [facility] away to the hospital because of it.

## 2021-08-19 ENCOUNTER — OFFICE VISIT (OUTPATIENT)
Dept: FAMILY MEDICINE CLINIC | Age: 51
End: 2021-08-19
Payer: MEDICAID

## 2021-08-19 VITALS
HEIGHT: 62 IN | OXYGEN SATURATION: 97 % | HEART RATE: 108 BPM | DIASTOLIC BLOOD PRESSURE: 73 MMHG | BODY MASS INDEX: 20.24 KG/M2 | RESPIRATION RATE: 16 BRPM | WEIGHT: 110 LBS | TEMPERATURE: 97.8 F | SYSTOLIC BLOOD PRESSURE: 118 MMHG

## 2021-08-19 DIAGNOSIS — R05.8 PRODUCTIVE COUGH: ICD-10-CM

## 2021-08-19 DIAGNOSIS — Z12.31 ENCOUNTER FOR SCREENING MAMMOGRAM FOR MALIGNANT NEOPLASM OF BREAST: ICD-10-CM

## 2021-08-19 DIAGNOSIS — Z23 ENCOUNTER FOR IMMUNIZATION: ICD-10-CM

## 2021-08-19 DIAGNOSIS — K12.1 MOUTH ULCERS: ICD-10-CM

## 2021-08-19 DIAGNOSIS — E53.8 VITAMIN B12 DEFICIENCY: ICD-10-CM

## 2021-08-19 DIAGNOSIS — R68.2 DRY MOUTH: ICD-10-CM

## 2021-08-19 DIAGNOSIS — K13.0 ANGULAR CHEILITIS: ICD-10-CM

## 2021-08-19 DIAGNOSIS — K50.10 CROHN'S DISEASE OF LARGE INTESTINE WITHOUT COMPLICATION (HCC): ICD-10-CM

## 2021-08-19 DIAGNOSIS — L01.00 IMPETIGO: Primary | ICD-10-CM

## 2021-08-19 PROCEDURE — 99214 OFFICE O/P EST MOD 30 MIN: CPT | Performed by: FAMILY MEDICINE

## 2021-08-19 PROCEDURE — 96372 THER/PROPH/DIAG INJ SC/IM: CPT | Performed by: FAMILY MEDICINE

## 2021-08-19 PROCEDURE — 90750 HZV VACC RECOMBINANT IM: CPT | Performed by: FAMILY MEDICINE

## 2021-08-19 RX ORDER — CYANOCOBALAMIN 1000 UG/ML
1000 INJECTION, SOLUTION INTRAMUSCULAR; SUBCUTANEOUS ONCE
Status: COMPLETED | OUTPATIENT
Start: 2021-08-19 | End: 2021-08-19

## 2021-08-19 RX ORDER — BENZONATATE 100 MG/1
100 CAPSULE ORAL
Qty: 21 CAPSULE | Refills: 0 | Status: SHIPPED | OUTPATIENT
Start: 2021-08-19 | End: 2021-08-26

## 2021-08-19 RX ORDER — MUPIROCIN 20 MG/G
OINTMENT TOPICAL DAILY
Qty: 22 G | Refills: 0 | Status: SHIPPED | OUTPATIENT
Start: 2021-08-19 | End: 2022-02-07

## 2021-08-19 RX ORDER — ACETAMINOPHEN 500 MG
500-1000 TABLET ORAL
Qty: 60 TABLET | Refills: 0 | Status: SHIPPED | OUTPATIENT
Start: 2021-08-19

## 2021-08-19 RX ORDER — GUAIFENESIN 600 MG/1
600 TABLET, EXTENDED RELEASE ORAL 2 TIMES DAILY
Qty: 14 TABLET | Refills: 0 | Status: SHIPPED | OUTPATIENT
Start: 2021-08-19 | End: 2021-11-08

## 2021-08-19 RX ADMIN — CYANOCOBALAMIN 1000 MCG: 1000 INJECTION, SOLUTION INTRAMUSCULAR; SUBCUTANEOUS at 17:00

## 2021-08-19 NOTE — PROGRESS NOTES
History of Present Illness:     Chief Complaint   Patient presents with   [facility] ED Follow-up     ED follow up. went to [address] ED 7/27/21 for cold symptoms       Daja Alcantara is a 49 y.o. female     Evaluated in [place] ER for cough on 7/27. Per patient, the CXR was clear. Her symptoms improved with Tessalon pearls and Naproxen. Still coughing up phlegm. Today, she reports sore on the corner of her mouth. Associated with yellow crusting. Due for her B12 shot today    PMH (REVIEWED):  Past Medical History:   Diagnosis Date    Autoimmune disease ([address]     Crohn's Disease    Crohn disease ([address] 4/19/2010    Crohn's     Depression 4/19/2010    History of vascular access device 07/18/2017    [facility] VAT - 33 cm right brachial PICC for abx and limited access    Perforation bowel ([address] 7/4/2017    S/p palak Arthur 7/2017    Peripheral neuropathy 2/2/2012    B12 deficiency MRI brain normal 1/2012 MRA head normal 1/2012 CTA neck normal 1/2012     Renal calculi 9/6/2020    Vertigo     Vitamin B12 deficiency 1/21/2012    164 on 1/2012 Needs 1000 mcg IM twice a week        Current Medications/Allergies (REVIEWED):     Current Outpatient Medications on File Prior to Visit   Medication Sig Dispense Refill    nystatin (MYCOSTATIN) 100,000 unit/mL suspension Take 5 mL by mouth four (4) times daily. swish and spit 473 mL 1    cholecalciferol (Vitamin D3) (1000 Units /25 mcg) tablet Take 1 Tablet by mouth daily. 90 Tablet 3    magic mouthwash solution Magic mouth wash Maalox Lidocaine 2% viscous Diphenhydramine oral solution Pharmacy to mix equal portions of ingredients to a total volume as indicated in the dispense amount. 100 mL 3    fexofenadine (ALLEGRA) 180 mg tablet Take 1 Tablet by mouth daily as needed for Allergies. 90 Tablet 3    mometasone (NASONEX) 50 mcg/actuation nasal spray 2 Sprays by Both Nostrils route daily.  17 Container 3    valACYclovir (VALTREX) 500 mg tablet Take 1 Tab by mouth daily. 90 Tab 1    Walker misc Rollator walker. Dx: Lyme disease, falls, neuropathy 1 Each 0    escitalopram oxalate (LEXAPRO) 10 mg tablet Take 10 mg by mouth daily.  clotrimazole (LOTRIMIN) 1 % topical cream Apply  to affected area two (2) times a day. 15 g 0    cimetidine HCl (TAGAMET) 300 mg/5 mL solution Take 5 mL by mouth four (4) times daily. Do NOT take with Protonix/Pantoprazole. 237 mL 0    fluconazole (DIFLUCAN) 100 mg tablet PLEASE SEE ATTACHED FOR DETAILED DIRECTIONS 3 Tab 0    LORazepam (ATIVAN) 1 mg tablet Take 1 mg by mouth two (2) times a day. Patient reports taking twice daily [id number]      pantoprazole (PROTONIX) 40 mg tablet Take 40 mg by mouth daily.  baclofen (LIORESAL) 10 mg tablet Take 10 mg by mouth three (3) times daily.  cyanocobalamin (VITAMIN B12) 1,000 mcg/mL injection 1,000 mcg by IntraMUSCular route every thirty (30) days.  DULoxetine (CYMBALTA) 60 mg capsule Take 120 mg by mouth daily.  dextroamphetamine-amphetamine (ADDERALL) 20 mg tablet Take 20 mg by mouth three (3) times daily.  clonazePAM (KLONOPIN) 1 mg tablet Take 1 mg by mouth two (2) times a day. Patient reports taking twice daily in the AM and PM (9095-9887)      traZODone (DESYREL) 100 mg tablet Take 50 mg by mouth nightly. No current facility-administered medications on file prior to visit. Allergies   Allergen Reactions    Doxycycline Swelling     Other reaction(s): Other (See Comments), WHELPS, ITCHING, NAUSEA  blisters      Adhesive Rash    Cephalosporins Hives    Penicillins Hives    Sulfa (Sulfonamide Antibiotics) Unknown (comments)    Tetracyclines Nausea and Vomiting         Review of Systems:     Review of Systems   Constitutional: Negative for chills and fever. Respiratory: Positive for cough and sputum production. Negative for shortness of breath. Cardiovascular: Negative for chest pain, palpitations and leg swelling. Skin: Positive for rash. Objective:     Vitals:    08/19/21 1441   BP: 118/73   Pulse: (!) 108   Resp: 16   Temp: 97.8 °F (36.6 °C)   TempSrc: Oral   SpO2: 97%   Weight: 110 lb (49.9 kg)   Height: 5' 2\" (1.575 m)       Physical Exam:  General appearance - alert, well appearing, and in no distress  Mental status - alert, oriented to person, place, and time, anxious, scattered  Mouth - Dry mucous membranes. +shallow sore along corner of right side of mouth with honey colored crusting. Chest - clear to auscultation, no wheezes, rales or rhonchi, symmetric air entry  Heart - normal rate, regular rhythm, normal S1, S2, no murmurs, rubs, clicks or gallops    Recent Labs:  No results found for this or any previous visit (from the past 12 hour(s)). Assessment and Plan:       ICD-10-CM ICD-9-CM    1. Impetigo  L01.00 684 mupirocin (BACTROBAN) 2 % ointment   2. Angular cheilitis  K13.0 528.5    3. Dry mouth  R68.2 527.7    4. Vitamin B12 deficiency  E53.8 266.2    5. Productive cough  R05 786.2 benzonatate (TESSALON) 100 mg capsule      guaiFENesin ER (MUCINEX) 600 mg ER tablet   6. Encounter for screening mammogram for malignant neoplasm of breast  Z12.31 V76.12 [facility] MAMMO BI SCREENING INCL CAD   7. Crohn's disease of large intestine without complication (HCC)  T26.95 555.1 REFERRAL TO GASTROENTEROLOGY   8. Mouth ulcers  K12.1 528.9 acetaminophen (TYLENOL) 500 mg tablet       Angular cheilitis with superimposed impetigo  Mupirocin ointment     Dry mouth  Recommended OTC medications  Suspect her polypharmacy is the cause    B12 injection given today    Productive cough; improving  Will continue Tessalon PRN  Trial Mucinex PRN    Mammogram ordered    New GI referral placed for her to follow up with Dr. Inés Hurst  Last colonoscopy 1-2 years ago; will request reports    Shingrix vaccine given today    Follow up: 1 month    Gricelda Carrasco MD    We discussed the expected course, resolution and complications of the diagnosis(es) in detail. Medication risks, benefits, costs, interactions, and alternatives were discussed as indicated. I advised her to contact the office if her condition worsens, changes or fails to improve as anticipated. She expressed understanding with the diagnosis(es) and plan.

## 2021-08-19 NOTE — PROGRESS NOTES
Sandra North is a 49 y.o. female    Chief Complaint   Patient presents with   [address] [place] [address] ED Follow-up     ED follow up. went to [address] [facility] ED 7/27/21 for cold symptoms       1. Have you been to the ER, urgent care clinic since your last visit? Hospitalized since your last visit? Yes [facility] 7/27/21    2. Have you seen or consulted any other health care providers outside of the [address] [place] [address] since your last visit? Include any pap smears or colon screening. Yes -[facility]       Visit Vitals  /73 (BP 1 Location: Right upper arm, BP Patient Position: Sitting, BP Cuff Size: Adult)   Pulse (!) 108   Temp 97.8 °F (36.6 °C) (Oral)   Resp 16   Ht 5' 2\" (1.575 m)   Wt 110 lb (49.9 kg)   SpO2 97%   BMI 20.12 kg/m²           Health Maintenance Due   Topic Date Due    PAP AKA CERVICAL CYTOLOGY  04/08/2015    Colorectal Cancer Screening Combo  07/04/2018    Pneumococcal 0-64 years (2 of 4 - PCV13) 08/30/2018    Shingrix Vaccine Age 50> (1 of 2) Never done    Breast Cancer Screen Mammogram  04/09/2021         Medication Reconciliation completed, changes noted.   Please  Update medication list.

## 2021-08-19 NOTE — PATIENT INSTRUCTIONS
- For cold sore recurrence, increase you Valacyclovir to 2 pills daily for 3 days. Then go back to 1 pill daily. - Apply antibiotic ointment to sore on mouth 2-3 times daily for 5 days. You can also use this for the lesion in your nose.

## 2021-08-30 ENCOUNTER — OFFICE VISIT (OUTPATIENT)
Dept: FAMILY MEDICINE CLINIC | Age: 51
End: 2021-08-30
Payer: MEDICAID

## 2021-08-30 VITALS
OXYGEN SATURATION: 99 % | WEIGHT: 118 LBS | HEART RATE: 99 BPM | BODY MASS INDEX: 21.71 KG/M2 | RESPIRATION RATE: 16 BRPM | HEIGHT: 62 IN | TEMPERATURE: 98 F | SYSTOLIC BLOOD PRESSURE: 116 MMHG | DIASTOLIC BLOOD PRESSURE: 75 MMHG

## 2021-08-30 DIAGNOSIS — M54.2 NECK PAIN: ICD-10-CM

## 2021-08-30 DIAGNOSIS — S46.812A STRAIN OF LEFT TRAPEZIUS MUSCLE, INITIAL ENCOUNTER: Primary | ICD-10-CM

## 2021-08-30 DIAGNOSIS — M62.838 TRAPEZIUS MUSCLE SPASM: ICD-10-CM

## 2021-08-30 DIAGNOSIS — K12.1 MOUTH ULCERS: ICD-10-CM

## 2021-08-30 PROCEDURE — 20552 NJX 1/MLT TRIGGER POINT 1/2: CPT | Performed by: FAMILY MEDICINE

## 2021-08-30 PROCEDURE — 99213 OFFICE O/P EST LOW 20 MIN: CPT | Performed by: FAMILY MEDICINE

## 2021-08-30 RX ORDER — DEXAMETHASONE SODIUM PHOSPHATE 4 MG/ML
2 INJECTION, SOLUTION INTRA-ARTICULAR; INTRALESIONAL; INTRAMUSCULAR; INTRAVENOUS; SOFT TISSUE ONCE
Status: COMPLETED | OUTPATIENT
Start: 2021-08-30 | End: 2021-08-30

## 2021-08-30 RX ORDER — LIDOCAINE HYDROCHLORIDE 10 MG/ML
2 INJECTION INFILTRATION; PERINEURAL ONCE
Status: COMPLETED | OUTPATIENT
Start: 2021-08-30 | End: 2021-08-30

## 2021-08-30 RX ADMIN — LIDOCAINE HYDROCHLORIDE 2 ML: 10 INJECTION INFILTRATION; PERINEURAL at 15:06

## 2021-08-30 RX ADMIN — DEXAMETHASONE SODIUM PHOSPHATE 2 MG: 4 INJECTION, SOLUTION INTRA-ARTICULAR; INTRALESIONAL; INTRAMUSCULAR; INTRAVENOUS; SOFT TISSUE at 15:05

## 2021-08-30 NOTE — PROGRESS NOTES
Evon Laws is a 49 y.o. female    Chief Complaint   Patient presents with    Neck Pain     neck pain for a while. states her neck feels very stiff, which is causing pain - has limited ROM due to her pain      [facility]  OFFICE PROCEDURE PROGRESS NOTE        Chart reviewed for the following:   I, Chuy Acosta, have reviewed the History, Physical and updated the Allergic reactions for [address] performed immediately prior to start of procedure:   Jena Gilliland, have performed the following reviews on Evon Laws prior to the start of the procedure:            * Patient was identified by name and date of birth   * Agreement on procedure being performed was verified  * Risks and Benefits explained to the patient  * Procedure site verified and marked as necessary  * Patient was positioned for comfort  * Consent was signed and verified     Time: 11:50am      Date of procedure: 8/30/2021    Procedure performed by:  Danielle Parks MD    Provider assisted by: Rajiv Fletcher MD    Patient assisted by: self    How tolerated by patient: tolerated the procedure well with no complications    Post Procedural Pain Scale: 0 - No Hurt    Comments: none        1. Have you been to the ER, urgent care clinic since your last visit? Hospitalized since your last visit? No    2. Have you seen or consulted any other health care providers outside of the [address] [place] [address] since your last visit? Include any pap smears or colon screening.  No      Visit Vitals  /75 (BP 1 Location: Right arm, BP Patient Position: Sitting, BP Cuff Size: Adult)   Pulse 99   Temp 98 °F (36.7 °C) (Temporal)   Resp 16   Ht 5' 2\" (1.575 m)   Wt 118 lb (53.5 kg)   SpO2 99%   BMI 21.58 kg/m²           Health Maintenance Due   Topic Date Due    PAP AKA CERVICAL CYTOLOGY  04/08/2015    Colorectal Cancer Screening Combo  07/04/2018    Pneumococcal 0-64 years (2 of 4 - PCV13) 08/30/2018    Breast Cancer Screen Mammogram  04/09/2021    COVID-19 Vaccine (3 - Moderna risk 3-dose series) 06/11/2021         Medication Reconciliation completed, changes noted.   Please  Update medication list.

## 2021-08-30 NOTE — PROGRESS NOTES
[address] [facility]      Chief Complaint:   Chief Complaint   Patient presents with    Neck Pain     neck pain for a while. states her neck feels very stiff, which is causing pain - has limited ROM due to her pain        SUBJECTIVE:  Latisha Moreno is a 49 y.o. female who presents for neck pain. She describes having a stiffness in her neck for a \"very long time\" with no injury or trauma to the area that she could identify. Describes it as a crick in her neck. States it limits her in her range of motion and some of her activities. No weakness or changes in sensation that she could identify. No injury or trauma. No radiating pain. No focal neurological deficit. PMHx:  Past Medical History:   Diagnosis Date    Autoimmune disease ([facility]     Crohn's Disease    Crohn disease ([facility] 4/19/2010    Crohn's     Depression 4/19/2010    History of vascular access device 07/18/2017    [facility] VAT - 33 cm right brachial PICC for abx and limited access    Perforation bowel ([facility] 7/4/2017    S/p palak Zuniga 7/2017    Peripheral neuropathy 2/2/2012    B12 deficiency MRI brain normal 1/2012 MRA head normal 1/2012 CTA neck normal 1/2012     Renal calculi 9/6/2020    Vertigo     Vitamin B12 deficiency 1/21/2012    164 on 1/2012 Needs 1000 mcg IM twice a week        Meds:   Current Outpatient Medications   Medication Sig Dispense Refill    guaiFENesin ER (MUCINEX) 600 mg ER tablet Take 1 Tablet by mouth two (2) times a day. Indications: Cough and congestion 14 Tablet 0    mupirocin (BACTROBAN) 2 % ointment Apply  to affected area daily. Apply to area 2-3 times daily for 5 days 22 g 0    acetaminophen (TYLENOL) 500 mg tablet Take 1-2 Tablets by mouth every eight (8) hours as needed for Pain. 60 Tablet 0    nystatin (MYCOSTATIN) 100,000 unit/mL suspension Take 5 mL by mouth four (4) times daily.  swish and spit 473 mL 1    cholecalciferol (Vitamin D3) (1000 Units /25 mcg) tablet Take 1 Tablet by mouth daily. 90 Tablet 3    fexofenadine (ALLEGRA) 180 mg tablet Take 1 Tablet by mouth daily as needed for Allergies. 90 Tablet 3    mometasone (NASONEX) 50 mcg/actuation nasal spray 2 Sprays by Both Nostrils route daily. 17 Container 3    valACYclovir (VALTREX) 500 mg tablet Take 1 Tab by mouth daily. 90 Tab 1    Walker misc Rollator walker. Dx: Lyme disease, falls, neuropathy 1 Each 0    escitalopram oxalate (LEXAPRO) 10 mg tablet Take 10 mg by mouth daily.  fluconazole (DIFLUCAN) 100 mg tablet PLEASE SEE ATTACHED FOR DETAILED DIRECTIONS 3 Tab 0    LORazepam (ATIVAN) 1 mg tablet Take 1 mg by mouth two (2) times a day. Patient reports taking twice daily [id number]      pantoprazole (PROTONIX) 40 mg tablet Take 40 mg by mouth daily.  baclofen (LIORESAL) 10 mg tablet Take 10 mg by mouth three (3) times daily.  cyanocobalamin (VITAMIN B12) 1,000 mcg/mL injection 1,000 mcg by IntraMUSCular route every thirty (30) days.  DULoxetine (CYMBALTA) 60 mg capsule Take 120 mg by mouth daily.  dextroamphetamine-amphetamine (ADDERALL) 20 mg tablet Take 20 mg by mouth three (3) times daily.  clonazePAM (KLONOPIN) 1 mg tablet Take 1 mg by mouth two (2) times a day. Patient reports taking twice daily in the AM and PM (5393-2798)      traZODone (DESYREL) 100 mg tablet Take 50 mg by mouth nightly. Allergies: Allergies   Allergen Reactions    Doxycycline Swelling     Other reaction(s):  Other (See Comments), WHELPS, ITCHING, NAUSEA  blisters      Adhesive Rash    Cephalosporins Hives    Penicillins Hives    Sulfa (Sulfonamide Antibiotics) Unknown (comments)    Tetracyclines Nausea and Vomiting       Smoker:  Social History     Tobacco Use   Smoking Status Current Some Day Smoker    Packs/day: 0.50    Years: 8.00    Pack years: 4.00    Types: Cigarettes   Smokeless Tobacco Never Used       ETOH:   Social History     Substance and Sexual Activity   Alcohol Use No       FH:   Family History   Problem Relation Age of Onset    Heart Disease Father     Cancer Mother        ROS:  Per HPI    Physical Exam:  Visit Vitals  /75 (BP 1 Location: Right arm, BP Patient Position: Sitting, BP Cuff Size: Adult)   Pulse 99   Temp 98 °F (36.7 °C) (Temporal)   Resp 16   Ht 5' 2\" (1.575 m)   Wt 118 lb (53.5 kg)   SpO2 99%   BMI 21.58 kg/m²     MSK:    Posture: Normal   Deformity: None    ROM - Cervical spine:     Flexion: Normal     Extension: Limited secondary to pain     Lateral bending: Normal    Upper Ext: FROM bilaterally       Palpation:    C1 - T1 tenderness: None    Trapezious tenderness: Present in upper trapezius on left. One trigger point identified. Strength (0-5/5):    :   Left: 5/5  Right: 5/5    Wrist Extension:  Left: 5/5  Right: 5/5    Wrist Flexion:  Left: 5/5  Right: 5/5    Elbow Flextion: Left: 5/5  Right: 5/5    Elbow Extension:  Left: 5/5  Right: 5/5    Shoulder Flexion:  Left: 5/5  Right: 5/5    Shoulder Abduction:  Left: 5/5  Right: 5/5    Shouder Ext Rotation: Left: 5/5  Right: 5/5    Shoulder Int Rotation: Left: 5/5  Right: 5/5       Sensation: Intact, no deficits in the upper ext bilaterally. Procedure:  Informed consent - Risks, benefits and alternatives discussed. Time Out - Performed, cross checking patient ID and procedure. Preparation - Cleaned and prepped with alcohol swab x3. Anesthesia - Ethyl chloride spray used to anesthitise the skin prior to injection. Description of procedure - 1 trigger point identified in the left trap and each injected with 0.5 ml dexamethasone and 2 ml of 1% lidocaine mixture under sterile conditions. Patient tolerated the procedure well and there were no complications. Patient reports pain relief following the injections. Assessment:  Neck pain: Trap spasm    Plan:  1. Trigger point injection performed, with improvement as above.   2. Advised patient to perform HEP    RTC: PRN

## 2021-08-31 DIAGNOSIS — K12.1 MOUTH ULCERS: ICD-10-CM

## 2021-08-31 RX ORDER — NYSTATIN 100000 [USP'U]/ML
1 SUSPENSION ORAL 4 TIMES DAILY
Qty: 480 ML | Refills: 1 | Status: SHIPPED | OUTPATIENT
Start: 2021-08-31 | End: 2021-09-01 | Stop reason: SDUPTHER

## 2021-08-31 RX ORDER — ERGOCALCIFEROL (VITAMIN D2) 50 MCG
1 CAPSULE ORAL DAILY
Qty: 90 EACH | Refills: 3 | Status: SHIPPED | OUTPATIENT
Start: 2021-08-31 | End: 2021-10-21 | Stop reason: ALTCHOICE

## 2021-08-31 RX ORDER — NYSTATIN 100000 [USP'U]/ML
1 SUSPENSION ORAL 4 TIMES DAILY
Qty: 473 ML | Refills: 1 | OUTPATIENT
Start: 2021-08-31

## 2021-09-01 ENCOUNTER — TELEPHONE (OUTPATIENT)
Dept: FAMILY MEDICINE CLINIC | Age: 51
End: 2021-09-01

## 2021-09-01 DIAGNOSIS — K12.1 MOUTH ULCERS: ICD-10-CM

## 2021-09-01 DIAGNOSIS — K13.79 MOUTH SORES: ICD-10-CM

## 2021-09-01 DIAGNOSIS — Z91.09 ENVIRONMENTAL ALLERGIES: ICD-10-CM

## 2021-09-01 RX ORDER — NYSTATIN 100000 [USP'U]/ML
1 SUSPENSION ORAL 4 TIMES DAILY
Qty: 480 ML | Refills: 1 | Status: SHIPPED | OUTPATIENT
Start: 2021-09-01 | End: 2021-10-08

## 2021-09-01 NOTE — TELEPHONE ENCOUNTER
Gave verbal order for Vitamin D3 2,000 unit capsules 90 capsules with 3 refills to the pharmacy on file for the patient.

## 2021-09-01 NOTE — TELEPHONE ENCOUNTER
----- Message from Kerri Esquivel sent at 8/31/2021  3:08 PM EDT -----  Regarding: Dr. Maty Cardoso  General Message/Vendor Calls    Caller's first and last name:self      Reason for call:Pt advised her nystatin (MYCOSTATIN) 100,000 unit/mL suspension was sent to the wrong [facility]. Callback required yes/no and why:yes, to clarify       Best contact number(s):[phone]      Details to clarify the request:[facility] [address], [address] [facility] the [facility] and the phone number is [phone].       Kerri Esquivel

## 2021-09-01 NOTE — TELEPHONE ENCOUNTER
Fax rec'd from [facility] on medication ergocalciferol 200 mcg/ml drop    An alternative is requested. Fax put in nurse folder to view.

## 2021-09-02 RX ORDER — VALACYCLOVIR HYDROCHLORIDE 500 MG/1
TABLET, FILM COATED ORAL
Qty: 30 TABLET | Refills: 2 | Status: SHIPPED | OUTPATIENT
Start: 2021-09-02 | End: 2021-09-09

## 2021-09-02 RX ORDER — MOMETASONE FUROATE 50 UG/1
SPRAY, METERED NASAL
Qty: 17 EACH | Refills: 3 | Status: SHIPPED | OUTPATIENT
Start: 2021-09-02 | End: 2021-12-28

## 2021-09-09 ENCOUNTER — TELEPHONE (OUTPATIENT)
Dept: FAMILY MEDICINE CLINIC | Age: 51
End: 2021-09-09

## 2021-09-09 DIAGNOSIS — K13.79 MOUTH SORES: ICD-10-CM

## 2021-09-09 RX ORDER — VALACYCLOVIR HYDROCHLORIDE 500 MG/1
500 TABLET, FILM COATED ORAL 2 TIMES DAILY
Qty: 60 TABLET | Refills: 2 | Status: SHIPPED | OUTPATIENT
Start: 2021-09-09 | End: 2021-11-22

## 2021-09-09 RX ORDER — VALACYCLOVIR HYDROCHLORIDE 500 MG/1
500 TABLET, FILM COATED ORAL DAILY
Qty: 30 TABLET | Refills: 2 | Status: CANCELLED | OUTPATIENT
Start: 2021-09-09

## 2021-09-09 NOTE — TELEPHONE ENCOUNTER
[facility] patient they have a message regarding the patients medication valtrez. Put in nurse folder.

## 2021-09-16 ENCOUNTER — TELEPHONE (OUTPATIENT)
Dept: FAMILY MEDICINE CLINIC | Age: 51
End: 2021-09-16

## 2021-09-16 NOTE — TELEPHONE ENCOUNTER
I Faxed last OV/labs to [facility] at their request. Referral order is on file.     Faxed to [phone]  /Dr. Artie Grady
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
## 2021-09-23 ENCOUNTER — TRANSCRIBE ORDER (OUTPATIENT)
Dept: REGISTRATION | Age: 51
End: 2021-09-23

## 2021-09-23 ENCOUNTER — OFFICE VISIT (OUTPATIENT)
Dept: FAMILY MEDICINE CLINIC | Age: 51
End: 2021-09-23
Payer: MEDICAID

## 2021-09-23 VITALS
HEART RATE: 92 BPM | TEMPERATURE: 97.5 F | HEIGHT: 62 IN | RESPIRATION RATE: 18 BRPM | OXYGEN SATURATION: 98 % | SYSTOLIC BLOOD PRESSURE: 135 MMHG | DIASTOLIC BLOOD PRESSURE: 78 MMHG | WEIGHT: 116 LBS | BODY MASS INDEX: 21.35 KG/M2

## 2021-09-23 DIAGNOSIS — Z01.812 PRE-OPERATIVE LABORATORY EXAMINATION: Primary | ICD-10-CM

## 2021-09-23 DIAGNOSIS — E53.8 VITAMIN B12 DEFICIENCY: ICD-10-CM

## 2021-09-23 DIAGNOSIS — M62.838 MUSCLE SPASM: Primary | ICD-10-CM

## 2021-09-23 DIAGNOSIS — Z11.52 ENCOUNTER FOR SCREENING FOR COVID-19: ICD-10-CM

## 2021-09-23 DIAGNOSIS — Z23 ENCOUNTER FOR IMMUNIZATION: ICD-10-CM

## 2021-09-23 PROCEDURE — 90471 IMMUNIZATION ADMIN: CPT | Performed by: FAMILY MEDICINE

## 2021-09-23 PROCEDURE — 90670 PCV13 VACCINE IM: CPT | Performed by: FAMILY MEDICINE

## 2021-09-23 PROCEDURE — 99213 OFFICE O/P EST LOW 20 MIN: CPT | Performed by: FAMILY MEDICINE

## 2021-09-23 RX ORDER — CYANOCOBALAMIN 1000 UG/ML
1000 INJECTION, SOLUTION INTRAMUSCULAR; SUBCUTANEOUS ONCE
Qty: 1 ML | Refills: 0
Start: 2021-09-23 | End: 2021-09-23 | Stop reason: SDUPTHER

## 2021-09-23 RX ORDER — BACLOFEN 10 MG/1
10 TABLET ORAL 3 TIMES DAILY
Qty: 90 TABLET | Refills: 2 | Status: SHIPPED | OUTPATIENT
Start: 2021-09-23 | End: 2021-12-14

## 2021-09-23 RX ORDER — CYANOCOBALAMIN 1000 UG/ML
1000 INJECTION, SOLUTION INTRAMUSCULAR; SUBCUTANEOUS ONCE
Status: COMPLETED | OUTPATIENT
Start: 2021-09-23 | End: 2021-09-23

## 2021-09-23 RX ADMIN — CYANOCOBALAMIN 1000 MCG: 1000 INJECTION, SOLUTION INTRAMUSCULAR; SUBCUTANEOUS at 18:09

## 2021-09-23 NOTE — LETTER
NOTIFICATION RETURN TO WORK / SCHOOL    9/23/2021 5:15 PM    Ms. Onel Dutta  [address] [id number]      To Whom It May Concern:    Pamela Vital is currently under the care of [address] [address]. She had her COVID test administered today 9/23/21. If there are questions or concerns please have the patient contact our office.         Sincerely,      Michel Lee MD

## 2021-09-23 NOTE — PROGRESS NOTES
History of Present Illness:     Chief Complaint   Patient presents with    Follow-up     follow up on chronic conditions. also needs a covid test before her endoscopy       Jigar Green is a 49 y.o. female     Pt reports she had a good week. Needs COVID test for upcoming endoscopy on Monday. Needs her B12 injection. PMH (REVIEWED):  Past Medical History:   Diagnosis Date    Autoimmune disease ([facility] [address] [facility]     Crohn's Disease    Crohn disease ([facility] [address] 4/19/2010    Crohn's     Depression 4/19/2010    History of vascular access device 07/18/2017    [facility] VAT - 33 cm right brachial PICC for abx and limited access    Perforation bowel ([facility] [address] 7/4/2017    S/p palak Cuenca 7/2017    Peripheral neuropathy 2/2/2012    B12 deficiency MRI brain normal 1/2012 MRA head normal 1/2012 CTA neck normal 1/2012     Renal calculi 9/6/2020    Vertigo     Vitamin B12 deficiency 1/21/2012    164 on 1/2012 Needs 1000 mcg IM twice a week        Current Medications/Allergies (REVIEWED):     Current Outpatient Medications on File Prior to Visit   Medication Sig Dispense Refill    valACYclovir (VALTREX) 500 mg tablet Take 1 Tablet by mouth two (2) times a day. 60 Tablet 2    mometasone (NASONEX) 50 mcg/actuation nasal spray SPRAY 2 SPRAYS INTO EACH NOSTRIL EVERY DAY 17 Each 3    nystatin (MYCOSTATIN) 100,000 unit/mL suspension Take 5 mL by mouth four (4) times daily. swish and spit 480 mL 1    ergocalciferol, vitamin D2, 50 mcg (2,000 unit) cap Take 1 Capsule by mouth daily. 90 Each 3    guaiFENesin ER (MUCINEX) 600 mg ER tablet Take 1 Tablet by mouth two (2) times a day. Indications: Cough and congestion 14 Tablet 0    mupirocin (BACTROBAN) 2 % ointment Apply  to affected area daily. Apply to area 2-3 times daily for 5 days 22 g 0    acetaminophen (TYLENOL) 500 mg tablet Take 1-2 Tablets by mouth every eight (8) hours as needed for Pain.  60 Tablet 0    fexofenadine (ALLEGRA) 180 mg tablet Take 1 Tablet by mouth daily as needed for Allergies. 90 Tablet 3    Walker misc Rollator walker. Dx: Lyme disease, falls, neuropathy 1 Each 0    escitalopram oxalate (LEXAPRO) 10 mg tablet Take 10 mg by mouth daily.  fluconazole (DIFLUCAN) 100 mg tablet PLEASE SEE ATTACHED FOR DETAILED DIRECTIONS 3 Tab 0    LORazepam (ATIVAN) 1 mg tablet Take 1 mg by mouth two (2) times a day. Patient reports taking twice daily [id number]      pantoprazole (PROTONIX) 40 mg tablet Take 40 mg by mouth daily.  cyanocobalamin (VITAMIN B12) 1,000 mcg/mL injection 1,000 mcg by IntraMUSCular route every thirty (30) days.  DULoxetine (CYMBALTA) 60 mg capsule Take 120 mg by mouth daily.  dextroamphetamine-amphetamine (ADDERALL) 20 mg tablet Take 20 mg by mouth three (3) times daily.  clonazePAM (KLONOPIN) 1 mg tablet Take 1 mg by mouth two (2) times a day. Patient reports taking twice daily in the AM and PM ([id number])       No current facility-administered medications on file prior to visit. Allergies   Allergen Reactions    Doxycycline Swelling     Other reaction(s): Other (See Comments), WHELPS, ITCHING, NAUSEA  blisters      Adhesive Rash    Cephalosporins Hives    Penicillins Hives    Sulfa (Sulfonamide Antibiotics) Unknown (comments)    Tetracyclines Nausea and Vomiting         Objective:     Vitals:    09/23/21 1634   BP: 135/78   Pulse: 92   Resp: 18   Temp: 97.5 °F (36.4 °C)   TempSrc: Temporal   SpO2: 98%   Weight: 116 lb (52.6 kg)   Height: 5' 2\" (1.575 m)       Physical Exam:  General appearance - alert, well appearing, and in no distress  Mental status - alert, oriented to person, place, and time, pressured speech, tangential thought process; baseline. Neurological - alert, oriented, normal speech, no focal findings or movement disorder noted    Recent Labs:  No results found for this or any previous visit (from the past 12 hour(s)). Assessment and Plan:       ICD-10-CM ICD-9-CM    1.  Muscle spasm  S39.694 728.85 baclofen (LIORESAL) 10 mg tablet   2. Encounter for screening for COVID-19  Z11.52 V73.89 NOVEL CORONAVIRUS (COVID-19)       Followed by ortho previously for back issues  Started on Baclofen  Wants us to take over her refills    COVID testing done for upcoming EGD    Follow up: 1-2 months for well woman visit with vilma Massey MD      We discussed the expected course, resolution and complications of the diagnosis(es) in detail. Medication risks, benefits, costs, interactions, and alternatives were discussed as indicated. I advised her to contact the office if her condition worsens, changes or fails to improve as anticipated. She expressed understanding with the diagnosis(es) and plan.

## 2021-09-23 NOTE — PROGRESS NOTES
Evon Laws is a 49 y.o. female    Chief Complaint   Patient presents with    Follow-up     follow up on chronic conditions. also needs a covid test before her endoscopy       1. Have you been to the ER, urgent care clinic since your last visit? Hospitalized since your last visit? No    2. Have you seen or consulted any other health care providers outside of the [address] [place] [address] since your last visit? Include any pap smears or colon screening. No      Visit Vitals  /78 (BP 1 Location: Right upper arm, BP Patient Position: Sitting, BP Cuff Size: Adult)   Pulse 92   Temp 97.5 °F (36.4 °C) (Temporal)   Resp 18   Ht 5' 2\" (1.575 m)   Wt 116 lb (52.6 kg)   SpO2 98%   BMI 21.22 kg/m²           Health Maintenance Due   Topic Date Due    Cervical cancer screen  07/14/2010    Colorectal Cancer Screening Combo  07/04/2018    Breast Cancer Screen Mammogram  04/09/2021    COVID-19 Vaccine (3 - Moderna risk 3-dose series) 06/11/2021    Flu Vaccine (1) 09/01/2021         Medication Reconciliation completed, changes noted.   Please  Update medication list.

## 2021-09-23 NOTE — PERIOP NOTES
Informed patient of COVID requirements, patient to completed COVID curbside testing at [address] [place] [address] tomorrow between 4547-4720. Patient verbalized understanding that COVID test is required to proceed with procedure.

## 2021-09-24 ENCOUNTER — TRANSCRIBE ORDER (OUTPATIENT)
Dept: REGISTRATION | Age: 51
End: 2021-09-24

## 2021-09-24 ENCOUNTER — HOSPITAL ENCOUNTER (OUTPATIENT)
Dept: LAB | Age: 51
Discharge: HOME | End: 2021-09-24
Payer: MEDICAID

## 2021-09-24 DIAGNOSIS — Z01.812 PRE-OPERATIVE LABORATORY EXAMINATION: Primary | ICD-10-CM

## 2021-09-24 DIAGNOSIS — Z01.812 PRE-OPERATIVE LABORATORY EXAMINATION: ICD-10-CM

## 2021-09-24 PROCEDURE — U0005 INFEC AGEN DETEC AMPLI PROBE: HCPCS

## 2021-09-25 LAB
SARS-COV-2, NAA 2 DAY TAT: NORMAL
SARS-COV-2, NAA: NOT DETECTED

## 2021-09-26 LAB
SARS-COV-2, XPLCVT: NOT DETECTED
SOURCE, COVRS: NORMAL

## 2021-09-27 ENCOUNTER — HOSPITAL ENCOUNTER (OUTPATIENT)
Age: 51
Setting detail: OUTPATIENT SURGERY
Discharge: HOME | End: 2021-09-27
Attending: INTERNAL MEDICINE | Admitting: INTERNAL MEDICINE
Payer: MEDICAID

## 2021-09-27 ENCOUNTER — ANESTHESIA EVENT (OUTPATIENT)
Dept: ENDOSCOPY | Age: 51
End: 2021-09-27
Payer: MEDICAID

## 2021-09-27 ENCOUNTER — ANESTHESIA (OUTPATIENT)
Dept: ENDOSCOPY | Age: 51
End: 2021-09-27
Payer: MEDICAID

## 2021-09-27 VITALS
WEIGHT: 117.06 LBS | BODY MASS INDEX: 21.54 KG/M2 | DIASTOLIC BLOOD PRESSURE: 103 MMHG | OXYGEN SATURATION: 100 % | TEMPERATURE: 97.8 F | HEART RATE: 71 BPM | RESPIRATION RATE: 22 BRPM | HEIGHT: 62 IN | SYSTOLIC BLOOD PRESSURE: 119 MMHG

## 2021-09-27 LAB
H PYLORI FROM TISSUE: NEGATIVE
KIT LOT NO., HCLOLOT: NORMAL
NEGATIVE CONTROL: NEGATIVE
POSITIVE CONTROL: POSITIVE

## 2021-09-27 PROCEDURE — 2709999900 HC NON-CHARGEABLE SUPPLY: Performed by: INTERNAL MEDICINE

## 2021-09-27 PROCEDURE — 74011000250 HC RX REV CODE- 250: Performed by: NURSE ANESTHETIST, CERTIFIED REGISTERED

## 2021-09-27 PROCEDURE — 88305 TISSUE EXAM BY PATHOLOGIST: CPT

## 2021-09-27 PROCEDURE — 74011250636 HC RX REV CODE- 250/636: Performed by: NURSE ANESTHETIST, CERTIFIED REGISTERED

## 2021-09-27 PROCEDURE — 74011250636 HC RX REV CODE- 250/636: Performed by: INTERNAL MEDICINE

## 2021-09-27 PROCEDURE — 87077 CULTURE AEROBIC IDENTIFY: CPT | Performed by: INTERNAL MEDICINE

## 2021-09-27 PROCEDURE — 76060000031 HC ANESTHESIA FIRST 0.5 HR: Performed by: INTERNAL MEDICINE

## 2021-09-27 PROCEDURE — 76040000019: Performed by: INTERNAL MEDICINE

## 2021-09-27 PROCEDURE — 77030021593 HC FCPS BIOP ENDOSC BSC -A: Performed by: INTERNAL MEDICINE

## 2021-09-27 RX ORDER — ATROPINE SULFATE 0.1 MG/ML
0.5 INJECTION INTRAVENOUS
Status: DISCONTINUED | OUTPATIENT
Start: 2021-09-27 | End: 2021-09-27 | Stop reason: HOSPADM

## 2021-09-27 RX ORDER — DEXTROMETHORPHAN/PSEUDOEPHED 2.5-7.5/.8
1.2 DROPS ORAL
Status: DISCONTINUED | OUTPATIENT
Start: 2021-09-27 | End: 2021-09-27 | Stop reason: HOSPADM

## 2021-09-27 RX ORDER — SODIUM CHLORIDE 9 MG/ML
INJECTION, SOLUTION INTRAVENOUS
Status: DISCONTINUED | OUTPATIENT
Start: 2021-09-27 | End: 2021-09-27 | Stop reason: HOSPADM

## 2021-09-27 RX ORDER — SODIUM CHLORIDE 9 MG/ML
50 INJECTION, SOLUTION INTRAVENOUS CONTINUOUS
Status: DISCONTINUED | OUTPATIENT
Start: 2021-09-27 | End: 2021-09-27 | Stop reason: HOSPADM

## 2021-09-27 RX ORDER — PROPOFOL 10 MG/ML
INJECTION, EMULSION INTRAVENOUS AS NEEDED
Status: DISCONTINUED | OUTPATIENT
Start: 2021-09-27 | End: 2021-09-27 | Stop reason: HOSPADM

## 2021-09-27 RX ORDER — NALOXONE HYDROCHLORIDE 0.4 MG/ML
0.4 INJECTION, SOLUTION INTRAMUSCULAR; INTRAVENOUS; SUBCUTANEOUS
Status: DISCONTINUED | OUTPATIENT
Start: 2021-09-27 | End: 2021-09-27 | Stop reason: HOSPADM

## 2021-09-27 RX ORDER — LIDOCAINE HYDROCHLORIDE 20 MG/ML
INJECTION, SOLUTION EPIDURAL; INFILTRATION; INTRACAUDAL; PERINEURAL AS NEEDED
Status: DISCONTINUED | OUTPATIENT
Start: 2021-09-27 | End: 2021-09-27 | Stop reason: HOSPADM

## 2021-09-27 RX ORDER — MIDAZOLAM HYDROCHLORIDE 1 MG/ML
.25-5 INJECTION, SOLUTION INTRAMUSCULAR; INTRAVENOUS
Status: DISCONTINUED | OUTPATIENT
Start: 2021-09-27 | End: 2021-09-27 | Stop reason: HOSPADM

## 2021-09-27 RX ORDER — FLUMAZENIL 0.1 MG/ML
0.2 INJECTION INTRAVENOUS
Status: DISCONTINUED | OUTPATIENT
Start: 2021-09-27 | End: 2021-09-27 | Stop reason: HOSPADM

## 2021-09-27 RX ORDER — EPINEPHRINE 0.1 MG/ML
1 INJECTION INTRACARDIAC; INTRAVENOUS
Status: DISCONTINUED | OUTPATIENT
Start: 2021-09-27 | End: 2021-09-27 | Stop reason: HOSPADM

## 2021-09-27 RX ORDER — FENTANYL CITRATE 50 UG/ML
100 INJECTION, SOLUTION INTRAMUSCULAR; INTRAVENOUS
Status: DISCONTINUED | OUTPATIENT
Start: 2021-09-27 | End: 2021-09-27 | Stop reason: HOSPADM

## 2021-09-27 RX ADMIN — SODIUM CHLORIDE 50 ML/HR: 9 INJECTION, SOLUTION INTRAVENOUS at 08:31

## 2021-09-27 RX ADMIN — PROPOFOL INJECTABLE EMULSION 20 MG: 10 INJECTION, EMULSION INTRAVENOUS at 09:04

## 2021-09-27 RX ADMIN — LIDOCAINE HYDROCHLORIDE 80 MG: 20 INJECTION, SOLUTION INTRAVENOUS at 08:58

## 2021-09-27 RX ADMIN — SODIUM CHLORIDE: 900 INJECTION, SOLUTION INTRAVENOUS at 08:50

## 2021-09-27 RX ADMIN — PROPOFOL INJECTABLE EMULSION 60 MG: 10 INJECTION, EMULSION INTRAVENOUS at 08:59

## 2021-09-27 NOTE — H&P
Patient Name: Harpreet Dutta   2021   Gender: Female    (age): 1970 (49)         Referring Physician:     Vandana Truong [address]  [phone] (phone)  [phone] (fax)        Chief Complaint: dysphagia           History of Present Illness: The patient is seen for evaluation of dysphagia. Symptoms started greater than 15 months ago. Difficulty with swallowing has occurred intermittently with solids. Symptoms have been progressive with time. Food seems to get stuck in the suprasternal area. Dysphagia occurs at least once monthly. Associated complaints include frequent heartburn. Endorses daily pantoprazole use. ? ? Past Medical History      Medical Conditions: Anemia  Crohn's disease  Lime Disease  Stomach Ulcers   Surgical Procedures: Kidney Stones, 2020  Tonsillectomy  C Section  Bowel Resections  Colon Resection   Dx Studies: Abdominal U/S  Colonoscopy, 2017, Normal mucosa in the terminal ileum and whole colon  Colonoscopy  CT Scan  EGD, 2017, Spot in the antrum - completely healed ulcer. EGD, 2017, Ulcer in the antrum. (BRITTANI-Test, Biopsy)  Endoscopy  MRI   Medications:  Allergy Relief (fexofenadine) 180 mg Take 1 tablet by mouth once a day as directed  baclofen 10 mg Take 1 tablet by mouth three times a day as directed  cholecalciferol (vitamin D3) 50 mcg (2,000 unit) Take 1 capsule by mouth once a day as directed  clonazepam 1 mg Take 1 tablet by mouth twice a day as directed  dextroamphetamine-amphetamine 20 mg Take 1 tablet by mouth twice a day as directed  escitalopram oxalate 20 mg Take 1 tablet by mouth once a day as directed  lorazepam 1 mg Take 1 tablet by mouth four times a day as needed  mecobalamin (vitamin B12) 10,000 mcg Inject 1 dose intramuscularly once a month as directed  mometasone 50 mcg/actuation Spray 2 pump into both nostrils once a day as directed  nystatin 100,000 unit/mL swish & Spit 5 ml by mouth four times a day as directed  oxybutynin chloride 5 mg Take 1 tablet by mouth three times a day as directed  pantoprazole 40 mg Take 1 tablet by mouth once a day as directed  prednisolone acetate 1% Instill 1 drop into both eyes once a day as directed  valacyclovir 500 mg Take 1 tablet by mouth twice a day as directed   Allergies: Doxycycling  Nsaids (Non-Steroidal Anti-Inflammatory Drug) - ulcers  Penicillins - hives, n/v  Tetracyclines   Immunizations: zoster, 08/22/2021  Influenza vaccination, 12/10/2020  Influenza vaccination, 09/22/2019  Flu vaccine, 09/14/2017  Pneumococcal conjugate PCV 13, 09/14/2017  COVID Vaccine, 04/16/2021; 05/14/2021      Social History      Alcohol: None   Tobacco: Light tobacco smoker   Drugs: None   Exercise: Exercise less than 3 times a week. Caffeine: Daily. Family History No history of Colon Cancer, Colon Polyps, Esophogeal Cancer, GI Cancers, Liver disease  Son: Diagnosed with Crohn's disease or ulcerative colitis; Mother: Diagnosed with Crohn's disease or ulcerative colitis; Review of Systems:   Cardiovascular: Denies chest pain, irregular heart beat, palpitations, peripheral edema, syncope, Sweats. Constitutional: Denies fatigue, fever, loss of appetite, weight gain, weight loss. ENMT: Denies nose bleeds, sore throat, hearing loss. Endocrine: Denies excessive thirst, heat intolerance. Eyes: Denies loss of vision. Gastrointestinal: Presents suffers from abdominal pain, abdominal swelling, diarrhea, Bloating/gas, heartburn, dysphagia. Denies change in bowel habits, constipation, jaundice, nausea, rectal bleeding, stomach cramps, vomiting, rectal pain, Stool incontinence, hematemesis. Genitourinary: Denies dark urine, dysuria, frequent urination, hematuria, incontinence. Hematologic/Lymphatic: Denies easy bruising, prolonged bleeding. Integumentary: Denies itching, rashes, sun sensitivity.    Musculoskeletal: Denies arthritis, back pain, gout, joint pain, muscle weakness, stiffness. Neurological: Denies dizziness, fainting, frequent headaches, memory loss. Psychiatric: Denies anxiety, depression, difficulty sleeping, hallucinations, nervousness, panic attacks, paranoia. Respiratory: Denies cough, dyspnea, wheezing. Vital Signs: See nursing notes     Physical Exam:   Constitutional:      Appearance: No distress, appears comfortable. Skin:      Inspection: No rash, no jaundice. Clear to percussion and auscultation respiratory:      Cardiac regular rate and rhythm    Gastrointestinal/Abdomen:      Abdomen: non-distended, nontender. Psychiatric:      Judgment/insight: Normal, normal judgement, normal insight. Impressions: Dysphagia, oropharyngeal phase? ?  : ?         Plan: Education Handout -Smoking/Benefits of Quitting  I have discussed EGD biopsy, dilation, alternatives complications including but not limited to pain, cardiopulmonary event, bleeding, perforation; all questions answered.

## 2021-09-27 NOTE — PERIOP NOTES
Received recovery report from Anesthesia team, see anesthesia note. ABD remains soft and non-tender post procedure. Pt has no complaints at this time and tolerated the procedure well. Endoscope was pre-cleaned at bedside immediately following procedure by Devorah Eddy RN. Post recovery report given to [address] [place] [address].

## 2021-09-27 NOTE — PROCEDURES
Tammy Glasgow M.D. 2021    Esophagogastroduodenoscopy (EGD) Procedure Note  Gautam Key  : 1970  [facility] Medical Record Number: [id number]      Indications:    Dysphagia/odynophagia  Referring Physician:  Warden Jigar MD  Anesthesia/Sedation: see nursing notes  Endoscopist:  Dr. Sung Castle  Assistants: None  Permit:  The indications, risks, benefits and alternatives were reviewed with the patient or their decision maker who was provided an opportunity to ask questions and all questions were answered. The specific risks of esophagogastroduodenoscopy with conscious sedation were reviewed, including but not limited to anesthetic complication, bleeding, adverse drug reaction, missed lesion, infection, IV site reactions, and intestinal perforation which would lead to the need for surgical repair. Alternatives to EGD including radiographic imaging, observation without testing, or laboratory testing were reviewed as well as the limitations of those alternatives discussed. After considering the options and having all their questions answered, the patient or their decision maker provided both verbal and written consent to proceed. Procedure in Detail:  After obtaining informed consent, positioning of the patient in the left lateral decubitus position, and conduction of a pre-procedure pause or \"time out\" the endoscope was introduced into the mouth and advanced to the third portion duodenum. A careful inspection was made, and findings or interventions are described below.     Findings:   Esophagus:minute shallow EG junction erosion; otherwise unremarkable  Stomach: shallow prepyloric ulcer; otherwise normal  Duodenum/jejunum: normal    Complications/estimated blood loss: none    Therapies:  biopsy of esophagus  biopsy of stomach pre pyloric antrum    Specimens: biopsies    Implants: none           Recommendations:  -add sucralfate to existing pantoprazole   Reiterated recommendation for daily use    Thank you for entrusting me with this patient's care. Please do not hesitate to contact me with any questions or if I can be of assistance with any of your other patients' GI needs.   Signed By: Indira Wyatt MD                        September 27, 2021

## 2021-09-27 NOTE — ANESTHESIA POSTPROCEDURE EVALUATION
Procedure(s):  UPPER ESOPHAGOGASTRODUODENOSCOPY (EGD) (URGENT)  ESOPHAGOGASTRODUODENAL (EGD) BIOPSY. MAC    Anesthesia Post Evaluation      Multimodal analgesia: multimodal analgesia not used between 6 hours prior to anesthesia start to PACU discharge  Patient location during evaluation: PACU  Patient participation: complete - patient participated  Level of consciousness: awake  Pain management: adequate  Airway patency: patent  Anesthetic complications: no  Cardiovascular status: acceptable, blood pressure returned to baseline and hemodynamically stable  Respiratory status: acceptable  Hydration status: acceptable  Post anesthesia nausea and vomiting:  controlled      INITIAL Post-op Vital signs:   Vitals Value Taken Time   /103 09/27/21 0932   Temp 36.6 °C (97.8 °F) 09/27/21 0914   Pulse 69 09/27/21 0935   Resp 15 09/27/21 0935   SpO2 100 % 09/27/21 0935   Vitals shown include unvalidated device data.

## 2021-09-27 NOTE — ANESTHESIA PREPROCEDURE EVALUATION
Relevant Problems   NEUROLOGY   (+) Depression      RENAL FAILURE   (+) Staghorn renal calculus      HEMATOLOGY   (+) Anemia       Anesthetic History   No history of anesthetic complications            Review of Systems / Medical History  Patient summary reviewed, nursing notes reviewed and pertinent labs reviewed    Pulmonary          Smoker         Neuro/Psych   Within defined limits           Cardiovascular  Within defined limits                Exercise tolerance: >4 METS     GI/Hepatic/Renal     GERD    Renal disease: stones  PUD     Endo/Other  Within defined limits           Other Findings   Comments: Hx perforated gastric ulcer    Crohn's disease    B12 deficiency    Lyme disease    neuropathy           Physical Exam    Airway  Mallampati: II    Neck ROM: normal range of motion   Mouth opening: Normal     Cardiovascular  Regular rate and rhythm,  S1 and S2 normal,  no murmur, click, rub, or gallop  Rhythm: regular  Rate: normal         Dental  No notable dental hx       Pulmonary  Breath sounds clear to auscultation               Abdominal  GI exam deferred       Other Findings            Anesthetic Plan    ASA: 3  Anesthesia type: MAC          Induction: Intravenous  Anesthetic plan and risks discussed with: Patient

## 2021-09-27 NOTE — DISCHARGE INSTRUCTIONS
Tarik Westley  [id number]  1970    DISCHARGE INSTRUCTIONS    Results:  Ulcer and acid reflux changes both seen    Discomfort:  Redness at IV site- apply warm compress to area; if redness or soreness persist- contact your physician. There may be a slight amount of blood passed from the rectum. Gaseous discomfort - walking, belching will help relieve any discomfort. You may not operate a vehicle for 12 hours. You may not engage in an occupation involving machinery or appliances for rest of today. You may not drink alcoholic beverages for at least 12 hours. Avoid making any critical decisions for at least 24 hours. DIET:   High fiber diet. Medications: Add sucralfate 2 tabs before meals twice daily to pantoprazole which continues as every AM before meals              Resume usual medications today   ACTIVITY:  You may resume your normal daily activities it is recommended that you spend the remainder of the day resting -  avoid any strenuous activity. CALL M.D. ANY SIGN OF:   Increasing pain, nausea, vomiting  Abdominal distension (swelling)  New increased bleeding (oral or rectal)  Fever (chills)  Pain in chest area  Bloody discharge from nose or mouth  Shortness of breath     Follow-up Instructions:  Call Dr. Indira Wyatt if you have any questions or problems.         DISCHARGE SUMMARY from Nurse    The following personal items collected during your admission are returned to you:   Dental Appliance: Dental Appliances: Lowers, Uppers  Vision: Visual Aid: None  Hearing Aid:    Jewelry:    Clothing:    Other Valuables:    Valuables sent to safe:

## 2021-09-27 NOTE — PROGRESS NOTES
Daisy Hart  1970  [id number]    Situation:  Verbal report received from: Porsha Hudson  Procedure: Procedure(s):  UPPER ESOPHAGOGASTRODUODENOSCOPY (EGD) (URGENT)  ESOPHAGOGASTRODUODENAL (EGD) BIOPSY    Background:    Preoperative diagnosis: DYSPHAGIA, GASTRIC ULCER  Postoperative diagnosis: Gastric Ulcer  Esophagitis    :  Dr. Joya Siddiqui  Assistant(s): Endoscopy RN-1: Khari Sosa RN  Endoscopy RN-2: Leonel Neumann RN    Specimens:   ID Type Source Tests Collected by Time Destination   1 : Gastric Ulcer Biopsy Preservative Gastric  Maryam Cortes MD 9/27/2021 1972 Pathology   2 : GE Junction Biopsy Preservative   Maryam Cortes MD 9/27/2021 7586 Pathology     H. Pylori  no    Assessment:  Intra-procedure medications   Anesthesia gave intra-procedure sedation and medications, see anesthesia flow sheet yes    Intravenous fluids: NS@ KVO     Vital signs stable yes    Abdominal assessment: round and soft yes    Recommendation:  Discharge patient per MD order yes.   Return to floor Na  Family or Friend family  Permission to share finding with family or friend yes

## 2021-09-27 NOTE — PROGRESS NOTES
Endoscopy discharge instructions have been reviewed and given to patient. The patient verbalized understanding and acceptance of instructions. Dr. Phan Elliott discussed with patient procedure findings and next steps.

## 2021-10-08 DIAGNOSIS — K12.1 MOUTH ULCERS: ICD-10-CM

## 2021-10-08 RX ORDER — NYSTATIN 100000 [USP'U]/ML
1 SUSPENSION ORAL 4 TIMES DAILY
Qty: 480 ML | Refills: 1 | Status: SHIPPED | OUTPATIENT
Start: 2021-10-08 | End: 2021-12-06

## 2021-10-21 ENCOUNTER — OFFICE VISIT (OUTPATIENT)
Dept: FAMILY MEDICINE CLINIC | Age: 51
End: 2021-10-21
Payer: MEDICAID

## 2021-10-21 VITALS
HEIGHT: 62 IN | HEART RATE: 88 BPM | DIASTOLIC BLOOD PRESSURE: 85 MMHG | BODY MASS INDEX: 22.01 KG/M2 | WEIGHT: 119.6 LBS | OXYGEN SATURATION: 100 % | SYSTOLIC BLOOD PRESSURE: 125 MMHG | TEMPERATURE: 97.5 F | RESPIRATION RATE: 16 BRPM

## 2021-10-21 DIAGNOSIS — Z23 ENCOUNTER FOR IMMUNIZATION: ICD-10-CM

## 2021-10-21 DIAGNOSIS — Z91.89 AT HIGH RISK FOR OSTEOPOROSIS: ICD-10-CM

## 2021-10-21 DIAGNOSIS — F17.200 CURRENT SMOKER: ICD-10-CM

## 2021-10-21 DIAGNOSIS — E53.8 VITAMIN B12 DEFICIENCY: ICD-10-CM

## 2021-10-21 DIAGNOSIS — E55.9 VITAMIN D DEFICIENCY: ICD-10-CM

## 2021-10-21 DIAGNOSIS — F51.04 PSYCHOPHYSIOLOGICAL INSOMNIA: ICD-10-CM

## 2021-10-21 DIAGNOSIS — Z01.419 WELL WOMAN EXAM WITH ROUTINE GYNECOLOGICAL EXAM: Primary | ICD-10-CM

## 2021-10-21 PROCEDURE — 90686 IIV4 VACC NO PRSV 0.5 ML IM: CPT | Performed by: FAMILY MEDICINE

## 2021-10-21 PROCEDURE — 99396 PREV VISIT EST AGE 40-64: CPT | Performed by: FAMILY MEDICINE

## 2021-10-21 PROCEDURE — 96372 THER/PROPH/DIAG INJ SC/IM: CPT | Performed by: FAMILY MEDICINE

## 2021-10-21 RX ORDER — CYANOCOBALAMIN 1000 UG/ML
1000 INJECTION, SOLUTION INTRAMUSCULAR; SUBCUTANEOUS ONCE
Status: COMPLETED | OUTPATIENT
Start: 2021-10-21 | End: 2021-10-21

## 2021-10-21 RX ORDER — ACETAMINOPHEN 500 MG
2000 TABLET ORAL DAILY
Qty: 90 CAPSULE | Refills: 3 | Status: SHIPPED | OUTPATIENT
Start: 2021-10-21

## 2021-10-21 RX ORDER — ACETAMINOPHEN 500 MG
2000 TABLET ORAL DAILY
COMMUNITY
Start: 2021-09-03 | End: 2021-10-21 | Stop reason: SDUPTHER

## 2021-10-21 RX ORDER — MELATONIN 5 MG
5 CAPSULE ORAL
Qty: 90 CAPSULE | Refills: 3 | Status: SHIPPED | OUTPATIENT
Start: 2021-10-21 | End: 2022-09-01 | Stop reason: SDUPTHER

## 2021-10-21 RX ADMIN — CYANOCOBALAMIN 1000 MCG: 1000 INJECTION, SOLUTION INTRAMUSCULAR; SUBCUTANEOUS at 17:53

## 2021-10-21 NOTE — PROGRESS NOTES
HPI:  Kassidy Barreto is a 49 y.o. female presenting for well woman exam.     Acute complaints: None. Just needs her vitamin D3 filled. GYN: No longer has periods s/p Novasure (endometrial ablation) years ago. Sexual:  Not currently sexually active with male partner. Tubal ligation as method of birth control. Psych: Mood is good. Followed by psych. Feels safe at home. Health Maintenance - reviewed:  Pap (age 21-65): Last pap 2010 - Normal.    Mammogram (age 54-69): To be scheduled    Colonoscopy (age 54-65): Due but still needs to be scheduled. Established with GI for UGI issues and will schedule colonoscopy at follow up. Low Dose CT Lung (age 46-80 with 30ppy hx and current smoker or quit < 15 yrs): Not yet aged in. DEXA (>/= 73 yo or sooner with RF): Ordered     HIV screening: Declined      Allergies- reviewed: Allergies   Allergen Reactions    Doxycycline Swelling     Other reaction(s): Other (See Comments), WHELPS, ITCHING, NAUSEA  blisters      Adhesive Rash    Cephalosporins Hives    Penicillins Hives    Sulfa (Sulfonamide Antibiotics) Unknown (comments)    Tetracyclines Nausea and Vomiting         Medications- reviewed:   Current Outpatient Medications   Medication Sig    cholecalciferol (VITAMIN D3) (2,000 UNITS /50 MCG) cap capsule Take 2,000 Units by mouth daily.  nystatin (MYCOSTATIN) 100,000 unit/mL suspension TAKE 5 ML BY MOUTH FOUR (4) TIMES DAILY. SWISH AND SPIT    baclofen (LIORESAL) 10 mg tablet Take 1 Tablet by mouth three (3) times daily.  valACYclovir (VALTREX) 500 mg tablet Take 1 Tablet by mouth two (2) times a day.  mometasone (NASONEX) 50 mcg/actuation nasal spray SPRAY 2 SPRAYS INTO EACH NOSTRIL EVERY DAY    ergocalciferol, vitamin D2, 50 mcg (2,000 unit) cap Take 1 Capsule by mouth daily.  guaiFENesin ER (MUCINEX) 600 mg ER tablet Take 1 Tablet by mouth two (2) times a day.  Indications: Cough and congestion    mupirocin (BACTROBAN) 2 % ointment Apply  to affected area daily. Apply to area 2-3 times daily for 5 days    acetaminophen (TYLENOL) 500 mg tablet Take 1-2 Tablets by mouth every eight (8) hours as needed for Pain.  fexofenadine (ALLEGRA) 180 mg tablet Take 1 Tablet by mouth daily as needed for Allergies.  escitalopram oxalate (LEXAPRO) 10 mg tablet Take 10 mg by mouth daily.  fluconazole (DIFLUCAN) 100 mg tablet PLEASE SEE ATTACHED FOR DETAILED DIRECTIONS    LORazepam (ATIVAN) 1 mg tablet Take 1 mg by mouth two (2) times a day. Patient reports taking twice daily [id number]    pantoprazole (PROTONIX) 40 mg tablet Take 40 mg by mouth daily.  cyanocobalamin (VITAMIN B12) 1,000 mcg/mL injection 1,000 mcg by IntraMUSCular route every thirty (30) days.  DULoxetine (CYMBALTA) 60 mg capsule Take 120 mg by mouth daily.  dextroamphetamine-amphetamine (ADDERALL) 20 mg tablet Take 20 mg by mouth three (3) times daily.  clonazePAM (KLONOPIN) 1 mg tablet Take 1 mg by mouth two (2) times a day. Patient reports taking twice daily in the AM and PM ([id number])     No current facility-administered medications for this visit.          Past Medical History- reviewed:  Past Medical History:   Diagnosis Date    Autoimmune disease ([facility]     Crohn's Disease    Crohn disease ([facility] 2010    Crohn's     Depression 2010    History of vascular access device 2017    [facility] VAT - 33 cm right brachial PICC for abx and limited access    Perforation bowel ([facility] 2017    S/p palak Ga 2017    Peripheral neuropathy 2012    B12 deficiency MRI brain normal 2012 MRA head normal 2012 CTA neck normal 2012     Renal calculi 2020    Vertigo     Vitamin B12 deficiency 2012    164 on 2012 Needs 1000 mcg IM twice a week          Past Surgical History- reviewed:   Past Surgical History:   Procedure Laterality Date    HX  SECTION      HX HEENT      HX TONSIL AND ADENOIDECTOMY      HX TUBAL LIGATION      ID ABDOMEN SURGERY PROC UNLISTED  2009    due to Crohn's-bowel resection x2         Social History- reviewed:  Social History     Socioeconomic History    Marital status:      Spouse name: Not on file    Number of children: Not on file    Years of education: Not on file    Highest education level: Not on file   Occupational History    Not on file   Tobacco Use    Smoking status: Current Some Day Smoker     Packs/day: 0.50     Years: 8.00     Pack years: 4.00     Types: Cigarettes    Smokeless tobacco: Never Used   Substance and Sexual Activity    Alcohol use: No    Drug use: No     Comment: 1 pack a day    Sexual activity: Yes     Partners: Male     Birth control/protection: Pill   Other Topics Concern    Not on file   Social History Narrative    Not on file     Social Determinants of Health     Financial Resource Strain:     Difficulty of Paying Living Expenses:    Food Insecurity:     Worried About Running Out of Food in the Last Year:     Ran Out of Food in the Last Year:    Transportation Needs:     Lack of Transportation (Medical):      Lack of Transportation (Non-Medical):    Physical Activity:     Days of Exercise per Week:     Minutes of Exercise per Session:    Stress:     Feeling of Stress :    Social Connections:     Frequency of Communication with Friends and Family:     Frequency of Social Gatherings with Friends and Family:     Attends Yazidi Services:     Active Member of Clubs or Organizations:     Attends Club or Organization Meetings:     Marital Status:    Intimate Partner Violence:     Fear of Current or Ex-Partner:     Emotionally Abused:     Physically Abused:     Sexually Abused:          Immunizations- reviewed:   Immunization History   Administered Date(s) Administered    Covid-19, MODERNA, Mrna, Lnp-s, Pf, 100mcg/0.5mL 04/16/2021, 05/14/2021    Influenza Vaccine 09/08/2020    Influenza Vaccine (Mdck Quadrivalent)(>2 Yrs Flucelvax Quad vial [id number]) 10/28/2020    Influenza Vaccine (Quad) Mdck Pf (>2 Yrs Flucelvax QUAD [id number]) 10/04/2019    Influenza Vaccine TestPlant) PF (>6 Mo Flulaval, Fluarix, and >3 Yrs Afluria, Fluzone [id number]) 08/30/2017    Pneumococcal Conjugate (PCV-13) 09/23/2021    Pneumococcal Polysaccharide (PPSV-23) 08/30/2017    Tdap 10/08/2014    Zoster Recombinant 08/19/2021     Review of systems:  Items bolded if positive. Constitutional: Fever, chills, night sweats, weight loss, lymphadenopathy, fatigue  HEENT: Vision change, eye pain, rhinorrhea, sinus pain, epistaxis, dysphagia, change in hearing, tinnitus, vertigo.    Endocrine: Weight change, heat/ cold intolerance, tremor, insomnia, polyuria, polydipsia, polyphagia, abnl hair growth, nail changes  Cardiovascular: Chest pain, palpitations, syncope, lower extremity edema, orthopnea, paroxysmal nocturnal dyspnea  Pulmonary: Shortness of breath, dyspnea on exertion, cough, hemoptysis, wheezing  GI: Nausea, vomiting, diarrhea, melena, hematochezia, change in appetite, abdominal pain, change in bowel habits or stools  : Dysuria, frequency, urgency, incontinence, hematuria, nocturia  Musculoskeletal: joint swelling or pain, muscle pain, back pain  Skin:  Rash, New/growing/changing skin lesions  Neurologic: Headache, muscle weakness, paresthesias, anesthesia, ataxia, change in speech, change in gait   Psychiatric: depression, anxiety, hallucinations, amberly, SI/HI      Physical Exam  Visit Vitals  Wt 119 lb 9.6 oz (54.3 kg)   BMI 21.88 kg/m²       General appearance - alert, well appearing, and in no distress  Eyes - pupils equal and reactive, extraocular eye movements intact  Chest - clear to auscultation, no wheezes, rales or rhonchi, symmetric air entry  Heart - normal rate, regular rhythm, normal S1, S2, no murmurs, rubs, clicks or gallops  Abdomen - soft, nontender, nondistended, no masses or organomegaly  Neurological - alert, oriented, normal speech, no focal findings or movement disorder noted  Extremities - peripheral pulses normal, no pedal edema, no clubbing or cyanosis  Skin - normal coloration and turgor, no rashes, no suspicious skin lesions noted  Pelvic - Exam chaperoned by Quintin Keenan LPN. External genitalia normal without rashes or lesions. Pink and atrophic  vaginal mucosa. Scant white discharge. Cervix without lesions or abnormal discharge. Uterus non tender and normal size. No adnexal masses or tenderness. Assessment/Plan:   Ms. Brennon Becerril is a 49 y.o. female presenting for well woman health maintenance visit. · Counseled on importance of healthy diet, regular exercise, healthy lifestyle (i.e. Safe sex practices, seatbelt safety, wearing sunscreen, etc.)    · Pap smear done today    · Mammogram ordered but pt needed to reschedule    · Colonoscopy due. Pt well established with GI so she will schedule with them soon. · DEXA ordered. Increased risk of early osteoporosis given smoking, Crohn's disease requiring repeated courses of steroids over the yrs and family hx of early osteoporosis in mother. · Flu vaccine today    · B12 injection today    · Follow-up: Return for yearly wellness visits      Orders Placed This Encounter    cholecalciferol (VITAMIN D3) (2,000 UNITS /50 MCG) cap capsule     Sig: Take 2,000 Units by mouth daily. I have discussed the diagnosis with the patient and the intended plan as seen in the above orders. The patient has received an after-visit summary and questions were answered concerning future plans. Informed pt to return to the office if new symptoms arise.       Nivia Rodriguez MD

## 2021-10-21 NOTE — PROGRESS NOTES
Venita Esposito is a 49 y.o. female    Chief Complaint   Patient presents with    Complete Physical     CPE with pap. would like to discuss something to help her sleep, possibly melatonin       1. Have you been to the ER, urgent care clinic since your last visit? Hospitalized since your last visit? No    2. Have you seen or consulted any other health care providers outside of the [address] [place] [address] since your last visit? Include any pap smears or colon screening. No      Visit Vitals  /85 (BP 1 Location: Right upper arm, BP Patient Position: Sitting, BP Cuff Size: Adult)   Pulse 88   Temp 97.5 °F (36.4 °C) (Temporal)   Resp 16   Ht 5' 2\" (1.575 m)   Wt 119 lb 9.6 oz (54.3 kg)   SpO2 100%   BMI 21.88 kg/m²           Health Maintenance Due   Topic Date Due    Cervical cancer screen  07/13/2013    Colorectal Cancer Screening Combo  07/04/2018    Breast Cancer Screen Mammogram  04/09/2021    COVID-19 Vaccine (3 - Moderna risk 3-dose series) 06/11/2021    Shingrix Vaccine Age 50> (2 of 2) 10/14/2021         Medication Reconciliation completed, changes noted.   Please  Update medication list.

## 2021-10-27 LAB
CYTOLOGIST CVX/VAG CYTO: NORMAL
CYTOLOGY CVX/VAG DOC CYTO: NORMAL
CYTOLOGY CVX/VAG DOC THIN PREP: NORMAL
DX ICD CODE: NORMAL
HPV I/H RISK 4 DNA CVX QL PROBE+SIG AMP: NEGATIVE
Lab: NORMAL
OTHER STN SPEC: NORMAL
STAT OF ADQ CVX/VAG CYTO-IMP: NORMAL

## 2021-10-28 NOTE — PROGRESS NOTES
PAP is negative for IEL or malignancy, HR HPV negative. Repeat PAP in  5 years. Notified via [employer].

## 2021-11-06 DIAGNOSIS — R05.8 PRODUCTIVE COUGH: ICD-10-CM

## 2021-11-08 RX ORDER — SYRINGE-NEEDLE,INSULIN,0.5 ML 28GX1/2"
SYRINGE, EMPTY DISPOSABLE MISCELLANEOUS
Qty: 20 TABLET | Refills: 0 | Status: SHIPPED | OUTPATIENT
Start: 2021-11-08 | End: 2022-02-07

## 2021-11-17 ENCOUNTER — OFFICE VISIT (OUTPATIENT)
Dept: FAMILY MEDICINE CLINIC | Age: 51
End: 2021-11-17
Payer: MEDICAID

## 2021-11-17 VITALS
RESPIRATION RATE: 17 BRPM | OXYGEN SATURATION: 99 % | BODY MASS INDEX: 22.63 KG/M2 | SYSTOLIC BLOOD PRESSURE: 119 MMHG | TEMPERATURE: 97.8 F | HEIGHT: 62 IN | HEART RATE: 106 BPM | DIASTOLIC BLOOD PRESSURE: 79 MMHG | WEIGHT: 123 LBS

## 2021-11-17 DIAGNOSIS — G56.01 CARPAL TUNNEL SYNDROME OF RIGHT WRIST: ICD-10-CM

## 2021-11-17 DIAGNOSIS — M62.838 TRAPEZIUS MUSCLE SPASM: ICD-10-CM

## 2021-11-17 DIAGNOSIS — M79.18 MYOFASCIAL PAIN: Primary | ICD-10-CM

## 2021-11-17 PROCEDURE — 20553 NJX 1/MLT TRIGGER POINTS 3/>: CPT | Performed by: STUDENT IN AN ORGANIZED HEALTH CARE EDUCATION/TRAINING PROGRAM

## 2021-11-17 PROCEDURE — 96372 THER/PROPH/DIAG INJ SC/IM: CPT | Performed by: STUDENT IN AN ORGANIZED HEALTH CARE EDUCATION/TRAINING PROGRAM

## 2021-11-17 PROCEDURE — 99213 OFFICE O/P EST LOW 20 MIN: CPT | Performed by: STUDENT IN AN ORGANIZED HEALTH CARE EDUCATION/TRAINING PROGRAM

## 2021-11-17 RX ORDER — LIDOCAINE HYDROCHLORIDE 10 MG/ML
10 INJECTION INFILTRATION; PERINEURAL ONCE
Status: COMPLETED | OUTPATIENT
Start: 2021-11-17 | End: 2021-11-17

## 2021-11-17 RX ADMIN — LIDOCAINE HYDROCHLORIDE 10 ML: 10 INJECTION INFILTRATION; PERINEURAL at 14:49

## 2021-11-17 NOTE — PROGRESS NOTES
Chief Complaint   Patient presents with    Shoulder Pain     right     1. Have you been to the ER, urgent care clinic since your last visit? Hospitalized since your last visit? No    2. Have you seen or consulted any other health care providers outside of the [address] [place] [address] since your last visit? Include any pap smears or colon screening.  No

## 2021-11-17 NOTE — PROGRESS NOTES
[address] [facility]      Chief Complaint:   Chief Complaint   Patient presents with    Shoulder Pain     right       History of Present Illness     Patient Identification  Remi Benedict is a 49 y.o. female complains of pain in the neck/back pain. Remi Benedict  is a very pleasant 49 y.o. F who comes in for evaluation of neck/back pain  without any specific injury or inciting event for several years The pain is located primarily in the traps without radiating symptoms, persistent in nature and described as stabbing . The pain is rated as 10/10 during today's visit. The patient denies any night pain, weakness, or bowel/bladder dysfunction. The patient has no other complaints at this time. She is right handed nad works as a   She does endorse right wrist/ hand numbness as well as cramping that is wore at night wihtout accident or injury. Does not wear splints.       Prior Treatments:  Active Physical Therapy: yes 5 years ago with improvement  Attempted Modalities:  none tried  Injections:  Trigger point injection  Surgeries:  no prior surgery to the affected area  Medications: tylenol  Prior Imaging:  has not had prior imaging of the area       Past Medical History:   Diagnosis Date    Autoimmune disease ([facility]     Crohn's Disease    Crohn disease ([facility] [address] 4/19/2010    Crohn's     Depression 4/19/2010    History of vascular access device 07/18/2017    [facility] VAT - 33 cm right brachial PICC for abx and limited access    Perforation bowel ([facility] 7/4/2017    S/p palak Smith 7/2017    Peripheral neuropathy 2/2/2012    B12 deficiency MRI brain normal 1/2012 MRA head normal 1/2012 CTA neck normal 1/2012     Renal calculi 9/6/2020    Vertigo     Vitamin B12 deficiency 1/21/2012    164 on 1/2012 Needs 1000 mcg IM twice a week      Family History   Problem Relation Age of Onset    Heart Disease Father    Marcos Seeds Cancer Mother      Current Outpatient Medications   Medication Sig Dispense Refill    cholecalciferol (VITAMIN D3) (2,000 UNITS /50 MCG) cap capsule Take 1 Capsule by mouth daily. 90 Capsule 3    melatonin 5 mg cap capsule Take 1 Capsule by mouth nightly. 90 Capsule 3    nystatin (MYCOSTATIN) 100,000 unit/mL suspension TAKE 5 ML BY MOUTH FOUR (4) TIMES DAILY. SWISH AND SPIT 480 mL 1    baclofen (LIORESAL) 10 mg tablet Take 1 Tablet by mouth three (3) times daily. 90 Tablet 2    valACYclovir (VALTREX) 500 mg tablet Take 1 Tablet by mouth two (2) times a day. 60 Tablet 2    mometasone (NASONEX) 50 mcg/actuation nasal spray SPRAY 2 SPRAYS INTO EACH NOSTRIL EVERY DAY 17 Each 3    acetaminophen (TYLENOL) 500 mg tablet Take 1-2 Tablets by mouth every eight (8) hours as needed for Pain. 60 Tablet 0    fexofenadine (ALLEGRA) 180 mg tablet Take 1 Tablet by mouth daily as needed for Allergies. 90 Tablet 3    escitalopram oxalate (LEXAPRO) 10 mg tablet Take 10 mg by mouth daily.  LORazepam (ATIVAN) 1 mg tablet Take 1 mg by mouth two (2) times a day. Patient reports taking twice daily [id number]      pantoprazole (PROTONIX) 40 mg tablet Take 40 mg by mouth daily.  cyanocobalamin (VITAMIN B12) 1,000 mcg/mL injection 1,000 mcg by IntraMUSCular route every thirty (30) days.  dextroamphetamine-amphetamine (ADDERALL) 20 mg tablet Take 20 mg by mouth three (3) times daily.  clonazePAM (KLONOPIN) 1 mg tablet Take 1 mg by mouth two (2) times a day. Patient reports taking twice daily in the AM and PM ([id number])      Mucus Relief  mg ER tablet TAKE 1 TABLET BY MOUTH TWO (2) TIMES A DAY. INDICATIONS: COUGH AND CONGESTION (Patient not taking: Reported on 11/17/2021) 20 Tablet 0    mupirocin (BACTROBAN) 2 % ointment Apply  to affected area daily.  Apply to area 2-3 times daily for 5 days (Patient not taking: Reported on 11/17/2021) 22 g 0    fluconazole (DIFLUCAN) 100 mg tablet PLEASE SEE ATTACHED FOR DETAILED DIRECTIONS (Patient not taking: Reported on 11/17/2021) 3 Tab 0    DULoxetine (CYMBALTA) 60 mg capsule Take 120 mg by mouth daily. (Patient not taking: Reported on 11/17/2021)       Allergies   Allergen Reactions    Doxycycline Swelling     Other reaction(s): Other (See Comments), WHELPS, ITCHING, NAUSEA  blisters      Adhesive Rash    Cephalosporins Hives    Penicillins Hives    Sulfa (Sulfonamide Antibiotics) Unknown (comments)    Tetracyclines Nausea and Vomiting       Review of Systems  A comprehensive review of systems was negative except for that written in the HPI. Physical Exam     Visit Vitals  /79   Pulse (!) 106   Temp 97.8 °F (36.6 °C) (Temporal)   Resp 17   Ht 5' 2\" (1.575 m)   Wt 123 lb (55.8 kg)   SpO2 99%   BMI 22.50 kg/m²       GEN: Well appearing. No apparent distress. Responds to all questions appropriately. Lungs: No labored respirations. Talking in complete sentences without difficulty. General:  Awake and alert in no acute distress,   Psych: normal mood and affect. HEENT: NC/AT, normal visual tracking  Pulmonary: no resp distress, chest expansion appears symmetrical  CV: extremities are warm and perfused  Abd: non-distended  Ext: no c/c/e    Gait: normal  CERVICAL SPINE REGION: Flexion, extension, side-bending, rotation, oblique extension all full and pain free except for in left sidebending     Inspection, cervical: normal, no listing of the head, no gross asymmetries. Palpation:  Tender at right Cervical paraspinals, trapezius, supraspinatus,      Reflexes: Upper limbs:  RIGHT    LEFT   Biceps C5-6               2+     2+  Brachioradialis C5-6  2+             2+  Triceps C(6)7-8(1)  2+     2+      Strength, upper limbs:    5/5 in SA, EF, EE, WE, ADM, APB bilaterally \      Sensation: Upper limbs:   Intact to pinprick over C3-T1 bilateral UE dermatomes       Tests for cervical radiculopathy/myelopathy:    Spurling's sign: negative bilaterally    Long tract signs for myelopathy/UMN process:   UMN Sign                              Alexis sign: negative bilaterally     Ankle clonus:   2-3 beats bilaterally  Babinski sign:   mute bilaterally  Knee jerk:    2+/4 bilaterally  Ankle jerk:    2+/4 bilaterally    RIGHT SHOULDER  Palpation:  AC tenderness: None  SC tenderness: None  Clavicle tenderness: None  Biceps tenderness: None    Rotator Cuff Exam:  Neers sign: Negative  Wise sign: Negative  Painful Arc: Negative  Lift-off sign / Belly Press: Negative    Biceps/Labrum/AC Exam:  Yergasons Test: Negative  Speeds Test: Negative    Neuro/Vascular:  Pulses intact, no edema, and neurologically intact    Wrist: right  Wrist Effusion: None  Deformity: None      Palpation:  Snuff box tenderness: None  TFCC tenderness: None  Ulna styloid tenderness: None  Distal radius tenderness: None  Hook of Hamate tenderness: None  Second compartment (intersection) tenderness: None    Other test:  Finkelsteins test: Negative  Phalens test: Positive  Tinels test: positive on median nerve      Strength (0-5/5):   : 5/5  Intrinsics: 5/5  EPL (extensor pollicis longus): 5/5  Pinch mechanism: 5/5      [facility]  OFFICE PROCEDURE PROGRESS NOTE        Chart reviewed for the following:   IPetra MD, have reviewed the History, Physical and updated the Allergic reactions for [facility]     TIME OUT performed immediately prior to start of procedure:   Gil Ha MD, have performed the following reviews on [address] [place] [address] prior to the start of the procedure:            * Patient was identified by name and date of birth   * Agreement on procedure being performed was verified  * Risks and Benefits explained to the patient  * Procedure site verified and marked as necessary  * Patient was positioned for comfort  * Consent was signed and verified     Time: 2:00 pm      Date of procedure: 11/17/2021    Procedure performed by:  Petra Powers MD    Provider assisted by: None     Patient assisted by: self    How tolerated by patient: tolerated the procedure well with no complications    Post Procedural Pain Scale: 0 - No Hurt    Trigger Point Injections     Preparation - Cleaned and prepped with chlorhexidine swab x3. Anesthesia - Ethyl chloride spray used to anesthitise the skin prior to injection. Description of procedure - 4 trigger points were identified in the bilateral trap (3 right, 1 left) and each site was injected with 1 cc of 1% Lidocaine. Patient tolerated the procedure well and there were no complications. Patient reported pain relief following the injections. Assessment:    ICD-10-CM ICD-9-CM    1. Myofascial pain  M79.18 729.1    2. Carpal tunnel syndrome of right wrist  G56.01 354.0    3. Trapezius muscle spasm  M62.838 728.85        Plan:  -Trigger point injections as above  - start physical therapy for neck pain, patient has had improvement in past  - start night splint for right CTS, if no improvement can attempt injection in 3-4 weeks  - Home Exercise Program as per handout.  - Ice 15 minutes, three times a day PRN and after exercise. Can alternate with heat for 15 minutes. Medications:    1. Acetaminophen (Tylenol):  500mg 1-2 tablets every 6 hours as needed for pain.     RTC: 3-4  weeks

## 2021-11-22 ENCOUNTER — TELEPHONE (OUTPATIENT)
Dept: FAMILY MEDICINE CLINIC | Age: 51
End: 2021-11-22

## 2021-11-22 DIAGNOSIS — K13.79 MOUTH SORES: ICD-10-CM

## 2021-11-22 DIAGNOSIS — R11.0 NAUSEA: Primary | ICD-10-CM

## 2021-11-22 RX ORDER — VALACYCLOVIR HYDROCHLORIDE 500 MG/1
500 TABLET, FILM COATED ORAL 2 TIMES DAILY
Qty: 60 TABLET | Refills: 2 | Status: SHIPPED | OUTPATIENT
Start: 2021-11-22 | End: 2022-02-01

## 2021-11-24 ENCOUNTER — TELEPHONE (OUTPATIENT)
Dept: FAMILY MEDICINE CLINIC | Age: 51
End: 2021-11-24

## 2021-11-24 RX ORDER — ONDANSETRON 4 MG/1
4 TABLET, FILM COATED ORAL
Qty: 30 TABLET | Refills: 3 | Status: SHIPPED | OUTPATIENT
Start: 2021-11-24 | End: 2022-02-28

## 2021-11-24 NOTE — TELEPHONE ENCOUNTER
Hi! Pt. Called to request her referral for her neck to re-send it again to [facility]. Fax no. [phone].  Please and Thank you    -laverne

## 2021-12-01 ENCOUNTER — CLINICAL SUPPORT (OUTPATIENT)
Dept: FAMILY MEDICINE CLINIC | Age: 51
End: 2021-12-01

## 2021-12-01 ENCOUNTER — OFFICE VISIT (OUTPATIENT)
Dept: FAMILY MEDICINE CLINIC | Age: 51
End: 2021-12-01
Payer: MEDICAID

## 2021-12-01 VITALS
OXYGEN SATURATION: 99 % | BODY MASS INDEX: 23.19 KG/M2 | HEIGHT: 62 IN | HEART RATE: 99 BPM | WEIGHT: 126 LBS | RESPIRATION RATE: 18 BRPM | DIASTOLIC BLOOD PRESSURE: 87 MMHG | SYSTOLIC BLOOD PRESSURE: 130 MMHG

## 2021-12-01 DIAGNOSIS — M79.18 MYOFASCIAL PAIN: Primary | ICD-10-CM

## 2021-12-01 DIAGNOSIS — E53.8 VITAMIN B12 DEFICIENCY: Primary | ICD-10-CM

## 2021-12-01 DIAGNOSIS — M62.838 TRAPEZIUS MUSCLE SPASM: ICD-10-CM

## 2021-12-01 DIAGNOSIS — M62.838 MUSCLE SPASM: ICD-10-CM

## 2021-12-01 PROCEDURE — 96372 THER/PROPH/DIAG INJ SC/IM: CPT | Performed by: FAMILY MEDICINE

## 2021-12-01 PROCEDURE — 99213 OFFICE O/P EST LOW 20 MIN: CPT | Performed by: STUDENT IN AN ORGANIZED HEALTH CARE EDUCATION/TRAINING PROGRAM

## 2021-12-01 PROCEDURE — 20553 NJX 1/MLT TRIGGER POINTS 3/>: CPT | Performed by: STUDENT IN AN ORGANIZED HEALTH CARE EDUCATION/TRAINING PROGRAM

## 2021-12-01 RX ORDER — CYANOCOBALAMIN 1000 UG/ML
1000 INJECTION, SOLUTION INTRAMUSCULAR; SUBCUTANEOUS ONCE
Status: COMPLETED | OUTPATIENT
Start: 2021-12-01 | End: 2022-01-05

## 2021-12-01 RX ORDER — LIDOCAINE HYDROCHLORIDE 10 MG/ML
8 INJECTION INFILTRATION; PERINEURAL ONCE
Status: COMPLETED | OUTPATIENT
Start: 2021-12-01 | End: 2021-12-22

## 2021-12-01 RX ADMIN — CYANOCOBALAMIN 1000 MCG: 1000 INJECTION, SOLUTION INTRAMUSCULAR; SUBCUTANEOUS at 15:31

## 2021-12-01 NOTE — PROGRESS NOTES
Identified Patient with two Patient identifiers (Name and ). Two Patient Identifiers confirmed. Reviewed record in preparation for visit and have obtained necessary documentation. Chief Complaint   Patient presents with    Back Pain     follow up on trigger point injections       Visit Vitals  /87 (BP 1 Location: Right arm, BP Patient Position: Sitting, BP Cuff Size: Adult)   Pulse 99   Resp 18   Ht 5' 2\" (1.575 m)   Wt 126 lb (57.2 kg)   SpO2 99%   BMI 23.05 kg/m²       1. Have you been to the ER, urgent care clinic since your last visit? Hospitalized since your last visit? No    2. Have you seen or consulted any other health care providers outside of the [address] [place] [address] since your last visit? Include any pap smears or colon screening.  No

## 2021-12-01 NOTE — PROGRESS NOTES
[address] [facility]      Chief Complaint:   Chief Complaint   Patient presents with    Back Pain     follow up on trigger point injections       Subjective:   History: This patient is a 49 y.o. female  Presenting for follow up of Upper back pain. Patient reports that since her last visit, she has had 60-80% improvement in neck pain. She has yet to start physical therapy and had pham office seeking the referral. She asks for repeat injections as it had helped her last time. . The pain is located primarily in the traps without radiating symptoms, persistent in nature and described as stabbing . The patient denies any night pain, weakness, or bowel/bladder dysfunction. The patient has no other complaints at this time. She is right handed and works as a        Prior Treatments:  Active Physical Therapy: yes 5 years ago with improvement  Attempted Modalities:  none tried  Injections:  Trigger point injection  Surgeries:  no prior surgery to the affected area  Medications: tylenol  Prior Imaging:  has not had prior imaging of the area     No bowel or bladder incontinence. No fever, night sweats, or weight changes. No saddle anesthesia. Review of Systems:  General/Constitutional:  No fever, chills, sweats, fatigue, night sweats, weakness, weight loss or weight gain   Head: No headache, no trauma   Neck: No swelling, masses, stiffness, pain, or limited movement   Cardiac: No chest pain   Respiratory: No cough, shortness of breath, or dyspnea on exertion   GI: No incontinence. No nausea/vomiting, diarrhea, abdominal pain, bloody or dark stools  : No incontinence. No change in urinary habits. Peripheral Vascular: No edema, coldness, numbness, discoloration, pain, or paresthesias   Musculoskeletal: As per HPI  Neurological: No saddle distribution paresthesia. No siatic pain.  No loss of consciousness, dizziness, seizures, dysarthria, cognitive changes, problems with balance, or unilateral weakness. Past Medical History:   Diagnosis Date    Autoimmune disease ([facility] [address]     Crohn's Disease    Crohn disease ([facility] [address] 4/19/2010    Crohn's     Depression 4/19/2010    History of vascular access device 07/18/2017    [facility] VAT - 33 cm right brachial PICC for abx and limited access    Perforation bowel ([facility] [address] 7/4/2017    S/p palak Rizzo 7/2017    Peripheral neuropathy 2/2/2012    B12 deficiency MRI brain normal 1/2012 MRA head normal 1/2012 CTA neck normal 1/2012     Renal calculi 9/6/2020    Vertigo     Vitamin B12 deficiency 1/21/2012    164 on 1/2012 Needs 1000 mcg IM twice a week      Family History   Problem Relation Age of Onset    Heart Disease Father     Cancer Mother      Current Outpatient Medications   Medication Sig Dispense Refill    ondansetron hcl (ZOFRAN) 4 mg tablet Take 1 Tablet by mouth every eight (8) hours as needed for Nausea or Vomiting. 30 Tablet 3    valACYclovir (VALTREX) 500 mg tablet TAKE 1 TABLET BY MOUTH TWO (2) TIMES A DAY. 60 Tablet 2    cholecalciferol (VITAMIN D3) (2,000 UNITS /50 MCG) cap capsule Take 1 Capsule by mouth daily. 90 Capsule 3    melatonin 5 mg cap capsule Take 1 Capsule by mouth nightly. 90 Capsule 3    nystatin (MYCOSTATIN) 100,000 unit/mL suspension TAKE 5 ML BY MOUTH FOUR (4) TIMES DAILY. SWISH AND SPIT 480 mL 1    baclofen (LIORESAL) 10 mg tablet Take 1 Tablet by mouth three (3) times daily. 90 Tablet 2    mometasone (NASONEX) 50 mcg/actuation nasal spray SPRAY 2 SPRAYS INTO EACH NOSTRIL EVERY DAY 17 Each 3    acetaminophen (TYLENOL) 500 mg tablet Take 1-2 Tablets by mouth every eight (8) hours as needed for Pain. 60 Tablet 0    fexofenadine (ALLEGRA) 180 mg tablet Take 1 Tablet by mouth daily as needed for Allergies. 90 Tablet 3    escitalopram oxalate (LEXAPRO) 10 mg tablet Take 10 mg by mouth daily.       LORazepam (ATIVAN) 1 mg tablet Take 1 mg by mouth two (2) times a day. Patient reports taking twice daily [id number]      pantoprazole (PROTONIX) 40 mg tablet Take 40 mg by mouth daily.  cyanocobalamin (VITAMIN B12) 1,000 mcg/mL injection 1,000 mcg by IntraMUSCular route every thirty (30) days.  dextroamphetamine-amphetamine (ADDERALL) 20 mg tablet Take 20 mg by mouth three (3) times daily.  clonazePAM (KLONOPIN) 1 mg tablet Take 1 mg by mouth two (2) times a day. Patient reports taking twice daily in the AM and PM ([id number])      Mucus Relief  mg ER tablet TAKE 1 TABLET BY MOUTH TWO (2) TIMES A DAY. INDICATIONS: COUGH AND CONGESTION (Patient not taking: Reported on 11/17/2021) 20 Tablet 0    mupirocin (BACTROBAN) 2 % ointment Apply  to affected area daily. Apply to area 2-3 times daily for 5 days (Patient not taking: Reported on 11/17/2021) 22 g 0    fluconazole (DIFLUCAN) 100 mg tablet PLEASE SEE ATTACHED FOR DETAILED DIRECTIONS (Patient not taking: Reported on 11/17/2021) 3 Tab 0    DULoxetine (CYMBALTA) 60 mg capsule Take 120 mg by mouth daily. (Patient not taking: Reported on 11/17/2021)       Current Facility-Administered Medications   Medication Dose Route Frequency Provider Last Rate Last Admin    lidocaine (XYLOCAINE) 10 mg/mL (1 %) injection 8 mL  8 mL IntraMUSCular Rosalee Nova MD         Allergies   Allergen Reactions    Doxycycline Swelling     Other reaction(s):  Other (See Comments), WHELPS, ITCHING, NAUSEA  blisters      Adhesive Rash    Cephalosporins Hives    Penicillins Hives    Sulfa (Sulfonamide Antibiotics) Unknown (comments)    Tetracyclines Nausea and Vomiting     Social History     Socioeconomic History    Marital status:      Spouse name: Not on file    Number of children: Not on file    Years of education: Not on file    Highest education level: Not on file   Occupational History    Not on file   Tobacco Use    Smoking status: Current Some Day Smoker     Packs/day: 0.50     Years: 8.00     Pack years: 4.00     Types: Cigarettes    Smokeless tobacco: Never Used   Substance and Sexual Activity    Alcohol use: No    Drug use: No     Comment: 1 pack a day    Sexual activity: Yes     Partners: Male     Birth control/protection: Pill   Other Topics Concern    Not on file   Social History Narrative    Not on file     Social Determinants of Health     Financial Resource Strain:     Difficulty of Paying Living Expenses: Not on file   Food Insecurity:     Worried About Running Out of Food in the Last Year: Not on file    Amira of Food in the Last Year: Not on file   Transportation Needs:     Lack of Transportation (Medical): Not on file    Lack of Transportation (Non-Medical): Not on file   Physical Activity:     Days of Exercise per Week: Not on file    Minutes of Exercise per Session: Not on file   Stress:     Feeling of Stress : Not on file   Social Connections:     Frequency of Communication with Friends and Family: Not on file    Frequency of Social Gatherings with Friends and Family: Not on file    Attends Christian Services: Not on file    Active Member of [address] [place] [address] or Organizations: Not on file    Attends Club or Organization Meetings: Not on file    Marital Status: Not on file   Intimate Partner Violence:     Fear of Current or Ex-Partner: Not on file    Emotionally Abused: Not on file    Physically Abused: Not on file    Sexually Abused: Not on file   Housing Stability:     Unable to Pay for Housing in the Last Year: Not on file    Number of [place] in the Last Year: Not on file    Unstable Housing in the Last Year: Not on file       Objective:     Visit Vitals  /87 (BP 1 Location: Right arm, BP Patient Position: Sitting, BP Cuff Size: Adult)   Pulse 99   Resp 18   Ht 5' 2\" (1.575 m)   Wt 126 lb (57.2 kg)   SpO2 99%   BMI 23.05 kg/m²       GEN: Well appearing. No apparent distress. Responds to all questions appropriately. Lungs: No labored respirations.   Talking in complete sentences without difficulty. General:  Awake and alert in no acute distress,   Psych: normal mood and affect. HEENT: NC/AT, normal visual tracking  Pulmonary: no resp distress, chest expansion appears symmetrical  CV: extremities are warm and perfused  Abd: non-distended  Ext: no c/c/e    Gait: normal  CERVICAL SPINE REGION: Flexion, extension, side-bending, rotation, oblique extension all full and pain free except for in left sidebending     Inspection, cervical: normal, no listing of the head, no gross asymmetries. Palpation:  Tender at right Cervical paraspinals, trapezius, supraspinatus,      Reflexes: Upper limbs:  RIGHT    LEFT   Biceps C5-6               2+     2+  Brachioradialis C5-6  2+             2+  Triceps C(6)7-8(1)  2+     2+      Strength, upper limbs:    5/5 in SA, EF, EE, WE, ADM, APB bilaterally       Sensation: Upper limbs:   Intact to pinprick over C3-T1 bilateral UE dermatomes       Tests for cervical radiculopathy/myelopathy:    Spurling's sign: negative bilaterally    Long tract signs for myelopathy/UMN process:   UMN Sign                              Alexis sign: negative bilaterally     Ankle clonus:   2-3 beats bilaterally  Babinski sign:   mute bilaterally  Knee jerk:    2+/4 bilaterally  Ankle jerk:    2+/4 bilaterally    RIGHT SHOULDER  Palpation:  AC tenderness: None  SC tenderness: None  Clavicle tenderness: None  Biceps tenderness: None    Rotator Cuff Exam:  Neers sign: Negative  Wise sign: Negative  Painful Arc: Negative  Lift-off sign / Belly Press: Negative    Biceps/Labrum/AC Exam:  Yergasons Test: Negative  Speeds Test: Negative    Neuro/Vascular:  Pulses intact, no edema, and neurologically intact         [facility]  OFFICE PROCEDURE PROGRESS NOTE        Chart reviewed for the following:   Jeff LOYD MD, have reviewed the History, Physical and updated the Allergic reactions for Aprill L Dutta     TIME OUT performed immediately prior to start of procedure:   Mp Coleman MD, have performed the following reviews on [address] [place] [address] prior to the start of the procedure:            * Patient was identified by name and date of birth   * Agreement on procedure being performed was verified  * Risks and Benefits explained to the patient  * Procedure site verified and marked as necessary  * Patient was positioned for comfort  * Consent was signed and verified     Time: 2:30 pm      Date of procedure: 12/1/2021    Procedure performed by:  Iliana Pastor MD    Provider assisted by: None     Patient assisted by: self    How tolerated by patient: tolerated the procedure well with no complications    Post Procedural Pain Scale: 0 - No Hurt    Trigger Point Injections     Preparation - Cleaned and prepped with chlorhexidine swab x3. Anesthesia - Ethyl chloride spray used to anesthitise the skin prior to injection. Description of procedure - 4 trigger points were identified in the bilateral trap and each site was injected with 1 cc of 1% Lidocaine. Patient tolerated the procedure well and there were no complications. Patient reports 80% pain relief following the injections. Assessment:       ICD-10-CM ICD-9-CM    1. Myofascial pain  M79.18 729.1 INJECT TRIGGER POINTS, > 3   2. Trapezius muscle spasm  M62.838 728.85 INJECT TRIGGER POINTS, > 3   3. Muscle spasm  M62.838 728.85          Plan:      Plan:  -Trigger point injections as above  - physical therapy for neck pain, patient has had improvement in past. Print out of referral privded  - Home Exercise Program as per handout.  - Ice 15 minutes, three times a day PRN and after exercise.   Can alternate with heat for 15 minutes.      Medications:               1.   Acetaminophen (Tylenol):  500mg 1-2 tablets every 6 hours as needed for pain.     RTC: 3-4  weeks

## 2021-12-06 DIAGNOSIS — K12.1 MOUTH ULCERS: ICD-10-CM

## 2021-12-06 RX ORDER — NYSTATIN 100000 [USP'U]/ML
1 SUSPENSION ORAL 4 TIMES DAILY
Qty: 480 ML | Refills: 1 | Status: SHIPPED | OUTPATIENT
Start: 2021-12-06 | End: 2022-01-25

## 2021-12-13 DIAGNOSIS — M62.838 MUSCLE SPASM: ICD-10-CM

## 2021-12-14 RX ORDER — BACLOFEN 10 MG/1
10 TABLET ORAL 3 TIMES DAILY
Qty: 90 TABLET | Refills: 2 | Status: SHIPPED | OUTPATIENT
Start: 2021-12-14 | End: 2022-02-25

## 2021-12-17 ENCOUNTER — PATIENT MESSAGE (OUTPATIENT)
Dept: FAMILY MEDICINE CLINIC | Age: 51
End: 2021-12-17

## 2021-12-22 RX ADMIN — LIDOCAINE HYDROCHLORIDE 8 ML: 10 INJECTION INFILTRATION; PERINEURAL at 08:07

## 2021-12-23 DIAGNOSIS — Z91.09 ENVIRONMENTAL ALLERGIES: ICD-10-CM

## 2021-12-28 RX ORDER — MOMETASONE FUROATE 50 UG/1
SPRAY, METERED NASAL
Qty: 17 EACH | Refills: 3 | Status: SHIPPED | OUTPATIENT
Start: 2021-12-28 | End: 2022-04-16

## 2021-12-28 NOTE — PROCEDURES
[facility]  *** FINAL REPORT ***    Name: Jeff Simpson  MRN: [id number]    Outpatient  : 1970  HIS Order #: [id number]  [address] [facility] Visit #: [id number]  Date: 09 Aug 2018    TYPE OF TEST: Peripheral Venous Testing    REASON FOR TEST  Pain in limb, Limb swelling    Right Leg:-  Deep venous thrombosis:           No  Superficial venous thrombosis:    No  Deep venous insufficiency:        No  Superficial venous insufficiency: No      INTERPRETATION/FINDINGS  PROCEDURE:  RIGHT LOWER EXTREMITY  VENOUS DUPLEX. Evaluation of lower  extremity veins with ultrasound (B-mode imaging, pulsed Doppler, color   Doppler). Includes the common femoral, deep femoral, femoral,  popliteal, posterior tibial, peroneal, and great saphenous veins. Other veins, for example the gastrocnemius and soleal veins, may also  be visualized. FINDINGS: Idella Coup scale and color flow duplex images of the veins in the  right lower extremity demonstrate normal compressibility, spontaneous  and augmented flow profiles, and absence of filling defects throughout   the deep and superficial veins in the right lower extremity. CONCLUSION:Right lower extremity venous duplex negative for deep  venous thrombosis or thrombophlebitis. Left common femoral vein is  thrombus free. ADDITIONAL COMMENTS    I have personally reviewed the data relevant to the interpretation of  this  study. TECHNOLOGIST: Elmo Isbell  Signed: 2018 10:43:45 PM    PHYSICIAN: Chris Iglesias.  Laraine Libman, MD  Signed: 8/10/2018 9:57:18 AM
27-Dec-2021 00:17

## 2022-01-05 ENCOUNTER — OFFICE VISIT (OUTPATIENT)
Dept: FAMILY MEDICINE CLINIC | Age: 52
End: 2022-01-05

## 2022-01-05 ENCOUNTER — CLINICAL SUPPORT (OUTPATIENT)
Dept: FAMILY MEDICINE CLINIC | Age: 52
End: 2022-01-05
Payer: MEDICAID

## 2022-01-05 VITALS
SYSTOLIC BLOOD PRESSURE: 150 MMHG | HEIGHT: 62 IN | WEIGHT: 129 LBS | DIASTOLIC BLOOD PRESSURE: 83 MMHG | OXYGEN SATURATION: 99 % | BODY MASS INDEX: 23.74 KG/M2 | RESPIRATION RATE: 22 BRPM | HEART RATE: 129 BPM

## 2022-01-05 DIAGNOSIS — M54.2 NECK PAIN: ICD-10-CM

## 2022-01-05 DIAGNOSIS — M62.838 TRAPEZIUS MUSCLE SPASM: Primary | ICD-10-CM

## 2022-01-05 DIAGNOSIS — E53.8 VITAMIN B 12 DEFICIENCY: Primary | ICD-10-CM

## 2022-01-05 PROCEDURE — 96372 THER/PROPH/DIAG INJ SC/IM: CPT | Performed by: STUDENT IN AN ORGANIZED HEALTH CARE EDUCATION/TRAINING PROGRAM

## 2022-01-05 PROCEDURE — 96372 THER/PROPH/DIAG INJ SC/IM: CPT | Performed by: FAMILY MEDICINE

## 2022-01-05 PROCEDURE — 20553 NJX 1/MLT TRIGGER POINTS 3/>: CPT | Performed by: STUDENT IN AN ORGANIZED HEALTH CARE EDUCATION/TRAINING PROGRAM

## 2022-01-05 RX ORDER — LIDOCAINE HYDROCHLORIDE 10 MG/ML
8 INJECTION INFILTRATION; PERINEURAL ONCE
Status: COMPLETED | OUTPATIENT
Start: 2022-01-05 | End: 2022-01-05

## 2022-01-05 RX ORDER — CYANOCOBALAMIN 1000 UG/ML
1000 INJECTION, SOLUTION INTRAMUSCULAR; SUBCUTANEOUS
Qty: 1 ML | Refills: 0
Start: 2022-01-05 | End: 2022-01-05

## 2022-01-05 RX ADMIN — LIDOCAINE HYDROCHLORIDE 8 ML: 10 INJECTION INFILTRATION; PERINEURAL at 16:00

## 2022-01-05 RX ADMIN — CYANOCOBALAMIN 1000 MCG: 1000 INJECTION, SOLUTION INTRAMUSCULAR; SUBCUTANEOUS at 16:21

## 2022-01-05 NOTE — PROGRESS NOTES
[id number] [facility]      Chief Complaint:   Chief Complaint   Patient presents with    Back Pain       Subjective:   History: This patient is a 49 y.o. female  Presenting for follow up of Upper back pain here for trigger point injections    Patient reports that since her last visit, she has had 80% improvement in neck pain. She has yet to start physical therapy. She asks for repeat injections as it had helped her last time. The pain is located primarily in the traps without radiating symptoms, persistent in nature and described as stabbing . The patient denies any night pain, weakness, or bowel/bladder dysfunction. The patient has no other complaints at this time. She is right handed and works as a        Prior Treatments:  Active Physical Therapy: yes 5 years ago with improvement  Attempted Modalities:  none tried  Injections:  Trigger point injection  Surgeries:  no prior surgery to the affected area  Medications: tylenol  Prior Imaging:  has not had prior imaging of the area     No bowel or bladder incontinence. No fever, night sweats, or weight changes. No saddle anesthesia. Review of Systems:  General/Constitutional:  No fever, chills, sweats, fatigue, night sweats, weakness, weight loss or weight gain   Head: No headache, no trauma   Neck: No swelling, masses, stiffness, pain, or limited movement   Cardiac: No chest pain   Respiratory: No cough, shortness of breath, or dyspnea on exertion   GI: No incontinence. No nausea/vomiting, diarrhea, abdominal pain, bloody or dark stools  : No incontinence. No change in urinary habits. Peripheral Vascular: No edema, coldness, numbness, discoloration, pain, or paresthesias   Musculoskeletal: As per HPI  Neurological: No saddle distribution paresthesia. No siatic pain.  No loss of consciousness, dizziness, seizures, dysarthria, cognitive changes, problems with balance, or unilateral weakness. Past Medical History:   Diagnosis Date    Autoimmune disease ([facility] [address]     Crohn's Disease    Crohn disease ([facility] [address] 4/19/2010    Crohn's     Depression 4/19/2010    History of vascular access device 07/18/2017    [facility] VAT - 33 cm right brachial PICC for abx and limited access    Perforation bowel ([facility] [address] 7/4/2017    S/p palak Kumari 7/2017    Peripheral neuropathy 2/2/2012    B12 deficiency MRI brain normal 1/2012 MRA head normal 1/2012 CTA neck normal 1/2012     Renal calculi 9/6/2020    Vertigo     Vitamin B12 deficiency 1/21/2012    164 on 1/2012 Needs 1000 mcg IM twice a week      Family History   Problem Relation Age of Onset    Heart Disease Father     Cancer Mother      Current Outpatient Medications   Medication Sig Dispense Refill    mometasone (NASONEX) 50 mcg/actuation nasal spray SPRAY 2 SPRAYS INTO EACH NOSTRIL EVERY DAY 17 Each 3    baclofen (LIORESAL) 10 mg tablet TAKE 1 TABLET BY MOUTH THREE (3) TIMES DAILY. 90 Tablet 2    nystatin (MYCOSTATIN) 100,000 unit/mL suspension TAKE 5 ML BY MOUTH FOUR (4) TIMES DAILY. SWISH AND SPIT 480 mL 1    ondansetron hcl (ZOFRAN) 4 mg tablet Take 1 Tablet by mouth every eight (8) hours as needed for Nausea or Vomiting. 30 Tablet 3    valACYclovir (VALTREX) 500 mg tablet TAKE 1 TABLET BY MOUTH TWO (2) TIMES A DAY. 60 Tablet 2    cholecalciferol (VITAMIN D3) (2,000 UNITS /50 MCG) cap capsule Take 1 Capsule by mouth daily. 90 Capsule 3    melatonin 5 mg cap capsule Take 1 Capsule by mouth nightly. 90 Capsule 3    acetaminophen (TYLENOL) 500 mg tablet Take 1-2 Tablets by mouth every eight (8) hours as needed for Pain. 60 Tablet 0    fexofenadine (ALLEGRA) 180 mg tablet Take 1 Tablet by mouth daily as needed for Allergies. 90 Tablet 3    escitalopram oxalate (LEXAPRO) 10 mg tablet Take 10 mg by mouth daily.  LORazepam (ATIVAN) 1 mg tablet Take 1 mg by mouth two (2) times a day.  Patient reports taking twice daily [id number]  pantoprazole (PROTONIX) 40 mg tablet Take 40 mg by mouth daily.  cyanocobalamin (VITAMIN B12) 1,000 mcg/mL injection 1,000 mcg by IntraMUSCular route every thirty (30) days.  dextroamphetamine-amphetamine (ADDERALL) 20 mg tablet Take 20 mg by mouth three (3) times daily.  clonazePAM (KLONOPIN) 1 mg tablet Take 1 mg by mouth two (2) times a day. Patient reports taking twice daily in the AM and PM ([id number])      cyanocobalamin (VITAMIN B12) 1,000 mcg/mL injection 1 mL by IntraMUSCular route now for 1 dose. 1 mL 0    Mucus Relief  mg ER tablet TAKE 1 TABLET BY MOUTH TWO (2) TIMES A DAY. INDICATIONS: COUGH AND CONGESTION (Patient not taking: Reported on 11/17/2021) 20 Tablet 0    mupirocin (BACTROBAN) 2 % ointment Apply  to affected area daily. Apply to area 2-3 times daily for 5 days (Patient not taking: Reported on 11/17/2021) 22 g 0    fluconazole (DIFLUCAN) 100 mg tablet PLEASE SEE ATTACHED FOR DETAILED DIRECTIONS (Patient not taking: Reported on 11/17/2021) 3 Tab 0    DULoxetine (CYMBALTA) 60 mg capsule Take 120 mg by mouth daily. (Patient not taking: Reported on 11/17/2021)       Allergies   Allergen Reactions    Doxycycline Swelling     Other reaction(s):  Other (See Comments), WHELPS, ITCHING, NAUSEA  blisters      Adhesive Rash    Cephalosporins Hives    Penicillins Hives    Sulfa (Sulfonamide Antibiotics) Unknown (comments)    Tetracyclines Nausea and Vomiting     Social History     Socioeconomic History    Marital status:      Spouse name: Not on file    Number of children: Not on file    Years of education: Not on file    Highest education level: Not on file   Occupational History    Not on file   Tobacco Use    Smoking status: Current Some Day Smoker     Packs/day: 0.50     Years: 8.00     Pack years: 4.00     Types: Cigarettes    Smokeless tobacco: Never Used   Substance and Sexual Activity    Alcohol use: No    Drug use: No     Comment: 1 pack a day  Sexual activity: Yes     Partners: Male     Birth control/protection: Pill   Other Topics Concern    Not on file   Social History Narrative    Not on file     Social Determinants of Health     Financial Resource Strain:     Difficulty of Paying Living Expenses: Not on file   Food Insecurity:     Worried About Running Out of Food in the Last Year: Not on file    Amira of Food in the Last Year: Not on file   Transportation Needs:     Lack of Transportation (Medical): Not on file    Lack of Transportation (Non-Medical): Not on file   Physical Activity:     Days of Exercise per Week: Not on file    Minutes of Exercise per Session: Not on file   Stress:     Feeling of Stress : Not on file   Social Connections:     Frequency of Communication with Friends and Family: Not on file    Frequency of Social Gatherings with Friends and Family: Not on file    Attends Anabaptism Services: Not on file    Active Member of [address] [place] [address] [employer] or Organizations: Not on file    Attends Club or Organization Meetings: Not on file    Marital Status: Not on file   Intimate Partner Violence:     Fear of Current or Ex-Partner: Not on file    Emotionally Abused: Not on file    Physically Abused: Not on file    Sexually Abused: Not on file   Housing Stability:     Unable to Pay for Housing in the Last Year: Not on file    Number of [place] in the Last Year: Not on file    Unstable Housing in the Last Year: Not on file       Objective:     Visit Vitals  BP (!) 150/83 (BP 1 Location: Right arm, BP Patient Position: Sitting, BP Cuff Size: Adult)   Pulse (!) 129   Resp 22   Ht 5' 2\" (1.575 m)   Wt 129 lb (58.5 kg)   SpO2 99%   BMI 23.59 kg/m²       GEN: Well appearing. No apparent distress. Responds to all questions appropriately. Lungs: No labored respirations. Talking in complete sentences without difficulty. General:  Awake and alert in no acute distress,   Psych: normal mood and affect.    HEENT: NC/AT, normal visual tracking  Pulmonary: no resp distress, chest expansion appears symmetrical  CV: extremities are warm and perfused  Abd: non-distended  Ext: no c/c/e    Gait: normal  CERVICAL SPINE REGION: Flexion, extension, side-bending, rotation, oblique extension all full and pain free except for in left sidebending     Inspection, cervical: normal, no listing of the head, no gross asymmetries. Palpation:  Tender at right Cervical paraspinals, trapezius, supraspinatus,, tenderness in right lumbar paraspinal as well      Reflexes: Upper limbs:  RIGHT    LEFT   Biceps C5-6               2+     2+  Brachioradialis C5-6  2+             2+  Triceps C(6)7-8(1)  2+     2+      Strength, upper limbs:    5/5 in SA, EF, EE, WE, ADM, APB bilaterally       Sensation: Upper limbs:   Intact to pinprick over C3-T1 bilateral UE dermatomes       Tests for cervical radiculopathy/myelopathy:    Spurling's sign: negative bilaterally    Long tract signs for myelopathy/UMN process:   UMN Sign                              Alexis sign: negative bilaterally     Ankle clonus:   2-3 beats bilaterally  Babinski sign:   mute bilaterally  Knee jerk:    2+/4 bilaterally  Ankle jerk:    2+/4 bilaterally    Neuro/Vascular:  Pulses intact, no edema, and neurologically intact         [facility]  OFFICE PROCEDURE PROGRESS NOTE        Chart reviewed for the following:   Simonne Baumgarten, MD, have reviewed the History, Physical and updated the Allergic reactions for Onel Leija performed immediately prior to start of procedure:   Simonne Baumgarten, MD, have performed the following reviews on [address] [place] [address] prior to the start of the procedure:            * Patient was identified by name and date of birth   * Agreement on procedure being performed was verified  * Risks and Benefits explained to the patient  * Procedure site verified and marked as necessary  * Patient was positioned for comfort  * Consent was signed and verified     Time: 3:30 pm      Date of procedure: 1/5/2022    Procedure performed by:  Lazarus Householder, MD    Provider assisted by: None     Patient assisted by: self    How tolerated by patient: tolerated the procedure well with no complications    Post Procedural Pain Scale: 0 - No Hurt    Trigger Point Injections     Preparation - Cleaned and prepped with chlorhexidine swab x3. Anesthesia - Ethyl chloride spray used to anesthitise the skin prior to injection. Description of procedure - 3 trigger points were identified in the bilateral trap and 1 trigger in the lumbar paraspinal and each site was injected with 1 cc of 1% Lidocaine. Patient tolerated the procedure well and there were no complications. Patient reports 100% pain relief following the injections. Assessment:       ICD-10-CM ICD-9-CM    1. Trapezius muscle spasm  M62.838 728.85    2. Neck pain  M54.2 723.1          Plan:      Plan:  -Trigger point injections as above  - physical therapy for neck pain, patient has had improvement in past.   - Home Exercise Program as per handout.  - Ice 15 minutes, three times a day PRN and after exercise. Can alternate with heat for 15 minutes.    - discussed improtance of smoking cessatoin     Medications:               1.   Acetaminophen (Tylenol):  500mg 1-2 tablets every 6 hours as needed for pain.     RTC: 3-4  weeks

## 2022-01-05 NOTE — PROGRESS NOTES
Identified Patient with two Patient identifiers (Name and ). Two Patient Identifiers confirmed. Reviewed record in preparation for visit and have obtained necessary documentation. Chief Complaint   Patient presents with    Back Pain       Visit Vitals  BP (!) 150/83 (BP 1 Location: Right arm, BP Patient Position: Sitting, BP Cuff Size: Adult)   Pulse (!) 129   Resp 22   Ht 5' 2\" (1.575 m)   Wt 129 lb (58.5 kg)   SpO2 99%   BMI 23.59 kg/m²       1. Have you been to the ER, urgent care clinic since your last visit? Hospitalized since your last visit? No    2. Have you seen or consulted any other health care providers outside of the [address] [place] [address] since your last visit? Include any pap smears or colon screening.  No

## 2022-01-06 NOTE — TELEPHONE ENCOUNTER
From: Rowena Zaragoza  To: Pradeep Lou MD  Sent: 12/17/2021 8:06 AM EST  Subject: Appointment Request    Appointment Request From: Rowena Zaragoza    With Provider: Pradeep Lou MD [[address]]    Preferred Date Range: 1/7/2022  1/14/2022    Preferred Times: Any Time    Reason for visit: Request an Appointment    Comments:   Follow up and B-12 injection

## 2022-01-06 NOTE — TELEPHONE ENCOUNTER
Called pt yesterday and explained that we are stating to do nurse visits Tuesday-thursdays. Pt stated she wanted to see Dr Alia Larsen for trigger point injections. She was also able to get her B12 injection at her visit yesterday.

## 2022-01-23 DIAGNOSIS — K12.1 MOUTH ULCERS: ICD-10-CM

## 2022-01-25 RX ORDER — NYSTATIN 100000 [USP'U]/ML
1 SUSPENSION ORAL 4 TIMES DAILY
Qty: 480 ML | Refills: 1 | Status: SHIPPED | OUTPATIENT
Start: 2022-01-25 | End: 2022-03-08

## 2022-02-01 DIAGNOSIS — K13.79 MOUTH SORES: ICD-10-CM

## 2022-02-01 RX ORDER — VALACYCLOVIR HYDROCHLORIDE 500 MG/1
TABLET, FILM COATED ORAL
Qty: 60 TABLET | Refills: 2 | Status: SHIPPED | OUTPATIENT
Start: 2022-02-01 | End: 2022-04-16

## 2022-02-07 DIAGNOSIS — R05.8 PRODUCTIVE COUGH: ICD-10-CM

## 2022-02-07 DIAGNOSIS — L01.00 IMPETIGO: ICD-10-CM

## 2022-02-07 RX ORDER — MUPIROCIN 20 MG/G
OINTMENT TOPICAL DAILY
Qty: 22 G | Refills: 0 | Status: SHIPPED | OUTPATIENT
Start: 2022-02-07

## 2022-02-07 RX ORDER — SYRINGE-NEEDLE,INSULIN,0.5 ML 28GX1/2"
SYRINGE, EMPTY DISPOSABLE MISCELLANEOUS
Qty: 20 TABLET | Refills: 0 | Status: SHIPPED | OUTPATIENT
Start: 2022-02-07 | End: 2022-06-01

## 2022-02-09 ENCOUNTER — OFFICE VISIT (OUTPATIENT)
Dept: FAMILY MEDICINE CLINIC | Age: 52
End: 2022-02-09
Payer: MEDICAID

## 2022-02-09 VITALS
TEMPERATURE: 97.3 F | HEIGHT: 62 IN | HEART RATE: 92 BPM | SYSTOLIC BLOOD PRESSURE: 117 MMHG | OXYGEN SATURATION: 100 % | DIASTOLIC BLOOD PRESSURE: 81 MMHG | RESPIRATION RATE: 18 BRPM | WEIGHT: 132.2 LBS | BODY MASS INDEX: 24.33 KG/M2

## 2022-02-09 DIAGNOSIS — J32.9 RECURRENT SINUS INFECTIONS: Primary | ICD-10-CM

## 2022-02-09 DIAGNOSIS — E53.8 VITAMIN B 12 DEFICIENCY: ICD-10-CM

## 2022-02-09 DIAGNOSIS — N95.1 HOT FLASHES DUE TO MENOPAUSE: ICD-10-CM

## 2022-02-09 DIAGNOSIS — H93.8X2 EAR FULLNESS, LEFT: ICD-10-CM

## 2022-02-09 PROCEDURE — 96372 THER/PROPH/DIAG INJ SC/IM: CPT | Performed by: FAMILY MEDICINE

## 2022-02-09 PROCEDURE — 99213 OFFICE O/P EST LOW 20 MIN: CPT | Performed by: FAMILY MEDICINE

## 2022-02-09 RX ORDER — CYANOCOBALAMIN 1000 UG/ML
1000 INJECTION, SOLUTION INTRAMUSCULAR; SUBCUTANEOUS ONCE
Status: COMPLETED | OUTPATIENT
Start: 2022-02-09 | End: 2022-02-09

## 2022-02-09 RX ADMIN — CYANOCOBALAMIN 1000 MCG: 1000 INJECTION, SOLUTION INTRAMUSCULAR; SUBCUTANEOUS at 17:06

## 2022-02-09 NOTE — PROGRESS NOTES
History of Present Illness:     Chief Complaint   Patient presents with    Menopause     hot flashes and weight gain    Ear Pain     left ear pain/fullness       Nikolas Trevino is a 49 y.o. female     C/o hot flashes and weight gain    Left ear pain/ fullness  Started after she sneezed  Worries she has fluid in her ear  Denies decreased hearing, pain, drainage    PMH (REVIEWED):  Past Medical History:   Diagnosis Date    Autoimmune disease ([facility] [address]     Crohn's Disease    Crohn disease ([facility] [address] 4/19/2010    Crohn's     Depression 4/19/2010    History of vascular access device 07/18/2017    [facility] VAT - 33 cm right brachial PICC for abx and limited access    Perforation bowel ([facility] [address] 7/4/2017    S/p palak Zhang Push 7/2017    Peripheral neuropathy 2/2/2012    B12 deficiency MRI brain normal 1/2012 MRA head normal 1/2012 CTA neck normal 1/2012     Renal calculi 9/6/2020    Vertigo     Vitamin B12 deficiency 1/21/2012    164 on 1/2012 Needs 1000 mcg IM twice a week        Current Medications/Allergies (REVIEWED):     Current Outpatient Medications on File Prior to Visit   Medication Sig Dispense Refill    mupirocin (BACTROBAN) 2 % ointment APPLY TO AFFECTED AREA DAILY. APPLY TO AREA 2-3 TIMES DAILY FOR 5 DAYS 22 g 0    Mucus Relief  mg ER tablet TAKE 1 TABLET BY MOUTH TWO (2) TIMES A DAY. INDICATIONS: COUGH AND CONGESTION 20 Tablet 0    valACYclovir (VALTREX) 500 mg tablet TAKE 1 TABLET BY MOUTH TWO TIMES A DAY. 60 Tablet 2    nystatin (MYCOSTATIN) 100,000 unit/mL suspension TAKE 5 ML BY MOUTH FOUR (4) TIMES DAILY. SWISH AND SPIT 480 mL 1    mometasone (NASONEX) 50 mcg/actuation nasal spray SPRAY 2 SPRAYS INTO EACH NOSTRIL EVERY DAY 17 Each 3    baclofen (LIORESAL) 10 mg tablet TAKE 1 TABLET BY MOUTH THREE (3) TIMES DAILY. 90 Tablet 2    ondansetron hcl (ZOFRAN) 4 mg tablet Take 1 Tablet by mouth every eight (8) hours as needed for Nausea or Vomiting.  30 Tablet 3    cholecalciferol (VITAMIN D3) (2,000 UNITS /50 MCG) cap capsule Take 1 Capsule by mouth daily. 90 Capsule 3    melatonin 5 mg cap capsule Take 1 Capsule by mouth nightly. 90 Capsule 3    acetaminophen (TYLENOL) 500 mg tablet Take 1-2 Tablets by mouth every eight (8) hours as needed for Pain. 60 Tablet 0    fexofenadine (ALLEGRA) 180 mg tablet Take 1 Tablet by mouth daily as needed for Allergies. 90 Tablet 3    escitalopram oxalate (LEXAPRO) 10 mg tablet Take 10 mg by mouth daily.  LORazepam (ATIVAN) 1 mg tablet Take 1 mg by mouth two (2) times a day. Patient reports taking twice daily [id number]      pantoprazole (PROTONIX) 40 mg tablet Take 40 mg by mouth daily.  cyanocobalamin (VITAMIN B12) 1,000 mcg/mL injection 1,000 mcg by IntraMUSCular route every thirty (30) days.  dextroamphetamine-amphetamine (ADDERALL) 20 mg tablet Take 20 mg by mouth three (3) times daily.  clonazePAM (KLONOPIN) 1 mg tablet Take 1 mg by mouth two (2) times a day. Patient reports taking twice daily in the AM and PM ([id number])       No current facility-administered medications on file prior to visit. Allergies   Allergen Reactions    Doxycycline Swelling     Other reaction(s): Other (See Comments), WHELPS, ITCHING, NAUSEA  blisters      Adhesive Rash    Cephalosporins Hives    Penicillins Hives    Sulfa (Sulfonamide Antibiotics) Unknown (comments)    Tetracyclines Nausea and Vomiting         Review of Systems:     Review of Systems   Constitutional: Negative for chills, fever and weight loss. HENT: Positive for ear pain and sinus pain. Negative for ear discharge and hearing loss. Objective:     Vitals:    02/09/22 1614   BP: 117/81   Pulse: 92   Resp: 18   Temp: 97.3 °F (36.3 °C)   TempSrc: Temporal   SpO2: 100%   Weight: 132 lb 3.2 oz (60 kg)   Height: 5' 2\" (1.575 m)       Physical Exam:  General appearance - alert, well appearing, and in no distress  Ears - Bilateral TMs normal. Right canal dry, erythematous.  Left TM dry, erythematous with small mount of crusting. Chest - no tachypnea, retractions or cyanosis  Neurological - alert, oriented, normal speech, no focal findings or movement disorder noted    Recent Labs:  No results found for this or any previous visit (from the past 12 hour(s)). Assessment and Plan:       ICD-10-CM ICD-9-CM    1. Recurrent sinus infections  J32.9 473.9 REFERRAL TO ENT-OTOLARYNGOLOGY   2. Hot flashes due to menopause  N95.1 627.2    3. Vitamin B 12 deficiency  E53.8 266.2 cyanocobalamin (VITAMIN B12) injection 1,000 mcg      NH THER/PROPH/DIAG INJECTION, SUBCUT/IM   4. Ear fullness, left  H93.8X2 388.8        Recurrent Sinusitis  Referred to ENT    Hot flashes   Using [facility], not helping  Can consider Clonidine? ? Would like to avoid SSRI/ SNRI due to current tx    B12 Def  Given B12 injection today    Ear fullness  Normal TMs but canal appears irritated  Can use mineral or sweet oil to help soothe ears    Follow up: 1 month    Esha Montoya MD    We discussed the expected course, resolution and complications of the diagnosis(es) in detail. Medication risks, benefits, costs, interactions, and alternatives were discussed as indicated. I advised her to contact the office if her condition worsens, changes or fails to improve as anticipated. She expressed understanding with the diagnosis(es) and plan.

## 2022-02-09 NOTE — PATIENT INSTRUCTIONS
Hot Flashes During Menopause: Care Instructions  Your Care Instructions     A hot flash is a sudden feeling of intense body heat. Your head, neck, and chest may get red. Your heartbeat may speed up, and you may feel anxious. You may find that hot flashes occur more often in warm rooms or during stressful times. Hot flashes and other symptoms are a normal response to the hormone changes that occur before your menstrual cycle goes away completely (menopause). Hot flashes often get better and go away with time. Some women take hormone pills or other medicine to treat bothersome symptoms. Follow-up care is a key part of your treatment and safety. Be sure to make and go to all appointments, and call your doctor if you are having problems. It's also a good idea to know your test results and keep a list of the medicines you take. How can you care for yourself at home? · If you decide to take medicine to treat hot flashes, take it exactly as prescribed. Call your doctor if you think you are having a problem with your medicine. You will get more details on the specific medicine your doctor prescribes. · Learn to meditate. Sit quietly and focus on your breathing. Try to practice each day. Books, classes, and tapes can help you start a program.  · Wear natural fabrics, such as cotton and silk. Dress in layers so you can take off clothes as needed. · Keep the room temperature cool, or use a fan. You are more likely to have a hot flash when you are too warm than when you are cool. · Use fewer blankets when you sleep at night. · Drink cold fluids rather than hot ones. Limit your intake of caffeine and alcohol. · Eat smaller meals more often during the day so your body makes less heat than when digesting large amounts of food. Eat low-fat and high-fiber foods. · Do not smoke. Smoking can make hot flashes worse. If you need help quitting, talk to your doctor about stop-smoking programs and medicines.  These can increase your chances of quitting for good. · Get at least 30 minutes of exercise on most days of the week. Walking is a good choice. You also may want to do other activities, such as running, swimming, cycling, or playing tennis or team sports. Where can you learn more? Go to http://www.gray.com/  Enter F700 in the search box to learn more about \"Hot Flashes During Menopause: Care Instructions. \"  Current as of: February 11, 2021               Content Version: 13.0  © 6711-4345 [employer]. Care instructions adapted under license by [employer] (which disclaims liability or warranty for this information). If you have questions about a medical condition or this instruction, always ask your healthcare professional. [address] any warranty or liability for your use of this information.

## 2022-02-09 NOTE — PROGRESS NOTES
Ysabel Anthony is a 49 y.o. female    Chief Complaint   Patient presents with    Menopause     hot flashes and weight gain    Ear Pain     left ear pain/fullness     B12 injection administered 2/9/2022 by Jay Nunez with patient's consent. Patient tolerated procedure well. No reactions noted. 1. Have you been to the ER, urgent care clinic since your last visit? Hospitalized since your last visit? No    2. Have you seen or consulted any other health care providers outside of the [address] [place] [address] since your last visit? Include any pap smears or colon screening. No      Vitals:    02/09/22 1614   BP: 117/81   Pulse: 92   Temp: 97.3 °F (36.3 °C)   TempSrc: Temporal   Resp: 18   Height: 5' 2\" (1.575 m)   Weight: 132 lb 3.2 oz (60 kg)   SpO2: 100%             Health Maintenance Due   Topic Date Due    Depression Monitoring  Never done    Colorectal Cancer Screening Combo  07/04/2018    Breast Cancer Screen Mammogram  04/09/2021    Shingrix Vaccine Age 50> (2 of 2) 10/14/2021         Medication Reconciliation completed, changes noted.   Please  Update medication list.

## 2022-02-23 ENCOUNTER — OFFICE VISIT (OUTPATIENT)
Dept: FAMILY MEDICINE CLINIC | Age: 52
End: 2022-02-23
Payer: MEDICAID

## 2022-02-23 VITALS
HEIGHT: 62 IN | BODY MASS INDEX: 23.92 KG/M2 | SYSTOLIC BLOOD PRESSURE: 125 MMHG | RESPIRATION RATE: 20 BRPM | OXYGEN SATURATION: 99 % | WEIGHT: 130 LBS | HEART RATE: 131 BPM | DIASTOLIC BLOOD PRESSURE: 84 MMHG | TEMPERATURE: 99.7 F

## 2022-02-23 DIAGNOSIS — M53.3 SACROILIAC JOINT PAIN: ICD-10-CM

## 2022-02-23 DIAGNOSIS — M79.10 TRIGGER POINT: ICD-10-CM

## 2022-02-23 DIAGNOSIS — M62.838 TRAPEZIUS MUSCLE SPASM: Primary | ICD-10-CM

## 2022-02-23 PROCEDURE — 99213 OFFICE O/P EST LOW 20 MIN: CPT | Performed by: STUDENT IN AN ORGANIZED HEALTH CARE EDUCATION/TRAINING PROGRAM

## 2022-02-23 PROCEDURE — 20553 NJX 1/MLT TRIGGER POINTS 3/>: CPT | Performed by: STUDENT IN AN ORGANIZED HEALTH CARE EDUCATION/TRAINING PROGRAM

## 2022-02-23 NOTE — PROGRESS NOTES
Identified Patient with two Patient identifiers (Name and ). Two Patient Identifiers confirmed. Reviewed record in preparation for visit and have obtained necessary documentation. Chief Complaint   Patient presents with    Back Pain     follow up, trigger points       Visit Vitals  /84 (BP 1 Location: Right arm, BP Patient Position: Sitting, BP Cuff Size: Adult)   Pulse (!) 131   Temp 99.7 °F (37.6 °C) (Oral)   Resp 20   Ht 5' 2\" (1.575 m)   Wt 130 lb (59 kg)   SpO2 99%   BMI 23.78 kg/m²       1. Have you been to the ER, urgent care clinic since your last visit? Hospitalized since your last visit? No    2. Have you seen or consulted any other health care providers outside of the [address] [place] [address] since your last visit? Include any pap smears or colon screening.  No

## 2022-02-26 RX ORDER — LIDOCAINE HYDROCHLORIDE 10 MG/ML
10 INJECTION INFILTRATION; PERINEURAL ONCE
Status: SHIPPED | OUTPATIENT
Start: 2022-02-27 | End: 2022-02-27

## 2022-02-27 NOTE — PROGRESS NOTES
[address] [facility]      Chief Complaint:   Chief Complaint   Patient presents with    Back Pain     follow up, trigger points       Subjective:   History: This patient is a 49 y.o. female  Presenting for follow up of Upper back pain here for trigger point injections    Patient reports that since her last visit, she has had improvement in neck pain. However she had fall since then taht resulted in increased pain. The pain is located primarily in the traps without radiating symptoms, persistent in nature and described as stabbing . The patient denies any night pain, weakness, or bowel/bladder dysfunction. The patient has no other complaints at this time. She is right handed and works as a        Prior Treatments:  Active Physical Therapy: yes 5 years ago with improvement  Attempted Modalities:  none tried  Injections:  Trigger point injection  Surgeries:  no prior surgery to the affected area  Medications: tylenol  Prior Imaging:  has not had prior imaging of the area     No bowel or bladder incontinence. No fever, night sweats, or weight changes. No saddle anesthesia. Review of Systems:  General/Constitutional:  No fever, chills, sweats, fatigue, night sweats, weakness, weight loss or weight gain   Head: No headache, no trauma   Neck: No swelling, masses, stiffness, pain, or limited movement   Cardiac: No chest pain   Respiratory: No cough, shortness of breath, or dyspnea on exertion   GI: No incontinence. No nausea/vomiting, diarrhea, abdominal pain, bloody or dark stools  : No incontinence. No change in urinary habits. Peripheral Vascular: No edema, coldness, numbness, discoloration, pain, or paresthesias   Musculoskeletal: As per HPI  Neurological: No saddle distribution paresthesia. No siatic pain.  No loss of consciousness, dizziness, seizures, dysarthria, cognitive changes, problems with balance, or unilateral weakness. Past Medical History:   Diagnosis Date    Autoimmune disease ([facility] [address]     Crohn's Disease    Crohn disease ([facility] [address] 4/19/2010    Crohn's     Depression 4/19/2010    History of vascular access device 07/18/2017    [facility] VAT - 33 cm right brachial PICC for abx and limited access    Perforation bowel ([facility] [address] 7/4/2017    S/p palak Knight 7/2017    Peripheral neuropathy 2/2/2012    B12 deficiency MRI brain normal 1/2012 MRA head normal 1/2012 CTA neck normal 1/2012     Renal calculi 9/6/2020    Vertigo     Vitamin B12 deficiency 1/21/2012    164 on 1/2012 Needs 1000 mcg IM twice a week      Family History   Problem Relation Age of Onset    Heart Disease Father     Cancer Mother      Current Outpatient Medications   Medication Sig Dispense Refill    mupirocin (BACTROBAN) 2 % ointment APPLY TO AFFECTED AREA DAILY. APPLY TO AREA 2-3 TIMES DAILY FOR 5 DAYS 22 g 0    Mucus Relief  mg ER tablet TAKE 1 TABLET BY MOUTH TWO (2) TIMES A DAY. INDICATIONS: COUGH AND CONGESTION 20 Tablet 0    valACYclovir (VALTREX) 500 mg tablet TAKE 1 TABLET BY MOUTH TWO TIMES A DAY. 60 Tablet 2    nystatin (MYCOSTATIN) 100,000 unit/mL suspension TAKE 5 ML BY MOUTH FOUR (4) TIMES DAILY. SWISH AND SPIT 480 mL 1    mometasone (NASONEX) 50 mcg/actuation nasal spray SPRAY 2 SPRAYS INTO EACH NOSTRIL EVERY DAY 17 Each 3    ondansetron hcl (ZOFRAN) 4 mg tablet Take 1 Tablet by mouth every eight (8) hours as needed for Nausea or Vomiting. 30 Tablet 3    cholecalciferol (VITAMIN D3) (2,000 UNITS /50 MCG) cap capsule Take 1 Capsule by mouth daily. 90 Capsule 3    melatonin 5 mg cap capsule Take 1 Capsule by mouth nightly. 90 Capsule 3    acetaminophen (TYLENOL) 500 mg tablet Take 1-2 Tablets by mouth every eight (8) hours as needed for Pain. 60 Tablet 0    fexofenadine (ALLEGRA) 180 mg tablet Take 1 Tablet by mouth daily as needed for Allergies.  90 Tablet 3    escitalopram oxalate (LEXAPRO) 10 mg tablet Take 10 mg by mouth daily.  LORazepam (ATIVAN) 1 mg tablet Take 1 mg by mouth two (2) times a day. Patient reports taking twice daily [id number]      pantoprazole (PROTONIX) 40 mg tablet Take 40 mg by mouth daily.  cyanocobalamin (VITAMIN B12) 1,000 mcg/mL injection 1,000 mcg by IntraMUSCular route every thirty (30) days.  dextroamphetamine-amphetamine (ADDERALL) 20 mg tablet Take 20 mg by mouth three (3) times daily.  clonazePAM (KLONOPIN) 1 mg tablet Take 1 mg by mouth two (2) times a day. Patient reports taking twice daily in the AM and PM ([id number])      baclofen (LIORESAL) 10 mg tablet TAKE 1 TABLET BY MOUTH THREE TIMES A DAY 90 Tablet 0     Allergies   Allergen Reactions    Doxycycline Swelling     Other reaction(s):  Other (See Comments), WHELPS, ITCHING, NAUSEA  blisters      Adhesive Rash    Cephalosporins Hives    Penicillins Hives    Sulfa (Sulfonamide Antibiotics) Unknown (comments)    Tetracyclines Nausea and Vomiting     Social History     Socioeconomic History    Marital status:      Spouse name: Not on file    Number of children: Not on file    Years of education: Not on file    Highest education level: Not on file   Occupational History    Not on file   Tobacco Use    Smoking status: Current Some Day Smoker     Packs/day: 0.50     Years: 8.00     Pack years: 4.00     Types: Cigarettes    Smokeless tobacco: Never Used   Substance and Sexual Activity    Alcohol use: No    Drug use: No     Comment: 1 pack a day    Sexual activity: Yes     Partners: Male     Birth control/protection: Pill   Other Topics Concern    Not on file   Social History Narrative    Not on file     Social Determinants of Health     Financial Resource Strain:     Difficulty of Paying Living Expenses: Not on file   Food Insecurity:     Worried About Running Out of Food in the Last Year: Not on file    Amira of Food in the Last Year: Not on file   Transportation Needs:     Lack of Transportation (Medical): Not on file    Lack of Transportation (Non-Medical): Not on file   Physical Activity:     Days of Exercise per Week: Not on file    Minutes of Exercise per Session: Not on file   Stress:     Feeling of Stress : Not on file   Social Connections:     Frequency of Communication with Friends and Family: Not on file    Frequency of Social Gatherings with Friends and Family: Not on file    Attends Bahai Services: Not on file    Active Member of [address] [place] [address] [employer] or Organizations: Not on file    Attends Club or Organization Meetings: Not on file    Marital Status: Not on file   Intimate Partner Violence:     Fear of Current or Ex-Partner: Not on file    Emotionally Abused: Not on file    Physically Abused: Not on file    Sexually Abused: Not on file   Housing Stability:     Unable to Pay for Housing in the Last Year: Not on file    Number of [place] in the Last Year: Not on file    Unstable Housing in the Last Year: Not on file       Objective:     Visit Vitals  /84 (BP 1 Location: Right arm, BP Patient Position: Sitting, BP Cuff Size: Adult)   Pulse (!) 131   Temp 99.7 °F (37.6 °C) (Oral)   Resp 20   Ht 5' 2\" (1.575 m)   Wt 130 lb (59 kg)   SpO2 99%   BMI 23.78 kg/m²       GEN: Well appearing. No apparent distress. Responds to all questions appropriately. Lungs: No labored respirations. Talking in complete sentences without difficulty. General:  Awake and alert in no acute distress,   Psych: normal mood and affect. HEENT: NC/AT, normal visual tracking  Pulmonary: no resp distress, chest expansion appears symmetrical  CV: extremities are warm and perfused  Abd: non-distended  Ext: no c/c/e    Gait: normal  CERVICAL SPINE REGION: Flexion, extension, side-bending, rotation, oblique extension all full and pain free except for in left sidebending     Inspection, cervical: normal, no listing of the head, no gross asymmetries.       Palpation:  Tender at right Cervical paraspinals, trapezius, supraspinatus,, tenderness in right lumbar paraspinal as well      Reflexes: Upper limbs:  RIGHT    LEFT   Biceps C5-6               2+     2+  Brachioradialis C5-6  2+             2+  Triceps C(6)7-8(1)  2+     2+      Strength, upper limbs:    5/5 in SA, EF, EE, WE, ADM, APB bilaterally       Sensation: Upper limbs: Intact to pinprick over C3-T1 bilateral UE dermatomes       Tests for cervical radiculopathy/myelopathy:    Spurling's sign: negative bilaterally    Long tract signs for myelopathy/UMN process:   UMN Sign                              Alexis sign: negative bilaterally     Ankle clonus:   2-3 beats bilaterally  Babinski sign:   mute bilaterally  Knee jerk:    2+/4 bilaterally  Ankle jerk:    2+/4 bilaterally      Right SIJ point**    Neuro/Vascular:  Pulses intact, no edema, and neurologically intact         [facility]  OFFICE PROCEDURE PROGRESS NOTE        Chart reviewed for the following:   Mindi Porter MD, have reviewed the History, Physical and updated the Allergic reactions for [facility]     TIME OUT performed immediately prior to start of procedure:   Mindi Porter MD, have performed the following reviews on [address] [place] [address] prior to the start of the procedure:            * Patient was identified by name and date of birth   * Agreement on procedure being performed was verified  * Risks and Benefits explained to the patient  * Procedure site verified and marked as necessary  * Patient was positioned for comfort  * Consent was signed and verified     Time: 4:30 pm      Date of procedure: 2/26/2022    Procedure performed by:  Leigh Sanchez MD    Provider assisted by: Leidy Zhu LPN    Patient assisted by: self    How tolerated by patient: tolerated the procedure well with no complications    Post Procedural Pain Scale: 0 - No Hurt    Trigger Point Injections     Preparation - Cleaned and prepped with chlorhexidine swab x3.     Anesthesia - Ethyl chloride spray used to anesthitise the skin prior to injection. Description of procedure - 4 trigger points were identified in the bilateral trap and 1 trigger in the lumbar paraspinal and each site was injected with 1 cc of 1% Lidocaine. Patient tolerated the procedure well and there were no complications. Patient reports 100% pain relief following the injections. Assessment:       ICD-10-CM ICD-9-CM    1. Trapezius muscle spasm  M62.838 728.85    2.  Trigger point  M79.10 729.1          Plan:      Plan:  -Trigger point injections as above  - physical therapy for neck pain, patient has had improvement in past.  - reprinted for patient  - patient to return to clinic for possible right SIJ injection with US guidance     Medications:               1.   Acetaminophen (Tylenol):  500mg 1-2 tablets every 6 hours as needed for pain.     RTC: 3-4  weeks

## 2022-02-28 DIAGNOSIS — R11.0 NAUSEA: ICD-10-CM

## 2022-02-28 RX ORDER — ONDANSETRON 4 MG/1
4 TABLET, FILM COATED ORAL
Qty: 30 TABLET | Refills: 3 | Status: SHIPPED | OUTPATIENT
Start: 2022-02-28 | End: 2022-04-19 | Stop reason: SDUPTHER

## 2022-03-07 DIAGNOSIS — K12.1 MOUTH ULCERS: ICD-10-CM

## 2022-03-08 RX ORDER — NYSTATIN 100000 [USP'U]/ML
1 SUSPENSION ORAL 4 TIMES DAILY
Qty: 480 ML | Refills: 1 | Status: SHIPPED | OUTPATIENT
Start: 2022-03-08 | End: 2022-04-18

## 2022-03-09 ENCOUNTER — OFFICE VISIT (OUTPATIENT)
Dept: FAMILY MEDICINE CLINIC | Age: 52
End: 2022-03-09
Payer: MEDICAID

## 2022-03-09 VITALS
HEART RATE: 90 BPM | WEIGHT: 135 LBS | OXYGEN SATURATION: 98 % | RESPIRATION RATE: 17 BRPM | BODY MASS INDEX: 24.84 KG/M2 | SYSTOLIC BLOOD PRESSURE: 118 MMHG | DIASTOLIC BLOOD PRESSURE: 77 MMHG | HEIGHT: 62 IN

## 2022-03-09 DIAGNOSIS — E53.8 VITAMIN B12 DEFICIENCY: Primary | ICD-10-CM

## 2022-03-09 DIAGNOSIS — D51.9 ANEMIA DUE TO VITAMIN B12 DEFICIENCY, UNSPECIFIED B12 DEFICIENCY TYPE: ICD-10-CM

## 2022-03-09 DIAGNOSIS — Z13.220 LIPID SCREENING: ICD-10-CM

## 2022-03-09 DIAGNOSIS — R35.0 URINARY FREQUENCY: ICD-10-CM

## 2022-03-09 DIAGNOSIS — E78.1 HYPERTRIGLYCERIDEMIA: ICD-10-CM

## 2022-03-09 DIAGNOSIS — Z23 ENCOUNTER FOR IMMUNIZATION: ICD-10-CM

## 2022-03-09 DIAGNOSIS — Z12.11 COLON CANCER SCREENING: ICD-10-CM

## 2022-03-09 LAB
BILIRUB UR QL STRIP: NEGATIVE
GLUCOSE UR-MCNC: NEGATIVE MG/DL
KETONES P FAST UR STRIP-MCNC: NEGATIVE MG/DL
PH UR STRIP: 6.5 [PH] (ref 4.6–8)
PROT UR QL STRIP: NORMAL
SP GR UR STRIP: 1.02 (ref 1–1.03)
UA UROBILINOGEN AMB POC: NORMAL (ref 0.2–1)
URINALYSIS CLARITY POC: NORMAL
URINALYSIS COLOR POC: YELLOW
URINE BLOOD POC: NEGATIVE
URINE LEUKOCYTES POC: NORMAL
URINE NITRITES POC: NEGATIVE

## 2022-03-09 PROCEDURE — 81003 URINALYSIS AUTO W/O SCOPE: CPT | Performed by: FAMILY MEDICINE

## 2022-03-09 PROCEDURE — 90750 HZV VACC RECOMBINANT IM: CPT | Performed by: FAMILY MEDICINE

## 2022-03-09 PROCEDURE — 99213 OFFICE O/P EST LOW 20 MIN: CPT | Performed by: FAMILY MEDICINE

## 2022-03-09 RX ORDER — CYANOCOBALAMIN 1000 UG/ML
1000 INJECTION, SOLUTION INTRAMUSCULAR; SUBCUTANEOUS ONCE
Status: COMPLETED | OUTPATIENT
Start: 2022-03-09 | End: 2022-03-09

## 2022-03-09 RX ADMIN — CYANOCOBALAMIN 1000 MCG: 1000 INJECTION, SOLUTION INTRAMUSCULAR; SUBCUTANEOUS at 17:04

## 2022-03-09 NOTE — PROGRESS NOTES
History of Present Illness:     Chief Complaint   Patient presents with    Follow Up Chronic Condition       Glenny Colon is a 49 y.o. female     Due for B12 injection    Wants Shingrix vaccine    Needs referral for colonoscopy  Sees Dr. Paola Alston    She is doing well overall  Her psychiatrist increased her Lexapro to 20mg    Wants her urine checked    PMH (REVIEWED):  Past Medical History:   Diagnosis Date    Autoimmune disease ([facility] [address]     Crohn's Disease    Crohn disease ([facility] [address] 4/19/2010    Crohn's     Depression 4/19/2010    History of vascular access device 07/18/2017    [facility] VAT - 33 cm right brachial PICC for abx and limited access    Perforation bowel ([facility] [address] 7/4/2017    S/p palak Smith Marrow 7/2017    Peripheral neuropathy 2/2/2012    B12 deficiency MRI brain normal 1/2012 MRA head normal 1/2012 CTA neck normal 1/2012     Renal calculi 9/6/2020    Vertigo     Vitamin B12 deficiency 1/21/2012    164 on 1/2012 Needs 1000 mcg IM twice a week        Current Medications/Allergies (REVIEWED):     Current Outpatient Medications on File Prior to Visit   Medication Sig Dispense Refill    nystatin (MYCOSTATIN) 100,000 unit/mL suspension TAKE 5 ML BY MOUTH FOUR (4) TIMES DAILY. SWISH AND SPIT 480 mL 1    ondansetron hcl (ZOFRAN) 4 mg tablet TAKE 1 TABLET BY MOUTH EVERY EIGHT (8) HOURS AS NEEDED FOR NAUSEA OR VOMITING. 30 Tablet 3    baclofen (LIORESAL) 10 mg tablet TAKE 1 TABLET BY MOUTH THREE TIMES A DAY 90 Tablet 0    mupirocin (BACTROBAN) 2 % ointment APPLY TO AFFECTED AREA DAILY. APPLY TO AREA 2-3 TIMES DAILY FOR 5 DAYS 22 g 0    Mucus Relief  mg ER tablet TAKE 1 TABLET BY MOUTH TWO (2) TIMES A DAY. INDICATIONS: COUGH AND CONGESTION 20 Tablet 0    valACYclovir (VALTREX) 500 mg tablet TAKE 1 TABLET BY MOUTH TWO TIMES A DAY.  60 Tablet 2    mometasone (NASONEX) 50 mcg/actuation nasal spray SPRAY 2 SPRAYS INTO EACH NOSTRIL EVERY DAY 17 Each 3    cholecalciferol (VITAMIN D3) (2,000 UNITS /50 MCG) cap capsule Take 1 Capsule by mouth daily. 90 Capsule 3    melatonin 5 mg cap capsule Take 1 Capsule by mouth nightly. 90 Capsule 3    acetaminophen (TYLENOL) 500 mg tablet Take 1-2 Tablets by mouth every eight (8) hours as needed for Pain. 60 Tablet 0    fexofenadine (ALLEGRA) 180 mg tablet Take 1 Tablet by mouth daily as needed for Allergies. 90 Tablet 3    escitalopram oxalate (LEXAPRO) 10 mg tablet Take 20 mg by mouth daily.  LORazepam (ATIVAN) 1 mg tablet Take 1 mg by mouth two (2) times a day. Patient reports taking twice daily [id number]      pantoprazole (PROTONIX) 40 mg tablet Take 40 mg by mouth daily.  cyanocobalamin (VITAMIN B12) 1,000 mcg/mL injection 1,000 mcg by IntraMUSCular route every thirty (30) days.  dextroamphetamine-amphetamine (ADDERALL) 20 mg tablet Take 20 mg by mouth three (3) times daily.  clonazePAM (KLONOPIN) 1 mg tablet Take 1 mg by mouth two (2) times a day. Patient reports taking twice daily in the AM and PM ([id number])       No current facility-administered medications on file prior to visit. Allergies   Allergen Reactions    Doxycycline Swelling     Other reaction(s): Other (See Comments), WHELPS, ITCHING, NAUSEA  blisters      Adhesive Rash    Cephalosporins Hives    Penicillins Hives    Sulfa (Sulfonamide Antibiotics) Unknown (comments)    Tetracyclines Nausea and Vomiting         Review of Systems:     Review of Systems   Constitutional: Negative for chills and fever. Genitourinary: Positive for frequency. Negative for urgency. Neurological: Negative for sensory change and focal weakness.         Objective:     Vitals:    03/09/22 1606   BP: 118/77   Pulse: 90   Resp: 17   SpO2: 98%   Weight: 135 lb (61.2 kg)   Height: 5' 2\" (1.575 m)       Physical Exam:  General appearance - alert, well appearing, and in no distress  Chest - no tachypnea, retractions or cyanosis  Neurological - alert, oriented, normal speech, no focal findings or movement disorder noted    Recent Labs:  Recent Results (from the past 12 hour(s))   AMB POC URINALYSIS DIP STICK AUTO W/O MICRO    Collection Time: 03/09/22  4:56 PM   Result Value Ref Range    Color (UA POC) Yellow     Clarity (UA POC) Cloudy     Glucose (UA POC) Negative Negative    Bilirubin (UA POC) Negative Negative    Ketones (UA POC) Negative Negative    Specific gravity (UA POC) 1.020 1.001 - 1.035    Blood (UA POC) Negative Negative    pH (UA POC) 6.5 4.6 - 8.0    Protein (UA POC) 1+ Negative    Urobilinogen (UA POC) 0.2 mg/dL 0.2 - 1    Nitrites (UA POC) Negative Negative    Leukocyte esterase (UA POC) 1+ Negative       Assessment and Plan:       ICD-10-CM ICD-9-CM    1. Vitamin B12 deficiency  E53.8 266.2 CBC W/O DIFF      VITAMIN B12      VITAMIN B12      CBC W/O DIFF      cyanocobalamin (VITAMIN B12) injection 1,000 mcg      THER/PROPH/DIAG INJECTION, SUBCUT/IM   2. Anemia due to vitamin B12 deficiency, unspecified B12 deficiency type  Y78.6 095.9 METABOLIC PANEL, BASIC      METABOLIC PANEL, BASIC   3. Lipid screening  Z13.220 V77.91    4. Hypertriglyceridemia  E78.1 272.1 LIPID PANEL      LIPID PANEL   5. Urinary frequency  R35.0 788.41 AMB POC URINALYSIS DIP STICK AUTO W/O MICRO      CULTURE, URINE   6. Colon cancer screening  Z12.11 V76.51 REFERRAL TO GASTROENTEROLOGY   7. Encounter for immunization  Z23 V03.89          Anemia 2/2 B12 deficiency  CBC and B12 added  Will given B12 inj in office today    Elevated TGs  Checking lipid panel    C/o urinary frequency and change in color  Checking UA today - +1 leuks   Will send UCx    GI referral placed for colon cancer screening    Shingrix 2/2 today    Follow up: 4-6 wks    Afua Matamoros MD    We discussed the expected course, resolution and complications of the diagnosis(es) in detail. Medication risks, benefits, costs, interactions, and alternatives were discussed as indicated.   I advised her to contact the office if her condition worsens, changes or fails to improve as anticipated. She expressed understanding with the diagnosis(es) and plan.

## 2022-03-09 NOTE — PROGRESS NOTES
Mel Pierre is a 49 y.o. female    Chief Complaint   Patient presents with    Follow Up Chronic Condition       1. Have you been to the ER, urgent care clinic since your last visit? Hospitalized since your last visit? No  2. Have you seen or consulted any other health care providers outside of the [address] [place] [address] since your last visit? Include any pap smears or colon screening.  No    Visit Vitals  /77 (BP 1 Location: Right arm, BP Patient Position: Sitting)   Pulse 90   Resp 17   Ht 5' 2\" (1.575 m)   Wt 135 lb (61.2 kg)   SpO2 98%   BMI 24.69 kg/m²       Health Maintenance Due   Topic Date Due    Colorectal Cancer Screening Combo  07/04/2018    Breast Cancer Screen Mammogram  04/09/2021    Shingrix Vaccine Age 50> (2 of 2) 10/14/2021

## 2022-03-10 LAB
BUN SERPL-MCNC: 16 MG/DL (ref 6–24)
BUN/CREAT SERPL: 15 (ref 9–23)
CALCIUM SERPL-MCNC: 9.5 MG/DL (ref 8.7–10.2)
CHLORIDE SERPL-SCNC: 110 MMOL/L (ref 96–106)
CHOLEST SERPL-MCNC: 147 MG/DL (ref 100–199)
CO2 SERPL-SCNC: 16 MMOL/L (ref 20–29)
CREAT SERPL-MCNC: 1.09 MG/DL (ref 0.57–1)
EGFR: 62 ML/MIN/1.73
ERYTHROCYTE [DISTWIDTH] IN BLOOD BY AUTOMATED COUNT: 14.3 % (ref 11.7–15.4)
GLUCOSE SERPL-MCNC: 88 MG/DL (ref 65–99)
HCT VFR BLD AUTO: 35.2 % (ref 34–46.6)
HDLC SERPL-MCNC: 79 MG/DL
HGB BLD-MCNC: 11.2 G/DL (ref 11.1–15.9)
IMP & REVIEW OF LAB RESULTS: NORMAL
INTERPRETATION: NORMAL
LDLC SERPL CALC-MCNC: 49 MG/DL (ref 0–99)
MCH RBC QN AUTO: 29 PG (ref 26.6–33)
MCHC RBC AUTO-ENTMCNC: 31.8 G/DL (ref 31.5–35.7)
MCV RBC AUTO: 91 FL (ref 79–97)
PLATELET # BLD AUTO: 380 X10E3/UL (ref 150–450)
POTASSIUM SERPL-SCNC: 4.3 MMOL/L (ref 3.5–5.2)
RBC # BLD AUTO: 3.86 X10E6/UL (ref 3.77–5.28)
SODIUM SERPL-SCNC: 142 MMOL/L (ref 134–144)
TRIGL SERPL-MCNC: 110 MG/DL (ref 0–149)
VIT B12 SERPL-MCNC: 277 PG/ML (ref 232–1245)
VLDLC SERPL CALC-MCNC: 19 MG/DL (ref 5–40)
WBC # BLD AUTO: 8.9 X10E3/UL (ref 3.4–10.8)

## 2022-03-13 LAB — BACTERIA UR CULT: NORMAL

## 2022-03-18 PROBLEM — N12 PYELONEPHRITIS: Status: ACTIVE | Noted: 2020-09-05

## 2022-03-18 PROBLEM — R68.2 DRY MOUTH: Status: ACTIVE | Noted: 2021-03-24

## 2022-03-18 PROBLEM — D72.828 NEUTROPHILIA: Status: ACTIVE | Noted: 2017-07-05

## 2022-03-18 PROBLEM — N20.0 STAGHORN RENAL CALCULUS: Status: ACTIVE | Noted: 2020-09-06

## 2022-03-18 PROBLEM — D72.9 NEUTROPHILIA: Status: ACTIVE | Noted: 2017-07-05

## 2022-03-18 PROBLEM — K52.9 COLITIS: Status: ACTIVE | Noted: 2020-09-06

## 2022-03-19 PROBLEM — Z87.11 HX OF GASTRIC ULCER: Status: ACTIVE | Noted: 2020-02-20

## 2022-03-20 PROBLEM — D64.9 ANEMIA: Status: ACTIVE | Noted: 2017-07-04

## 2022-03-20 PROBLEM — A69.20 LYME DISEASE: Status: ACTIVE | Noted: 2017-06-01

## 2022-03-23 DIAGNOSIS — M62.838 MUSCLE SPASM: ICD-10-CM

## 2022-03-23 DIAGNOSIS — Z91.09 ENVIRONMENTAL ALLERGIES: ICD-10-CM

## 2022-03-23 RX ORDER — BACLOFEN 10 MG/1
TABLET ORAL
Qty: 90 TABLET | Refills: 0 | Status: SHIPPED | OUTPATIENT
Start: 2022-03-23 | End: 2022-04-19 | Stop reason: SDUPTHER

## 2022-03-23 RX ORDER — MINERAL OIL
180 ENEMA (ML) RECTAL
Qty: 90 TABLET | Refills: 2 | Status: SHIPPED | OUTPATIENT
Start: 2022-03-23 | End: 2022-07-13 | Stop reason: SDUPTHER

## 2022-03-24 ENCOUNTER — OFFICE VISIT (OUTPATIENT)
Dept: FAMILY MEDICINE CLINIC | Age: 52
End: 2022-03-24
Payer: MEDICAID

## 2022-03-24 VITALS
WEIGHT: 130 LBS | DIASTOLIC BLOOD PRESSURE: 80 MMHG | OXYGEN SATURATION: 98 % | BODY MASS INDEX: 23.92 KG/M2 | RESPIRATION RATE: 20 BRPM | SYSTOLIC BLOOD PRESSURE: 116 MMHG | TEMPERATURE: 98.4 F | HEIGHT: 62 IN | HEART RATE: 121 BPM

## 2022-03-24 DIAGNOSIS — M54.9 BACK PAIN, UNSPECIFIED BACK LOCATION, UNSPECIFIED BACK PAIN LATERALITY, UNSPECIFIED CHRONICITY: ICD-10-CM

## 2022-03-24 DIAGNOSIS — M53.3 SACROILIAC JOINT PAIN: Primary | ICD-10-CM

## 2022-03-24 PROCEDURE — 99213 OFFICE O/P EST LOW 20 MIN: CPT | Performed by: FAMILY MEDICINE

## 2022-03-24 PROCEDURE — 20611 DRAIN/INJ JOINT/BURSA W/US: CPT | Performed by: FAMILY MEDICINE

## 2022-03-24 RX ORDER — TRIAMCINOLONE ACETONIDE 40 MG/ML
40 INJECTION, SUSPENSION INTRA-ARTICULAR; INTRAMUSCULAR ONCE
Qty: 1 ML | Refills: 0
Start: 2022-03-24 | End: 2022-03-24

## 2022-03-24 RX ORDER — LIDOCAINE HYDROCHLORIDE 10 MG/ML
4 INJECTION INFILTRATION; PERINEURAL ONCE
Qty: 4 ML | Refills: 0
Start: 2022-03-24 | End: 2022-03-24

## 2022-03-24 NOTE — PROGRESS NOTES
[id number] [facility]      Chief Complaint:   Chief Complaint   Patient presents with    Back Pain     follow up from Dr. Bill Perez, low back pain, possible SI joint. Subjective:   History: This patient is a 49 y.o. female  Presenting for follow up of back pain. Patient notes improvement in upper back pain with monthly triger point injections, however she still has lower right sided back pain. Increased with sitting. . The patient denies any night pain, weakness, or bowel/bladder dysfunction. The patient has no other complaints at this time. She is right handed and works as a        Prior Treatments:  Active Physical Therapy: yes 5 years ago with improvement  Attempted Modalities:  none tried  Injections:  Trigger point injection  Surgeries:  no prior surgery to the affected area  Medications: tylenol  Prior Imaging:  has not had prior imaging of the area     No bowel or bladder incontinence. No fever, night sweats, or weight changes. No saddle anesthesia. Review of Systems:  General/Constitutional:  No fever, chills, sweats, fatigue, night sweats, weakness, weight loss or weight gain   Head: No headache, no trauma   Neck: No swelling, masses, stiffness, pain, or limited movement   Cardiac: No chest pain   Respiratory: No cough, shortness of breath, or dyspnea on exertion   GI: No incontinence. No nausea/vomiting, diarrhea, abdominal pain, bloody or dark stools  : No incontinence. No change in urinary habits. Peripheral Vascular: No edema, coldness, numbness, discoloration, pain, or paresthesias   Musculoskeletal: As per HPI  Neurological: No saddle distribution paresthesia. No siatic pain. No loss of consciousness, dizziness, seizures, dysarthria, cognitive changes, problems with balance, or unilateral weakness.     Past Medical History:   Diagnosis Date    Autoimmune disease ([facility] [address]     Crohn's Disease    [facility] ([facility] 4/19/2010    Crohn's     Depression 4/19/2010    History of vascular access device 07/18/2017    [facility] VAT - 33 cm right brachial PICC for abx and limited access    Perforation bowel ([facility] 7/4/2017    S/p palak [place] 7/2017    Peripheral neuropathy 2/2/2012    B12 deficiency MRI brain normal 1/2012 MRA head normal 1/2012 CTA neck normal 1/2012     Renal calculi 9/6/2020    Vertigo     Vitamin B12 deficiency 1/21/2012    164 on 1/2012 Needs 1000 mcg IM twice a week      Family History   Problem Relation Age of Onset    Heart Disease Father     Cancer Mother      Current Outpatient Medications   Medication Sig Dispense Refill    triamcinolone acetonide (Kenalog) 40 mg/mL injection 1 mL by IntraBURSal route once for 1 dose. 1 mL 0    lidocaine (XYLOCAINE) 10 mg/mL (1 %) injection 4 mL by IntraBURSal route once for 1 dose. 4 mL 0    baclofen (LIORESAL) 10 mg tablet TAKE 1 TABLET BY MOUTH THREE TIMES A DAY 90 Tablet 0    fexofenadine (ALLEGRA) 180 mg tablet TAKE 1 TABLET BY MOUTH DAILY AS NEEDED FOR ALLERGIES. 90 Tablet 2    nystatin (MYCOSTATIN) 100,000 unit/mL suspension TAKE 5 ML BY MOUTH FOUR (4) TIMES DAILY. SWISH AND SPIT 480 mL 1    ondansetron hcl (ZOFRAN) 4 mg tablet TAKE 1 TABLET BY MOUTH EVERY EIGHT (8) HOURS AS NEEDED FOR NAUSEA OR VOMITING. 30 Tablet 3    mupirocin (BACTROBAN) 2 % ointment APPLY TO AFFECTED AREA DAILY. APPLY TO AREA 2-3 TIMES DAILY FOR 5 DAYS 22 g 0    Mucus Relief  mg ER tablet TAKE 1 TABLET BY MOUTH TWO (2) TIMES A DAY. INDICATIONS: COUGH AND CONGESTION 20 Tablet 0    valACYclovir (VALTREX) 500 mg tablet TAKE 1 TABLET BY MOUTH TWO TIMES A DAY. 60 Tablet 2    mometasone (NASONEX) 50 mcg/actuation nasal spray SPRAY 2 SPRAYS INTO EACH NOSTRIL EVERY DAY 17 Each 3    cholecalciferol (VITAMIN D3) (2,000 UNITS /50 MCG) cap capsule Take 1 Capsule by mouth daily. 90 Capsule 3    melatonin 5 mg cap capsule Take 1 Capsule by mouth nightly.  90 Capsule 3  acetaminophen (TYLENOL) 500 mg tablet Take 1-2 Tablets by mouth every eight (8) hours as needed for Pain. 60 Tablet 0    escitalopram oxalate (LEXAPRO) 10 mg tablet Take 20 mg by mouth daily.  LORazepam (ATIVAN) 1 mg tablet Take 1 mg by mouth two (2) times a day. Patient reports taking twice daily [id number]      pantoprazole (PROTONIX) 40 mg tablet Take 40 mg by mouth daily.  cyanocobalamin (VITAMIN B12) 1,000 mcg/mL injection 1,000 mcg by IntraMUSCular route every thirty (30) days.  dextroamphetamine-amphetamine (ADDERALL) 20 mg tablet Take 20 mg by mouth three (3) times daily.  clonazePAM (KLONOPIN) 1 mg tablet Take 1 mg by mouth two (2) times a day. Patient reports taking twice daily in the AM and PM ([id number])       Allergies   Allergen Reactions    Doxycycline Swelling     Other reaction(s):  Other (See Comments), WHELPS, ITCHING, NAUSEA  blisters      Adhesive Rash    Cephalosporins Hives    Penicillins Hives    Sulfa (Sulfonamide Antibiotics) Unknown (comments)    Tetracyclines Nausea and Vomiting     Social History     Socioeconomic History    Marital status:      Spouse name: Not on file    Number of children: Not on file    Years of education: Not on file    Highest education level: Not on file   Occupational History    Not on file   Tobacco Use    Smoking status: Current Some Day Smoker     Packs/day: 0.50     Years: 8.00     Pack years: 4.00     Types: Cigarettes    Smokeless tobacco: Never Used   Substance and Sexual Activity    Alcohol use: No    Drug use: No     Comment: 1 pack a day    Sexual activity: Yes     Partners: Male     Birth control/protection: Pill   Other Topics Concern    Not on file   Social History Narrative    Not on file     Social Determinants of Health     Financial Resource Strain:     Difficulty of Paying Living Expenses: Not on file   Food Insecurity:     Worried About Running Out of Food in the Last Year: Not on file    Ran Out of Food in the Last Year: Not on file   Transportation Needs:     Lack of Transportation (Medical): Not on file    Lack of Transportation (Non-Medical): Not on file   Physical Activity:     Days of Exercise per Week: Not on file    Minutes of Exercise per Session: Not on file   Stress:     Feeling of Stress : Not on file   Social Connections:     Frequency of Communication with Friends and Family: Not on file    Frequency of Social Gatherings with Friends and Family: Not on file    Attends [employer] Services: Not on file    Active Member of [address] [place] [address] or Organizations: Not on file    Attends Club or Organization Meetings: Not on file    Marital Status: Not on file   Intimate Partner Violence:     Fear of Current or Ex-Partner: Not on file    Emotionally Abused: Not on file    Physically Abused: Not on file    Sexually Abused: Not on file   Housing Stability:     Unable to Pay for Housing in the Last Year: Not on file    Number of [place] in the Last Year: Not on file    Unstable Housing in the Last Year: Not on file       Objective:     Visit Vitals  /80 (BP 1 Location: Right arm, BP Patient Position: Sitting, BP Cuff Size: Adult)   Pulse (!) 121   Temp 98.4 °F (36.9 °C) (Oral)   Resp 20   Ht 5' 2\" (1.575 m)   Wt 130 lb (59 kg)   SpO2 98%   BMI 23.78 kg/m²         BACK Exam    General:  Awake and alert in no acute distress,   Psych: normal mood and affect. HEENT: NC/AT, normal visual tracking  Pulmonary: no resp distress, chest expansion appears symmetrical  CV: extremities are warm and perfused  Abd: non-distended  Ext: no c/c/e     Inspection: Non-antalgic gait    CERVICAL SPINE REGION:  Inspection, cervical: normal, no listing of the head, no gross asymmetries.     Active ROM of the cervical spine:  full in flexion/extension/sidebending, rotation      Palpation:  Tender at Cervical paraspinals, tender along the right lateral traps and supraspinatus, non tender along the biceps    LUMBAR Spine Region:    Inspection, lumbar:  no gross asymmetries. Active ROM of the Lumbar spine:  full in flexion/extension/sidebending rotation without pain      Palpation:  Tender at right SIJ      Strength, upper limbs:    5/5 in SA, EF, EE, WE, ADM, APB bilaterally   5/5 in HF, KE, KF, DF, PF, EHL bilaterally      Sensation: Upper limbs:   Intact to pinprick over C3-T1 bilateral UE dermatomes  Intact at L2-S1 dermatomes bilaterally to light touch        Long tract signs for myelopathy/UMN process:   UMN Sign                              Alexis sign: negative bilaterally     Ankle clonus:   none  Babinski sign:   mute bilaterally    Reflexes  Biceps:                       2+/4 bilaterally  Triceps:   2+/4 bilaterally  BR[de-identified]    2+/4 bilaterally  Knee:   2+/4 bilaterally  Ankle:   2+/4 bilaterally    Provocative tests  Tests for cervical radiculopathy/myelopathy:    Spurling's sign: negative bilaterally  SLR: negative  FADIR/TJ: with pain posteriorly in SIJ        Right SIJ tenderness    Neuro/Vascular:  Pulses intact, no edema, and neurologically intact     [facility]  OFFICE PROCEDURE PROGRESS NOTE        Chart reviewed for the following:   Aaliyah Zheng MD, have reviewed the History, Physical and updated the Allergic reactions for [facility] RENATO CheekDutta     TIME OUT performed immediately prior to start of procedure:   I, Bozena Dodd MD, have performed the following reviews on [address] [place] [address] prior to the start of the procedure:            * Patient was identified by name and date of birth   * Agreement on procedure being performed was verified  * Risks and Benefits explained to the patient  * Procedure site verified and marked as necessary  * Patient was positioned for comfort  * Consent was signed and verified     Time: 4:30pm      Date of procedure: 3/24/2022    Procedure performed by:  Bozena Dodd MD    Provider assisted by: Kirsty Wild DO    Patient assisted by: self    How tolerated by patient: tolerated the procedure well with no complications    Post Procedural Pain Scale: 0 - No Hurt      Ultrasound Guided Right SI Joint Injection    Treatment options discussed with patient at length, including risks, benefits, alternatives, and the nature of any potential procedures for the problem. Using the "[employer] system, I performed a MSK US guided aspiration and/or injection of the above target via an in-plane approach after Chlorhexidine prep and needle localization with the curvi-linear Probe using a 22G needle,  injecting 40 mg Kenalog and 4 ml Lidocaine 1% w/o Epi. Pt reported 95 percent relief of symptoms after injection. The injection was performed for diagnostic and prognostic purposes. Ultrasound Performed and Interpreted by:  Diana Crowell MD, CAQSM, RMSK      Assessment:       ICD-10-CM ICD-9-CM    1. Sacroiliac joint pain  M53.3 724.6 AMB POS US DRAIN/INJECT LARGE JOINT/BURSA      TRIAMCINOLONE ACETONIDE INJ      triamcinolone acetonide (Kenalog) 40 mg/mL injection      lidocaine (XYLOCAINE) 10 mg/mL (1 %) injection   2. Back pain, unspecified back location, unspecified back pain laterality, unspecified chronicity  M54.9 724.5          Plan:      Plan:  -Right SIJ injection improved pain  - Patient encouraged to continue physical therapy. States she was planning to restart next week     Medications:               1.   Acetaminophen (Tylenol):  500mg 1-2 tablets every 6 hours as needed for pain.     Follow-up and Dispositions    · Return in about 2 weeks (around 4/7/2022) for Dr. Mona Hughes.

## 2022-03-24 NOTE — PROGRESS NOTES
Identified Patient with two Patient identifiers (Name and ). Two Patient Identifiers confirmed. Reviewed record in preparation for visit and have obtained necessary documentation. Chief Complaint   Patient presents with    Back Pain     follow up from Dr. Alia Larsen, low back pain, possible SI joint. Visit Vitals  /80 (BP 1 Location: Right arm, BP Patient Position: Sitting, BP Cuff Size: Adult)   Pulse (!) 121   Temp 98.4 °F (36.9 °C) (Oral)   Resp 20   Ht 5' 2\" (1.575 m)   Wt 130 lb (59 kg)   SpO2 98%   BMI 23.78 kg/m²       1. Have you been to the ER, urgent care clinic since your last visit? Hospitalized since your last visit? No    2. Have you seen or consulted any other health care providers outside of the [address] [place] [address] since your last visit? Include any pap smears or colon screening.  No

## 2022-04-17 DIAGNOSIS — K12.1 MOUTH ULCERS: ICD-10-CM

## 2022-04-18 RX ORDER — NYSTATIN 100000 [USP'U]/ML
1 SUSPENSION ORAL 4 TIMES DAILY
Qty: 480 ML | Refills: 1 | Status: SHIPPED | OUTPATIENT
Start: 2022-04-18

## 2022-04-19 ENCOUNTER — CLINICAL SUPPORT (OUTPATIENT)
Dept: FAMILY MEDICINE CLINIC | Age: 52
End: 2022-04-19
Payer: MEDICAID

## 2022-04-19 VITALS — OXYGEN SATURATION: 98 % | HEIGHT: 62 IN | BODY MASS INDEX: 23.55 KG/M2 | RESPIRATION RATE: 18 BRPM | WEIGHT: 128 LBS

## 2022-04-19 DIAGNOSIS — E53.8 VITAMIN B12 DEFICIENCY: Primary | ICD-10-CM

## 2022-04-19 DIAGNOSIS — M62.838 MUSCLE SPASM: ICD-10-CM

## 2022-04-19 DIAGNOSIS — R11.0 NAUSEA: ICD-10-CM

## 2022-04-19 PROCEDURE — 96372 THER/PROPH/DIAG INJ SC/IM: CPT | Performed by: FAMILY MEDICINE

## 2022-04-19 RX ORDER — CYANOCOBALAMIN 1000 UG/ML
1000 INJECTION, SOLUTION INTRAMUSCULAR; SUBCUTANEOUS ONCE
Status: COMPLETED | OUTPATIENT
Start: 2022-04-19 | End: 2022-04-19

## 2022-04-19 RX ADMIN — CYANOCOBALAMIN 1000 MCG: 1000 INJECTION, SOLUTION INTRAMUSCULAR; SUBCUTANEOUS at 17:20

## 2022-04-19 NOTE — PROGRESS NOTES
Chief Complaint   Patient presents with    Injection B12     Vitals:    04/19/22 1713   Resp: 18   Height: 5' 2\" (1.575 m)   Weight: 128 lb (58.1 kg)   SpO2: 98%     B12 injection given today, by Katya Moore with patient's consent. No reactions noted. Advised pt to return for nurse visit in 1 month.

## 2022-04-27 ENCOUNTER — OFFICE VISIT (OUTPATIENT)
Dept: FAMILY MEDICINE CLINIC | Age: 52
End: 2022-04-27
Payer: MEDICAID

## 2022-04-27 VITALS
WEIGHT: 128 LBS | TEMPERATURE: 98 F | HEIGHT: 62 IN | DIASTOLIC BLOOD PRESSURE: 84 MMHG | OXYGEN SATURATION: 98 % | BODY MASS INDEX: 23.55 KG/M2 | HEART RATE: 124 BPM | RESPIRATION RATE: 17 BRPM | SYSTOLIC BLOOD PRESSURE: 115 MMHG

## 2022-04-27 DIAGNOSIS — M79.10 TRIGGER POINT: Primary | ICD-10-CM

## 2022-04-27 PROCEDURE — 99213 OFFICE O/P EST LOW 20 MIN: CPT | Performed by: STUDENT IN AN ORGANIZED HEALTH CARE EDUCATION/TRAINING PROGRAM

## 2022-04-27 PROCEDURE — 20553 NJX 1/MLT TRIGGER POINTS 3/>: CPT | Performed by: STUDENT IN AN ORGANIZED HEALTH CARE EDUCATION/TRAINING PROGRAM

## 2022-04-27 RX ORDER — LIDOCAINE HYDROCHLORIDE 10 MG/ML
10 INJECTION INFILTRATION; PERINEURAL ONCE
Status: COMPLETED | OUTPATIENT
Start: 2022-04-27 | End: 2022-04-27

## 2022-04-27 RX ORDER — BACLOFEN 10 MG/1
10 TABLET ORAL 3 TIMES DAILY
Qty: 90 TABLET | Refills: 0 | Status: SHIPPED | OUTPATIENT
Start: 2022-04-27 | End: 2022-05-23

## 2022-04-27 RX ORDER — ONDANSETRON 4 MG/1
4 TABLET, FILM COATED ORAL
Qty: 30 TABLET | Refills: 3 | Status: SHIPPED | OUTPATIENT
Start: 2022-04-27

## 2022-04-27 RX ADMIN — LIDOCAINE HYDROCHLORIDE 10 ML: 10 INJECTION INFILTRATION; PERINEURAL at 17:12

## 2022-04-27 NOTE — PROGRESS NOTES
Chief Complaint   Patient presents with    Shoulder Pain     bilateral     1. Have you been to the ER, urgent care clinic since your last visit? Hospitalized since your last visit? No    2. Have you seen or consulted any other health care providers outside of the [address] [place] [address] since your last visit? Include any pap smears or colon screening.  No

## 2022-04-27 NOTE — PROGRESS NOTES
[address] [address] Sports Medicine      Chief Complaint:   Chief Complaint   Patient presents with    Back Pain     follow up, trigger points       Subjective:   History: This patient is a 49 y.o. female  Presenting for follow up of Upper back pain here for trigger point injections    Patient reports that since her last visit, she has had improvement in neck pain. The pain is still  located primarily in the traps without radiating symptoms, persistent in nature and described as stabbing . The patient denies any night pain, weakness, or bowel/bladder dysfunction. The patient has no other complaints at this time. She is right handed and works as a     She has not been able to start physical therapy yet.        Prior Treatments:  Active Physical Therapy: yes 5 years ago with improvement  Attempted Modalities:  none tried  Injections:  Trigger point injection  Surgeries:  no prior surgery to the affected area  Medications: tylenol  Prior Imaging:  has not had prior imaging of the area     No bowel or bladder incontinence. No fever, night sweats, or weight changes. No saddle anesthesia. Review of Systems:  General/Constitutional:  No fever, chills, sweats, fatigue, night sweats, weakness, weight loss or weight gain   Head: No headache, no trauma   Neck: No swelling, masses, stiffness, pain, or limited movement   Cardiac: No chest pain   Respiratory: No cough, shortness of breath, or dyspnea on exertion   GI: No incontinence. No nausea/vomiting, diarrhea, abdominal pain, bloody or dark stools  : No incontinence. No change in urinary habits. Peripheral Vascular: No edema, coldness, numbness, discoloration, pain, or paresthesias   Musculoskeletal: As per HPI  Neurological: No saddle distribution paresthesia. No siatic pain.  No loss of consciousness, dizziness, seizures, dysarthria, cognitive changes, problems with balance, or unilateral weakness. Past Medical History:   Diagnosis Date    Autoimmune disease ([facility]     Crohn's Disease    Crohn disease ([facility] 4/19/2010    Crohn's     Depression 4/19/2010    History of vascular access device 07/18/2017    [facility] VAT - 33 cm right brachial PICC for abx and limited access    Perforation bowel ([facility] 7/4/2017    S/p palak [place]ou[place] 7/2017    Peripheral neuropathy 2/2/2012    B12 deficiency MRI brain normal 1/2012 MRA head normal 1/2012 CTA neck normal 1/2012     Renal calculi 9/6/2020    Vertigo     Vitamin B12 deficiency 1/21/2012    164 on 1/2012 Needs 1000 mcg IM twice a week      Family History   Problem Relation Age of Onset    Heart Disease Father     Cancer Mother      Current Outpatient Medications   Medication Sig Dispense Refill    mupirocin (BACTROBAN) 2 % ointment APPLY TO AFFECTED AREA DAILY. APPLY TO AREA 2-3 TIMES DAILY FOR 5 DAYS 22 g 0    Mucus Relief  mg ER tablet TAKE 1 TABLET BY MOUTH TWO (2) TIMES A DAY. INDICATIONS: COUGH AND CONGESTION 20 Tablet 0    valACYclovir (VALTREX) 500 mg tablet TAKE 1 TABLET BY MOUTH TWO TIMES A DAY. 60 Tablet 2    nystatin (MYCOSTATIN) 100,000 unit/mL suspension TAKE 5 ML BY MOUTH FOUR (4) TIMES DAILY. SWISH AND SPIT 480 mL 1    mometasone (NASONEX) 50 mcg/actuation nasal spray SPRAY 2 SPRAYS INTO EACH NOSTRIL EVERY DAY 17 Each 3    ondansetron hcl (ZOFRAN) 4 mg tablet Take 1 Tablet by mouth every eight (8) hours as needed for Nausea or Vomiting. 30 Tablet 3    cholecalciferol (VITAMIN D3) (2,000 UNITS /50 MCG) cap capsule Take 1 Capsule by mouth daily. 90 Capsule 3    melatonin 5 mg cap capsule Take 1 Capsule by mouth nightly. 90 Capsule 3    acetaminophen (TYLENOL) 500 mg tablet Take 1-2 Tablets by mouth every eight (8) hours as needed for Pain. 60 Tablet 0    fexofenadine (ALLEGRA) 180 mg tablet Take 1 Tablet by mouth daily as needed for Allergies.  90 Tablet 3    escitalopram oxalate (LEXAPRO) 10 mg tablet Take 10 mg by mouth daily.  LORazepam (ATIVAN) 1 mg tablet Take 1 mg by mouth two (2) times a day. Patient reports taking twice daily [id number]      pantoprazole (PROTONIX) 40 mg tablet Take 40 mg by mouth daily.  cyanocobalamin (VITAMIN B12) 1,000 mcg/mL injection 1,000 mcg by IntraMUSCular route every thirty (30) days.  dextroamphetamine-amphetamine (ADDERALL) 20 mg tablet Take 20 mg by mouth three (3) times daily.  clonazePAM (KLONOPIN) 1 mg tablet Take 1 mg by mouth two (2) times a day. Patient reports taking twice daily in the AM and PM ([id number])      baclofen (LIORESAL) 10 mg tablet TAKE 1 TABLET BY MOUTH THREE TIMES A DAY 90 Tablet 0     Allergies   Allergen Reactions    Doxycycline Swelling     Other reaction(s):  Other (See Comments), WHELPS, ITCHING, NAUSEA  blisters      Adhesive Rash    Cephalosporins Hives    Penicillins Hives    Sulfa (Sulfonamide Antibiotics) Unknown (comments)    Tetracyclines Nausea and Vomiting     Social History     Socioeconomic History    Marital status:      Spouse name: Not on file    Number of children: Not on file    Years of education: Not on file    Highest education level: Not on file   Occupational History    Not on file   Tobacco Use    Smoking status: Current Some Day Smoker     Packs/day: 0.50     Years: 8.00     Pack years: 4.00     Types: Cigarettes    Smokeless tobacco: Never Used   Substance and Sexual Activity    Alcohol use: No    Drug use: No     Comment: 1 pack a day    Sexual activity: Yes     Partners: Male     Birth control/protection: Pill   Other Topics Concern    Not on file   Social History Narrative    Not on file     Social Determinants of Health     Financial Resource Strain:     Difficulty of Paying Living Expenses: Not on file   Food Insecurity:     Worried About Running Out of Food in the Last Year: Not on file    Amira of Food in the Last Year: Not on file   Transportation Needs:     Lack of Transportation (Medical): Not on file    Lack of Transportation (Non-Medical): Not on file   Physical Activity:     Days of Exercise per Week: Not on file    Minutes of Exercise per Session: Not on file   Stress:     Feeling of Stress : Not on file   Social Connections:     Frequency of Communication with Friends and Family: Not on file    Frequency of Social Gatherings with Friends and Family: Not on file    Attends Roman [employer] Services: Not on file    Active Member of [address] [place] [address] [employer] or Organizations: Not on file    Attends Club or Organization Meetings: Not on file    Marital Status: Not on file   Intimate Partner Violence:     Fear of Current or Ex-Partner: Not on file    Emotionally Abused: Not on file    Physically Abused: Not on file    Sexually Abused: Not on file   Housing Stability:     Unable to Pay for Housing in the Last Year: Not on file    Number of [place] in the Last Year: Not on file    Unstable Housing in the Last Year: Not on file       Objective:     Visit Vitals  Visit Vitals  /84   Pulse (!) 124   Temp 98 °F (36.7 °C) (Temporal)   Resp 17   Ht 5' 2\" (1.575 m)   Wt 128 lb (58.1 kg)   SpO2 98%   BMI 23.41 kg/m²     HR normal rate with auscultation    GEN: Well appearing. No apparent distress. Responds to all questions appropriately. Lungs: No labored respirations. Talking in complete sentences without difficulty. General:  Awake and alert in no acute distress,   Psych: normal mood and affect. HEENT: NC/AT, normal visual tracking  Pulmonary: no resp distress, chest expansion appears symmetrical  CV: extremities are warm and perfused  Abd: non-distended  Ext: no c/c/e    Gait: normal  CERVICAL SPINE REGION: Flexion, extension, side-bending, rotation, oblique extension all full and pain free except for in left sidebending     Inspection, cervical: normal, no listing of the head, no gross asymmetries.       Palpation:  Tender at right Cervical paraspinals, trapezius, supraspinatus,, tenderness in bilateral lumbar paraspinal as well      Reflexes: Upper limbs:  RIGHT    LEFT   Biceps C5-6               2+     2+  Brachioradialis C5-6  2+             2+  Triceps C(6)7-8(1)  2+     2+      Strength, upper limbs:    5/5 in SA, EF, EE, WE, ADM, APB bilaterally   5/5 in bilateral lower extremities      Sensation: Upper limbs: Intact to pinprick over C3-T1 bilateral UE and LE dermatomes       Tests for cervical radiculopathy/myelopathy:    Spurling's sign: negative bilaterally    Long tract signs for myelopathy/UMN process:   UMN Sign                              Alexis sign: negative bilaterally     Ankle clonus:   2-3 beats bilaterally  Babinski sign:   mute bilaterally  Knee jerk:    2+/4 bilaterally  Ankle jerk:    2+/4 bilaterally        Neuro/Vascular:  Pulses intact, no edema, and neurologically intact         [facility]  OFFICE PROCEDURE PROGRESS NOTE        Chart reviewed for the following:   Bushra Johnson MD, have reviewed the History, Physical and updated the Allergic reactions for Onel Leija performed immediately prior to start of procedure:   Bushra Johnson MD, have performed the following reviews on [address] [place] [address] prior to the start of the procedure:            * Patient was identified by name and date of birth   * Agreement on procedure being performed was verified  * Risks and Benefits explained to the patient  * Procedure site verified and marked as necessary  * Patient was positioned for comfort  * Consent was signed and verified     Time: 5:00 pm      Date of procedure: 4/27/2022    Procedure performed by:  Yolie Doyle MD    Patient assisted by: self    How tolerated by patient: tolerated the procedure well with no complications    Post Procedural Pain Scale: 0 - No Hurt    Trigger Point Injections     Preparation - Cleaned and prepped with chlorhexidine swab x3.     Anesthesia - Ethyl chloride spray used to anesthitise the skin prior to injection. Description of procedure - 5 trigger points were identified in the bilateral trap  each site was injected with 2 cc of 1% Lidocaine. Patient tolerated the procedure well and there were no complications. Patient reports 100% pain relief following the injections. Assessment:       ICD-10-CM ICD-9-CM    1. Trapezius muscle spasm  M62.838 728.85    2.  Trigger point  M79.10 729.1          Plan:      Plan:  -Trigger point injections as above  - physical therapy for neck pain, patient has had improvement in past.  - reprinted for patient  - patient states that SIJ pain has improved with injection on last visit     Medications:               1.   Acetaminophen (Tylenol):  500mg 1-2 tablets every 6 hours as needed for pain.     RTC: 3-4  weeks

## 2022-04-28 NOTE — PROGRESS NOTES
I reviewed the patient's medical history, the resident's findings on physical examination, the patient's diagnoses, and treatment plan as documented in the resident note. I concur with the treatment plan as documented. Additional suggestions noted. I was present during the critical and key portions of the procedure and immediately available to furnish services.  (Other procedure 5 minutes)

## 2022-05-16 DIAGNOSIS — E53.8 VITAMIN B12 DEFICIENCY: Primary | ICD-10-CM

## 2022-05-16 RX ORDER — CYANOCOBALAMIN 1000 UG/ML
1000 INJECTION, SOLUTION INTRAMUSCULAR; SUBCUTANEOUS ONCE
Qty: 1 ML | Refills: 0
Start: 2022-05-16 | End: 2022-05-16

## 2022-05-16 NOTE — TELEPHONE ENCOUNTER
Pt at  demanding B12 injection after arriving 25 min late for visit. She was offered nursing visit for the B12 injection; however, she is requesting it be sent to [facility]. Will ablige and send her B12 script to [facility] though one additional day will likely not affect her b12 levels or symptoms.      Rogelio Barnes MD

## 2022-05-17 ENCOUNTER — PATIENT MESSAGE (OUTPATIENT)
Dept: FAMILY MEDICINE CLINIC | Age: 52
End: 2022-05-17

## 2022-05-18 NOTE — TELEPHONE ENCOUNTER
Spoke with pharmacy tech at [facility], who confirmed they have not received the Rx for the B12 injection. l she also stated the vial does not come with a syringe or needle, so the pt would have to purchase that separately if she wanted to administer the injection herself.

## 2022-05-22 DIAGNOSIS — M62.838 MUSCLE SPASM: ICD-10-CM

## 2022-05-23 RX ORDER — BACLOFEN 10 MG/1
TABLET ORAL
Qty: 90 TABLET | Refills: 0 | Status: SHIPPED | OUTPATIENT
Start: 2022-05-23 | End: 2022-06-16

## 2022-05-31 DIAGNOSIS — R05.8 PRODUCTIVE COUGH: ICD-10-CM

## 2022-06-01 RX ORDER — SYRINGE-NEEDLE,INSULIN,0.5 ML 28GX1/2"
SYRINGE, EMPTY DISPOSABLE MISCELLANEOUS
Qty: 20 TABLET | Refills: 0 | Status: SHIPPED | OUTPATIENT
Start: 2022-06-01

## 2022-06-14 ENCOUNTER — ANESTHESIA EVENT (OUTPATIENT)
Dept: ENDOSCOPY | Age: 52
End: 2022-06-14
Payer: MEDICAID

## 2022-06-14 ENCOUNTER — HOSPITAL ENCOUNTER (OUTPATIENT)
Age: 52
Setting detail: OUTPATIENT SURGERY
Discharge: HOME | End: 2022-06-14
Attending: SPECIALIST | Admitting: SPECIALIST
Payer: MEDICAID

## 2022-06-14 ENCOUNTER — ANESTHESIA (OUTPATIENT)
Dept: ENDOSCOPY | Age: 52
End: 2022-06-14
Payer: MEDICAID

## 2022-06-14 VITALS
OXYGEN SATURATION: 98 % | RESPIRATION RATE: 21 BRPM | WEIGHT: 123.68 LBS | HEART RATE: 84 BPM | SYSTOLIC BLOOD PRESSURE: 111 MMHG | DIASTOLIC BLOOD PRESSURE: 89 MMHG | HEIGHT: 62 IN | TEMPERATURE: 97.7 F | BODY MASS INDEX: 22.76 KG/M2

## 2022-06-14 PROCEDURE — 74011250636 HC RX REV CODE- 250/636: Performed by: NURSE ANESTHETIST, CERTIFIED REGISTERED

## 2022-06-14 PROCEDURE — 77030021593 HC FCPS BIOP ENDOSC BSC -A: Performed by: SPECIALIST

## 2022-06-14 PROCEDURE — 74011000250 HC RX REV CODE- 250: Performed by: NURSE ANESTHETIST, CERTIFIED REGISTERED

## 2022-06-14 PROCEDURE — 88305 TISSUE EXAM BY PATHOLOGIST: CPT

## 2022-06-14 PROCEDURE — 77030018712 HC DEV BLLN INFL BSC -B: Performed by: SPECIALIST

## 2022-06-14 PROCEDURE — 76040000019: Performed by: SPECIALIST

## 2022-06-14 PROCEDURE — C1726 CATH, BAL DIL, NON-VASCULAR: HCPCS | Performed by: SPECIALIST

## 2022-06-14 PROCEDURE — 2709999900 HC NON-CHARGEABLE SUPPLY: Performed by: SPECIALIST

## 2022-06-14 PROCEDURE — 76060000031 HC ANESTHESIA FIRST 0.5 HR: Performed by: SPECIALIST

## 2022-06-14 RX ORDER — PROPOFOL 10 MG/ML
INJECTION, EMULSION INTRAVENOUS AS NEEDED
Status: DISCONTINUED | OUTPATIENT
Start: 2022-06-14 | End: 2022-06-14 | Stop reason: HOSPADM

## 2022-06-14 RX ORDER — NALOXONE HYDROCHLORIDE 0.4 MG/ML
0.4 INJECTION, SOLUTION INTRAMUSCULAR; INTRAVENOUS; SUBCUTANEOUS
Status: CANCELLED | OUTPATIENT
Start: 2022-06-14 | End: 2022-06-14

## 2022-06-14 RX ORDER — SODIUM CHLORIDE 9 MG/ML
INJECTION, SOLUTION INTRAVENOUS
Status: DISCONTINUED | OUTPATIENT
Start: 2022-06-14 | End: 2022-06-14 | Stop reason: HOSPADM

## 2022-06-14 RX ORDER — SODIUM CHLORIDE 9 MG/ML
50 INJECTION, SOLUTION INTRAVENOUS CONTINUOUS
Status: CANCELLED | OUTPATIENT
Start: 2022-06-14 | End: 2022-06-14

## 2022-06-14 RX ORDER — MIDAZOLAM HYDROCHLORIDE 1 MG/ML
.25-5 INJECTION, SOLUTION INTRAMUSCULAR; INTRAVENOUS AS NEEDED
Status: CANCELLED | OUTPATIENT
Start: 2022-06-14

## 2022-06-14 RX ORDER — DEXTROMETHORPHAN/PSEUDOEPHED 2.5-7.5/.8
1.2 DROPS ORAL
Status: CANCELLED | OUTPATIENT
Start: 2022-06-14

## 2022-06-14 RX ORDER — PROPOFOL 10 MG/ML
INJECTION, EMULSION INTRAVENOUS
Status: DISCONTINUED | OUTPATIENT
Start: 2022-06-14 | End: 2022-06-14 | Stop reason: HOSPADM

## 2022-06-14 RX ORDER — FENTANYL CITRATE 50 UG/ML
INJECTION, SOLUTION INTRAMUSCULAR; INTRAVENOUS AS NEEDED
Status: DISCONTINUED | OUTPATIENT
Start: 2022-06-14 | End: 2022-06-14 | Stop reason: HOSPADM

## 2022-06-14 RX ORDER — FENTANYL CITRATE 50 UG/ML
25 INJECTION, SOLUTION INTRAMUSCULAR; INTRAVENOUS AS NEEDED
Status: CANCELLED | OUTPATIENT
Start: 2022-06-14

## 2022-06-14 RX ORDER — FLUMAZENIL 0.1 MG/ML
0.2 INJECTION INTRAVENOUS
Status: CANCELLED | OUTPATIENT
Start: 2022-06-14 | End: 2022-06-14

## 2022-06-14 RX ORDER — LIDOCAINE HYDROCHLORIDE 20 MG/ML
INJECTION, SOLUTION EPIDURAL; INFILTRATION; INTRACAUDAL; PERINEURAL AS NEEDED
Status: DISCONTINUED | OUTPATIENT
Start: 2022-06-14 | End: 2022-06-14 | Stop reason: HOSPADM

## 2022-06-14 RX ADMIN — PROPOFOL 20 MG: 10 INJECTION, EMULSION INTRAVENOUS at 10:33

## 2022-06-14 RX ADMIN — SODIUM CHLORIDE: 9 INJECTION, SOLUTION INTRAVENOUS at 10:15

## 2022-06-14 RX ADMIN — LIDOCAINE HYDROCHLORIDE 60 MG: 20 INJECTION, SOLUTION EPIDURAL; INFILTRATION; INTRACAUDAL; PERINEURAL at 10:29

## 2022-06-14 RX ADMIN — PROPOFOL 30 MG: 10 INJECTION, EMULSION INTRAVENOUS at 10:41

## 2022-06-14 RX ADMIN — PROPOFOL 130 MCG/KG/MIN: 10 INJECTION, EMULSION INTRAVENOUS at 10:29

## 2022-06-14 RX ADMIN — PROPOFOL 50 MG: 10 INJECTION, EMULSION INTRAVENOUS at 10:29

## 2022-06-14 RX ADMIN — FENTANYL CITRATE 50 MCG: 50 INJECTION, SOLUTION INTRAMUSCULAR; INTRAVENOUS at 10:26

## 2022-06-14 NOTE — PERIOP NOTES
Report from [facility], CRNA, see anesthesia record. ABD remains soft and non-tender post procedure. Pt has no complaints at this time and tolerated the procedure well. Endoscope was pre-cleaned at bedside immediately following procedure by Jina Barclay.

## 2022-06-14 NOTE — H&P
47 y.o. female for open access colonoscopy for screening   Additional data for completion of the targeted pre-endoscopy H&P will be provided under 'H&P interval notes'. Please see that document which will be attached to this. Claudia Keller MD     And EGD for history of peptic ulcer disease.

## 2022-06-14 NOTE — ANESTHESIA PREPROCEDURE EVALUATION
Relevant Problems   NEUROLOGY   (+) Depression      RENAL FAILURE   (+) Staghorn renal calculus      HEMATOLOGY   (+) Anemia       Anesthetic History   No history of anesthetic complications            Review of Systems / Medical History  Patient summary reviewed, nursing notes reviewed and pertinent labs reviewed    Pulmonary  Within defined limits                 Neuro/Psych         Psychiatric history (panic attacks)     Cardiovascular  Within defined limits                     GI/Hepatic/Renal         Renal disease: stones      Comments: Crohn's disease Endo/Other  Within defined limits           Other Findings   Comments: B 12 deficiency  Lyme's disease           Physical Exam    Airway  Mallampati: II  TM Distance: 4 - 6 cm  Neck ROM: normal range of motion   Mouth opening: Normal     Cardiovascular    Rhythm: regular  Rate: normal         Dental  No notable dental hx       Pulmonary  Breath sounds clear to auscultation               Abdominal         Other Findings            Anesthetic Plan    ASA: 2  Anesthesia type: MAC            Anesthetic plan and risks discussed with: Patient

## 2022-06-14 NOTE — INTERVAL H&P NOTE
Pre-Endoscopy H&P Update  Chief complaint/HPI/ROS:  The indication for the procedure, the patient's history and the patient's current medications are reviewed prior to the procedure and that data is reported on the H&P to which this document is attached. Any significant complaints with regard to organ systems will be noted, and if not mentioned then a review of systems is not contributory. Past Medical History:   Diagnosis Date    Adverse effect of anesthesia     itching around face and dry rash    Crohn disease ([facility] [address] 2010    Depression 2010    History of vascular access device 2017    [facility] VAT - 33 cm right brachial PICC for abx and limited access    Kidney stones     History of stent placed and removed    Lyme disease     Panic attack     Perforation bowel ([facility] [address] 2017    S/p palak Grimes 2017    Peripheral neuropathy 2012    B12 deficiency MRI brain normal 2012 MRA head normal 2012 CTA neck normal 2012     Renal calculi 2020    Vertigo     Vitamin B12 deficiency 2012    164 on 2012 Needs 1000 mcg IM twice a week     Vitamin E deficiency       Past Surgical History:   Procedure Laterality Date    HX  SECTION      HX  SECTION      HX COLONOSCOPY      HX ENDOSCOPY      EGD    HX HEENT      HX LITHOTRIPSY      HX PARTIAL COLECTOMY      due to Crohn's-bowel resection x2    HX TONSIL AND ADENOIDECTOMY      HX TUBAL LIGATION       Social   Social History     Tobacco Use    Smoking status: Current Some Day Smoker     Packs/day: 0.50     Years: 8.00     Pack years: 4.00     Types: Cigarettes    Smokeless tobacco: Never Used   Substance Use Topics    Alcohol use: No      Family History   Problem Relation Age of Onset    Heart Disease Father     Cancer Mother       Allergies   Allergen Reactions    Doxycycline Swelling     Other reaction(s):  Other (See Comments), WHELPS, ITCHING, NAUSEA  blisters      Adhesive Rash    Cephalosporins Hives Penicillins Hives    Sulfa (Sulfonamide Antibiotics) Unknown (comments)    Tetracyclines Nausea and Vomiting      Prior to Admission Medications   Prescriptions Last Dose Informant Patient Reported? Taking? LORazepam (ATIVAN) 1 mg tablet 2022 at Unknown time Self Yes Yes   Sig: Take 1 mg by mouth two (2) times a day. Patient reports taking twice daily [id number]   Mucus Relief  mg ER tablet 2022 at Unknown time  No Yes   Sig: TAKE 1 TABLET BY MOUTH TWO (2) TIMES A DAY. INDICATIONS: COUGH AND CONGESTION   acetaminophen (TYLENOL) 500 mg tablet 2022  No Yes   Sig: Take 1-2 Tablets by mouth every eight (8) hours as needed for Pain. baclofen (LIORESAL) 10 mg tablet 2022 at Unknown time  No Yes   Sig: TAKE 1 TABLET BY MOUTH THREE TIMES A DAY   cholecalciferol (VITAMIN D3) (2,000 UNITS /50 MCG) cap capsule 2022 at Unknown time  No Yes   Sig: Take 1 Capsule by mouth daily. clonazePAM (KLONOPIN) 1 mg tablet 2022 at Unknown time Self Yes Yes   Sig: Take 1 mg by mouth two (2) times a day. Patient reports taking twice daily in the AM and PM ([id number])   cyanocobalamin, vitamin B-12, 1,000 mcg/mL kit 6/3/2022  No Yes   Si mL by Injection route every thirty (30) days. dextroamphetamine-amphetamine (ADDERALL) 20 mg tablet 2022 Self Yes Yes   Sig: Take 20 mg by mouth three (3) times daily. escitalopram oxalate (LEXAPRO) 10 mg tablet 2022  Yes Yes   Sig: Take 20 mg by mouth daily. fexofenadine (ALLEGRA) 180 mg tablet 2022  No Yes   Sig: TAKE 1 TABLET BY MOUTH DAILY AS NEEDED FOR ALLERGIES. melatonin 5 mg cap capsule 2022 at Unknown time  No Yes   Sig: Take 1 Capsule by mouth nightly. mometasone (NASONEX) 50 mcg/actuation nasal spray 2022 at Unknown time  No Yes   Sig: SPRAY 2 SPRAYS INTO EACH NOSTRIL EVERY DAY   mupirocin (BACTROBAN) 2 % ointment 2022  No Yes   Sig: APPLY TO AFFECTED AREA DAILY.  APPLY TO AREA 2-3 TIMES DAILY FOR 5 DAYS   nystatin (MYCOSTATIN) 100,000 unit/mL suspension 6/12/2022  No Yes   Sig: TAKE 5 ML BY MOUTH FOUR (4) TIMES DAILY. SWISH AND SPIT   ondansetron hcl (ZOFRAN) 4 mg tablet 5/14/2022 at Unknown time  No Yes   Sig: Take 1 Tablet by mouth every eight (8) hours as needed for Nausea or Vomiting. pantoprazole (PROTONIX) 40 mg tablet 6/13/2022 at Unknown time Self Yes Yes   Sig: Take 40 mg by mouth daily. valACYclovir (VALTREX) 500 mg tablet 6/12/2022  No Yes   Sig: TAKE 1 TABLET BY MOUTH TWICE A DAY      Facility-Administered Medications: None       PHYSICAL EXAM:  The patient is examined immediately prior to the procedure. Visit Vitals  BP 97/73   Pulse 89   Temp 98.7 °F (37.1 °C)   Resp 17   Ht 5' 2\" (1.575 m)   Wt 56.1 kg (123 lb 10.9 oz)   SpO2 99%   Breastfeeding No   BMI 22.62 kg/m²     Gen: Appears comfortable, no distress. Pulm: comfortable respirations with no abnormal audible breath sounds  HEART: well perfused, no abnormal audible heart sounds  GI: abdomen flat. PLAN:  Informed consent discussion held, patient afforded an opportunity to ask questions and all questions answered. After being advised of the risks, benefits, and alternatives, the patient requested that we proceed and indicated so on a written consent form. Will proceed with procedure as planned.   Farshad Fox MD

## 2022-06-14 NOTE — DISCHARGE INSTRUCTIONS
[facility] D. Darylene Pitter, MD  [phone]      June 14, 2022    Carmella Dakin  YOB: 1970    COLONOSCOPY DISCHARGE INSTRUCTIONS    If there is redness at IV site you should apply warm compress to area. If redness or soreness persist contact Dr. Darylene Pitter' or your primary care doctor. There may be a slight amount of blood passed from the rectum. Gaseous discomfort may develop, but walking, belching will help relieve this. You may not operate a vehicle for 12 hours  You may not operate machinery or dangerous appliances for rest of today  You may not drink alcoholic beverages for 12 hours  Avoid making any critical decisions for 24 hours    DIET:  You may resume your normal diet, but some patients find that heavy or large meals may lead to indigestion or vomiting. I suggest a light meal as first food intake. MEDICATIONS:  The use of some over-the-counter pain medication may lead to bleeding after colon biopsies or polyp removal.  Tylenol (also called acetaminophen) is safe to take even if you have had colonoscopy with polyp removal.  Based on the procedure you had today you may not safely take aspirin or aspirin-like products for the next ten (10) days. Remember that Tylenol (also called acetaminophen) is safe to take after colonoscopy even if you have had biopsies or polyps removed. ACTIVITY:  You may resume your normal household activities, but it is recommended that you spend the remainder of the day resting -  avoid any strenuous activity. CALL DR. Jf Carter' OFFICE IF:  Increasing pain, nausea, vomiting  Abdominal distension (swelling)  Significant new or increased bleeding (oral or rectal)  Fever/Chills  Chest pain/shortness of breath                       Additional instructions:   No aspirin 10 days.   We found a benign appearing narrowing of the lower esophagus which we biospied and dilated/stretched. The colon is completely healthy and I took some biopsies of the last portion of the small intestine to make sure no Crohn's disease changes. I'll contact you with the biopsy results in  7-10 day. I sent a prescription for 1 years of pantoprazole to the pharmacy. It was an honor to be your doctor today. Please let me or my office staff know if you have any feedback about today's procedure. Skyler Sarmiento MD    Colonoscopy saves lives, and can prevent colon cancer. Everyone aged 48 or older needs colonoscopy.   Tell your family and friends: get the test!

## 2022-06-14 NOTE — PROGRESS NOTES
Georgina [place]  1970  [id number]    Situation:  Verbal report received from:   Tushar Lancaster RN   Procedure: Procedure(s):  COLONOSCOPY  ESOPHAGOGASTRODUODENOSCOPY (EGD)  ESOPHAGOGASTRODUODENAL (EGD) BIOPSY  ESOPHAGEAL DILATION  COLON BIOPSY    Background:    Preoperative diagnosis: Crohn's disease of small intestine with rectal bleeding ([facility] [address] [K50.011]  Postoperative diagnosis: Esophageal Ring  Esophagitis    :  Dr. Tristian James   Assistant(s): Endoscopy RN-1: Charles Scott RN  Endoscopy RN-2: Jamison Parsons    Specimens:   ID Type Source Tests Collected by Time Destination   1 : Gastric Antrum Biopsies Preservative   Giovanny Raza MD 6/14/2022 1037 Pathology   2 : EG Junction biopsies Preservative   Giovanny Raza MD 6/14/2022 1037 Pathology   3 : Ileum Biopsies Preservative   Giovanny Raza MD 6/14/2022 1043 Pathology     H. Pylori  no    Assessment:  Intra-procedure medications     Anesthesia gave intra-procedure sedation and medications, see anesthesia flow sheet yes    Intravenous fluids: NS@ KVO     Vital signs stable   yes    Abdominal assessment: round and soft   yes    Recommendation:  Discharge patient per MD order  yes.   Return to floor  outpatient  Family or Friend   Family   Permission to share finding with family or friend yes

## 2022-06-14 NOTE — PROGRESS NOTES
Endoscopy discharge instructions have been reviewed and given to patient. The patient verbalized understanding and acceptance of instructions. Dr. Emma Bardales  discussed with patient procedure findings and next steps.

## 2022-06-14 NOTE — ANESTHESIA POSTPROCEDURE EVALUATION
Procedure(s):  COLONOSCOPY  ESOPHAGOGASTRODUODENOSCOPY (EGD)  ESOPHAGOGASTRODUODENAL (EGD) BIOPSY  ESOPHAGEAL DILATION  COLON BIOPSY. MAC    Anesthesia Post Evaluation        Patient location during evaluation: bedside  Level of consciousness: awake  Pain management: satisfactory to patient  Airway patency: patent  Anesthetic complications: no  Cardiovascular status: acceptable  Respiratory status: acceptable  Hydration status: acceptable        INITIAL Post-op Vital signs:   Vitals Value Taken Time   /89 06/14/22 1105   Temp 36.5 °C (97.7 °F) 06/14/22 1100   Pulse 82 06/14/22 1110   Resp 12 06/14/22 1110   SpO2 98 % 06/14/22 1105   Vitals shown include unvalidated device data.

## 2022-06-14 NOTE — PROCEDURES
[address] [facility] REBA Rogers MD  [phone]      2022    Esophagogastroduodenoscopy & Colonoscopy Procedure Note  Eva Alcala  : 1970  [facility] Medical Record Number: [id number]      Indications:    Dysphagia/odynophagia, GERD, Personal history of antral peptic ulcer. Personal history of Crohn's disease status post ileocecectomy. Referring Physician:  Jeffrey Velarde MD  Anesthesia/Sedation: Conscious Sedation/Moderate Sedation/MAC  Endoscopist:  Dr. Don Adams  Complications:  None  Estimated Blood Loss:  None    Permit:  The indications, risks, benefits and alternatives were reviewed with the patient or their decision maker who was provided an opportunity to ask questions and all questions were answered. The specific risks of esophagogastroduodenoscopy with conscious sedation were reviewed, including but not limited to anesthetic complication, bleeding, adverse drug reaction, missed lesion, infection, IV site reactions, and intestinal perforation which would lead to the need for surgical repair. Alternatives to EGD and colonoscopy including radiographic imaging, observation without testing, or laboratory testing were reviewed as well as the limitations of those alternatives discussed. After considering the options and having all their questions answered, the patient or their decision maker provided both verbal and written consent to proceed. -----------EGD------------   Procedure in Detail:  After obtaining informed consent, positioning of the patient in the left lateral decubitus position, and conduction of a pre-procedure pause or \"time out\" the endoscope was introduced into the mouth and advanced to the duodenum. A careful inspection was made, and findings or interventions are described below. Findings:   Esophagus:  There was a benign appearing esophageal ring at the EG junction which was biopsied and dilated with 20mm TTS balloon. Hemostasis confirmed. Stomach: Focal erythema at the antrum which is biopsied with cold forceps for histology. Duodenum/jejunum: normal        ----------Colonoscopy-----------    Procedure in Detail:  After obtaining informed consent, positioning of the patient in the left lateral decubitus position, and conduction of a pre-procedure pause or \"time out\" the endoscope was introduced into the anus and advanced to the lissa-terminal ileum. The quality of the colonic preparation was good. A careful inspection was made as the colonoscope was withdrawn, findings and interventions are described below. Findings:   The patient is status post ileocecectomy. The neoterminal ileum has some focal aphthous appearing ulcers just in the last 2-3cm but the more proximal ileum is normal.  Cold forceps biopsies taken for histology. The colon mucosa is normal without inflammatory changes, polyps, or colon cancer. ------------------------------  Specimens:    See above    Complications:   None; patient tolerated the procedure well. Impressions:  EGD:  Esophageal ring, gastritis. Colonoscopy: Some changes of the ileum, may be anastomotic in nature as opposed to active IBD. Recommendations:     - Await pathology. Thank you for entrusting me with this patient's care. Please do not hesitate to contact me with any questions or if I can be of assistance with any of your other patients' GI needs. Signed By: Mara Alaniz MD                        June 14, 2022    Surgical assistant none. Implants none unless specified.

## 2022-06-16 DIAGNOSIS — M62.838 MUSCLE SPASM: ICD-10-CM

## 2022-06-16 RX ORDER — BACLOFEN 10 MG/1
TABLET ORAL
Qty: 90 TABLET | Refills: 0 | Status: SHIPPED | OUTPATIENT
Start: 2022-06-16 | End: 2022-07-15

## 2022-07-13 DIAGNOSIS — Z91.09 ENVIRONMENTAL ALLERGIES: ICD-10-CM

## 2022-07-13 RX ORDER — MINERAL OIL
180 ENEMA (ML) RECTAL
Qty: 90 TABLET | Refills: 2 | Status: SHIPPED | OUTPATIENT
Start: 2022-07-13

## 2022-07-13 RX ORDER — MOMETASONE FUROATE 50 UG/1
2 SPRAY, METERED NASAL DAILY
Qty: 17 EACH | Refills: 3 | Status: SHIPPED | OUTPATIENT
Start: 2022-07-13 | End: 2022-10-06

## 2022-07-15 ENCOUNTER — TELEPHONE (OUTPATIENT)
Dept: FAMILY MEDICINE CLINIC | Age: 52
End: 2022-07-15

## 2022-07-15 DIAGNOSIS — M62.838 MUSCLE SPASM: ICD-10-CM

## 2022-07-15 RX ORDER — BACLOFEN 10 MG/1
TABLET ORAL
Qty: 90 TABLET | Refills: 0 | Status: SHIPPED | OUTPATIENT
Start: 2022-07-15 | End: 2022-07-15 | Stop reason: SDUPTHER

## 2022-07-15 NOTE — TELEPHONE ENCOUNTER
Prior authorizations for allegra and mometasone initiated and approved today, 7/15/22. Notified pt's pharmacy. They will call the pt when the meds are ready for .

## 2022-07-20 ENCOUNTER — OFFICE VISIT (OUTPATIENT)
Dept: FAMILY MEDICINE CLINIC | Age: 52
End: 2022-07-20
Payer: MEDICAID

## 2022-07-20 VITALS
DIASTOLIC BLOOD PRESSURE: 76 MMHG | BODY MASS INDEX: 23.06 KG/M2 | RESPIRATION RATE: 20 BRPM | OXYGEN SATURATION: 98 % | HEIGHT: 62 IN | TEMPERATURE: 98.8 F | SYSTOLIC BLOOD PRESSURE: 115 MMHG | HEART RATE: 110 BPM | WEIGHT: 125.3 LBS

## 2022-07-20 DIAGNOSIS — E53.8 VITAMIN B12 DEFICIENCY: Primary | ICD-10-CM

## 2022-07-20 DIAGNOSIS — M62.838 TRAPEZIUS MUSCLE SPASM: ICD-10-CM

## 2022-07-20 DIAGNOSIS — M53.3 SACROILIAC JOINT PAIN: ICD-10-CM

## 2022-07-20 DIAGNOSIS — R23.3 EASY BRUISING: ICD-10-CM

## 2022-07-20 PROCEDURE — 96372 THER/PROPH/DIAG INJ SC/IM: CPT | Performed by: FAMILY MEDICINE

## 2022-07-20 PROCEDURE — 99213 OFFICE O/P EST LOW 20 MIN: CPT | Performed by: FAMILY MEDICINE

## 2022-07-20 RX ORDER — CYANOCOBALAMIN 1000 UG/ML
1000 INJECTION, SOLUTION INTRAMUSCULAR; SUBCUTANEOUS ONCE
Status: COMPLETED | OUTPATIENT
Start: 2022-07-20 | End: 2022-07-20

## 2022-07-20 RX ADMIN — CYANOCOBALAMIN 1000 MCG: 1000 INJECTION, SOLUTION INTRAMUSCULAR; SUBCUTANEOUS at 20:13

## 2022-07-20 NOTE — PROGRESS NOTES
History of Present Illness:     Chief Complaint   Patient presents with    Follow-up       Fatemeh Richards is a 47 y.o. female     Reports doing well overall    Still following with psychiatrist    Followed by Dr. Yusef White (GI)  Recently had EGD and colonoscopy  Had her esophagus stretched and it helped    ENT diagnosed her with Sjorgrens syndrome    Wants iron infusions for low iron and fatigue    Has muscle spasms in upper shoulder/ neck pain  Low back pain  Previously seen by Dr. Latoya Madrigal for trigger point injections and SI joint injection  They helped a lot and wants a follow up with them    PMH (REVIEWED):  Past Medical History:   Diagnosis Date    Adverse effect of anesthesia     itching around face and dry rash    Crohn disease ([facility] [address] 4/19/2010    Depression 4/19/2010    History of vascular access device 07/18/2017    [facility] VAT - 33 cm right brachial PICC for abx and limited access    Kidney stones     History of stent placed and removed    Lyme disease     Panic attack     Perforation bowel ([facility] [address] 7/4/2017    S/p palak Brito 7/2017    Peripheral neuropathy 2/2/2012    B12 deficiency MRI brain normal 1/2012 MRA head normal 1/2012 CTA neck normal 1/2012     Renal calculi 9/6/2020    Vertigo     Vitamin B12 deficiency 1/21/2012    164 on 1/2012 Needs 1000 mcg IM twice a week     Vitamin E deficiency        Current Medications/Allergies (REVIEWED):     Current Outpatient Medications on File Prior to Visit   Medication Sig Dispense Refill    baclofen (LIORESAL) 10 mg tablet Take 1 Tablet by mouth three (3) times daily. 30 Tablet 0    fexofenadine (ALLEGRA) 180 mg tablet Take 1 Tablet by mouth daily as needed for Allergies. 90 Tablet 2    mometasone (NASONEX) 50 mcg/actuation nasal spray 2 Sprays by Both Nostrils route daily. 17 Each 3    Mucus Relief  mg ER tablet TAKE 1 TABLET BY MOUTH TWO (2) TIMES A DAY.  INDICATIONS: COUGH AND CONGESTION 20 Tablet 0    cyanocobalamin, vitamin B-12, 1,000 mcg/mL kit 1 mL by Injection route every thirty (30) days. 1 Kit 3    ondansetron hcl (ZOFRAN) 4 mg tablet Take 1 Tablet by mouth every eight (8) hours as needed for Nausea or Vomiting. 30 Tablet 3    nystatin (MYCOSTATIN) 100,000 unit/mL suspension TAKE 5 ML BY MOUTH FOUR (4) TIMES DAILY. SWISH AND SPIT 480 mL 1    valACYclovir (VALTREX) 500 mg tablet TAKE 1 TABLET BY MOUTH TWICE A DAY 60 Tablet 2    mupirocin (BACTROBAN) 2 % ointment APPLY TO AFFECTED AREA DAILY. APPLY TO AREA 2-3 TIMES DAILY FOR 5 DAYS 22 g 0    cholecalciferol (VITAMIN D3) (2,000 UNITS /50 MCG) cap capsule Take 1 Capsule by mouth daily. 90 Capsule 3    melatonin 5 mg cap capsule Take 1 Capsule by mouth nightly. 90 Capsule 3    acetaminophen (TYLENOL) 500 mg tablet Take 1-2 Tablets by mouth every eight (8) hours as needed for Pain. 60 Tablet 0    escitalopram oxalate (LEXAPRO) 10 mg tablet Take 20 mg by mouth daily. LORazepam (ATIVAN) 1 mg tablet Take 1 mg by mouth two (2) times a day. Patient reports taking twice daily [id number]      pantoprazole (PROTONIX) 40 mg tablet Take 40 mg by mouth daily. dextroamphetamine-amphetamine (ADDERALL) 20 mg tablet Take 20 mg by mouth three (3) times daily. clonazePAM (KLONOPIN) 1 mg tablet Take 1 mg by mouth two (2) times a day. Patient reports taking twice daily in the AM and PM ([id number])       No current facility-administered medications on file prior to visit. Allergies   Allergen Reactions    Doxycycline Swelling     Other reaction(s): Other (See Comments), WHELPS, ITCHING, NAUSEA  blisters      Adhesive Rash    Cephalosporins Hives    Penicillins Hives    Sulfa (Sulfonamide Antibiotics) Unknown (comments)    Tetracyclines Nausea and Vomiting         Review of Systems:     Review of Systems   Constitutional:  Positive for malaise/fatigue. Negative for chills, fever and weight loss. Respiratory:  Negative for shortness of breath. Cardiovascular:  Negative for chest pain.    Musculoskeletal: Positive for back pain and neck pain. Objective:     Vitals:    07/20/22 1447   BP: 115/76   Pulse: (!) 110   Resp: 20   Temp: 98.8 °F (37.1 °C)   TempSrc: Oral   SpO2: 98%   Weight: 125 lb 4.8 oz (56.8 kg)   Height: 5' 2\" (1.575 m)       Physical Exam:  General appearance - alert, well appearing, and in no distress  Mental status - alert, oriented to person, place, and time. Restless, anxious, pressured speech. Neck - +TTP and palpable muscle spasm to bilateral trapeziousmuscles  Chest - clear to auscultation, no wheezes, rales or rhonchi, symmetric air entry  Heart - tachycardia, S1 and S2 normal, no murmurs noted    Recent Labs:  No results found for this or any previous visit (from the past 12 hour(s)). Assessment and Plan:       ICD-10-CM ICD-9-CM    1. Vitamin B12 deficiency  E53.8 266.2       2. Easy bruising  R23.8 782.9 CBC W/O DIFF      3. Sacroiliac joint pain  M53.3 724.6       4. Trapezius muscle spasm  M62.838 728.85           Vit B12 deficiency  Will give injection today    Check CBC for easy bruising  Pt concerned about anemia; however, last CBC good    SI joint pain  Previous steroid injection helped  Will refer back to Sports Med    Trap muscle spasm  Continue Baclofen PRN  Refer to sports med for trigger point injection as noted above    Follow up: 1-2 mo for chronic condition follow up    Anthony Scanlon MD    We discussed the expected course, resolution and complications of the diagnosis(es) in detail. Medication risks, benefits, costs, interactions, and alternatives were discussed as indicated. I advised her to contact the office if her condition worsens, changes or fails to improve as anticipated. She expressed understanding with the diagnosis(es) and plan.

## 2022-07-20 NOTE — PROGRESS NOTES
Identified pt with two pt identifiers(name and ). Reviewed record in preparation for visit and have obtained necessary documentation. Chief Complaint   Patient presents with    Follow-up        Health Maintenance Due   Topic    Breast Cancer Screen Mammogram     COVID-19 Vaccine (4 - Booster for Moderna series)       Visit Vitals  /76 (BP 1 Location: Right upper arm, BP Patient Position: Sitting, BP Cuff Size: Adult)   Pulse (!) 110   Temp 98.8 °F (37.1 °C) (Oral)   Resp 20   Ht 5' 2\" (1.575 m)   Wt 125 lb 4.8 oz (56.8 kg)   SpO2 98%   BMI 22.92 kg/m²         Coordination of Care Questionnaire:  :   1) Have you been to an emergency room, urgent care, or hospitalized since your last visit? If yes, where when, and reason for visit? no       2. Have seen or consulted any other health care provider since your last visit? If yes, where when, and reason for visit? NO      Patient is accompanied by self I have received verbal consent from [address] [place] [address] to discuss any/all medical information while they are present in the room.

## 2022-07-21 LAB
ERYTHROCYTE [DISTWIDTH] IN BLOOD BY AUTOMATED COUNT: 15.7 % (ref 11.7–15.4)
HCT VFR BLD AUTO: 35.9 % (ref 34–46.6)
HGB BLD-MCNC: 11.7 G/DL (ref 11.1–15.9)
MCH RBC QN AUTO: 28.1 PG (ref 26.6–33)
MCHC RBC AUTO-ENTMCNC: 32.6 G/DL (ref 31.5–35.7)
MCV RBC AUTO: 86 FL (ref 79–97)
PLATELET # BLD AUTO: 322 X10E3/UL (ref 150–450)
RBC # BLD AUTO: 4.16 X10E6/UL (ref 3.77–5.28)
WBC # BLD AUTO: 10 X10E3/UL (ref 3.4–10.8)

## 2022-08-09 ENCOUNTER — OFFICE VISIT (OUTPATIENT)
Dept: FAMILY MEDICINE CLINIC | Age: 52
End: 2022-08-09

## 2022-08-09 VITALS
BODY MASS INDEX: 22.63 KG/M2 | OXYGEN SATURATION: 98 % | HEART RATE: 125 BPM | RESPIRATION RATE: 18 BRPM | DIASTOLIC BLOOD PRESSURE: 80 MMHG | HEIGHT: 62 IN | WEIGHT: 123 LBS | TEMPERATURE: 98.1 F | SYSTOLIC BLOOD PRESSURE: 120 MMHG

## 2022-08-09 DIAGNOSIS — M54.2 NECK PAIN: ICD-10-CM

## 2022-08-09 DIAGNOSIS — M79.18 MYOFASCIAL MUSCLE PAIN: ICD-10-CM

## 2022-08-09 DIAGNOSIS — G56.01 PARTIAL THENAR ATROPHY, RIGHT: ICD-10-CM

## 2022-08-09 DIAGNOSIS — G56.01 CARPAL TUNNEL SYNDROME ON RIGHT: Primary | ICD-10-CM

## 2022-08-09 DIAGNOSIS — M54.12 CERVICAL RADICULOPATHY AT C7: ICD-10-CM

## 2022-08-09 PROCEDURE — 20553 NJX 1/MLT TRIGGER POINTS 3/>: CPT | Performed by: FAMILY MEDICINE

## 2022-08-09 PROCEDURE — 99215 OFFICE O/P EST HI 40 MIN: CPT | Performed by: FAMILY MEDICINE

## 2022-08-09 RX ORDER — PREDNISONE 20 MG/1
TABLET ORAL
Qty: 26 TABLET | Refills: 0 | Status: SHIPPED | OUTPATIENT
Start: 2022-08-09

## 2022-08-09 NOTE — PATIENT INSTRUCTIONS
[employer] allows you to send messages to your doctor, view your test results, renew your prescriptions, schedule appointments, and more! [employer] is how we primarily communicate as well. To access you [employer] go to [email or web address] and enter your log in and password. If you need to set up an account, forgot your username and password or have any questions you can call [phone] to talk to our [address] staff. Remember, [employer] is NOT to be used for urgent needs. For medical emergencies, dial 911.

## 2022-08-09 NOTE — PROGRESS NOTES
Identified Patient with two Patient identifiers (Name and ). Two Patient Identifiers confirmed. Reviewed record in preparation for visit and have obtained necessary documentation. Chief Complaint   Patient presents with    Back Pain     Patient would like trigger point injections. Visit Vitals  /80 (BP 1 Location: Left arm, BP Patient Position: Sitting, BP Cuff Size: Adult)   Pulse (!) 125   Temp 98.1 °F (36.7 °C) (Oral)   Resp 18   Ht 5' 2\" (1.575 m)   Wt 123 lb (55.8 kg)   SpO2 98%   BMI 22.50 kg/m²       1. Have you been to the ER, urgent care clinic since your last visit? Hospitalized since your last visit? No    2. Have you seen or consulted any other health care providers outside of the [address] [place] [address] since your last visit? Include any pap smears or colon screening.  No

## 2022-08-09 NOTE — PROGRESS NOTES
Subjective:      Vijaya Lacey is an 47 y.o. female who presents for evaluation and treatment   of neck pain. Trap muscle spasm  Baclofen PRN    Patient reports that since her last visit, she has had improvement in neck pain. The pain is still  located primarily in the traps without radiating symptoms, persistent in nature and described as stabbing . The patient denies any night pain, weakness, or bowel/bladder dysfunction. The patient has no other complaints at this time. She is right handed and works as a      She has not been able to start physical therapy yet.          Prior Treatments:  Active Physical Therapy: yes 5 years ago with improvement  Attempted Modalities:  none tried  Injections:  Trigger point injection  Surgeries:  no prior surgery to the affected area  Medications: tylenol  Prior Imaging:  has not had prior imaging of the area        Past Medical History:   Diagnosis Date    Adverse effect of anesthesia     itching around face and dry rash    Crohn disease ([facility] [address] 4/19/2010    Depression 4/19/2010    History of vascular access device 07/18/2017    [facility] VAT - 33 cm right brachial PICC for abx and limited access    Kidney stones     History of stent placed and removed    Lyme disease     Panic attack     Perforation bowel ([facility] [address] 7/4/2017    S/p palak Hobbs 7/2017    Peripheral neuropathy 2/2/2012    B12 deficiency MRI brain normal 1/2012 MRA head normal 1/2012 CTA neck normal 1/2012     Renal calculi 9/6/2020    Vertigo     Vitamin B12 deficiency 1/21/2012    164 on 1/2012 Needs 1000 mcg IM twice a week     Vitamin E deficiency      Family History   Problem Relation Age of Onset    Heart Disease Father     Cancer Mother      Current Outpatient Medications   Medication Sig Dispense Refill    predniSONE (DELTASONE) 20 mg tablet Take 3 tabs for 5 days then 2 tabs for 4 days and 1 tab for 3 days 26 Tablet 0    baclofen (LIORESAL) 10 mg tablet Take 1 Tablet by mouth three (3) times daily. 30 Tablet 0    fexofenadine (ALLEGRA) 180 mg tablet Take 1 Tablet by mouth daily as needed for Allergies. 90 Tablet 2    mometasone (NASONEX) 50 mcg/actuation nasal spray 2 Sprays by Both Nostrils route daily. 17 Each 3    Mucus Relief  mg ER tablet TAKE 1 TABLET BY MOUTH TWO (2) TIMES A DAY. INDICATIONS: COUGH AND CONGESTION 20 Tablet 0    cyanocobalamin, vitamin B-12, 1,000 mcg/mL kit 1 mL by Injection route every thirty (30) days. 1 Kit 3    ondansetron hcl (ZOFRAN) 4 mg tablet Take 1 Tablet by mouth every eight (8) hours as needed for Nausea or Vomiting. 30 Tablet 3    nystatin (MYCOSTATIN) 100,000 unit/mL suspension TAKE 5 ML BY MOUTH FOUR (4) TIMES DAILY. SWISH AND SPIT 480 mL 1    valACYclovir (VALTREX) 500 mg tablet TAKE 1 TABLET BY MOUTH TWICE A DAY 60 Tablet 2    mupirocin (BACTROBAN) 2 % ointment APPLY TO AFFECTED AREA DAILY. APPLY TO AREA 2-3 TIMES DAILY FOR 5 DAYS 22 g 0    cholecalciferol (VITAMIN D3) (2,000 UNITS /50 MCG) cap capsule Take 1 Capsule by mouth daily. 90 Capsule 3    melatonin 5 mg cap capsule Take 1 Capsule by mouth nightly. 90 Capsule 3    acetaminophen (TYLENOL) 500 mg tablet Take 1-2 Tablets by mouth every eight (8) hours as needed for Pain. 60 Tablet 0    escitalopram oxalate (LEXAPRO) 10 mg tablet Take 20 mg by mouth daily. LORazepam (ATIVAN) 1 mg tablet Take 1 mg by mouth two (2) times a day. Patient reports taking twice daily [id number]      pantoprazole (PROTONIX) 40 mg tablet Take 40 mg by mouth daily. dextroamphetamine-amphetamine (ADDERALL) 20 mg tablet Take 20 mg by mouth three (3) times daily. clonazePAM (KLONOPIN) 1 mg tablet Take 1 mg by mouth two (2) times a day. Patient reports taking twice daily in the AM and PM ([id number])       Allergies   Allergen Reactions    Doxycycline Swelling     Other reaction(s):  Other (See Comments), WHELPS, ITCHING, NAUSEA  blisters      Adhesive Rash    Cephalosporins Hives    Penicillins Hives    Sulfa (Sulfonamide Antibiotics) Unknown (comments)    Tetracyclines Nausea and Vomiting     Social History     Socioeconomic History    Marital status:      Spouse name: Not on file    Number of children: Not on file    Years of education: Not on file    Highest education level: Not on file   Occupational History    Not on file   Tobacco Use    Smoking status: Some Days     Packs/day: 0.50     Years: 8.00     Pack years: 4.00     Types: Cigarettes    Smokeless tobacco: Never   Vaping Use    Vaping Use: Never used   Substance and Sexual Activity    Alcohol use: No    Drug use: No     Comment: 1 pack a day    Sexual activity: Yes     Partners: Male     Birth control/protection: Pill   Other Topics Concern    Not on file   Social History Narrative    Not on file     Social Determinants of Health     Financial Resource Strain: Low Risk     Difficulty of Paying Living Expenses: Not very hard   Food Insecurity: No Food Insecurity    Worried About Running Out of Food in the Last Year: Never true    Ran Out of Food in the Last Year: Never true   Transportation Needs: Not on file   Physical Activity: Not on file   Stress: Not on file   Social Connections: Not on file   Intimate Partner Violence: Not on file   Housing Stability: Not on file       Objective:     Visit Vitals  /80 (BP 1 Location: Left arm, BP Patient Position: Sitting, BP Cuff Size: Adult)   Pulse (!) 125   Temp 98.1 °F (36.7 °C) (Oral)   Resp 18   Ht 5' 2\" (1.575 m)   Wt 123 lb (55.8 kg)   SpO2 98%   BMI 22.50 kg/m²     General: Alert and oriented and in no acute distress. Responds to all questions appropriately. SKIN: No rash. EYES: Sclera and conjunctiva clear. LUNGS: Clear to auscultation bilaterally, no wheeze, rales and rhonchi. CARDIOVASCULAR: Regular, rate, and rhythm without murmurs, gallops or rubs. ABDOMEN: Soft; nontender; nondistended; normoactive bowel sounds; no masses or organomegaly.      NEUROLOGIC: Speech intact; face symmetrical; moves all extremities equally. MSK:    Posture: Normal   Deformity: None    ROM - Cervical spine:     Flexion: Normal     Extension: Normal     Lateral bending: Normal    Upper Ext: FROM bilaterally       Strength (0-5/5):    :   Left: 5/5  Right: 5/5    Wrist Extension:  Left: 5/5  Right: 5/5    Wrist Flexion:  Left: 5/5  Right: 5/5    Elbow Flextion: Left: 5/5  Right: 5/5    Elbow Extension:  Left: 5/5  Right: 5/5    Shoulder Flexion:  Left: 5/5  Right: 5/5    Shoulder Abduction:  Left: 5/5  Right: 5/5    Shouder Ext Rotation: Left: 5/5  Right: 5/5    Shoulder Int Rotation: Left: 5/5  Right: 5/5       Sensation: Decreased C7 and C8 on right      Special test:    Spurlings: Left: Positive  Right: Positive      Inspection, cervical: normal, no listing of the head, no gross asymmetries. Palpation:  Tender at right Cervical paraspinals, trapezius, supraspinatus,, tenderness in bilateral lumbar paraspinal as well                            Reflexes: Upper limbs:                       RIGHT    LEFT              Biceps C5-6                             2+           2+  Brachioradialis            C5-6                2+             2+  Triceps            C(6)7-8(1)                   2+            2+    Positive Tinels: Median Nerve     Trigger Point Injections     Preparation - Cleaned and prepped with chlorhexidine swab x3. Anesthesia - Ethyl chloride spray used to anesthitise the skin prior to injection. Description of procedure - 6 trigger points were identified in the bilateral trap and each site was injected with 0.5 ml of 1% Lidocaine and 0.5ml of D5 as a  (50:50) mixture. Patient tolerated the procedure well and there were no complications. Patient reports relief following the injections.        [facility]  OFFICE PROCEDURE PROGRESS NOTE        Chart reviewed for the following:   Humberto Nunn MD, have reviewed the History, Physical and updated the Allergic reactions for @patient    TIME OUT performed immediately prior to start of procedure:   ICarlota MD, have performed the following reviews on [address] [place] [address]   prior to the start of the procedure:            * Patient was identified by name and date of birth   * Agreement on procedure being performed was verified  * Risks and Benefits explained to the patient  * Procedure site verified and marked as necessary  * Patient was positioned for comfort  * Consent was signed and verified     Time: 2:30 pm    Date of procedure: 8/9/2022    Procedure performed by:  Carlota Alvarez MD    Provider assisted by: Leticia Mcdonald LPN    Patient assisted by: self    How tolerated by patient: tolerated the procedure well with no complications    Post Procedural Pain Scale: 0 - No Hurt      Assessment:       ICD-10-CM ICD-9-CM    1. Carpal tunnel syndrome on right  G56.01 354.0 REFERRAL TO ORTHOPEDICS      2. Cervical radiculopathy at C7  M54.12 723.4 XR SPINE CERV 4 OR 5 V      predniSONE (DELTASONE) 20 mg tablet      REFERRAL TO PHYSICAL THERAPY      3. Neck pain  M54.2 723.1 XR SPINE CERV 4 OR 5 V      INJECT TRIGGER POINTS, > 3      REFERRAL TO PHYSICAL THERAPY      4. Myofascial muscle pain  M79.18 729.1 INJECT TRIGGER POINTS, > 3      REFERRAL TO PHYSICAL THERAPY      5. Partial thenar atrophy, right  G56.01 354.0 REFERRAL TO ORTHOPEDICS          Plan:   1. Improved with injection. Start PT. Concern C7 and C8 radiculopathy. Start steroids and follow up in 2 weels  2. Mild thenar atrophy on right, will refer for carpal tunnel surgical evaluation   3. Spent time reviewing diagnoses, course, possible treatments, reasons for referral and recommendations.      Patient encounter was at > 40 minutes of total time spend on the date of the encounter: preparing to see the patient(reviewing prior notes and tests), obtaining and/or reviewing separately obtained history, performing a medially appropriate examination and/or evaluation, counseling and educating the patient, ordering medications, referral or procedures, documenting clinical information in the electronic or other health record, independently interpreting results(not separately reported) and communicating results to the patient/family/caregiver and care coordination(not separately reported) for follow up appt.

## 2022-08-14 DIAGNOSIS — K13.79 MOUTH SORES: ICD-10-CM

## 2022-08-15 ENCOUNTER — CLINICAL SUPPORT (OUTPATIENT)
Dept: FAMILY MEDICINE CLINIC | Age: 52
End: 2022-08-15
Payer: MEDICAID

## 2022-08-15 VITALS
RESPIRATION RATE: 19 BRPM | OXYGEN SATURATION: 95 % | DIASTOLIC BLOOD PRESSURE: 81 MMHG | BODY MASS INDEX: 22.63 KG/M2 | HEART RATE: 100 BPM | WEIGHT: 123 LBS | SYSTOLIC BLOOD PRESSURE: 119 MMHG | HEIGHT: 62 IN | TEMPERATURE: 97.5 F

## 2022-08-15 DIAGNOSIS — D51.3 OTHER DIETARY VITAMIN B12 DEFICIENCY ANEMIA: Primary | ICD-10-CM

## 2022-08-15 PROCEDURE — 96372 THER/PROPH/DIAG INJ SC/IM: CPT | Performed by: FAMILY MEDICINE

## 2022-08-15 RX ORDER — VALACYCLOVIR HYDROCHLORIDE 500 MG/1
TABLET, FILM COATED ORAL
Qty: 60 TABLET | Refills: 2 | Status: SHIPPED | OUTPATIENT
Start: 2022-08-15

## 2022-08-15 RX ORDER — CYANOCOBALAMIN 1000 UG/ML
1000 INJECTION, SOLUTION INTRAMUSCULAR; SUBCUTANEOUS ONCE
Qty: 1 ML | Refills: 0
Start: 2022-08-15 | End: 2022-08-15

## 2022-08-15 NOTE — PROGRESS NOTES
Chief Complaint   Patient presents with    Injection B12     Visit Vitals  /81 (BP 1 Location: Right upper arm, BP Patient Position: Sitting, BP Cuff Size: Adult)   Pulse 100   Temp 97.5 °F (36.4 °C) (Temporal)   Resp 19   Ht 5' 2\" (1.575 m)   Wt 123 lb (55.8 kg)   SpO2 95%   BMI 22.50 kg/m²     1. Have you been to the ER, urgent care clinic since your last visit? Hospitalized since your last visit? No    2. Have you seen or consulted any other health care providers outside of the [address] [place] [address] since your last visit? Include any pap smears or colon screening. No    Patient received 1 ml of Vit B12 injection in right gluteus magen. No signs or symptoms of distress. Patient tolerated well.

## 2022-09-01 ENCOUNTER — OFFICE VISIT (OUTPATIENT)
Dept: FAMILY MEDICINE CLINIC | Age: 52
End: 2022-09-01
Payer: MEDICAID

## 2022-09-01 VITALS
HEIGHT: 62 IN | RESPIRATION RATE: 18 BRPM | SYSTOLIC BLOOD PRESSURE: 125 MMHG | DIASTOLIC BLOOD PRESSURE: 77 MMHG | BODY MASS INDEX: 22.63 KG/M2 | HEART RATE: 104 BPM | WEIGHT: 123 LBS | OXYGEN SATURATION: 96 %

## 2022-09-01 DIAGNOSIS — M62.838 MUSCLE SPASM: ICD-10-CM

## 2022-09-01 DIAGNOSIS — Z91.89 AT HIGH RISK FOR OSTEOPOROSIS: ICD-10-CM

## 2022-09-01 DIAGNOSIS — E53.8 VITAMIN B12 DEFICIENCY: ICD-10-CM

## 2022-09-01 DIAGNOSIS — E53.8 VITAMIN B12 DEFICIENCY: Primary | ICD-10-CM

## 2022-09-01 DIAGNOSIS — F51.04 PSYCHOPHYSIOLOGICAL INSOMNIA: ICD-10-CM

## 2022-09-01 DIAGNOSIS — Z12.31 ENCOUNTER FOR SCREENING MAMMOGRAM FOR MALIGNANT NEOPLASM OF BREAST: ICD-10-CM

## 2022-09-01 DIAGNOSIS — K50.10 CROHN'S DISEASE OF LARGE INTESTINE WITHOUT COMPLICATION (HCC): ICD-10-CM

## 2022-09-01 PROCEDURE — 99213 OFFICE O/P EST LOW 20 MIN: CPT | Performed by: FAMILY MEDICINE

## 2022-09-01 RX ORDER — CYANOCOBALAMIN 1000 UG/ML
1000 INJECTION, SOLUTION INTRAMUSCULAR; SUBCUTANEOUS ONCE
Qty: 1 ML | Refills: 0
Start: 2022-09-01 | End: 2022-09-01

## 2022-09-01 RX ORDER — MELATONIN 5 MG
5 CAPSULE ORAL
Qty: 90 CAPSULE | Refills: 3 | Status: SHIPPED | OUTPATIENT
Start: 2022-09-01

## 2022-09-01 RX ORDER — BACLOFEN 10 MG/1
10 TABLET ORAL 3 TIMES DAILY
Qty: 90 TABLET | Refills: 1 | Status: SHIPPED | OUTPATIENT
Start: 2022-09-01

## 2022-09-01 RX ORDER — ESCITALOPRAM OXALATE 20 MG/1
20 TABLET ORAL DAILY
COMMUNITY
Start: 2022-08-16

## 2022-09-01 RX ORDER — CYANOCOBALAMIN 1000 UG/ML
1000 INJECTION, SOLUTION INTRAMUSCULAR; SUBCUTANEOUS ONCE
Qty: 1 ML | Refills: 0 | Status: CANCELLED
Start: 2022-09-01 | End: 2022-09-01

## 2022-09-01 NOTE — PROGRESS NOTES
Akosua Castellanos is a 47 y.o. female    Chief Complaint   Patient presents with    Follow-up       1. Have you been to the ER, urgent care clinic since your last visit? Hospitalized since your last visit? No  2. Have you seen or consulted any other health care providers outside of the [address] [place] [address] since your last visit? Include any pap smears or colon screening.  No    Visit Vitals  /77 (BP 1 Location: Right arm, BP Patient Position: Sitting)   Pulse (!) 104   Resp 18   Ht 5' 2\" (1.575 m)   Wt 123 lb (55.8 kg)   SpO2 96%   BMI 22.50 kg/m²     3 most recent PHQ Screens 7/20/2022   Little interest or pleasure in doing things Not at all   Feeling down, depressed, irritable, or hopeless Not at all   Total Score PHQ 2 0   Trouble falling or staying asleep, or sleeping too much Nearly every day   Feeling tired or having little energy Nearly every day   Poor appetite, weight loss, or overeating Not at all   Feeling bad about yourself - or that you are a failure or have let yourself or your family down Nearly every day   Trouble concentrating on things such as school, work, reading, or watching TV Not at all   Moving or speaking so slowly that other people could have noticed; or the opposite being so fidgety that others notice More than half the days   Thoughts of being better off dead, or hurting yourself in some way Not at all   PHQ 9 Score 11   How difficult have these problems made it for you to do your work, take care of your home and get along with others Very difficult     Health Maintenance Due   Topic Date Due    Breast Cancer Screen Mammogram  04/09/2021    COVID-19 Vaccine (4 - Booster for Moderna series) 01/14/2022    Flu Vaccine (1) 09/01/2022

## 2022-09-01 NOTE — PROGRESS NOTES
History of Present Illness:     Chief Complaint   Patient presents with    Follow-up       Chuy Balbuena is a 47 y.o. female     Needs med refills  Melatonin for sleep    Needs Baclofen for muscle spasm  Taking for back pain, neck pain, muscle spasm    Saw Dr. Adelaida Eisenberg recently for carpal tunnel syndrome  Planning to get a nerve test     Disclosed concerns with her son  She is concerned about her safety at times  Son is currently in a mental health facility  Working with [facility] program to help as well    PMH (REVIEWED):  Past Medical History:   Diagnosis Date    Adverse effect of anesthesia     itching around face and dry rash    Crohn disease ([facility] [address] 4/19/2010    Depression 4/19/2010    History of vascular access device 07/18/2017    [facility] VAT - 33 cm right brachial PICC for abx and limited access    Kidney stones     History of stent placed and removed    Lyme disease     Panic attack     Perforation bowel ([facility] [address] 7/4/2017    S/p palak Gallegos 7/2017    Peripheral neuropathy 2/2/2012    B12 deficiency MRI brain normal 1/2012 MRA head normal 1/2012 CTA neck normal 1/2012     Renal calculi 9/6/2020    Vertigo     Vitamin B12 deficiency 1/21/2012    164 on 1/2012 Needs 1000 mcg IM twice a week     Vitamin E deficiency        Current Medications/Allergies (REVIEWED):     Current Outpatient Medications on File Prior to Visit   Medication Sig Dispense Refill    valACYclovir (VALTREX) 500 mg tablet TAKE 1 TABLET BY MOUTH TWICE A DAY 60 Tablet 2    fexofenadine (ALLEGRA) 180 mg tablet Take 1 Tablet by mouth daily as needed for Allergies. 90 Tablet 2    Mucus Relief  mg ER tablet TAKE 1 TABLET BY MOUTH TWO (2) TIMES A DAY. INDICATIONS: COUGH AND CONGESTION 20 Tablet 0    cyanocobalamin, vitamin B-12, 1,000 mcg/mL kit 1 mL by Injection route every thirty (30) days. 1 Kit 3    ondansetron hcl (ZOFRAN) 4 mg tablet Take 1 Tablet by mouth every eight (8) hours as needed for Nausea or Vomiting.  30 Tablet 3 dextroamphetamine-amphetamine (ADDERALL) 20 mg tablet Take 20 mg by mouth three (3) times daily. escitalopram oxalate (LEXAPRO) 20 mg tablet Take 20 mg by mouth daily. predniSONE (DELTASONE) 20 mg tablet Take 3 tabs for 5 days then 2 tabs for 4 days and 1 tab for 3 days 26 Tablet 0    mometasone (NASONEX) 50 mcg/actuation nasal spray 2 Sprays by Both Nostrils route daily. 17 Each 3    nystatin (MYCOSTATIN) 100,000 unit/mL suspension TAKE 5 ML BY MOUTH FOUR (4) TIMES DAILY. SWISH AND SPIT 480 mL 1    mupirocin (BACTROBAN) 2 % ointment APPLY TO AFFECTED AREA DAILY. APPLY TO AREA 2-3 TIMES DAILY FOR 5 DAYS 22 g 0    cholecalciferol (VITAMIN D3) (2,000 UNITS /50 MCG) cap capsule Take 1 Capsule by mouth daily. 90 Capsule 3    acetaminophen (TYLENOL) 500 mg tablet Take 1-2 Tablets by mouth every eight (8) hours as needed for Pain. 60 Tablet 0    LORazepam (ATIVAN) 1 mg tablet Take 1 mg by mouth two (2) times a day. Patient reports taking twice daily [id number]      pantoprazole (PROTONIX) 40 mg tablet Take 40 mg by mouth daily. clonazePAM (KLONOPIN) 1 mg tablet Take 1 mg by mouth two (2) times a day. Patient reports taking twice daily in the AM and PM ([id number])       No current facility-administered medications on file prior to visit. Allergies   Allergen Reactions    Doxycycline Swelling     Other reaction(s): Other (See Comments), WHELPS, ITCHING, NAUSEA  blisters      Cephalosporins Hives    Sulfa (Sulfonamide Antibiotics) Unknown (comments)     Other reaction(s):  Other (see comments)    Tetracyclines Nausea and Vomiting    Adhesive Rash    Penicillins Hives and Rash         Review of Systems:     Denies numbness/ tingling/ weakness in extremities      Objective:     Vitals:    09/01/22 1508   BP: 125/77   Pulse: (!) 104   Resp: 18   SpO2: 96%   Weight: 123 lb (55.8 kg)   Height: 5' 2\" (1.575 m)       Physical Exam:  General appearance - alert, well appearing, and in no distress  Mental status - alert, oriented to person, place, and time, anxious, agitated  Neurological - alert, oriented, normal speech, no focal findings or movement disorder noted    Recent Labs:  No results found for this or any previous visit (from the past 12 hour(s)). Assessment and Plan:       ICD-10-CM ICD-9-CM    1. Vitamin B12 deficiency  E53.8 266.2 VITAMIN B12 INJECTION      THER/PROPH/DIAG INJECTION, SUBCUT/IM      cyanocobalamin (Vitamin B-12) 1,000 mcg/mL injection      2. Muscle spasm  M62.838 728.85 baclofen (LIORESAL) 10 mg tablet      3. Psychophysiological insomnia  F51.04 307.42 melatonin 5 mg cap capsule      4. Encounter for screening mammogram for malignant neoplasm of breast  Z12.31 V76.12 [facility] MAMMO BI SCREENING INCL CAD      5. At high risk for osteoporosis  Z91.89 V49.89 DEXA BONE DENSITY STUDY AXIAL      6. Crohn's disease of large intestine without complication (HCC)  L60.82 555.1 DEXA BONE DENSITY STUDY AXIAL          Refilled medications    B12 injection given today    Re-Ordered DEXA and mammogram    Discussed what is going on at home with her son. Currently, the patient says she is safe at home. Working with  and community resources for her son. We discussed a safety plan. She will call 911 if she ever feels unsafe. She will not be alone with her son in the car or home. She denies any physical trauma currently. Denies SI or HI. Denies feeling unsafe or endangered. Follow up: 1 mo for follow up    Santy Preston MD    We discussed the expected course, resolution and complications of the diagnosis(es) in detail. Medication risks, benefits, costs, interactions, and alternatives were discussed as indicated. I advised her to contact the office if her condition worsens, changes or fails to improve as anticipated. She expressed understanding with the diagnosis(es) and plan.

## 2022-09-02 RX ORDER — CYANOCOBALAMIN 1000 UG/ML
INJECTION, SOLUTION INTRAMUSCULAR; SUBCUTANEOUS
Qty: 1 ML | Refills: 3 | Status: SHIPPED | OUTPATIENT
Start: 2022-09-02

## 2022-10-06 ENCOUNTER — OFFICE VISIT (OUTPATIENT)
Dept: FAMILY MEDICINE CLINIC | Age: 52
End: 2022-10-06
Payer: MEDICAID

## 2022-10-06 VITALS
TEMPERATURE: 98.3 F | RESPIRATION RATE: 18 BRPM | OXYGEN SATURATION: 98 % | WEIGHT: 125.6 LBS | SYSTOLIC BLOOD PRESSURE: 136 MMHG | HEART RATE: 110 BPM | DIASTOLIC BLOOD PRESSURE: 84 MMHG | BODY MASS INDEX: 22.97 KG/M2

## 2022-10-06 DIAGNOSIS — Z23 ENCOUNTER FOR IMMUNIZATION: ICD-10-CM

## 2022-10-06 DIAGNOSIS — E53.8 VITAMIN B12 DEFICIENCY: Primary | ICD-10-CM

## 2022-10-06 PROCEDURE — 90686 IIV4 VACC NO PRSV 0.5 ML IM: CPT | Performed by: FAMILY MEDICINE

## 2022-10-06 PROCEDURE — 96372 THER/PROPH/DIAG INJ SC/IM: CPT | Performed by: FAMILY MEDICINE

## 2022-10-06 PROCEDURE — 99213 OFFICE O/P EST LOW 20 MIN: CPT | Performed by: FAMILY MEDICINE

## 2022-10-06 NOTE — PROGRESS NOTES
Visit for B12 injection  Reviewed last B12 levels from 3/2022, WNL  Received B12 injection prior to leavinv    Discussed vaccines in detail  Questions about high dose flu, pneumonia vaccine and which COVID booster  Recommended flu, COVID booster and PCV 20    Stressors are better this visit  Her son has a new   His mood has been much better    Reviewed VS  Notable for elevated HR, but she was rushing for appt    Total length of chart review, preparation and time spent with patient was 25 min.      Suzanna Mitchell MD   10/6/2022

## 2022-10-19 ENCOUNTER — TELEPHONE (OUTPATIENT)
Dept: FAMILY MEDICINE CLINIC | Age: 52
End: 2022-10-19

## 2022-10-19 NOTE — TELEPHONE ENCOUNTER
----- Message from Laymon Cockayne sent at 10/13/2022  2:17 PM EDT -----  Subject: Message to Provider    QUESTIONS  Information for Provider? Pt called to reschedule injection. Pt stated she   is very sore and in pain and worried the injection could make it worse. Pt   stated she had other injections and procedure in the same day which made   her bruise and experience a lot of pain. Pt would like to reschedule this   for the week of 10/17 and does need an afternoon appt. please advise.   ---------------------------------------------------------------------------  --------------  Saira MORALES  [phone]; OK to leave message on voicemail  ---------------------------------------------------------------------------  --------------  SCRIPT ANSWERS  Relationship to Patient?  Self

## 2022-11-05 DIAGNOSIS — M62.838 MUSCLE SPASM: ICD-10-CM

## 2022-11-05 DIAGNOSIS — E55.9 VITAMIN D DEFICIENCY: ICD-10-CM

## 2022-11-07 RX ORDER — ACETAMINOPHEN 500 MG
TABLET ORAL
Qty: 30 CAPSULE | Refills: 11 | Status: SHIPPED | OUTPATIENT
Start: 2022-11-07

## 2022-11-07 RX ORDER — BACLOFEN 10 MG/1
TABLET ORAL
Qty: 90 TABLET | Refills: 1 | Status: SHIPPED | OUTPATIENT
Start: 2022-11-07

## 2022-11-10 ENCOUNTER — OFFICE VISIT (OUTPATIENT)
Dept: FAMILY MEDICINE CLINIC | Age: 52
End: 2022-11-10
Payer: MEDICAID

## 2022-11-10 VITALS
RESPIRATION RATE: 20 BRPM | BODY MASS INDEX: 23.74 KG/M2 | TEMPERATURE: 98.6 F | HEIGHT: 62 IN | HEART RATE: 99 BPM | SYSTOLIC BLOOD PRESSURE: 125 MMHG | DIASTOLIC BLOOD PRESSURE: 77 MMHG | WEIGHT: 129 LBS | OXYGEN SATURATION: 98 %

## 2022-11-10 DIAGNOSIS — M54.12 CERVICAL RADICULOPATHY AT C7: Primary | ICD-10-CM

## 2022-11-10 DIAGNOSIS — M54.12 CERVICAL RADICULOPATHY AT C6: ICD-10-CM

## 2022-11-10 DIAGNOSIS — M54.2 NECK PAIN: ICD-10-CM

## 2022-11-10 DIAGNOSIS — M79.18 MYOFASCIAL MUSCLE PAIN: ICD-10-CM

## 2022-11-10 DIAGNOSIS — G56.02 LEFT CARPAL TUNNEL SYNDROME: ICD-10-CM

## 2022-11-10 PROCEDURE — 20553 NJX 1/MLT TRIGGER POINTS 3/>: CPT | Performed by: FAMILY MEDICINE

## 2022-11-10 PROCEDURE — 99214 OFFICE O/P EST MOD 30 MIN: CPT | Performed by: FAMILY MEDICINE

## 2022-11-10 NOTE — PROGRESS NOTES
Identified Patient with two Patient identifiers (Name and ). Two Patient Identifiers confirmed. Reviewed record in preparation for visit and have obtained necessary documentation. Chief Complaint   Patient presents with    Back Pain     And neck pain - patient here today to get trigger points and discuss recent EMG       Visit Vitals  /77 (BP 1 Location: Right arm, BP Patient Position: Sitting, BP Cuff Size: Adult)   Pulse 99   Temp 98.6 °F (37 °C) (Oral)   Resp 20   Ht 5' 2\" (1.575 m)   Wt 129 lb (58.5 kg)   SpO2 98%   BMI 23.59 kg/m²       1. Have you been to the ER, urgent care clinic since your last visit? Hospitalized since your last visit? No    2. Have you seen or consulted any other health care providers outside of the [address] [place] [address] since your last visit? Include any pap smears or colon screening.  No

## 2022-11-10 NOTE — PROGRESS NOTES
History of Present Illness     Chief Complaint   Patient presents with    Back Pain     And neck pain - patient here today to get trigger points and discuss recent EMG     Patient Identification  Kimo Cureil is a 47 y.o. female complains of continued upper back and neck pain and spasms and some paresthesias into right arm. Difficult to sit, prefers standing for discussion. Had an EMG showing mild sensory mononeuropathy of the left median nerve though most of pain is in the right side. There were no findings on the right side. NCS/EMG was painful. We discussed the EMG findings as did Dr. Jeffry Munoz at her visit in October and discussed that parasthesias are possibly coming from neck. Would like to do TPI to right upper back today. Last TPI (8/9/22) helped for about 3 weeks and the relief is good. PT was ordered last visit but was unable to go but would like to pursue this again, though closer to clinic. Discussed also possibly related to low B12, will continue repletion and get a follow-up lab in near future. Imaging:   C-spine XR 8/9/22 Independently reviewed and interpreted. No acute bony abnormalities. Report: There is no acute fracture or subluxation. Prevertebral soft tissues are within normal limits. Bones are well-mineralized.     Past Medical History:   Diagnosis Date    Adverse effect of anesthesia     itching around face and dry rash    Crohn disease ([address] 4/19/2010    Depression 4/19/2010    History of vascular access device 07/18/2017    [facility] VAT - 33 cm right brachial PICC for abx and limited access    Kidney stones     History of stent placed and removed    Lyme disease     Panic attack     Perforation bowel ([address] 7/4/2017    S/p palak Avelar 7/2017    Peripheral neuropathy 2/2/2012    B12 deficiency MRI brain normal 1/2012 MRA head normal 1/2012 CTA neck normal 1/2012     Renal calculi 9/6/2020    Vertigo     Vitamin B12 deficiency 1/21/2012    164 on 1/2012 Needs 1000 mcg IM twice a week Vitamin E deficiency      Family History   Problem Relation Age of Onset    Heart Disease Father     Cancer Mother      Current Outpatient Medications   Medication Sig Dispense Refill    cholecalciferol (VITAMIN D3) (2,000 UNITS /50 MCG) cap capsule TAKE 1 CAPSULE BY MOUTH EVERY DAY 30 Capsule 11    baclofen (LIORESAL) 10 mg tablet TAKE 1 TABLET BY MOUTH THREE TIMES A DAY 90 Tablet 1    cyanocobalamin (VITAMIN B12) 1,000 mcg/mL injection INJECT 1 ML BY SUBCUTANEOUSLY EVERY THIRTY (30) DAYS. 1 mL 3    escitalopram oxalate (LEXAPRO) 20 mg tablet Take 20 mg by mouth daily. melatonin 5 mg cap capsule Take 1 Capsule by mouth nightly. 90 Capsule 3    valACYclovir (VALTREX) 500 mg tablet TAKE 1 TABLET BY MOUTH TWICE A DAY 60 Tablet 2    predniSONE (DELTASONE) 20 mg tablet Take 3 tabs for 5 days then 2 tabs for 4 days and 1 tab for 3 days 26 Tablet 0    fexofenadine (ALLEGRA) 180 mg tablet Take 1 Tablet by mouth daily as needed for Allergies. 90 Tablet 2    Mucus Relief  mg ER tablet TAKE 1 TABLET BY MOUTH TWO (2) TIMES A DAY. INDICATIONS: COUGH AND CONGESTION 20 Tablet 0    ondansetron hcl (ZOFRAN) 4 mg tablet Take 1 Tablet by mouth every eight (8) hours as needed for Nausea or Vomiting. 30 Tablet 3    nystatin (MYCOSTATIN) 100,000 unit/mL suspension TAKE 5 ML BY MOUTH FOUR (4) TIMES DAILY. SWISH AND SPIT 480 mL 1    mupirocin (BACTROBAN) 2 % ointment APPLY TO AFFECTED AREA DAILY. APPLY TO AREA 2-3 TIMES DAILY FOR 5 DAYS 22 g 0    acetaminophen (TYLENOL) 500 mg tablet Take 1-2 Tablets by mouth every eight (8) hours as needed for Pain. 60 Tablet 0    LORazepam (ATIVAN) 1 mg tablet Take 1 mg by mouth two (2) times a day. Patient reports taking twice daily [id number]      pantoprazole (PROTONIX) 40 mg tablet Take 40 mg by mouth daily. dextroamphetamine-amphetamine (ADDERALL) 20 mg tablet Take 20 mg by mouth three (3) times daily. clonazePAM (KLONOPIN) 1 mg tablet Take 1 mg by mouth two (2) times a day. Patient reports taking twice daily in the AM and PM ([id number])       Allergies   Allergen Reactions    Doxycycline Swelling     Other reaction(s): Other (See Comments), WHELPS, ITCHING, NAUSEA  blisters      Cephalosporins Hives    Sulfa (Sulfonamide Antibiotics) Unknown (comments)     Other reaction(s): Other (see comments)    Tetracyclines Nausea and Vomiting    Adhesive Rash    Penicillins Hives and Rash         Physical Exam     Visit Vitals  /77 (BP 1 Location: Right arm, BP Patient Position: Sitting, BP Cuff Size: Adult)   Pulse 99   Temp 98.6 °F (37 °C) (Oral)   Resp 20   Ht 5' 2\" (1.575 m)   Wt 129 lb (58.5 kg)   SpO2 98%   BMI 23.59 kg/m²     General: Standing in room, a little anxious. Skin: Has some mottling in low back, reports from using heating pads too long. No skin changes apparent to the areas of injection. No notable UE edema    MSK Neck and BUE:   Inspection: She prefers her head side-bent to the right, and seems to twitch   Palpation: She is diffusely tender to neck and upper and middle back, worse on the right   Neck ROM: She does elicit poor ROM of her neck, lacks full ROM   Sensation: Sensation to R C6 and C7 dermatomes diminished compared to the left   Strength:  Full in BUE  Reflexes 2+ BUE   Gait: Antalgic, uses cane in right hand   [facility]  OFFICE PROCEDURE PROGRESS NOTE        Chart reviewed for the following:   Cassie Angela MD, have reviewed the History, Physical and updated the Allergic reactions for @patient    TIME OUT performed immediately prior to start of procedure:   I, Julia Lacey MD, have performed the following reviews on [address] [place] [address]   prior to the start of the procedure:            * Patient was identified by name and date of birth   * Agreement on procedure being performed was verified  * Risks and Benefits explained to the patient  * Procedure site verified and marked as necessary  * Patient was positioned for comfort  * Consent was signed and verified     Time: 5 pm    Date of procedure: 11/10/2022    Procedure performed by:  Joce Bush MD    Provider assisted by: Fonda Gosselin, LPN    Patient assisted by: self    How tolerated by patient: tolerated the procedure well with no complications    Post Procedural Pain Scale: 0 - No Hurt      Trigger Point Injections     Preparation - Cleaned and prepped with chlorhexidine swab x3. Anesthesia - Ethyl chloride spray used to anesthitise the skin prior to injection. Description of procedure - 6 trigger points were identified in the right trap and each site was injected with 0.5 ml of 1% Lidocaine and 0.5ml of D5 as a  (50:50) mixture. Patient tolerated the procedure well and there were no complications. Patient reports 80% pain relief following the injections. No results found for any visits on 11/10/22. Assessment:    ICD-10-CM ICD-9-CM    1. Cervical radiculopathy at C7  M54.12 723.4 MRI CERV SPINE WO CONT      REFERRAL TO PHYSICAL THERAPY      2. Cervical radiculopathy at C6  M54.12 723.4 MRI CERV SPINE WO CONT      REFERRAL TO PHYSICAL THERAPY      3. Neck pain  M54.2 723.1 MRI CERV SPINE WO CONT      REFERRAL TO PHYSICAL THERAPY      INJECT TRIGGER POINTS, > 3      4. Myofascial muscle pain  M79.18 729.1 INJECT TRIGGER POINTS, > 3      5. Left carpal tunnel syndrome  G56.02 354.0         Plan:  -TPI to 6 locations on the left middle and upper traps   -MRI spine without contrast ordered given continued paresthesias in the RUE. Reviewed notes from orthopedic and symptoms thought to be cervical in origin. Recommended further assessment.  -New Referral to PT at [facility] for neck and upper extremity  -Follow-up for repeat TPI as needed    After Care Instructions: The patient is asked to continue to rest the joint for a few more days before resuming regular activities. It may be more painful for the first 1-2 days.   Watch for fever, or increased swelling or persistent pain in the joint. Call or return to clinic prn if such symptoms occur or there is failure to improve as anticipated. Follow-up and Dispositions    Return in about 2 weeks (around 11/24/2022) for After MRI complete. Patient encounter was >30 minutes was spent of total time spent on the date of the encounter: preparing to see the patient(reviewing prior notes and tests), obtaining and/or reviewing separately obtained history, performing a medially appropriate examination and/or evaluation, counseling and educating the patient, ordering referrals and tests, documenting clinical information in the electronic or other health record.

## 2022-11-18 ENCOUNTER — OFFICE VISIT (OUTPATIENT)
Dept: FAMILY MEDICINE CLINIC | Age: 52
End: 2022-11-18
Payer: MEDICAID

## 2022-11-18 VITALS
WEIGHT: 128 LBS | OXYGEN SATURATION: 98 % | HEIGHT: 62 IN | HEART RATE: 83 BPM | DIASTOLIC BLOOD PRESSURE: 81 MMHG | RESPIRATION RATE: 16 BRPM | BODY MASS INDEX: 23.55 KG/M2 | SYSTOLIC BLOOD PRESSURE: 125 MMHG

## 2022-11-18 DIAGNOSIS — E53.8 VITAMIN B12 DEFICIENCY: Primary | ICD-10-CM

## 2022-11-18 DIAGNOSIS — F43.0 STRESS REACTION: ICD-10-CM

## 2022-11-18 PROCEDURE — 99213 OFFICE O/P EST LOW 20 MIN: CPT | Performed by: FAMILY MEDICINE

## 2022-11-18 NOTE — PROGRESS NOTES
Patient in distress as her son was TDO'd to [facility] earlier today. We spent visit discussing a TDO and the likely course of treatment for her son. The patient is safe and feels safe at home. She was reassured after our discussion. She is calm, appropriate thought process, no SI or HI.     B12 injection given today.      Total amount of time spent for encounter: 20 minutes    Berny Katz MD  11/18/2022

## 2022-11-18 NOTE — PROGRESS NOTES
Kellen Kim is a 47 y.o. female    Chief Complaint   Patient presents with    Follow-up         1. Have you been to the ER, urgent care clinic since your last visit? Hospitalized since your last visit? No  2. Have you seen or consulted any other health care providers outside of the [address] [place] [address] since your last visit? Include any pap smears or colon screening.  No    Visit Vitals  /81 (BP 1 Location: Left upper arm, BP Patient Position: Sitting)   Pulse 83   Resp 16   Ht 5' 2\" (1.575 m)   Wt 128 lb (58.1 kg)   SpO2 98%   BMI 23.41 kg/m²     3 most recent PHQ Screens 7/20/2022   Little interest or pleasure in doing things Not at all   Feeling down, depressed, irritable, or hopeless Not at all   Total Score PHQ 2 0   Trouble falling or staying asleep, or sleeping too much Nearly every day   Feeling tired or having little energy Nearly every day   Poor appetite, weight loss, or overeating Not at all   Feeling bad about yourself - or that you are a failure or have let yourself or your family down Nearly every day   Trouble concentrating on things such as school, work, reading, or watching TV Not at all   Moving or speaking so slowly that other people could have noticed; or the opposite being so fidgety that others notice More than half the days   Thoughts of being better off dead, or hurting yourself in some way Not at all   PHQ 9 Score 11   How difficult have these problems made it for you to do your work, take care of your home and get along with others Very difficult     Health Maintenance Due   Topic Date Due    Breast Cancer Screen Mammogram  04/09/2021    COVID-19 Vaccine (4 - Booster for [facility] series) 12/17/2021

## 2023-01-09 ENCOUNTER — TELEPHONE (OUTPATIENT)
Dept: FAMILY MEDICINE CLINIC | Age: 53
End: 2023-01-09

## 2023-01-09 DIAGNOSIS — L01.00 IMPETIGO: ICD-10-CM

## 2023-01-09 DIAGNOSIS — M62.838 MUSCLE SPASM: ICD-10-CM

## 2023-01-09 RX ORDER — MUPIROCIN 20 MG/G
OINTMENT TOPICAL DAILY
Qty: 22 G | Refills: 0 | Status: SHIPPED | OUTPATIENT
Start: 2023-01-09

## 2023-01-09 RX ORDER — BACLOFEN 10 MG/1
10 TABLET ORAL 3 TIMES DAILY
Qty: 90 TABLET | Refills: 1 | Status: SHIPPED | OUTPATIENT
Start: 2023-01-09

## 2023-01-09 NOTE — TELEPHONE ENCOUNTER
Called patient to notify her of the power outage in clinic, and to attempt to reschedule. Call went to voicemail. Left patient a message. Will send a [employer] message as well. Clinic will re-open in the morning.

## 2023-01-10 DIAGNOSIS — M62.838 MUSCLE SPASM: ICD-10-CM

## 2023-01-10 RX ORDER — BACLOFEN 10 MG/1
10 TABLET ORAL 3 TIMES DAILY
Qty: 90 TABLET | Refills: 1 | OUTPATIENT
Start: 2023-01-10

## 2023-01-27 ENCOUNTER — OFFICE VISIT (OUTPATIENT)
Dept: FAMILY MEDICINE CLINIC | Age: 53
End: 2023-01-27
Payer: MEDICAID

## 2023-01-27 VITALS
HEIGHT: 62 IN | OXYGEN SATURATION: 98 % | WEIGHT: 124 LBS | RESPIRATION RATE: 20 BRPM | SYSTOLIC BLOOD PRESSURE: 128 MMHG | BODY MASS INDEX: 22.82 KG/M2 | HEART RATE: 94 BPM | TEMPERATURE: 97.9 F | DIASTOLIC BLOOD PRESSURE: 87 MMHG

## 2023-01-27 DIAGNOSIS — T36.95XA ANTIBIOTIC-INDUCED YEAST INFECTION: ICD-10-CM

## 2023-01-27 DIAGNOSIS — B37.9 ANTIBIOTIC-INDUCED YEAST INFECTION: ICD-10-CM

## 2023-01-27 DIAGNOSIS — L03.116 CELLULITIS OF LEFT LOWER EXTREMITY: ICD-10-CM

## 2023-01-27 DIAGNOSIS — E53.8 VITAMIN B12 DEFICIENCY: Primary | ICD-10-CM

## 2023-01-27 DIAGNOSIS — J01.00 SUBACUTE MAXILLARY SINUSITIS: ICD-10-CM

## 2023-01-27 RX ORDER — CYANOCOBALAMIN 1000 UG/ML
1000 INJECTION, SOLUTION INTRAMUSCULAR; SUBCUTANEOUS ONCE
Status: COMPLETED | OUTPATIENT
Start: 2023-01-27 | End: 2023-01-27

## 2023-01-27 RX ORDER — LEVOFLOXACIN 500 MG/1
500 TABLET, FILM COATED ORAL DAILY
Qty: 5 TABLET | Refills: 0 | Status: SHIPPED | OUTPATIENT
Start: 2023-01-27 | End: 2023-02-01

## 2023-01-27 RX ADMIN — CYANOCOBALAMIN 1000 MCG: 1000 INJECTION, SOLUTION INTRAMUSCULAR; SUBCUTANEOUS at 16:08

## 2023-01-27 NOTE — PROGRESS NOTES
Identified Patient with two Patient identifiers (Name and ). Two Patient Identifiers confirmed. Reviewed record in preparation for visit and have obtained necessary documentation. Chief Complaint   Patient presents with    Skin Problem     Patient has a blister on left leg that is not healing, patient states it has been there for 2 weeks. Visit Vitals  /87 (BP 1 Location: Right arm, BP Patient Position: Sitting, BP Cuff Size: Adult)   Pulse 94   Temp 97.9 °F (36.6 °C) (Oral)   Resp 20   Ht 5' 2\" (1.575 m)   Wt 124 lb (56.2 kg)   SpO2 98%   BMI 22.68 kg/m²       1. Have you been to the ER, urgent care clinic since your last visit? Hospitalized since your last visit? No    2. Have you seen or consulted any other health care providers outside of the [address] [place] [address] since your last visit? Include any pap smears or colon screening.  No

## 2023-01-27 NOTE — PROGRESS NOTES
History of Present Illness:     Chief Complaint   Patient presents with    Skin Problem     Patient has a blister on left leg that is not healing, patient states it has been there for 2 weeks. Bernard Friday is a 47 y.o. female     Skin blister on leg  Blister on her leg has not healed in 2 weeks  Started using Mupirocin ointment  Area started out as a bump, progressed to blister  When it ruptured, there was green puss and bad smell  Now wound is open, surrounding area tender  No fevers, chills, sweats, n/v      PMH (REVIEWED):  Past Medical History:   Diagnosis Date    Adverse effect of anesthesia     itching around face and dry rash    Cro[address] disease ([facility] [address] 4/19/2010    Depression 4/19/2010    History of vascular access device 07/18/2017    [facility] VAT - 33 cm right brachial PICC for abx and limited access    Kidney stones     History of stent placed and removed    Lyme disease     Panic attack     Perforation bowel ([facility] [address] 7/4/2017    S/p palak Simental 7/2017    Peripheral neuropathy 2/2/2012    B12 deficiency MRI brain normal 1/2012 MRA head normal 1/2012 CTA neck normal 1/2012     Renal calculi 9/6/2020    Vertigo     Vitamin B12 deficiency 1/21/2012    164 on 1/2012 Needs 1000 mcg IM twice a week     Vitamin E deficiency        Current Medications/Allergies (REVIEWED):     Current Outpatient Medications on File Prior to Visit   Medication Sig Dispense Refill    mupirocin (BACTROBAN) 2 % ointment Apply  to affected area daily. Apply to area 2-3 times daily for 5 days 22 g 0    baclofen (LIORESAL) 10 mg tablet Take 1 Tablet by mouth three (3) times daily. 90 Tablet 1    cholecalciferol (VITAMIN D3) (2,000 UNITS /50 MCG) cap capsule TAKE 1 CAPSULE BY MOUTH EVERY DAY 30 Capsule 11    cyanocobalamin (VITAMIN B12) 1,000 mcg/mL injection INJECT 1 ML BY SUBCUTANEOUSLY EVERY THIRTY (30) DAYS. 1 mL 3    escitalopram oxalate (LEXAPRO) 20 mg tablet Take 20 mg by mouth daily.       melatonin 5 mg cap capsule Take 1 Capsule by mouth nightly. 90 Capsule 3    valACYclovir (VALTREX) 500 mg tablet TAKE 1 TABLET BY MOUTH TWICE A DAY 60 Tablet 2    fexofenadine (ALLEGRA) 180 mg tablet Take 1 Tablet by mouth daily as needed for Allergies. 90 Tablet 2    Mucus Relief  mg ER tablet TAKE 1 TABLET BY MOUTH TWO (2) TIMES A DAY. INDICATIONS: COUGH AND CONGESTION 20 Tablet 0    ondansetron hcl (ZOFRAN) 4 mg tablet Take 1 Tablet by mouth every eight (8) hours as needed for Nausea or Vomiting. 30 Tablet 3    nystatin (MYCOSTATIN) 100,000 unit/mL suspension TAKE 5 ML BY MOUTH FOUR (4) TIMES DAILY. SWISH AND SPIT 480 mL 1    acetaminophen (TYLENOL) 500 mg tablet Take 1-2 Tablets by mouth every eight (8) hours as needed for Pain. 60 Tablet 0    LORazepam (ATIVAN) 1 mg tablet Take 1 mg by mouth two (2) times a day. Patient reports taking twice daily [id number]      pantoprazole (PROTONIX) 40 mg tablet Take 40 mg by mouth daily. dextroamphetamine-amphetamine (ADDERALL) 20 mg tablet Take 20 mg by mouth three (3) times daily. clonazePAM (KLONOPIN) 1 mg tablet Take 1 mg by mouth two (2) times a day. Patient reports taking twice daily in the AM and PM (8326-3512)       No current facility-administered medications on file prior to visit. Allergies   Allergen Reactions    Doxycycline Swelling     Other reaction(s): Other (See Comments), WHELPS, ITCHING, NAUSEA  blisters      Cephalosporins Hives    Sulfa (Sulfonamide Antibiotics) Unknown (comments)     Other reaction(s):  Other (see comments)    Tetracyclines Nausea and Vomiting    Adhesive Rash    Penicillins Hives and Rash         Objective:     Vitals:    01/27/23 1522   BP: 128/87   Pulse: 94   Resp: 20   Temp: 97.9 °F (36.6 °C)   TempSrc: Oral   SpO2: 98%   Weight: 124 lb (56.2 kg)   Height: 5' 2\" (1.575 m)       Physical Exam:  General appearance - alert, well appearing, and in no distress  Skin -       Recent Labs:  No results found for this or any previous visit (from the past 12 hour(s)). Assessment and Plan:       ICD-10-CM ICD-9-CM    1. Vitamin B12 deficiency  E53.8 266.2 cyanocobalamin (VITAMIN B12) injection 1,000 mcg      THER/PROPH/DIAG INJECTION, SUBCUT/IM      2. Cellulitis of left lower extremity  L03.116 682.6 levoFLOXacin (LEVAQUIN) 500 mg tablet      3. Subacute maxillary sinusitis  J01.00 461.0 levoFLOXacin (LEVAQUIN) 500 mg tablet      4. Antibiotic-induced yeast infection  B37.9 112.9     T36.95XA E930.9           B12 def  Given IM injection today    Leg wound with concern for developing cellulitis given tenderness and surrounding swelling. Very limited abx options based on her allergies. Will treat with Levaquin (treating for sinusitis today) and have her continue Mupirocin. Counseled on cleaning the area, keeping dry. Sinusitis. C/o maxillary pain and green drainage at end of visit. Treat with Levaquin x 5 days (not ideal but numerous abx allergies limit our options). Recommended OTC Monostat if needed for yeast infection as Diflucan will interact with abx. Follow up: 4 weeks. Will biopsy lesion if still present. Terrence Mejia MD    We discussed the expected course, resolution and complications of the diagnosis(es) in detail. Medication risks, benefits, costs, interactions, and alternatives were discussed as indicated. I advised her to contact the office if her condition worsens, changes or fails to improve as anticipated. She expressed understanding with the diagnosis(es) and plan.

## 2023-03-08 ENCOUNTER — OFFICE VISIT (OUTPATIENT)
Dept: FAMILY MEDICINE CLINIC | Age: 53
End: 2023-03-08
Payer: MEDICAID

## 2023-03-08 VITALS
WEIGHT: 141 LBS | RESPIRATION RATE: 18 BRPM | OXYGEN SATURATION: 99 % | TEMPERATURE: 97.2 F | HEIGHT: 62 IN | SYSTOLIC BLOOD PRESSURE: 128 MMHG | DIASTOLIC BLOOD PRESSURE: 78 MMHG | HEART RATE: 97 BPM | BODY MASS INDEX: 25.95 KG/M2

## 2023-03-08 DIAGNOSIS — K13.79 MOUTH SORES: ICD-10-CM

## 2023-03-08 DIAGNOSIS — H04.123 DRY EYE SYNDROME OF BOTH EYES: Primary | ICD-10-CM

## 2023-03-08 DIAGNOSIS — E53.8 VITAMIN B12 DEFICIENCY: ICD-10-CM

## 2023-03-08 PROCEDURE — 99213 OFFICE O/P EST LOW 20 MIN: CPT | Performed by: FAMILY MEDICINE

## 2023-03-08 PROCEDURE — 96372 THER/PROPH/DIAG INJ SC/IM: CPT | Performed by: FAMILY MEDICINE

## 2023-03-08 RX ORDER — CYANOCOBALAMIN 1000 UG/ML
1000 INJECTION, SOLUTION INTRAMUSCULAR; SUBCUTANEOUS ONCE
Qty: 1 ML | Refills: 0
Start: 2023-03-08 | End: 2023-03-08

## 2023-03-08 RX ORDER — VALACYCLOVIR HYDROCHLORIDE 500 MG/1
500 TABLET, FILM COATED ORAL 2 TIMES DAILY
Qty: 60 TABLET | Refills: 5 | Status: SHIPPED | OUTPATIENT
Start: 2023-03-08

## 2023-03-08 RX ORDER — MOMETASONE FUROATE 50 UG/1
SPRAY, METERED NASAL
COMMUNITY
Start: 2023-03-05

## 2023-03-08 NOTE — PROGRESS NOTES
History of Present Illness:     Chief Complaint   Patient presents with    Follow Up Chronic Condition       Brandon Bustamanter is a 47 y.o. female     Due for her B12 injection    C/o dry eyes  Would like to discuss an eye drop     Needs refills of her Valtrex  Significant improvement in mouth sores    Depressed mood  Feels like a failure  Biggest stressor is her son who has significant psych issues  Worse since her mother passed bc that was her support system  Followed by psych  Started on Rexulti  Recently started going to a Women's counseling group at a [employer]    PMH (REVIEWED):  Past Medical History:   Diagnosis Date    Adverse effect of anesthesia     itching around face and dry rash    Crohn disease ([facility] [address] 4/19/2010    Depression 4/19/2010    History of vascular access device 07/18/2017    [facility] VAT - 33 cm right brachial PICC for abx and limited access    Kidney stones     History of stent placed and removed    Lyme disease     Panic attack     Perforation bowel ([facility] [address] 7/4/2017    S/p palak Elizabeth 7/2017    Peripheral neuropathy 2/2/2012    B12 deficiency MRI brain normal 1/2012 MRA head normal 1/2012 CTA neck normal 1/2012     Renal calculi 9/6/2020    Vertigo     Vitamin B12 deficiency 1/21/2012    164 on 1/2012 Needs 1000 mcg IM twice a week     Vitamin E deficiency        Current Medications/Allergies (REVIEWED):     Current Outpatient Medications on File Prior to Visit   Medication Sig Dispense Refill    brexpiprazole (Rexulti) 2 mg tab tablet Take 2 mg by mouth daily. mometasone (NASONEX) 50 mcg/actuation nasal spray       cyanocobalamin (VITAMIN B12) 1,000 mcg/mL injection 1 mL by IntraMUSCular route every thirty (30) days. 1 mL 3    mupirocin (BACTROBAN) 2 % ointment Apply  to affected area daily. Apply to area 2-3 times daily for 5 days 22 g 0    baclofen (LIORESAL) 10 mg tablet Take 1 Tablet by mouth three (3) times daily.  90 Tablet 1    cholecalciferol (VITAMIN D3) (2,000 UNITS /50 MCG) cap capsule TAKE 1 CAPSULE BY MOUTH EVERY DAY 30 Capsule 11    escitalopram oxalate (LEXAPRO) 20 mg tablet Take 20 mg by mouth daily. melatonin 5 mg cap capsule Take 1 Capsule by mouth nightly. 90 Capsule 3    valACYclovir (VALTREX) 500 mg tablet TAKE 1 TABLET BY MOUTH TWICE A DAY 60 Tablet 2    ondansetron hcl (ZOFRAN) 4 mg tablet Take 1 Tablet by mouth every eight (8) hours as needed for Nausea or Vomiting. 30 Tablet 3    nystatin (MYCOSTATIN) 100,000 unit/mL suspension TAKE 5 ML BY MOUTH FOUR (4) TIMES DAILY. SWISH AND SPIT 480 mL 1    acetaminophen (TYLENOL) 500 mg tablet Take 1-2 Tablets by mouth every eight (8) hours as needed for Pain. 60 Tablet 0    LORazepam (ATIVAN) 1 mg tablet Take 1 mg by mouth two (2) times a day. Patient reports taking twice daily [id number]      pantoprazole (PROTONIX) 40 mg tablet Take 40 mg by mouth daily. dextroamphetamine-amphetamine (ADDERALL) 20 mg tablet Take 20 mg by mouth three (3) times daily. clonazePAM (KLONOPIN) 1 mg tablet Take 1 mg by mouth two (2) times a day. Patient reports taking twice daily in the AM and PM ([id number])      fexofenadine (ALLEGRA) 180 mg tablet Take 1 Tablet by mouth daily as needed for Allergies. (Patient not taking: Reported on 3/8/2023) 90 Tablet 2    Mucus Relief  mg ER tablet TAKE 1 TABLET BY MOUTH TWO (2) TIMES A DAY. INDICATIONS: COUGH AND CONGESTION (Patient not taking: Reported on 3/8/2023) 20 Tablet 0     No current facility-administered medications on file prior to visit. Allergies   Allergen Reactions    Doxycycline Swelling     Other reaction(s): Other (See Comments), WHELPS, ITCHING, NAUSEA  blisters      Cephalosporins Hives    Sulfa (Sulfonamide Antibiotics) Unknown (comments)     Other reaction(s):  Other (see comments)    Tetracyclines Nausea and Vomiting    Adhesive Rash    Penicillins Hives and Rash         Review of Systems:     +weight gain  + Dry eyes      Objective:     Vitals:    03/08/23 1512   BP: 128/78   Pulse: 97   Resp: 18   SpO2: 99%   Weight: 141 lb (64 kg)   Height: 5' 2\" (1.575 m)       Physical Exam:  General appearance - alert, well appearing, and in no distress  Mental status - alert, oriented to person, place, and time, tangential thought process, conversation centered around her son. Eyes - Sclera erythematous bilaterally    Recent Labs:  No results found for this or any previous visit (from the past 12 hour(s)). Assessment and Plan:       ICD-10-CM ICD-9-CM    1. Dry eye syndrome of both eyes  H04. 123 375.15       2. Mouth sores  K13.79 528.9           Dry eyes  Will trial eye ointment  Eye drops not working for her    Continue Valtrex for mouth sores    B12 injection today    Follow up: 1 month    Richard Massey MD    We discussed the expected course, resolution and complications of the diagnosis(es) in detail. Medication risks, benefits, costs, interactions, and alternatives were discussed as indicated. I advised her to contact the office if her condition worsens, changes or fails to improve as anticipated. She expressed understanding with the diagnosis(es) and plan.

## 2023-03-08 NOTE — PROGRESS NOTES
Mark Griffiths is a 47 y.o. female    Chief Complaint   Patient presents with    Follow Up Chronic Condition     1. Have you been to the ER, urgent care clinic since your last visit? Hospitalized since your last visit? No  2. Have you seen or consulted any other health care providers outside of the [address] [place] [address] since your last visit? Include any pap smears or colon screening.  No    Visit Vitals  /78 (BP 1 Location: Right arm, BP Patient Position: Sitting)   Pulse 97   Temp 97.2 °F (36.2 °C) (Temporal)   Resp 18   Ht 5' 2\" (1.575 m)   Wt 141 lb (64 kg)   SpO2 99%   BMI 25.79 kg/m²     3 most recent PHQ Screens 3/8/2023   Little interest or pleasure in doing things More than half the days   Feeling down, depressed, irritable, or hopeless More than half the days   Total Score PHQ 2 4   Trouble falling or staying asleep, or sleeping too much Several days   Feeling tired or having little energy Several days   Poor appetite, weight loss, or overeating More than half the days   Feeling bad about yourself - or that you are a failure or have let yourself or your family down Several days   Trouble concentrating on things such as school, work, reading, or watching TV More than half the days   Moving or speaking so slowly that other people could have noticed; or the opposite being so fidgety that others notice Nearly every day   Thoughts of being better off dead, or hurting yourself in some way Not at all   PHQ 9 Score 14   How difficult have these problems made it for you to do your work, take care of your home and get along with others -     Health Maintenance Due   Topic Date Due    Breast Cancer Screen Mammogram  04/09/2021    COVID-19 Vaccine (4 - Booster for Volanda Glad series) 12/17/2021     Has really dry eyes wants eye drops

## 2023-03-13 DIAGNOSIS — M62.838 MUSCLE SPASM: ICD-10-CM

## 2023-03-13 RX ORDER — BACLOFEN 10 MG/1
TABLET ORAL
Qty: 90 TABLET | Refills: 1 | Status: SHIPPED | OUTPATIENT
Start: 2023-03-13

## 2023-03-16 DIAGNOSIS — H04.123 DRY EYE SYNDROME OF BOTH EYES: ICD-10-CM

## 2023-05-05 SDOH — ECONOMIC STABILITY: TRANSPORTATION INSECURITY
IN THE PAST 12 MONTHS, HAS LACK OF TRANSPORTATION KEPT YOU FROM MEETINGS, WORK, OR FROM GETTING THINGS NEEDED FOR DAILY LIVING?: YES

## 2023-05-05 SDOH — ECONOMIC STABILITY: FOOD INSECURITY: WITHIN THE PAST 12 MONTHS, THE FOOD YOU BOUGHT JUST DIDN'T LAST AND YOU DIDN'T HAVE MONEY TO GET MORE.: SOMETIMES TRUE

## 2023-05-05 SDOH — ECONOMIC STABILITY: HOUSING INSECURITY
IN THE LAST 12 MONTHS, WAS THERE A TIME WHEN YOU DID NOT HAVE A STEADY PLACE TO SLEEP OR SLEPT IN A SHELTER (INCLUDING NOW)?: NO

## 2023-05-05 SDOH — ECONOMIC STABILITY: INCOME INSECURITY: HOW HARD IS IT FOR YOU TO PAY FOR THE VERY BASICS LIKE FOOD, HOUSING, MEDICAL CARE, AND HEATING?: PATIENT DECLINED

## 2023-05-05 SDOH — ECONOMIC STABILITY: FOOD INSECURITY: WITHIN THE PAST 12 MONTHS, YOU WORRIED THAT YOUR FOOD WOULD RUN OUT BEFORE YOU GOT MONEY TO BUY MORE.: NEVER TRUE

## 2023-05-06 DIAGNOSIS — Z91.09 OTHER ALLERGY STATUS, OTHER THAN TO DRUGS AND BIOLOGICAL SUBSTANCES: ICD-10-CM

## 2023-05-08 ENCOUNTER — OFFICE VISIT (OUTPATIENT)
Age: 53
End: 2023-05-08
Payer: MEDICAID

## 2023-05-08 VITALS
DIASTOLIC BLOOD PRESSURE: 80 MMHG | HEIGHT: 62 IN | HEART RATE: 116 BPM | SYSTOLIC BLOOD PRESSURE: 118 MMHG | BODY MASS INDEX: 26.35 KG/M2 | OXYGEN SATURATION: 97 % | TEMPERATURE: 98.7 F | WEIGHT: 143.2 LBS | RESPIRATION RATE: 20 BRPM

## 2023-05-08 DIAGNOSIS — E53.8 DEFICIENCY OF OTHER SPECIFIED B GROUP VITAMINS: ICD-10-CM

## 2023-05-08 DIAGNOSIS — H04.123 DRY EYES: ICD-10-CM

## 2023-05-08 DIAGNOSIS — R68.2 DRY MOUTH: Primary | ICD-10-CM

## 2023-05-08 DIAGNOSIS — E66.3 OVERWEIGHT (BMI 25.0-29.9): ICD-10-CM

## 2023-05-08 DIAGNOSIS — K50.90 CROHN'S DISEASE WITHOUT COMPLICATION, UNSPECIFIED GASTROINTESTINAL TRACT LOCATION (HCC): ICD-10-CM

## 2023-05-08 DIAGNOSIS — Z12.31 ENCOUNTER FOR SCREENING MAMMOGRAM FOR MALIGNANT NEOPLASM OF BREAST: ICD-10-CM

## 2023-05-08 DIAGNOSIS — E53.8 VITAMIN B12 DEFICIENCY: ICD-10-CM

## 2023-05-08 DIAGNOSIS — R09.81 SINUS CONGESTION: ICD-10-CM

## 2023-05-08 DIAGNOSIS — R39.14 FEELING OF INCOMPLETE BLADDER EMPTYING: ICD-10-CM

## 2023-05-08 PROCEDURE — 96372 THER/PROPH/DIAG INJ SC/IM: CPT | Performed by: FAMILY MEDICINE

## 2023-05-08 PROCEDURE — PBSHW PBB SHADOW CHARGE: Performed by: FAMILY MEDICINE

## 2023-05-08 PROCEDURE — 99214 OFFICE O/P EST MOD 30 MIN: CPT | Performed by: FAMILY MEDICINE

## 2023-05-08 RX ORDER — ALBUTEROL SULFATE 90 UG/1
2 AEROSOL, METERED RESPIRATORY (INHALATION) AS NEEDED
Qty: 18 G | Refills: 1 | Status: SHIPPED | OUTPATIENT
Start: 2023-05-08

## 2023-05-08 RX ORDER — MOMETASONE FUROATE 50 UG/1
SPRAY, METERED NASAL DAILY
Qty: 17 G | Refills: 2 | Status: SHIPPED | OUTPATIENT
Start: 2023-05-08

## 2023-05-08 RX ORDER — GUAIFENESIN 600 MG/1
600 TABLET, EXTENDED RELEASE ORAL 2 TIMES DAILY
Qty: 30 TABLET | Refills: 0 | Status: SHIPPED | OUTPATIENT
Start: 2023-05-08 | End: 2023-05-23

## 2023-05-08 RX ORDER — CYANOCOBALAMIN 1000 UG/ML
1000 INJECTION, SOLUTION INTRAMUSCULAR; SUBCUTANEOUS ONCE
Status: COMPLETED | OUTPATIENT
Start: 2023-05-08 | End: 2023-05-08

## 2023-05-08 RX ORDER — ALBUTEROL SULFATE 90 UG/1
2 AEROSOL, METERED RESPIRATORY (INHALATION) AS NEEDED
COMMUNITY
Start: 2017-01-05 | End: 2023-05-08 | Stop reason: SDUPTHER

## 2023-05-08 RX ADMIN — CYANOCOBALAMIN 1000 MCG: 1000 INJECTION, SOLUTION INTRAMUSCULAR at 16:16

## 2023-05-08 ASSESSMENT — ENCOUNTER SYMPTOMS: SINUS PRESSURE: 1

## 2023-05-08 NOTE — PATIENT INSTRUCTIONS
Please call to schedule an appointment with the nutritionist.     Norma Mejia, MPH, RDN  [address]  [address], [address]  [address], [place], [address] [facility]  [address] [place] [address] [place], [place]   [address] [place], [address] ([address] [place] [address])  [place], [address] [place] [address]  [phone]

## 2023-05-08 NOTE — PROGRESS NOTES
History of Present Illness:     Chief Complaint   Patient presents with    Medication Refill    Injections     B12 Injection       Rocío Burns is a 47 y.o. female     Doing well overall    C/o dry mouth and throat  Drinking a lot of water and Pedialyte   Feels her sinuses are too dry to get mucus out    Weight gain  Has gained about 20 lbs this year  Feels like she is getting out of breath more often    Sensation of incomplete bladder emptying    PMH (REVIEWED):  Past Medical History:   Diagnosis Date    Adverse effect of anesthesia     itching around face and dry rash    Crohn disease ([facility] [place] [address] 4/19/2010    Depression 4/19/2010    History of vascular access device 07/18/2017    [facility] VAT - 33 cm right brachial PICC for abx and limited access    Kidney stones     History of stent placed and removed    Lyme disease     Panic attack     Perforation bowel ([facility] [place] [address] 7/4/2017    S/p ruddy Parker 7/2017    Peripheral neuropathy 2/2/2012    B12 deficiency MRI brain normal 1/2012 MRA head normal 1/2012 CTA neck normal 1/2012     Renal calculi 9/6/2020    Vertigo     Vitamin B12 deficiency 1/21/2012    164 on 1/2012 Needs 1000 mcg IM twice a week     Vitamin E deficiency        Current Medications/Allergies (REVIEWED):     Current Outpatient Medications on File Prior to Visit   Medication Sig Dispense Refill    acetaminophen (TYLENOL) 500 MG tablet Take by mouth every 8 hours as needed      amphetamine-dextroamphetamine (ADDERALL) 20 MG tablet Take by mouth 3 times daily. baclofen (LIORESAL) 10 MG tablet Take by mouth 3 times daily      brexpiprazole (REXULTI) 2 MG TABS tablet Take by mouth daily      Cholecalciferol 50 MCG (2000 UT) CAPS TAKE 1 CAPSULE BY MOUTH EVERY DAY      clonazePAM (KLONOPIN) 1 MG tablet Take by mouth 2 times daily.       cyanocobalamin 1000 MCG/ML injection Inject into the muscle every 30 days      escitalopram (LEXAPRO) 20 MG tablet Take by mouth daily      LORazepam (ATIVAN) 1 MG tablet

## 2023-05-09 LAB
ANA SER QL: NEGATIVE
CRP SERPL-MCNC: <1 MG/L (ref 0–10)
ENA SS-A AB SER-ACNC: <0.2 AI (ref 0–0.9)
ENA SS-B AB SER-ACNC: <0.2 AI (ref 0–0.9)
ERYTHROCYTE [SEDIMENTATION RATE] IN BLOOD BY WESTERGREN METHOD: 11 MM/HR (ref 0–40)
TSH SERPL DL<=0.005 MIU/L-ACNC: 1.48 UIU/ML (ref 0.45–4.5)

## 2023-05-09 RX ORDER — MOMETASONE FUROATE 50 UG/1
SPRAY, METERED NASAL
Refills: 3 | OUTPATIENT
Start: 2023-05-09

## 2023-05-11 DIAGNOSIS — M62.838 OTHER MUSCLE SPASM: ICD-10-CM

## 2023-05-11 RX ORDER — DEXTRAN 70, GLYCERIN, HYPROMELLOSE 1; 2; 3 MG/ML; MG/ML; MG/ML
SOLUTION/ DROPS OPHTHALMIC
Qty: 15 ML | Refills: 1 | Status: SHIPPED | OUTPATIENT
Start: 2023-05-11

## 2023-05-11 RX ORDER — BACLOFEN 10 MG/1
TABLET ORAL
Qty: 90 TABLET | Refills: 1 | Status: SHIPPED | OUTPATIENT
Start: 2023-05-11

## 2023-05-16 LAB
14-3-3 ETA AG SER IA-MCNC: <0.2 NG/ML
14-3-3 ETA AG SER IA-MCNC: <0.2 NG/ML
CCP IGA+IGG SERPL IA-ACNC: <20 UNITS
CCP IGA+IGG SERPL IA-ACNC: <20 UNITS
RHEUMATOID FACT SERPL-ACNC: <14 UNITS/ML
RHEUMATOID FACT SERPL-ACNC: <14 UNITS/ML

## 2023-05-25 ENCOUNTER — HOSPITAL ENCOUNTER (OUTPATIENT)
Facility: HOSPITAL | Age: 53
Discharge: HOME | End: 2023-05-25
Payer: MEDICAID

## 2023-05-25 DIAGNOSIS — Z12.31 OTHER SCREENING MAMMOGRAM: ICD-10-CM

## 2023-05-25 PROCEDURE — 77063 BREAST TOMOSYNTHESIS BI: CPT

## 2023-06-05 ENCOUNTER — OFFICE VISIT (OUTPATIENT)
Age: 53
End: 2023-06-05
Payer: MEDICAID

## 2023-06-05 VITALS
HEIGHT: 62 IN | SYSTOLIC BLOOD PRESSURE: 120 MMHG | OXYGEN SATURATION: 98 % | HEART RATE: 89 BPM | WEIGHT: 146.8 LBS | TEMPERATURE: 98.4 F | BODY MASS INDEX: 27.02 KG/M2 | DIASTOLIC BLOOD PRESSURE: 81 MMHG | RESPIRATION RATE: 18 BRPM

## 2023-06-05 DIAGNOSIS — E53.8 VITAMIN B12 DEFICIENCY: Primary | ICD-10-CM

## 2023-06-05 DIAGNOSIS — R06.02 SHORTNESS OF BREATH: ICD-10-CM

## 2023-06-05 DIAGNOSIS — R63.5 WEIGHT GAIN: ICD-10-CM

## 2023-06-05 PROCEDURE — PBSHW PBB SHADOW CHARGE: Performed by: FAMILY MEDICINE

## 2023-06-05 PROCEDURE — 99213 OFFICE O/P EST LOW 20 MIN: CPT | Performed by: FAMILY MEDICINE

## 2023-06-05 PROCEDURE — 96372 THER/PROPH/DIAG INJ SC/IM: CPT | Performed by: FAMILY MEDICINE

## 2023-06-05 RX ORDER — CYANOCOBALAMIN 1000 UG/ML
1000 INJECTION, SOLUTION INTRAMUSCULAR; SUBCUTANEOUS ONCE
Status: COMPLETED | OUTPATIENT
Start: 2023-06-05 | End: 2023-06-05

## 2023-06-05 RX ADMIN — CYANOCOBALAMIN 1000 MCG: 1000 INJECTION, SOLUTION INTRAMUSCULAR at 16:39

## 2023-06-05 ASSESSMENT — PATIENT HEALTH QUESTIONNAIRE - PHQ9
SUM OF ALL RESPONSES TO PHQ9 QUESTIONS 1 & 2: 4
SUM OF ALL RESPONSES TO PHQ QUESTIONS 1-9: 4
2. FEELING DOWN, DEPRESSED OR HOPELESS: 2
1. LITTLE INTEREST OR PLEASURE IN DOING THINGS: 2
SUM OF ALL RESPONSES TO PHQ QUESTIONS 1-9: 4

## 2023-06-05 NOTE — PROGRESS NOTES
Chief Complaint   Patient presents with    Follow-up Chronic Condition    Injections     B12 Injection    Shortness of Breath     C/o shortness of breath with exertion     Vitals:    06/05/23 1515   BP: 120/81   Site: Right Upper Arm   Position: Sitting   Cuff Size: Medium Adult   Pulse: 89   Resp: 18   Temp: 98.4 °F (36.9 °C)   TempSrc: Oral   SpO2: 98%   Weight: 146 lb 12.8 oz (66.6 kg)   Height: 5' 2\" (1.575 m)      1. Have you been to the ER, urgent care clinic since your last visit? Hospitalized since your last visit? No    2. Have you seen or consulted any other health care providers outside of the [address] [place] [address] since your last visit? Include any pap smears or colon screening.  No
8 hours as needed      amphetamine-dextroamphetamine (ADDERALL) 20 MG tablet Take by mouth 3 times daily. Cholecalciferol 50 MCG (2000 UT) CAPS TAKE 1 CAPSULE BY MOUTH EVERY DAY      clonazePAM (KLONOPIN) 1 MG tablet Take by mouth 2 times daily. cyanocobalamin 1000 MCG/ML injection Inject into the muscle every 30 days      escitalopram (LEXAPRO) 20 MG tablet Take by mouth daily      LORazepam (ATIVAN) 1 MG tablet Take by mouth 2 times daily. Melatonin 5 MG CAPS Take by mouth      mupirocin (BACTROBAN) 2 % ointment Apply topically daily      nystatin (MYCOSTATIN) 936612 UNIT/ML suspension Take by mouth 4 times daily      ondansetron (ZOFRAN) 4 MG tablet Take by mouth every 8 hours as needed      pantoprazole (PROTONIX) 40 MG tablet Take by mouth daily      valACYclovir (VALTREX) 500 MG tablet Take by mouth 2 times daily       No current facility-administered medications on file prior to visit. Allergies   Allergen Reactions    Doxycycline Swelling and Other (See Comments)     Other reaction(s): Other (See Comments), WHELPS, ITCHING, NAUSEA, BLISTERS. Interacts with other medications        Tetracyclines & Related Nausea And Vomiting and Swelling     Other reaction(s): Other (See Comments), WHELPS, ITCHING, NAUSEA  blisters      Cephalosporins Hives    Sulfa Antibiotics      Other reaction(s): Unknown (comments)  Other reaction(s): Other (see comments)    Adhesive Tape Rash    Penicillins Hives and Rash         Review of Systems:     Review of Systems   Respiratory:  Positive for shortness of breath. Negative for wheezing. Cardiovascular:  Positive for leg swelling. Negative for chest pain and palpitations.       Objective:     Vitals:    06/05/23 1515   BP: 120/81   Site: Right Upper Arm   Position: Sitting   Cuff Size: Medium Adult   Pulse: 89   Resp: 18   Temp: 98.4 °F (36.9 °C)   TempSrc: Oral   SpO2: 98%   Weight: 146 lb 12.8 oz (66.6 kg)   Height: 5' 2\" (1.575 m)       Physical

## 2023-06-05 NOTE — PATIENT INSTRUCTIONS
Dietician Recommendation: Madhuri Khan, MPH, RDN    [address] [facility]  [address] [facility], [address]  [facility] [place] [address] [place] [address], [facility], [address] [facility]  [facility]   [address] [facility], [address] ([address] [facility])  [place], [address]  [phone]

## 2023-06-06 LAB
ALBUMIN SERPL-MCNC: 4.5 G/DL (ref 3.8–4.9)
ALBUMIN/GLOB SERPL: 1.9 {RATIO} (ref 1.2–2.2)
ALP SERPL-CCNC: 83 IU/L (ref 44–121)
ALT SERPL-CCNC: 8 IU/L (ref 0–32)
AST SERPL-CCNC: 12 IU/L (ref 0–40)
BILIRUB SERPL-MCNC: <0.2 MG/DL (ref 0–1.2)
BUN SERPL-MCNC: 15 MG/DL (ref 6–24)
BUN/CREAT SERPL: 14 (ref 9–23)
CALCIUM SERPL-MCNC: 9.8 MG/DL (ref 8.7–10.2)
CHLORIDE SERPL-SCNC: 106 MMOL/L (ref 96–106)
CO2 SERPL-SCNC: 18 MMOL/L (ref 20–29)
CREAT SERPL-MCNC: 1.07 MG/DL (ref 0.57–1)
EGFRCR SERPLBLD CKD-EPI 2021: 62 ML/MIN/1.73
ERYTHROCYTE [DISTWIDTH] IN BLOOD BY AUTOMATED COUNT: 13.8 % (ref 11.7–15.4)
GLOBULIN SER CALC-MCNC: 2.4 G/DL (ref 1.5–4.5)
GLUCOSE SERPL-MCNC: 87 MG/DL (ref 70–99)
HCT VFR BLD AUTO: 39 % (ref 34–46.6)
HGB BLD-MCNC: 12.8 G/DL (ref 11.1–15.9)
MCH RBC QN AUTO: 28.9 PG (ref 26.6–33)
MCHC RBC AUTO-ENTMCNC: 32.8 G/DL (ref 31.5–35.7)
MCV RBC AUTO: 88 FL (ref 79–97)
PLATELET # BLD AUTO: 261 X10E3/UL (ref 150–450)
POTASSIUM SERPL-SCNC: 4.4 MMOL/L (ref 3.5–5.2)
PROT SERPL-MCNC: 6.9 G/DL (ref 6–8.5)
RBC # BLD AUTO: 4.43 X10E6/UL (ref 3.77–5.28)
SODIUM SERPL-SCNC: 140 MMOL/L (ref 134–144)
WBC # BLD AUTO: 6.7 X10E3/UL (ref 3.4–10.8)

## 2023-06-06 ASSESSMENT — ENCOUNTER SYMPTOMS
SHORTNESS OF BREATH: 1
WHEEZING: 0

## 2023-06-26 DIAGNOSIS — R09.81 SINUS CONGESTION: ICD-10-CM

## 2023-06-29 ENCOUNTER — TELEPHONE (OUTPATIENT)
Age: 53
End: 2023-06-29

## 2023-06-29 RX ORDER — ALBUTEROL SULFATE 90 UG/1
2 AEROSOL, METERED RESPIRATORY (INHALATION) AS NEEDED
Qty: 18 EACH | Refills: 1 | Status: SHIPPED | OUTPATIENT
Start: 2023-06-29

## 2023-07-05 DIAGNOSIS — M62.838 OTHER MUSCLE SPASM: ICD-10-CM

## 2023-07-06 ENCOUNTER — TELEPHONE (OUTPATIENT)
Age: 53
End: 2023-07-06

## 2023-07-06 DIAGNOSIS — M62.838 OTHER MUSCLE SPASM: ICD-10-CM

## 2023-07-06 RX ORDER — DEXTRAN 70, GLYCERIN, HYPROMELLOSE 1; 2; 3 MG/ML; MG/ML; MG/ML
SOLUTION/ DROPS OPHTHALMIC
Qty: 15 ML | Refills: 1 | OUTPATIENT
Start: 2023-07-06

## 2023-07-06 RX ORDER — BACLOFEN 10 MG/1
10 TABLET ORAL 3 TIMES DAILY
Qty: 90 TABLET | Refills: 1 | Status: SHIPPED | OUTPATIENT
Start: 2023-07-06

## 2023-07-06 RX ORDER — BACLOFEN 10 MG/1
TABLET ORAL
Qty: 90 TABLET | Refills: 1 | OUTPATIENT
Start: 2023-07-06

## 2023-07-06 NOTE — TELEPHONE ENCOUNTER
Prescription refill sent to [facility] on [address] at [address].     This writer contacted patient. Two patient identifiers verified. Patient informed of the above information and denies further questions or concerns.  No further action required

## 2023-07-06 NOTE — TELEPHONE ENCOUNTER
Pt stated that she has 10 Baclofen tablets remaining. She is requesting that the refill is sent to pharmacy at the appropriate time.     Thank you

## 2023-07-09 DIAGNOSIS — R09.81 SINUS CONGESTION: ICD-10-CM

## 2023-07-11 DIAGNOSIS — R09.81 SINUS CONGESTION: ICD-10-CM

## 2023-07-12 ENCOUNTER — NURSE TRIAGE (OUTPATIENT)
Dept: OTHER | Facility: CLINIC | Age: 53
End: 2023-07-12

## 2023-07-12 ENCOUNTER — OFFICE VISIT (OUTPATIENT)
Age: 53
End: 2023-07-12
Payer: MEDICAID

## 2023-07-12 VITALS
SYSTOLIC BLOOD PRESSURE: 137 MMHG | OXYGEN SATURATION: 98 % | HEART RATE: 92 BPM | RESPIRATION RATE: 16 BRPM | DIASTOLIC BLOOD PRESSURE: 80 MMHG

## 2023-07-12 DIAGNOSIS — H11.31 SUBCONJUNCTIVAL HEMORRHAGE, NON-TRAUMATIC, RIGHT: Primary | ICD-10-CM

## 2023-07-12 PROCEDURE — 99212 OFFICE O/P EST SF 10 MIN: CPT

## 2023-07-12 NOTE — PROGRESS NOTES
History of Present Illness:  Jackelin Cortez is a 47 y.o. female who presents for bloody eye after sneezing. Had a strong sneeze yesterday, after which she noticed bleeding within her medial right eye ball. She denies trauma or injury to eye. No loss of vision / blurry vision. Past Medical History:   Diagnosis Date    Adverse effect of anesthesia     itching around face and dry rash    Crohn disease ([address]) 4/19/2010    Depression 4/19/2010    History of vascular access device 07/18/2017    [facility] VAT - 33 cm right brachial PICC for abx and limited access    Kidney stones     History of stent placed and removed    Lyme disease     Panic attack     Perforation bowel ([address]) 7/4/2017    S/p ruddy Vincent 7/2017    Peripheral neuropathy 2/2/2012    B12 deficiency MRI brain normal 1/2012 MRA head normal 1/2012 CTA neck normal 1/2012     Renal calculi 9/6/2020    Vertigo     Vitamin B12 deficiency 1/21/2012    164 on 1/2012 Needs 1000 mcg IM twice a week     Vitamin E deficiency        Past Surgical History:   Procedure Laterality Date    [address]      COLONOSCOPY      COLONOSCOPY N/A 6/14/2022    COLONOSCOPY performed by Negrito Shepherd MD at [address] [facility] PARTIAL COLECTOMY  2009    due to Crohn's-bowel resection x2    LITHOTRIPSY      TONSILLECTOMY AND ADENOIDECTOMY      TUBAL LIGATION      UPPER GASTROINTESTINAL ENDOSCOPY      EGD       Current Outpatient Medications   Medication Sig Dispense Refill    albuterol sulfate HFA (PROVENTIL;VENTOLIN;PROAIR) 108 (90 Base) MCG/ACT inhaler Inhale 2 puffs into the lungs every 6 hours as needed for Wheezing 18 each 1    baclofen (LIORESAL) 10 MG tablet Take 1 tablet by mouth 3 times daily 90 tablet 1    Artificial Tear Solution (GENTEAL TEARS) 0.1-0.2-0.3 % SOLN ADMINISTER 1 DROP TO BOTH EYES EVERY FOUR TO SIX (4-6) HOURS AS NEEDED (DRY EYES).  15 mL 1    mometasone (NASONEX) 50 MCG/ACT nasal spray by Nasal route

## 2023-07-12 NOTE — TELEPHONE ENCOUNTER
[facility]    Received call from Mckay Guzman at [facility] with [employer]. Subjective: Caller states \"I had a sneezing, coughing fit this morning. When I was done I looked at my right eye and it almost looked like a zit. \"     Current Symptoms: right blood shot eye, blurry vision after sneezing/ coughing     Onset:   today     Associated Symptoms: NA    Pain Severity: 6/10; corner of eye throbbing pain     Temperature: denies     What has been tried: n/a     LMP: NA Pregnant: NA    Recommended disposition: Go to Office Now    Care advice provided, patient verbalizes understanding; denies any other questions or concerns; instructed to call back for any new or worsening symptoms. Patient/Caller agrees with recommended disposition; writer provided warm transfer to [facility] at [facility] for appointment scheduling    Attention Provider: Thank you for allowing me to participate in the care of your patient. The patient was connected to triage in response to information provided to the ECC/PSC. Please do not respond through this encounter as the response is not directed to a shared pool.       Reason for Disposition   New or worsening blurred vision   MODERATE eye pain or discomfort (e.g., interferes with normal activities or awakens from sleep; more than mild)    Protocols used: Eye - Red Without Pus-ADULT-[place]

## 2023-07-14 RX ORDER — ALBUTEROL SULFATE 90 UG/1
2 AEROSOL, METERED RESPIRATORY (INHALATION) AS NEEDED
Qty: 18 EACH | Refills: 1 | OUTPATIENT
Start: 2023-07-14

## 2023-07-14 RX ORDER — ALBUTEROL SULFATE 90 UG/1
2 AEROSOL, METERED RESPIRATORY (INHALATION) EVERY 6 HOURS PRN
Qty: 18 EACH | Refills: 1 | Status: SHIPPED | OUTPATIENT
Start: 2023-07-14

## 2023-07-17 RX ORDER — DEXTRAN 70, GLYCERIN, HYPROMELLOSE 1; 2; 3 MG/ML; MG/ML; MG/ML
1 SOLUTION/ DROPS OPHTHALMIC EVERY 6 HOURS
Qty: 15 ML | Refills: 1 | Status: SHIPPED | OUTPATIENT
Start: 2023-07-17

## 2023-07-22 DIAGNOSIS — R11.0 NAUSEA: ICD-10-CM

## 2023-07-24 DIAGNOSIS — R09.81 SINUS CONGESTION: ICD-10-CM

## 2023-07-25 ENCOUNTER — OFFICE VISIT (OUTPATIENT)
Age: 53
End: 2023-07-25
Payer: MEDICAID

## 2023-07-25 VITALS
OXYGEN SATURATION: 98 % | BODY MASS INDEX: 27.23 KG/M2 | DIASTOLIC BLOOD PRESSURE: 82 MMHG | HEIGHT: 62 IN | SYSTOLIC BLOOD PRESSURE: 125 MMHG | WEIGHT: 148 LBS | RESPIRATION RATE: 16 BRPM | HEART RATE: 94 BPM

## 2023-07-25 DIAGNOSIS — R11.0 NAUSEA: ICD-10-CM

## 2023-07-25 DIAGNOSIS — R63.5 WEIGHT GAIN: ICD-10-CM

## 2023-07-25 DIAGNOSIS — E53.8 VITAMIN B12 DEFICIENCY: Primary | ICD-10-CM

## 2023-07-25 PROCEDURE — 99213 OFFICE O/P EST LOW 20 MIN: CPT | Performed by: FAMILY MEDICINE

## 2023-07-25 PROCEDURE — PBSHW PBB SHADOW CHARGE: Performed by: FAMILY MEDICINE

## 2023-07-25 RX ORDER — MOMETASONE FUROATE 50 UG/1
SPRAY, METERED NASAL
Qty: 17 G | Refills: 2 | Status: SHIPPED | OUTPATIENT
Start: 2023-07-25

## 2023-07-25 RX ORDER — ONDANSETRON 4 MG/1
4 TABLET, FILM COATED ORAL EVERY 8 HOURS PRN
Qty: 30 TABLET | Refills: 3 | Status: SHIPPED | OUTPATIENT
Start: 2023-07-25

## 2023-07-25 RX ORDER — CYANOCOBALAMIN 1000 UG/ML
1000 INJECTION, SOLUTION INTRAMUSCULAR; SUBCUTANEOUS ONCE
Status: COMPLETED | OUTPATIENT
Start: 2023-07-25 | End: 2023-07-25

## 2023-07-25 RX ADMIN — CYANOCOBALAMIN 1000 MCG: 1000 INJECTION, SOLUTION INTRAMUSCULAR at 17:19

## 2023-07-25 ASSESSMENT — ENCOUNTER SYMPTOMS
SHORTNESS OF BREATH: 1
WHEEZING: 0

## 2023-07-25 NOTE — PROGRESS NOTES
Arlen Carpio is a 47 y.o. female     Chief Complaint   Patient presents with    1 Month Follow-Up        Vitals:    07/25/23 1512   BP: 125/82   Site: Right Upper Arm   Position: Sitting   Pulse: 94   Resp: 16   SpO2: 98%   Weight: 148 lb (67.1 kg)   Height: 5' 2\" (1.575 m)       PHQ-9  6/5/2023 3/8/2023 7/20/2022   Little interest or pleasure in doing things 2 2 0   Little interest or pleasure in doing things - - -   Feeling down, depressed, or hopeless 2 2 0   Trouble falling or staying asleep, or sleeping too much - 1 3   Feeling tired or having little energy - 1 3   Poor appetite or overeating - 2 0   Feeling bad about yourself - or that you are a failure or have let yourself or your family down - 1 3   Trouble concentrating on things, such as reading the newspaper or watching television - 2 0   Moving or speaking so slowly that other people could have noticed. Or the opposite - being so fidgety or restless that you have been moving around a lot more than usual - 3 2   PHQ-2 Score 4 4 0   Total Score PHQ 2 - - -   PHQ-9 Total Score 4 14 11   How difficult have these problems made it for you to do your work, take care of your home and get along with others - - Very difficult        Health Maintenance Due   Topic    COVID-19 Vaccine (4 - Booster for Moderna series)        Coordination of Care Questionnaire:  :   1. Have you been to the ER, urgent care clinic since your last visit? Hospitalized since your last visit? No    2. Have you seen or consulted any other health care providers outside of the [address] [place] [address] since your last visit? Include any pap smears or colon screening. No    This patient is accompanied in the office by her self  I have received verbal consent from Arlen Carpio to discuss any/all medical information while they are present in the room.
Labs:  No results found for this or any previous visit (from the past 12 hour(s)). Assessment and Plan:      Diagnosis Orders   1. Vitamin B12 deficiency  cyanocobalamin injection 1,000 mcg      2. Weight gain        3. Nausea  ondansetron (ZOFRAN) 4 MG tablet            B12 def, stable  Given monthly B12 injection    Shortness of breath, subjective  Exam and appearance incongruent with reported symptoms at rest  Normal CXR on eval  Will monitor for now and consider further work up if worsening    Weight gain  Suspect this is contributing to subjective shortness of breath  Referred to nutritionist; pt to schedule a visit    Follow up: 1 month    Ayan Nice MD    We discussed the expected course, resolution and complications of the diagnosis(es) in detail. Medication risks, benefits, costs, interactions, and alternatives were discussed as indicated. I advised her to contact the office if her condition worsens, changes or fails to improve as anticipated. She expressed understanding with the diagnosis(es) and plan.

## 2023-07-26 RX ORDER — ONDANSETRON 4 MG/1
TABLET, FILM COATED ORAL
Qty: 30 TABLET | Refills: 3 | OUTPATIENT
Start: 2023-07-26

## 2023-08-23 ENCOUNTER — OFFICE VISIT (OUTPATIENT)
Age: 53
End: 2023-08-23
Payer: MEDICAID

## 2023-08-23 DIAGNOSIS — E66.3 OVERWEIGHT (BMI 25.0-29.9): ICD-10-CM

## 2023-08-23 DIAGNOSIS — R10.2 PELVIC PAIN: ICD-10-CM

## 2023-08-23 DIAGNOSIS — E53.8 VITAMIN B12 DEFICIENCY: Primary | ICD-10-CM

## 2023-08-23 DIAGNOSIS — R63.5 WEIGHT GAIN: ICD-10-CM

## 2023-08-23 DIAGNOSIS — R14.0 ABDOMINAL BLOATING: ICD-10-CM

## 2023-08-23 DIAGNOSIS — R63.1 EXCESSIVE THIRST: ICD-10-CM

## 2023-08-23 PROCEDURE — 96372 THER/PROPH/DIAG INJ SC/IM: CPT | Performed by: FAMILY MEDICINE

## 2023-08-23 PROCEDURE — 99214 OFFICE O/P EST MOD 30 MIN: CPT | Performed by: FAMILY MEDICINE

## 2023-08-23 RX ORDER — CYANOCOBALAMIN 1000 UG/ML
1000 INJECTION, SOLUTION INTRAMUSCULAR; SUBCUTANEOUS ONCE
Status: COMPLETED | OUTPATIENT
Start: 2023-08-23 | End: 2023-08-23

## 2023-08-23 RX ADMIN — CYANOCOBALAMIN 1000 MCG: 1000 INJECTION, SOLUTION INTRAMUSCULAR at 15:15

## 2023-08-23 ASSESSMENT — ENCOUNTER SYMPTOMS
ABDOMINAL DISTENTION: 1
SHORTNESS OF BREATH: 1
ABDOMINAL PAIN: 1
WHEEZING: 0

## 2023-08-23 NOTE — PROGRESS NOTES
History of Present Illness:     Chief Complaint   Patient presents with    Follow-up     Meds an b12 vaccine       Yane Baeza is a 47 y.o. female     No big issues  Needs her B12 injection    Still feels out of breath  Gaining weight  Feels particularly bloated and gaining weight in mid section    Her son recently got an apartment! This is a huge relief     PMH (REVIEWED):  Past Medical History:   Diagnosis Date    Adverse effect of anesthesia     itching around face and dry rash    Crohn disease ([address]) 2010    Depression 2010    History of vascular access device 2017    [facility] VAT - 33 cm right brachial PICC for abx and limited access    Kidney stones     History of stent placed and removed    Lyme disease     Panic attack     Perforation bowel ([address]) 2017    S/p sugery Dr. Maryjo Apgar 2017    Peripheral neuropathy 2012    B12 deficiency MRI brain normal 2012 MRA head normal 2012 CTA neck normal 2012     Renal calculi 2020    Vertigo     Vitamin B12 deficiency 2012    164 on 2012 Needs 1000 mcg IM twice a week     Vitamin E deficiency        Current Medications/Allergies (REVIEWED):     Current Outpatient Medications on File Prior to Visit   Medication Sig Dispense Refill    mometasone (NASONEX) 50 MCG/ACT nasal spray USE ONE TIME DAILY AS DIRECTED BY NASAL ROUTE 17 g 2    ondansetron (ZOFRAN) 4 MG tablet Take 1 tablet by mouth every 8 hours as needed for Nausea 30 tablet 3    Artificial Tear Solution (GENTEAL TEARS) 0.1-0.2-0.3 % SOLN Place 1 drop into both eyes in the morning and 1 drop at noon and 1 drop in the evening and 1 drop before bedtime.  15 mL 1    albuterol sulfate HFA (PROVENTIL;VENTOLIN;PROAIR) 108 (90 Base) MCG/ACT inhaler Inhale 2 puffs into the lungs every 6 hours as needed for Wheezing 18 each 1    baclofen (LIORESAL) 10 MG tablet Take 1 tablet by mouth 3 times daily 90 tablet 1    acetaminophen (TYLENOL) 500 MG tablet Take by mouth every 8 hours as

## 2023-08-24 LAB
HBA1C MFR BLD: 5.5 % (ref 4.8–5.6)
SPECIMEN STATUS REPORT: NORMAL

## 2023-08-25 VITALS
HEART RATE: 95 BPM | SYSTOLIC BLOOD PRESSURE: 130 MMHG | DIASTOLIC BLOOD PRESSURE: 80 MMHG | WEIGHT: 149.03 LBS | BODY MASS INDEX: 27.43 KG/M2 | OXYGEN SATURATION: 95 % | RESPIRATION RATE: 18 BRPM | TEMPERATURE: 98.4 F | HEIGHT: 62 IN

## 2023-08-25 DIAGNOSIS — M62.838 OTHER MUSCLE SPASM: ICD-10-CM

## 2023-08-25 RX ORDER — BACLOFEN 10 MG/1
TABLET ORAL
Qty: 90 TABLET | Refills: 1 | OUTPATIENT
Start: 2023-08-25

## 2023-08-25 SDOH — ECONOMIC STABILITY: INCOME INSECURITY: HOW HARD IS IT FOR YOU TO PAY FOR THE VERY BASICS LIKE FOOD, HOUSING, MEDICAL CARE, AND HEATING?: NOT HARD AT ALL

## 2023-08-25 SDOH — ECONOMIC STABILITY: FOOD INSECURITY: WITHIN THE PAST 12 MONTHS, THE FOOD YOU BOUGHT JUST DIDN'T LAST AND YOU DIDN'T HAVE MONEY TO GET MORE.: NEVER TRUE

## 2023-08-25 SDOH — ECONOMIC STABILITY: FOOD INSECURITY: WITHIN THE PAST 12 MONTHS, YOU WORRIED THAT YOUR FOOD WOULD RUN OUT BEFORE YOU GOT MONEY TO BUY MORE.: NEVER TRUE

## 2023-08-29 DIAGNOSIS — R09.81 SINUS CONGESTION: ICD-10-CM

## 2023-08-30 DIAGNOSIS — R09.81 SINUS CONGESTION: ICD-10-CM

## 2023-08-30 DIAGNOSIS — M62.838 OTHER MUSCLE SPASM: ICD-10-CM

## 2023-08-30 RX ORDER — ALBUTEROL SULFATE 90 UG/1
AEROSOL, METERED RESPIRATORY (INHALATION)
Qty: 18 EACH | Refills: 1 | OUTPATIENT
Start: 2023-08-30

## 2023-08-30 RX ORDER — ALBUTEROL SULFATE 90 UG/1
2 AEROSOL, METERED RESPIRATORY (INHALATION) EVERY 6 HOURS PRN
Qty: 18 EACH | Refills: 1 | OUTPATIENT
Start: 2023-08-30

## 2023-08-30 RX ORDER — BACLOFEN 10 MG/1
10 TABLET ORAL 3 TIMES DAILY
Qty: 90 TABLET | Refills: 1 | OUTPATIENT
Start: 2023-08-30

## 2023-09-09 DIAGNOSIS — M62.838 OTHER MUSCLE SPASM: ICD-10-CM

## 2023-09-12 DIAGNOSIS — M62.838 OTHER MUSCLE SPASM: ICD-10-CM

## 2023-09-18 ENCOUNTER — OFFICE VISIT (OUTPATIENT)
Age: 53
End: 2023-09-18
Payer: MEDICAID

## 2023-09-18 VITALS
WEIGHT: 149 LBS | HEART RATE: 72 BPM | RESPIRATION RATE: 16 BRPM | HEIGHT: 62 IN | DIASTOLIC BLOOD PRESSURE: 75 MMHG | BODY MASS INDEX: 27.42 KG/M2 | OXYGEN SATURATION: 99 % | SYSTOLIC BLOOD PRESSURE: 124 MMHG

## 2023-09-18 DIAGNOSIS — Z23 ENCOUNTER FOR IMMUNIZATION: ICD-10-CM

## 2023-09-18 DIAGNOSIS — M62.838 OTHER MUSCLE SPASM: ICD-10-CM

## 2023-09-18 DIAGNOSIS — R14.0 ABDOMINAL BLOATING: ICD-10-CM

## 2023-09-18 DIAGNOSIS — E53.8 VITAMIN B12 DEFICIENCY: ICD-10-CM

## 2023-09-18 DIAGNOSIS — R60.9 PERIPHERAL EDEMA: Primary | ICD-10-CM

## 2023-09-18 PROCEDURE — 90674 CCIIV4 VAC NO PRSV 0.5 ML IM: CPT | Performed by: FAMILY MEDICINE

## 2023-09-18 PROCEDURE — 99214 OFFICE O/P EST MOD 30 MIN: CPT | Performed by: FAMILY MEDICINE

## 2023-09-18 RX ORDER — BACLOFEN 10 MG/1
10 TABLET ORAL 3 TIMES DAILY
Qty: 90 TABLET | Refills: 1 | Status: SHIPPED | OUTPATIENT
Start: 2023-09-18

## 2023-09-18 RX ORDER — FUROSEMIDE 20 MG/1
10 TABLET ORAL DAILY
Qty: 15 TABLET | Refills: 0 | Status: SHIPPED | OUTPATIENT
Start: 2023-09-18

## 2023-09-18 RX ADMIN — CYANOCOBALAMIN 1000 MCG: 1000 INJECTION, SOLUTION INTRAMUSCULAR at 14:45

## 2023-09-18 SDOH — ECONOMIC STABILITY: FOOD INSECURITY: WITHIN THE PAST 12 MONTHS, YOU WORRIED THAT YOUR FOOD WOULD RUN OUT BEFORE YOU GOT MONEY TO BUY MORE.: NEVER TRUE

## 2023-09-18 SDOH — ECONOMIC STABILITY: INCOME INSECURITY: HOW HARD IS IT FOR YOU TO PAY FOR THE VERY BASICS LIKE FOOD, HOUSING, MEDICAL CARE, AND HEATING?: NOT HARD AT ALL

## 2023-09-18 SDOH — ECONOMIC STABILITY: FOOD INSECURITY: WITHIN THE PAST 12 MONTHS, THE FOOD YOU BOUGHT JUST DIDN'T LAST AND YOU DIDN'T HAVE MONEY TO GET MORE.: NEVER TRUE

## 2023-09-18 ASSESSMENT — PATIENT HEALTH QUESTIONNAIRE - PHQ9
SUM OF ALL RESPONSES TO PHQ QUESTIONS 1-9: 4
2. FEELING DOWN, DEPRESSED OR HOPELESS: 2
SUM OF ALL RESPONSES TO PHQ9 QUESTIONS 1 & 2: 4
SUM OF ALL RESPONSES TO PHQ QUESTIONS 1-9: 4
1. LITTLE INTEREST OR PLEASURE IN DOING THINGS: 2

## 2023-09-18 ASSESSMENT — ENCOUNTER SYMPTOMS
WHEEZING: 0
ABDOMINAL PAIN: 1
SHORTNESS OF BREATH: 1
ABDOMINAL DISTENTION: 1

## 2023-09-18 NOTE — PATIENT INSTRUCTIONS
Appointment for Ultrasound scheduled at [facility]   Thursday September 28, 2023 at 4PM   Nothing To Eat or Drink 6 to 8 hours prior to he appointment

## 2023-09-18 NOTE — PROGRESS NOTES
acetaminophen (TYLENOL) 500 MG tablet Take by mouth every 8 hours as needed      amphetamine-dextroamphetamine (ADDERALL) 20 MG tablet Take by mouth 3 times daily. Cholecalciferol 50 MCG (2000 UT) CAPS TAKE 1 CAPSULE BY MOUTH EVERY DAY      clonazePAM (KLONOPIN) 1 MG tablet Take by mouth 2 times daily. escitalopram (LEXAPRO) 20 MG tablet Take by mouth daily      LORazepam (ATIVAN) 1 MG tablet Take by mouth 2 times daily. Melatonin 5 MG CAPS Take by mouth      mupirocin (BACTROBAN) 2 % ointment Apply topically daily      nystatin (MYCOSTATIN) 212502 UNIT/ML suspension Take by mouth 4 times daily      pantoprazole (PROTONIX) 40 MG tablet Take by mouth daily      valACYclovir (VALTREX) 500 MG tablet Take by mouth 2 times daily       No current facility-administered medications on file prior to visit. Allergies   Allergen Reactions    Doxycycline Swelling and Other (See Comments)     Other reaction(s): Other (See Comments), WHELPS, ITCHING, NAUSEA, BLISTERS. Interacts with other medications        Tetracyclines & Related Nausea And Vomiting and Swelling     Other reaction(s): Other (See Comments), WHELPS, ITCHING, NAUSEA  blisters      Cephalosporins Hives    Sulfa Antibiotics      Other reaction(s): Unknown (comments)  Other reaction(s): Other (see comments)    Adhesive Tape Rash    Penicillins Hives and Rash         Review of Systems:     Review of Systems   Respiratory:  Positive for shortness of breath. Negative for wheezing. Cardiovascular:  Negative for chest pain, palpitations and leg swelling. Gastrointestinal:  Positive for abdominal distention and abdominal pain.         Objective:     Vitals:    09/18/23 1452   BP: 124/75   Site: Left Upper Arm   Position: Sitting   Cuff Size: Medium Adult   Pulse: 72   Resp: 16   SpO2: 99%   Weight: 149 lb (67.6 kg)   Height: 5' 2\" (1.575 m)       Physical Exam:  General appearance - alert, well appearing, and in no distress, speaking in full

## 2023-09-19 RX ORDER — BACLOFEN 10 MG/1
10 TABLET ORAL 3 TIMES DAILY
Qty: 90 TABLET | Refills: 1 | OUTPATIENT
Start: 2023-09-19

## 2023-09-19 RX ORDER — CYANOCOBALAMIN 1000 UG/ML
1000 INJECTION, SOLUTION INTRAMUSCULAR; SUBCUTANEOUS ONCE
Status: COMPLETED | OUTPATIENT
Start: 2023-09-19 | End: 2023-09-18

## 2023-09-19 NOTE — TELEPHONE ENCOUNTER
Medication Refill Request    Edgar Monzon is requesting a refill of the following medication(s):   Requested Prescriptions     Pending Prescriptions Disp Refills    Melatonin 5 MG CAPS [Pharmacy Med Name: MELATONIN 5 MG SOFTGEL] 30 capsule 11     Sig: TAKE 1 CAPSULE BY MOUTH EVERYDAY AT BEDTIME      Last Office Visit Date: 09/18/2023 Dr. Keith Maria    Please send refill to:    [facility]/pharmacy [address] [phone] - F [phone]  [address] [place] [address]  Phone: [phone] Fax: [phone]

## 2023-09-20 ENCOUNTER — TELEPHONE (OUTPATIENT)
Age: 53
End: 2023-09-20

## 2023-09-20 NOTE — TELEPHONE ENCOUNTER
Called Ms. Lo Will to help answer any additional questions she may have regarding the recent [employer] message sent, pertaining to upcoming changes with her insurance. Reviewed the process for changing her current plan. Explained how [employer] works, and that it is a temporary solution, if authorization is granted. Encouraged Ms. Lo Will to review the comparison chart provided, to review the other carriers with plans that will still be in network with [facility] after 10/1/23. A [employer] request has been submitted via [employer] for Ms. Lo Will, to request a few more visits for the year. Explained that she will need to receive authorization to proceed with her upcoming appointment on 10/25. Will call the patient with ay updates, and will plan to sit with her, after her 10/25 appointment, to discuss further. She verbalized understanding.

## 2023-09-28 ENCOUNTER — HOSPITAL ENCOUNTER (OUTPATIENT)
Facility: HOSPITAL | Age: 53
Discharge: HOME | End: 2023-09-28
Attending: FAMILY MEDICINE
Payer: MEDICAID

## 2023-09-28 ENCOUNTER — HOSPITAL ENCOUNTER (OUTPATIENT)
Facility: HOSPITAL | Age: 53
End: 2023-09-28
Attending: FAMILY MEDICINE
Payer: MEDICAID

## 2023-09-28 DIAGNOSIS — R14.0 ABDOMINAL BLOATING: ICD-10-CM

## 2023-09-28 DIAGNOSIS — R10.2 PELVIC PAIN: ICD-10-CM

## 2023-09-28 PROCEDURE — 76830 TRANSVAGINAL US NON-OB: CPT

## 2023-09-28 PROCEDURE — 76700 US EXAM ABDOM COMPLETE: CPT

## 2023-09-29 ENCOUNTER — PATIENT MESSAGE (OUTPATIENT)
Age: 53
End: 2023-09-29

## 2023-10-01 DIAGNOSIS — K12.1 OTHER FORMS OF STOMATITIS: ICD-10-CM

## 2023-10-03 DIAGNOSIS — E55.9 VITAMIN D DEFICIENCY, UNSPECIFIED: ICD-10-CM

## 2023-10-04 RX ORDER — ACETAMINOPHEN 160 MG
TABLET,DISINTEGRATING ORAL DAILY
Qty: 30 CAPSULE | Refills: 11 | Status: SHIPPED | OUTPATIENT
Start: 2023-10-04

## 2023-10-04 NOTE — TELEPHONE ENCOUNTER
Medication Refill Request    April ERINN Kourtney Londonosylvia is requesting a refill of the following medication(s):   Requested Prescriptions     Pending Prescriptions Disp Refills    Cholecalciferol (VITAMIN D3) 50 MCG (2000 UT) CAPS [Pharmacy Med Name: VITAMIN D3 2,000 UNIT SOFTGEL] 30 capsule 11     Sig: TAKE 1 CAPSULE BY MOUTH EVERY DAY      Last provider to prescribe medication: Dr. Leticia Morris  Last Date of Medication Prescribed: 11/07/2022   Last Office Visit Date: 09/18/2023    Please send refill to:    [facility]/pharmacy #[id number]- [address] [phone]  [address] [place] [address] [place] [address]  Phone: [phone] Fax: [phone]

## 2023-10-07 RX ORDER — DEXTRAN 70, GLYCERIN, HYPROMELLOSE 1; 2; 3 MG/ML; MG/ML; MG/ML
1 SOLUTION/ DROPS OPHTHALMIC EVERY 6 HOURS
Qty: 15 ML | Refills: 1 | Status: SHIPPED | OUTPATIENT
Start: 2023-10-07

## 2023-10-12 DIAGNOSIS — R14.0 ABDOMINAL BLOATING: ICD-10-CM

## 2023-10-12 DIAGNOSIS — R60.9 PERIPHERAL EDEMA: ICD-10-CM

## 2023-10-16 NOTE — TELEPHONE ENCOUNTER
Medication Refill Request    Robert Rosa is requesting a refill of the following medication(s):   Requested Prescriptions     Pending Prescriptions Disp Refills    furosemide (LASIX) 20 MG tablet [Pharmacy Med Name: FUROSEMIDE 20 MG TABLET] 15 tablet 0     Sig: TAKE 1/2 TABLET BY MOUTH DAILY      Last provider to prescribe medication: Dr. Keith Maria  Last Date of Medication Prescribed: 09/18/2023   Last Office Visit Date: 09/18/2023    Please send refill to:    [facility]/pharmacy #[id number]- [place] [address] [phone]  [address] [place] [address]. [place] [address]  Phone: [phone] Fax: [phone]

## 2023-10-17 DIAGNOSIS — R09.81 SINUS CONGESTION: ICD-10-CM

## 2023-10-17 RX ORDER — FUROSEMIDE 20 MG/1
10 TABLET ORAL DAILY
Qty: 45 TABLET | Refills: 0 | Status: SHIPPED | OUTPATIENT
Start: 2023-10-17

## 2023-10-17 RX ORDER — ALBUTEROL SULFATE 90 UG/1
AEROSOL, METERED RESPIRATORY (INHALATION)
Qty: 8.5 EACH | Refills: 1 | Status: SHIPPED | OUTPATIENT
Start: 2023-10-17

## 2023-10-17 NOTE — TELEPHONE ENCOUNTER
Medication Refill Request    Bereket Del Toro is requesting a refill of the following medication(s):   Requested Prescriptions     Pending Prescriptions Disp Refills    albuterol sulfate HFA (PROVENTIL;VENTOLIN;PROAIR) 108 (90 Base) MCG/ACT inhaler [Pharmacy Med Name: ALBUTEROL HFA (PROAIR) INHALER] 8.5 each 1     Sig: INHALE 2 PUFFS INTO THE LUNGS AS NEEDED FOR WHEEZING.       Last provider to prescribe medication: Dr. Gricelda Wolff  Last Date of Medication Prescribed: 07/14/2023   Last Office Visit Date: 09/18/2023    Please send refill to:    [facility]/pharmacy #[id number]- [place] [facility], [address] [place] [address] [phone] Tonya Zapata [phone]  [address] [place] [address]  Phone: [phone] Fax: [phone]

## 2023-11-07 DIAGNOSIS — M62.838 OTHER MUSCLE SPASM: ICD-10-CM

## 2023-11-07 DIAGNOSIS — R09.81 SINUS CONGESTION: ICD-10-CM

## 2023-11-08 ENCOUNTER — OFFICE VISIT (OUTPATIENT)
Age: 53
End: 2023-11-08
Payer: MEDICAID

## 2023-11-08 VITALS
DIASTOLIC BLOOD PRESSURE: 83 MMHG | HEART RATE: 72 BPM | WEIGHT: 144.2 LBS | BODY MASS INDEX: 26.37 KG/M2 | OXYGEN SATURATION: 98 % | SYSTOLIC BLOOD PRESSURE: 126 MMHG | RESPIRATION RATE: 16 BRPM

## 2023-11-08 DIAGNOSIS — E53.8 VITAMIN B12 DEFICIENCY: ICD-10-CM

## 2023-11-08 DIAGNOSIS — J01.00 SUBACUTE MAXILLARY SINUSITIS: Primary | ICD-10-CM

## 2023-11-08 DIAGNOSIS — M62.838 OTHER MUSCLE SPASM: ICD-10-CM

## 2023-11-08 DIAGNOSIS — H66.003 NON-RECURRENT ACUTE SUPPURATIVE OTITIS MEDIA OF BOTH EARS WITHOUT SPONTANEOUS RUPTURE OF TYMPANIC MEMBRANES: ICD-10-CM

## 2023-11-08 PROCEDURE — 99214 OFFICE O/P EST MOD 30 MIN: CPT | Performed by: FAMILY MEDICINE

## 2023-11-08 PROCEDURE — 96372 THER/PROPH/DIAG INJ SC/IM: CPT | Performed by: FAMILY MEDICINE

## 2023-11-08 PROCEDURE — PBSHW PBB SHADOW CHARGE: Performed by: FAMILY MEDICINE

## 2023-11-08 RX ORDER — LEVOFLOXACIN 500 MG/1
500 TABLET, FILM COATED ORAL DAILY
Qty: 7 TABLET | Refills: 0 | Status: SHIPPED | OUTPATIENT
Start: 2023-11-08 | End: 2023-11-15

## 2023-11-08 RX ORDER — CYANOCOBALAMIN 1000 UG/ML
1000 INJECTION, SOLUTION INTRAMUSCULAR; SUBCUTANEOUS ONCE
Status: COMPLETED | OUTPATIENT
Start: 2023-11-08 | End: 2023-11-08

## 2023-11-08 RX ORDER — MOMETASONE FUROATE 50 UG/1
SPRAY, METERED NASAL
Qty: 17 G | Refills: 2 | Status: SHIPPED | OUTPATIENT
Start: 2023-11-08

## 2023-11-08 RX ORDER — FEXOFENADINE HCL 180 MG/1
180 TABLET ORAL DAILY PRN
Qty: 90 TABLET | Refills: 3 | Status: SHIPPED | OUTPATIENT
Start: 2023-11-08

## 2023-11-08 RX ORDER — LIDOCAINE 4 G/G
1 PATCH TOPICAL DAILY
Qty: 30 PATCH | Refills: 0 | Status: SHIPPED | OUTPATIENT
Start: 2023-11-08 | End: 2023-12-08

## 2023-11-08 RX ORDER — BACLOFEN 10 MG/1
10 TABLET ORAL 3 TIMES DAILY
Qty: 90 TABLET | Refills: 1 | Status: SHIPPED | OUTPATIENT
Start: 2023-11-08

## 2023-11-08 RX ADMIN — CYANOCOBALAMIN 1000 MCG: 1000 INJECTION, SOLUTION INTRAMUSCULAR at 17:21

## 2023-11-08 ASSESSMENT — ENCOUNTER SYMPTOMS
SHORTNESS OF BREATH: 0
SINUS PAIN: 1
WHEEZING: 0
SINUS PRESSURE: 1

## 2023-11-08 NOTE — TELEPHONE ENCOUNTER
Medication Refill Request    Garrett Win is requesting a refill of the following medication(s):   Requested Prescriptions     Pending Prescriptions Disp Refills    baclofen (LIORESAL) 10 MG tablet [Pharmacy Med Name: BACLOFEN 10 MG TABLET] 90 tablet 1     Sig: TAKE 1 TABLET BY MOUTH THREE TIMES A DAY    mometasone (NASONEX) 50 MCG/ACT nasal spray [Pharmacy Med Name: MOMETASONE FUROATE 50 MCG SPRY]  2     Sig: USE ONE TIME DAILY AS DIRECTED BY NASAL ROUTE        Last Office Visit Date: 09/18/2023    Please send refill to:    [facility]/pharmacy [address] Unknown Phy [phone]  [address] [place], [place] [address]  Phone: [phone] Fax: [phone]

## 2023-11-08 NOTE — PROGRESS NOTES
History of Present Illness:     Chief Complaint   Patient presents with    Fingernail Cracking    Anxiety     Increase anxiety associated with panic attacks     Oral/Mouth Dryness     Worsening/Unable to Sissy Jurist is a 47 y.o. female     Still having sinus pressure  Difficulty coughing up phegm  What she coughs up is thick and dark  Not taking Mucinex bc pharmacy wont fill  Using her nasal spray every day    PMH (REVIEWED):  Past Medical History:   Diagnosis Date    ADHD (attention deficit hyperactivity disorder)     Adverse effect of anesthesia     itching around face and dry rash    Crohn disease ([address]) 4/19/2010    Depression 4/19/2010    GERD (gastroesophageal reflux disease)     History of vascular access device 07/18/2017    [facility] VAT - 33 cm right brachial PICC for abx and limited access    Irritable bowel syndrome     Kidney stones     History of stent placed and removed    Lyme disease     Panic attack     Perforation bowel ([address]) 7/4/2017    S/p ruddy Garcia 7/2017    Peripheral neuropathy 2/2/2012    B12 deficiency MRI brain normal 1/2012 MRA head normal 1/2012 CTA neck normal 1/2012     Renal calculi 9/6/2020    Vertigo     Vitamin B12 deficiency 1/21/2012    164 on 1/2012 Needs 1000 mcg IM twice a week     Vitamin E deficiency        Current Medications/Allergies (REVIEWED):     Current Outpatient Medications on File Prior to Visit   Medication Sig Dispense Refill    baclofen (LIORESAL) 10 MG tablet TAKE 1 TABLET BY MOUTH THREE TIMES A DAY 90 tablet 1    mometasone (NASONEX) 50 MCG/ACT nasal spray USE ONE TIME DAILY AS DIRECTED BY NASAL ROUTE 17 g 2    furosemide (LASIX) 20 MG tablet TAKE 1/2 TABLET BY MOUTH DAILY 45 tablet 0    albuterol sulfate HFA (PROVENTIL;VENTOLIN;PROAIR) 108 (90 Base) MCG/ACT inhaler INHALE 2 PUFFS INTO THE LUNGS AS NEEDED FOR WHEEZING.  8.5 each 1    Artificial Tear Solution (GENTEAL TEARS) 0.1-0.2-0.3 % SOLN PLACE 1 DROP INTO BOTH EYES IN THE MORNING AND

## 2023-11-08 NOTE — TELEPHONE ENCOUNTER
Medication Refill Request    Jyothi Trevino is requesting a refill of the following medication(s):   Requested Prescriptions     Pending Prescriptions Disp Refills    fexofenadine (ALLEGRA) 180 MG tablet [Pharmacy Med Name: FEXOFENADINE  MG TABLET] 30 tablet 8     Sig: TAKE 1 TABLET BY MOUTH DAILY AS NEEDED FOR ALLERGIES.         Last Office Visit Date: 09/18/2023    Please send refill to:    [facility]/pharmacy [address] [place] [phone]  [address] [place] [address]. [place] [address]  Phone: [phone] Fax: [phone]

## 2023-12-05 DIAGNOSIS — M62.838 OTHER MUSCLE SPASM: ICD-10-CM

## 2023-12-05 RX ORDER — LIDOCAINE 4 G/G
1 PATCH TOPICAL DAILY
Qty: 30 PATCH | Refills: 0 | OUTPATIENT
Start: 2023-12-05 | End: 2024-01-04

## 2023-12-05 RX ORDER — LIDOCAINE PAIN RELIEF 40 MG/1000MG
PATCH TOPICAL
Qty: 30 PATCH | Refills: 0 | OUTPATIENT
Start: 2023-12-05

## 2023-12-06 NOTE — TELEPHONE ENCOUNTER
Medication Refill Request    April ERINN Hadley Client is requesting a refill of the following medication(s):   Requested Prescriptions     Pending Prescriptions Disp Refills    lidocaine 4 % external patch 30 patch 0     Sig: Place 1 patch onto the skin daily      Last provider to prescribe medication: Dr. Gricelda Wolff  Last Date of Medication Prescribed: 11/08/2023   Last Office Visit Date: 11/08/2023    Please send refill to:    [facility]/pharmacy #[id number]- [address] [phone]  [address] [place] [address]  Phone: [phone] Fax: [phone]

## 2023-12-07 RX ORDER — LIDOCAINE 4 G/G
1 PATCH TOPICAL DAILY
Qty: 30 PATCH | Refills: 0 | Status: SHIPPED | OUTPATIENT
Start: 2023-12-07 | End: 2024-01-06

## 2023-12-07 RX ORDER — LIDOCAINE 4 G/G
1 PATCH TOPICAL DAILY
Qty: 30 PATCH | Refills: 0 | OUTPATIENT
Start: 2023-12-07 | End: 2024-01-06

## 2023-12-14 DIAGNOSIS — R09.81 SINUS CONGESTION: ICD-10-CM

## 2023-12-14 RX ORDER — ALBUTEROL SULFATE 90 UG/1
AEROSOL, METERED RESPIRATORY (INHALATION)
Qty: 8.5 EACH | Refills: 1 | Status: CANCELLED | OUTPATIENT
Start: 2023-12-14

## 2023-12-14 RX ORDER — MOMETASONE FUROATE 50 UG/1
SPRAY, METERED NASAL DAILY
Qty: 17 G | Refills: 2 | Status: SHIPPED | OUTPATIENT
Start: 2023-12-14

## 2023-12-14 RX ORDER — DEXTRAN 70, GLYCERIN, HYPROMELLOSE 1; 2; 3 MG/ML; MG/ML; MG/ML
1 SOLUTION/ DROPS OPHTHALMIC EVERY 6 HOURS
Qty: 15 ML | Refills: 1 | OUTPATIENT
Start: 2023-12-14

## 2023-12-14 RX ORDER — DEXTRAN 70, GLYCERIN, HYPROMELLOSE 1; 2; 3 MG/ML; MG/ML; MG/ML
1 SOLUTION/ DROPS OPHTHALMIC EVERY 6 HOURS
Qty: 15 ML | Refills: 1 | Status: CANCELLED | OUTPATIENT
Start: 2023-12-14

## 2023-12-14 RX ORDER — ALBUTEROL SULFATE 90 UG/1
AEROSOL, METERED RESPIRATORY (INHALATION)
Qty: 8.5 EACH | Refills: 1 | Status: SHIPPED | OUTPATIENT
Start: 2023-12-14

## 2023-12-14 RX ORDER — DEXTRAN 70, GLYCERIN, HYPROMELLOSE 1; 2; 3 MG/ML; MG/ML; MG/ML
1 SOLUTION/ DROPS OPHTHALMIC EVERY 6 HOURS
Qty: 15 ML | Refills: 1 | Status: SHIPPED | OUTPATIENT
Start: 2023-12-14

## 2023-12-27 ENCOUNTER — OFFICE VISIT (OUTPATIENT)
Age: 53
End: 2023-12-27
Payer: MEDICAID

## 2023-12-27 VITALS
HEART RATE: 101 BPM | OXYGEN SATURATION: 96 % | SYSTOLIC BLOOD PRESSURE: 135 MMHG | BODY MASS INDEX: 24.99 KG/M2 | DIASTOLIC BLOOD PRESSURE: 82 MMHG | TEMPERATURE: 98.6 F | RESPIRATION RATE: 18 BRPM | WEIGHT: 135.8 LBS | HEIGHT: 62 IN

## 2023-12-27 DIAGNOSIS — S81.802D WOUND OF LEFT LOWER EXTREMITY, SUBSEQUENT ENCOUNTER: ICD-10-CM

## 2023-12-27 DIAGNOSIS — L81.9 HYPERPIGMENTED SKIN LESION: Primary | ICD-10-CM

## 2023-12-27 DIAGNOSIS — B37.0 THRUSH: ICD-10-CM

## 2023-12-27 DIAGNOSIS — E53.8 VITAMIN B12 DEFICIENCY: ICD-10-CM

## 2023-12-27 PROCEDURE — 99213 OFFICE O/P EST LOW 20 MIN: CPT | Performed by: FAMILY MEDICINE

## 2023-12-27 PROCEDURE — 96372 THER/PROPH/DIAG INJ SC/IM: CPT | Performed by: FAMILY MEDICINE

## 2023-12-27 PROCEDURE — PBSHW PBB SHADOW CHARGE: Performed by: FAMILY MEDICINE

## 2023-12-27 RX ORDER — CYANOCOBALAMIN 1000 UG/ML
1000 INJECTION, SOLUTION INTRAMUSCULAR; SUBCUTANEOUS ONCE
Status: COMPLETED | OUTPATIENT
Start: 2023-12-27 | End: 2023-12-27

## 2023-12-27 RX ADMIN — CYANOCOBALAMIN 1000 MCG: 1000 INJECTION, SOLUTION INTRAMUSCULAR at 17:19

## 2023-12-27 NOTE — PROGRESS NOTES
History of Present Illness:     Chief Complaint   Patient presents with    Lesion(s)     Patient is in the office with a c/o lesion located on the left lateral calf associated with redness. Follow-up       Ravin Velasquez is a 47 y.o. female     Doing well    Psychiatrist changed her meds  Taken off of Lexapro  Started on Lamictal    Lesion on left lower leg  Non healing since scab fell off 1 month ago  Started out looking like a hematoma  Initially concerned for infection bc of drainage  Tried Mupirocin but did not help     PMH (REVIEWED):  Past Medical History:   Diagnosis Date    ADHD (attention deficit hyperactivity disorder)     Adverse effect of anesthesia     itching around face and dry rash    Crohn disease ([address]) 4/19/2010    Depression 4/19/2010    GERD (gastroesophageal reflux disease)     History of vascular access device 07/18/2017    [facility] VAT - 33 cm right brachial PICC for abx and limited access    Irritable bowel syndrome     Kidney stones     History of stent placed and removed    Lyme disease     Panic attack     Perforation bowel ([address]) 7/4/2017    S/p sugery Dr. Clemon Cheadle 7/2017    Peripheral neuropathy 2/2/2012    B12 deficiency MRI brain normal 1/2012 MRA head normal 1/2012 CTA neck normal 1/2012     Renal calculi 9/6/2020    Vertigo     Vitamin B12 deficiency 1/21/2012    164 on 1/2012 Needs 1000 mcg IM twice a week     Vitamin E deficiency        Current Medications/Allergies (REVIEWED):     Current Outpatient Medications on File Prior to Visit   Medication Sig Dispense Refill    mometasone (NASONEX) 50 MCG/ACT nasal spray by Nasal route daily 17 g 2    Artificial Tear Solution (GENTEAL TEARS) 0.1-0.2-0.3 % SOLN Place 1 drop into both eyes every 6 hours 15 mL 1    albuterol sulfate HFA (PROVENTIL;VENTOLIN;PROAIR) 108 (90 Base) MCG/ACT inhaler INHALE 2 PUFFS INTO THE LUNGS AS NEEDED FOR WHEEZING.  8.5 each 1    lidocaine 4 % external patch Place 1 patch onto the skin daily 30 patch 0

## 2023-12-31 DIAGNOSIS — M62.838 OTHER MUSCLE SPASM: ICD-10-CM

## 2024-01-02 RX ORDER — BACLOFEN 10 MG/1
10 TABLET ORAL 3 TIMES DAILY
Qty: 90 TABLET | Refills: 1 | Status: SHIPPED | OUTPATIENT
Start: 2024-01-02

## 2024-01-02 NOTE — TELEPHONE ENCOUNTER
Medication Refill Request    Edgar Martell is requesting a refill of the following medication(s):   Requested Prescriptions     Pending Prescriptions Disp Refills    baclofen (LIORESAL) 10 MG tablet [Pharmacy Med Name: BACLOFEN 10 MG TABLET] 90 tablet 1     Sig: TAKE 1 TABLET BY MOUTH THREE TIMES A DAY        Listed PCP is Shauna Perez MD   Last provider to prescribe medication: Dr. Perez  Last Date of Medication Prescribed: 11/08/2023   Date of Last Office Visit at [facility]: 12/27/2023   Labs Labs: N/A  Future Appointment:   Future Appointments   Date Time Provider Department Center   2/8/2024  4:00 PM Shauna Perez MD [facility] BS AMB       Refill Request Note:    Please send refill to:    [facility]/pharmacy #[id number] - [place], [place] - [address] - P [phone] - F [phone]  [address] [facility] [address]  Phone: [phone] Fax: [phone]

## 2024-01-04 DIAGNOSIS — M62.838 OTHER MUSCLE SPASM: ICD-10-CM

## 2024-01-05 RX ORDER — LIDOCAINE PAIN RELIEF 40 MG/1000MG
PATCH TOPICAL
Qty: 25 PATCH | Refills: 1 | Status: SHIPPED | OUTPATIENT
Start: 2024-01-05

## 2024-02-05 DIAGNOSIS — R09.81 SINUS CONGESTION: ICD-10-CM

## 2024-02-05 DIAGNOSIS — B37.0 THRUSH: ICD-10-CM

## 2024-02-05 DIAGNOSIS — R11.0 NAUSEA: ICD-10-CM

## 2024-02-05 DIAGNOSIS — R14.0 ABDOMINAL BLOATING: ICD-10-CM

## 2024-02-05 DIAGNOSIS — R60.9 PERIPHERAL EDEMA: ICD-10-CM

## 2024-02-05 DIAGNOSIS — E55.9 VITAMIN D DEFICIENCY, UNSPECIFIED: ICD-10-CM

## 2024-02-05 DIAGNOSIS — M62.838 OTHER MUSCLE SPASM: ICD-10-CM

## 2024-02-05 RX ORDER — MOMETASONE FUROATE 50 UG/1
SPRAY, METERED NASAL DAILY
Qty: 17 G | Refills: 2 | Status: SHIPPED | OUTPATIENT
Start: 2024-02-05

## 2024-02-05 RX ORDER — ONDANSETRON 4 MG/1
4 TABLET, FILM COATED ORAL EVERY 8 HOURS PRN
Qty: 30 TABLET | Refills: 3 | Status: SHIPPED | OUTPATIENT
Start: 2024-02-05

## 2024-02-05 RX ORDER — ALBUTEROL SULFATE 90 UG/1
AEROSOL, METERED RESPIRATORY (INHALATION)
Qty: 8.5 EACH | Refills: 1 | Status: SHIPPED | OUTPATIENT
Start: 2024-02-05

## 2024-02-05 RX ORDER — ACETAMINOPHEN 160 MG
1 TABLET,DISINTEGRATING ORAL DAILY
Qty: 90 CAPSULE | Refills: 3 | Status: SHIPPED | OUTPATIENT
Start: 2024-02-05

## 2024-02-05 RX ORDER — FEXOFENADINE HCL 180 MG/1
180 TABLET ORAL DAILY
Qty: 90 TABLET | Refills: 3 | Status: SHIPPED | OUTPATIENT
Start: 2024-02-05

## 2024-02-05 RX ORDER — DEXTRAN 70, GLYCERIN, HYPROMELLOSE 1; 2; 3 MG/ML; MG/ML; MG/ML
1 SOLUTION/ DROPS OPHTHALMIC EVERY 6 HOURS
Qty: 15 ML | Refills: 1 | Status: SHIPPED | OUTPATIENT
Start: 2024-02-05

## 2024-02-05 RX ORDER — BACLOFEN 10 MG/1
10 TABLET ORAL 3 TIMES DAILY
Qty: 90 TABLET | Refills: 1 | Status: SHIPPED | OUTPATIENT
Start: 2024-02-05

## 2024-02-05 RX ORDER — FUROSEMIDE 20 MG/1
10 TABLET ORAL DAILY
Qty: 45 TABLET | Refills: 0 | Status: SHIPPED | OUTPATIENT
Start: 2024-02-05

## 2024-02-05 RX ORDER — LIDOCAINE 4 G/G
PATCH TOPICAL
Qty: 25 PATCH | Refills: 1 | Status: SHIPPED | OUTPATIENT
Start: 2024-02-05

## 2024-02-08 ENCOUNTER — OFFICE VISIT (OUTPATIENT)
Age: 54
End: 2024-02-08
Payer: MEDICAID

## 2024-02-08 VITALS
WEIGHT: 136 LBS | BODY MASS INDEX: 25.03 KG/M2 | DIASTOLIC BLOOD PRESSURE: 80 MMHG | HEIGHT: 62 IN | RESPIRATION RATE: 18 BRPM | OXYGEN SATURATION: 99 % | HEART RATE: 95 BPM | SYSTOLIC BLOOD PRESSURE: 128 MMHG

## 2024-02-08 DIAGNOSIS — E53.8 VITAMIN B12 DEFICIENCY: Primary | ICD-10-CM

## 2024-02-08 DIAGNOSIS — R14.0 ABDOMINAL BLOATING: ICD-10-CM

## 2024-02-08 PROCEDURE — 99213 OFFICE O/P EST LOW 20 MIN: CPT | Performed by: FAMILY MEDICINE

## 2024-02-08 PROCEDURE — PBSHW PBB SHADOW CHARGE: Performed by: FAMILY MEDICINE

## 2024-02-08 PROCEDURE — 96372 THER/PROPH/DIAG INJ SC/IM: CPT | Performed by: FAMILY MEDICINE

## 2024-02-08 RX ORDER — TRAZODONE HYDROCHLORIDE 50 MG/1
50 TABLET ORAL NIGHTLY
COMMUNITY
Start: 2024-02-07

## 2024-02-08 RX ORDER — CYANOCOBALAMIN 1000 UG/ML
1000 INJECTION, SOLUTION INTRAMUSCULAR; SUBCUTANEOUS ONCE
Status: COMPLETED | OUTPATIENT
Start: 2024-02-08 | End: 2024-02-08

## 2024-02-08 RX ORDER — LAMOTRIGINE 25 MG/1
25 TABLET ORAL DAILY
COMMUNITY
Start: 2024-02-07

## 2024-02-08 RX ORDER — ESCITALOPRAM OXALATE 20 MG/1
20 TABLET ORAL EVERY MORNING
COMMUNITY
Start: 2024-01-30

## 2024-02-08 RX ORDER — DICYCLOMINE HYDROCHLORIDE 10 MG/1
10 CAPSULE ORAL 4 TIMES DAILY
Qty: 120 CAPSULE | Refills: 0 | Status: SHIPPED | OUTPATIENT
Start: 2024-02-08

## 2024-02-08 RX ADMIN — CYANOCOBALAMIN 1000 MCG: 1000 INJECTION, SOLUTION INTRAMUSCULAR at 17:20

## 2024-02-08 ASSESSMENT — ENCOUNTER SYMPTOMS
ABDOMINAL DISTENTION: 1
WHEEZING: 0
ABDOMINAL PAIN: 0
SHORTNESS OF BREATH: 0

## 2024-02-08 NOTE — PROGRESS NOTES
Assessment/Plan:    Problem List Items Addressed This Visit        Endocrine    Type 2 diabetes mellitus without complication, without long-term current use of insulin ([facility] [address] )    Relevant Orders    Lipid Panel with Direct LDL reflex      Other Visit Diagnoses     Encounter for vaccination    -  Primary    Relevant Orders    influenza vaccine, high-dose, PF 0 7 mL (FLUZONE HIGH-DOSE) (Completed)    Lumbar radiculitis        Relevant Medications    naproxen (NAPROSYN) 500 mg tablet    Other Relevant Orders    Ambulatory referral to Physical Therapy    Basic metabolic panel           Diagnoses and all orders for this visit:    Encounter for vaccination  -     influenza vaccine, high-dose, PF 0 7 mL (FLUZONE HIGH-DOSE)    Lumbar radiculitis  -     naproxen (NAPROSYN) 500 mg tablet; Take 1 tablet (500 mg total) by mouth 2 (two) times a day as needed for mild pain for up to 14 days  -     Ambulatory referral to Physical Therapy; Future  -     Basic metabolic panel; Future    Type 2 diabetes mellitus without complication, without long-term current use of insulin ([facility])  -     Lipid Panel with Direct LDL reflex; Future        No problem-specific Assessment & Plan notes found for this encounter  Subjective: The patient was seen and examined and noted to have issues with back pain radiating into the the left anterior thigh without radiation beyone the left knee  Patient ID: Bob Jay is a 80 y o  male  CT done in the ER shows changes at the lever of the spine around the level of L4  The patient states that the prednisone helped, but did not resolve his issues  The patient notes episodic severe pain radiating to the area of the left anterior knee  He notes no persistent weakness or numbness        The following portions of the patient's history were reviewed and updated as appropriate:   He has a past medical history of Acute systolic heart failure [facility] ) (05/30/2018), Cardiomyopathy ([facility] ) (05/07/2018), Room: 7    Identified pt with two pt identifiers(name and ). Reviewed record in preparation for visit and have obtained necessary documentation.    Chief Complaint   Patient presents with    1 Month Follow-Up    Excessive Gas     Excessive Gas, bloating and abdominal discomfort   States she contacted Dr. Claudio Office and physician recommended patient to take Magnesium daily due to Crohns States magnesium without relief. BRAT Diet currently due to symptoms     Discuss Labs        Health Maintenance Due   Topic    Hepatitis B vaccine (1 of 3 - 3-dose series)    COVID-19 Vaccine ( season)       Vitals:    24 1618   BP: 128/80   Site: Left Upper Arm   Position: Sitting   Cuff Size: Medium Adult   Pulse: 95   Resp: 18   SpO2: 99%   Weight: 61.7 kg (136 lb)   Height: 1.575 m (5' 2\")           Coordination of Care Questionnaire:  :   1. Have you been to the ER, urgent care clinic since your last visit?  Hospitalized since your last visit?No    2. Have you seen or consulted any other health care providers outside of the [facility] since your last visit?  Include any pap smears or colon screening. No    This patient is accompanied in the office by her self.  I have received verbal consent from Edgar Martell to discuss any/all medical information while they are present in the room.    Coronary artery disease involving native coronary artery of native heart without angina pectoris (05/30/2018), Dilated aortic root ([facility] [address] ) (05/30/2018), Fitting or adjustment of automatic implantable cardioverter-defibrillator (12/04/2018), ICD (implantable cardioverter-defibrillator), dual, in situ, Left atrial dilatation (05/30/2018), Non-rheumatic mitral regurgitation (05/30/2018), and Non-sustained ventricular tachycardia ([facility] ) (05/30/2018)  ,  does not have any pertinent problems on file  ,   has a past surgical history that includes Colonoscopy; Cardiac defibrillator placement; and Hernia repair  ,  family history includes Alcohol abuse in his father; Bone cancer in his family; COPD in his mother; Diabetes in his father; Lung disease in his brother  ,   reports that he has never smoked  He has never used smokeless tobacco  He reports previous alcohol use  He reports that he does not use drugs  ,  has No Known Allergies     Current Outpatient Medications   Medication Sig Dispense Refill    Accu-Chek FastClix Lancets MISC Use daily 102 each 3    Accu-Chek Softclix Lancets lancets Use 2 (two) times a day Use as instructed 200 each 5    aspirin (ECOTRIN LOW STRENGTH) 81 mg EC tablet Take 81 mg by mouth daily      atorvastatin (LIPITOR) 40 mg tablet Take 1 tablet (40 mg total) by mouth daily 30 tablet 5    Blood Glucose Monitoring Suppl (Accu-Chek Guide) w/Device KIT Use daily Test once daily 1 kit 0    carvedilol (COREG) 6 25 mg tablet 2 (two) times a day      furosemide (LASIX) 20 mg tablet Take 1 tablet (20 mg total) by mouth daily 30 tablet 5    glucose blood (Accu-Chek Guide) test strip TEST SUGAR ONCE DAILY 100 strip 3    lisinopril (ZESTRIL) 2 5 mg tablet Take 1 tablet (2 5 mg total) by mouth daily 90 tablet 1    multivitamin (THERAGRAN) TABS Take 1 tablet by mouth daily      cyclobenzaprine (FLEXERIL) 10 mg tablet Take 1 tablet (10 mg total) by mouth 2 (two) times a day as needed for muscle spasms (Patient not taking: Reported on 8/31/2021) 20 tablet 0    meloxicam (MOBIC) 15 mg tablet Take 1 tablet (15 mg total) by mouth daily (Patient not taking: Reported on 9/14/2021) 30 tablet 1    methylPREDNISolone 4 MG tablet therapy pack Use as directed on package (Patient not taking: Reported on 9/14/2021) 21 tablet 0    naproxen (NAPROSYN) 500 mg tablet Take 1 tablet (500 mg total) by mouth 2 (two) times a day as needed for mild pain for up to 14 days 28 tablet 0    predniSONE 10 mg tablet 3 Tabs PO QDay x 3 Days, Then 2 Tabs PO QDay x 3 Days, Then 1 Tab PO QDay x 3 Days, Then 1/2 Tab PO QDay x 4 Days, Then Stop (Patient not taking: Reported on 9/14/2021) 20 tablet 0    spironolactone (ALDACTONE) 25 mg tablet Take 1 tablet (25 mg total) by mouth daily 30 tablet 5     No current facility-administered medications for this visit  Review of Systems   Constitutional: Negative for chills, fatigue and fever  HENT: Negative  Respiratory: Negative for cough, chest tightness and shortness of breath  Cardiovascular: Negative for chest pain and palpitations  Gastrointestinal: Negative for abdominal pain, constipation, diarrhea, nausea and vomiting  Genitourinary: Negative  Musculoskeletal: Negative for back pain and myalgias  Skin: Negative  Neurological: Negative  Psychiatric/Behavioral: Negative for dysphoric mood  The patient is not nervous/anxious  Objective:  Vitals:    09/14/21 1545   BP: 100/68   Pulse: 74   Resp: 18   Temp: 97 8 °F (36 6 °C)   TempSrc: Tympanic   SpO2: 97%   Weight: 94 3 kg (208 lb)   Height: 5' 6" (1 676 m)     Body mass index is 33 57 kg/m²  Physical Exam  Vitals and nursing note reviewed  Constitutional:       Appearance: He is well-developed  HENT:      Head: Normocephalic and atraumatic  Eyes:      Pupils: Pupils are equal, round, and reactive to light  Cardiovascular:      Rate and Rhythm: Normal rate and regular rhythm        Heart sounds: Normal heart sounds  No murmur heard  Pulmonary:      Effort: Pulmonary effort is normal       Breath sounds: Normal breath sounds  No stridor  No rales  Abdominal:      General: Bowel sounds are normal  There is no distension  Palpations: Abdomen is soft  Tenderness: There is no abdominal tenderness  Musculoskeletal:         General: No deformity  Normal range of motion  Cervical back: Normal range of motion and neck supple  Skin:     General: Skin is warm and dry  Neurological:      Mental Status: He is alert and oriented to person, place, and time            PHQ-9 Depression Screening    PHQ-9:   Frequency of the following problems over the past two weeks:

## 2024-02-08 NOTE — PROGRESS NOTES
History of Present Illness:     Chief Complaint   Patient presents with    1 Month Follow-Up    Excessive Gas     Excessive Gas, bloating and abdominal discomfort   States she contacted Dr. Claudio Office and physician recommended patient to take Magnesium daily due to Crohns States magnesium without relief. BRAT Diet currently due to symptoms     Discuss Labs       Edgar Martell is a 53 y.o. female     Excessive gas  Called her GI  Told to trial Gas X and Magnesium Citrate gummies    Still seeing psych  Stopped taking Prozac due to SE  Still taking Lamictal and Lexapro    PMH (REVIEWED):  Past Medical History:   Diagnosis Date    ADHD (attention deficit hyperactivity disorder)     Adverse effect of anesthesia     itching around face and dry rash    Crohn disease ([facility]) 4/19/2010    Depression 4/19/2010    GERD (gastroesophageal reflux disease)     History of vascular access device 07/18/2017    [facility] VAT - 33 cm right brachial PICC for abx and limited access    Irritable bowel syndrome     Kidney stones     History of stent placed and removed    Lyme disease     Panic attack     Perforation bowel ([facility]) 7/4/2017    S/p ruddy Jj 7/2017    Peripheral neuropathy 2/2/2012    B12 deficiency MRI brain normal 1/2012 MRA head normal 1/2012 CTA neck normal 1/2012     Renal calculi 9/6/2020    Vertigo     Vitamin B12 deficiency 1/21/2012    164 on 1/2012 Needs 1000 mcg IM twice a week     Vitamin E deficiency        Current Medications/Allergies (REVIEWED):     Current Outpatient Medications on File Prior to Visit   Medication Sig Dispense Refill    escitalopram (LEXAPRO) 20 MG tablet Take 1 tablet by mouth every morning      lamoTRIgine (LAMICTAL) 25 MG tablet Take 1 tablet by mouth daily      traZODone (DESYREL) 50 MG tablet Take 1 tablet by mouth nightly      ondansetron (ZOFRAN) 4 MG tablet Take 1 tablet by mouth every 8 hours as needed for Nausea 30 tablet 3    Melatonin 5 MG CAPS TAKE 1 CAPSULE BY MOUTH

## 2024-03-03 DIAGNOSIS — R14.0 ABDOMINAL BLOATING: ICD-10-CM

## 2024-03-06 ENCOUNTER — OFFICE VISIT (OUTPATIENT)
Age: 54
End: 2024-03-06
Payer: MEDICAID

## 2024-03-06 VITALS
DIASTOLIC BLOOD PRESSURE: 76 MMHG | HEART RATE: 74 BPM | SYSTOLIC BLOOD PRESSURE: 119 MMHG | RESPIRATION RATE: 16 BRPM | HEIGHT: 62 IN | BODY MASS INDEX: 24.87 KG/M2 | OXYGEN SATURATION: 98 %

## 2024-03-06 DIAGNOSIS — E53.8 VITAMIN B12 DEFICIENCY: ICD-10-CM

## 2024-03-06 DIAGNOSIS — R14.0 ABDOMINAL BLOATING: ICD-10-CM

## 2024-03-06 DIAGNOSIS — R30.0 DYSURIA: ICD-10-CM

## 2024-03-06 DIAGNOSIS — R10.9 BILATERAL FLANK PAIN: Primary | ICD-10-CM

## 2024-03-06 LAB
BILIRUBIN, URINE, POC: ABNORMAL
BLOOD URINE, POC: NEGATIVE
GLUCOSE URINE, POC: NEGATIVE
KETONES, URINE, POC: ABNORMAL
LEUKOCYTE ESTERASE, URINE, POC: NEGATIVE
NITRITE, URINE, POC: NEGATIVE
PH, URINE, POC: 6 (ref 4.6–8)
PROTEIN,URINE, POC: ABNORMAL
SPECIFIC GRAVITY, URINE, POC: 1.02 (ref 1–1.03)
URINALYSIS CLARITY, POC: CLEAR
URINALYSIS COLOR, POC: YELLOW
UROBILINOGEN, POC: ABNORMAL

## 2024-03-06 PROCEDURE — 99213 OFFICE O/P EST LOW 20 MIN: CPT | Performed by: FAMILY MEDICINE

## 2024-03-06 PROCEDURE — PBSHW PBB SHADOW CHARGE: Performed by: FAMILY MEDICINE

## 2024-03-06 PROCEDURE — 81003 URINALYSIS AUTO W/O SCOPE: CPT | Performed by: FAMILY MEDICINE

## 2024-03-06 PROCEDURE — PBSHW AMB POC URINALYSIS DIP STICK AUTO W/O MICRO: Performed by: FAMILY MEDICINE

## 2024-03-06 RX ORDER — DICYCLOMINE HYDROCHLORIDE 10 MG/1
10 CAPSULE ORAL 4 TIMES DAILY
Qty: 120 CAPSULE | Refills: 5 | Status: SHIPPED | OUTPATIENT
Start: 2024-03-06

## 2024-03-06 RX ORDER — FLUOXETINE HYDROCHLORIDE 20 MG/1
10 CAPSULE ORAL DAILY
COMMUNITY
Start: 2024-02-07

## 2024-03-06 RX ORDER — CYANOCOBALAMIN 1000 UG/ML
1000 INJECTION, SOLUTION INTRAMUSCULAR; SUBCUTANEOUS ONCE
Status: COMPLETED | OUTPATIENT
Start: 2024-03-06 | End: 2024-03-06

## 2024-03-06 RX ORDER — DICYCLOMINE HYDROCHLORIDE 10 MG/1
10 CAPSULE ORAL 4 TIMES DAILY
Qty: 120 CAPSULE | Refills: 0 | OUTPATIENT
Start: 2024-03-06

## 2024-03-06 RX ADMIN — CYANOCOBALAMIN 1000 MCG: 1000 INJECTION, SOLUTION INTRAMUSCULAR at 17:14

## 2024-03-06 ASSESSMENT — ENCOUNTER SYMPTOMS
ABDOMINAL DISTENTION: 1
WHEEZING: 0
SHORTNESS OF BREATH: 0
ABDOMINAL PAIN: 0

## 2024-03-06 NOTE — PROGRESS NOTES
Room: 6    Identified pt with two pt identifiers(name and ). Reviewed record in preparation for visit and have obtained necessary documentation.    Chief Complaint   Patient presents with    1 Month Follow-Up    Urinary Frequency     Urinary Frequency and Urinary Pressure         Health Maintenance Due   Topic    Hepatitis B vaccine (1 of 3 - 3-dose series)    COVID-19 Vaccine ( season)       Vitals:    24 1625   Height: 1.575 m (5' 2\")           Coordination of Care Questionnaire:  :   1. Have you been to the ER, urgent care clinic since your last visit?  Hospitalized since your last visit?No    2. Have you seen or consulted any other health care providers outside of the [facility] since your last visit?  Include any pap smears or colon screening. No    This patient is accompanied in the office by her self.  I have received verbal consent from Edgar Martell to discuss any/all medical information while they are present in the room.

## 2024-03-06 NOTE — PROGRESS NOTES
History of Present Illness:     Chief Complaint   Patient presents with    1 Month Follow-Up    Urinary Frequency     Urinary Frequency and Urinary Pressure        Edgar Martell is a 53 y.o. female     Excessive gas  Doing well on Bentyl    Feels she has discomfort with urination  Pressure, going every 5 min  Right sided low back pain    Followed by Psych  Started back on Prozac  Doing well    PMH (REVIEWED):  Past Medical History:   Diagnosis Date    ADHD (attention deficit hyperactivity disorder)     Adverse effect of anesthesia     itching around face and dry rash    Crohn disease ([facility]) 4/19/2010    Depression 4/19/2010    GERD (gastroesophageal reflux disease)     History of vascular access device 07/18/2017    [facility] VAT - 33 cm right brachial PICC for abx and limited access    Irritable bowel syndrome     Kidney stones     History of stent placed and removed    Lyme disease     Panic attack     Perforation bowel ([facility]) 7/4/2017    S/p ruddy Jj 7/2017    Peripheral neuropathy 2/2/2012    B12 deficiency MRI brain normal 1/2012 MRA head normal 1/2012 CTA neck normal 1/2012     Renal calculi 9/6/2020    Vertigo     Vitamin B12 deficiency 1/21/2012    164 on 1/2012 Needs 1000 mcg IM twice a week     Vitamin E deficiency        Current Medications/Allergies (REVIEWED):     Current Outpatient Medications on File Prior to Visit   Medication Sig Dispense Refill    FLUoxetine (PROZAC) 20 MG capsule Take 10 mg by mouth daily      escitalopram (LEXAPRO) 20 MG tablet Take 1 tablet by mouth every morning      lamoTRIgine (LAMICTAL) 25 MG tablet Take 1 tablet by mouth daily      traZODone (DESYREL) 50 MG tablet Take 1 tablet by mouth nightly      ondansetron (ZOFRAN) 4 MG tablet Take 1 tablet by mouth every 8 hours as needed for Nausea 30 tablet 3    Melatonin 5 MG CAPS TAKE 1 CAPSULE BY MOUTH EVERYDAY AT BEDTIME 90 capsule 3    Cholecalciferol (VITAMIN D3) 50 MCG (2000 UT) CAPS Take 1 capsule by mouth daily 90

## 2024-03-07 LAB
BACTERIA UR CULT: NORMAL
BUN SERPL-MCNC: 12 MG/DL (ref 6–24)
BUN/CREAT SERPL: 10 (ref 9–23)
CALCIUM SERPL-MCNC: 9.6 MG/DL (ref 8.7–10.2)
CHLORIDE SERPL-SCNC: 110 MMOL/L (ref 96–106)
CO2 SERPL-SCNC: 18 MMOL/L (ref 20–29)
CREAT SERPL-MCNC: 1.25 MG/DL (ref 0.57–1)
EGFRCR SERPLBLD CKD-EPI 2021: 52 ML/MIN/1.73
GLUCOSE SERPL-MCNC: 80 MG/DL (ref 70–99)
HCT VFR BLD AUTO: 37.6 % (ref 34–46.6)
HGB BLD-MCNC: 12.2 G/DL (ref 11.1–15.9)
MAGNESIUM SERPL-MCNC: 2.1 MG/DL (ref 1.6–2.3)
MCH RBC QN AUTO: 29 PG (ref 26.6–33)
MCHC RBC AUTO-ENTMCNC: 32.4 G/DL (ref 31.5–35.7)
MCV RBC AUTO: 89 FL (ref 79–97)
PLATELET # BLD AUTO: 268 X10E3/UL (ref 150–450)
POTASSIUM SERPL-SCNC: 3.8 MMOL/L (ref 3.5–5.2)
RBC # BLD AUTO: 4.21 X10E6/UL (ref 3.77–5.28)
REPORT: NORMAL
SODIUM SERPL-SCNC: 146 MMOL/L (ref 134–144)

## 2024-03-08 LAB — VIT B12 SERPL-MCNC: 248 PG/ML (ref 232–1245)

## 2024-03-20 DIAGNOSIS — R09.81 SINUS CONGESTION: ICD-10-CM

## 2024-03-22 NOTE — TELEPHONE ENCOUNTER
Medication Refill Request    Edgar Martell is requesting a refill of the following medication(s):   Requested Prescriptions     Pending Prescriptions Disp Refills    albuterol sulfate HFA (PROVENTIL;VENTOLIN;PROAIR) 108 (90 Base) MCG/ACT inhaler [Pharmacy Med Name: ALBUTEROL HFA (PROAIR) INHALER] 8.5 each 1     Sig: INHALE 2 PUFFS INTO THE LUNGS AS NEEDED FOR WHEEZING.        Listed PCP is Shauna Perez MD   Last provider to prescribe medication: Dr. Perez  Last Date of Medication Prescribed: 02/05/2024   Date of Last Office Visit at [facility]: 03/06/2024     Future Appointment:   Future Appointments   Date Time Provider Department Center   5/8/2024  4:00 PM Shauna Perez MD [facility] BS AMB       Please send refill to:    [facility]/pharmacy #[id number] - [place], [place] - [address] - P [phone] - F [phone]  [address] [facility] [address]  Phone: [phone] Fax: [phone]

## 2024-03-25 RX ORDER — ALBUTEROL SULFATE 90 UG/1
AEROSOL, METERED RESPIRATORY (INHALATION)
Qty: 8.5 EACH | Refills: 3 | Status: SHIPPED | OUTPATIENT
Start: 2024-03-25

## 2024-04-11 RX ORDER — DEXTRAN 70, GLYCERIN, HYPROMELLOSE 1; 2; 3 MG/ML; MG/ML; MG/ML
1 SOLUTION/ DROPS OPHTHALMIC EVERY 6 HOURS
Qty: 15 ML | Refills: 1 | Status: SHIPPED | OUTPATIENT
Start: 2024-04-11

## 2024-04-11 NOTE — TELEPHONE ENCOUNTER
Medication Refill Request    Edgar Martell is requesting a refill of the following medication(s):   Requested Prescriptions     Pending Prescriptions Disp Refills    Artificial Tear Solution (GENTEAL TEARS) 0.1-0.2-0.3 % SOLN [Pharmacy Med Name: GENTEAL TEARS 0.1%-0.2%-0.3%] 15 mL 1     Sig: PLACE 1 DROP INTO BOTH EYES EVERY 6 HOURS.        Listed PCP is Shauna Perez MD   Last provider to prescribe medication: Dr. Perez  Last Date of Medication Prescribed: 02/05/2024   Date of Last Office Visit at [facility]: 03/06/2024     Future Appointment:   Future Appointments   Date Time Provider Department Center   5/8/2024  4:00 PM Shauna Perez MD [facility] BS AMB         Please send refill to:    [facility]/pharmacy #[id number] - [place], [place] - [address] - P [phone] - F [phone]  [address] [place] [address]  Phone: [phone] Fax: [phone]

## 2024-04-12 NOTE — PROGRESS NOTES
1440: discharge to [facility], all belongings and papers sent with. Report given to Radha NICOLE at [facility]. Patient in stable condition upon discharge.   Bedside shift change report given to CB RN (oncoming nurse) by Azar Goldberg RN (offgoing nurse). Report included the following information SBAR, Kardex, Intake/Output, MAR and Accordion.

## 2024-04-14 DIAGNOSIS — M62.838 OTHER MUSCLE SPASM: ICD-10-CM

## 2024-04-16 DIAGNOSIS — M62.838 OTHER MUSCLE SPASM: ICD-10-CM

## 2024-04-16 NOTE — TELEPHONE ENCOUNTER
Medication Refill Request    Edgar Martell is requesting a refill of the following medication(s):   Requested Prescriptions     Pending Prescriptions Disp Refills    lidocaine (LIDOCAINE PAIN RELIEF) 4 % external patch 25 patch 1     Sig: APPLY 1 PATCH ONTO THE SKIN EVERY DAY (LEAVE ON FOR 12 HOURS OFF FOR 12 HOURS)        Listed PCP is Shauna Perez MD   Last provider to prescribe medication: Chris  Last Date of Medication Prescribed: 02/05/2024   [facility]     Future Appointment:   Future Appointments   Date Time Provider Department Center   5/8/2024  4:00 PM Shauna Perez MD [facility] BS AMB       Please send refill to:    [facility]/pharmacy #[id number] - [place], [place] - [address] [facility] - P [phone] - F [phone]  [address] [facility] [address]  Phone: [phone] Fax: [phone]

## 2024-04-17 NOTE — TELEPHONE ENCOUNTER
Medication Refill Request    Edgar Martell is requesting a refill of the following medication(s):   Requested Prescriptions     Pending Prescriptions Disp Refills    lidocaine (LIDOCAINE PAIN RELIEF) 4 % external patch [Pharmacy Med Name: LIDOCAINE PAIN RELIEF 4% PATCH] 25 patch 1     Sig: APPLY 1 PATCH ONTO THE SKIN EVERY DAY (LEAVE ON FOR 12 HOURS OFF FOR 12 HOURS)        Listed PCP is Shauna Perez MD   Last provider to prescribe medication: Dr. Perez  Last Date of Medication Prescribed: 02/05/2024   Date of Last Office Visit at [facility]: 03/062024   Last Labs:  Future Appointment:   Future Appointments   Date Time Provider Department Center   5/8/2024  4:00 PM Shauna Perez MD [facility] BS AMB       Please send refill to:    [facility]/pharmacy #[id number] - [place], [place] - [address] - P [phone] - F [phone]  [address] [facility] [address]  Phone: [phone] Fax: [phone]

## 2024-04-18 RX ORDER — LIDOCAINE 4 G/G
PATCH TOPICAL
Qty: 25 PATCH | Refills: 1 | OUTPATIENT
Start: 2024-04-18

## 2024-04-18 RX ORDER — LIDOCAINE 4 G/G
PATCH TOPICAL
Qty: 25 PATCH | Refills: 1 | Status: SHIPPED | OUTPATIENT
Start: 2024-04-18

## 2024-04-20 DIAGNOSIS — R14.0 ABDOMINAL BLOATING: ICD-10-CM

## 2024-04-20 DIAGNOSIS — R60.0 PERIPHERAL EDEMA: ICD-10-CM

## 2024-04-22 DIAGNOSIS — R14.0 ABDOMINAL BLOATING: ICD-10-CM

## 2024-04-22 DIAGNOSIS — R60.0 PERIPHERAL EDEMA: ICD-10-CM

## 2024-04-22 RX ORDER — FUROSEMIDE 20 MG/1
10 TABLET ORAL DAILY
Qty: 45 TABLET | Refills: 0 | OUTPATIENT
Start: 2024-04-22

## 2024-04-22 NOTE — TELEPHONE ENCOUNTER
Medication Refill Request    Edgar Martell is requesting a refill of the following medication(s):   Requested Prescriptions     Pending Prescriptions Disp Refills    furosemide (LASIX) 20 MG tablet [Pharmacy Med Name: FUROSEMIDE 20 MG TABLET] 45 tablet 0     Sig: TAKE 1/2 TABLET BY MOUTH DAILY        Listed PCP is Shauna Perez MD   Last provider to prescribe medication: Dr. Perez  Last Date of Medication Prescribed: 02/05/2024   Date of Last Office Visit at [facility]: 03/06/2024   Last Labs:  Lab Results   Component Value Date/Time     03/06/2024 04:06 PM    K 3.8 03/06/2024 04:06 PM     03/06/2024 04:06 PM    CO2 18 03/06/2024 04:06 PM    BUN 12 03/06/2024 04:06 PM    CREATININE 1.25 03/06/2024 04:06 PM    GLUCOSE 80 03/06/2024 04:06 PM    CALCIUM 9.6 03/06/2024 04:06 PM    LABGLOM 52 03/06/2024 04:06 PM        Future Appointment:   Future Appointments   Date Time Provider Department Center   5/8/2024  4:00 PM Shauna Perez MD [facility] BS AMB       Please send refill to:    [facility]/pharmacy #[id number] - [place], [place] - [address] - P [phone] - F [phone]  [address] [facility] [address]  Phone: [phone] Fax: [phone]

## 2024-04-23 RX ORDER — FUROSEMIDE 20 MG/1
10 TABLET ORAL DAILY
Qty: 45 TABLET | Refills: 0 | Status: SHIPPED | OUTPATIENT
Start: 2024-04-23

## 2024-04-26 DIAGNOSIS — M62.838 OTHER MUSCLE SPASM: ICD-10-CM

## 2024-04-26 NOTE — TELEPHONE ENCOUNTER
Medication Refill Request    Edgar Martell is requesting a refill of the following medication(s):   Requested Prescriptions     Pending Prescriptions Disp Refills    baclofen (LIORESAL) 10 MG tablet [Pharmacy Med Name: BACLOFEN 10 MG TABLET] 90 tablet 1     Sig: TAKE 1 TABLET BY MOUTH THREE TIMES A DAY        Listed PCP is Shauna Perez MD   Last provider to prescribe medication: Dr. Perez  Last Date of Medication Prescribed: 02/05/2024   Date of Last Office Visit at [facility]: 03/06/2024   Last Labs:  Future Appointment:   Future Appointments   Date Time Provider Department Center   5/8/2024  4:00 PM Shauna Perez MD [facility] BS AMB       Please send refill to:    [facility]/pharmacy #[id number] - [place], [place] - [address] - P [phone] - F [phone]  [address] [facility] [address]  Phone: [phone] Fax: [phone]

## 2024-04-28 RX ORDER — BACLOFEN 10 MG/1
10 TABLET ORAL 3 TIMES DAILY
Qty: 90 TABLET | Refills: 1 | Status: SHIPPED | OUTPATIENT
Start: 2024-04-28

## 2024-05-22 DIAGNOSIS — M62.838 OTHER MUSCLE SPASM: ICD-10-CM

## 2024-05-24 RX ORDER — BACLOFEN 10 MG/1
10 TABLET ORAL 3 TIMES DAILY
Qty: 270 TABLET | Refills: 1 | Status: SHIPPED | OUTPATIENT
Start: 2024-05-24

## 2024-05-24 NOTE — TELEPHONE ENCOUNTER
Requesting a 90 Day Supply      Medication Refill Request    Edgar PLASENCIA Jasbir is requesting a refill of the following medication(s):   Requested Prescriptions     Pending Prescriptions Disp Refills    baclofen (LIORESAL) 10 MG tablet [Pharmacy Med Name: BACLOFEN 10 MG TABLET] 270 tablet 1     Sig: TAKE 1 TABLET BY MOUTH THREE TIMES A DAY        Listed PCP is Shauna Perez MD   Last provider to prescribe medication: Dr. Perez  Last Date of Medication Prescribed: 04/28/2024 - 30 Day Supply   Date of Last Office Visit at [facility]: 03/06/2024     Future Appointment: No future appointments.    Please send refill to:    [facility]/pharmacy #[facility] - [place], [place] - [address] - P [phone] - F [phone]  [address] [place] [address]  Phone: [phone] Fax: [phone]

## 2024-05-27 DIAGNOSIS — R09.81 SINUS CONGESTION: ICD-10-CM

## 2024-05-28 RX ORDER — MOMETASONE FUROATE 50 UG/1
SPRAY, METERED NASAL
Qty: 17 G | Refills: 2 | Status: SHIPPED | OUTPATIENT
Start: 2024-05-28

## 2024-05-28 NOTE — TELEPHONE ENCOUNTER
Medication Refill Request    Edgar PLASENCIA Martell is requesting a refill of the following medication(s):   Requested Prescriptions     Pending Prescriptions Disp Refills    mometasone (NASONEX) 50 MCG/ACT nasal spray [Pharmacy Med Name: MOMETASONE FUROATE 50 MCG SPRY]  2     Sig: SPRAY 1 SPRAY INTO EACH NOSTRIL DAILY        Listed PCP is Shauna Perez MD   Last provider to prescribe medication: Dr. Perez  Last Date of Medication Prescribed: 02/05/2024   Date of Last Office Visit at [facility]: 03/06/2024     Future Appointment: No future appointments.    Please send refill to:    [facility]/pharmacy #[id number] - [place], [place] - [address] [facility] - P [phone] - F [phone]  [address] [place] [address]  Phone: [phone] Fax: [phone]

## 2024-06-13 ENCOUNTER — PATIENT MESSAGE (OUTPATIENT)
Age: 54
End: 2024-06-13

## 2024-06-13 DIAGNOSIS — M62.838 OTHER MUSCLE SPASM: ICD-10-CM
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
## 2024-06-13 NOTE — TELEPHONE ENCOUNTER
Patient is requesting a refill for the follow ing medication:    lidocaine (LIDOCAINE PAIN RELIEF) 4 % external patch

## 2024-06-14 RX ORDER — LIDOCAINE 4 G/G
PATCH TOPICAL
Qty: 25 PATCH | Refills: 1 | Status: SHIPPED | OUTPATIENT
Start: 2024-06-14

## 2024-06-14 NOTE — TELEPHONE ENCOUNTER
Medication Refill Request    Edgar Martell is requesting a refill of the following medication(s):   Requested Prescriptions     Pending Prescriptions Disp Refills    lidocaine (LIDOCAINE PAIN RELIEF) 4 % external patch [Pharmacy Med Name: LIDOCAINE PAIN RELIEF 4% PATCH] 25 patch 1     Sig: APPLY 1 PATCH ONTO THE SKIN EVERY DAY (LEAVE ON FOR 12 HOURS OFF FOR 12 HOURS)        Listed PCP is Shauna Perez MD   Last provider to prescribe medication: Dr. Perez  Last Date of Medication Prescribed: 04/18/2024  Date of Last Office Visit at [facility]: 03/06/2024  Last Labs:  Future Appointment:   Future Appointments   Date Time Provider Department Center   7/1/2024  4:00 PM Shauna Perez MD [facility] BS AMB         Please send refill to:    [facility]/pharmacy #[id number] - [place], [place] - [address] - P [phone] - F [phone]  [address] [facility] [address]  Phone: [phone] Fax: [phone]

## 2024-06-18 ENCOUNTER — CLINICAL DOCUMENTATION (OUTPATIENT)
Age: 54
End: 2024-06-18

## 2024-06-18 NOTE — PROGRESS NOTES
Prior Authorization    Prior auth submitted via [email or web address]    Insurance Carrier/Provider: [employer]     Medication Name/ Dosage: FEXOFENADINE  MG TABLET    Quantity/Day Supply: 90 tablets/90 Days    DX: J30.9 Allergic Rhinitis     Reference Number: [id number]    Outcome: Approved Effective: 06/18/2024 through 06/18/2025     If Denied Reason for Denial: N/A    Prior Authorization Note:   This writer contacted [facility] pharmacy [address] Two patient identifiers verified with pharmacy technician. Pharmacy technician notified of prior authorization approval. Per pharmacy technician too soon to fill RX but positive outcome obtained when submitting claim.

## 2024-06-19 RX ORDER — DEXTRAN 70, GLYCERIN, HYPROMELLOSE 1; 2; 3 MG/ML; MG/ML; MG/ML
1 SOLUTION/ DROPS OPHTHALMIC EVERY 6 HOURS
Qty: 15 ML | Refills: 1 | Status: SHIPPED | OUTPATIENT
Start: 2024-06-19

## 2024-06-19 NOTE — TELEPHONE ENCOUNTER
Medication Refill Request    Edgar Martell is requesting a refill of the following medication(s):   Requested Prescriptions     Pending Prescriptions Disp Refills    Artificial Tear Solution (GENTEAL TEARS) 0.1-0.2-0.3 % SOLN [Pharmacy Med Name: GENTEAL TEARS 0.1%-0.2%-0.3%] 15 mL 1     Sig: PLACE 1 DROP INTO BOTH EYES EVERY 6 HOURS.        Listed PCP is Shauna Perez MD   Last provider to prescribe medication: Dr. Perez  Last Date of Medication Prescribed: 04/11/2024   Date of Last Office Visit at [facility]: 03/06/2024     Future Appointment:   Future Appointments   Date Time Provider Department Center   7/1/2024  4:00 PM Shauna Perez MD [facility] BS AMB       Please send refill to:    [facility]/pharmacy #[id number] - [place], [place] - [address] - P [phone] - F [phone]  [address] [place] [address]  Phone: [phone] Fax: [phone]

## 2024-07-01 ENCOUNTER — OFFICE VISIT (OUTPATIENT)
Age: 54
End: 2024-07-01
Payer: MEDICAID

## 2024-07-01 VITALS
OXYGEN SATURATION: 97 % | RESPIRATION RATE: 16 BRPM | WEIGHT: 141 LBS | DIASTOLIC BLOOD PRESSURE: 75 MMHG | BODY MASS INDEX: 25.95 KG/M2 | SYSTOLIC BLOOD PRESSURE: 116 MMHG | HEART RATE: 94 BPM | HEIGHT: 62 IN

## 2024-07-01 DIAGNOSIS — R14.0 ABDOMINAL BLOATING: ICD-10-CM

## 2024-07-01 DIAGNOSIS — E53.8 VITAMIN B12 DEFICIENCY: Primary | ICD-10-CM

## 2024-07-01 DIAGNOSIS — R60.0 PERIPHERAL EDEMA: ICD-10-CM

## 2024-07-01 PROCEDURE — PBSHW PBB SHADOW CHARGE: Performed by: FAMILY MEDICINE

## 2024-07-01 PROCEDURE — 96372 THER/PROPH/DIAG INJ SC/IM: CPT | Performed by: FAMILY MEDICINE

## 2024-07-01 PROCEDURE — 99213 OFFICE O/P EST LOW 20 MIN: CPT | Performed by: FAMILY MEDICINE

## 2024-07-01 RX ORDER — CYANOCOBALAMIN 1000 UG/ML
1000 INJECTION, SOLUTION INTRAMUSCULAR; SUBCUTANEOUS ONCE
Status: COMPLETED | OUTPATIENT
Start: 2024-07-01 | End: 2024-07-01

## 2024-07-01 RX ORDER — FUROSEMIDE 20 MG/1
10 TABLET ORAL DAILY
Qty: 45 TABLET | Refills: 0 | Status: SHIPPED | OUTPATIENT
Start: 2024-07-01

## 2024-07-01 RX ADMIN — CYANOCOBALAMIN 1000 MCG: 1000 INJECTION INTRAMUSCULAR; SUBCUTANEOUS at 17:19

## 2024-07-01 ASSESSMENT — ENCOUNTER SYMPTOMS
WHEEZING: 0
ABDOMINAL PAIN: 0
ABDOMINAL DISTENTION: 1
SHORTNESS OF BREATH: 0

## 2024-07-01 NOTE — PROGRESS NOTES
Room 6    Identified pt with two pt identifiers(name and ). Reviewed record in preparation for visit and have obtained necessary documentation.    Chief Complaint   Patient presents with    B12 Injection     Discuss Increase dosage of B12      4 Month Follow Up     - Multiple Insect Bites on Bilateral Legs from the knees down from Fire Ants  - Multiple Stings on right hand from wasp stings   Areas located on bilateral legs and hands associated with itching. Was not evaluated in ER or Urgent Care    Medication Refill     Medication Refills Pending        Health Maintenance Due   Topic    Hepatitis B vaccine (1 of 3 - 3-dose series)    COVID-19 Vaccine ( season)       Vitals:    24 1638   BP: 116/75   Site: Right Upper Arm   Position: Sitting   Cuff Size: Medium Adult   Pulse: 94   Resp: 16   SpO2: 97%   Weight: 64 kg (141 lb)   Height: 1.575 m (5' 2\")         \"Have you been to the ER, urgent care clinic since your last visit?  Hospitalized since your last visit?\"    2024 [facility] ER Abdominal Pain - Flare of Crohn. CT Scan with contrast Negative for Stones, + scar tissue and inflammation. DX with UTI     “Have you seen or consulted any other health care providers outside of [facility] since your last visit?”    NO      Click Here for Release of Records Request     This patient is accompanied in the office by her self.  I have received verbal consent from Edgar Martell to discuss any/all medical information while they are present in the room.    
2    baclofen (LIORESAL) 10 MG tablet TAKE 1 TABLET BY MOUTH THREE TIMES A  tablet 1    furosemide (LASIX) 20 MG tablet TAKE 1/2 TABLET BY MOUTH DAILY 45 tablet 0    albuterol sulfate HFA (PROVENTIL;VENTOLIN;PROAIR) 108 (90 Base) MCG/ACT inhaler INHALE 2 PUFFS INTO THE LUNGS AS NEEDED FOR WHEEZING. 8.5 each 3    FLUoxetine (PROZAC) 20 MG capsule Take 10 mg by mouth daily      dicyclomine (BENTYL) 10 MG capsule Take 1 capsule by mouth 4 times daily 120 capsule 5    escitalopram (LEXAPRO) 20 MG tablet Take 1 tablet by mouth every morning      lamoTRIgine (LAMICTAL) 25 MG tablet Take 2 tablets by mouth daily      traZODone (DESYREL) 50 MG tablet Take 1 tablet by mouth nightly      ondansetron (ZOFRAN) 4 MG tablet Take 1 tablet by mouth every 8 hours as needed for Nausea 30 tablet 3    Melatonin 5 MG CAPS TAKE 1 CAPSULE BY MOUTH EVERYDAY AT BEDTIME 90 capsule 3    Cholecalciferol (VITAMIN D3) 50 MCG (2000 UT) CAPS Take 1 capsule by mouth daily 90 capsule 3    fexofenadine (ALLEGRA) 180 MG tablet Take 1 tablet by mouth daily 90 tablet 3    nystatin (MYCOSTATIN) 490997 UNIT/ML suspension Take by mouth 4 times daily Take by mouth 4 times daily 60 mL 3    acetaminophen (TYLENOL) 500 MG tablet Take by mouth every 8 hours as needed      amphetamine-dextroamphetamine (ADDERALL) 20 MG tablet Take by mouth 3 times daily.      clonazePAM (KLONOPIN) 1 MG tablet Take by mouth 2 times daily.      LORazepam (ATIVAN) 1 MG tablet Take by mouth 2 times daily.      mupirocin (BACTROBAN) 2 % ointment Apply topically daily      pantoprazole (PROTONIX) 40 MG tablet Take by mouth daily      valACYclovir (VALTREX) 500 MG tablet Take by mouth 2 times daily       No current facility-administered medications on file prior to visit.       Allergies   Allergen Reactions    Doxycycline Swelling and Other (See Comments)     Other reaction(s): Other (See Comments), WHELPS, ITCHING, NAUSEA, BLISTERS. Interacts with other medications

## 2024-08-05 ENCOUNTER — TELEPHONE (OUTPATIENT)
Age: 54
End: 2024-08-05

## 2024-08-05 NOTE — TELEPHONE ENCOUNTER
Prior Authorization - APPROVED    Prior auth submitted via: [email or web address]    Insurance Carrier/Provider: [employer]    Medication Name/ Dosage: mometasone (NASONEX) 50 MCG/ACT nasal spray    Quantity/Day Supply: 17 grams    DX: Sinus congestion [R09.81]    Reference Number: [id number]

## 2024-08-13 DIAGNOSIS — R09.81 SINUS CONGESTION: ICD-10-CM

## 2024-08-13 NOTE — TELEPHONE ENCOUNTER
Medication Refill Request    dEgar Martell is requesting a refill of the following medication(s):   Requested Prescriptions     Pending Prescriptions Disp Refills    albuterol sulfate HFA (PROVENTIL;VENTOLIN;PROAIR) 108 (90 Base) MCG/ACT inhaler [Pharmacy Med Name: ALBUTEROL HFA (PROAIR) INHALER] 8.5 each 3     Sig: INHALE 2 PUFFS INTO THE LUNGS AS NEEDED FOR WHEEZING.    Artificial Tear Solution (GENTEAL TEARS) 0.1-0.2-0.3 % SOLN [Pharmacy Med Name: GENTEAL TEARS 0.1%-0.2%-0.3%] 15 mL 1     Sig: PLACE 1 DROP INTO BOTH EYES EVERY 6 HOURS.        Listed PCP is Shauna Perez MD   Last provider to prescribe medication: Dr. Perez on 6/19/24  Date of Last Office Visit at [facility]: 7/1/24 with Dr. Perez    FUTURE APPOINTMENT:   Future Appointments   Date Time Provider Department Center   8/21/2024  4:00 PM Shauna Perez MD [facility] ECC DEP       Please send refill to:    [facility]pharmacy #[id number] - [place], [place] - [address] [facility] - P [phone] - F [phone]  [address] [facility] [address]  Phone: [phone] Fax: [phone]      Please review request and approve or deny with recommendations within 48 hours.

## 2024-08-14 RX ORDER — DEXTRAN 70, GLYCERIN, HYPROMELLOSE 1; 2; 3 MG/ML; MG/ML; MG/ML
1 SOLUTION/ DROPS OPHTHALMIC EVERY 6 HOURS
Qty: 15 ML | Refills: 1 | Status: SHIPPED | OUTPATIENT
Start: 2024-08-14

## 2024-08-14 RX ORDER — ALBUTEROL SULFATE 90 UG/1
AEROSOL, METERED RESPIRATORY (INHALATION)
Qty: 8.5 EACH | Refills: 3 | Status: SHIPPED | OUTPATIENT
Start: 2024-08-14

## 2024-08-17 DIAGNOSIS — M62.838 OTHER MUSCLE SPASM: ICD-10-CM

## 2024-08-20 RX ORDER — LIDOCAINE 4 G/G
PATCH TOPICAL
Qty: 25 PATCH | Refills: 1 | Status: SHIPPED | OUTPATIENT
Start: 2024-08-20

## 2024-08-21 ENCOUNTER — OFFICE VISIT (OUTPATIENT)
Age: 54
End: 2024-08-21
Payer: MEDICAID

## 2024-08-21 VITALS
HEART RATE: 78 BPM | HEIGHT: 62 IN | RESPIRATION RATE: 18 BRPM | DIASTOLIC BLOOD PRESSURE: 80 MMHG | BODY MASS INDEX: 24.84 KG/M2 | SYSTOLIC BLOOD PRESSURE: 125 MMHG | TEMPERATURE: 98.1 F | OXYGEN SATURATION: 98 % | WEIGHT: 135 LBS

## 2024-08-21 DIAGNOSIS — J01.90 ACUTE BACTERIAL SINUSITIS: ICD-10-CM

## 2024-08-21 DIAGNOSIS — B37.9 ANTIBIOTIC-INDUCED YEAST INFECTION: ICD-10-CM

## 2024-08-21 DIAGNOSIS — L98.491 SKIN ULCER, LIMITED TO BREAKDOWN OF SKIN (HCC): ICD-10-CM

## 2024-08-21 DIAGNOSIS — L03.115 CELLULITIS OF RIGHT LOWER EXTREMITY: Primary | ICD-10-CM

## 2024-08-21 DIAGNOSIS — B96.89 ACUTE BACTERIAL SINUSITIS: ICD-10-CM

## 2024-08-21 DIAGNOSIS — M79.661 RIGHT CALF PAIN: ICD-10-CM

## 2024-08-21 DIAGNOSIS — T36.95XA ANTIBIOTIC-INDUCED YEAST INFECTION: ICD-10-CM

## 2024-08-21 DIAGNOSIS — E53.8 VITAMIN B12 DEFICIENCY: ICD-10-CM

## 2024-08-21 PROCEDURE — 96372 THER/PROPH/DIAG INJ SC/IM: CPT | Performed by: FAMILY MEDICINE

## 2024-08-21 PROCEDURE — 99214 OFFICE O/P EST MOD 30 MIN: CPT | Performed by: FAMILY MEDICINE

## 2024-08-21 PROCEDURE — PBSHW PBB SHADOW CHARGE: Performed by: FAMILY MEDICINE

## 2024-08-21 RX ORDER — FLUCONAZOLE 150 MG/1
150 TABLET ORAL
Qty: 2 TABLET | Refills: 0 | Status: SHIPPED | OUTPATIENT
Start: 2024-08-21 | End: 2024-08-27

## 2024-08-21 RX ORDER — CYANOCOBALAMIN 1000 UG/ML
1000 INJECTION, SOLUTION INTRAMUSCULAR; SUBCUTANEOUS ONCE
Status: COMPLETED | OUTPATIENT
Start: 2024-08-21 | End: 2024-08-21

## 2024-08-21 RX ORDER — PREDNISONE 20 MG/1
20 TABLET ORAL DAILY
Qty: 5 TABLET | Refills: 0 | Status: SHIPPED | OUTPATIENT
Start: 2024-08-21 | End: 2024-08-26

## 2024-08-21 RX ORDER — CLINDAMYCIN HYDROCHLORIDE 300 MG/1
300 CAPSULE ORAL 3 TIMES DAILY
Qty: 21 CAPSULE | Refills: 0 | Status: SHIPPED | OUTPATIENT
Start: 2024-08-21 | End: 2024-08-28

## 2024-08-21 RX ADMIN — CYANOCOBALAMIN 1000 MCG: 1000 INJECTION INTRAMUSCULAR; SUBCUTANEOUS at 16:53

## 2024-08-21 ASSESSMENT — PATIENT HEALTH QUESTIONNAIRE - PHQ9: DEPRESSION UNABLE TO ASSESS: PT REFUSES

## 2024-08-21 NOTE — PROGRESS NOTES
Edgar Martell is a 54 y.o. female      Chief Complaint   Patient presents with    Follow-up Chronic Condition     Patient is coming in for a follow up on chronic conditions. She is requesting vitamin b12 injection. No other concerns.        \"Have you been to the ER, urgent care clinic since your last visit?  Hospitalized since your last visit?\"    NO    “Have you seen or consulted any other health care providers outside of [facility] since your last visit?”    NO              Vitals:    08/21/24 1612   BP: 125/80   Site: Right Upper Arm   Position: Sitting   Pulse: 78   Resp: 18   Temp: 98.1 °F (36.7 °C)   TempSrc: Oral   SpO2: 98%   Weight: 61.2 kg (135 lb)   Height: 1.575 m (5' 2\")            Health Maintenance Due   Topic Date Due    Hepatitis B vaccine (1 of 3 - 19+ 3-dose series) Never done    COVID-19 Vaccine (4 - 2023-24 season) 09/01/2023    Flu vaccine (1) 08/01/2024    Depression Monitoring  09/18/2024         Medication Reconciliation completed, changes noted.  Please  Update medication list.

## 2024-08-21 NOTE — PROGRESS NOTES
History of Present Illness:     Chief Complaint   Patient presents with    Follow-up Chronic Condition     Patient is coming in for a follow up on chronic conditions. She is requesting vitamin b12 injection. No other concerns.        Edgar Martell is a 54 y.o. female     Needs B12 injection    Ulceration on right outer leg  Associated with redness and drainage  Started as a little scab  Associated with tingling in the bottom of her feet    C/o sinus pressure and headache  Green nasal drainage    PMH (REVIEWED):  Past Medical History:   Diagnosis Date    ADHD (attention deficit hyperactivity disorder)     Adverse effect of anesthesia     itching around face and dry rash    Crohn disease ([facility]) 4/19/2010    Depression 4/19/2010    GERD (gastroesophageal reflux disease)     History of vascular access device 07/18/2017    [facility] VAT - 33 cm right brachial PICC for abx and limited access    Irritable bowel syndrome     Kidney stones     History of stent placed and removed    Lyme disease     Panic attack     Perforation bowel ([facility]) 7/4/2017    S/p ruddy Jj 7/2017    Peripheral neuropathy 2/2/2012    B12 deficiency MRI brain normal 1/2012 MRA head normal 1/2012 CTA neck normal 1/2012     Renal calculi 9/6/2020    Vertigo     Vitamin B12 deficiency 1/21/2012    164 on 1/2012 Needs 1000 mcg IM twice a week     Vitamin E deficiency        Current Medications/Allergies (REVIEWED):     Current Outpatient Medications on File Prior to Visit   Medication Sig Dispense Refill    lidocaine (LIDOCAINE PAIN RELIEF) 4 % external patch APPLY 1 PATCH ONTO THE SKIN EVERY DAY (LEAVE ON FOR 12 HOURS OFF FOR 12 HOURS) 25 patch 1    albuterol sulfate HFA (PROVENTIL;VENTOLIN;PROAIR) 108 (90 Base) MCG/ACT inhaler INHALE 2 PUFFS INTO THE LUNGS AS NEEDED FOR WHEEZING. 8.5 each 3    Artificial Tear Solution (GENTEAL TEARS) 0.1-0.2-0.3 % SOLN PLACE 1 DROP INTO BOTH EYES EVERY 6 HOURS. 15 mL 1    furosemide (LASIX) 20 MG tablet Take

## 2024-08-22 ASSESSMENT — ENCOUNTER SYMPTOMS
SINUS PAIN: 1
SINUS PRESSURE: 1
RHINORRHEA: 1

## 2024-08-29 ENCOUNTER — PATIENT MESSAGE (OUTPATIENT)
Age: 54
End: 2024-08-29

## 2024-08-29 DIAGNOSIS — J01.90 ACUTE BACTERIAL SINUSITIS: Primary | ICD-10-CM

## 2024-08-29 DIAGNOSIS — B96.89 ACUTE BACTERIAL SINUSITIS: Primary | ICD-10-CM

## 2024-08-30 DIAGNOSIS — B96.89 ACUTE BACTERIAL SINUSITIS: ICD-10-CM

## 2024-08-30 DIAGNOSIS — J01.90 ACUTE BACTERIAL SINUSITIS: ICD-10-CM

## 2024-08-30 RX ORDER — PREDNISONE 20 MG/1
20 TABLET ORAL DAILY
Qty: 5 TABLET | Refills: 0 | Status: SHIPPED | OUTPATIENT
Start: 2024-08-30 | End: 2024-09-04

## 2024-08-30 NOTE — TELEPHONE ENCOUNTER
Sending short script of Prednisone to help with congestion/ ear pain. Recently treated for sinusitis.

## 2024-09-03 RX ORDER — PREDNISONE 20 MG/1
TABLET ORAL
Qty: 5 TABLET | Refills: 0 | OUTPATIENT
Start: 2024-09-03

## 2024-09-21 DIAGNOSIS — R14.0 ABDOMINAL BLOATING: ICD-10-CM

## 2024-09-24 RX ORDER — DICYCLOMINE HYDROCHLORIDE 10 MG/1
10 CAPSULE ORAL 4 TIMES DAILY
Qty: 120 CAPSULE | Refills: 5 | Status: SHIPPED | OUTPATIENT
Start: 2024-09-24

## 2024-10-07 DIAGNOSIS — R60.0 PERIPHERAL EDEMA: ICD-10-CM

## 2024-10-07 DIAGNOSIS — R14.0 ABDOMINAL BLOATING: ICD-10-CM

## 2024-10-07 RX ORDER — FUROSEMIDE 20 MG
10 TABLET ORAL DAILY
Qty: 45 TABLET | Refills: 0 | Status: SHIPPED | OUTPATIENT
Start: 2024-10-07

## 2024-10-07 NOTE — TELEPHONE ENCOUNTER
Medication Refill Request    Edgar Martell is requesting a refill of the following medication(s):   Requested Prescriptions     Pending Prescriptions Disp Refills    furosemide (LASIX) 20 MG tablet [Pharmacy Med Name: FUROSEMIDE 20 MG TABLET] 45 tablet 0     Sig: TAKE 1/2 TABLET BY MOUTH DAILY      Lab Results   Component Value Date     (H) 03/06/2024    K 3.8 03/06/2024     (H) 03/06/2024    CO2 18 (L) 03/06/2024    BUN 12 03/06/2024    CREATININE 1.25 (H) 03/06/2024    GLUCOSE 80 03/06/2024    CALCIUM 9.6 03/06/2024    BILITOT <0.2 06/05/2023    ALKPHOS 83 06/05/2023    AST 12 06/05/2023    ALT 8 06/05/2023    LABGLOM 52 (L) 03/06/2024    GFRAA 90 03/24/2021    AGRATIO 1.9 06/05/2023    GLOB 4.7 (H) 09/05/2020       Listed PCP is Shauna Perez MD   Last provider to prescribe medication: Dr. Perez on 07/01/24  Date of Last Office Visit at [facility]: 08/21/24 with Dr. Perez    FUTURE APPOINTMENT:   Future Appointments   Date Time Provider Department Center   10/16/2024  4:00 PM Shauna Perez MD [facility] ECC DEP       Please send refill to:    [id number] - [place], [place] - [address] - P [phone] - F [phone]  [address] [place] [address]  Phone: [phone] Fax: [phone]      Please review request and approve or deny with recommendations within 48 hours.

## 2024-10-14 DIAGNOSIS — R09.81 SINUS CONGESTION: ICD-10-CM

## 2024-10-14 DIAGNOSIS — R60.0 PERIPHERAL EDEMA: ICD-10-CM

## 2024-10-14 DIAGNOSIS — E55.9 VITAMIN D DEFICIENCY, UNSPECIFIED: ICD-10-CM

## 2024-10-14 DIAGNOSIS — M62.838 OTHER MUSCLE SPASM: ICD-10-CM

## 2024-10-14 DIAGNOSIS — R14.0 ABDOMINAL BLOATING: ICD-10-CM

## 2024-10-14 DIAGNOSIS — B37.0 THRUSH: ICD-10-CM

## 2024-10-14 DIAGNOSIS — R11.0 NAUSEA: ICD-10-CM

## 2024-10-14 SDOH — ECONOMIC STABILITY: INCOME INSECURITY: HOW HARD IS IT FOR YOU TO PAY FOR THE VERY BASICS LIKE FOOD, HOUSING, MEDICAL CARE, AND HEATING?: SOMEWHAT HARD

## 2024-10-14 SDOH — ECONOMIC STABILITY: FOOD INSECURITY: WITHIN THE PAST 12 MONTHS, THE FOOD YOU BOUGHT JUST DIDN'T LAST AND YOU DIDN'T HAVE MONEY TO GET MORE.: OFTEN TRUE

## 2024-10-14 SDOH — ECONOMIC STABILITY: FOOD INSECURITY: WITHIN THE PAST 12 MONTHS, YOU WORRIED THAT YOUR FOOD WOULD RUN OUT BEFORE YOU GOT MONEY TO BUY MORE.: SOMETIMES TRUE

## 2024-10-14 SDOH — ECONOMIC STABILITY: TRANSPORTATION INSECURITY
IN THE PAST 12 MONTHS, HAS LACK OF TRANSPORTATION KEPT YOU FROM MEETINGS, WORK, OR FROM GETTING THINGS NEEDED FOR DAILY LIVING?: NO

## 2024-10-14 NOTE — TELEPHONE ENCOUNTER
Medication Refill Request    Edgar Martell is requesting a refill of the following medication(s):   Requested Prescriptions     Pending Prescriptions Disp Refills    ondansetron (ZOFRAN) 4 MG tablet 30 tablet 3     Sig: Take 1 tablet by mouth every 8 hours as needed for Nausea    Melatonin 5 MG CAPS 90 capsule 3     Sig: TAKE 1 CAPSULE BY MOUTH EVERYDAY AT BEDTIME    vitamin D (VITAMIN D3) 50 MCG (2000 UT) CAPS capsule 90 capsule 3     Sig: Take 1 capsule by mouth daily    fexofenadine (ALLEGRA) 180 MG tablet 90 tablet 3     Sig: Take 1 tablet by mouth daily    nystatin (MYCOSTATIN) 906786 UNIT/ML suspension 60 mL 3     Sig: Take by mouth 4 times daily Take by mouth 4 times daily    baclofen (LIORESAL) 10 MG tablet 270 tablet 1     Sig: Take 1 tablet by mouth 3 times daily    mometasone (NASONEX) 50 MCG/ACT nasal spray 17 g 2    albuterol sulfate HFA (PROVENTIL;VENTOLIN;PROAIR) 108 (90 Base) MCG/ACT inhaler 8.5 each 3     Sig: INHALE 2 PUFFS INTO THE LUNGS AS NEEDED FOR WHEEZING.    Artificial Tear Solution (GENTEAL TEARS) 0.1-0.2-0.3 % SOLN 15 mL 1     Sig: Place 1 drop into both eyes every 6 hours    lidocaine (LIDOCAINE PAIN RELIEF) 4 % external patch 25 patch 1     Sig: APPLY 1 PATCH ONTO THE SKIN EVERY DAY (LEAVE ON FOR 12 HOURS OFF FOR 12 HOURS)    dicyclomine (BENTYL) 10 MG capsule 120 capsule 5     Sig: Take 1 capsule by mouth in the morning, at noon, in the evening, and at bedtime    furosemide (LASIX) 20 MG tablet 45 tablet 0     Sig: Take 0.5 tablets by mouth daily     Lab Results   Component Value Date/Time    VITD25 43.4 06/03/2021 04:00 PM       Lab Results   Component Value Date    WBC 7.1 03/06/2024    HGB 12.2 03/06/2024    HCT 37.6 03/06/2024    MCV 89 03/06/2024     03/06/2024     Lab Results   Component Value Date     (H) 03/06/2024    K 3.8 03/06/2024     (H) 03/06/2024    CO2 18 (L) 03/06/2024    BUN 12 03/06/2024    CREATININE 1.25 (H) 03/06/2024    GLUCOSE 80 03/06/2024

## 2024-10-15 RX ORDER — LIDOCAINE 4 G/G
PATCH TOPICAL
Qty: 25 PATCH | Refills: 1 | Status: SHIPPED | OUTPATIENT
Start: 2024-10-15

## 2024-10-15 RX ORDER — ALBUTEROL SULFATE 90 UG/1
INHALANT RESPIRATORY (INHALATION)
Qty: 8.5 EACH | Refills: 3 | Status: SHIPPED | OUTPATIENT
Start: 2024-10-15

## 2024-10-15 RX ORDER — NYSTATIN 100000 [USP'U]/ML
SUSPENSION ORAL 4 TIMES DAILY
Qty: 60 ML | Refills: 3 | Status: SHIPPED | OUTPATIENT
Start: 2024-10-15

## 2024-10-15 RX ORDER — MOMETASONE FUROATE MONOHYDRATE 50 UG/1
2 SPRAY, METERED NASAL DAILY
Qty: 17 G | Refills: 2 | Status: SHIPPED | OUTPATIENT
Start: 2024-10-15

## 2024-10-15 RX ORDER — BACLOFEN 10 MG/1
10 TABLET ORAL 3 TIMES DAILY
Qty: 270 TABLET | Refills: 1 | Status: SHIPPED | OUTPATIENT
Start: 2024-10-15

## 2024-10-15 RX ORDER — ONDANSETRON 4 MG/1
4 TABLET, FILM COATED ORAL EVERY 8 HOURS PRN
Qty: 30 TABLET | Refills: 3 | Status: SHIPPED | OUTPATIENT
Start: 2024-10-15

## 2024-10-15 RX ORDER — DICYCLOMINE HYDROCHLORIDE 10 MG/1
10 CAPSULE ORAL 4 TIMES DAILY
Qty: 120 CAPSULE | Refills: 5 | Status: SHIPPED | OUTPATIENT
Start: 2024-10-15

## 2024-10-15 RX ORDER — FEXOFENADINE HCL 180 MG/1
180 TABLET ORAL DAILY
Qty: 90 TABLET | Refills: 3 | Status: SHIPPED | OUTPATIENT
Start: 2024-10-15

## 2024-10-15 RX ORDER — GLIPIZIDE 10 MG/1
1 TABLET ORAL EVERY 6 HOURS
Qty: 15 ML | Refills: 1 | Status: SHIPPED | OUTPATIENT
Start: 2024-10-15

## 2024-10-15 RX ORDER — ACETAMINOPHEN 160 MG
2000 TABLET,DISINTEGRATING ORAL DAILY
Qty: 90 CAPSULE | Refills: 3 | Status: SHIPPED | OUTPATIENT
Start: 2024-10-15

## 2024-10-15 RX ORDER — FUROSEMIDE 20 MG
10 TABLET ORAL DAILY
Qty: 45 TABLET | Refills: 0 | Status: SHIPPED | OUTPATIENT
Start: 2024-10-15

## 2024-10-16 ENCOUNTER — OFFICE VISIT (OUTPATIENT)
Age: 54
End: 2024-10-16
Payer: MEDICAID

## 2024-10-16 VITALS
DIASTOLIC BLOOD PRESSURE: 72 MMHG | OXYGEN SATURATION: 98 % | SYSTOLIC BLOOD PRESSURE: 126 MMHG | WEIGHT: 135 LBS | BODY MASS INDEX: 24.84 KG/M2 | HEIGHT: 62 IN | HEART RATE: 74 BPM | RESPIRATION RATE: 16 BRPM

## 2024-10-16 DIAGNOSIS — T36.95XA ANTIBIOTIC-INDUCED YEAST INFECTION: ICD-10-CM

## 2024-10-16 DIAGNOSIS — B37.9 ANTIBIOTIC-INDUCED YEAST INFECTION: ICD-10-CM

## 2024-10-16 DIAGNOSIS — Z23 ENCOUNTER FOR IMMUNIZATION: ICD-10-CM

## 2024-10-16 DIAGNOSIS — D48.9 NEOPLASM, UNCERTAIN WHETHER BENIGN OR MALIGNANT: Primary | ICD-10-CM

## 2024-10-16 DIAGNOSIS — E53.8 VITAMIN B12 DEFICIENCY: ICD-10-CM

## 2024-10-16 PROCEDURE — 11601 EXC TR-EXT MAL+MARG 0.6-1 CM: CPT | Performed by: FAMILY MEDICINE

## 2024-10-16 PROCEDURE — 99213 OFFICE O/P EST LOW 20 MIN: CPT | Performed by: FAMILY MEDICINE

## 2024-10-16 PROCEDURE — 96372 THER/PROPH/DIAG INJ SC/IM: CPT | Performed by: FAMILY MEDICINE

## 2024-10-16 PROCEDURE — 90661 CCIIV3 VAC ABX FR 0.5 ML IM: CPT | Performed by: FAMILY MEDICINE

## 2024-10-16 RX ORDER — CLINDAMYCIN HCL 300 MG
300 CAPSULE ORAL 2 TIMES DAILY
Qty: 14 CAPSULE | Refills: 0 | Status: SHIPPED | OUTPATIENT
Start: 2024-10-16 | End: 2024-10-23

## 2024-10-16 RX ORDER — CYANOCOBALAMIN 1000 UG/ML
1000 INJECTION, SOLUTION INTRAMUSCULAR; SUBCUTANEOUS ONCE
Status: COMPLETED | OUTPATIENT
Start: 2024-10-16 | End: 2024-10-16

## 2024-10-16 RX ORDER — TRAZODONE HYDROCHLORIDE 100 MG/1
TABLET ORAL
COMMUNITY
Start: 2024-09-14

## 2024-10-16 RX ORDER — FLUCONAZOLE 150 MG/1
150 TABLET ORAL
Qty: 2 TABLET | Refills: 0 | Status: SHIPPED | OUTPATIENT
Start: 2024-10-16 | End: 2024-10-22

## 2024-10-16 RX ADMIN — CYANOCOBALAMIN 1000 MCG: 1000 INJECTION INTRAMUSCULAR; SUBCUTANEOUS at 17:59

## 2024-10-16 ASSESSMENT — ENCOUNTER SYMPTOMS
RHINORRHEA: 1
SINUS PRESSURE: 1
SINUS PAIN: 1

## 2024-10-16 NOTE — PROGRESS NOTES
Room 13    Identified pt with two pt identifiers(name and ). Reviewed record in preparation for visit and have obtained necessary documentation.  Chief Complaint   Patient presents with    Follow-up Chronic Condition        Health Maintenance Due   Topic    Hepatitis B vaccine (1 of 3 - 19+ 3-dose series)    Flu vaccine (1)    COVID-19 Vaccine ( season)    DTaP/Tdap/Td vaccine (2 - Td or Tdap)    Depression Monitoring        Vitals:    10/16/24 1612   BP: 126/72   Site: Right Upper Arm   Position: Sitting   Cuff Size: Medium Adult   Pulse: 74   Resp: 16   SpO2: 98%   Weight: 61.2 kg (135 lb)   Height: 1.575 m (5' 2\")         \"Have you been to the ER, urgent care clinic since your last visit?  Hospitalized since your last visit?\"    No    “Have you seen or consulted any other health care providers outside of [facility] since your last visit?”    No        Click Here for Release of Records Request     This patient is accompanied in the office by her self.  I have received verbal consent from Edgar Martell to discuss any/all medical information while they are present in the room.    
(see comments)    Adhesive Tape Rash    Penicillins Hives and Rash       Review of Systems:     Review of Systems   Constitutional:  Negative for fever.   HENT:  Positive for rhinorrhea, sinus pressure and sinus pain. Negative for congestion and ear pain.    Cardiovascular:  Positive for leg swelling.   Skin:  Positive for wound.        Objective:     Vitals:    10/16/24 1612   Height: 1.575 m (5' 2\")         Physical Exam:  General appearance - alert, well appearing, and in no distress   Mental status -better mood, mostly staying on topic today, no crying, bright mood  HENT - Minimal clear effusion behind bilateral TM. +Maxillary and frontal sinus tenderness.   Skin - RLE hyperpigmented lesion, see media for picture     Recent Labs:  No results found for this or any previous visit (from the past 12 hour(s)).      Assessment and Plan:      Diagnosis Orders   1. Neoplasm, uncertain whether benign or malignant  EXCISION SKIN MALIGANT 0.6-1CM TRUNK,ARM,LEG    Surgical Pathology    clindamycin (CLEOCIN) 300 MG capsule    Surgical Pathology      2. Vitamin B12 deficiency  cyanocobalamin injection 1,000 mcg      3. Encounter for immunization  Influenza, FLUCELVAX Trivalent, (age 6 mo+) IM, Preservative Free, 0.5mL      4. Antibiotic-induced yeast infection  fluconazole (DIFLUCAN) 150 MG tablet            RLE non healing wound, hyperpgimented  Numerous barriers with referring pt to Derm  Will take elliptical biopsy today; see procedure note  Treat empirically with Clindamycin given biopsy and inability to adequately suture close due to tension     B12 def due to malabsorption from Crohn's disease, stable  B12 injection today  Rpt B12 level    Follow up: 1 month    Patient informed of the risks (including bleeding and infection) and benefits of the   procedure and Written informed consent obtained.    The lesion and surrounding area was given a sterile prep using chlorhexidine and draped in the usual sterile fashion. The

## 2024-10-22 LAB
CPT BILLING CODE: NORMAL
DIAGNOSIS SYNOPSIS:: NORMAL
DX ICD CODE: NORMAL
DX ICD CODE: NORMAL
PATH REPORT.COMMENTS IMP SPEC: NORMAL
PATH REPORT.FINAL DX SPEC: NORMAL
PATH REPORT.GROSS SPEC: NORMAL
PATH REPORT.RELEVANT HX SPEC: NORMAL
PATH REPORT.SITE OF ORIGIN SPEC: NORMAL
PATHOLOGIST NAME: NORMAL
PAYMENT PROCEDURE: NORMAL

## 2024-11-05 ENCOUNTER — PATIENT MESSAGE (OUTPATIENT)
Age: 54
End: 2024-11-05

## 2024-11-05 ENCOUNTER — TELEPHONE (OUTPATIENT)
Age: 54
End: 2024-11-05

## 2024-11-05 DIAGNOSIS — T14.8XXA WOUND INFECTION: Primary | ICD-10-CM

## 2024-11-05 DIAGNOSIS — L08.9 WOUND INFECTION: Primary | ICD-10-CM

## 2024-11-05 RX ORDER — MUPIROCIN 20 MG/G
OINTMENT TOPICAL
Qty: 30 G | Refills: 0 | Status: SHIPPED | OUTPATIENT
Start: 2024-11-05 | End: 2024-11-12

## 2024-11-05 NOTE — TELEPHONE ENCOUNTER
This writer contacted patient in reference to recent biopsy results. Two patient identifiers verified with patient. Patient informed of normal biopsy results per Dr. Perez. Patient verbalized understanding. Patient states are is still painful and tender to the touch, pain is described as a throbbing pain from the inside out. associated with redness along the outer edges of the wound, swelling, denies drainage or bleeding. Patient states she has been cleaning wound and applying triple antibiotic ointment as recommended by physician. Patient was advised to take picture of would and send via [employer] and concerns will be forwarded to physician to obtain next steps in plan of care.

## 2024-11-06 RX ORDER — CEPHALEXIN 500 MG/1
500 CAPSULE ORAL 4 TIMES DAILY
Qty: 28 CAPSULE | Refills: 0 | Status: SHIPPED | OUTPATIENT
Start: 2024-11-06 | End: 2024-11-13

## 2024-11-06 NOTE — TELEPHONE ENCOUNTER
Discussed allergies with pt. Notes that took Keflex a long time ago to which she has documented reaction of hives. Given limited abx options, pt is open to another trial of the medication. Will monitor closely for reaction.

## 2024-11-25 ENCOUNTER — OFFICE VISIT (OUTPATIENT)
Age: 54
End: 2024-11-25
Payer: MEDICAID

## 2024-11-25 VITALS
TEMPERATURE: 98.4 F | SYSTOLIC BLOOD PRESSURE: 132 MMHG | RESPIRATION RATE: 18 BRPM | HEART RATE: 110 BPM | BODY MASS INDEX: 24.14 KG/M2 | DIASTOLIC BLOOD PRESSURE: 82 MMHG | WEIGHT: 131.17 LBS | HEIGHT: 62 IN | OXYGEN SATURATION: 97 %

## 2024-11-25 DIAGNOSIS — L97.911 ULCER OF RIGHT LOWER EXTREMITY, LIMITED TO BREAKDOWN OF SKIN (HCC): Primary | ICD-10-CM

## 2024-11-25 DIAGNOSIS — E53.8 VITAMIN B12 DEFICIENCY: ICD-10-CM

## 2024-11-25 DIAGNOSIS — I73.9 CLAUDICATION (HCC): ICD-10-CM

## 2024-11-25 PROCEDURE — PBSHW PBB SHADOW CHARGE: Performed by: FAMILY MEDICINE

## 2024-11-25 PROCEDURE — 99214 OFFICE O/P EST MOD 30 MIN: CPT | Performed by: FAMILY MEDICINE

## 2024-11-25 PROCEDURE — 96372 THER/PROPH/DIAG INJ SC/IM: CPT | Performed by: FAMILY MEDICINE

## 2024-11-25 RX ORDER — CYANOCOBALAMIN 1000 UG/ML
1000 INJECTION, SOLUTION INTRAMUSCULAR; SUBCUTANEOUS ONCE
Status: COMPLETED | OUTPATIENT
Start: 2024-11-25 | End: 2024-11-25

## 2024-11-25 RX ORDER — MUPIROCIN 20 MG/G
OINTMENT TOPICAL
COMMUNITY
Start: 2024-11-05

## 2024-11-25 RX ORDER — LAMOTRIGINE 100 MG/1
50 TABLET ORAL 2 TIMES DAILY
COMMUNITY
Start: 2024-11-19

## 2024-11-25 RX ADMIN — CYANOCOBALAMIN 1000 MCG: 1000 INJECTION INTRAMUSCULAR; SUBCUTANEOUS at 17:10

## 2024-11-25 ASSESSMENT — PATIENT HEALTH QUESTIONNAIRE - PHQ9
3. TROUBLE FALLING OR STAYING ASLEEP: NEARLY EVERY DAY
SUM OF ALL RESPONSES TO PHQ QUESTIONS 1-9: 13
SUM OF ALL RESPONSES TO PHQ9 QUESTIONS 1 & 2: 4
6. FEELING BAD ABOUT YOURSELF - OR THAT YOU ARE A FAILURE OR HAVE LET YOURSELF OR YOUR FAMILY DOWN: NOT AT ALL
9. THOUGHTS THAT YOU WOULD BE BETTER OFF DEAD, OR OF HURTING YOURSELF: NOT AT ALL
7. TROUBLE CONCENTRATING ON THINGS, SUCH AS READING THE NEWSPAPER OR WATCHING TELEVISION: NOT AT ALL
2. FEELING DOWN, DEPRESSED OR HOPELESS: SEVERAL DAYS
SUM OF ALL RESPONSES TO PHQ QUESTIONS 1-9: 13
SUM OF ALL RESPONSES TO PHQ QUESTIONS 1-9: 13
4. FEELING TIRED OR HAVING LITTLE ENERGY: NEARLY EVERY DAY
SUM OF ALL RESPONSES TO PHQ QUESTIONS 1-9: 13
1. LITTLE INTEREST OR PLEASURE IN DOING THINGS: NEARLY EVERY DAY
10. IF YOU CHECKED OFF ANY PROBLEMS, HOW DIFFICULT HAVE THESE PROBLEMS MADE IT FOR YOU TO DO YOUR WORK, TAKE CARE OF THINGS AT HOME, OR GET ALONG WITH OTHER PEOPLE: SOMEWHAT DIFFICULT
5. POOR APPETITE OR OVEREATING: NOT AT ALL
8. MOVING OR SPEAKING SO SLOWLY THAT OTHER PEOPLE COULD HAVE NOTICED. OR THE OPPOSITE, BEING SO FIGETY OR RESTLESS THAT YOU HAVE BEEN MOVING AROUND A LOT MORE THAN USUAL: NEARLY EVERY DAY

## 2024-11-25 NOTE — PROGRESS NOTES
Edgar Martell is a 54 y.o. female      Chief Complaint   Patient presents with    Wound Check     - no changes in the healing process - reported she just got a new wound on her right ankle  - still have pain from last biopsy around the wound  - has not been able to schedule an appt with dermatology ...still having some issues finding someone to take her insurance       \"Have you been to the ER, urgent care clinic since your last visit?  Hospitalized since your last visit?\"    NO    “Have you seen or consulted any other health care providers outside of [facility] since your last visit?”    NO              Vitals:    11/25/24 1617   BP: 132/82   Site: Right Upper Arm   Position: Sitting   Cuff Size: Medium Adult   Pulse: (!) 110   Resp: 18   Temp: 98.4 °F (36.9 °C)   TempSrc: Oral   SpO2: 97%   Weight: 59.5 kg (131 lb 2.8 oz)   Height: 1.575 m (5' 2.01\")            Health Maintenance Due   Topic Date Due    Hepatitis B vaccine (1 of 3 - 19+ 3-dose series) Never done    COVID-19 Vaccine (4 - 2023-24 season) 09/01/2024    Depression Monitoring  09/18/2024    DTaP/Tdap/Td vaccine (2 - Td or Tdap) 10/08/2024         Medication Reconciliation completed, changes noted.  Please  Update medication list.    
disease, stable  B12 injection today  Rpt B12 level    Follow up: 1 month    Shauna Perez MD    We discussed the expected course, resolution and complications of the diagnosis(es) in detail.  Medication risks, benefits, costs, interactions, and alternatives were discussed as indicated.  I advised her to contact the office if her condition worsens, changes or fails to improve as anticipated. She expressed understanding with the diagnosis(es) and plan.

## 2024-12-23 ENCOUNTER — OFFICE VISIT (OUTPATIENT)
Age: 54
End: 2024-12-23
Payer: MEDICAID

## 2024-12-23 VITALS
RESPIRATION RATE: 18 BRPM | SYSTOLIC BLOOD PRESSURE: 128 MMHG | OXYGEN SATURATION: 97 % | TEMPERATURE: 97.9 F | DIASTOLIC BLOOD PRESSURE: 82 MMHG | WEIGHT: 136.8 LBS | HEART RATE: 86 BPM | HEIGHT: 62 IN | BODY MASS INDEX: 25.17 KG/M2

## 2024-12-23 DIAGNOSIS — T36.95XA ANTIBIOTIC-INDUCED YEAST INFECTION: ICD-10-CM

## 2024-12-23 DIAGNOSIS — H04.123 CHRONIC DRYNESS OF BOTH EYES: ICD-10-CM

## 2024-12-23 DIAGNOSIS — J01.00 ACUTE NON-RECURRENT MAXILLARY SINUSITIS: Primary | ICD-10-CM

## 2024-12-23 DIAGNOSIS — E53.8 VITAMIN B12 DEFICIENCY: ICD-10-CM

## 2024-12-23 DIAGNOSIS — H65.02 NON-RECURRENT ACUTE SEROUS OTITIS MEDIA OF LEFT EAR: ICD-10-CM

## 2024-12-23 DIAGNOSIS — B37.9 ANTIBIOTIC-INDUCED YEAST INFECTION: ICD-10-CM

## 2024-12-23 PROBLEM — K11.7 XEROSTOMIA: Status: ACTIVE | Noted: 2019-08-20

## 2024-12-23 PROBLEM — Z87.11 HX OF GASTRIC ULCER: Status: ACTIVE | Noted: 2020-02-20

## 2024-12-23 PROCEDURE — PBSHW PBB SHADOW CHARGE: Performed by: FAMILY MEDICINE

## 2024-12-23 PROCEDURE — 99213 OFFICE O/P EST LOW 20 MIN: CPT | Performed by: FAMILY MEDICINE

## 2024-12-23 PROCEDURE — 96372 THER/PROPH/DIAG INJ SC/IM: CPT | Performed by: FAMILY MEDICINE

## 2024-12-23 RX ORDER — PERFLUOROHEXYLOCTANE 1 MG/MG
1 SOLUTION OPHTHALMIC
Qty: 3 ML | Refills: 0 | Status: SHIPPED | OUTPATIENT
Start: 2024-12-23

## 2024-12-23 RX ORDER — FLUCONAZOLE 150 MG/1
150 TABLET ORAL ONCE
Qty: 1 TABLET | Refills: 0 | Status: SHIPPED | OUTPATIENT
Start: 2024-12-23 | End: 2024-12-23

## 2024-12-23 RX ORDER — CYANOCOBALAMIN 1000 UG/ML
1000 INJECTION, SOLUTION INTRAMUSCULAR; SUBCUTANEOUS ONCE
Status: COMPLETED | OUTPATIENT
Start: 2024-12-23 | End: 2024-12-23

## 2024-12-23 RX ORDER — LEVOFLOXACIN 500 MG/1
500 TABLET, FILM COATED ORAL DAILY
Qty: 5 TABLET | Refills: 0 | Status: SHIPPED | OUTPATIENT
Start: 2024-12-23 | End: 2024-12-28

## 2024-12-23 RX ADMIN — CYANOCOBALAMIN 1000 MCG: 1000 INJECTION, SOLUTION INTRAMUSCULAR at 17:09

## 2024-12-23 ASSESSMENT — PATIENT HEALTH QUESTIONNAIRE - PHQ9
SUM OF ALL RESPONSES TO PHQ QUESTIONS 1-9: 4
2. FEELING DOWN, DEPRESSED OR HOPELESS: MORE THAN HALF THE DAYS
5. POOR APPETITE OR OVEREATING: NOT AT ALL
3. TROUBLE FALLING OR STAYING ASLEEP: NOT AT ALL
SUM OF ALL RESPONSES TO PHQ QUESTIONS 1-9: 4
4. FEELING TIRED OR HAVING LITTLE ENERGY: NOT AT ALL
9. THOUGHTS THAT YOU WOULD BE BETTER OFF DEAD, OR OF HURTING YOURSELF: NOT AT ALL
6. FEELING BAD ABOUT YOURSELF - OR THAT YOU ARE A FAILURE OR HAVE LET YOURSELF OR YOUR FAMILY DOWN: NOT AT ALL
SUM OF ALL RESPONSES TO PHQ QUESTIONS 1-9: 4
10. IF YOU CHECKED OFF ANY PROBLEMS, HOW DIFFICULT HAVE THESE PROBLEMS MADE IT FOR YOU TO DO YOUR WORK, TAKE CARE OF THINGS AT HOME, OR GET ALONG WITH OTHER PEOPLE: NOT DIFFICULT AT ALL
SUM OF ALL RESPONSES TO PHQ9 QUESTIONS 1 & 2: 4
7. TROUBLE CONCENTRATING ON THINGS, SUCH AS READING THE NEWSPAPER OR WATCHING TELEVISION: NOT AT ALL
8. MOVING OR SPEAKING SO SLOWLY THAT OTHER PEOPLE COULD HAVE NOTICED. OR THE OPPOSITE, BEING SO FIGETY OR RESTLESS THAT YOU HAVE BEEN MOVING AROUND A LOT MORE THAN USUAL: NOT AT ALL
1. LITTLE INTEREST OR PLEASURE IN DOING THINGS: MORE THAN HALF THE DAYS
SUM OF ALL RESPONSES TO PHQ QUESTIONS 1-9: 4

## 2024-12-23 NOTE — PROGRESS NOTES
History of Present Illness:     Chief Complaint   Patient presents with    Wound Check     - something in left ear       Edgar Martell is a 54 y.o. female     Needs B12 injection    Non healing wound on right lower leg  No follow ups made since last visit    Feels like something in her left ear  Ear pain and fullness  Facial tenderness and congestion    Still needs to make follow up with vascular       PMH (REVIEWED):  Past Medical History:   Diagnosis Date    ADHD (attention deficit hyperactivity disorder)     Adverse effect of anesthesia     itching around face and dry rash    Crohn disease (HCC) 4/19/2010    Depression 4/19/2010    GERD (gastroesophageal reflux disease)     History of vascular access device 07/18/2017    [facility] VAT - 33 cm right brachial PICC for abx and limited access    Irritable bowel syndrome     Kidney stones     History of stent placed and removed    Lyme disease     Panic attack     Perforation bowel ([facility]) 7/4/2017    S/p ruddy Jj 7/2017    Peripheral neuropathy 2/2/2012    B12 deficiency MRI brain normal 1/2012 MRA head normal 1/2012 CTA neck normal 1/2012     Renal calculi 9/6/2020    Vertigo     Vitamin B12 deficiency 1/21/2012    164 on 1/2012 Needs 1000 mcg IM twice a week     Vitamin E deficiency        Current Medications/Allergies (REVIEWED):     Current Outpatient Medications on File Prior to Visit   Medication Sig Dispense Refill    lamoTRIgine (LAMICTAL) 100 MG tablet Take 0.5 tablets by mouth 2 times daily      mupirocin (BACTROBAN) 2 % ointment Apply topically      traZODone (DESYREL) 100 MG tablet TAKE 1 AND 1/2 TABLET BY MOUTH AT BEDTIME      ondansetron (ZOFRAN) 4 MG tablet Take 1 tablet by mouth every 8 hours as needed for Nausea 30 tablet 3    Melatonin 5 MG CAPS TAKE 1 CAPSULE BY MOUTH EVERYDAY AT BEDTIME 90 capsule 3    vitamin D (VITAMIN D3) 50 MCG (2000 UT) CAPS capsule Take 1 capsule by mouth daily 90 capsule 3    fexofenadine (ALLEGRA) 180 MG tablet

## 2024-12-23 NOTE — PROGRESS NOTES
Edgar Martell is a 54 y.o. female      Chief Complaint   Patient presents with    Wound Check     - something in left ear       \"Have you been to the ER, urgent care clinic since your last visit?  Hospitalized since your last visit?\"    NO    “Have you seen or consulted any other health care providers outside of [facility] since your last visit?”    NO              Vitals:    12/23/24 1616   BP: 128/82   Site: Right Upper Arm   Position: Sitting   Cuff Size: Large Adult   Pulse: 86   Resp: 18   Temp: 97.9 °F (36.6 °C)   TempSrc: Oral   SpO2: 97%   Weight: 62.1 kg (136 lb 12.8 oz)   Height: 1.575 m (5' 2.01\")            Health Maintenance Due   Topic Date Due    Hepatitis B vaccine (1 of 3 - 19+ 3-dose series) Never done    COVID-19 Vaccine (4 - 2023-24 season) 09/01/2024    DTaP/Tdap/Td vaccine (2 - Td or Tdap) 10/08/2024         Medication Reconciliation completed, changes noted.  Please  Update medication list.

## 2024-12-29 DIAGNOSIS — M62.838 OTHER MUSCLE SPASM: ICD-10-CM

## 2024-12-30 DIAGNOSIS — B37.0 THRUSH: ICD-10-CM

## 2024-12-30 RX ORDER — NYSTATIN 100000 [USP'U]/ML
SUSPENSION ORAL 4 TIMES DAILY
Qty: 60 ML | Refills: 3 | OUTPATIENT
Start: 2024-12-30

## 2024-12-30 RX ORDER — LIDOCAINE 4 G/G
PATCH TOPICAL
Qty: 25 PATCH | Refills: 1 | Status: SHIPPED | OUTPATIENT
Start: 2024-12-30

## 2024-12-30 NOTE — TELEPHONE ENCOUNTER
Medication Refill Request    Edgar Martell is requesting a refill of the following medication(s):   Requested Prescriptions     Pending Prescriptions Disp Refills    lidocaine 4 % external patch [Pharmacy Med Name: LIDOCAINE 4% PATCH] 25 patch 1     Sig: APPLY 1 PATCH ONTO THE SKIN EVERY DAY (LEAVE ON FOR 12 HOURS OFF FOR 12 HOURS)        Listed PCP is Shauna Perez MD   Last provider to prescribe medication: Shauna Perez MD  Last Date of Medication Prescribed: 10.15.24   Date of Last Office Visit at [facility]: 12.23.24   FUTURE APPOINTMENT:   Future Appointments   Date Time Provider Department Center   2/12/2025  3:40 PM Shauna Perez MD [facility] ECC DEP       Please send refill to:    [facility]/pharmacy #[id number] - [place], [place] - [address] - P [phone] - F [phone]  [address] [facility] [address]  Phone: [phone] Fax: [phone]      Please review request and approve or deny with recommendations.

## 2024-12-30 NOTE — TELEPHONE ENCOUNTER
Medication Refill Request    Edgar Martell is requesting a refill of the following medication(s):   Requested Prescriptions     Pending Prescriptions Disp Refills    nystatin (MYCOSTATIN) 757599 UNIT/ML suspension [Pharmacy Med Name: NYSTATIN 100,000 UNIT/ML SUSP] 60 mL 3     Sig: TAKE BY MOUTH FOUR TIMES A DAY        Listed PCP is Shauna Perez MD   Last provider to prescribe medication: Shauna Perez MD   Last Date of Medication Prescribed: 10.15.24   Date of Last Office Visit at [facility]: 12.23.24   FUTURE APPOINTMENT:   Future Appointments   Date Time Provider Department Center   2/12/2025  3:40 PM Shauna Perez MD [facility] ECC DEP       Please send refill to:    [facility]/pharmacy #[id number] - [place], [place] - [address] - P [phone] - F [phone]  [address] [facility] [address]  Phone: [phone] Fax: [phone]      Please review request and approve or deny with recommendations.

## 2024-12-31 ENCOUNTER — TELEPHONE (OUTPATIENT)
Age: 54
End: 2024-12-31

## 2024-12-31 RX ORDER — NYSTATIN 100000 [USP'U]/ML
SUSPENSION ORAL
Qty: 60 ML | Refills: 3 | Status: SHIPPED | OUTPATIENT
Start: 2024-12-31

## 2024-12-31 NOTE — TELEPHONE ENCOUNTER
[facility] pharmacy called into clinic and had questions regarding pt Rx for Nystatin - I looked at the order it doesn't reflect how much to take every four hours.     They only spoke with  but before I call them back I wanted to have all information available.

## 2025-01-02 NOTE — TELEPHONE ENCOUNTER
Spoke w/ Jason at patient pharmacy and updated him with this information     - no further action needed

## 2025-02-11 SDOH — ECONOMIC STABILITY: FOOD INSECURITY: WITHIN THE PAST 12 MONTHS, YOU WORRIED THAT YOUR FOOD WOULD RUN OUT BEFORE YOU GOT MONEY TO BUY MORE.: NEVER TRUE

## 2025-02-11 SDOH — ECONOMIC STABILITY: INCOME INSECURITY: IN THE LAST 12 MONTHS, WAS THERE A TIME WHEN YOU WERE NOT ABLE TO PAY THE MORTGAGE OR RENT ON TIME?: NO

## 2025-02-11 SDOH — ECONOMIC STABILITY: FOOD INSECURITY: WITHIN THE PAST 12 MONTHS, THE FOOD YOU BOUGHT JUST DIDN'T LAST AND YOU DIDN'T HAVE MONEY TO GET MORE.: SOMETIMES TRUE

## 2025-02-11 SDOH — ECONOMIC STABILITY: TRANSPORTATION INSECURITY
IN THE PAST 12 MONTHS, HAS THE LACK OF TRANSPORTATION KEPT YOU FROM MEDICAL APPOINTMENTS OR FROM GETTING MEDICATIONS?: NO

## 2025-02-17 NOTE — TELEPHONE ENCOUNTER
Medication Refill Request    Edgar Martell is requesting a refill of the following medication(s):   Requested Prescriptions     Pending Prescriptions Disp Refills    Melatonin 5 MG CAPS 90 capsule 3     Sig: TAKE 1 CAPSULE BY MOUTH EVERYDAY AT BEDTIME        Listed PCP is Shauna Perez MD   Last provider to prescribe medication: Shauna Perez MD  Last Date of Medication Prescribed: 10.15.24   Date of Last Office Visit at [facility]: 12.23.24   FUTURE APPOINTMENT:   Future Appointments   Date Time Provider Department Center   3/26/2025  2:20 PM Shauna Perez MD [facility] ECC DEP       Please send refill to:    [facility]/pharmacy #[id number] - [place], [place] - [address] - P [phone] - F [phone]  [address] [facility] [address]  Phone: [phone] Fax: [phone]      Please review request and approve or deny with recommendations.

## 2025-02-24 DIAGNOSIS — H04.123 CHRONIC DRYNESS OF BOTH EYES: ICD-10-CM

## 2025-02-25 RX ORDER — PERFLUOROHEXYLOCTANE 1 MG/MG
1 SOLUTION OPHTHALMIC
Qty: 3 ML | Refills: 0 | Status: SHIPPED | OUTPATIENT
Start: 2025-02-25

## 2025-02-25 NOTE — TELEPHONE ENCOUNTER
Medication Refill Request    Edgar Martell is requesting a refill of the following medication(s):   Requested Prescriptions     Pending Prescriptions Disp Refills    Perfluorohexyloctane (MIEBO) 1.338 GM/ML SOLN 3 mL 0     Sig: Place 1 drop into both eyes every 6-8 hours as needed (Dry Eyes)        Listed PCP is Shauna Perez MD   Last provider to prescribe medication: Dr. Perez on 12/23/2024  Date of Last Office Visit at [facility]: 12/23/2024 with Dr. Perez    FUTURE [facility] APPOINTMENT: 3/26/2025    Please send refill to:    [facility]/pharmacy #[id number] - [place], [place] - [address] [facility] - P [phone] - F [phone]  [address] [place] [address]  Phone: [phone] Fax: [phone]      Please review request and approve or deny with recommendations within 48 hours.

## 2025-03-15 DIAGNOSIS — B37.0 THRUSH: ICD-10-CM

## 2025-03-17 RX ORDER — NYSTATIN 100000 [USP'U]/ML
SUSPENSION ORAL
Qty: 60 ML | Refills: 3 | Status: SHIPPED | OUTPATIENT
Start: 2025-03-17

## 2025-03-17 NOTE — TELEPHONE ENCOUNTER
Medication Refill Request    Edgar Martell is requesting a refill of the following medication(s):   Requested Prescriptions     Pending Prescriptions Disp Refills    nystatin (MYCOSTATIN) [id number] UNIT/ML suspension [Pharmacy Med Name: NYSTATIN 100,000 UNIT/ML SUSP] 60 mL 3     Sig: TAKE BY 5 ML BY MOUTH 4 TIMES A DAY        Listed PCP is Shauna Perez MD   Last provider to prescribe medication: Shauna Perez MD  Last Date of Medication Prescribed: 12.31.24   Date of Last Office Visit at [facility]: 12.23.24   FUTURE APPOINTMENT:   Future Appointments   Date Time Provider Department Center   3/26/2025  2:20 PM Shauna Perez MD [facility] ECC DEP       Please send refill to:    [facility]/pharmacy #[id number] - [place], [place] - [address] - P [phone] - F [phone]  [address] [facility] [address]  Phone: [phone] Fax: [phone]      Please review request and approve or deny with recommendations.

## 2025-03-23 DIAGNOSIS — R60.0 PERIPHERAL EDEMA: ICD-10-CM

## 2025-03-23 DIAGNOSIS — R09.81 SINUS CONGESTION: ICD-10-CM

## 2025-03-23 DIAGNOSIS — R14.0 ABDOMINAL BLOATING: ICD-10-CM

## 2025-03-23 DIAGNOSIS — H04.123 CHRONIC DRYNESS OF BOTH EYES: ICD-10-CM

## 2025-03-23 DIAGNOSIS — R11.0 NAUSEA: ICD-10-CM

## 2025-03-23 SDOH — ECONOMIC STABILITY: INCOME INSECURITY: IN THE LAST 12 MONTHS, WAS THERE A TIME WHEN YOU WERE NOT ABLE TO PAY THE MORTGAGE OR RENT ON TIME?: NO

## 2025-03-23 SDOH — ECONOMIC STABILITY: FOOD INSECURITY: WITHIN THE PAST 12 MONTHS, YOU WORRIED THAT YOUR FOOD WOULD RUN OUT BEFORE YOU GOT MONEY TO BUY MORE.: NEVER TRUE

## 2025-03-23 SDOH — ECONOMIC STABILITY: FOOD INSECURITY: WITHIN THE PAST 12 MONTHS, THE FOOD YOU BOUGHT JUST DIDN'T LAST AND YOU DIDN'T HAVE MONEY TO GET MORE.: NEVER TRUE

## 2025-03-24 RX ORDER — PERFLUOROHEXYLOCTANE 1 MG/MG
1 SOLUTION OPHTHALMIC
Qty: 3 ML | Refills: 0 | Status: SHIPPED | OUTPATIENT
Start: 2025-03-24

## 2025-03-24 RX ORDER — ALBUTEROL SULFATE 90 UG/1
INHALANT RESPIRATORY (INHALATION)
Qty: 8.5 EACH | Refills: 3 | Status: SHIPPED | OUTPATIENT
Start: 2025-03-24

## 2025-03-24 RX ORDER — FUROSEMIDE 20 MG/1
10 TABLET ORAL DAILY
Qty: 45 TABLET | Refills: 0 | Status: SHIPPED | OUTPATIENT
Start: 2025-03-24

## 2025-03-24 RX ORDER — MOMETASONE FUROATE MONOHYDRATE 50 UG/1
2 SPRAY, METERED NASAL DAILY
Qty: 17 G | Refills: 2 | Status: SHIPPED | OUTPATIENT
Start: 2025-03-24

## 2025-03-24 RX ORDER — ONDANSETRON 4 MG/1
4 TABLET, FILM COATED ORAL EVERY 8 HOURS PRN
Qty: 30 TABLET | Refills: 3 | Status: SHIPPED | OUTPATIENT
Start: 2025-03-24

## 2025-03-24 NOTE — TELEPHONE ENCOUNTER
Medication Refill Request    Edgar Martell is requesting a refill of the following medication(s):   Requested Prescriptions     Pending Prescriptions Disp Refills    ondansetron (ZOFRAN) 4 MG tablet 30 tablet 3     Sig: Take 1 tablet by mouth every 8 hours as needed for Nausea    mometasone (NASONEX) 50 MCG/ACT nasal spray 17 g 2     Si sprays by Nasal route daily    albuterol sulfate HFA (PROVENTIL;VENTOLIN;PROAIR) 108 (90 Base) MCG/ACT inhaler 8.5 each 3     Sig: INHALE 2 PUFFS INTO THE LUNGS AS NEEDED FOR WHEEZING.    furosemide (LASIX) 20 MG tablet 45 tablet 0     Sig: Take 0.5 tablets by mouth daily    Perfluorohexyloctane (MIEBO) 1.338 GM/ML SOLN 3 mL 0     Sig: Place 1 drop into both eyes every 6-8 hours as needed (Dry Eyes)        Listed PCP is Shauna Perez MD   Last provider to prescribe medication: Dr. Perez on 10/15/24  Date of Last Office Visit at [facility]: 2024 with Dr. Perez    FUTURE [facility] APPOINTMENT: 3/26/2025    Please send refill to:    [facility]/pharmacy #[id number] - [place], [place] - [address] [facility] [address] - P [phone] - F [phone]  [address] [facility] [address]  Phone: [phone] Fax: [phone]      Please review request and approve or deny with recommendations within 48 hours.

## 2025-03-26 ENCOUNTER — OFFICE VISIT (OUTPATIENT)
Age: 55
End: 2025-03-26
Payer: MEDICAID

## 2025-03-26 ENCOUNTER — PATIENT MESSAGE (OUTPATIENT)
Age: 55
End: 2025-03-26

## 2025-03-26 ENCOUNTER — ANCILLARY PROCEDURE (OUTPATIENT)
Age: 55
End: 2025-03-26
Payer: MEDICAID

## 2025-03-26 VITALS
WEIGHT: 131.2 LBS | BODY MASS INDEX: 24.14 KG/M2 | SYSTOLIC BLOOD PRESSURE: 130 MMHG | RESPIRATION RATE: 19 BRPM | DIASTOLIC BLOOD PRESSURE: 78 MMHG | HEIGHT: 62 IN | OXYGEN SATURATION: 98 % | HEART RATE: 92 BPM

## 2025-03-26 DIAGNOSIS — R55 SYNCOPE, UNSPECIFIED SYNCOPE TYPE: ICD-10-CM

## 2025-03-26 DIAGNOSIS — E53.8 VITAMIN B12 DEFICIENCY: ICD-10-CM

## 2025-03-26 DIAGNOSIS — G24.01 TARDIVE DYSKINESIA: ICD-10-CM

## 2025-03-26 DIAGNOSIS — R52 COMPLAINTS OF TOTAL BODY PAIN: ICD-10-CM

## 2025-03-26 DIAGNOSIS — K50.919 CROHN'S DISEASE WITH COMPLICATION, UNSPECIFIED GASTROINTESTINAL TRACT LOCATION (HCC): ICD-10-CM

## 2025-03-26 DIAGNOSIS — R06.2 WHEEZING: ICD-10-CM

## 2025-03-26 DIAGNOSIS — R55 SYNCOPE AND COLLAPSE: Primary | ICD-10-CM

## 2025-03-26 PROCEDURE — 71046 X-RAY EXAM CHEST 2 VIEWS: CPT

## 2025-03-26 PROCEDURE — 99214 OFFICE O/P EST MOD 30 MIN: CPT | Performed by: FAMILY MEDICINE

## 2025-03-26 PROCEDURE — 93010 ELECTROCARDIOGRAM REPORT: CPT | Performed by: FAMILY MEDICINE

## 2025-03-26 PROCEDURE — 96372 THER/PROPH/DIAG INJ SC/IM: CPT | Performed by: FAMILY MEDICINE

## 2025-03-26 PROCEDURE — 93005 ELECTROCARDIOGRAM TRACING: CPT | Performed by: FAMILY MEDICINE

## 2025-03-26 PROCEDURE — PBSHW PBB SHADOW CHARGE: Performed by: FAMILY MEDICINE

## 2025-03-26 RX ORDER — ACETAMINOPHEN 500 MG
1000 TABLET ORAL 3 TIMES DAILY
Qty: 180 TABLET | Refills: 0 | Status: SHIPPED | OUTPATIENT
Start: 2025-03-26 | End: 2025-04-25

## 2025-03-26 RX ORDER — CYANOCOBALAMIN 1000 UG/ML
1000 INJECTION, SOLUTION INTRAMUSCULAR; SUBCUTANEOUS ONCE
Status: COMPLETED | OUTPATIENT
Start: 2025-03-26 | End: 2025-03-26

## 2025-03-26 RX ORDER — ESCITALOPRAM OXALATE 10 MG/1
10 TABLET ORAL DAILY
COMMUNITY
Start: 2025-03-16

## 2025-03-26 RX ADMIN — CYANOCOBALAMIN 1000 MCG: 1000 INJECTION, SOLUTION INTRAMUSCULAR at 16:04

## 2025-03-26 ASSESSMENT — PATIENT HEALTH QUESTIONNAIRE - PHQ9
2. FEELING DOWN, DEPRESSED OR HOPELESS: NEARLY EVERY DAY
SUM OF ALL RESPONSES TO PHQ QUESTIONS 1-9: 6
1. LITTLE INTEREST OR PLEASURE IN DOING THINGS: NEARLY EVERY DAY

## 2025-03-26 NOTE — PROGRESS NOTES
Edgar Martell is a 54 y.o. female      Chief Complaint   Patient presents with    Follow-up     Passed out Monday 03.24 - fell off toilet  - white substance and bumps in mouth  - spots on body are still sore  - body tremors   - body hurts extremely bad       \"Have you been to the ER, urgent care clinic since your last visit?  Hospitalized since your last visit?\"    NO    “Have you seen or consulted any other health care providers outside of [facility] since your last visit?”    NO              Vitals:    03/26/25 1447   BP: 130/78   BP Site: Right Upper Arm   Patient Position: Sitting   BP Cuff Size: Medium Adult   Pulse: 92   Resp: 19   SpO2: 98%   Weight: 59.5 kg (131 lb 3.2 oz)   Height: 1.575 m (5' 2.01\")            Health Maintenance Due   Topic Date Due    DTaP/Tdap/Td vaccine (2 - Td or Tdap) 10/08/2024    GFR test (Diabetes, CKD 3-4, OR last GFR 15-59)  03/06/2025         Medication Reconciliation completed, changes noted.  Please  Update medication list.

## 2025-03-26 NOTE — PROGRESS NOTES
History of Present Illness:     Chief Complaint   Patient presents with    Follow-up     Passed out Monday 03.24 - fell off toilet  - white substance and bumps in mouth  - spots on body are still sore  - body tremors   - body hurts extremely bad       Edgar Martell is a 54 y.o. female     Needs B12 injection    Sores in her mouth   White on the sides of her mouth    Recently fell off the toilet  Passed out Monday  Recalls she was watching TV, her legs felt tingling that day  Went to use the bathroom late that night   Recalls sitting on the toilet, placed her hands on her knees the doesn't remember anything else  Fell on her right side  Woke up on the floor to the dog crying and licking her face 3 hours later   Does admit to drinking Tequila shots that night but later denied this during visit    Bodily pain and tremors       PMH (REVIEWED):  Past Medical History:   Diagnosis Date    ADHD (attention deficit hyperactivity disorder)     Adverse effect of anesthesia     itching around face and dry rash    Crohn disease (HCC) 4/19/2010    Depression 4/19/2010    GERD (gastroesophageal reflux disease)     History of vascular access device 07/18/2017    [facility] VAT - 33 cm right brachial PICC for abx and limited access    Irritable bowel syndrome     Kidney stones     History of stent placed and removed    Lyme disease     Panic attack     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

## 2025-03-27 ENCOUNTER — RESULTS FOLLOW-UP (OUTPATIENT)
Age: 55
End: 2025-03-27

## 2025-03-27 LAB
ALBUMIN SERPL-MCNC: 4.7 G/DL (ref 3.8–4.9)
ALP SERPL-CCNC: 81 IU/L (ref 44–121)
ALT SERPL-CCNC: 13 IU/L (ref 0–32)
AST SERPL-CCNC: 21 IU/L (ref 0–40)
BILIRUB SERPL-MCNC: 0.3 MG/DL (ref 0–1.2)
BUN SERPL-MCNC: 13 MG/DL (ref 6–24)
BUN/CREAT SERPL: 10 (ref 9–23)
CALCIUM SERPL-MCNC: 9.4 MG/DL (ref 8.7–10.2)
CHLORIDE SERPL-SCNC: 100 MMOL/L (ref 96–106)
CO2 SERPL-SCNC: 22 MMOL/L (ref 20–29)
CREAT SERPL-MCNC: 1.3 MG/DL (ref 0.57–1)
EGFRCR SERPLBLD CKD-EPI 2021: 49 ML/MIN/1.73
ERYTHROCYTE [DISTWIDTH] IN BLOOD BY AUTOMATED COUNT: 13.5 % (ref 11.7–15.4)
GLOBULIN SER CALC-MCNC: 2 G/DL (ref 1.5–4.5)
GLUCOSE SERPL-MCNC: 80 MG/DL (ref 70–99)
HCT VFR BLD AUTO: 38.2 % (ref 34–46.6)
HGB BLD-MCNC: 12 G/DL (ref 11.1–15.9)
MCH RBC QN AUTO: 28.1 PG (ref 26.6–33)
MCHC RBC AUTO-ENTMCNC: 31.4 G/DL (ref 31.5–35.7)
MCV RBC AUTO: 90 FL (ref 79–97)
PLATELET # BLD AUTO: 267 X10E3/UL (ref 150–450)
POTASSIUM SERPL-SCNC: 3.6 MMOL/L (ref 3.5–5.2)
PROT SERPL-MCNC: 6.7 G/DL (ref 6–8.5)
RBC # BLD AUTO: 4.27 X10E6/UL (ref 3.77–5.28)
REPORT: NORMAL
SODIUM SERPL-SCNC: 140 MMOL/L (ref 134–144)
TSH SERPL DL<=0.005 MIU/L-ACNC: 1.4 UIU/ML (ref 0.45–4.5)
VIT B12 SERPL-MCNC: >2000 PG/ML (ref 232–1245)
WBC # BLD AUTO: 7.9 X10E3/UL (ref 3.4–10.8)

## 2025-03-28 ENCOUNTER — TELEPHONE (OUTPATIENT)
Age: 55
End: 2025-03-28

## 2025-03-28 NOTE — TELEPHONE ENCOUNTER
This writer verified patient by 2 identifiers with patient. Patient inquired on kidney function results. This writer informed patient per the results from Dr Perez it states \"Your labs overall are doing OK. Your kidney function has gone down a bit, which is the one thing that is different from your norm. I am concerned it may be from some of your medications but it can also be from being dehydrated.\" Patient states she have not been feeling good prior to coming into office so she wasn't drinking much fluids. This writer inquired if patient remains with head movements and have she spoke with psych physician yet. Per patient she states she still feel like crap and having head movements. She states she have not reached out to them but reach out and let them know about her symptoms. No further questions at this time from patient and patient aware of reaching out to Psych physician.

## 2025-03-28 NOTE — TELEPHONE ENCOUNTER
I attempted to speak with the provider or his nurse regarding medical concerns noted by .     At patient last visit she was displaying some symptoms that could be related to the increase of some of her psych medications. Some of the concerns were odd behaviors, repetitive movements which lead to the concern of tardive dyskinesia.    Left VM for nurse to give me a call back to discuss more in depth.

## 2025-04-02 ENCOUNTER — PATIENT MESSAGE (OUTPATIENT)
Age: 55
End: 2025-04-02

## 2025-04-29 DIAGNOSIS — B37.0 THRUSH: ICD-10-CM

## 2025-05-05 ENCOUNTER — TELEPHONE (OUTPATIENT)
Age: 55
End: 2025-05-05

## 2025-05-05 RX ORDER — NYSTATIN 100000 [USP'U]/ML
SUSPENSION ORAL
Qty: 60 ML | Refills: 3 | Status: SHIPPED | OUTPATIENT
Start: 2025-05-05

## 2025-05-05 RX ORDER — GLIPIZIDE 10 MG/1
1 TABLET ORAL EVERY 6 HOURS
Qty: 15 ML | Refills: 1 | Status: SHIPPED | OUTPATIENT
Start: 2025-05-05

## 2025-05-05 NOTE — TELEPHONE ENCOUNTER
Medication Refill Request    Edgar Martell is requesting a refill of the following medication(s):   Requested Prescriptions     Pending Prescriptions Disp Refills    Artificial Tear Solution (GENTEAL TEARS) 0.1-0.2-0.3 % SOLN [Pharmacy Med Name: GENTEAL TEARS 0.1%-0.2%-0.3%] 15 mL 1     Sig: PLACE 1 DROP INTO BOTH EYES EVERY 6 HOURS.    nystatin (MYCOSTATIN) [id number] UNIT/ML suspension [Pharmacy Med Name: NYSTATIN 100,000 UNIT/ML SUSP] 60 mL 3     Sig: TAKE BY 5 ML BY MOUTH 4 TIMES A DAY        Listed PCP is Shauna Perez MD   Last provider to prescribe medication: Shauna Perez MD  Last Date of Medication Prescribed: 10.15.24 - 03.17.25   Date of Last Office Visit at [facility]: 03.26.25   FUTURE APPOINTMENT: No future appointments.    Please send refill to:    [facility]/pharmacy #[id number] - [place], [place] - [address] - P [phone] - F [phone]  [address] [place] [address]  Phone: [phone] Fax: [phone]      Please review request and approve or deny with recommendations.

## 2025-05-27 DIAGNOSIS — R52 COMPLAINTS OF TOTAL BODY PAIN: ICD-10-CM

## 2025-05-28 RX ORDER — PSEUDOEPHED/ACETAMINOPH/DIPHEN 30MG-500MG
TABLET ORAL
Qty: 120 TABLET | Refills: 1 | Status: SHIPPED | OUTPATIENT
Start: 2025-05-28

## 2025-05-28 NOTE — TELEPHONE ENCOUNTER
Medication Refill Request    Edgar Martell is requesting a refill of the following medication(s):   Requested Prescriptions     Pending Prescriptions Disp Refills    Acetaminophen Extra Strength 500 MG TABS [Pharmacy Med Name: ACETAMINOPHEN 500 MG TABLET] 120 tablet 1     Sig: TAKE 2 TABLETS BY MOUTH 3 TIMES A DAY (INS PAYS FOR 4/DAY)        Listed PCP is Shauna Perez MD   Last provider to prescribe medication: Shauna Perez MD  Last Date of Medication Prescribed: 03.26.25   Date of Last Office Visit at [facility]: 03.26.25   FUTURE APPOINTMENT: No future appointments.    Please send refill to:    [facility]/pharmacy #[id number] - [place], [place] - [address] [facility] - P [phone] - F [phone]  [address] [place] [address]  Phone: [phone] Fax: [phone]      Please review request and approve or deny with recommendations.      [Well Developed] : well developed [No Acute Distress] : no acute distress [No Carotid Bruit] : no carotid bruit [Normal S1, S2] : normal S1, S2 [No Murmur] : no murmur [Clear Lung Fields] : clear lung fields [No Respiratory Distress] : no respiratory distress  [Normal Gait] : normal gait [No Edema] : no edema [Normal Radial B/L] : normal radial B/L [Moves all extremities] : moves all extremities [No Focal Deficits] : no focal deficits [Normal Speech] : normal speech [Alert and Oriented] : alert and oriented

## 2025-06-02 DIAGNOSIS — M62.838 OTHER MUSCLE SPASM: ICD-10-CM

## 2025-06-03 RX ORDER — LIDOCAINE 4 G/G
PATCH TOPICAL
Qty: 25 PATCH | Refills: 1 | Status: SHIPPED | OUTPATIENT
Start: 2025-06-03

## 2025-06-03 NOTE — TELEPHONE ENCOUNTER
Medication Refill Request    Edgar Martell is requesting a refill of the following medication(s):   Requested Prescriptions     Pending Prescriptions Disp Refills    lidocaine 4 % external patch [Pharmacy Med Name: LIDOCAINE 4% PATCH] 25 patch 1     Sig: APPLY 1 PATCH ONTO THE SKIN EVERY DAY (LEAVE ON FOR 12 HOURS OFF FOR 12 HOURS)        Listed PCP is Shauna Perez MD   Last provider to prescribe medication: Shauna Perez MD  Last Date of Medication Prescribed: 12.30.24   Date of Last Office Visit at [facility]: 03.26.25   FUTURE APPOINTMENT: No future appointments.    Please send refill to:    [facility]/pharmacy #[id number] - [place], [place] - [address] [facility] - P [phone] - F [phone]  [address] [place] [address]  Phone: [phone] Fax: [phone]      Please review request and approve or deny with recommendations.

## 2025-07-11 DIAGNOSIS — R60.0 PERIPHERAL EDEMA: ICD-10-CM

## 2025-07-11 DIAGNOSIS — R14.0 ABDOMINAL BLOATING: ICD-10-CM

## 2025-07-14 RX ORDER — FUROSEMIDE 20 MG/1
10 TABLET ORAL DAILY
Qty: 45 TABLET | Refills: 0 | Status: SHIPPED | OUTPATIENT
Start: 2025-07-14

## 2025-08-09 DIAGNOSIS — R52 COMPLAINTS OF TOTAL BODY PAIN: ICD-10-CM

## 2025-08-21 RX ORDER — PSEUDOEPHED/ACETAMINOPH/DIPHEN 30MG-500MG
TABLET ORAL
Qty: 120 TABLET | Refills: 1 | Status: SHIPPED | OUTPATIENT
Start: 2025-08-21

## (undated) DEVICE — SUTURE PDS II SZ 0 L60IN ABSRB VLT L48MM CTX 1/2 CIR Z990G

## (undated) DEVICE — CUFF RMFG BP INF SZ 11 DISP -- LAWSON OEM ITEM 238915

## (undated) DEVICE — CYSTOSCOPY PACK: Brand: CONVERTORS

## (undated) DEVICE — SET GRAV CK VLV NEEDLESS ST 3 GANGED 4WAY STPCOCK HI FLO 10

## (undated) DEVICE — GOWN,PLEAT,SPECIALTY,XL,STRL: Brand: MEDLINE

## (undated) DEVICE — (D)PREP SKN CHLRAPRP APPL 26ML -- CONVERT TO ITEM 371833

## (undated) DEVICE — BAG SPEC BIOHZRD 10 X 10 IN --

## (undated) DEVICE — SURGICAL PROCEDURE PACK BASIN MAJ SET CUST NO CAUT

## (undated) DEVICE — SOLIDIFIER MEDC 1200ML -- CONVERT TO 356117

## (undated) DEVICE — (D)SENSOR RMFG 02 PULS OXMTR -- DISC BY MFR USE ITEM 133445

## (undated) DEVICE — SWAB CULT LIQ STUART AGR AERB MOD IN BRK SGL RAYON TIP PLAS 220099] BECTON DICKINSON MICRO]

## (undated) DEVICE — ELECTRODE,RADIOTRANSLUCENT,FOAM,3PK: Brand: MEDLINE

## (undated) DEVICE — COLON CLOSING PACK: Brand: MEDLINE INDUSTRIES, INC.

## (undated) DEVICE — SPONGE LAP 18X18IN STRL -- 5/PK

## (undated) DEVICE — CURVED, LARGE JAW, OPEN SEALER/DIVIDER NANO-COATED: Brand: LIGASURE IMPACT

## (undated) DEVICE — 3M™ CUROS™ DISINFECTING CAP FOR NEEDLELESS CONNECTORS 270/CARTON 20 CARTONS/CASE CFF1-270: Brand: CUROS™

## (undated) DEVICE — SPONGE GZ W4XL4IN COT 12 PLY TYP VII WVN C FLD DSGN

## (undated) DEVICE — SUTURE VCRL SZ 2-0 L27IN ABSRB UD L26MM SH 1/2 CIR J417H

## (undated) DEVICE — FORCEPS BX L240CM JAW DIA2.8MM L CAP W/ NDL MIC MESH TOOTH

## (undated) DEVICE — TOWEL SURG W17XL27IN STD BLU COT NONFENESTRATED PREWASHED

## (undated) DEVICE — SOLUTION IRRIG 1000ML H2O STRL BLT

## (undated) DEVICE — BITEBLOCK ENDOSCP 60FR MAXI WHT POLYETH STURDY W/ VELC WVN

## (undated) DEVICE — CONTAINER SPEC 20 ML LID NEUT BUFF FORMALIN 10 % POLYPR STS

## (undated) DEVICE — DBD-PACK,LAPAROTOMY,2 REINFORCED GOWNS: Brand: MEDLINE

## (undated) DEVICE — INFECTION CONTROL KIT SYS

## (undated) DEVICE — CANN NASAL O2 CAPNOGRAPHY AD -- FILTERLINE

## (undated) DEVICE — SUTURE PERMAHAND SZ 3-0 L30IN NONABSORBABLE BLK SILK BRAID A304H

## (undated) DEVICE — STERILE POLYISOPRENE POWDER-FREE SURGICAL GLOVES: Brand: PROTEXIS

## (undated) DEVICE — BAG BELONG PT PERS CLEAR HANDL

## (undated) DEVICE — ESOPHAGEAL BALLOON DILATATION CATHETER: Brand: CRE FIXED WIRE

## (undated) DEVICE — SUTURE PERMAHAND SZ 2-0 L30IN NONABSORBABLE BLK SILK W/O A305H

## (undated) DEVICE — CULTURETTE SGL EVAC TUBE PALL -- 100/CA

## (undated) DEVICE — REM POLYHESIVE ADULT PATIENT RETURN ELECTRODE: Brand: VALLEYLAB

## (undated) DEVICE — SURGICAL PROCEDURE PACK TISS 3X5 IN ABSORBABLE SEPRAFILM

## (undated) DEVICE — DRAIN SURG 24FR L5/16IN DIA8MM SIL RND HUBLESS FULL FLUT

## (undated) DEVICE — DRAPE,REIN 53X77,STERILE: Brand: MEDLINE

## (undated) DEVICE — NEEDLE HYPO 22GA L1.5IN BLK S STL HUB POLYPR SHLD REG BVL

## (undated) DEVICE — SYR 10ML LUER LOK 1/5ML GRAD --

## (undated) DEVICE — STERILE-Z MAYO STAND COVERS CLEAR POLYETHYLENE STERILE UNIVERSAL FIT 20 PER CASE: Brand: STERILE-Z

## (undated) DEVICE — 3M™ IOBAN™ 2 ANTIMICROBIAL INCISE DRAPE 6650EZ: Brand: IOBAN™ 2

## (undated) DEVICE — 1200 GUARD II KIT W/5MM TUBE W/O VAC TUBE: Brand: GUARDIAN

## (undated) DEVICE — SIMPLICITY FLUFF UNDERPAD 23X36, MODERATE: Brand: SIMPLICITY

## (undated) DEVICE — 3000CC GUARDIAN II: Brand: GUARDIAN

## (undated) DEVICE — AMD ANTIMICROBIAL DRAIN SPONGES, 6 PLY, 0.2% POLYHEXAMETHYLENE BIGUANIDE HCI (PHMB): Brand: EXCILON

## (undated) DEVICE — JELLY,LUBE,STERILE,FLIP TOP,TUBE,4-OZ: Brand: MEDLINE

## (undated) DEVICE — CATH IV AUTOGRD BC PNK 20GA 25 -- INSYTE

## (undated) DEVICE — STERILE POLYISOPRENE POWDER-FREE SURGICAL GLOVES WITH EMOLLIENT COATING: Brand: PROTEXIS

## (undated) DEVICE — CANISTER, RIGID, 3000CC: Brand: MEDLINE INDUSTRIES, INC.

## (undated) DEVICE — SOLUTION IRRIGATION H2O 0797305] ICU MEDICAL INC]

## (undated) DEVICE — TUBING, SUCTION, 1/4" X 10', STRAIGHT: Brand: MEDLINE

## (undated) DEVICE — TUBING IRRIG L77IN DIA0.241IN L BOR FOR CYSTO W/ NVENT

## (undated) DEVICE — HANDLE LT SNAP ON ULT DURABLE LENS FOR TRUMPF ALC DISPOSABLE

## (undated) DEVICE — DRAPE FLD WRM W44XL66IN C6L FOR INTRATEMP SYS THERMABASIN

## (undated) DEVICE — SUT ETHLN 2-0 18IN FS BLK --

## (undated) DEVICE — SUTURE VCRL SZ 0 L18IN ABSRB UD POLYGLACTIN 910 COAT BRAID J646H

## (undated) DEVICE — DEVON™ KNEE AND BODY STRAP 60" X 3" (1.5 M X 7.6 CM): Brand: DEVON

## (undated) DEVICE — ROCKER SWITCH PENCIL BLADE ELECTRODE, HOLSTER: Brand: EDGE

## (undated) DEVICE — Device

## (undated) DEVICE — POOLE SUCTION INSTRUMENT WITH REMOVABLE SHEATH: Brand: POOLE

## (undated) DEVICE — KIT COLON W/ 1.1OZ LUB AND 2 END

## (undated) DEVICE — CANNULA CUSH AD W/ 14FT TBG

## (undated) DEVICE — CATH IV AUTOGRD BC BLU 22GA 25 -- INSYTE

## (undated) DEVICE — STRAP,POSITIONING,KNEE/BODY,FOAM,4X60": Brand: MEDLINE

## (undated) DEVICE — SOLUTION SCRB 2OZ 10% POVIDONE IOD ANTIMIC BTL

## (undated) DEVICE — DEVICE TRNSF SPIK STL 2008S] MICROTEK MEDICAL INC]

## (undated) DEVICE — KENDALL SCD EXPRESS SLEEVES, KNEE LENGTH, MEDIUM: Brand: KENDALL SCD

## (undated) DEVICE — SYR ASSEMB INFL BLLN 60ML --

## (undated) DEVICE — 3M™ MEDIPORE™ H SOFT CLOTH TAPE SHORT ROLL TAPE 6INCHES X 2 YARDS 16 ROLLS/CASE 2866S: Brand: 3M™ MEDIPORE™

## (undated) DEVICE — COVER LT HNDL BLU PLAS

## (undated) DEVICE — OPEN-END URETERAL CATHETER: Brand: COOK

## (undated) DEVICE — SOLUTION IV 1000ML 0.9% SOD CHL

## (undated) DEVICE — SUTURE MCRYL SZ 4-0 L27IN ABSRB UD L19MM PS-2 1/2 CIR PRIM Y426H

## (undated) DEVICE — SOLUTION IRRIG 3000ML 0.9% SOD CHL FLX CONT 0797208] ICU MEDICAL INC]